# Patient Record
Sex: FEMALE | Race: BLACK OR AFRICAN AMERICAN | NOT HISPANIC OR LATINO | Employment: FULL TIME | RURAL
[De-identification: names, ages, dates, MRNs, and addresses within clinical notes are randomized per-mention and may not be internally consistent; named-entity substitution may affect disease eponyms.]

---

## 2018-11-14 ENCOUNTER — HISTORICAL (OUTPATIENT)
Dept: ADMINISTRATIVE | Facility: HOSPITAL | Age: 58
End: 2018-11-14

## 2018-11-15 LAB
LAB AP CLINICAL INFORMATION: NORMAL
LAB AP COMMENTS: NORMAL
LAB AP DIAGNOSIS - HISTORICAL: NORMAL
LAB AP GROSS PATHOLOGY - HISTORICAL: NORMAL
LAB AP SPECIMEN SUBMITTED - HISTORICAL: NORMAL

## 2018-11-28 ENCOUNTER — HISTORICAL (OUTPATIENT)
Dept: ADMINISTRATIVE | Facility: HOSPITAL | Age: 58
End: 2018-11-28

## 2018-11-30 LAB
LAB AP CAP CHECKLIST - HISTORICAL: NORMAL
LAB AP CLINICAL INFORMATION: NORMAL
LAB AP COMMENTS: NORMAL
LAB AP DIAGNOSIS - HISTORICAL: NORMAL
LAB AP GROSS PATHOLOGY - HISTORICAL: NORMAL
LAB AP SPECIMEN SUBMITTED - HISTORICAL: NORMAL

## 2019-10-09 ENCOUNTER — HISTORICAL (OUTPATIENT)
Dept: ADMINISTRATIVE | Facility: HOSPITAL | Age: 59
End: 2019-10-09

## 2019-10-10 LAB
LAB AP CLINICAL INFORMATION: NORMAL
LAB AP DIAGNOSIS - HISTORICAL: NORMAL
LAB AP GROSS PATHOLOGY - HISTORICAL: NORMAL
LAB AP SPECIMEN SUBMITTED - HISTORICAL: NORMAL

## 2020-01-29 ENCOUNTER — HISTORICAL (OUTPATIENT)
Dept: ADMINISTRATIVE | Facility: HOSPITAL | Age: 60
End: 2020-01-29

## 2020-10-13 ENCOUNTER — HISTORICAL (OUTPATIENT)
Dept: ADMINISTRATIVE | Facility: HOSPITAL | Age: 60
End: 2020-10-13

## 2020-10-13 LAB — SARS-COV+SARS-COV-2 AG RESP QL IA.RAPID: NEGATIVE

## 2020-12-01 ENCOUNTER — HISTORICAL (OUTPATIENT)
Dept: ADMINISTRATIVE | Facility: HOSPITAL | Age: 60
End: 2020-12-01

## 2021-03-03 ENCOUNTER — HISTORICAL (OUTPATIENT)
Dept: ADMINISTRATIVE | Facility: HOSPITAL | Age: 61
End: 2021-03-03

## 2021-03-31 ENCOUNTER — HOSPITAL ENCOUNTER (OUTPATIENT)
Dept: RADIOLOGY | Facility: HOSPITAL | Age: 61
Discharge: HOME | End: 2021-03-31
Attending: SURGERY
Payer: COMMERCIAL

## 2021-03-31 ENCOUNTER — OFFICE VISIT (OUTPATIENT)
Dept: SURGERY | Facility: CLINIC | Age: 61
End: 2021-03-31
Payer: COMMERCIAL

## 2021-03-31 ENCOUNTER — HISTORICAL (OUTPATIENT)
Dept: ADMINISTRATIVE | Facility: HOSPITAL | Age: 61
End: 2021-03-31

## 2021-03-31 DIAGNOSIS — E04.1 THYROID NODULE: ICD-10-CM

## 2021-03-31 DIAGNOSIS — E04.1 THYROID NODULE: Primary | ICD-10-CM

## 2021-03-31 PROCEDURE — 88173 PR  INTERPRETATION OF FNA SMEAR: ICD-10-PCS | Mod: 26,,, | Performed by: PATHOLOGY

## 2021-03-31 PROCEDURE — 10005 FNA BX W/US GDN 1ST LES: CPT | Mod: S$PBB,,, | Performed by: SURGERY

## 2021-03-31 PROCEDURE — 88173 CYTOPATH EVAL FNA REPORT: CPT | Mod: 26,,, | Performed by: PATHOLOGY

## 2021-03-31 PROCEDURE — 99999 PR PBB SHADOW E&M-EST. PATIENT-LVL III: CPT | Mod: PBBFAC,,, | Performed by: SURGERY

## 2021-03-31 PROCEDURE — 99204 OFFICE O/P NEW MOD 45 MIN: CPT | Mod: S$PBB,,, | Performed by: SURGERY

## 2021-03-31 PROCEDURE — 88305 TISSUE EXAM BY PATHOLOGIST: CPT | Mod: 26,,, | Performed by: PATHOLOGY

## 2021-03-31 PROCEDURE — 99204 PR OFFICE/OUTPT VISIT, NEW, LEVL IV, 45-59 MIN: ICD-10-PCS | Mod: S$PBB,,, | Performed by: SURGERY

## 2021-03-31 PROCEDURE — 10005 FNA BX W/US GDN 1ST LES: CPT | Mod: PBBFAC

## 2021-03-31 PROCEDURE — 88173 CYTOPATH EVAL FNA REPORT: CPT | Performed by: PATHOLOGY

## 2021-03-31 PROCEDURE — 88305 TISSUE EXAM BY PATHOLOGIST: ICD-10-PCS | Mod: 26,,, | Performed by: PATHOLOGY

## 2021-03-31 PROCEDURE — 99999 PR PBB SHADOW E&M-EST. PATIENT-LVL III: ICD-10-PCS | Mod: PBBFAC,,, | Performed by: SURGERY

## 2021-03-31 PROCEDURE — 10005 PR FINE NEEDLE ASP BIOPSY, W/US GUIDANCE, 1ST LESION: ICD-10-PCS | Mod: S$PBB,,, | Performed by: SURGERY

## 2021-03-31 PROCEDURE — 99213 OFFICE O/P EST LOW 20 MIN: CPT | Mod: PBBFAC | Performed by: SURGERY

## 2021-03-31 PROCEDURE — 88305 TISSUE EXAM BY PATHOLOGIST: CPT | Performed by: PATHOLOGY

## 2021-03-31 RX ORDER — FUROSEMIDE 20 MG/1
20 TABLET ORAL DAILY PRN
COMMUNITY
Start: 2021-02-14 | End: 2021-06-16 | Stop reason: SDUPTHER

## 2021-03-31 RX ORDER — INSULIN ASPART 100 [IU]/ML
INJECTION, SOLUTION INTRAVENOUS; SUBCUTANEOUS
COMMUNITY
Start: 2021-01-26 | End: 2021-06-16 | Stop reason: SDUPTHER

## 2021-03-31 RX ORDER — OLMESARTAN MEDOXOMIL AND HYDROCHLOROTHIAZIDE 40/25 40; 25 MG/1; MG/1
1 TABLET ORAL DAILY
COMMUNITY
Start: 2021-03-24 | End: 2021-06-16 | Stop reason: SDUPTHER

## 2021-03-31 RX ORDER — ROSUVASTATIN CALCIUM 40 MG/1
40 TABLET, COATED ORAL DAILY
COMMUNITY
Start: 2021-01-26 | End: 2021-06-16 | Stop reason: SDUPTHER

## 2021-03-31 RX ORDER — FERROUS SULFATE 324(65)MG
324 TABLET, DELAYED RELEASE (ENTERIC COATED) ORAL DAILY
COMMUNITY
End: 2021-06-16 | Stop reason: SDUPTHER

## 2021-03-31 RX ORDER — EMPAGLIFLOZIN 25 MG/1
25 TABLET, FILM COATED ORAL EVERY MORNING
Status: ON HOLD | COMMUNITY
Start: 2021-02-10 | End: 2021-05-15 | Stop reason: HOSPADM

## 2021-04-01 LAB
INSULIN SERPL-ACNC: NORMAL U[IU]/ML
LAB AP CLINICAL INFORMATION: NORMAL
LAB AP COMMENTS: NORMAL
LAB AP DIAGNOSIS - HISTORICAL: NORMAL
LAB AP MICROSCOPIC PATHOLOGY - HISTORICAL: NORMAL
LAB AP SPECIMEN SUBMITTED - HISTORICAL: NORMAL

## 2021-04-05 ENCOUNTER — TELEPHONE (OUTPATIENT)
Dept: SURGERY | Facility: CLINIC | Age: 61
End: 2021-04-05

## 2021-04-06 DIAGNOSIS — E04.1 THYROID NODULE GREATER THAN OR EQUAL TO 1 CM IN DIAMETER INCIDENTALLY NOTED ON IMAGING STUDY: Primary | ICD-10-CM

## 2021-05-13 ENCOUNTER — HOSPITAL ENCOUNTER (INPATIENT)
Facility: HOSPITAL | Age: 61
LOS: 2 days | Discharge: HOME | DRG: 247 | End: 2021-05-15
Attending: INTERNAL MEDICINE | Admitting: INTERNAL MEDICINE
Payer: COMMERCIAL

## 2021-05-13 DIAGNOSIS — R07.9 CHEST PAIN: ICD-10-CM

## 2021-05-13 DIAGNOSIS — I21.4 NSTEMI (NON-ST ELEVATED MYOCARDIAL INFARCTION): Primary | ICD-10-CM

## 2021-05-13 DIAGNOSIS — I48.91 ATRIAL FIBRILLATION WITH RAPID VENTRICULAR RESPONSE: ICD-10-CM

## 2021-05-13 DIAGNOSIS — I25.10 CORONARY ARTERY DISEASE: ICD-10-CM

## 2021-05-13 PROCEDURE — 11000001 HC ACUTE MED/SURG PRIVATE ROOM

## 2021-05-13 PROCEDURE — 25000003 PHARM REV CODE 250: Performed by: INTERNAL MEDICINE

## 2021-05-13 PROCEDURE — 36415 COLL VENOUS BLD VENIPUNCTURE: CPT | Performed by: INTERNAL MEDICINE

## 2021-05-13 PROCEDURE — 84443 ASSAY THYROID STIM HORMONE: CPT | Performed by: INTERNAL MEDICINE

## 2021-05-13 PROCEDURE — 84484 ASSAY OF TROPONIN QUANT: CPT | Performed by: INTERNAL MEDICINE

## 2021-05-13 RX ORDER — LOSARTAN POTASSIUM 100 MG/1
100 TABLET ORAL DAILY
Status: DISCONTINUED | OUTPATIENT
Start: 2021-05-14 | End: 2021-05-15 | Stop reason: HOSPADM

## 2021-05-13 RX ORDER — DILTIAZEM HYDROCHLORIDE 30 MG/1
30 TABLET, FILM COATED ORAL EVERY 6 HOURS
Status: DISCONTINUED | OUTPATIENT
Start: 2021-05-14 | End: 2021-05-14

## 2021-05-13 RX ORDER — GLUCAGON 1 MG
1 KIT INJECTION
Status: DISCONTINUED | OUTPATIENT
Start: 2021-05-14 | End: 2021-05-15 | Stop reason: HOSPADM

## 2021-05-13 RX ORDER — ACETAMINOPHEN 325 MG/1
650 TABLET ORAL EVERY 4 HOURS PRN
Status: DISCONTINUED | OUTPATIENT
Start: 2021-05-14 | End: 2021-05-14

## 2021-05-13 RX ORDER — ONDANSETRON 2 MG/ML
4 INJECTION INTRAMUSCULAR; INTRAVENOUS EVERY 8 HOURS PRN
Status: DISCONTINUED | OUTPATIENT
Start: 2021-05-14 | End: 2021-05-15 | Stop reason: HOSPADM

## 2021-05-13 RX ORDER — METOPROLOL TARTRATE 25 MG/1
25 TABLET, FILM COATED ORAL 2 TIMES DAILY
Status: DISCONTINUED | OUTPATIENT
Start: 2021-05-14 | End: 2021-05-14

## 2021-05-13 RX ORDER — ACETAMINOPHEN 325 MG/1
650 TABLET ORAL EVERY 8 HOURS PRN
Status: DISCONTINUED | OUTPATIENT
Start: 2021-05-14 | End: 2021-05-14

## 2021-05-13 RX ORDER — DILTIAZEM HYDROCHLORIDE 5 MG/ML
10 INJECTION INTRAVENOUS
Status: DISCONTINUED | OUTPATIENT
Start: 2021-05-14 | End: 2021-05-15 | Stop reason: HOSPADM

## 2021-05-13 RX ORDER — HYDROCODONE BITARTRATE AND ACETAMINOPHEN 5; 325 MG/1; MG/1
1 TABLET ORAL EVERY 6 HOURS PRN
Status: DISCONTINUED | OUTPATIENT
Start: 2021-05-14 | End: 2021-05-15 | Stop reason: HOSPADM

## 2021-05-13 RX ORDER — ATORVASTATIN CALCIUM 10 MG/1
10 TABLET, FILM COATED ORAL DAILY
Status: DISCONTINUED | OUTPATIENT
Start: 2021-05-14 | End: 2021-05-14

## 2021-05-13 RX ORDER — PROCHLORPERAZINE EDISYLATE 5 MG/ML
5 INJECTION INTRAMUSCULAR; INTRAVENOUS EVERY 6 HOURS PRN
Status: DISCONTINUED | OUTPATIENT
Start: 2021-05-14 | End: 2021-05-15 | Stop reason: HOSPADM

## 2021-05-13 RX ORDER — INSULIN ASPART 100 [IU]/ML
0-5 INJECTION, SOLUTION INTRAVENOUS; SUBCUTANEOUS
Status: DISCONTINUED | OUTPATIENT
Start: 2021-05-14 | End: 2021-05-15 | Stop reason: HOSPADM

## 2021-05-13 RX ORDER — FERROUS SULFATE 325(65) MG
325 TABLET ORAL DAILY
Status: DISCONTINUED | OUTPATIENT
Start: 2021-05-14 | End: 2021-05-15 | Stop reason: HOSPADM

## 2021-05-13 RX ORDER — IBUPROFEN 200 MG
24 TABLET ORAL
Status: DISCONTINUED | OUTPATIENT
Start: 2021-05-14 | End: 2021-05-13 | Stop reason: RX

## 2021-05-13 RX ORDER — SODIUM CHLORIDE 0.9 % (FLUSH) 0.9 %
10 SYRINGE (ML) INJECTION
Status: DISCONTINUED | OUTPATIENT
Start: 2021-05-14 | End: 2021-05-15 | Stop reason: HOSPADM

## 2021-05-13 RX ORDER — IBUPROFEN 200 MG
16 TABLET ORAL
Status: DISCONTINUED | OUTPATIENT
Start: 2021-05-14 | End: 2021-05-13 | Stop reason: RX

## 2021-05-13 RX ADMIN — METOPROLOL TARTRATE 25 MG: 25 TABLET, FILM COATED ORAL at 11:05

## 2021-05-13 RX ADMIN — HYDROCODONE BITARTRATE AND ACETAMINOPHEN 1 TABLET: 5; 325 TABLET ORAL at 11:05

## 2021-05-13 RX ADMIN — DILTIAZEM HYDROCHLORIDE 30 MG: 30 TABLET, FILM COATED ORAL at 11:05

## 2021-05-14 PROBLEM — I21.4 NSTEMI (NON-ST ELEVATED MYOCARDIAL INFARCTION): Status: ACTIVE | Noted: 2021-05-14

## 2021-05-14 PROBLEM — E11.9 DIABETES MELLITUS WITHOUT COMPLICATION: Status: ACTIVE | Noted: 2021-05-14

## 2021-05-14 PROBLEM — R07.9 CHEST PAIN: Status: ACTIVE | Noted: 2021-05-14

## 2021-05-14 PROBLEM — C18.9 COLON CANCER: Status: ACTIVE | Noted: 2021-05-14

## 2021-05-14 LAB
ALBUMIN SERPL BCP-MCNC: 3.2 G/DL (ref 3.5–5)
ALBUMIN/GLOB SERPL: 0.9 {RATIO}
ALP SERPL-CCNC: 90 U/L (ref 46–118)
ALT SERPL W P-5'-P-CCNC: 25 U/L (ref 13–56)
ANION GAP SERPL CALCULATED.3IONS-SCNC: 19 MMOL/L (ref 7–16)
AST SERPL W P-5'-P-CCNC: 23 U/L (ref 15–37)
AV INDEX (PROSTH): 0.73
AV VALVE AREA: 1.65 CM2
BASOPHILS # BLD AUTO: 0.05 K/UL (ref 0–0.2)
BASOPHILS NFR BLD AUTO: 0.6 % (ref 0–1)
BILIRUB SERPL-MCNC: 0.2 MG/DL (ref 0–1.2)
BSA FOR ECHO PROCEDURE: 2.02 M2
BUN SERPL-MCNC: 55 MG/DL (ref 7–18)
BUN/CREAT SERPL: 35 (ref 6–20)
CALCIUM SERPL-MCNC: 8.4 MG/DL (ref 8.5–10.1)
CHLORIDE SERPL-SCNC: 104 MMOL/L (ref 98–107)
CO2 SERPL-SCNC: 20 MMOL/L (ref 21–32)
CREAT SERPL-MCNC: 1.58 MG/DL (ref 0.55–1.02)
CV ECHO LV RWT: 0.72 CM
D DIMER PPP FEU-MCNC: 0.5 ΜG/ML (ref 0–0.47)
DIFFERENTIAL METHOD BLD: ABNORMAL
DOP CALC AO VTI: 36.19 CM
DOP CALC LVOT AREA: 2.3 CM2
DOP CALC LVOT DIAMETER: 1.7 CM
DOP CALC LVOT STROKE VOLUME: 59.85 CM3
DOP CALCLVOT PEAK VEL VTI: 26.38 CM
ECHO LV POSTERIOR WALL: 1.3 CM (ref 0.6–1.1)
EJECTION FRACTION: 65 %
EOSINOPHIL # BLD AUTO: 0.14 K/UL (ref 0–0.5)
EOSINOPHIL NFR BLD AUTO: 1.7 % (ref 1–4)
ERYTHROCYTE [DISTWIDTH] IN BLOOD BY AUTOMATED COUNT: 13.3 % (ref 11.5–14.5)
FRACTIONAL SHORTENING: 47 % (ref 28–44)
GLOBULIN SER-MCNC: 3.5 G/DL (ref 2–4)
GLUCOSE SERPL-MCNC: 175 MG/DL (ref 70–105)
GLUCOSE SERPL-MCNC: 198 MG/DL (ref 70–105)
GLUCOSE SERPL-MCNC: 253 MG/DL (ref 74–106)
GLUCOSE SERPL-MCNC: 270 MG/DL (ref 70–105)
HCT VFR BLD AUTO: 36.3 % (ref 38–47)
HGB BLD-MCNC: 10.9 G/DL (ref 12–16)
IMM GRANULOCYTES # BLD AUTO: 0.01 K/UL (ref 0–0.04)
IMM GRANULOCYTES NFR BLD: 0.1 % (ref 0–0.4)
INTERVENTRICULAR SEPTUM: 1.3 CM (ref 0.6–1.1)
LEFT ATRIUM SIZE: 3.9 CM
LEFT INTERNAL DIMENSION IN SYSTOLE: 1.9 CM (ref 2.1–4)
LEFT VENTRICLE MASS INDEX: 83 G/M2
LEFT VENTRICULAR INTERNAL DIMENSION IN DIASTOLE: 3.6 CM (ref 3.5–6)
LEFT VENTRICULAR MASS: 160.07 G
LYMPHOCYTES # BLD AUTO: 3 K/UL (ref 1–4.8)
LYMPHOCYTES NFR BLD AUTO: 36.8 % (ref 27–41)
MCH RBC QN AUTO: 26.7 PG (ref 27–31)
MCHC RBC AUTO-ENTMCNC: 30 G/DL (ref 32–36)
MCV RBC AUTO: 88.8 FL (ref 80–96)
MONOCYTES # BLD AUTO: 0.53 K/UL (ref 0–0.8)
MONOCYTES NFR BLD AUTO: 6.5 % (ref 2–6)
MPC BLD CALC-MCNC: 11.2 FL (ref 9.4–12.4)
NEUTROPHILS # BLD AUTO: 4.42 K/UL (ref 1.8–7.7)
NEUTROPHILS NFR BLD AUTO: 54.3 % (ref 53–65)
NRBC # BLD AUTO: 0 X10E3/UL
NRBC, AUTO (.00): 0 %
PLATELET # BLD AUTO: 373 K/UL (ref 150–400)
POTASSIUM SERPL-SCNC: 4.8 MMOL/L (ref 3.5–5.1)
PROT SERPL-MCNC: 6.7 G/DL (ref 6.4–8.2)
RBC # BLD AUTO: 4.09 M/UL (ref 4.2–5.4)
SODIUM SERPL-SCNC: 138 MMOL/L (ref 136–145)
TROPONIN I SERPL-MCNC: 0.16 NG/ML
TROPONIN I SERPL-MCNC: 0.17 NG/ML
TROPONIN I SERPL-MCNC: 0.19 NG/ML
TSH SERPL DL<=0.005 MIU/L-ACNC: 1.78 UIU/ML (ref 0.36–3.74)
WBC # BLD AUTO: 8.15 K/UL (ref 4.5–11)

## 2021-05-14 PROCEDURE — 82962 GLUCOSE BLOOD TEST: CPT

## 2021-05-14 PROCEDURE — 99223 1ST HOSP IP/OBS HIGH 75: CPT | Mod: 25,,, | Performed by: INTERNAL MEDICINE

## 2021-05-14 PROCEDURE — 93458 L HRT ARTERY/VENTRICLE ANGIO: CPT | Performed by: INTERNAL MEDICINE

## 2021-05-14 PROCEDURE — 99153 MOD SED SAME PHYS/QHP EA: CPT | Performed by: INTERNAL MEDICINE

## 2021-05-14 PROCEDURE — 27000221 HC OXYGEN, UP TO 24 HOURS

## 2021-05-14 PROCEDURE — 25000003 PHARM REV CODE 250: Performed by: INTERNAL MEDICINE

## 2021-05-14 PROCEDURE — 93458 PR CATH PLACE/CORON ANGIO, IMG SUPER/INTERP,W LEFT HEART VENTRICULOGRAPHY: ICD-10-PCS | Mod: 26,XU,, | Performed by: INTERNAL MEDICINE

## 2021-05-14 PROCEDURE — 27000080 OPTIME MED/SURG SUP & DEVICES GENERAL CLASSIFICATION: Performed by: INTERNAL MEDICINE

## 2021-05-14 PROCEDURE — 27100168 OPTIME MED/SURG SUP & DEVICES NON-STERILE SUPPLY: Performed by: INTERNAL MEDICINE

## 2021-05-14 PROCEDURE — 36252 INS CATH REN ART 1ST BILAT: CPT | Mod: ,,, | Performed by: INTERNAL MEDICINE

## 2021-05-14 PROCEDURE — 99152 MOD SED SAME PHYS/QHP 5/>YRS: CPT | Performed by: INTERNAL MEDICINE

## 2021-05-14 PROCEDURE — 27201423 OPTIME MED/SURG SUP & DEVICES STERILE SUPPLY: Performed by: INTERNAL MEDICINE

## 2021-05-14 PROCEDURE — 27800903 OPTIME MED/SURG SUP & DEVICES OTHER IMPLANTS: Performed by: INTERNAL MEDICINE

## 2021-05-14 PROCEDURE — 36252 PR INS CATH REN ART 1ST BILAT: ICD-10-PCS | Mod: ,,, | Performed by: INTERNAL MEDICINE

## 2021-05-14 PROCEDURE — 36415 COLL VENOUS BLD VENIPUNCTURE: CPT | Performed by: INTERNAL MEDICINE

## 2021-05-14 PROCEDURE — 36252 INS CATH REN ART 1ST BILAT: CPT | Performed by: INTERNAL MEDICINE

## 2021-05-14 PROCEDURE — 93458 L HRT ARTERY/VENTRICLE ANGIO: CPT | Mod: 26,XU,, | Performed by: INTERNAL MEDICINE

## 2021-05-14 PROCEDURE — C1887 CATHETER, GUIDING: HCPCS | Performed by: INTERNAL MEDICINE

## 2021-05-14 PROCEDURE — 25500020 PHARM REV CODE 255: Performed by: INTERNAL MEDICINE

## 2021-05-14 PROCEDURE — C9600 PERC DRUG-EL COR STENT SING: HCPCS | Mod: RC | Performed by: INTERNAL MEDICINE

## 2021-05-14 PROCEDURE — 92928 PR STENT: ICD-10-PCS | Mod: XS,RC,, | Performed by: INTERNAL MEDICINE

## 2021-05-14 PROCEDURE — 84484 ASSAY OF TROPONIN QUANT: CPT | Performed by: INTERNAL MEDICINE

## 2021-05-14 PROCEDURE — C1894 INTRO/SHEATH, NON-LASER: HCPCS | Performed by: INTERNAL MEDICINE

## 2021-05-14 PROCEDURE — 63600175 PHARM REV CODE 636 W HCPCS: Performed by: INTERNAL MEDICINE

## 2021-05-14 PROCEDURE — 94761 N-INVAS EAR/PLS OXIMETRY MLT: CPT

## 2021-05-14 PROCEDURE — 80053 COMPREHEN METABOLIC PANEL: CPT | Performed by: INTERNAL MEDICINE

## 2021-05-14 PROCEDURE — 85378 FIBRIN DEGRADE SEMIQUANT: CPT | Performed by: INTERNAL MEDICINE

## 2021-05-14 PROCEDURE — C1760 CLOSURE DEV, VASC: HCPCS | Performed by: INTERNAL MEDICINE

## 2021-05-14 PROCEDURE — 99223 1ST HOSP IP/OBS HIGH 75: CPT | Mod: ,,, | Performed by: INTERNAL MEDICINE

## 2021-05-14 PROCEDURE — 85025 COMPLETE CBC W/AUTO DIFF WBC: CPT | Performed by: INTERNAL MEDICINE

## 2021-05-14 PROCEDURE — 11000001 HC ACUTE MED/SURG PRIVATE ROOM

## 2021-05-14 PROCEDURE — 92928 PRQ TCAT PLMT NTRAC ST 1 LES: CPT | Mod: XS,RC,, | Performed by: INTERNAL MEDICINE

## 2021-05-14 PROCEDURE — 99223 PR INITIAL HOSPITAL CARE,LEVL III: ICD-10-PCS | Mod: 25,,, | Performed by: INTERNAL MEDICINE

## 2021-05-14 PROCEDURE — 25000003 PHARM REV CODE 250: Performed by: NURSE PRACTITIONER

## 2021-05-14 PROCEDURE — C1874 STENT, COATED/COV W/DEL SYS: HCPCS | Performed by: INTERNAL MEDICINE

## 2021-05-14 PROCEDURE — C1769 GUIDE WIRE: HCPCS | Performed by: INTERNAL MEDICINE

## 2021-05-14 PROCEDURE — 99223 PR INITIAL HOSPITAL CARE,LEVL III: ICD-10-PCS | Mod: ,,, | Performed by: INTERNAL MEDICINE

## 2021-05-14 DEVICE — XIENCE SIERRA™ EVEROLIMUS ELUTING CORONARY STENT SYSTEM 2.75 MM X 18 MM / RAPID-EXCHANGE
Type: IMPLANTABLE DEVICE | Site: CORONARY | Status: FUNCTIONAL
Brand: XIENCE SIERRA™

## 2021-05-14 DEVICE — XIENCE SIERRA™ EVEROLIMUS ELUTING CORONARY STENT SYSTEM 2.75 MM X 15 MM / RAPID-EXCHANGE
Type: IMPLANTABLE DEVICE | Site: CORONARY | Status: FUNCTIONAL
Brand: XIENCE SIERRA™

## 2021-05-14 RX ORDER — ASPIRIN 325 MG
TABLET, DELAYED RELEASE (ENTERIC COATED) ORAL
Status: DISCONTINUED | OUTPATIENT
Start: 2021-05-14 | End: 2021-05-14

## 2021-05-14 RX ORDER — MIDAZOLAM HYDROCHLORIDE 5 MG/ML
INJECTION INTRAMUSCULAR; INTRAVENOUS
Status: DISCONTINUED | OUTPATIENT
Start: 2021-05-14 | End: 2021-05-14 | Stop reason: HOSPADM

## 2021-05-14 RX ORDER — DIPHENHYDRAMINE HCL 25 MG
CAPSULE ORAL
Status: DISCONTINUED | OUTPATIENT
Start: 2021-05-14 | End: 2021-05-14 | Stop reason: HOSPADM

## 2021-05-14 RX ORDER — SPIRONOLACTONE 50 MG/1
50 TABLET, FILM COATED ORAL DAILY
Status: ON HOLD | COMMUNITY
End: 2021-05-28 | Stop reason: HOSPADM

## 2021-05-14 RX ORDER — SODIUM CHLORIDE 9 MG/ML
INJECTION, SOLUTION INTRAVENOUS
Status: DISCONTINUED | OUTPATIENT
Start: 2021-05-14 | End: 2021-05-15 | Stop reason: HOSPADM

## 2021-05-14 RX ORDER — SODIUM CHLORIDE 9 MG/ML
INJECTION, SOLUTION INTRAVENOUS CONTINUOUS
Status: DISCONTINUED | OUTPATIENT
Start: 2021-05-14 | End: 2021-05-15 | Stop reason: HOSPADM

## 2021-05-14 RX ORDER — ASPIRIN 81 MG/1
81 TABLET ORAL DAILY
Status: DISCONTINUED | OUTPATIENT
Start: 2021-05-14 | End: 2021-05-14

## 2021-05-14 RX ORDER — NITROGLYCERIN 5 MG/ML
INJECTION, SOLUTION INTRAVENOUS
Status: DISCONTINUED | OUTPATIENT
Start: 2021-05-14 | End: 2021-05-14 | Stop reason: HOSPADM

## 2021-05-14 RX ORDER — LIDOCAINE HYDROCHLORIDE 10 MG/ML
INJECTION INFILTRATION; PERINEURAL
Status: DISCONTINUED | OUTPATIENT
Start: 2021-05-14 | End: 2021-05-14 | Stop reason: HOSPADM

## 2021-05-14 RX ORDER — ASPIRIN 81 MG/1
81 TABLET ORAL DAILY
COMMUNITY
End: 2021-07-14 | Stop reason: ALTCHOICE

## 2021-05-14 RX ORDER — FENTANYL CITRATE 50 UG/ML
INJECTION, SOLUTION INTRAMUSCULAR; INTRAVENOUS
Status: DISCONTINUED | OUTPATIENT
Start: 2021-05-14 | End: 2021-05-14 | Stop reason: HOSPADM

## 2021-05-14 RX ORDER — DIAZEPAM 5 MG/1
TABLET ORAL
Status: DISCONTINUED | OUTPATIENT
Start: 2021-05-14 | End: 2021-05-14 | Stop reason: HOSPADM

## 2021-05-14 RX ORDER — ONDANSETRON 4 MG/1
8 TABLET, ORALLY DISINTEGRATING ORAL EVERY 8 HOURS PRN
Status: DISCONTINUED | OUTPATIENT
Start: 2021-05-14 | End: 2021-05-15 | Stop reason: HOSPADM

## 2021-05-14 RX ORDER — MELOXICAM 7.5 MG/1
7.5 TABLET ORAL DAILY
Status: ON HOLD | COMMUNITY
End: 2021-05-15 | Stop reason: HOSPADM

## 2021-05-14 RX ORDER — ASPIRIN 81 MG/1
81 TABLET ORAL DAILY
Status: DISCONTINUED | OUTPATIENT
Start: 2021-05-14 | End: 2021-05-15 | Stop reason: HOSPADM

## 2021-05-14 RX ORDER — ATORVASTATIN CALCIUM 80 MG/1
80 TABLET, FILM COATED ORAL DAILY
Status: DISCONTINUED | OUTPATIENT
Start: 2021-05-14 | End: 2021-05-15 | Stop reason: HOSPADM

## 2021-05-14 RX ORDER — ACETAMINOPHEN 325 MG/1
650 TABLET ORAL EVERY 4 HOURS PRN
Status: DISCONTINUED | OUTPATIENT
Start: 2021-05-14 | End: 2021-05-15 | Stop reason: HOSPADM

## 2021-05-14 RX ORDER — METOPROLOL TARTRATE 25 MG/1
25 TABLET, FILM COATED ORAL 4 TIMES DAILY
Status: DISCONTINUED | OUTPATIENT
Start: 2021-05-14 | End: 2021-05-15 | Stop reason: HOSPADM

## 2021-05-14 RX ORDER — SODIUM CHLORIDE 450 MG/100ML
INJECTION, SOLUTION INTRAVENOUS CONTINUOUS
Status: DISCONTINUED | OUTPATIENT
Start: 2021-05-14 | End: 2021-05-15 | Stop reason: HOSPADM

## 2021-05-14 RX ADMIN — SODIUM CHLORIDE: 4.5 INJECTION, SOLUTION INTRAVENOUS at 02:05

## 2021-05-14 RX ADMIN — ASPIRIN 81 MG: 81 TABLET, COATED ORAL at 02:05

## 2021-05-14 RX ADMIN — METOPROLOL TARTRATE 25 MG: 25 TABLET, FILM COATED ORAL at 09:05

## 2021-05-14 RX ADMIN — SODIUM CHLORIDE: 9 INJECTION, SOLUTION INTRAVENOUS at 03:05

## 2021-05-14 RX ADMIN — FERROUS SULFATE TAB 325 MG (65 MG ELEMENTAL FE) 325 MG: 325 (65 FE) TAB at 09:05

## 2021-05-14 RX ADMIN — ATORVASTATIN CALCIUM 80 MG: 80 TABLET, FILM COATED ORAL at 09:05

## 2021-05-14 RX ADMIN — TICAGRELOR 90 MG: 90 TABLET ORAL at 09:05

## 2021-05-14 RX ADMIN — ASPIRIN 81 MG: 81 TABLET, COATED ORAL at 09:05

## 2021-05-14 RX ADMIN — DILTIAZEM HYDROCHLORIDE 30 MG: 30 TABLET, FILM COATED ORAL at 05:05

## 2021-05-15 VITALS
HEIGHT: 62 IN | WEIGHT: 205 LBS | TEMPERATURE: 98 F | BODY MASS INDEX: 37.73 KG/M2 | RESPIRATION RATE: 16 BRPM | OXYGEN SATURATION: 99 % | SYSTOLIC BLOOD PRESSURE: 135 MMHG | HEART RATE: 72 BPM | DIASTOLIC BLOOD PRESSURE: 56 MMHG

## 2021-05-15 PROBLEM — N18.4 CKD (CHRONIC KIDNEY DISEASE), STAGE IV: Chronic | Status: ACTIVE | Noted: 2021-05-15

## 2021-05-15 LAB
ANION GAP SERPL CALCULATED.3IONS-SCNC: 16 MMOL/L (ref 7–16)
BASOPHILS # BLD AUTO: 0.04 K/UL (ref 0–0.2)
BASOPHILS NFR BLD AUTO: 0.5 % (ref 0–1)
BUN SERPL-MCNC: 46 MG/DL (ref 7–18)
BUN/CREAT SERPL: 30 (ref 6–20)
CALCIUM SERPL-MCNC: 8.5 MG/DL (ref 8.5–10.1)
CHLORIDE SERPL-SCNC: 106 MMOL/L (ref 98–107)
CHOLEST SERPL-MCNC: 141 MG/DL (ref 0–200)
CHOLEST/HDLC SERPL: 3.3 {RATIO}
CO2 SERPL-SCNC: 23 MMOL/L (ref 21–32)
CREAT SERPL-MCNC: 1.51 MG/DL (ref 0.55–1.02)
DIFFERENTIAL METHOD BLD: ABNORMAL
EOSINOPHIL # BLD AUTO: 0.17 K/UL (ref 0–0.5)
EOSINOPHIL NFR BLD AUTO: 2.3 % (ref 1–4)
ERYTHROCYTE [DISTWIDTH] IN BLOOD BY AUTOMATED COUNT: 13.6 % (ref 11.5–14.5)
GLUCOSE SERPL-MCNC: 216 MG/DL (ref 70–105)
GLUCOSE SERPL-MCNC: 256 MG/DL (ref 74–106)
HCT VFR BLD AUTO: 34.1 % (ref 38–47)
HCT VFR BLD AUTO: 34.2 % (ref 38–47)
HDLC SERPL-MCNC: 43 MG/DL (ref 40–60)
HGB BLD-MCNC: 10.5 G/DL (ref 12–16)
HGB BLD-MCNC: 10.5 G/DL (ref 12–16)
IMM GRANULOCYTES # BLD AUTO: 0.02 K/UL (ref 0–0.04)
IMM GRANULOCYTES NFR BLD: 0.3 % (ref 0–0.4)
LDLC SERPL CALC-MCNC: 62 MG/DL
LDLC/HDLC SERPL: 1.4 {RATIO}
LYMPHOCYTES # BLD AUTO: 2.75 K/UL (ref 1–4.8)
LYMPHOCYTES NFR BLD AUTO: 37.7 % (ref 27–41)
MCH RBC QN AUTO: 27.3 PG (ref 27–31)
MCHC RBC AUTO-ENTMCNC: 30.7 G/DL (ref 32–36)
MCV RBC AUTO: 89.1 FL (ref 80–96)
MONOCYTES # BLD AUTO: 0.48 K/UL (ref 0–0.8)
MONOCYTES NFR BLD AUTO: 6.6 % (ref 2–6)
MPC BLD CALC-MCNC: 11.3 FL (ref 9.4–12.4)
NEUTROPHILS # BLD AUTO: 3.83 K/UL (ref 1.8–7.7)
NEUTROPHILS NFR BLD AUTO: 52.6 % (ref 53–65)
NONHDLC SERPL-MCNC: 98 MG/DL
NRBC # BLD AUTO: 0 X10E3/UL
NRBC, AUTO (.00): 0 %
PLATELET # BLD AUTO: 342 K/UL (ref 150–400)
POTASSIUM SERPL-SCNC: 4.7 MMOL/L (ref 3.5–5.1)
RBC # BLD AUTO: 3.84 M/UL (ref 4.2–5.4)
SODIUM SERPL-SCNC: 140 MMOL/L (ref 136–145)
T4 SERPL-MCNC: 4.8 ΜG/DL (ref 4.8–13.9)
TRIGL SERPL-MCNC: 179 MG/DL (ref 35–150)
TSH SERPL DL<=0.005 MIU/L-ACNC: 2.07 UIU/ML (ref 0.36–3.74)
VLDLC SERPL-MCNC: 36 MG/DL
WBC # BLD AUTO: 7.29 K/UL (ref 4.5–11)

## 2021-05-15 PROCEDURE — 25000003 PHARM REV CODE 250: Performed by: INTERNAL MEDICINE

## 2021-05-15 PROCEDURE — 85014 HEMATOCRIT: CPT | Performed by: HOSPITALIST

## 2021-05-15 PROCEDURE — 99239 HOSP IP/OBS DSCHRG MGMT >30: CPT | Mod: ,,, | Performed by: INTERNAL MEDICINE

## 2021-05-15 PROCEDURE — 99239 PR HOSPITAL DISCHARGE DAY,>30 MIN: ICD-10-PCS | Mod: ,,, | Performed by: INTERNAL MEDICINE

## 2021-05-15 PROCEDURE — 36415 COLL VENOUS BLD VENIPUNCTURE: CPT | Performed by: INTERNAL MEDICINE

## 2021-05-15 PROCEDURE — 36415 COLL VENOUS BLD VENIPUNCTURE: CPT | Performed by: HOSPITALIST

## 2021-05-15 PROCEDURE — 80048 BASIC METABOLIC PNL TOTAL CA: CPT | Performed by: HOSPITALIST

## 2021-05-15 PROCEDURE — 82962 GLUCOSE BLOOD TEST: CPT

## 2021-05-15 PROCEDURE — 63600175 PHARM REV CODE 636 W HCPCS: Performed by: INTERNAL MEDICINE

## 2021-05-15 PROCEDURE — 84443 ASSAY THYROID STIM HORMONE: CPT | Performed by: HOSPITALIST

## 2021-05-15 PROCEDURE — 85018 HEMOGLOBIN: CPT | Performed by: HOSPITALIST

## 2021-05-15 PROCEDURE — 25000003 PHARM REV CODE 250: Performed by: NURSE PRACTITIONER

## 2021-05-15 PROCEDURE — 80061 LIPID PANEL: CPT | Performed by: HOSPITALIST

## 2021-05-15 PROCEDURE — 84436 ASSAY OF TOTAL THYROXINE: CPT | Performed by: HOSPITALIST

## 2021-05-15 PROCEDURE — 25000003 PHARM REV CODE 250: Performed by: HOSPITALIST

## 2021-05-15 PROCEDURE — 85025 COMPLETE CBC W/AUTO DIFF WBC: CPT | Performed by: INTERNAL MEDICINE

## 2021-05-15 PROCEDURE — 94761 N-INVAS EAR/PLS OXIMETRY MLT: CPT

## 2021-05-15 RX ORDER — PANTOPRAZOLE SODIUM 40 MG/1
40 TABLET, DELAYED RELEASE ORAL DAILY
Status: DISCONTINUED | OUTPATIENT
Start: 2021-05-15 | End: 2021-05-15 | Stop reason: HOSPADM

## 2021-05-15 RX ORDER — CLOPIDOGREL BISULFATE 75 MG/1
300 TABLET ORAL ONCE
Status: COMPLETED | OUTPATIENT
Start: 2021-05-15 | End: 2021-05-15

## 2021-05-15 RX ORDER — METOPROLOL TARTRATE 25 MG/1
25 TABLET, FILM COATED ORAL 2 TIMES DAILY
Qty: 30 TABLET | Refills: 2 | Status: SHIPPED | OUTPATIENT
Start: 2021-05-15 | End: 2021-06-01 | Stop reason: SDUPTHER

## 2021-05-15 RX ORDER — CLOPIDOGREL BISULFATE 75 MG/1
75 TABLET ORAL DAILY
Qty: 90 TABLET | Refills: 4 | Status: SHIPPED | OUTPATIENT
Start: 2021-05-15 | End: 2022-01-04 | Stop reason: SDUPTHER

## 2021-05-15 RX ORDER — PANTOPRAZOLE SODIUM 40 MG/1
40 TABLET, DELAYED RELEASE ORAL DAILY
Qty: 30 TABLET | Refills: 11 | Status: SHIPPED | OUTPATIENT
Start: 2021-05-16 | End: 2021-06-16 | Stop reason: SDUPTHER

## 2021-05-15 RX ADMIN — METOPROLOL TARTRATE 25 MG: 25 TABLET, FILM COATED ORAL at 09:05

## 2021-05-15 RX ADMIN — FERROUS SULFATE TAB 325 MG (65 MG ELEMENTAL FE) 325 MG: 325 (65 FE) TAB at 09:05

## 2021-05-15 RX ADMIN — LOSARTAN POTASSIUM 100 MG: 100 TABLET, FILM COATED ORAL at 09:05

## 2021-05-15 RX ADMIN — SODIUM CHLORIDE: 9 INJECTION, SOLUTION INTRAVENOUS at 03:05

## 2021-05-15 RX ADMIN — TICAGRELOR 90 MG: 90 TABLET ORAL at 09:05

## 2021-05-15 RX ADMIN — APIXABAN 5 MG: 5 TABLET, FILM COATED ORAL at 10:05

## 2021-05-15 RX ADMIN — CLOPIDOGREL 300 MG: 75 TABLET, FILM COATED ORAL at 10:05

## 2021-05-15 RX ADMIN — ASPIRIN 81 MG: 81 TABLET, COATED ORAL at 09:05

## 2021-05-15 RX ADMIN — PANTOPRAZOLE SODIUM 40 MG: 40 TABLET, DELAYED RELEASE ORAL at 09:05

## 2021-05-15 RX ADMIN — INSULIN ASPART 2 UNITS: 100 INJECTION, SOLUTION INTRAVENOUS; SUBCUTANEOUS at 05:05

## 2021-05-15 RX ADMIN — ATORVASTATIN CALCIUM 80 MG: 80 TABLET, FILM COATED ORAL at 09:05

## 2021-05-26 ENCOUNTER — OFFICE VISIT (OUTPATIENT)
Dept: CARDIOLOGY | Facility: CLINIC | Age: 61
End: 2021-05-26
Payer: COMMERCIAL

## 2021-05-26 ENCOUNTER — HOSPITAL ENCOUNTER (OUTPATIENT)
Facility: HOSPITAL | Age: 61
Discharge: HOME | End: 2021-05-28
Attending: FAMILY MEDICINE | Admitting: HOSPITALIST
Payer: COMMERCIAL

## 2021-05-26 VITALS
OXYGEN SATURATION: 96 % | WEIGHT: 186 LBS | HEART RATE: 99 BPM | HEIGHT: 62 IN | SYSTOLIC BLOOD PRESSURE: 126 MMHG | DIASTOLIC BLOOD PRESSURE: 70 MMHG | BODY MASS INDEX: 34.23 KG/M2

## 2021-05-26 DIAGNOSIS — Z53.20 PROCEDURE NOT CARRIED OUT BECAUSE OF PATIENT'S DECISION: ICD-10-CM

## 2021-05-26 DIAGNOSIS — R07.89 OTHER CHEST PAIN: ICD-10-CM

## 2021-05-26 DIAGNOSIS — I48.91 ATRIAL FIBRILLATION: Chronic | ICD-10-CM

## 2021-05-26 DIAGNOSIS — C18.9 MALIGNANT NEOPLASM OF COLON, UNSPECIFIED PART OF COLON: ICD-10-CM

## 2021-05-26 DIAGNOSIS — R73.9 HYPERGLYCEMIA: ICD-10-CM

## 2021-05-26 DIAGNOSIS — N18.4 CKD (CHRONIC KIDNEY DISEASE), STAGE IV: ICD-10-CM

## 2021-05-26 DIAGNOSIS — E04.1 THYROID NODULE: ICD-10-CM

## 2021-05-26 DIAGNOSIS — I25.119 CHEST PAIN DUE TO CAD: ICD-10-CM

## 2021-05-26 DIAGNOSIS — I10 HYPERTENSION, UNSPECIFIED TYPE: Primary | ICD-10-CM

## 2021-05-26 DIAGNOSIS — R07.9 CHEST PAIN: ICD-10-CM

## 2021-05-26 DIAGNOSIS — I48.11 LONGSTANDING PERSISTENT ATRIAL FIBRILLATION: ICD-10-CM

## 2021-05-26 DIAGNOSIS — E11.9 DIABETES MELLITUS WITHOUT COMPLICATION: ICD-10-CM

## 2021-05-26 DIAGNOSIS — E87.5 HYPERKALEMIA: ICD-10-CM

## 2021-05-26 DIAGNOSIS — R07.9 CHEST PAIN, UNSPECIFIED TYPE: Primary | ICD-10-CM

## 2021-05-26 DIAGNOSIS — C18.9 COLON CANCER: ICD-10-CM

## 2021-05-26 PROBLEM — I21.4 NSTEMI (NON-ST ELEVATED MYOCARDIAL INFARCTION): Status: RESOLVED | Noted: 2021-05-14 | Resolved: 2021-05-26

## 2021-05-26 LAB
ALBUMIN SERPL BCP-MCNC: 3.4 G/DL (ref 3.5–5)
ALBUMIN/GLOB SERPL: 0.9 {RATIO}
ALP SERPL-CCNC: 118 U/L (ref 46–118)
ALT SERPL W P-5'-P-CCNC: 38 U/L (ref 13–56)
ANION GAP SERPL CALCULATED.3IONS-SCNC: 10 MMOL/L (ref 7–16)
AST SERPL W P-5'-P-CCNC: 26 U/L (ref 15–37)
BASOPHILS # BLD AUTO: 0.05 K/UL (ref 0–0.2)
BASOPHILS NFR BLD AUTO: 0.6 % (ref 0–1)
BILIRUB SERPL-MCNC: 0.2 MG/DL (ref 0–1.2)
BUN SERPL-MCNC: 41 MG/DL (ref 7–18)
BUN/CREAT SERPL: 31 (ref 6–20)
CALCIUM SERPL-MCNC: 8.6 MG/DL (ref 8.5–10.1)
CHLORIDE SERPL-SCNC: 109 MMOL/L (ref 98–107)
CO2 SERPL-SCNC: 25 MMOL/L (ref 21–32)
CREAT SERPL-MCNC: 1.32 MG/DL (ref 0.55–1.02)
DIFFERENTIAL METHOD BLD: ABNORMAL
EOSINOPHIL # BLD AUTO: 0.17 K/UL (ref 0–0.5)
EOSINOPHIL NFR BLD AUTO: 2.2 % (ref 1–4)
ERYTHROCYTE [DISTWIDTH] IN BLOOD BY AUTOMATED COUNT: 13.2 % (ref 11.5–14.5)
EST. AVERAGE GLUCOSE BLD GHB EST-MCNC: 200 MG/DL
GLOBULIN SER-MCNC: 3.7 G/DL (ref 2–4)
GLUCOSE SERPL-MCNC: 111 MG/DL (ref 70–105)
GLUCOSE SERPL-MCNC: 158 MG/DL (ref 74–106)
GLUCOSE SERPL-MCNC: 218 MG/DL (ref 70–105)
HBA1C MFR BLD HPLC: 8.6 % (ref 4.5–6.6)
HCT VFR BLD AUTO: 31.7 % (ref 38–47)
HGB BLD-MCNC: 9.9 G/DL (ref 12–16)
IMM GRANULOCYTES # BLD AUTO: 0.01 K/UL (ref 0–0.04)
IMM GRANULOCYTES NFR BLD: 0.1 % (ref 0–0.4)
LYMPHOCYTES # BLD AUTO: 2.93 K/UL (ref 1–4.8)
LYMPHOCYTES NFR BLD AUTO: 37.2 % (ref 27–41)
MCH RBC QN AUTO: 27.4 PG (ref 27–31)
MCHC RBC AUTO-ENTMCNC: 31.2 G/DL (ref 32–36)
MCV RBC AUTO: 87.8 FL (ref 80–96)
MONOCYTES # BLD AUTO: 0.54 K/UL (ref 0–0.8)
MONOCYTES NFR BLD AUTO: 6.9 % (ref 2–6)
MPC BLD CALC-MCNC: 11.4 FL (ref 9.4–12.4)
NEUTROPHILS # BLD AUTO: 4.18 K/UL (ref 1.8–7.7)
NEUTROPHILS NFR BLD AUTO: 53 % (ref 53–65)
NRBC # BLD AUTO: 0 X10E3/UL
NRBC, AUTO (.00): 0 %
NT-PROBNP SERPL-MCNC: 287 PG/ML (ref 1–125)
PLATELET # BLD AUTO: 325 K/UL (ref 150–400)
POTASSIUM SERPL-SCNC: 5.8 MMOL/L (ref 3.5–5.1)
POTASSIUM SERPL-SCNC: 6.1 MMOL/L (ref 3.5–5.1)
PROT SERPL-MCNC: 7.1 G/DL (ref 6.4–8.2)
RBC # BLD AUTO: 3.61 M/UL (ref 4.2–5.4)
SODIUM SERPL-SCNC: 138 MMOL/L (ref 136–145)
TROPONIN I SERPL-MCNC: <0.017 NG/ML
WBC # BLD AUTO: 7.88 K/UL (ref 4.5–11)

## 2021-05-26 PROCEDURE — 99219 PR INITIAL OBSERVATION CARE,LEVL II: ICD-10-PCS | Mod: ,,, | Performed by: HOSPITALIST

## 2021-05-26 PROCEDURE — 25000003 PHARM REV CODE 250: Performed by: FAMILY MEDICINE

## 2021-05-26 PROCEDURE — 99214 OFFICE O/P EST MOD 30 MIN: CPT | Mod: PBBFAC,25 | Performed by: INTERNAL MEDICINE

## 2021-05-26 PROCEDURE — 36415 COLL VENOUS BLD VENIPUNCTURE: CPT | Performed by: FAMILY MEDICINE

## 2021-05-26 PROCEDURE — 84484 ASSAY OF TROPONIN QUANT: CPT | Mod: 91 | Performed by: FAMILY MEDICINE

## 2021-05-26 PROCEDURE — 84484 ASSAY OF TROPONIN QUANT: CPT | Mod: 91 | Performed by: NURSE PRACTITIONER

## 2021-05-26 PROCEDURE — 82962 GLUCOSE BLOOD TEST: CPT

## 2021-05-26 PROCEDURE — 96374 THER/PROPH/DIAG INJ IV PUSH: CPT

## 2021-05-26 PROCEDURE — 94760 N-INVAS EAR/PLS OXIMETRY 1: CPT

## 2021-05-26 PROCEDURE — 93010 EKG 12-LEAD: ICD-10-PCS | Mod: ,,, | Performed by: HOSPITALIST

## 2021-05-26 PROCEDURE — 93010 EKG 12-LEAD: ICD-10-PCS | Mod: S$PBB,,, | Performed by: INTERNAL MEDICINE

## 2021-05-26 PROCEDURE — 96375 TX/PRO/DX INJ NEW DRUG ADDON: CPT

## 2021-05-26 PROCEDURE — 93005 ELECTROCARDIOGRAM TRACING: CPT

## 2021-05-26 PROCEDURE — 25000003 PHARM REV CODE 250: Performed by: NURSE PRACTITIONER

## 2021-05-26 PROCEDURE — 93010 ELECTROCARDIOGRAM REPORT: CPT | Mod: ,,, | Performed by: HOSPITALIST

## 2021-05-26 PROCEDURE — 84484 ASSAY OF TROPONIN QUANT: CPT | Performed by: FAMILY MEDICINE

## 2021-05-26 PROCEDURE — 83880 ASSAY OF NATRIURETIC PEPTIDE: CPT | Performed by: FAMILY MEDICINE

## 2021-05-26 PROCEDURE — 99499 UNLISTED E&M SERVICE: CPT | Mod: S$PBB,,, | Performed by: INTERNAL MEDICINE

## 2021-05-26 PROCEDURE — G0378 HOSPITAL OBSERVATION PER HR: HCPCS

## 2021-05-26 PROCEDURE — 93005 ELECTROCARDIOGRAM TRACING: CPT | Mod: PBBFAC | Performed by: INTERNAL MEDICINE

## 2021-05-26 PROCEDURE — 25000003 PHARM REV CODE 250: Performed by: HOSPITALIST

## 2021-05-26 PROCEDURE — 93010 EKG 12-LEAD: ICD-10-PCS | Mod: 76,,, | Performed by: INTERNAL MEDICINE

## 2021-05-26 PROCEDURE — 3074F PR MOST RECENT SYSTOLIC BLOOD PRESSURE < 130 MM HG: ICD-10-PCS | Mod: ,,, | Performed by: INTERNAL MEDICINE

## 2021-05-26 PROCEDURE — 93010 ELECTROCARDIOGRAM REPORT: CPT | Mod: 76,,, | Performed by: INTERNAL MEDICINE

## 2021-05-26 PROCEDURE — 3078F PR MOST RECENT DIASTOLIC BLOOD PRESSURE < 80 MM HG: ICD-10-PCS | Mod: ,,, | Performed by: INTERNAL MEDICINE

## 2021-05-26 PROCEDURE — 99285 EMERGENCY DEPT VISIT HI MDM: CPT | Mod: 25

## 2021-05-26 PROCEDURE — 3074F SYST BP LT 130 MM HG: CPT | Mod: ,,, | Performed by: INTERNAL MEDICINE

## 2021-05-26 PROCEDURE — 99219 PR INITIAL OBSERVATION CARE,LEVL II: CPT | Mod: ,,, | Performed by: HOSPITALIST

## 2021-05-26 PROCEDURE — 63600175 PHARM REV CODE 636 W HCPCS: Performed by: FAMILY MEDICINE

## 2021-05-26 PROCEDURE — 3052F HG A1C>EQUAL 8.0%<EQUAL 9.0%: CPT | Mod: ,,, | Performed by: INTERNAL MEDICINE

## 2021-05-26 PROCEDURE — 80053 COMPREHEN METABOLIC PANEL: CPT | Performed by: FAMILY MEDICINE

## 2021-05-26 PROCEDURE — 3008F BODY MASS INDEX DOCD: CPT | Mod: ,,, | Performed by: INTERNAL MEDICINE

## 2021-05-26 PROCEDURE — 83036 HEMOGLOBIN GLYCOSYLATED A1C: CPT | Performed by: NURSE PRACTITIONER

## 2021-05-26 PROCEDURE — 93010 ELECTROCARDIOGRAM REPORT: CPT | Mod: S$PBB,,, | Performed by: INTERNAL MEDICINE

## 2021-05-26 PROCEDURE — 3052F PR MOST RECENT HEMOGLOBIN A1C LEVEL 8.0 - < 9.0%: ICD-10-PCS | Mod: ,,, | Performed by: INTERNAL MEDICINE

## 2021-05-26 PROCEDURE — 84132 ASSAY OF SERUM POTASSIUM: CPT | Mod: 59 | Performed by: FAMILY MEDICINE

## 2021-05-26 PROCEDURE — 99285 PR EMERGENCY DEPT VISIT,LEVEL V: ICD-10-PCS | Mod: ,,, | Performed by: FAMILY MEDICINE

## 2021-05-26 PROCEDURE — 99285 EMERGENCY DEPT VISIT HI MDM: CPT | Mod: ,,, | Performed by: FAMILY MEDICINE

## 2021-05-26 PROCEDURE — 99499 NO LOS: ICD-10-PCS | Mod: S$PBB,,, | Performed by: INTERNAL MEDICINE

## 2021-05-26 PROCEDURE — 85025 COMPLETE CBC W/AUTO DIFF WBC: CPT | Performed by: FAMILY MEDICINE

## 2021-05-26 PROCEDURE — 3078F DIAST BP <80 MM HG: CPT | Mod: ,,, | Performed by: INTERNAL MEDICINE

## 2021-05-26 PROCEDURE — 3008F PR BODY MASS INDEX (BMI) DOCUMENTED: ICD-10-PCS | Mod: ,,, | Performed by: INTERNAL MEDICINE

## 2021-05-26 RX ORDER — ASPIRIN 325 MG
325 TABLET ORAL
Status: COMPLETED | OUTPATIENT
Start: 2021-05-26 | End: 2021-05-26

## 2021-05-26 RX ORDER — LOSARTAN POTASSIUM 50 MG/1
50 TABLET ORAL DAILY
Status: DISCONTINUED | OUTPATIENT
Start: 2021-05-26 | End: 2021-05-28 | Stop reason: HOSPADM

## 2021-05-26 RX ORDER — METOPROLOL TARTRATE 25 MG/1
25 TABLET, FILM COATED ORAL 2 TIMES DAILY
Status: DISCONTINUED | OUTPATIENT
Start: 2021-05-26 | End: 2021-05-28 | Stop reason: HOSPADM

## 2021-05-26 RX ORDER — ATORVASTATIN CALCIUM 10 MG/1
10 TABLET, FILM COATED ORAL NIGHTLY
Status: DISCONTINUED | OUTPATIENT
Start: 2021-05-26 | End: 2021-05-28 | Stop reason: HOSPADM

## 2021-05-26 RX ORDER — GLUCAGON 1 MG
1 KIT INJECTION
Status: DISCONTINUED | OUTPATIENT
Start: 2021-05-26 | End: 2021-05-28 | Stop reason: HOSPADM

## 2021-05-26 RX ORDER — INSULIN ASPART 100 [IU]/ML
0-5 INJECTION, SOLUTION INTRAVENOUS; SUBCUTANEOUS
Status: DISCONTINUED | OUTPATIENT
Start: 2021-05-26 | End: 2021-05-28 | Stop reason: HOSPADM

## 2021-05-26 RX ORDER — IBUPROFEN 200 MG
24 TABLET ORAL
Status: DISCONTINUED | OUTPATIENT
Start: 2021-05-26 | End: 2021-05-28 | Stop reason: HOSPADM

## 2021-05-26 RX ORDER — BUTALBITAL, ACETAMINOPHEN AND CAFFEINE 50; 325; 40 MG/1; MG/1; MG/1
1 TABLET ORAL EVERY 4 HOURS PRN
Status: DISCONTINUED | OUTPATIENT
Start: 2021-05-26 | End: 2021-05-27

## 2021-05-26 RX ORDER — FERROUS SULFATE 325(65) MG
325 TABLET ORAL 2 TIMES DAILY
Status: DISCONTINUED | OUTPATIENT
Start: 2021-05-26 | End: 2021-05-28 | Stop reason: HOSPADM

## 2021-05-26 RX ORDER — ASPIRIN 81 MG/1
81 TABLET ORAL DAILY
Status: DISCONTINUED | OUTPATIENT
Start: 2021-05-27 | End: 2021-05-28 | Stop reason: HOSPADM

## 2021-05-26 RX ORDER — ONDANSETRON 4 MG/1
4 TABLET, ORALLY DISINTEGRATING ORAL
Status: COMPLETED | OUTPATIENT
Start: 2021-05-26 | End: 2021-05-26

## 2021-05-26 RX ORDER — PRAVASTATIN SODIUM 40 MG/1
40 TABLET ORAL NIGHTLY
Status: DISCONTINUED | OUTPATIENT
Start: 2021-05-26 | End: 2021-05-26 | Stop reason: CLARIF

## 2021-05-26 RX ORDER — PANTOPRAZOLE SODIUM 40 MG/1
40 TABLET, DELAYED RELEASE ORAL DAILY
Status: DISCONTINUED | OUTPATIENT
Start: 2021-05-26 | End: 2021-05-28 | Stop reason: HOSPADM

## 2021-05-26 RX ORDER — NITROGLYCERIN 0.4 MG/1
0.4 TABLET SUBLINGUAL EVERY 5 MIN PRN
Status: DISCONTINUED | OUTPATIENT
Start: 2021-05-26 | End: 2021-05-28 | Stop reason: HOSPADM

## 2021-05-26 RX ORDER — MORPHINE SULFATE 4 MG/ML
4 INJECTION, SOLUTION INTRAMUSCULAR; INTRAVENOUS
Status: COMPLETED | OUTPATIENT
Start: 2021-05-26 | End: 2021-05-26

## 2021-05-26 RX ORDER — IBUPROFEN 200 MG
16 TABLET ORAL
Status: DISCONTINUED | OUTPATIENT
Start: 2021-05-26 | End: 2021-05-28 | Stop reason: HOSPADM

## 2021-05-26 RX ORDER — CLOPIDOGREL BISULFATE 75 MG/1
75 TABLET ORAL DAILY
Status: DISCONTINUED | OUTPATIENT
Start: 2021-05-26 | End: 2021-05-28 | Stop reason: HOSPADM

## 2021-05-26 RX ADMIN — BUTALBITAL, ACETAMINOPHEN AND CAFFEINE 1 TABLET: 50; 325; 40 TABLET ORAL at 09:05

## 2021-05-26 RX ADMIN — METOPROLOL TARTRATE 25 MG: 25 TABLET, FILM COATED ORAL at 09:05

## 2021-05-26 RX ADMIN — MORPHINE SULFATE 4 MG: 4 INJECTION INTRAVENOUS at 04:05

## 2021-05-26 RX ADMIN — FERROUS SULFATE TAB 325 MG (65 MG ELEMENTAL FE) 325 MG: 325 (65 FE) TAB at 09:05

## 2021-05-26 RX ADMIN — APIXABAN 5 MG: 5 TABLET, FILM COATED ORAL at 09:05

## 2021-05-26 RX ADMIN — LOSARTAN POTASSIUM 50 MG: 50 TABLET, FILM COATED ORAL at 05:05

## 2021-05-26 RX ADMIN — ASPIRIN 325 MG ORAL TABLET 325 MG: 325 PILL ORAL at 11:05

## 2021-05-26 RX ADMIN — PANTOPRAZOLE SODIUM 40 MG: 40 TABLET, DELAYED RELEASE ORAL at 05:05

## 2021-05-26 RX ADMIN — ONDANSETRON 4 MG: 4 TABLET, ORALLY DISINTEGRATING ORAL at 04:05

## 2021-05-26 RX ADMIN — CLOPIDOGREL 75 MG: 75 TABLET, FILM COATED ORAL at 05:05

## 2021-05-26 RX ADMIN — ATORVASTATIN CALCIUM 10 MG: 10 TABLET, FILM COATED ORAL at 09:05

## 2021-05-27 LAB
ANION GAP SERPL CALCULATED.3IONS-SCNC: 6 MMOL/L (ref 7–16)
BASOPHILS # BLD AUTO: 0.03 K/UL (ref 0–0.2)
BASOPHILS NFR BLD AUTO: 0.4 % (ref 0–1)
BUN SERPL-MCNC: 37 MG/DL (ref 7–18)
BUN/CREAT SERPL: 30 (ref 6–20)
CALCIUM SERPL-MCNC: 8.2 MG/DL (ref 8.5–10.1)
CHLORIDE SERPL-SCNC: 107 MMOL/L (ref 98–107)
CO2 SERPL-SCNC: 26 MMOL/L (ref 21–32)
CREAT SERPL-MCNC: 1.23 MG/DL (ref 0.55–1.02)
DIFFERENTIAL METHOD BLD: ABNORMAL
EOSINOPHIL # BLD AUTO: 0.17 K/UL (ref 0–0.5)
EOSINOPHIL NFR BLD AUTO: 2.5 % (ref 1–4)
ERYTHROCYTE [DISTWIDTH] IN BLOOD BY AUTOMATED COUNT: 13.4 % (ref 11.5–14.5)
GLUCOSE SERPL-MCNC: 169 MG/DL (ref 74–106)
HCT VFR BLD AUTO: 30.2 % (ref 38–47)
HGB BLD-MCNC: 9.2 G/DL (ref 12–16)
IMM GRANULOCYTES # BLD AUTO: 0.01 K/UL (ref 0–0.04)
IMM GRANULOCYTES NFR BLD: 0.1 % (ref 0–0.4)
LYMPHOCYTES # BLD AUTO: 3.27 K/UL (ref 1–4.8)
LYMPHOCYTES NFR BLD AUTO: 47.5 % (ref 27–41)
MCH RBC QN AUTO: 27.2 PG (ref 27–31)
MCHC RBC AUTO-ENTMCNC: 30.5 G/DL (ref 32–36)
MCV RBC AUTO: 89.3 FL (ref 80–96)
MONOCYTES # BLD AUTO: 0.55 K/UL (ref 0–0.8)
MONOCYTES NFR BLD AUTO: 8 % (ref 2–6)
MPC BLD CALC-MCNC: 11.5 FL (ref 9.4–12.4)
NEUTROPHILS # BLD AUTO: 2.86 K/UL (ref 1.8–7.7)
NEUTROPHILS NFR BLD AUTO: 41.5 % (ref 53–65)
NRBC # BLD AUTO: 0 X10E3/UL
NRBC, AUTO (.00): 0 %
PLATELET # BLD AUTO: 316 K/UL (ref 150–400)
POTASSIUM SERPL-SCNC: 5.2 MMOL/L (ref 3.5–5.1)
RBC # BLD AUTO: 3.38 M/UL (ref 4.2–5.4)
SODIUM SERPL-SCNC: 134 MMOL/L (ref 136–145)
TROPONIN I SERPL-MCNC: <0.017 NG/ML
WBC # BLD AUTO: 6.89 K/UL (ref 4.5–11)

## 2021-05-27 PROCEDURE — 36415 COLL VENOUS BLD VENIPUNCTURE: CPT | Performed by: NURSE PRACTITIONER

## 2021-05-27 PROCEDURE — G0378 HOSPITAL OBSERVATION PER HR: HCPCS

## 2021-05-27 PROCEDURE — 25000003 PHARM REV CODE 250: Performed by: NURSE PRACTITIONER

## 2021-05-27 PROCEDURE — 36415 COLL VENOUS BLD VENIPUNCTURE: CPT | Performed by: HOSPITALIST

## 2021-05-27 PROCEDURE — 84484 ASSAY OF TROPONIN QUANT: CPT | Performed by: NURSE PRACTITIONER

## 2021-05-27 PROCEDURE — 27000221 HC OXYGEN, UP TO 24 HOURS

## 2021-05-27 PROCEDURE — 94761 N-INVAS EAR/PLS OXIMETRY MLT: CPT

## 2021-05-27 PROCEDURE — 63600175 PHARM REV CODE 636 W HCPCS: Performed by: HOSPITALIST

## 2021-05-27 PROCEDURE — 96376 TX/PRO/DX INJ SAME DRUG ADON: CPT | Mod: 59

## 2021-05-27 PROCEDURE — 85025 COMPLETE CBC W/AUTO DIFF WBC: CPT | Performed by: NURSE PRACTITIONER

## 2021-05-27 PROCEDURE — 25000003 PHARM REV CODE 250: Performed by: HOSPITALIST

## 2021-05-27 PROCEDURE — 99225 PR SUBSEQUENT OBSERVATION CARE,LEVEL II: CPT | Mod: ,,, | Performed by: HOSPITALIST

## 2021-05-27 PROCEDURE — 99225 PR SUBSEQUENT OBSERVATION CARE,LEVEL II: ICD-10-PCS | Mod: ,,, | Performed by: HOSPITALIST

## 2021-05-27 PROCEDURE — 80048 BASIC METABOLIC PNL TOTAL CA: CPT | Performed by: NURSE PRACTITIONER

## 2021-05-27 RX ORDER — MORPHINE SULFATE 2 MG/ML
2 INJECTION, SOLUTION INTRAMUSCULAR; INTRAVENOUS ONCE
Status: COMPLETED | OUTPATIENT
Start: 2021-05-27 | End: 2021-05-27

## 2021-05-27 RX ORDER — ACETAMINOPHEN 325 MG/1
650 TABLET ORAL EVERY 6 HOURS PRN
Status: DISCONTINUED | OUTPATIENT
Start: 2021-05-27 | End: 2021-05-28 | Stop reason: HOSPADM

## 2021-05-27 RX ADMIN — APIXABAN 5 MG: 5 TABLET, FILM COATED ORAL at 08:05

## 2021-05-27 RX ADMIN — ATORVASTATIN CALCIUM 10 MG: 10 TABLET, FILM COATED ORAL at 08:05

## 2021-05-27 RX ADMIN — METOPROLOL TARTRATE 25 MG: 25 TABLET, FILM COATED ORAL at 08:05

## 2021-05-27 RX ADMIN — ACETAMINOPHEN 650 MG: 325 TABLET ORAL at 09:05

## 2021-05-27 RX ADMIN — PANTOPRAZOLE SODIUM 40 MG: 40 TABLET, DELAYED RELEASE ORAL at 08:05

## 2021-05-27 RX ADMIN — FERROUS SULFATE TAB 325 MG (65 MG ELEMENTAL FE) 325 MG: 325 (65 FE) TAB at 08:05

## 2021-05-27 RX ADMIN — ACETAMINOPHEN 650 MG: 325 TABLET ORAL at 06:05

## 2021-05-27 RX ADMIN — MORPHINE SULFATE 2 MG: 2 INJECTION, SOLUTION INTRAMUSCULAR; INTRAVENOUS at 01:05

## 2021-05-27 RX ADMIN — LOSARTAN POTASSIUM 50 MG: 50 TABLET, FILM COATED ORAL at 08:05

## 2021-05-27 RX ADMIN — CLOPIDOGREL 75 MG: 75 TABLET, FILM COATED ORAL at 08:05

## 2021-05-27 RX ADMIN — ASPIRIN 81 MG: 81 TABLET, COATED ORAL at 08:05

## 2021-05-28 VITALS
RESPIRATION RATE: 20 BRPM | HEART RATE: 57 BPM | DIASTOLIC BLOOD PRESSURE: 46 MMHG | OXYGEN SATURATION: 95 % | HEIGHT: 62 IN | SYSTOLIC BLOOD PRESSURE: 120 MMHG | BODY MASS INDEX: 39.32 KG/M2 | TEMPERATURE: 98 F | WEIGHT: 213.69 LBS

## 2021-05-28 LAB
ANION GAP SERPL CALCULATED.3IONS-SCNC: 14 MMOL/L (ref 7–16)
AORTIC ROOT ANNULUS: 2.2 CM
AORTIC VALVE CUSP SEPERATION: 1.76 CM
BASOPHILS # BLD AUTO: 0.04 K/UL (ref 0–0.2)
BASOPHILS NFR BLD AUTO: 0.6 % (ref 0–1)
BSA FOR ECHO PROCEDURE: 2.06 M2
BUN SERPL-MCNC: 37 MG/DL (ref 7–18)
BUN/CREAT SERPL: 28 (ref 6–20)
CALCIUM SERPL-MCNC: 8.4 MG/DL (ref 8.5–10.1)
CHLORIDE SERPL-SCNC: 107 MMOL/L (ref 98–107)
CO2 SERPL-SCNC: 24 MMOL/L (ref 21–32)
CREAT SERPL-MCNC: 1.31 MG/DL (ref 0.55–1.02)
CV ECHO LV RWT: 0.64 CM
DIFFERENTIAL METHOD BLD: ABNORMAL
ECHO LV POSTERIOR WALL: 1.39 CM (ref 0.6–1.1)
EJECTION FRACTION: 55 %
EOSINOPHIL # BLD AUTO: 0.16 K/UL (ref 0–0.5)
EOSINOPHIL NFR BLD AUTO: 2.4 % (ref 1–4)
ERYTHROCYTE [DISTWIDTH] IN BLOOD BY AUTOMATED COUNT: 13.3 % (ref 11.5–14.5)
FRACTIONAL SHORTENING: 35 % (ref 28–44)
GLUCOSE SERPL-MCNC: 270 MG/DL (ref 74–106)
GLUCOSE SERPL-MCNC: 369 MG/DL (ref 70–105)
HCT VFR BLD AUTO: 31.5 % (ref 38–47)
HGB BLD-MCNC: 9.9 G/DL (ref 12–16)
IMM GRANULOCYTES # BLD AUTO: 0.01 K/UL (ref 0–0.04)
IMM GRANULOCYTES NFR BLD: 0.2 % (ref 0–0.4)
INTERVENTRICULAR SEPTUM: 1.19 CM (ref 0.6–1.1)
IVC OSTIUM: 1.2 CM
LEFT ATRIUM SIZE: 3.3 CM
LEFT INTERNAL DIMENSION IN SYSTOLE: 2.85 CM (ref 2.1–4)
LEFT VENTRICLE MASS INDEX: 106 G/M2
LEFT VENTRICULAR INTERNAL DIMENSION IN DIASTOLE: 4.36 CM (ref 3.5–6)
LEFT VENTRICULAR MASS: 209.74 G
LYMPHOCYTES # BLD AUTO: 2.27 K/UL (ref 1–4.8)
LYMPHOCYTES NFR BLD AUTO: 34.6 % (ref 27–41)
MCH RBC QN AUTO: 27.6 PG (ref 27–31)
MCHC RBC AUTO-ENTMCNC: 31.4 G/DL (ref 32–36)
MCV RBC AUTO: 87.7 FL (ref 80–96)
MONOCYTES # BLD AUTO: 0.43 K/UL (ref 0–0.8)
MONOCYTES NFR BLD AUTO: 6.6 % (ref 2–6)
MPC BLD CALC-MCNC: 11.2 FL (ref 9.4–12.4)
NEUTROPHILS # BLD AUTO: 3.65 K/UL (ref 1.8–7.7)
NEUTROPHILS NFR BLD AUTO: 55.6 % (ref 53–65)
NRBC # BLD AUTO: 0 X10E3/UL
NRBC, AUTO (.00): 0 %
PISA TR MAX VEL: 2.5 M/S
PLATELET # BLD AUTO: 311 K/UL (ref 150–400)
POTASSIUM SERPL-SCNC: 5.8 MMOL/L (ref 3.5–5.1)
RA MAJOR: 3.8 CM
RA PRESSURE: 3 MMHG
RBC # BLD AUTO: 3.59 M/UL (ref 4.2–5.4)
RIGHT VENTRICULAR END-DIASTOLIC DIMENSION: 3 CM
SODIUM SERPL-SCNC: 139 MMOL/L (ref 136–145)
TR MAX PG: 25 MMHG
TRICUSPID ANNULAR PLANE SYSTOLIC EXCURSION: 1.8 CM
TV REST PULMONARY ARTERY PRESSURE: 28 MMHG
WBC # BLD AUTO: 6.56 K/UL (ref 4.5–11)

## 2021-05-28 PROCEDURE — 25000003 PHARM REV CODE 250: Performed by: NURSE PRACTITIONER

## 2021-05-28 PROCEDURE — 82962 GLUCOSE BLOOD TEST: CPT

## 2021-05-28 PROCEDURE — 27000221 HC OXYGEN, UP TO 24 HOURS

## 2021-05-28 PROCEDURE — G0378 HOSPITAL OBSERVATION PER HR: HCPCS

## 2021-05-28 PROCEDURE — 99214 PR OFFICE/OUTPT VISIT, EST, LEVL IV, 30-39 MIN: ICD-10-PCS | Mod: ,,, | Performed by: INTERNAL MEDICINE

## 2021-05-28 PROCEDURE — 99214 OFFICE O/P EST MOD 30 MIN: CPT | Mod: ,,, | Performed by: INTERNAL MEDICINE

## 2021-05-28 PROCEDURE — 85025 COMPLETE CBC W/AUTO DIFF WBC: CPT | Performed by: HOSPITALIST

## 2021-05-28 PROCEDURE — 94761 N-INVAS EAR/PLS OXIMETRY MLT: CPT

## 2021-05-28 PROCEDURE — 36415 COLL VENOUS BLD VENIPUNCTURE: CPT | Performed by: HOSPITALIST

## 2021-05-28 PROCEDURE — 80048 BASIC METABOLIC PNL TOTAL CA: CPT | Performed by: HOSPITALIST

## 2021-05-28 RX ADMIN — PANTOPRAZOLE SODIUM 40 MG: 40 TABLET, DELAYED RELEASE ORAL at 08:05

## 2021-05-28 RX ADMIN — METOPROLOL TARTRATE 25 MG: 25 TABLET, FILM COATED ORAL at 08:05

## 2021-05-28 RX ADMIN — APIXABAN 5 MG: 5 TABLET, FILM COATED ORAL at 08:05

## 2021-05-28 RX ADMIN — LOSARTAN POTASSIUM 50 MG: 50 TABLET, FILM COATED ORAL at 08:05

## 2021-05-28 RX ADMIN — ASPIRIN 81 MG: 81 TABLET, COATED ORAL at 08:05

## 2021-05-28 RX ADMIN — FERROUS SULFATE TAB 325 MG (65 MG ELEMENTAL FE) 325 MG: 325 (65 FE) TAB at 09:05

## 2021-05-28 RX ADMIN — CLOPIDOGREL 75 MG: 75 TABLET, FILM COATED ORAL at 08:05

## 2021-06-01 DIAGNOSIS — I25.10 CORONARY ARTERIOSCLEROSIS: ICD-10-CM

## 2021-06-01 DIAGNOSIS — I48.91 ATRIAL FIBRILLATION, UNSPECIFIED TYPE: Primary | ICD-10-CM

## 2021-06-02 RX ORDER — METOPROLOL TARTRATE 25 MG/1
25 TABLET, FILM COATED ORAL 2 TIMES DAILY
Qty: 60 TABLET | Refills: 3 | Status: SHIPPED | OUTPATIENT
Start: 2021-06-02 | End: 2021-12-20 | Stop reason: SDUPTHER

## 2021-06-06 PROBLEM — I25.119 CHEST PAIN DUE TO CAD: Status: ACTIVE | Noted: 2021-05-26

## 2021-06-16 ENCOUNTER — OFFICE VISIT (OUTPATIENT)
Dept: FAMILY MEDICINE | Facility: CLINIC | Age: 61
End: 2021-06-16
Payer: COMMERCIAL

## 2021-06-16 VITALS
DIASTOLIC BLOOD PRESSURE: 72 MMHG | HEIGHT: 60 IN | BODY MASS INDEX: 42.01 KG/M2 | SYSTOLIC BLOOD PRESSURE: 139 MMHG | HEART RATE: 76 BPM | RESPIRATION RATE: 18 BRPM | WEIGHT: 214 LBS | TEMPERATURE: 98 F | OXYGEN SATURATION: 100 %

## 2021-06-16 DIAGNOSIS — I10 HYPERTENSION, UNSPECIFIED TYPE: ICD-10-CM

## 2021-06-16 DIAGNOSIS — Z00.01 ENCOUNTER FOR GENERAL ADULT MEDICAL EXAMINATION WITH ABNORMAL FINDINGS: Primary | ICD-10-CM

## 2021-06-16 DIAGNOSIS — Z13.220 SCREENING FOR LIPID DISORDERS: ICD-10-CM

## 2021-06-16 DIAGNOSIS — Z13.1 SCREENING FOR DIABETES MELLITUS (DM): ICD-10-CM

## 2021-06-16 DIAGNOSIS — Z79.4 TYPE 2 DIABETES MELLITUS WITH PROLIFERATIVE RETINOPATHY OF BOTH EYES, WITH LONG-TERM CURRENT USE OF INSULIN, MACULAR EDEMA PRESENCE UNSPECIFIED, UNSPECIFIED PROLIFERATIVE RETINOPATHY TYPE: ICD-10-CM

## 2021-06-16 DIAGNOSIS — E11.3593 TYPE 2 DIABETES MELLITUS WITH PROLIFERATIVE RETINOPATHY OF BOTH EYES, WITH LONG-TERM CURRENT USE OF INSULIN, MACULAR EDEMA PRESENCE UNSPECIFIED, UNSPECIFIED PROLIFERATIVE RETINOPATHY TYPE: ICD-10-CM

## 2021-06-16 DIAGNOSIS — Z12.4 SCREENING FOR MALIGNANT NEOPLASM OF CERVIX: ICD-10-CM

## 2021-06-16 PROBLEM — K20.90 GASTROESOPHAGITIS: Status: ACTIVE | Noted: 2021-06-16

## 2021-06-16 PROBLEM — D64.9 ANEMIA: Status: ACTIVE | Noted: 2021-06-16

## 2021-06-16 PROBLEM — E78.5 HYPERLIPIDEMIA: Status: ACTIVE | Noted: 2021-06-16

## 2021-06-16 PROBLEM — K29.70 GASTROESOPHAGITIS: Status: ACTIVE | Noted: 2021-06-16

## 2021-06-16 LAB
CHOLEST SERPL-MCNC: 143 MG/DL (ref 0–200)
CHOLEST/HDLC SERPL: 2.3 {RATIO}
GLUCOSE SERPL-MCNC: 130 MG/DL (ref 74–106)
HDLC SERPL-MCNC: 61 MG/DL (ref 40–60)
LDLC SERPL CALC-MCNC: 64 MG/DL
LDLC/HDLC SERPL: 1 {RATIO}
NONHDLC SERPL-MCNC: 82 MG/DL
TRIGL SERPL-MCNC: 90 MG/DL (ref 35–150)
VLDLC SERPL-MCNC: 18 MG/DL

## 2021-06-16 PROCEDURE — 3008F BODY MASS INDEX DOCD: CPT | Mod: ,,, | Performed by: FAMILY MEDICINE

## 2021-06-16 PROCEDURE — 82947 ASSAY GLUCOSE BLOOD QUANT: CPT | Mod: ,,, | Performed by: CLINICAL MEDICAL LABORATORY

## 2021-06-16 PROCEDURE — 80061 LIPID PANEL: CPT | Mod: ,,, | Performed by: CLINICAL MEDICAL LABORATORY

## 2021-06-16 PROCEDURE — 3052F PR MOST RECENT HEMOGLOBIN A1C LEVEL 8.0 - < 9.0%: ICD-10-PCS | Mod: ,,, | Performed by: FAMILY MEDICINE

## 2021-06-16 PROCEDURE — 80061 LIPID PANEL: ICD-10-PCS | Mod: ,,, | Performed by: CLINICAL MEDICAL LABORATORY

## 2021-06-16 PROCEDURE — 1126F AMNT PAIN NOTED NONE PRSNT: CPT | Mod: ,,, | Performed by: FAMILY MEDICINE

## 2021-06-16 PROCEDURE — 99396 PREV VISIT EST AGE 40-64: CPT | Mod: ,,, | Performed by: FAMILY MEDICINE

## 2021-06-16 PROCEDURE — 3008F PR BODY MASS INDEX (BMI) DOCUMENTED: ICD-10-PCS | Mod: ,,, | Performed by: FAMILY MEDICINE

## 2021-06-16 PROCEDURE — 3052F HG A1C>EQUAL 8.0%<EQUAL 9.0%: CPT | Mod: ,,, | Performed by: FAMILY MEDICINE

## 2021-06-16 PROCEDURE — 82947 GLUCOSE, FASTING: ICD-10-PCS | Mod: ,,, | Performed by: CLINICAL MEDICAL LABORATORY

## 2021-06-16 PROCEDURE — 99396 PR PREVENTIVE VISIT,EST,40-64: ICD-10-PCS | Mod: ,,, | Performed by: FAMILY MEDICINE

## 2021-06-16 PROCEDURE — 1126F PR PAIN SEVERITY QUANTIFIED, NO PAIN PRESENT: ICD-10-PCS | Mod: ,,, | Performed by: FAMILY MEDICINE

## 2021-06-16 RX ORDER — ROSUVASTATIN CALCIUM 40 MG/1
40 TABLET, COATED ORAL DAILY
Qty: 90 TABLET | Refills: 1 | Status: SHIPPED | OUTPATIENT
Start: 2021-06-16 | End: 2021-12-20 | Stop reason: SDUPTHER

## 2021-06-16 RX ORDER — CITALOPRAM 10 MG/1
10 TABLET ORAL DAILY
Qty: 30 TABLET | Refills: 5 | Status: SHIPPED | OUTPATIENT
Start: 2021-06-16 | End: 2021-10-24 | Stop reason: CLARIF

## 2021-06-16 RX ORDER — FERROUS SULFATE 324(65)MG
324 TABLET, DELAYED RELEASE (ENTERIC COATED) ORAL DAILY
Qty: 90 TABLET | Refills: 1 | Status: SHIPPED | OUTPATIENT
Start: 2021-06-16 | End: 2021-07-14 | Stop reason: SDUPTHER

## 2021-06-16 RX ORDER — OLMESARTAN MEDOXOMIL AND HYDROCHLOROTHIAZIDE 40/25 40; 25 MG/1; MG/1
1 TABLET ORAL DAILY
Qty: 90 TABLET | Refills: 1 | Status: SHIPPED | OUTPATIENT
Start: 2021-06-16 | End: 2021-12-20 | Stop reason: SDUPTHER

## 2021-06-16 RX ORDER — FUROSEMIDE 20 MG/1
20 TABLET ORAL DAILY PRN
Qty: 90 TABLET | Refills: 1 | Status: SHIPPED | OUTPATIENT
Start: 2021-06-16 | End: 2022-10-19 | Stop reason: SDUPTHER

## 2021-06-16 RX ORDER — PANTOPRAZOLE SODIUM 40 MG/1
40 TABLET, DELAYED RELEASE ORAL DAILY
Qty: 30 TABLET | Refills: 11 | Status: SHIPPED | OUTPATIENT
Start: 2021-06-16 | End: 2021-12-20 | Stop reason: SDUPTHER

## 2021-06-16 RX ORDER — INSULIN ASPART 100 [IU]/ML
20 INJECTION, SOLUTION INTRAVENOUS; SUBCUTANEOUS 2 TIMES DAILY
Qty: 12 SYRINGE | Refills: 1 | Status: SHIPPED | OUTPATIENT
Start: 2021-06-16 | End: 2021-12-20 | Stop reason: SDUPTHER

## 2021-06-17 DIAGNOSIS — R73.9 HYPERGLYCEMIA: Primary | ICD-10-CM

## 2021-06-17 PROBLEM — E11.39 TYPE 2 DIABETES MELLITUS WITH OPHTHALMIC COMPLICATION: Status: ACTIVE | Noted: 2021-05-14

## 2021-06-18 DIAGNOSIS — E11.9 TYPE 2 DIABETES MELLITUS WITHOUT COMPLICATION, WITH LONG-TERM CURRENT USE OF INSULIN: Primary | ICD-10-CM

## 2021-06-18 DIAGNOSIS — Z79.4 TYPE 2 DIABETES MELLITUS WITHOUT COMPLICATION, WITH LONG-TERM CURRENT USE OF INSULIN: Primary | ICD-10-CM

## 2021-06-18 DIAGNOSIS — R73.9 HYPERGLYCEMIA: Primary | ICD-10-CM

## 2021-06-30 ENCOUNTER — OFFICE VISIT (OUTPATIENT)
Dept: CARDIOLOGY | Facility: CLINIC | Age: 61
End: 2021-06-30
Payer: COMMERCIAL

## 2021-06-30 VITALS
OXYGEN SATURATION: 98 % | HEART RATE: 83 BPM | HEIGHT: 62 IN | WEIGHT: 213 LBS | BODY MASS INDEX: 39.2 KG/M2 | SYSTOLIC BLOOD PRESSURE: 122 MMHG | DIASTOLIC BLOOD PRESSURE: 66 MMHG

## 2021-06-30 DIAGNOSIS — I25.110 ATHEROSCLEROSIS OF NATIVE CORONARY ARTERY OF NATIVE HEART WITH UNSTABLE ANGINA PECTORIS: ICD-10-CM

## 2021-06-30 DIAGNOSIS — E78.2 MIXED HYPERLIPIDEMIA: ICD-10-CM

## 2021-06-30 DIAGNOSIS — I48.0 PAROXYSMAL ATRIAL FIBRILLATION: Primary | Chronic | ICD-10-CM

## 2021-06-30 DIAGNOSIS — I10 ESSENTIAL HYPERTENSION: ICD-10-CM

## 2021-06-30 DIAGNOSIS — E87.5 HYPERKALEMIA: Primary | ICD-10-CM

## 2021-06-30 PROCEDURE — 99214 PR OFFICE/OUTPT VISIT, EST, LEVL IV, 30-39 MIN: ICD-10-PCS | Mod: S$PBB,,, | Performed by: INTERNAL MEDICINE

## 2021-06-30 PROCEDURE — 99214 OFFICE O/P EST MOD 30 MIN: CPT | Mod: S$PBB,,, | Performed by: INTERNAL MEDICINE

## 2021-06-30 PROCEDURE — 3008F PR BODY MASS INDEX (BMI) DOCUMENTED: ICD-10-PCS | Mod: ,,, | Performed by: INTERNAL MEDICINE

## 2021-06-30 PROCEDURE — 3008F BODY MASS INDEX DOCD: CPT | Mod: ,,, | Performed by: INTERNAL MEDICINE

## 2021-06-30 PROCEDURE — 99214 OFFICE O/P EST MOD 30 MIN: CPT | Mod: PBBFAC | Performed by: INTERNAL MEDICINE

## 2021-06-30 RX ORDER — LANOLIN ALCOHOL/MO/W.PET/CERES
100 CREAM (GRAM) TOPICAL DAILY
COMMUNITY
End: 2023-11-16 | Stop reason: SDUPTHER

## 2021-06-30 RX ORDER — SPIRONOLACTONE 50 MG/1
50 TABLET, FILM COATED ORAL DAILY
COMMUNITY
End: 2021-10-25

## 2021-06-30 RX ORDER — ASCORBIC ACID 250 MG
250 TABLET,CHEWABLE ORAL DAILY
COMMUNITY

## 2021-06-30 RX ORDER — FERROUS SULFATE 325(65) MG
325 TABLET, DELAYED RELEASE (ENTERIC COATED) ORAL DAILY
COMMUNITY
End: 2022-01-04 | Stop reason: SDUPTHER

## 2021-07-14 ENCOUNTER — OFFICE VISIT (OUTPATIENT)
Dept: FAMILY MEDICINE | Facility: CLINIC | Age: 61
End: 2021-07-14
Payer: COMMERCIAL

## 2021-07-14 VITALS
OXYGEN SATURATION: 99 % | HEART RATE: 90 BPM | SYSTOLIC BLOOD PRESSURE: 138 MMHG | TEMPERATURE: 99 F | BODY MASS INDEX: 40.84 KG/M2 | WEIGHT: 208 LBS | DIASTOLIC BLOOD PRESSURE: 65 MMHG | HEIGHT: 60 IN | RESPIRATION RATE: 18 BRPM

## 2021-07-14 DIAGNOSIS — D64.9 ANEMIA, UNSPECIFIED TYPE: ICD-10-CM

## 2021-07-14 DIAGNOSIS — K92.2 GASTROINTESTINAL HEMORRHAGE, UNSPECIFIED GASTROINTESTINAL HEMORRHAGE TYPE: Primary | ICD-10-CM

## 2021-07-14 DIAGNOSIS — C18.9 MALIGNANT NEOPLASM OF COLON, UNSPECIFIED PART OF COLON: ICD-10-CM

## 2021-07-14 LAB
BASOPHILS # BLD AUTO: 0.04 K/UL (ref 0–0.2)
BASOPHILS NFR BLD AUTO: 0.4 % (ref 0–1)
DIFFERENTIAL METHOD BLD: ABNORMAL
EOSINOPHIL # BLD AUTO: 0.14 K/UL (ref 0–0.5)
EOSINOPHIL NFR BLD AUTO: 1.3 % (ref 1–4)
ERYTHROCYTE [DISTWIDTH] IN BLOOD BY AUTOMATED COUNT: 14.3 % (ref 11.5–14.5)
HCT VFR BLD AUTO: 34.5 % (ref 38–47)
HGB BLD-MCNC: 10.8 G/DL (ref 12–16)
IMM GRANULOCYTES # BLD AUTO: 0.02 K/UL (ref 0–0.04)
IMM GRANULOCYTES NFR BLD: 0.2 % (ref 0–0.4)
LYMPHOCYTES # BLD AUTO: 2.27 K/UL (ref 1–4.8)
LYMPHOCYTES NFR BLD AUTO: 21.7 % (ref 27–41)
MCH RBC QN AUTO: 27 PG (ref 27–31)
MCHC RBC AUTO-ENTMCNC: 31.3 G/DL (ref 32–36)
MCV RBC AUTO: 86.3 FL (ref 80–96)
MONOCYTES # BLD AUTO: 0.53 K/UL (ref 0–0.8)
MONOCYTES NFR BLD AUTO: 5.1 % (ref 2–6)
MPC BLD CALC-MCNC: 10.9 FL (ref 9.4–12.4)
NEUTROPHILS # BLD AUTO: 7.44 K/UL (ref 1.8–7.7)
NEUTROPHILS NFR BLD AUTO: 71.3 % (ref 53–65)
NRBC # BLD AUTO: 0 X10E3/UL
NRBC, AUTO (.00): 0 %
PLATELET # BLD AUTO: 436 K/UL (ref 150–400)
RBC # BLD AUTO: 4 M/UL (ref 4.2–5.4)
WBC # BLD AUTO: 10.44 K/UL (ref 4.5–11)

## 2021-07-14 PROCEDURE — 99495 TRANSJ CARE MGMT MOD F2F 14D: CPT | Mod: ,,, | Performed by: FAMILY MEDICINE

## 2021-07-14 PROCEDURE — 1111F PR DISCHARGE MEDS RECONCILED W/ CURRENT OUTPATIENT MED LIST: ICD-10-PCS | Mod: ,,, | Performed by: FAMILY MEDICINE

## 2021-07-14 PROCEDURE — 85025 COMPLETE CBC W/AUTO DIFF WBC: CPT | Mod: ,,, | Performed by: CLINICAL MEDICAL LABORATORY

## 2021-07-14 PROCEDURE — 99495 TCM SERVICES (MODERATE COMPLEXITY): ICD-10-PCS | Mod: ,,, | Performed by: FAMILY MEDICINE

## 2021-07-14 PROCEDURE — 3008F PR BODY MASS INDEX (BMI) DOCUMENTED: ICD-10-PCS | Mod: ,,, | Performed by: FAMILY MEDICINE

## 2021-07-14 PROCEDURE — 1125F AMNT PAIN NOTED PAIN PRSNT: CPT | Mod: ,,, | Performed by: FAMILY MEDICINE

## 2021-07-14 PROCEDURE — 85025 CBC WITH DIFFERENTIAL: ICD-10-PCS | Mod: ,,, | Performed by: CLINICAL MEDICAL LABORATORY

## 2021-07-14 PROCEDURE — 1125F PR PAIN SEVERITY QUANTIFIED, PAIN PRESENT: ICD-10-PCS | Mod: ,,, | Performed by: FAMILY MEDICINE

## 2021-07-14 PROCEDURE — 1111F DSCHRG MED/CURRENT MED MERGE: CPT | Mod: ,,, | Performed by: FAMILY MEDICINE

## 2021-07-14 PROCEDURE — 3008F BODY MASS INDEX DOCD: CPT | Mod: ,,, | Performed by: FAMILY MEDICINE

## 2021-07-14 RX ORDER — FLASH GLUCOSE SENSOR
KIT MISCELLANEOUS
COMMUNITY
Start: 2021-06-06

## 2021-07-14 RX ORDER — PREGABALIN 75 MG/1
75 CAPSULE ORAL NIGHTLY
COMMUNITY
Start: 2021-05-13 | End: 2021-10-24 | Stop reason: CLARIF

## 2021-07-14 RX ORDER — MELOXICAM 7.5 MG/1
2 TABLET ORAL DAILY
COMMUNITY
Start: 2021-06-15 | End: 2021-07-14 | Stop reason: ALTCHOICE

## 2021-07-14 RX ORDER — SEMAGLUTIDE 1.34 MG/ML
1 INJECTION, SOLUTION SUBCUTANEOUS WEEKLY
COMMUNITY
Start: 2020-11-12 | End: 2021-12-20 | Stop reason: SDUPTHER

## 2021-07-28 ENCOUNTER — OFFICE VISIT (OUTPATIENT)
Dept: CARDIOLOGY | Facility: CLINIC | Age: 61
End: 2021-07-28
Payer: COMMERCIAL

## 2021-07-28 VITALS
HEART RATE: 78 BPM | DIASTOLIC BLOOD PRESSURE: 60 MMHG | SYSTOLIC BLOOD PRESSURE: 114 MMHG | WEIGHT: 210 LBS | BODY MASS INDEX: 38.64 KG/M2 | OXYGEN SATURATION: 99 % | HEIGHT: 62 IN

## 2021-07-28 DIAGNOSIS — I25.110 ATHEROSCLEROSIS OF NATIVE CORONARY ARTERY OF NATIVE HEART WITH UNSTABLE ANGINA PECTORIS: Primary | ICD-10-CM

## 2021-07-28 DIAGNOSIS — I48.0 PAROXYSMAL ATRIAL FIBRILLATION: Chronic | ICD-10-CM

## 2021-07-28 PROCEDURE — 1160F RVW MEDS BY RX/DR IN RCRD: CPT | Mod: ,,, | Performed by: INTERNAL MEDICINE

## 2021-07-28 PROCEDURE — 1159F PR MEDICATION LIST DOCUMENTED IN MEDICAL RECORD: ICD-10-PCS | Mod: ,,, | Performed by: INTERNAL MEDICINE

## 2021-07-28 PROCEDURE — 3008F PR BODY MASS INDEX (BMI) DOCUMENTED: ICD-10-PCS | Mod: ,,, | Performed by: INTERNAL MEDICINE

## 2021-07-28 PROCEDURE — 1159F MED LIST DOCD IN RCRD: CPT | Mod: ,,, | Performed by: INTERNAL MEDICINE

## 2021-07-28 PROCEDURE — 3052F PR MOST RECENT HEMOGLOBIN A1C LEVEL 8.0 - < 9.0%: ICD-10-PCS | Mod: ,,, | Performed by: INTERNAL MEDICINE

## 2021-07-28 PROCEDURE — 3008F BODY MASS INDEX DOCD: CPT | Mod: ,,, | Performed by: INTERNAL MEDICINE

## 2021-07-28 PROCEDURE — 3074F SYST BP LT 130 MM HG: CPT | Mod: ,,, | Performed by: INTERNAL MEDICINE

## 2021-07-28 PROCEDURE — 99214 OFFICE O/P EST MOD 30 MIN: CPT | Mod: S$PBB,,, | Performed by: INTERNAL MEDICINE

## 2021-07-28 PROCEDURE — 99214 PR OFFICE/OUTPT VISIT, EST, LEVL IV, 30-39 MIN: ICD-10-PCS | Mod: S$PBB,,, | Performed by: INTERNAL MEDICINE

## 2021-07-28 PROCEDURE — 3074F PR MOST RECENT SYSTOLIC BLOOD PRESSURE < 130 MM HG: ICD-10-PCS | Mod: ,,, | Performed by: INTERNAL MEDICINE

## 2021-07-28 PROCEDURE — 1160F PR REVIEW ALL MEDS BY PRESCRIBER/CLIN PHARMACIST DOCUMENTED: ICD-10-PCS | Mod: ,,, | Performed by: INTERNAL MEDICINE

## 2021-07-28 PROCEDURE — 99214 OFFICE O/P EST MOD 30 MIN: CPT | Mod: PBBFAC | Performed by: INTERNAL MEDICINE

## 2021-07-28 PROCEDURE — 3078F DIAST BP <80 MM HG: CPT | Mod: ,,, | Performed by: INTERNAL MEDICINE

## 2021-07-28 PROCEDURE — 3078F PR MOST RECENT DIASTOLIC BLOOD PRESSURE < 80 MM HG: ICD-10-PCS | Mod: ,,, | Performed by: INTERNAL MEDICINE

## 2021-07-28 PROCEDURE — 3052F HG A1C>EQUAL 8.0%<EQUAL 9.0%: CPT | Mod: ,,, | Performed by: INTERNAL MEDICINE

## 2021-08-09 ENCOUNTER — OFFICE VISIT (OUTPATIENT)
Dept: FAMILY MEDICINE | Facility: CLINIC | Age: 61
End: 2021-08-09
Payer: COMMERCIAL

## 2021-08-09 VITALS
SYSTOLIC BLOOD PRESSURE: 124 MMHG | WEIGHT: 214 LBS | TEMPERATURE: 98 F | HEIGHT: 62 IN | DIASTOLIC BLOOD PRESSURE: 58 MMHG | OXYGEN SATURATION: 98 % | BODY MASS INDEX: 39.38 KG/M2 | RESPIRATION RATE: 18 BRPM | HEART RATE: 86 BPM

## 2021-08-09 DIAGNOSIS — U07.1 COVID-19: Primary | ICD-10-CM

## 2021-08-09 DIAGNOSIS — Z20.822 CONTACT WITH AND (SUSPECTED) EXPOSURE TO COVID-19: ICD-10-CM

## 2021-08-09 LAB
CTP QC/QA: YES
FLUAV AG NPH QL: NEGATIVE
FLUBV AG NPH QL: NEGATIVE
SARS-COV-2 AG RESP QL IA.RAPID: POSITIVE

## 2021-08-09 PROCEDURE — 3008F BODY MASS INDEX DOCD: CPT | Mod: ,,, | Performed by: FAMILY MEDICINE

## 2021-08-09 PROCEDURE — 99213 OFFICE O/P EST LOW 20 MIN: CPT | Mod: ,,, | Performed by: FAMILY MEDICINE

## 2021-08-09 PROCEDURE — 3074F SYST BP LT 130 MM HG: CPT | Mod: ,,, | Performed by: FAMILY MEDICINE

## 2021-08-09 PROCEDURE — 87428 SARSCOV & INF VIR A&B AG IA: CPT | Mod: QW,,, | Performed by: FAMILY MEDICINE

## 2021-08-09 PROCEDURE — 87428 POCT SARS-COV2 (COVID) WITH FLU ANTIGEN: ICD-10-PCS | Mod: QW,,, | Performed by: FAMILY MEDICINE

## 2021-08-09 PROCEDURE — 3052F HG A1C>EQUAL 8.0%<EQUAL 9.0%: CPT | Mod: ,,, | Performed by: FAMILY MEDICINE

## 2021-08-09 PROCEDURE — 3008F PR BODY MASS INDEX (BMI) DOCUMENTED: ICD-10-PCS | Mod: ,,, | Performed by: FAMILY MEDICINE

## 2021-08-09 PROCEDURE — 3078F PR MOST RECENT DIASTOLIC BLOOD PRESSURE < 80 MM HG: ICD-10-PCS | Mod: ,,, | Performed by: FAMILY MEDICINE

## 2021-08-09 PROCEDURE — 3052F PR MOST RECENT HEMOGLOBIN A1C LEVEL 8.0 - < 9.0%: ICD-10-PCS | Mod: ,,, | Performed by: FAMILY MEDICINE

## 2021-08-09 PROCEDURE — 3074F PR MOST RECENT SYSTOLIC BLOOD PRESSURE < 130 MM HG: ICD-10-PCS | Mod: ,,, | Performed by: FAMILY MEDICINE

## 2021-08-09 PROCEDURE — 3078F DIAST BP <80 MM HG: CPT | Mod: ,,, | Performed by: FAMILY MEDICINE

## 2021-08-09 PROCEDURE — 99213 PR OFFICE/OUTPT VISIT, EST, LEVL III, 20-29 MIN: ICD-10-PCS | Mod: ,,, | Performed by: FAMILY MEDICINE

## 2021-08-10 ENCOUNTER — INFUSION (OUTPATIENT)
Dept: INFECTIOUS DISEASES | Facility: HOSPITAL | Age: 61
End: 2021-08-10
Attending: FAMILY MEDICINE
Payer: COMMERCIAL

## 2021-08-10 ENCOUNTER — TELEPHONE (OUTPATIENT)
Dept: FAMILY MEDICINE | Facility: CLINIC | Age: 61
End: 2021-08-10

## 2021-08-10 VITALS
OXYGEN SATURATION: 98 % | RESPIRATION RATE: 15 BRPM | HEIGHT: 62 IN | WEIGHT: 214 LBS | DIASTOLIC BLOOD PRESSURE: 64 MMHG | TEMPERATURE: 100 F | HEART RATE: 76 BPM | SYSTOLIC BLOOD PRESSURE: 110 MMHG | BODY MASS INDEX: 39.38 KG/M2

## 2021-08-10 DIAGNOSIS — U07.1 COVID-19: Primary | ICD-10-CM

## 2021-08-10 PROCEDURE — 63600175 PHARM REV CODE 636 W HCPCS: Performed by: FAMILY MEDICINE

## 2021-08-10 PROCEDURE — 96365 THER/PROPH/DIAG IV INF INIT: CPT

## 2021-08-10 PROCEDURE — M0243 CASIRIVI AND IMDEVI INFUSION: HCPCS | Performed by: FAMILY MEDICINE

## 2021-08-10 PROCEDURE — 25000003 PHARM REV CODE 250: Performed by: FAMILY MEDICINE

## 2021-08-10 RX ORDER — ALBUTEROL SULFATE 90 UG/1
2 AEROSOL, METERED RESPIRATORY (INHALATION)
Status: DISCONTINUED | OUTPATIENT
Start: 2021-08-10 | End: 2022-06-13

## 2021-08-10 RX ORDER — ACETAMINOPHEN 325 MG/1
650 TABLET ORAL ONCE AS NEEDED
Status: DISCONTINUED | OUTPATIENT
Start: 2021-08-10 | End: 2022-06-13

## 2021-08-10 RX ORDER — EPINEPHRINE 0.3 MG/.3ML
0.3 INJECTION SUBCUTANEOUS
Status: DISCONTINUED | OUTPATIENT
Start: 2021-08-10 | End: 2022-06-13

## 2021-08-10 RX ORDER — DIPHENHYDRAMINE HYDROCHLORIDE 50 MG/ML
25 INJECTION INTRAMUSCULAR; INTRAVENOUS ONCE AS NEEDED
Status: DISCONTINUED | OUTPATIENT
Start: 2021-08-10 | End: 2022-06-13

## 2021-08-10 RX ORDER — ONDANSETRON 4 MG/1
4 TABLET, ORALLY DISINTEGRATING ORAL ONCE AS NEEDED
Status: DISCONTINUED | OUTPATIENT
Start: 2021-08-10 | End: 2022-06-13

## 2021-08-10 RX ORDER — SODIUM CHLORIDE 0.9 % (FLUSH) 0.9 %
10 SYRINGE (ML) INJECTION
Status: DISCONTINUED | OUTPATIENT
Start: 2021-08-10 | End: 2022-06-22 | Stop reason: HOSPADM

## 2021-08-10 RX ADMIN — SODIUM CHLORIDE 600 MG: 9 INJECTION, SOLUTION INTRAVENOUS at 12:08

## 2021-08-23 ENCOUNTER — HOSPITAL ENCOUNTER (OUTPATIENT)
Dept: RADIOLOGY | Facility: HOSPITAL | Age: 61
Discharge: HOME | End: 2021-08-23
Attending: FAMILY MEDICINE
Payer: COMMERCIAL

## 2021-08-23 ENCOUNTER — OFFICE VISIT (OUTPATIENT)
Dept: FAMILY MEDICINE | Facility: CLINIC | Age: 61
End: 2021-08-23
Payer: COMMERCIAL

## 2021-08-23 VITALS
OXYGEN SATURATION: 98 % | HEART RATE: 90 BPM | DIASTOLIC BLOOD PRESSURE: 63 MMHG | TEMPERATURE: 98 F | SYSTOLIC BLOOD PRESSURE: 125 MMHG | HEIGHT: 62 IN | WEIGHT: 211.63 LBS | BODY MASS INDEX: 38.94 KG/M2 | RESPIRATION RATE: 18 BRPM

## 2021-08-23 DIAGNOSIS — M54.9 DORSALGIA, UNSPECIFIED: ICD-10-CM

## 2021-08-23 DIAGNOSIS — Z79.4 TYPE 2 DIABETES MELLITUS WITH DIABETIC POLYNEUROPATHY, WITH LONG-TERM CURRENT USE OF INSULIN: ICD-10-CM

## 2021-08-23 DIAGNOSIS — E11.42 TYPE 2 DIABETES MELLITUS WITH DIABETIC POLYNEUROPATHY, WITH LONG-TERM CURRENT USE OF INSULIN: ICD-10-CM

## 2021-08-23 DIAGNOSIS — M25.50 MULTIPLE JOINT PAIN: Primary | ICD-10-CM

## 2021-08-23 LAB
CRP SERPL-MCNC: 0.49 MG/DL (ref 0–0.8)
ERYTHROCYTE [SEDIMENTATION RATE] IN BLOOD BY WESTERGREN METHOD: 22 MM/HR (ref 0–30)
RHEUMATOID FACT SER NEPH-ACNC: NEGATIVE [IU]/ML
URATE SERPL-MCNC: 9.3 MG/DL (ref 2.6–6)

## 2021-08-23 PROCEDURE — 3078F DIAST BP <80 MM HG: CPT | Mod: ,,, | Performed by: FAMILY MEDICINE

## 2021-08-23 PROCEDURE — 86140 C-REACTIVE PROTEIN: CPT | Mod: ,,, | Performed by: CLINICAL MEDICAL LABORATORY

## 2021-08-23 PROCEDURE — 84550 ASSAY OF BLOOD/URIC ACID: CPT | Mod: ,,, | Performed by: CLINICAL MEDICAL LABORATORY

## 2021-08-23 PROCEDURE — 72110 X-RAY EXAM L-2 SPINE 4/>VWS: CPT | Mod: TC

## 2021-08-23 PROCEDURE — 86430 RHEUMATOID FACTOR SCREEN: ICD-10-PCS | Mod: ,,, | Performed by: CLINICAL MEDICAL LABORATORY

## 2021-08-23 PROCEDURE — 86038 ANA EIA W/REFLEX DSDNA/ENA: ICD-10-PCS | Mod: ,,, | Performed by: CLINICAL MEDICAL LABORATORY

## 2021-08-23 PROCEDURE — 86430 RHEUMATOID FACTOR TEST QUAL: CPT | Mod: ,,, | Performed by: CLINICAL MEDICAL LABORATORY

## 2021-08-23 PROCEDURE — 86038 ANTINUCLEAR ANTIBODIES: CPT | Mod: ,,, | Performed by: CLINICAL MEDICAL LABORATORY

## 2021-08-23 PROCEDURE — 99214 OFFICE O/P EST MOD 30 MIN: CPT | Mod: ,,, | Performed by: FAMILY MEDICINE

## 2021-08-23 PROCEDURE — 3052F HG A1C>EQUAL 8.0%<EQUAL 9.0%: CPT | Mod: ,,, | Performed by: FAMILY MEDICINE

## 2021-08-23 PROCEDURE — 3008F BODY MASS INDEX DOCD: CPT | Mod: ,,, | Performed by: FAMILY MEDICINE

## 2021-08-23 PROCEDURE — 85651 SEDIMENTATION RATE, AUTOMATED: ICD-10-PCS | Mod: ,,, | Performed by: CLINICAL MEDICAL LABORATORY

## 2021-08-23 PROCEDURE — 99214 PR OFFICE/OUTPT VISIT, EST, LEVL IV, 30-39 MIN: ICD-10-PCS | Mod: ,,, | Performed by: FAMILY MEDICINE

## 2021-08-23 PROCEDURE — 86140 C-REACTIVE PROTEIN: ICD-10-PCS | Mod: ,,, | Performed by: CLINICAL MEDICAL LABORATORY

## 2021-08-23 PROCEDURE — 3008F PR BODY MASS INDEX (BMI) DOCUMENTED: ICD-10-PCS | Mod: ,,, | Performed by: FAMILY MEDICINE

## 2021-08-23 PROCEDURE — 3078F PR MOST RECENT DIASTOLIC BLOOD PRESSURE < 80 MM HG: ICD-10-PCS | Mod: ,,, | Performed by: FAMILY MEDICINE

## 2021-08-23 PROCEDURE — 84550 URIC ACID: ICD-10-PCS | Mod: ,,, | Performed by: CLINICAL MEDICAL LABORATORY

## 2021-08-23 PROCEDURE — 3074F PR MOST RECENT SYSTOLIC BLOOD PRESSURE < 130 MM HG: ICD-10-PCS | Mod: ,,, | Performed by: FAMILY MEDICINE

## 2021-08-23 PROCEDURE — 3074F SYST BP LT 130 MM HG: CPT | Mod: ,,, | Performed by: FAMILY MEDICINE

## 2021-08-23 PROCEDURE — 85651 RBC SED RATE NONAUTOMATED: CPT | Mod: ,,, | Performed by: CLINICAL MEDICAL LABORATORY

## 2021-08-23 PROCEDURE — 3052F PR MOST RECENT HEMOGLOBIN A1C LEVEL 8.0 - < 9.0%: ICD-10-PCS | Mod: ,,, | Performed by: FAMILY MEDICINE

## 2021-08-23 RX ORDER — TRAMADOL HYDROCHLORIDE 50 MG/1
50 TABLET ORAL EVERY 6 HOURS
Qty: 30 TABLET | Refills: 0 | Status: SHIPPED | OUTPATIENT
Start: 2021-08-23 | End: 2021-10-24 | Stop reason: CLARIF

## 2021-08-24 LAB — ANA SER QL: NEGATIVE

## 2021-08-30 PROBLEM — E11.42 TYPE 2 DIABETES MELLITUS WITH DIABETIC POLYNEUROPATHY, WITH LONG-TERM CURRENT USE OF INSULIN: Status: ACTIVE | Noted: 2021-08-30

## 2021-08-30 PROBLEM — Z79.4 TYPE 2 DIABETES MELLITUS WITH DIABETIC POLYNEUROPATHY, WITH LONG-TERM CURRENT USE OF INSULIN: Status: ACTIVE | Noted: 2021-08-30

## 2021-09-24 ENCOUNTER — OFFICE VISIT (OUTPATIENT)
Dept: FAMILY MEDICINE | Facility: CLINIC | Age: 61
End: 2021-09-24
Payer: COMMERCIAL

## 2021-09-24 VITALS
TEMPERATURE: 98 F | OXYGEN SATURATION: 97 % | DIASTOLIC BLOOD PRESSURE: 66 MMHG | SYSTOLIC BLOOD PRESSURE: 130 MMHG | HEART RATE: 73 BPM | BODY MASS INDEX: 39.2 KG/M2 | HEIGHT: 62 IN | RESPIRATION RATE: 20 BRPM | WEIGHT: 213 LBS

## 2021-09-24 DIAGNOSIS — M10.9 ACUTE GOUT OF LEFT KNEE, UNSPECIFIED CAUSE: Primary | ICD-10-CM

## 2021-09-24 PROCEDURE — 3078F PR MOST RECENT DIASTOLIC BLOOD PRESSURE < 80 MM HG: ICD-10-PCS | Mod: ,,, | Performed by: NURSE PRACTITIONER

## 2021-09-24 PROCEDURE — 3008F PR BODY MASS INDEX (BMI) DOCUMENTED: ICD-10-PCS | Mod: ,,, | Performed by: NURSE PRACTITIONER

## 2021-09-24 PROCEDURE — 99213 OFFICE O/P EST LOW 20 MIN: CPT | Mod: 25,,, | Performed by: NURSE PRACTITIONER

## 2021-09-24 PROCEDURE — 3052F PR MOST RECENT HEMOGLOBIN A1C LEVEL 8.0 - < 9.0%: ICD-10-PCS | Mod: ,,, | Performed by: NURSE PRACTITIONER

## 2021-09-24 PROCEDURE — 3075F PR MOST RECENT SYSTOLIC BLOOD PRESS GE 130-139MM HG: ICD-10-PCS | Mod: ,,, | Performed by: NURSE PRACTITIONER

## 2021-09-24 PROCEDURE — 3052F HG A1C>EQUAL 8.0%<EQUAL 9.0%: CPT | Mod: ,,, | Performed by: NURSE PRACTITIONER

## 2021-09-24 PROCEDURE — 1159F PR MEDICATION LIST DOCUMENTED IN MEDICAL RECORD: ICD-10-PCS | Mod: ,,, | Performed by: NURSE PRACTITIONER

## 2021-09-24 PROCEDURE — 3075F SYST BP GE 130 - 139MM HG: CPT | Mod: ,,, | Performed by: NURSE PRACTITIONER

## 2021-09-24 PROCEDURE — 96372 THER/PROPH/DIAG INJ SC/IM: CPT | Mod: ,,, | Performed by: NURSE PRACTITIONER

## 2021-09-24 PROCEDURE — 3078F DIAST BP <80 MM HG: CPT | Mod: ,,, | Performed by: NURSE PRACTITIONER

## 2021-09-24 PROCEDURE — 96372 PR INJECTION,THERAP/PROPH/DIAG2ST, IM OR SUBCUT: ICD-10-PCS | Mod: ,,, | Performed by: NURSE PRACTITIONER

## 2021-09-24 PROCEDURE — 3008F BODY MASS INDEX DOCD: CPT | Mod: ,,, | Performed by: NURSE PRACTITIONER

## 2021-09-24 PROCEDURE — 99213 PR OFFICE/OUTPT VISIT, EST, LEVL III, 20-29 MIN: ICD-10-PCS | Mod: 25,,, | Performed by: NURSE PRACTITIONER

## 2021-09-24 PROCEDURE — 1159F MED LIST DOCD IN RCRD: CPT | Mod: ,,, | Performed by: NURSE PRACTITIONER

## 2021-09-24 RX ORDER — KETOROLAC TROMETHAMINE 30 MG/ML
60 INJECTION, SOLUTION INTRAMUSCULAR; INTRAVENOUS
Status: COMPLETED | OUTPATIENT
Start: 2021-09-24 | End: 2021-09-24

## 2021-09-24 RX ORDER — ALLOPURINOL 100 MG/1
100 TABLET ORAL DAILY
Qty: 90 TABLET | Refills: 1 | Status: SHIPPED | OUTPATIENT
Start: 2021-09-24 | End: 2021-10-24 | Stop reason: CLARIF

## 2021-09-24 RX ADMIN — KETOROLAC TROMETHAMINE 60 MG: 30 INJECTION, SOLUTION INTRAMUSCULAR; INTRAVENOUS at 05:09

## 2021-09-30 ENCOUNTER — TELEPHONE (OUTPATIENT)
Dept: FAMILY MEDICINE | Facility: CLINIC | Age: 61
End: 2021-09-30

## 2021-10-04 ENCOUNTER — HOSPITAL ENCOUNTER (OUTPATIENT)
Dept: RADIOLOGY | Facility: HOSPITAL | Age: 61
Discharge: HOME | End: 2021-10-04
Attending: SURGERY
Payer: COMMERCIAL

## 2021-10-04 ENCOUNTER — OFFICE VISIT (OUTPATIENT)
Dept: SURGERY | Facility: CLINIC | Age: 61
End: 2021-10-04
Payer: COMMERCIAL

## 2021-10-04 DIAGNOSIS — E04.1 THYROID NODULE: ICD-10-CM

## 2021-10-04 DIAGNOSIS — Z01.818 ENCOUNTER FOR OTHER PREPROCEDURAL EXAMINATION: Primary | ICD-10-CM

## 2021-10-04 DIAGNOSIS — E04.1 THYROID NODULE GREATER THAN OR EQUAL TO 1 CM IN DIAMETER INCIDENTALLY NOTED ON IMAGING STUDY: ICD-10-CM

## 2021-10-04 DIAGNOSIS — Z78.9 CENTRAL VENOUS CATHETER IN PLACE: ICD-10-CM

## 2021-10-04 PROCEDURE — 3052F HG A1C>EQUAL 8.0%<EQUAL 9.0%: CPT | Mod: ,,, | Performed by: SURGERY

## 2021-10-04 PROCEDURE — 76536 US EXAM OF HEAD AND NECK: CPT | Mod: TC

## 2021-10-04 PROCEDURE — 76536 US THYROID: ICD-10-PCS | Mod: 26,,, | Performed by: RADIOLOGY

## 2021-10-04 PROCEDURE — 99214 PR OFFICE/OUTPT VISIT, EST, LEVL IV, 30-39 MIN: ICD-10-PCS | Mod: S$PBB,,, | Performed by: SURGERY

## 2021-10-04 PROCEDURE — 76536 US EXAM OF HEAD AND NECK: CPT | Mod: 26,,, | Performed by: RADIOLOGY

## 2021-10-04 PROCEDURE — 1159F MED LIST DOCD IN RCRD: CPT | Mod: ,,, | Performed by: SURGERY

## 2021-10-04 PROCEDURE — 99214 OFFICE O/P EST MOD 30 MIN: CPT | Mod: S$PBB,,, | Performed by: SURGERY

## 2021-10-04 PROCEDURE — 1159F PR MEDICATION LIST DOCUMENTED IN MEDICAL RECORD: ICD-10-PCS | Mod: ,,, | Performed by: SURGERY

## 2021-10-04 PROCEDURE — 3052F PR MOST RECENT HEMOGLOBIN A1C LEVEL 8.0 - < 9.0%: ICD-10-PCS | Mod: ,,, | Performed by: SURGERY

## 2021-10-04 PROCEDURE — 1160F RVW MEDS BY RX/DR IN RCRD: CPT | Mod: ,,, | Performed by: SURGERY

## 2021-10-04 PROCEDURE — 1160F PR REVIEW ALL MEDS BY PRESCRIBER/CLIN PHARMACIST DOCUMENTED: ICD-10-PCS | Mod: ,,, | Performed by: SURGERY

## 2021-10-04 PROCEDURE — 99213 OFFICE O/P EST LOW 20 MIN: CPT | Mod: PBBFAC | Performed by: SURGERY

## 2021-10-14 ENCOUNTER — OUTSIDE PLACE OF SERVICE (OUTPATIENT)
Dept: SURGERY | Facility: CLINIC | Age: 61
End: 2021-10-14
Payer: COMMERCIAL

## 2021-10-14 PROCEDURE — 36590 REMOVAL TUNNELED CV CATH: CPT | Mod: ,,, | Performed by: SURGERY

## 2021-10-14 PROCEDURE — 36590 PR REMOVAL TUNNELED CV CATH W SUBQ PORT OR PUMP: ICD-10-PCS | Mod: ,,, | Performed by: SURGERY

## 2021-10-19 ENCOUNTER — OFFICE VISIT (OUTPATIENT)
Dept: OTOLARYNGOLOGY | Facility: CLINIC | Age: 61
End: 2021-10-19
Payer: COMMERCIAL

## 2021-10-19 VITALS — HEIGHT: 62 IN | WEIGHT: 209 LBS | BODY MASS INDEX: 38.46 KG/M2

## 2021-10-19 DIAGNOSIS — J32.9 CHRONIC SINUSITIS, UNSPECIFIED LOCATION: ICD-10-CM

## 2021-10-19 DIAGNOSIS — R43.0 ANOSMIA: Primary | ICD-10-CM

## 2021-10-19 PROCEDURE — 3008F PR BODY MASS INDEX (BMI) DOCUMENTED: ICD-10-PCS | Mod: ,,, | Performed by: OTOLARYNGOLOGY

## 2021-10-19 PROCEDURE — 99204 PR OFFICE/OUTPT VISIT, NEW, LEVL IV, 45-59 MIN: ICD-10-PCS | Mod: S$PBB,,, | Performed by: OTOLARYNGOLOGY

## 2021-10-19 PROCEDURE — 3052F HG A1C>EQUAL 8.0%<EQUAL 9.0%: CPT | Mod: ,,, | Performed by: OTOLARYNGOLOGY

## 2021-10-19 PROCEDURE — 99215 OFFICE O/P EST HI 40 MIN: CPT | Mod: PBBFAC | Performed by: OTOLARYNGOLOGY

## 2021-10-19 PROCEDURE — 3052F PR MOST RECENT HEMOGLOBIN A1C LEVEL 8.0 - < 9.0%: ICD-10-PCS | Mod: ,,, | Performed by: OTOLARYNGOLOGY

## 2021-10-19 PROCEDURE — 99204 OFFICE O/P NEW MOD 45 MIN: CPT | Mod: S$PBB,,, | Performed by: OTOLARYNGOLOGY

## 2021-10-19 PROCEDURE — 3008F BODY MASS INDEX DOCD: CPT | Mod: ,,, | Performed by: OTOLARYNGOLOGY

## 2021-10-19 PROCEDURE — 1159F MED LIST DOCD IN RCRD: CPT | Mod: ,,, | Performed by: OTOLARYNGOLOGY

## 2021-10-19 PROCEDURE — 1159F PR MEDICATION LIST DOCUMENTED IN MEDICAL RECORD: ICD-10-PCS | Mod: ,,, | Performed by: OTOLARYNGOLOGY

## 2021-10-19 PROCEDURE — 1160F RVW MEDS BY RX/DR IN RCRD: CPT | Mod: ,,, | Performed by: OTOLARYNGOLOGY

## 2021-10-19 PROCEDURE — 1160F PR REVIEW ALL MEDS BY PRESCRIBER/CLIN PHARMACIST DOCUMENTED: ICD-10-PCS | Mod: ,,, | Performed by: OTOLARYNGOLOGY

## 2021-10-22 ENCOUNTER — OFFICE VISIT (OUTPATIENT)
Dept: SURGERY | Facility: CLINIC | Age: 61
End: 2021-10-22
Attending: SURGERY
Payer: COMMERCIAL

## 2021-10-22 DIAGNOSIS — Z09 FOLLOW-UP EXAMINATION, FOLLOWING OTHER SURGERY: Primary | ICD-10-CM

## 2021-10-22 PROCEDURE — 99024 PR POST-OP FOLLOW-UP VISIT: ICD-10-PCS | Mod: ,,, | Performed by: SURGERY

## 2021-10-22 PROCEDURE — 3052F HG A1C>EQUAL 8.0%<EQUAL 9.0%: CPT | Mod: ,,, | Performed by: SURGERY

## 2021-10-22 PROCEDURE — 99024 POSTOP FOLLOW-UP VISIT: CPT | Mod: ,,, | Performed by: SURGERY

## 2021-10-22 PROCEDURE — 3052F PR MOST RECENT HEMOGLOBIN A1C LEVEL 8.0 - < 9.0%: ICD-10-PCS | Mod: ,,, | Performed by: SURGERY

## 2021-10-22 PROCEDURE — 99214 OFFICE O/P EST MOD 30 MIN: CPT | Mod: PBBFAC | Performed by: SURGERY

## 2021-10-24 ENCOUNTER — HOSPITAL ENCOUNTER (EMERGENCY)
Facility: HOSPITAL | Age: 61
Discharge: HOME | End: 2021-10-25
Attending: EMERGENCY MEDICINE
Payer: COMMERCIAL

## 2021-10-24 DIAGNOSIS — R73.9 HYPERGLYCEMIA: ICD-10-CM

## 2021-10-24 DIAGNOSIS — Z79.4 TYPE 2 DIABETES MELLITUS WITH DIABETIC POLYNEUROPATHY, WITH LONG-TERM CURRENT USE OF INSULIN: ICD-10-CM

## 2021-10-24 DIAGNOSIS — C18.9 COLON CANCER: ICD-10-CM

## 2021-10-24 DIAGNOSIS — E11.42 TYPE 2 DIABETES MELLITUS WITH DIABETIC POLYNEUROPATHY, WITH LONG-TERM CURRENT USE OF INSULIN: ICD-10-CM

## 2021-10-24 DIAGNOSIS — I10 HYPERTENSION: ICD-10-CM

## 2021-10-24 DIAGNOSIS — E11.39: ICD-10-CM

## 2021-10-24 DIAGNOSIS — K29.70 GASTROESOPHAGITIS: ICD-10-CM

## 2021-10-24 DIAGNOSIS — I48.91 ATRIAL FIBRILLATION: Chronic | ICD-10-CM

## 2021-10-24 DIAGNOSIS — E78.5 HYPERLIPIDEMIA: ICD-10-CM

## 2021-10-24 DIAGNOSIS — I25.110 ATHEROSCLEROSIS OF NATIVE CORONARY ARTERY OF NATIVE HEART WITH UNSTABLE ANGINA PECTORIS: ICD-10-CM

## 2021-10-24 DIAGNOSIS — E04.1 THYROID NODULE: ICD-10-CM

## 2021-10-24 DIAGNOSIS — E87.5 HYPERKALEMIA: ICD-10-CM

## 2021-10-24 DIAGNOSIS — D64.9 ANEMIA: ICD-10-CM

## 2021-10-24 DIAGNOSIS — I25.119 CHEST PAIN DUE TO CAD: ICD-10-CM

## 2021-10-24 DIAGNOSIS — N17.9 ACUTE KIDNEY INJURY: ICD-10-CM

## 2021-10-24 DIAGNOSIS — N18.4 CKD (CHRONIC KIDNEY DISEASE), STAGE IV: Chronic | ICD-10-CM

## 2021-10-24 DIAGNOSIS — K20.90 GASTROESOPHAGITIS: ICD-10-CM

## 2021-10-24 DIAGNOSIS — R07.9 CHEST PAIN: Primary | ICD-10-CM

## 2021-10-24 LAB
ALBUMIN SERPL BCP-MCNC: 4 G/DL (ref 3.5–5)
ALBUMIN/GLOB SERPL: 0.9 {RATIO}
ALP SERPL-CCNC: 113 U/L (ref 46–118)
ALT SERPL W P-5'-P-CCNC: 18 U/L (ref 13–56)
ANION GAP SERPL CALCULATED.3IONS-SCNC: 13 MMOL/L (ref 7–16)
AST SERPL W P-5'-P-CCNC: 12 U/L (ref 15–37)
BASOPHILS # BLD AUTO: 0.03 K/UL (ref 0–0.2)
BASOPHILS NFR BLD AUTO: 0.4 % (ref 0–1)
BILIRUB SERPL-MCNC: 0.2 MG/DL (ref 0–1.2)
BUN SERPL-MCNC: 55 MG/DL (ref 7–18)
BUN/CREAT SERPL: 25 (ref 6–20)
CALCIUM SERPL-MCNC: 9.2 MG/DL (ref 8.5–10.1)
CHLORIDE SERPL-SCNC: 104 MMOL/L (ref 98–107)
CO2 SERPL-SCNC: 23 MMOL/L (ref 21–32)
CREAT SERPL-MCNC: 2.19 MG/DL (ref 0.55–1.02)
DIFFERENTIAL METHOD BLD: ABNORMAL
EOSINOPHIL # BLD AUTO: 0.11 K/UL (ref 0–0.5)
EOSINOPHIL NFR BLD AUTO: 1.5 % (ref 1–4)
ERYTHROCYTE [DISTWIDTH] IN BLOOD BY AUTOMATED COUNT: 14.4 % (ref 11.5–14.5)
FLUAV AG UPPER RESP QL IA.RAPID: NEGATIVE
FLUBV AG UPPER RESP QL IA.RAPID: NEGATIVE
GLOBULIN SER-MCNC: 4.4 G/DL (ref 2–4)
GLUCOSE SERPL-MCNC: 148 MG/DL (ref 74–106)
HCT VFR BLD AUTO: 28.8 % (ref 38–47)
HGB BLD-MCNC: 9.2 G/DL (ref 12–16)
IMM GRANULOCYTES # BLD AUTO: 0.01 K/UL (ref 0–0.04)
IMM GRANULOCYTES NFR BLD: 0.1 % (ref 0–0.4)
LYMPHOCYTES # BLD AUTO: 3.11 K/UL (ref 1–4.8)
LYMPHOCYTES NFR BLD AUTO: 43.3 % (ref 27–41)
MAGNESIUM SERPL-MCNC: 3.8 MG/DL (ref 1.7–2.3)
MCH RBC QN AUTO: 26.4 PG (ref 27–31)
MCHC RBC AUTO-ENTMCNC: 31.9 G/DL (ref 32–36)
MCV RBC AUTO: 82.5 FL (ref 80–96)
MONOCYTES # BLD AUTO: 0.6 K/UL (ref 0–0.8)
MONOCYTES NFR BLD AUTO: 8.3 % (ref 2–6)
MPC BLD CALC-MCNC: 10.6 FL (ref 9.4–12.4)
NEUTROPHILS # BLD AUTO: 3.33 K/UL (ref 1.8–7.7)
NEUTROPHILS NFR BLD AUTO: 46.4 % (ref 53–65)
NRBC # BLD AUTO: 0 X10E3/UL
NRBC, AUTO (.00): 0 %
NT-PROBNP SERPL-MCNC: 31 PG/ML (ref 1–125)
PLATELET # BLD AUTO: 446 K/UL (ref 150–400)
POTASSIUM SERPL-SCNC: 5.2 MMOL/L (ref 3.5–5.1)
PROT SERPL-MCNC: 8.4 G/DL (ref 6.4–8.2)
RBC # BLD AUTO: 3.49 M/UL (ref 4.2–5.4)
SARS-COV+SARS-COV-2 AG RESP QL IA.RAPID: NEGATIVE
SODIUM SERPL-SCNC: 135 MMOL/L (ref 136–145)
TROPONIN I SERPL-MCNC: <0.017 NG/ML
WBC # BLD AUTO: 7.19 K/UL (ref 4.5–11)

## 2021-10-24 PROCEDURE — 93010 ELECTROCARDIOGRAM REPORT: CPT | Mod: ,,, | Performed by: INTERNAL MEDICINE

## 2021-10-24 PROCEDURE — 80053 COMPREHEN METABOLIC PANEL: CPT | Performed by: EMERGENCY MEDICINE

## 2021-10-24 PROCEDURE — 93005 ELECTROCARDIOGRAM TRACING: CPT

## 2021-10-24 PROCEDURE — 99283 EMERGENCY DEPT VISIT LOW MDM: CPT | Mod: ,,, | Performed by: EMERGENCY MEDICINE

## 2021-10-24 PROCEDURE — 83880 ASSAY OF NATRIURETIC PEPTIDE: CPT | Performed by: EMERGENCY MEDICINE

## 2021-10-24 PROCEDURE — 93010 EKG 12-LEAD: ICD-10-PCS | Mod: ,,, | Performed by: INTERNAL MEDICINE

## 2021-10-24 PROCEDURE — 36415 COLL VENOUS BLD VENIPUNCTURE: CPT | Performed by: EMERGENCY MEDICINE

## 2021-10-24 PROCEDURE — 99285 EMERGENCY DEPT VISIT HI MDM: CPT | Mod: 25

## 2021-10-24 PROCEDURE — 84484 ASSAY OF TROPONIN QUANT: CPT | Performed by: EMERGENCY MEDICINE

## 2021-10-24 PROCEDURE — 87428 SARSCOV & INF VIR A&B AG IA: CPT | Performed by: EMERGENCY MEDICINE

## 2021-10-24 PROCEDURE — 83735 ASSAY OF MAGNESIUM: CPT | Performed by: EMERGENCY MEDICINE

## 2021-10-24 PROCEDURE — 84075 ASSAY ALKALINE PHOSPHATASE: CPT | Performed by: EMERGENCY MEDICINE

## 2021-10-24 PROCEDURE — 99283 PR EMERGENCY DEPT VISIT,LEVEL III: ICD-10-PCS | Mod: ,,, | Performed by: EMERGENCY MEDICINE

## 2021-10-24 PROCEDURE — 85025 COMPLETE CBC W/AUTO DIFF WBC: CPT | Performed by: EMERGENCY MEDICINE

## 2021-10-24 PROCEDURE — 25000003 PHARM REV CODE 250: Performed by: EMERGENCY MEDICINE

## 2021-10-24 PROCEDURE — 96360 HYDRATION IV INFUSION INIT: CPT

## 2021-10-24 RX ORDER — SODIUM CHLORIDE 9 MG/ML
INJECTION, SOLUTION INTRAVENOUS
Status: COMPLETED | OUTPATIENT
Start: 2021-10-24 | End: 2021-10-24

## 2021-10-24 RX ORDER — NAPROXEN SODIUM 220 MG/1
81 TABLET, FILM COATED ORAL DAILY
COMMUNITY
End: 2023-11-02

## 2021-10-24 RX ADMIN — SODIUM CHLORIDE: 9 INJECTION, SOLUTION INTRAVENOUS at 09:10

## 2021-10-25 VITALS
SYSTOLIC BLOOD PRESSURE: 138 MMHG | RESPIRATION RATE: 18 BRPM | OXYGEN SATURATION: 100 % | WEIGHT: 209 LBS | BODY MASS INDEX: 38.46 KG/M2 | HEIGHT: 62 IN | HEART RATE: 75 BPM | DIASTOLIC BLOOD PRESSURE: 56 MMHG | TEMPERATURE: 98 F

## 2021-10-25 LAB — TROPONIN I SERPL-MCNC: <0.017 NG/ML

## 2021-10-25 PROCEDURE — 36415 COLL VENOUS BLD VENIPUNCTURE: CPT | Performed by: EMERGENCY MEDICINE

## 2021-10-25 PROCEDURE — 84484 ASSAY OF TROPONIN QUANT: CPT | Performed by: EMERGENCY MEDICINE

## 2021-10-27 ENCOUNTER — HOSPITAL ENCOUNTER (OUTPATIENT)
Dept: RADIOLOGY | Facility: HOSPITAL | Age: 61
Discharge: HOME | End: 2021-10-27
Attending: OTOLARYNGOLOGY
Payer: COMMERCIAL

## 2021-10-27 ENCOUNTER — OFFICE VISIT (OUTPATIENT)
Dept: OTOLARYNGOLOGY | Facility: CLINIC | Age: 61
End: 2021-10-27
Payer: COMMERCIAL

## 2021-10-27 VITALS — BODY MASS INDEX: 38.46 KG/M2 | WEIGHT: 209 LBS | HEIGHT: 62 IN

## 2021-10-27 DIAGNOSIS — R43.0 ANOSMIA: Primary | ICD-10-CM

## 2021-10-27 DIAGNOSIS — J32.9 CHRONIC SINUSITIS, UNSPECIFIED LOCATION: ICD-10-CM

## 2021-10-27 PROCEDURE — 3008F PR BODY MASS INDEX (BMI) DOCUMENTED: ICD-10-PCS | Mod: ,,, | Performed by: OTOLARYNGOLOGY

## 2021-10-27 PROCEDURE — 3052F PR MOST RECENT HEMOGLOBIN A1C LEVEL 8.0 - < 9.0%: ICD-10-PCS | Mod: ,,, | Performed by: OTOLARYNGOLOGY

## 2021-10-27 PROCEDURE — 1159F MED LIST DOCD IN RCRD: CPT | Mod: ,,, | Performed by: OTOLARYNGOLOGY

## 2021-10-27 PROCEDURE — 70486 CT SINUSES WITHOUT CONTRAST: ICD-10-PCS | Mod: 26,,, | Performed by: RADIOLOGY

## 2021-10-27 PROCEDURE — 1159F PR MEDICATION LIST DOCUMENTED IN MEDICAL RECORD: ICD-10-PCS | Mod: ,,, | Performed by: OTOLARYNGOLOGY

## 2021-10-27 PROCEDURE — 99214 OFFICE O/P EST MOD 30 MIN: CPT | Mod: PBBFAC,25 | Performed by: OTOLARYNGOLOGY

## 2021-10-27 PROCEDURE — 70486 CT MAXILLOFACIAL W/O DYE: CPT | Mod: TC

## 2021-10-27 PROCEDURE — 99214 PR OFFICE/OUTPT VISIT, EST, LEVL IV, 30-39 MIN: ICD-10-PCS | Mod: S$PBB,,, | Performed by: OTOLARYNGOLOGY

## 2021-10-27 PROCEDURE — 99214 OFFICE O/P EST MOD 30 MIN: CPT | Mod: S$PBB,,, | Performed by: OTOLARYNGOLOGY

## 2021-10-27 PROCEDURE — 1160F PR REVIEW ALL MEDS BY PRESCRIBER/CLIN PHARMACIST DOCUMENTED: ICD-10-PCS | Mod: ,,, | Performed by: OTOLARYNGOLOGY

## 2021-10-27 PROCEDURE — 1160F RVW MEDS BY RX/DR IN RCRD: CPT | Mod: ,,, | Performed by: OTOLARYNGOLOGY

## 2021-10-27 PROCEDURE — 3052F HG A1C>EQUAL 8.0%<EQUAL 9.0%: CPT | Mod: ,,, | Performed by: OTOLARYNGOLOGY

## 2021-10-27 PROCEDURE — 3008F BODY MASS INDEX DOCD: CPT | Mod: ,,, | Performed by: OTOLARYNGOLOGY

## 2021-10-27 PROCEDURE — 70486 CT MAXILLOFACIAL W/O DYE: CPT | Mod: 26,,, | Performed by: RADIOLOGY

## 2021-10-27 RX ORDER — TRIAMCINOLONE ACETONIDE 55 UG/1
2 SPRAY, METERED NASAL DAILY
Qty: 16.9 ML | Refills: 0 | Status: SHIPPED | OUTPATIENT
Start: 2021-10-27 | End: 2022-06-13

## 2021-11-10 ENCOUNTER — OFFICE VISIT (OUTPATIENT)
Dept: PAIN MEDICINE | Facility: CLINIC | Age: 61
End: 2021-11-10
Payer: COMMERCIAL

## 2021-11-10 VITALS
SYSTOLIC BLOOD PRESSURE: 140 MMHG | HEART RATE: 82 BPM | BODY MASS INDEX: 38.46 KG/M2 | HEIGHT: 62 IN | WEIGHT: 209 LBS | DIASTOLIC BLOOD PRESSURE: 51 MMHG

## 2021-11-10 DIAGNOSIS — M54.17 LUMBOSACRAL RADICULOPATHY: Chronic | ICD-10-CM

## 2021-11-10 DIAGNOSIS — Z79.899 ENCOUNTER FOR LONG-TERM (CURRENT) USE OF OTHER MEDICATIONS: Primary | ICD-10-CM

## 2021-11-10 DIAGNOSIS — M54.9 DORSALGIA, UNSPECIFIED: Chronic | ICD-10-CM

## 2021-11-10 LAB

## 2021-11-10 PROCEDURE — 3077F PR MOST RECENT SYSTOLIC BLOOD PRESSURE >= 140 MM HG: ICD-10-PCS | Mod: ,,, | Performed by: PAIN MEDICINE

## 2021-11-10 PROCEDURE — 3078F DIAST BP <80 MM HG: CPT | Mod: ,,, | Performed by: PAIN MEDICINE

## 2021-11-10 PROCEDURE — 1159F PR MEDICATION LIST DOCUMENTED IN MEDICAL RECORD: ICD-10-PCS | Mod: ,,, | Performed by: PAIN MEDICINE

## 2021-11-10 PROCEDURE — G0481 PR DRUG TEST DEF 8-14 CLASSES: ICD-10-PCS | Mod: ,,, | Performed by: CLINICAL MEDICAL LABORATORY

## 2021-11-10 PROCEDURE — 80305 DRUG TEST PRSMV DIR OPT OBS: CPT | Mod: PBBFAC | Performed by: PAIN MEDICINE

## 2021-11-10 PROCEDURE — 3052F PR MOST RECENT HEMOGLOBIN A1C LEVEL 8.0 - < 9.0%: ICD-10-PCS | Mod: ,,, | Performed by: PAIN MEDICINE

## 2021-11-10 PROCEDURE — 3008F PR BODY MASS INDEX (BMI) DOCUMENTED: ICD-10-PCS | Mod: ,,, | Performed by: PAIN MEDICINE

## 2021-11-10 PROCEDURE — 1159F MED LIST DOCD IN RCRD: CPT | Mod: ,,, | Performed by: PAIN MEDICINE

## 2021-11-10 PROCEDURE — 99204 PR OFFICE/OUTPT VISIT, NEW, LEVL IV, 45-59 MIN: ICD-10-PCS | Mod: S$PBB,,, | Performed by: PAIN MEDICINE

## 2021-11-10 PROCEDURE — 99215 OFFICE O/P EST HI 40 MIN: CPT | Mod: PBBFAC | Performed by: PAIN MEDICINE

## 2021-11-10 PROCEDURE — 3008F BODY MASS INDEX DOCD: CPT | Mod: ,,, | Performed by: PAIN MEDICINE

## 2021-11-10 PROCEDURE — 3077F SYST BP >= 140 MM HG: CPT | Mod: ,,, | Performed by: PAIN MEDICINE

## 2021-11-10 PROCEDURE — 3078F PR MOST RECENT DIASTOLIC BLOOD PRESSURE < 80 MM HG: ICD-10-PCS | Mod: ,,, | Performed by: PAIN MEDICINE

## 2021-11-10 PROCEDURE — 99204 OFFICE O/P NEW MOD 45 MIN: CPT | Mod: S$PBB,,, | Performed by: PAIN MEDICINE

## 2021-11-10 PROCEDURE — 3052F HG A1C>EQUAL 8.0%<EQUAL 9.0%: CPT | Mod: ,,, | Performed by: PAIN MEDICINE

## 2021-11-10 PROCEDURE — G0481 DRUG TEST DEF 8-14 CLASSES: HCPCS | Mod: ,,, | Performed by: CLINICAL MEDICAL LABORATORY

## 2021-11-10 RX ORDER — TRAMADOL HYDROCHLORIDE 50 MG/1
50 TABLET ORAL EVERY 12 HOURS PRN
Qty: 30 TABLET | Refills: 0 | Status: SHIPPED | OUTPATIENT
Start: 2021-11-10 | End: 2021-11-30 | Stop reason: SDUPTHER

## 2021-11-12 LAB
6-ACETYLMORPHINE, URINE (RUSH): NEGATIVE 10 NG/ML
7-AMINOCLONAZEPAM, URINE (RUSH): NEGATIVE 25 NG/ML
A-HYDROXYALPRAZOLAM, URINE (RUSH): NEGATIVE 25 NG/ML
ACETYL FENTANYL, URINE (RUSH): NEGATIVE 2.5 NG/ML
ACETYL NORFENTANYL OXALATE, URINE (RUSH): NEGATIVE 5 NG/ML
AMPHET UR QL SCN: NEGATIVE 100 NG/ML
BENZOYLECGONINE, URINE (RUSH): NEGATIVE 100 NG/ML
BUPRENORPHINE UR QL SCN: NEGATIVE 25 NG/ML
CODEINE, URINE (RUSH): NEGATIVE 25 NG/ML
CREAT UR-MCNC: 36 MG/DL (ref 28–219)
EDDP, URINE (RUSH): NEGATIVE 25 NG/ML
FENTANYL, URINE (RUSH): NEGATIVE 2.5 NG/ML
HYDROCODONE, URINE (RUSH): NEGATIVE 25 NG/ML
HYDROMORPHONE, URINE (RUSH): NEGATIVE 25 NG/ML
LORAZEPAM, URINE (RUSH): NEGATIVE 25 NG/ML
METHADONE UR QL SCN: NEGATIVE 25 NG/ML
METHAMPHET UR QL SCN: NEGATIVE 100 NG/ML
MORPHINE, URINE (RUSH): NEGATIVE 25 NG/ML
NORBUPRENORPHINE, URINE (RUSH): NEGATIVE 25 NG/ML
NORDIAZEPAM, URINE (RUSH): NEGATIVE 25 NG/ML
NORFENTANYL OXALATE, URINE (RUSH): NEGATIVE 5 NG/ML
NORHYDROCODONE, URINE (RUSH): NEGATIVE 50 NG/ML
NOROXYCODONE HCL, URINE (RUSH): NEGATIVE 50 NG/ML
OXAZEPAM, URINE (RUSH): NEGATIVE 25 NG/ML
OXYCODONE UR QL SCN: NEGATIVE 25 NG/ML
OXYMORPHONE, URINE (RUSH): NEGATIVE 25 NG/ML
PH UR STRIP: 6 PH UNITS
SP GR UR STRIP: 1.01
TAPENTADOL, URINE (RUSH): NEGATIVE 25 NG/ML
TEMAZEPAM, URINE (RUSH): NEGATIVE 25 NG/ML
THC-COOH, URINE (RUSH): NEGATIVE 25 NG/ML
TRAMADOL, URINE (RUSH): 180.4 100 NG/ML

## 2021-11-16 ENCOUNTER — OFFICE VISIT (OUTPATIENT)
Dept: FAMILY MEDICINE | Facility: CLINIC | Age: 61
End: 2021-11-16
Payer: COMMERCIAL

## 2021-11-16 VITALS
WEIGHT: 212.63 LBS | BODY MASS INDEX: 40.15 KG/M2 | HEIGHT: 61 IN | SYSTOLIC BLOOD PRESSURE: 141 MMHG | TEMPERATURE: 97 F | HEART RATE: 74 BPM | RESPIRATION RATE: 17 BRPM | OXYGEN SATURATION: 98 % | DIASTOLIC BLOOD PRESSURE: 63 MMHG

## 2021-11-16 DIAGNOSIS — Z79.4 TYPE 2 DIABETES MELLITUS WITH DIABETIC POLYNEUROPATHY, WITH LONG-TERM CURRENT USE OF INSULIN: ICD-10-CM

## 2021-11-16 DIAGNOSIS — L97.519 DIABETIC ULCER OF TOE OF RIGHT FOOT ASSOCIATED WITH TYPE 2 DIABETES MELLITUS, UNSPECIFIED ULCER STAGE: Primary | ICD-10-CM

## 2021-11-16 DIAGNOSIS — E11.621 DIABETIC ULCER OF TOE OF RIGHT FOOT ASSOCIATED WITH TYPE 2 DIABETES MELLITUS, UNSPECIFIED ULCER STAGE: Primary | ICD-10-CM

## 2021-11-16 DIAGNOSIS — E11.42 TYPE 2 DIABETES MELLITUS WITH DIABETIC POLYNEUROPATHY, WITH LONG-TERM CURRENT USE OF INSULIN: ICD-10-CM

## 2021-11-16 PROCEDURE — 99214 OFFICE O/P EST MOD 30 MIN: CPT | Mod: ,,, | Performed by: NURSE PRACTITIONER

## 2021-11-16 PROCEDURE — 99214 PR OFFICE/OUTPT VISIT, EST, LEVL IV, 30-39 MIN: ICD-10-PCS | Mod: ,,, | Performed by: NURSE PRACTITIONER

## 2021-11-16 RX ORDER — INSULIN DEGLUDEC 100 U/ML
INJECTION, SOLUTION SUBCUTANEOUS
COMMUNITY
Start: 2021-07-27 | End: 2021-12-20 | Stop reason: SDUPTHER

## 2021-11-16 RX ORDER — FERROUS SULFATE TAB 325 MG (65 MG ELEMENTAL FE) 325 (65 FE) MG
TAB ORAL
COMMUNITY
Start: 2021-10-24 | End: 2022-01-04

## 2021-11-16 RX ORDER — SEMAGLUTIDE 1.34 MG/ML
INJECTION, SOLUTION SUBCUTANEOUS
COMMUNITY
Start: 2021-07-27 | End: 2022-06-13

## 2021-11-16 RX ORDER — EMPAGLIFLOZIN 25 MG/1
TABLET, FILM COATED ORAL
COMMUNITY
Start: 2021-10-10 | End: 2021-12-20 | Stop reason: SDUPTHER

## 2021-11-16 RX ORDER — HYDROCODONE BITARTRATE AND ACETAMINOPHEN 7.5; 325 MG/1; MG/1
TABLET ORAL
COMMUNITY
Start: 2021-10-14 | End: 2022-06-13

## 2021-11-30 PROBLEM — M89.49 OSTEOARTHROSIS MULTIPLE SITES, NOT SPECIFIED AS GENERALIZED: Chronic | Status: ACTIVE | Noted: 2021-11-30

## 2021-11-30 PROBLEM — M54.17 LUMBOSACRAL RADICULOPATHY: Status: ACTIVE | Noted: 2021-11-30

## 2021-11-30 PROBLEM — M54.17 LUMBOSACRAL RADICULOPATHY: Chronic | Status: ACTIVE | Noted: 2021-11-30

## 2021-12-01 ENCOUNTER — OFFICE VISIT (OUTPATIENT)
Dept: PAIN MEDICINE | Facility: CLINIC | Age: 61
End: 2021-12-01
Payer: COMMERCIAL

## 2021-12-01 ENCOUNTER — HOSPITAL ENCOUNTER (OUTPATIENT)
Dept: RADIOLOGY | Facility: HOSPITAL | Age: 61
Discharge: HOME | End: 2021-12-01
Attending: PAIN MEDICINE
Payer: COMMERCIAL

## 2021-12-01 VITALS
HEIGHT: 62 IN | HEART RATE: 79 BPM | SYSTOLIC BLOOD PRESSURE: 132 MMHG | RESPIRATION RATE: 18 BRPM | DIASTOLIC BLOOD PRESSURE: 45 MMHG | WEIGHT: 210 LBS | BODY MASS INDEX: 38.64 KG/M2

## 2021-12-01 DIAGNOSIS — M89.49 OSTEOARTHROSIS MULTIPLE SITES, NOT SPECIFIED AS GENERALIZED: Chronic | ICD-10-CM

## 2021-12-01 DIAGNOSIS — M54.17 LUMBOSACRAL RADICULOPATHY: Primary | Chronic | ICD-10-CM

## 2021-12-01 DIAGNOSIS — M54.9 DORSALGIA, UNSPECIFIED: ICD-10-CM

## 2021-12-01 PROCEDURE — 72148 MRI LUMBAR SPINE WITHOUT CONTRAST: ICD-10-PCS | Mod: 26,,, | Performed by: RADIOLOGY

## 2021-12-01 PROCEDURE — 99214 PR OFFICE/OUTPT VISIT, EST, LEVL IV, 30-39 MIN: ICD-10-PCS | Mod: S$PBB,,, | Performed by: PHYSICIAN ASSISTANT

## 2021-12-01 PROCEDURE — 72148 MRI LUMBAR SPINE W/O DYE: CPT | Mod: TC

## 2021-12-01 PROCEDURE — 99215 OFFICE O/P EST HI 40 MIN: CPT | Mod: PBBFAC,25 | Performed by: PHYSICIAN ASSISTANT

## 2021-12-01 PROCEDURE — 99214 OFFICE O/P EST MOD 30 MIN: CPT | Mod: S$PBB,,, | Performed by: PHYSICIAN ASSISTANT

## 2021-12-01 PROCEDURE — 72148 MRI LUMBAR SPINE W/O DYE: CPT | Mod: 26,,, | Performed by: RADIOLOGY

## 2021-12-01 RX ORDER — TRAMADOL HYDROCHLORIDE 50 MG/1
50 TABLET ORAL EVERY 12 HOURS PRN
Qty: 30 TABLET | Refills: 1 | Status: SHIPPED | OUTPATIENT
Start: 2021-12-10 | End: 2022-01-09

## 2021-12-07 ENCOUNTER — HOSPITAL ENCOUNTER (OUTPATIENT)
Dept: RADIOLOGY | Facility: HOSPITAL | Age: 61
Discharge: HOME | End: 2021-12-07
Attending: RADIOLOGY
Payer: COMMERCIAL

## 2021-12-07 DIAGNOSIS — Z12.31 SCREENING MAMMOGRAM, ENCOUNTER FOR: ICD-10-CM

## 2021-12-07 PROCEDURE — 77067 SCR MAMMO BI INCL CAD: CPT | Mod: TC

## 2021-12-20 ENCOUNTER — OFFICE VISIT (OUTPATIENT)
Dept: FAMILY MEDICINE | Facility: CLINIC | Age: 61
End: 2021-12-20
Payer: COMMERCIAL

## 2021-12-20 VITALS
DIASTOLIC BLOOD PRESSURE: 57 MMHG | TEMPERATURE: 98 F | RESPIRATION RATE: 18 BRPM | BODY MASS INDEX: 39.84 KG/M2 | WEIGHT: 211 LBS | OXYGEN SATURATION: 100 % | HEART RATE: 78 BPM | HEIGHT: 61 IN | SYSTOLIC BLOOD PRESSURE: 134 MMHG

## 2021-12-20 DIAGNOSIS — Z79.4 TYPE 2 DIABETES MELLITUS WITH PROLIFERATIVE RETINOPATHY OF BOTH EYES, WITH LONG-TERM CURRENT USE OF INSULIN, MACULAR EDEMA PRESENCE UNSPECIFIED, UNSPECIFIED PROLIFERATIVE RETINOPATHY TYPE: ICD-10-CM

## 2021-12-20 DIAGNOSIS — I10 HYPERTENSION, UNSPECIFIED TYPE: ICD-10-CM

## 2021-12-20 DIAGNOSIS — Z79.4 TYPE 2 DIABETES MELLITUS WITH DIABETIC POLYNEUROPATHY, WITH LONG-TERM CURRENT USE OF INSULIN: ICD-10-CM

## 2021-12-20 DIAGNOSIS — I10 PRIMARY HYPERTENSION: Primary | ICD-10-CM

## 2021-12-20 DIAGNOSIS — I48.91 ATRIAL FIBRILLATION, UNSPECIFIED TYPE: ICD-10-CM

## 2021-12-20 DIAGNOSIS — E11.42 TYPE 2 DIABETES MELLITUS WITH DIABETIC POLYNEUROPATHY, WITH LONG-TERM CURRENT USE OF INSULIN: ICD-10-CM

## 2021-12-20 DIAGNOSIS — E11.3593 TYPE 2 DIABETES MELLITUS WITH PROLIFERATIVE RETINOPATHY OF BOTH EYES, WITH LONG-TERM CURRENT USE OF INSULIN, MACULAR EDEMA PRESENCE UNSPECIFIED, UNSPECIFIED PROLIFERATIVE RETINOPATHY TYPE: ICD-10-CM

## 2021-12-20 LAB
ALBUMIN SERPL BCP-MCNC: 3.3 G/DL (ref 3.5–5)
ALBUMIN/GLOB SERPL: 0.8 {RATIO}
ALP SERPL-CCNC: 121 U/L (ref 46–118)
ALT SERPL W P-5'-P-CCNC: 28 U/L (ref 13–56)
ANION GAP SERPL CALCULATED.3IONS-SCNC: 11 MMOL/L (ref 7–16)
AST SERPL W P-5'-P-CCNC: 20 U/L (ref 15–37)
BILIRUB SERPL-MCNC: 0.1 MG/DL (ref 0–1.2)
BUN SERPL-MCNC: 29 MG/DL (ref 7–18)
BUN/CREAT SERPL: 24 (ref 6–20)
CALCIUM SERPL-MCNC: 8.7 MG/DL (ref 8.5–10.1)
CHLORIDE SERPL-SCNC: 107 MMOL/L (ref 98–107)
CO2 SERPL-SCNC: 26 MMOL/L (ref 21–32)
CREAT SERPL-MCNC: 1.21 MG/DL (ref 0.55–1.02)
CREAT UR-MCNC: 28 MG/DL (ref 28–219)
EST. AVERAGE GLUCOSE BLD GHB EST-MCNC: 170 MG/DL
GLOBULIN SER-MCNC: 4.3 G/DL (ref 2–4)
GLUCOSE SERPL-MCNC: 192 MG/DL (ref 74–106)
HBA1C MFR BLD HPLC: 7.7 % (ref 4.5–6.6)
MICROALBUMIN UR-MCNC: 14.4 MG/DL (ref 0–2.8)
MICROALBUMIN/CREAT RATIO PNL UR: 514.3 MG/G (ref 0–30)
POTASSIUM SERPL-SCNC: 3.9 MMOL/L (ref 3.5–5.1)
PROT SERPL-MCNC: 7.6 G/DL (ref 6.4–8.2)
SODIUM SERPL-SCNC: 140 MMOL/L (ref 136–145)

## 2021-12-20 PROCEDURE — 83036 HEMOGLOBIN A1C: ICD-10-PCS | Mod: ,,, | Performed by: CLINICAL MEDICAL LABORATORY

## 2021-12-20 PROCEDURE — 82570 ASSAY OF URINE CREATININE: CPT | Mod: ,,, | Performed by: CLINICAL MEDICAL LABORATORY

## 2021-12-20 PROCEDURE — 99214 OFFICE O/P EST MOD 30 MIN: CPT | Mod: ,,, | Performed by: FAMILY MEDICINE

## 2021-12-20 PROCEDURE — 80053 COMPREHEN METABOLIC PANEL: CPT | Mod: ,,, | Performed by: CLINICAL MEDICAL LABORATORY

## 2021-12-20 PROCEDURE — 82570 MICROALBUMIN / CREATININE RATIO URINE: ICD-10-PCS | Mod: ,,, | Performed by: CLINICAL MEDICAL LABORATORY

## 2021-12-20 PROCEDURE — 99214 PR OFFICE/OUTPT VISIT, EST, LEVL IV, 30-39 MIN: ICD-10-PCS | Mod: ,,, | Performed by: FAMILY MEDICINE

## 2021-12-20 PROCEDURE — 82043 UR ALBUMIN QUANTITATIVE: CPT | Mod: ,,, | Performed by: CLINICAL MEDICAL LABORATORY

## 2021-12-20 PROCEDURE — 80053 COMPREHENSIVE METABOLIC PANEL: ICD-10-PCS | Mod: ,,, | Performed by: CLINICAL MEDICAL LABORATORY

## 2021-12-20 PROCEDURE — 83036 HEMOGLOBIN GLYCOSYLATED A1C: CPT | Mod: ,,, | Performed by: CLINICAL MEDICAL LABORATORY

## 2021-12-20 PROCEDURE — 82043 MICROALBUMIN / CREATININE RATIO URINE: ICD-10-PCS | Mod: ,,, | Performed by: CLINICAL MEDICAL LABORATORY

## 2021-12-20 RX ORDER — PANTOPRAZOLE SODIUM 40 MG/1
40 TABLET, DELAYED RELEASE ORAL DAILY
Qty: 90 TABLET | Refills: 1 | Status: SHIPPED | OUTPATIENT
Start: 2021-12-20 | End: 2022-06-13

## 2021-12-20 RX ORDER — CLINDAMYCIN HYDROCHLORIDE 300 MG/1
CAPSULE ORAL
COMMUNITY
Start: 2021-12-01 | End: 2022-06-13

## 2021-12-20 RX ORDER — EMPAGLIFLOZIN 25 MG/1
25 TABLET, FILM COATED ORAL DAILY
Qty: 30 TABLET | Refills: 5 | Status: SHIPPED | OUTPATIENT
Start: 2021-12-20

## 2021-12-20 RX ORDER — INSULIN ASPART 100 [IU]/ML
20 INJECTION, SOLUTION INTRAVENOUS; SUBCUTANEOUS 2 TIMES DAILY
Qty: 12 EACH | Refills: 1 | Status: SHIPPED | OUTPATIENT
Start: 2021-12-20

## 2021-12-20 RX ORDER — OLMESARTAN MEDOXOMIL AND HYDROCHLOROTHIAZIDE 40/25 40; 25 MG/1; MG/1
1 TABLET ORAL DAILY
Qty: 90 TABLET | Refills: 1 | Status: ON HOLD | OUTPATIENT
Start: 2021-12-20 | End: 2022-06-22 | Stop reason: SDUPTHER

## 2021-12-20 RX ORDER — METOPROLOL TARTRATE 25 MG/1
25 TABLET, FILM COATED ORAL 2 TIMES DAILY
Qty: 180 TABLET | Refills: 1 | Status: SHIPPED | OUTPATIENT
Start: 2021-12-20 | End: 2022-01-04 | Stop reason: SDUPTHER

## 2021-12-20 RX ORDER — ROSUVASTATIN CALCIUM 40 MG/1
40 TABLET, COATED ORAL DAILY
Qty: 90 TABLET | Refills: 1 | Status: SHIPPED | OUTPATIENT
Start: 2021-12-20 | End: 2022-01-04 | Stop reason: SDUPTHER

## 2021-12-20 RX ORDER — SEMAGLUTIDE 1.34 MG/ML
1 INJECTION, SOLUTION SUBCUTANEOUS WEEKLY
Qty: 4 PEN | Refills: 5 | Status: SHIPPED | OUTPATIENT
Start: 2021-12-20 | End: 2022-06-13

## 2021-12-20 RX ORDER — INSULIN DEGLUDEC 100 U/ML
20 INJECTION, SOLUTION SUBCUTANEOUS NIGHTLY
Qty: 3 PEN | Refills: 5 | Status: SHIPPED | OUTPATIENT
Start: 2021-12-20 | End: 2022-06-13

## 2022-01-03 NOTE — PROGRESS NOTES
PCP: Primary Doctor No    Referring Provider:     Subjective:   Dilma Cr is a 61 y.o. female, nonsmoker, with hx of diabetes, Afib not on AC due to occult GI bleeding, HTN, HLD, CKD stage III, CAD  status post PCI of LAD and RCA 5/14/21,who presents for 6 mo follow up.       Patient denies recent chest pain.  Patient states she is out of iron right now. She states she has noticed no blood.  On plavix and aspirin.    She reports occasional shortness of breath and lower extremity edema.     EKG NSR without ischemic changes  ECHO  5/27/21:  The left ventricle is normal in size with mild concentric hypertrophy and normal systolic function.  EF 55%.                               Normal left ventricular diastolic function.                               Atrial fibrillation not observed.  C 5/14/21:  PCI Mid LAD Xience Maria Del Carmen 2.75 mm x 15 mm.     PCI distal RCA  Xience Maria Del Carmen  2.75 mm x 18 mm        Past Surgical History:   Procedure Laterality Date    BREAST SURGERY      C5-C6 RADHA  8-, 7-, 6-1-2016    Dr Fowler    CERVICAL SPINE SURGERY      COLON SURGERY      HYSTERECTOMY      LEFT HEART CATHETERIZATION Left 5/14/2021    Procedure: Left heart cath;  Surgeon: Mateus Guan MD;  Location: [facility] CATH LAB;  Service: Cardiology;  Laterality: Left;    MEDIPORT REMOVAL  10/14/2021    Dr Kraft    right rotator cuff repair      in [place]    TOTAL REDUCTION MAMMOPLASTY        Family History   Problem Relation Age of Onset    Hypertension Mother     Diabetes Mother     Hypertension Father     Diabetes Father     Hypertension Sister     Breast cancer Sister     Hypertension Brother       Social History     Socioeconomic History    Marital status:      Spouse name: Jorge Alberto    Number of children: 2   Occupational History    Occupation:  at [employer] for past 27 years   Tobacco Use    Smoking status: Never Smoker    Smokeless tobacco: Never Used   Substance and Sexual  Activity    Alcohol use: Never    Drug use: Never    Sexual activity: Yes          Lab Results   Component Value Date     12/20/2021    K 3.9 12/20/2021     12/20/2021    CO2 26 12/20/2021    BUN 29 (H) 12/20/2021    CREATININE 1.21 (H) 12/20/2021    CALCIUM 8.7 12/20/2021    ANIONGAP 11 12/20/2021    ESTGFRAFRICA 59 (L) 12/20/2021       Lab Results   Component Value Date    CHOL 143 06/16/2021    CHOL 141 05/15/2021     Lab Results   Component Value Date    HDL 61 (H) 06/16/2021    HDL 43 05/15/2021     Lab Results   Component Value Date    LDLCALC 64 06/16/2021    LDLCALC 62 05/15/2021     Lab Results   Component Value Date    TRIG 90 06/16/2021    TRIG 179 (H) 05/15/2021     Lab Results   Component Value Date    CHOLHDL 2.3 06/16/2021    CHOLHDL 3.3 05/15/2021       Lab Results   Component Value Date    WBC 7.19 10/24/2021    HGB 9.2 (L) 10/24/2021    HCT 28.8 (L) 10/24/2021    MCV 82.5 10/24/2021     (H) 10/24/2021           Current Outpatient Medications:     ascorbic acid, vitamin C, 250 mg Chew, Take 250 mg by mouth once daily., Disp: , Rfl:     aspirin 81 MG Chew, Take 81 mg by mouth once daily., Disp: , Rfl:     clindamycin (CLEOCIN) 300 MG capsule, , Disp: , Rfl:     clopidogreL (PLAVIX) 75 mg tablet, Take 1 tablet (75 mg total) by mouth once daily., Disp: 90 tablet, Rfl: 4    cyanocobalamin (VITAMIN B-12) 1000 MCG tablet, Take 100 mcg by mouth once daily., Disp: , Rfl:     FEROSUL 325 mg (65 mg iron) Tab tablet, , Disp: , Rfl:     ferrous sulfate 325 (65 FE) MG EC tablet, Take 325 mg by mouth once daily., Disp: , Rfl:     FREESTYLE BECCA 14 DAY SENSOR Kit, USE TO CHECK GLUCOSE 4 TIMES DAILY, Disp: , Rfl:     furosemide (LASIX) 20 MG tablet, Take 1 tablet (20 mg total) by mouth daily as needed (fluid)., Disp: 90 tablet, Rfl: 1    HYDROcodone-acetaminophen (NORCO) 7.5-325 mg per tablet, , Disp: , Rfl:     JARDIANCE 25 mg tablet, Take 1 tablet (25 mg total) by mouth once  daily., Disp: 30 tablet, Rfl: 5    metoprolol tartrate (LOPRESSOR) 25 MG tablet, Take 1 tablet (25 mg total) by mouth 2 (two) times daily., Disp: 180 tablet, Rfl: 1    NOVOLOG FLEXPEN U-100 INSULIN 100 unit/mL (3 mL) InPn pen, Inject 20 Units into the skin 2 (two) times a day., Disp: 12 each, Rfl: 1    olmesartan-hydrochlorothiazide (BENICAR HCT) 40-25 mg per tablet, Take 1 tablet by mouth once daily., Disp: 90 tablet, Rfl: 1    OZEMPIC 1 mg/dose (4 mg/3 mL), , Disp: , Rfl:     pantoprazole (PROTONIX) 40 MG tablet, Take 1 tablet (40 mg total) by mouth once daily., Disp: 90 tablet, Rfl: 1    rosuvastatin (CRESTOR) 40 MG Tab, Take 1 tablet (40 mg total) by mouth once daily., Disp: 90 tablet, Rfl: 1    semaglutide (OZEMPIC) 1 mg/dose (2 mg/1.5 mL) PnIj, Inject 1 mg into the skin once a week., Disp: 4 pen, Rfl: 5    traMADoL (ULTRAM) 50 mg tablet, Take 1 tablet (50 mg total) by mouth every 12 (twelve) hours as needed for Pain., Disp: 30 tablet, Rfl: 1    TRESIBA FLEXTOUCH U-100 100 unit/mL (3 mL) insulin pen, Inject 20 Units into the skin every evening., Disp: 3 pen, Rfl: 5    triamcinolone (NASACORT) 55 mcg nasal inhaler, 2 sprays by Nasal route once daily., Disp: 16.9 mL, Rfl: 0    Current Facility-Administered Medications:     acetaminophen tablet 650 mg, 650 mg, Oral, Once PRN, Olga Potts MD    albuterol inhaler 2 puff, 2 puff, Inhalation, Q20 Min PRN, Olga Potts MD    diphenhydrAMINE injection 25 mg, 25 mg, Intravenous, Once PRN, Olga Potts MD    EPINEPHrine (EPIPEN) 0.3 mg/0.3 mL pen injection 0.3 mg, 0.3 mg, Intramuscular, PRN, Olga Potts MD    methylPREDNISolone sodium succinate injection 40 mg, 40 mg, Intravenous, Once PRN, Olga Potts MD    ondansetron disintegrating tablet 4 mg, 4 mg, Oral, Once PRN, Olga Potts MD    sodium chloride 0.9% 500 mL flush bag, , Intravenous, PRN, Olga Potts MD    sodium chloride 0.9% flush 10 mL, 10 mL,  "Intravenous, PRN, Olga Potts MD       Review of Systems   Constitutional: Positive for malaise/fatigue.   Respiratory: Positive for shortness of breath. Negative for cough.    Cardiovascular: Positive for chest pain and leg swelling. Negative for palpitations, orthopnea, claudication and PND.   Neurological:        Numbness         Objective:   /62 (BP Location: Left arm, Patient Position: Sitting)   Pulse 83   Ht 5' 2" (1.575 m)   Wt 94.3 kg (208 lb)   LMP  (LMP Unknown)   SpO2 99%   BMI 38.04 kg/m²     Physical Exam  Constitutional:       General: She is not in acute distress.  Eyes:      Extraocular Movements: Extraocular movements intact.   Cardiovascular:      Pulses: Normal pulses.      Heart sounds: Normal heart sounds. No murmur heard.      Pulmonary:      Effort: Pulmonary effort is normal.      Breath sounds: Normal breath sounds.   Abdominal:      Palpations: Abdomen is soft.   Musculoskeletal:         General: No swelling.      Cervical back: Neck supple.   Skin:     Findings: No rash.   Neurological:      Mental Status: She is alert and oriented to person, place, and time.           Assessment:     1. Atherosclerosis of native coronary artery of native heart with unstable angina pectoris     2. Paroxysmal atrial fibrillation     3. Mixed hyperlipidemia           Plan:     Problem List Items Addressed This Visit        Cardiac/Vascular    Atrial fibrillation (Chronic)    Hyperlipidemia    Atherosclerosis of native coronary artery of native heart with unstable angina pectoris - Primary         #CAD  NSTEMI, status post PCI of LAD and RCA 5/14/21  PCI to mid to distal LAD by Dr. Quick  PCI to distal RCA, however stent at had diffuse disease with small PDA which is diffusely disease.  She has occasional chest pains.  These may be attributed to her anemia as well as her small vessel disease in the PDA.    On aspirin and Plavix given recent PCI in May of 2021. Will continue dual " anti-platelet therapy till May of 2022.   Will recommend continuing her iron replacement therapy.  If she continues to be symptomatic may consider PCI to the PDA which is severely stenosed however small in caliber.    #Hypertension  Well controlled      Please follow-up in May 22     # paroxysmal atrial fibrillation  Currently appears to be in sinus rhythm on exam.  She is off of Eliquis given significant symptomatic anemia, according the patient GI evaluation was unremarkable.  No other source of bleeding identified.    Refill iron and Plavix ths.

## 2022-01-04 ENCOUNTER — OFFICE VISIT (OUTPATIENT)
Dept: CARDIOLOGY | Facility: CLINIC | Age: 62
End: 2022-01-04
Payer: COMMERCIAL

## 2022-01-04 VITALS
BODY MASS INDEX: 38.28 KG/M2 | HEART RATE: 83 BPM | WEIGHT: 208 LBS | SYSTOLIC BLOOD PRESSURE: 134 MMHG | HEIGHT: 62 IN | OXYGEN SATURATION: 99 % | DIASTOLIC BLOOD PRESSURE: 62 MMHG

## 2022-01-04 DIAGNOSIS — I25.110 ATHEROSCLEROSIS OF NATIVE CORONARY ARTERY OF NATIVE HEART WITH UNSTABLE ANGINA PECTORIS: Primary | ICD-10-CM

## 2022-01-04 DIAGNOSIS — I48.0 PAROXYSMAL ATRIAL FIBRILLATION: Chronic | ICD-10-CM

## 2022-01-04 DIAGNOSIS — I48.91 ATRIAL FIBRILLATION, UNSPECIFIED TYPE: ICD-10-CM

## 2022-01-04 DIAGNOSIS — E78.2 MIXED HYPERLIPIDEMIA: ICD-10-CM

## 2022-01-04 PROCEDURE — 99215 OFFICE O/P EST HI 40 MIN: CPT | Mod: PBBFAC | Performed by: INTERNAL MEDICINE

## 2022-01-04 PROCEDURE — 3075F PR MOST RECENT SYSTOLIC BLOOD PRESS GE 130-139MM HG: ICD-10-PCS | Mod: ,,, | Performed by: INTERNAL MEDICINE

## 2022-01-04 PROCEDURE — 3008F BODY MASS INDEX DOCD: CPT | Mod: ,,, | Performed by: INTERNAL MEDICINE

## 2022-01-04 PROCEDURE — 99214 PR OFFICE/OUTPT VISIT, EST, LEVL IV, 30-39 MIN: ICD-10-PCS | Mod: S$PBB,,, | Performed by: INTERNAL MEDICINE

## 2022-01-04 PROCEDURE — 3075F SYST BP GE 130 - 139MM HG: CPT | Mod: ,,, | Performed by: INTERNAL MEDICINE

## 2022-01-04 PROCEDURE — 1159F MED LIST DOCD IN RCRD: CPT | Mod: ,,, | Performed by: INTERNAL MEDICINE

## 2022-01-04 PROCEDURE — 3078F PR MOST RECENT DIASTOLIC BLOOD PRESSURE < 80 MM HG: ICD-10-PCS | Mod: ,,, | Performed by: INTERNAL MEDICINE

## 2022-01-04 PROCEDURE — 1159F PR MEDICATION LIST DOCUMENTED IN MEDICAL RECORD: ICD-10-PCS | Mod: ,,, | Performed by: INTERNAL MEDICINE

## 2022-01-04 PROCEDURE — 3008F PR BODY MASS INDEX (BMI) DOCUMENTED: ICD-10-PCS | Mod: ,,, | Performed by: INTERNAL MEDICINE

## 2022-01-04 PROCEDURE — 99214 OFFICE O/P EST MOD 30 MIN: CPT | Mod: S$PBB,,, | Performed by: INTERNAL MEDICINE

## 2022-01-04 PROCEDURE — 3078F DIAST BP <80 MM HG: CPT | Mod: ,,, | Performed by: INTERNAL MEDICINE

## 2022-01-04 RX ORDER — FERROUS SULFATE 325(65) MG
325 TABLET, DELAYED RELEASE (ENTERIC COATED) ORAL DAILY
Qty: 60 TABLET | Refills: 5 | Status: SHIPPED | OUTPATIENT
Start: 2022-01-04 | End: 2022-05-10 | Stop reason: SDUPTHER

## 2022-01-04 RX ORDER — METOPROLOL TARTRATE 25 MG/1
25 TABLET, FILM COATED ORAL 2 TIMES DAILY
Qty: 180 TABLET | Refills: 1 | Status: SHIPPED | OUTPATIENT
Start: 2022-01-04 | End: 2022-06-13

## 2022-01-04 RX ORDER — ROSUVASTATIN CALCIUM 40 MG/1
40 TABLET, COATED ORAL DAILY
Qty: 90 TABLET | Refills: 1 | Status: SHIPPED | OUTPATIENT
Start: 2022-01-04 | End: 2022-06-12

## 2022-01-04 RX ORDER — CLOPIDOGREL BISULFATE 75 MG/1
75 TABLET ORAL DAILY
Qty: 90 TABLET | Refills: 4 | Status: SHIPPED | OUTPATIENT
Start: 2022-01-04 | End: 2022-05-10

## 2022-02-01 ENCOUNTER — OFFICE VISIT (OUTPATIENT)
Dept: FAMILY MEDICINE | Facility: CLINIC | Age: 62
End: 2022-02-01
Payer: COMMERCIAL

## 2022-02-01 VITALS
SYSTOLIC BLOOD PRESSURE: 140 MMHG | DIASTOLIC BLOOD PRESSURE: 57 MMHG | WEIGHT: 208 LBS | OXYGEN SATURATION: 97 % | RESPIRATION RATE: 20 BRPM | HEIGHT: 62 IN | HEART RATE: 80 BPM | TEMPERATURE: 98 F | BODY MASS INDEX: 38.28 KG/M2

## 2022-02-01 DIAGNOSIS — R05.9 COUGH: ICD-10-CM

## 2022-02-01 DIAGNOSIS — J30.9 ALLERGIC RHINITIS, UNSPECIFIED SEASONALITY, UNSPECIFIED TRIGGER: Primary | ICD-10-CM

## 2022-02-01 DIAGNOSIS — R09.81 NASAL CONGESTION: ICD-10-CM

## 2022-02-01 LAB
CTP QC/QA: YES
FLUAV AG NPH QL: NEGATIVE
FLUBV AG NPH QL: NEGATIVE
SARS-COV-2 AG RESP QL IA.RAPID: NEGATIVE

## 2022-02-01 PROCEDURE — 87428 SARSCOV & INF VIR A&B AG IA: CPT | Mod: QW,,, | Performed by: NURSE PRACTITIONER

## 2022-02-01 PROCEDURE — 3008F BODY MASS INDEX DOCD: CPT | Mod: ,,, | Performed by: NURSE PRACTITIONER

## 2022-02-01 PROCEDURE — 1159F MED LIST DOCD IN RCRD: CPT | Mod: ,,, | Performed by: NURSE PRACTITIONER

## 2022-02-01 PROCEDURE — 99213 PR OFFICE/OUTPT VISIT, EST, LEVL III, 20-29 MIN: ICD-10-PCS | Mod: ,,, | Performed by: NURSE PRACTITIONER

## 2022-02-01 PROCEDURE — 1159F PR MEDICATION LIST DOCUMENTED IN MEDICAL RECORD: ICD-10-PCS | Mod: ,,, | Performed by: NURSE PRACTITIONER

## 2022-02-01 PROCEDURE — 1160F PR REVIEW ALL MEDS BY PRESCRIBER/CLIN PHARMACIST DOCUMENTED: ICD-10-PCS | Mod: ,,, | Performed by: NURSE PRACTITIONER

## 2022-02-01 PROCEDURE — 87428 POCT SARS-COV2 (COVID) WITH FLU ANTIGEN: ICD-10-PCS | Mod: QW,,, | Performed by: NURSE PRACTITIONER

## 2022-02-01 PROCEDURE — 3077F SYST BP >= 140 MM HG: CPT | Mod: ,,, | Performed by: NURSE PRACTITIONER

## 2022-02-01 PROCEDURE — 3078F DIAST BP <80 MM HG: CPT | Mod: ,,, | Performed by: NURSE PRACTITIONER

## 2022-02-01 PROCEDURE — 99213 OFFICE O/P EST LOW 20 MIN: CPT | Mod: ,,, | Performed by: NURSE PRACTITIONER

## 2022-02-01 PROCEDURE — 3008F PR BODY MASS INDEX (BMI) DOCUMENTED: ICD-10-PCS | Mod: ,,, | Performed by: NURSE PRACTITIONER

## 2022-02-01 PROCEDURE — 3078F PR MOST RECENT DIASTOLIC BLOOD PRESSURE < 80 MM HG: ICD-10-PCS | Mod: ,,, | Performed by: NURSE PRACTITIONER

## 2022-02-01 PROCEDURE — 3077F PR MOST RECENT SYSTOLIC BLOOD PRESSURE >= 140 MM HG: ICD-10-PCS | Mod: ,,, | Performed by: NURSE PRACTITIONER

## 2022-02-01 PROCEDURE — 1160F RVW MEDS BY RX/DR IN RCRD: CPT | Mod: ,,, | Performed by: NURSE PRACTITIONER

## 2022-02-01 RX ORDER — FLUTICASONE PROPIONATE 50 MCG
2 SPRAY, SUSPENSION (ML) NASAL DAILY
Qty: 16 G | Refills: 0 | Status: SHIPPED | OUTPATIENT
Start: 2022-02-01 | End: 2022-06-13

## 2022-02-01 NOTE — PROGRESS NOTES
Kathryn Crocker DNP, [facility]    [address] [place] [address]     PATIENT NAME: Dilma Cr  : 1960  DATE: 22  MRN: [id number]      Billing Provider: Kathryn Crocker DNP, FNP  Level of Service:   Patient PCP Information     Provider PCP Type    Primary Doctor No General          Reason for Visit / Chief Complaint: Cough, Nasal Congestion, and Sore Throat (Symptoms started a couple days ago. Complain of non productive cough, nasal congestion, sneezing, and sore throat. Patient took tylenol this morning at 8:00 am. Patient is up to date on covid vaccine, and flu vaccine. )       Update PCP  Update Chief Complaint         History of Present Illness / Problem Focused Workflow     Dilma Cr presents to the clinic with Cough, Nasal Congestion, and Sore Throat (Symptoms started a couple days ago. Complain of non productive cough, nasal congestion, sneezing, and sore throat. Patient took tylenol this morning at 8:00 am. Patient is up to date on covid vaccine, and flu vaccine. )     Pt c/o nasal congestion and cough x 2 days. Denies any shortness of breath or fever.       Review of Systems     Review of Systems   Constitutional: Negative for appetite change, fatigue, fever and unexpected weight change.   HENT: Positive for nasal congestion, postnasal drip and rhinorrhea. Negative for hearing loss.    Eyes: Negative for visual disturbance.   Respiratory: Negative for shortness of breath.    Cardiovascular: Negative for chest pain.   Gastrointestinal: Negative for abdominal pain, constipation, diarrhea, nausea and vomiting.   Genitourinary: Negative for dysuria.   Musculoskeletal: Negative for back pain.   Psychiatric/Behavioral: Negative for sleep disturbance.        Medical / Social / Family History     Past Medical History:   Diagnosis Date    Arthritis     Cancer     colon cancer    Colon cancer     Coronary artery disease     Depression     Diabetes  mellitus, type 2     GERD (gastroesophageal reflux disease)     Hyperlipidemia     Myocardial infarction     2 stents in 05/14/2021 Dr Lora    Thyroid disease        Past Surgical History:   Procedure Laterality Date    BREAST SURGERY      C5-C6 RADHA  8-, 7-, 6-1-2016    Dr Fowler    CERVICAL SPINE SURGERY      COLON SURGERY      HYSTERECTOMY      LEFT HEART CATHETERIZATION Left 5/14/2021    Procedure: Left heart cath;  Surgeon: Mateus Guan MD;  Location: [facility] CATH LAB;  Service: Cardiology;  Laterality: Left;    MEDIPORT REMOVAL  10/14/2021    Dr Kraft    right rotator cuff repair      in [place]    TOTAL REDUCTION MAMMOPLASTY         Social History  Ms.  reports that she has never smoked. She has never used smokeless tobacco. She reports that she does not drink alcohol and does not use drugs.    Family History  Ms.'s family history includes Breast cancer in her sister; Diabetes in her father and mother; Hypertension in her brother, father, mother, and sister.    Medications and Allergies     Medications  Outpatient Medications Marked as Taking for the 2/1/22 encounter (Office Visit) with Kathryn Crocker, BARB, FNP   Medication Sig Dispense Refill    ascorbic acid, vitamin C, 250 mg Chew Take 250 mg by mouth once daily.      aspirin 81 MG Chew Take 81 mg by mouth once daily.      clopidogreL (PLAVIX) 75 mg tablet Take 1 tablet (75 mg total) by mouth once daily. 90 tablet 4    cyanocobalamin (VITAMIN B-12) 1000 MCG tablet Take 100 mcg by mouth once daily.      ferrous sulfate 325 (65 FE) MG EC tablet Take 1 tablet (325 mg total) by mouth once daily. 60 tablet 5    furosemide (LASIX) 20 MG tablet Take 1 tablet (20 mg total) by mouth daily as needed (fluid). 90 tablet 1    HYDROcodone-acetaminophen (NORCO) 7.5-325 mg per tablet       JARDIANCE 25 mg tablet Take 1 tablet (25 mg total) by mouth once daily. 30 tablet 5    metoprolol tartrate (LOPRESSOR) 25 MG tablet Take 1  tablet (25 mg total) by mouth 2 (two) times daily. 180 tablet 1    NOVOLOG FLEXPEN U-100 INSULIN 100 unit/mL (3 mL) InPn pen Inject 20 Units into the skin 2 (two) times a day. 12 each 1    olmesartan-hydrochlorothiazide (BENICAR HCT) 40-25 mg per tablet Take 1 tablet by mouth once daily. 90 tablet 1    rosuvastatin (CRESTOR) 40 MG Tab Take 1 tablet (40 mg total) by mouth once daily. 90 tablet 1     Current Facility-Administered Medications for the 2/1/22 encounter (Office Visit) with Kathryn Crocker, DNP, FNP   Medication Dose Route Frequency Provider Last Rate Last Admin    acetaminophen tablet 650 mg  650 mg Oral Once PRN Olga Potts MD        albuterol inhaler 2 puff  2 puff Inhalation Q20 Min PRN Olga Potts MD        diphenhydrAMINE injection 25 mg  25 mg Intravenous Once PRN Olga Potts MD        EPINEPHrine (EPIPEN) 0.3 mg/0.3 mL pen injection 0.3 mg  0.3 mg Intramuscular PRN Olga Potts MD        methylPREDNISolone sodium succinate injection 40 mg  40 mg Intravenous Once PRN Olga Potts MD        ondansetron disintegrating tablet 4 mg  4 mg Oral Once PRN Olga Potts MD        sodium chloride 0.9% 500 mL flush bag   Intravenous PRN Olga Potts MD        sodium chloride 0.9% flush 10 mL  10 mL Intravenous PRN Olga Potts MD           Allergies  Review of patient's allergies indicates:  No Known Allergies    Physical Examination     Vitals:    02/01/22 1551   BP: (!) 140/57   Pulse: 80   Resp: 20   Temp: 98.2 °F (36.8 °C)     Physical Exam  Vitals and nursing note reviewed.   Constitutional:       General: She is not in acute distress.  HENT:      Nose: Congestion and rhinorrhea present.      Mouth/Throat:      Mouth: Mucous membranes are moist.   Eyes:      Pupils: Pupils are equal, round, and reactive to light.   Cardiovascular:      Rate and Rhythm: Normal rate and regular rhythm.      Pulses: Normal pulses.      Heart sounds: Normal heart  sounds. No murmur heard.      Pulmonary:      Effort: Pulmonary effort is normal. No respiratory distress.      Breath sounds: Normal breath sounds. No wheezing, rhonchi or rales.   Chest:      Chest wall: No tenderness.   Abdominal:      General: Bowel sounds are normal.      Palpations: Abdomen is soft.   Musculoskeletal:         General: Normal range of motion.      Cervical back: Normal range of motion and neck supple.      Right lower leg: No edema.      Left lower leg: No edema.   Skin:     General: Skin is warm and dry.   Neurological:      General: No focal deficit present.      Mental Status: She is alert and oriented to person, place, and time.          Assessment and Plan (including Health Maintenance)      Problem List  Smart Sets  Document Outside HM   :    Plan:         Health Maintenance Due   Topic Date Due    Hepatitis C Screening  Never done    Eye Exam  Never done    COVID-19 Vaccine (3 - Booster for Pfizer series) 09/18/2021       Problem List Items Addressed This Visit    None     Visit Diagnoses     Allergic rhinitis, unspecified seasonality, unspecified trigger    -  Primary    Relevant Medications    pyrilamine-chlophedianoL 12.5-12.5 mg/5 mL Liqd    fluticasone propionate (FLONASE) 50 mcg/actuation nasal spray    Nasal congestion        Relevant Orders    POCT SARS-COV2 (COVID) with Flu Antigen (Completed)    Cough        Relevant Orders    POCT SARS-COV2 (COVID) with Flu Antigen (Completed)        Allergic rhinitis, unspecified seasonality, unspecified trigger  -     pyrilamine-chlophedianoL 12.5-12.5 mg/5 mL Liqd; Take 10 mLs by mouth every 8 (eight) hours as needed (cough/sneezing/drainage).  Dispense: 120 mL; Refill: 1  -     fluticasone propionate (FLONASE) 50 mcg/actuation nasal spray; 2 sprays (100 mcg total) by Each Nostril route once daily.  Dispense: 16 g; Refill: 0    Nasal congestion  -     POCT SARS-COV2 (COVID) with Flu Antigen    Cough  -     POCT SARS-COV2 (COVID) with Flu  Antigen       Health Maintenance Topics with due status: Not Due       Topic Last Completion Date    Colorectal Cancer Screening 12/02/2019    Lipid Panel 06/16/2021    Foot Exam 08/23/2021    Mammogram 12/07/2021    Diabetes Urine Screening 12/20/2021    Hemoglobin A1c 12/20/2021    High Dose Statin 02/01/2022         Future Appointments   Date Time Provider Department Center   2/2/2022 10:15 AM Gela Fowler MD [facility] PNTRE [facility]   5/10/2022  9:45 AM Stefany Lora MD [facility] CARD [facility]   6/17/2022  8:15 AM Olga Potts MD [facility] FAMMED [place] [facility]   10/3/2022  1:00 PM [facility] VASC US1 [facility] VASCUS [facility]   12/13/2022  9:00 AM [facility] MAMMO1 [facility] MMIC [facility] [place]        Follow up if symptoms worsen or fail to improve.     Signature:  Kathryn Crocker DNP, FNP  [address] [place] [address]  Phone #: [phone]  Fax #: [phone]    Date of encounter: 2/1/22    Patient Instructions   Increase fluid intake. Take meds as prescribed. Follow up if no improvement in 48 hours.

## 2022-02-01 NOTE — PROGRESS NOTES
She Disclaimer: This note has been generated using voice-recognition software. There may be typographical errors that have been missed during proof-reading        Patient ID: Dilma Cr is a 61 y.o. female.      Chief Complaint: Back Pain      61-year-old female returns for re-evaluation of chronic lower back and bilateral radicular pain.  MRI of the lumbar spine results were reviewed and discussed with patient.  The lower back pain has progressively worsened and is poorly responding to tramadol.  She was re-evaluate by her oncologist for colon cancer and there  was no recurrence.  Paresthesia of the lower extremities a and weakness have worsened.  She has  chronic renal insufficiency and is not a candidate for NSAIDs.  Lyrica has provided some relief.  She remains on Plavix per Dr. Lora and has a re-evaluation in several months.  Her pain remains poorly controlled and she returns today to discuss treatment options besides medication management.              Pain Assessment  Pain Score:   7  Pain Location: Back  Pain Orientation: Right,Left  Pain Radiating Towards: lower back pain radiates to bilateral ankles  Pain Descriptors: Burning,Constant,Sharp  Pain Frequency: Continuous  Pain Onset: Awakened from sleep  Clinical Progression: Gradually worsening  Aggravating Factors: Bending,Standing,Walking  Pain Intervention(s): Medication (See eMAR),Rest      A's of Opioid Risk Assessment  Activity:Patient can not perform ADL.   Analgesia:Patients pain is not controlled by current medication.   Adverse Effects: Patient denies constipation or sedation.  Aberrant Behavior:  reviewed with no aberrant drug seeking/taking behavior.      Patient denies any suicidal or homicidal ideations    Physical Therapy/Home Exercise: yes          Review of Systems   Constitutional: Negative.    HENT: Negative.    Eyes: Negative.    Respiratory: Negative.    Cardiovascular: Negative.    Gastrointestinal: Negative.    Endocrine:  Negative.    Genitourinary: Negative.    Musculoskeletal: Positive for arthralgias, back pain, leg pain and myalgias.   Integumentary:  Negative.   Neurological: Negative.    Hematological: Negative.    Psychiatric/Behavioral: Negative.              Past Medical History:   Diagnosis Date    Arthritis     Cancer     colon cancer    Colon cancer     Coronary artery disease     Depression     Diabetes mellitus, type 2     GERD (gastroesophageal reflux disease)     Hyperlipidemia     Myocardial infarction     2 stents in 05/14/2021 Dr Lora    Thyroid disease      Past Surgical History:   Procedure Laterality Date    BREAST SURGERY      C5-C6 RADHA  8-, 7-, 6-1-2016    Dr Fowler    CERVICAL SPINE SURGERY      COLON SURGERY      HYSTERECTOMY      LEFT HEART CATHETERIZATION Left 5/14/2021    Procedure: Left heart cath;  Surgeon: Mateus Guan MD;  [facility] CATH LAB;  Service: Cardiology;  Laterality: Left;    MEDIPORT REMOVAL  10/14/2021    Dr Kraft    right rotator cuff repair      in [place]    TOTAL REDUCTION MAMMOPLASTY       Social History     Socioeconomic History    Marital status:      Spouse name: Jorge Alberto    Number of children: 2   Occupational History    Occupation:  at [employer] for past 27 years   Tobacco Use    Smoking status: Never Smoker    Smokeless tobacco: Never Used   Substance and Sexual Activity    Alcohol use: Never    Drug use: Never    Sexual activity: Yes     Family History   Problem Relation Age of Onset    Hypertension Mother     Diabetes Mother     Hypertension Father     Diabetes Father     Hypertension Sister     Breast cancer Sister     Hypertension Brother      Review of patient's allergies indicates:  No Known Allergies  has a current medication list which includes the following prescription(s): ascorbic acid (vitamin c), aspirin, clopidogrel, cyanocobalamin, ferrous sulfate, fluticasone propionate, freestyle  padma 14 day sensor, furosemide, jardiance, metoprolol tartrate, novolog flexpen u-100 insulin, olmesartan-hydrochlorothiazide, ozempic, pyrilamine-chlophedianol, rosuvastatin, clindamycin, hydrocodone-acetaminophen, pantoprazole, ozempic, tramadol, tresiba flextouch u-100, and triamcinolone, and the following Facility-Administered Medications: acetaminophen, albuterol, diphenhydramine, epinephrine, methylprednisolone sodium succinate, ondansetron, sodium chloride 0.9% 500 mL flush bag, and sodium chloride 0.9%.      Objective:  Vitals:    02/02/22 1029   BP: (!) 146/68   Pulse: 80        Physical Exam  Vitals and nursing note reviewed.   Constitutional:       General: She is not in acute distress.     Appearance: Normal appearance. She is not ill-appearing, toxic-appearing or diaphoretic.   HENT:      Head: Normocephalic and atraumatic.      Nose: Nose normal.      Mouth/Throat:      Mouth: Mucous membranes are moist.   Eyes:      Extraocular Movements: Extraocular movements intact.      Pupils: Pupils are equal, round, and reactive to light.   Cardiovascular:      Rate and Rhythm: Normal rate and regular rhythm.      Heart sounds: Normal heart sounds.   Pulmonary:      Effort: Pulmonary effort is normal. No respiratory distress.      Breath sounds: Normal breath sounds. No stridor. No wheezing or rhonchi.   Abdominal:      General: Bowel sounds are normal.      Palpations: Abdomen is soft.   Musculoskeletal:         General: No swelling or deformity.      Cervical back: Normal and normal range of motion. No spasms or tenderness. No pain with movement. Normal range of motion.      Thoracic back: Normal.      Lumbar back: Tenderness and bony tenderness present. No signs of trauma or spasms. Negative right straight leg raise test and negative left straight leg raise test. No scoliosis.      Right lower leg: No edema.      Left lower leg: No edema.   Skin:     General: Skin is warm.   Neurological:      General: No  focal deficit present.      Mental Status: She is alert and oriented to person, place, and time. Mental status is at baseline.      Cranial Nerves: Cranial nerves are intact. No cranial nerve deficit.      Sensory: Sensation is intact. No sensory deficit.      Motor: No weakness.      Coordination: Coordination normal.      Gait: Gait normal.      Deep Tendon Reflexes: Reflexes are normal and symmetric.   Psychiatric:         Mood and Affect: Mood normal.         Behavior: Behavior normal.           Assessment:      1. Osteoarthrosis multiple sites, not specified as generalized    2. Dorsalgia, unspecified    3. Lumbosacral radiculopathy    4. Encounter for long-term (current) use of other medications    5. Pre-op testing    6. Screening for viral disease          Plan:  1. reviewed  2.Addiction, Dependency, Tolerance, Opioid abuse-misuse, Death, Diversion Discussed. Overdose reversal drug Naloxone discussed.  3.Refill/Continue medications for pain control and function       Requested Prescriptions     Signed Prescriptions Disp Refills    traMADoL (ULTRAM) 50 mg tablet 30 tablet 1     Sig: Take 1 tablet (50 mg total) by mouth every 24 hours as needed for Pain.     4.Urine drug screen point of care obtained and consistent with prescribed medications and medication refill date  5. Schedule L4-5 epidural steroid injection for lumbar radiculopathy    Orders Placed This Encounter   Procedures    COVID-19 Routine Screening     Standing Status:   Future     Standing Expiration Date:   4/3/2023     Order Specific Question:   Is the patient symptomatic?     Answer:   No     Order Specific Question:   Is this needed for pre-procedure or pre-op testing?     Answer:   Yes     Order Specific Question:   Diagnosis:     Answer:   Pre-op testing [[id number]]    POCT Urine Drug Screen Presump     Interpretive Information:     Negative:  No drug detected at the cut off level.   Positive:  This result represents presumptive positive  for the   tested drug, other substances may yield a positive response other   than the analyte of interest. This result should be utilized for   diagnostic purpose only. Confirmation testing will be performed upon physician request only.       Case Request Operating Room: L4-5 RADHA     Order Specific Question:   Medical Necessity:     Answer:   Medically Non-Urgent [100]     Order Specific Question:   CPT Code:     Answer:   [place] INJ LUMBAR/SACRAL, W/IMAGING GUIDANCE [82139]     Order Specific Question:   Positioning:     Answer:   Prone [1003]     Order Specific Question:   Post-Procedure Disposition:     Answer:   PACU [1]     Order Specific Question:   Estimated Length of Stay:     Answer:   0 midnight     Order Specific Question:   Implant Required:     Answer:   No [1001]     Order Specific Question:   Is an on-site pathologist required for this procedure?     Answer:   N/A      6. Indications for this procedure for this specific patient include the following     - Pt has been in pain for at least 6 weeks and has failed conservative care (e.g. Exercise, physical methods, NSAID/ and or muscle relaxants)  - Pt has no major risk factors for spinal cancer or contraindicated condition   - Radicular pain is interfering with functional activity  - Pain is associated with symptoms of nerve root irritation   - Any numbness documented is accompanied with paresthesia   - No evidence of systemic or local infection, bleeding tendency or unstable medical condition   - Epidural provided as part of a comprehensive pain management program  - All repeat injections have at least 80% pain relief and increase functional gain and physical activity, and reduction in reliance on the use of medication and or physical therapy  - Pt has significant functional limitation resulting in diminished quality of life and impaired age appropriate ADL's.   - Diagnostic evaluation has ruled out other causes of pain  - Pt participating in an active  rehabilitation program or home exercise program which has been discussed with the patient including heat ice and rest  - No more than 3 epidurals will be done in a 6 month period at the same level with at least 7 days between injections  - MAC is only offered in cases of needle phobia   - Injection done at L4-5 level  which is consistent with patient's dermatomal pain complaint    7. Monitored Anesthesia Care medical necessity authorization request:    Monitor anesthesia request is medically indicated for the scheduled nerve block procedure due to:  - needle phobia and anxiety, placing  the patient at risk during the provided service.  -patient has an ASA class greater than 3 and requires constant presence of an anesthesiologist during the procedure:  -patient has severe problems with muscles and muscle spasticity that makes it hard to lie still  -patient suffers from chronic pain and is unable to function due to  diminished ADLs,     report:  Reviewed and consistent with medication use as prescribed.      The total time spent for evaluation and management on 02/03/2022 including reviewing separately obtained history, performing a medically appropriate exam and evaluation, documenting clinical information in the health record, independently interpreting results and communicating them to the patient/family/caregiver, and ordering medications/tests/procedures was between 15-29 minutes.    The above plan and management options were discussed at length with patient. Patient is in agreement with the above and verbalized understanding. It will be communicated with the referring physician via electronic record, fax, or mail.

## 2022-02-02 ENCOUNTER — OFFICE VISIT (OUTPATIENT)
Dept: PAIN MEDICINE | Facility: CLINIC | Age: 62
End: 2022-02-02
Payer: COMMERCIAL

## 2022-02-02 VITALS
HEART RATE: 80 BPM | WEIGHT: 208 LBS | HEIGHT: 62 IN | BODY MASS INDEX: 38.28 KG/M2 | DIASTOLIC BLOOD PRESSURE: 68 MMHG | SYSTOLIC BLOOD PRESSURE: 146 MMHG

## 2022-02-02 DIAGNOSIS — M54.17 LUMBOSACRAL RADICULOPATHY: Chronic | ICD-10-CM

## 2022-02-02 DIAGNOSIS — M54.9 DORSALGIA, UNSPECIFIED: Chronic | ICD-10-CM

## 2022-02-02 DIAGNOSIS — Z11.59 SCREENING FOR VIRAL DISEASE: ICD-10-CM

## 2022-02-02 DIAGNOSIS — M89.49 OSTEOARTHROSIS MULTIPLE SITES, NOT SPECIFIED AS GENERALIZED: Primary | Chronic | ICD-10-CM

## 2022-02-02 DIAGNOSIS — Z79.899 ENCOUNTER FOR LONG-TERM (CURRENT) USE OF OTHER MEDICATIONS: ICD-10-CM

## 2022-02-02 DIAGNOSIS — Z01.818 PRE-OP TESTING: ICD-10-CM

## 2022-02-02 LAB

## 2022-02-02 PROCEDURE — 99214 OFFICE O/P EST MOD 30 MIN: CPT | Mod: PBBFAC | Performed by: PAIN MEDICINE

## 2022-02-02 PROCEDURE — 99214 PR OFFICE/OUTPT VISIT, EST, LEVL IV, 30-39 MIN: ICD-10-PCS | Mod: S$PBB,,, | Performed by: PAIN MEDICINE

## 2022-02-02 PROCEDURE — 3077F PR MOST RECENT SYSTOLIC BLOOD PRESSURE >= 140 MM HG: ICD-10-PCS | Mod: ,,, | Performed by: PAIN MEDICINE

## 2022-02-02 PROCEDURE — 99214 OFFICE O/P EST MOD 30 MIN: CPT | Mod: S$PBB,,, | Performed by: PAIN MEDICINE

## 2022-02-02 PROCEDURE — 80305 DRUG TEST PRSMV DIR OPT OBS: CPT | Mod: PBBFAC | Performed by: PAIN MEDICINE

## 2022-02-02 PROCEDURE — 3008F BODY MASS INDEX DOCD: CPT | Mod: ,,, | Performed by: PAIN MEDICINE

## 2022-02-02 PROCEDURE — 3078F DIAST BP <80 MM HG: CPT | Mod: ,,, | Performed by: PAIN MEDICINE

## 2022-02-02 PROCEDURE — 3008F PR BODY MASS INDEX (BMI) DOCUMENTED: ICD-10-PCS | Mod: ,,, | Performed by: PAIN MEDICINE

## 2022-02-02 PROCEDURE — 3078F PR MOST RECENT DIASTOLIC BLOOD PRESSURE < 80 MM HG: ICD-10-PCS | Mod: ,,, | Performed by: PAIN MEDICINE

## 2022-02-02 PROCEDURE — 1159F PR MEDICATION LIST DOCUMENTED IN MEDICAL RECORD: ICD-10-PCS | Mod: ,,, | Performed by: PAIN MEDICINE

## 2022-02-02 PROCEDURE — 3077F SYST BP >= 140 MM HG: CPT | Mod: ,,, | Performed by: PAIN MEDICINE

## 2022-02-02 PROCEDURE — 1159F MED LIST DOCD IN RCRD: CPT | Mod: ,,, | Performed by: PAIN MEDICINE

## 2022-02-02 RX ORDER — TRAMADOL HYDROCHLORIDE 50 MG/1
50 TABLET ORAL
Qty: 30 TABLET | Refills: 1 | Status: SHIPPED | OUTPATIENT
Start: 2022-02-02 | End: 2022-04-03

## 2022-02-02 NOTE — PATIENT INSTRUCTIONS
Pt is currently taking Plavix. We will send over a fax to Dr Lora for  permission to hold blood thinner for 10 days for L4-5 RADHA. Pt  aware we will call her as soon as we receive confirmation for it to be  held. Pt will continue to take her Plavix until she receives a call from our  office. Verbalized good understanding.    ALL PATIENTS TO HAVE COVID SCREENING DONE 48-72 HOURS PRIOR TO PROCEDURE INCLUDING PATIENTS THAT WHOM HAVE HAD COVID VACCINE!!!   IF YOUR  PROCEDURE IS ON A Tuesday, GET COVID TESTING ON THE  Friday BEFORE PROCEDURE.  IF YOUR PROCEDURE IS ON Thursday, HAVE COVID TESTING ON THE Monday OR Tuesday PRIOR TO PROCEDURE    IF YOUR PRIMARY  CARE DOCTOR ORDERS YOUR COVID TESTING YOU MUST BRING YOUR RESULTS WITH YOU TO YOUR PROCEDURE.    Procedure Instructions:    Nothing to eat or drink for 8 hours or after midnight including gum, candy, mints, or tobacco products.  If you are scheduled for 1:30 or later nothing to eat or drink after 5 a.m. the morning of the procedure, including gum, candy, mints, or tobacco products.  Must have a  at least 18 yrs of age to stay present at all times  No Diabetic medications the morning of procedure, check blood sugar the morning of procedure, if it is greater than 200 call the office at [phone]  If you are started on antibiotics or have been prescribed antibiotics, have a fever, or have any other type of infection call to reschedule procedure.  If you take blood pressure medications you can take it at your regular scheduled time with a small sip of WATER!    If you are having ANY TYPE OF EPIDURAL  RADHA/TFESI: hold aspirin and aspirin products, BC POWDER ETC. for 7 days and NSAIDS( ibuprofen, mobic, advil, diclofenac, aleve etc....)  for 3 days  prior to procedure.    IF YOU ARE HAVING ANY TYPE OF NECK/CERVICAL PROCEDURE  ASPIRIN AND ASPIRIN PRODUCTS, BC POWDER ETC. TO BE HELD FOR 7 DAYS AS WELL AS NSAIDS( IBUPROFEN, MOBIC, ADVIL, ALEVE, DICLOFENAC ETC...) FOR  3 DAYS PRIOR TO PROCEDURE.

## 2022-02-17 ENCOUNTER — DOCUMENTATION ONLY (OUTPATIENT)
Dept: PAIN MEDICINE | Facility: CLINIC | Age: 62
End: 2022-02-17
Payer: COMMERCIAL

## 2022-02-17 ENCOUNTER — TELEPHONE (OUTPATIENT)
Dept: PAIN MEDICINE | Facility: CLINIC | Age: 62
End: 2022-02-17
Payer: COMMERCIAL

## 2022-02-17 NOTE — TELEPHONE ENCOUNTER
2/17/2022 at 0909    Attempted to call pt and , no answer, voicemail not set up. Received permission for Plavix to be held 7 days instead of 10 days on 2/15/2022 from Dr Lora's office prior to L4-5 RADHA, ok Per Dr Fowler to hold for 7 days.    2/23/22 at 1329    Spoke to pt. Pt states she does not want to have procedure scheduled at this time b/c she has some doctor appointments coming up and they are all on procedure days. Pt aware she will need an OV within 30 days before we can schedule her procedure. Pt scheduled to see Dr Fowler on March 9th at 9:30. Pt verbalized good understanding.

## 2022-02-17 NOTE — PROGRESS NOTES
Received permission for Plavix to be held 7 days instead of 10 days on 2/15/2022 from Dr Lora's office prior to L4-5 RADHA. Ok for Plavix to be held for 7 days per Dr Fowler.

## 2022-03-01 ENCOUNTER — OFFICE VISIT (OUTPATIENT)
Dept: FAMILY MEDICINE | Facility: CLINIC | Age: 62
End: 2022-03-01
Payer: COMMERCIAL

## 2022-03-01 VITALS
HEART RATE: 71 BPM | BODY MASS INDEX: 38.58 KG/M2 | DIASTOLIC BLOOD PRESSURE: 61 MMHG | WEIGHT: 209.63 LBS | OXYGEN SATURATION: 99 % | HEIGHT: 62 IN | RESPIRATION RATE: 20 BRPM | SYSTOLIC BLOOD PRESSURE: 135 MMHG

## 2022-03-01 DIAGNOSIS — M50.90 CERVICAL DISC DISEASE: ICD-10-CM

## 2022-03-01 DIAGNOSIS — M54.12 CERVICAL RADICULOPATHY: Primary | ICD-10-CM

## 2022-03-01 PROCEDURE — 96372 PR INJECTION,THERAP/PROPH/DIAG2ST, IM OR SUBCUT: ICD-10-PCS | Mod: ,,, | Performed by: NURSE PRACTITIONER

## 2022-03-01 PROCEDURE — 1159F MED LIST DOCD IN RCRD: CPT | Mod: ,,, | Performed by: NURSE PRACTITIONER

## 2022-03-01 PROCEDURE — 3075F PR MOST RECENT SYSTOLIC BLOOD PRESS GE 130-139MM HG: ICD-10-PCS | Mod: ,,, | Performed by: NURSE PRACTITIONER

## 2022-03-01 PROCEDURE — 1159F PR MEDICATION LIST DOCUMENTED IN MEDICAL RECORD: ICD-10-PCS | Mod: ,,, | Performed by: NURSE PRACTITIONER

## 2022-03-01 PROCEDURE — 3008F BODY MASS INDEX DOCD: CPT | Mod: ,,, | Performed by: NURSE PRACTITIONER

## 2022-03-01 PROCEDURE — 99214 PR OFFICE/OUTPT VISIT, EST, LEVL IV, 30-39 MIN: ICD-10-PCS | Mod: 25,,, | Performed by: NURSE PRACTITIONER

## 2022-03-01 PROCEDURE — 3008F PR BODY MASS INDEX (BMI) DOCUMENTED: ICD-10-PCS | Mod: ,,, | Performed by: NURSE PRACTITIONER

## 2022-03-01 PROCEDURE — 3078F PR MOST RECENT DIASTOLIC BLOOD PRESSURE < 80 MM HG: ICD-10-PCS | Mod: ,,, | Performed by: NURSE PRACTITIONER

## 2022-03-01 PROCEDURE — 3078F DIAST BP <80 MM HG: CPT | Mod: ,,, | Performed by: NURSE PRACTITIONER

## 2022-03-01 PROCEDURE — 3075F SYST BP GE 130 - 139MM HG: CPT | Mod: ,,, | Performed by: NURSE PRACTITIONER

## 2022-03-01 PROCEDURE — 99214 OFFICE O/P EST MOD 30 MIN: CPT | Mod: 25,,, | Performed by: NURSE PRACTITIONER

## 2022-03-01 PROCEDURE — 1160F PR REVIEW ALL MEDS BY PRESCRIBER/CLIN PHARMACIST DOCUMENTED: ICD-10-PCS | Mod: ,,, | Performed by: NURSE PRACTITIONER

## 2022-03-01 PROCEDURE — 1160F RVW MEDS BY RX/DR IN RCRD: CPT | Mod: ,,, | Performed by: NURSE PRACTITIONER

## 2022-03-01 PROCEDURE — 96372 THER/PROPH/DIAG INJ SC/IM: CPT | Mod: ,,, | Performed by: NURSE PRACTITIONER

## 2022-03-01 RX ORDER — KETOROLAC TROMETHAMINE 30 MG/ML
30 INJECTION, SOLUTION INTRAMUSCULAR; INTRAVENOUS
Status: COMPLETED | OUTPATIENT
Start: 2022-03-01 | End: 2022-03-01

## 2022-03-01 RX ORDER — PREGABALIN 50 MG/1
50 CAPSULE ORAL 3 TIMES DAILY
Qty: 90 CAPSULE | Refills: 1 | Status: SHIPPED | OUTPATIENT
Start: 2022-03-01 | End: 2022-07-11

## 2022-03-01 RX ADMIN — KETOROLAC TROMETHAMINE 30 MG: 30 INJECTION, SOLUTION INTRAMUSCULAR; INTRAVENOUS at 11:03

## 2022-03-01 NOTE — PROGRESS NOTES
Kathryn Crocker DNP, EDMOND    [address] [place] [address]     PATIENT NAME: Dilma Cr  : 1960  DATE: 3/1/22  MRN: [id number]      Billing Provider: Kathryn Crocker DNP, EDMOND  Level of Service:   Patient PCP Information     Provider PCP Type    Primary Doctor No General          Reason for Visit / Chief Complaint: Neck Pain (Pt complains of neck pain that goes down shoulders and both arms. Started Torey morning when she woke up, did not get better with Tylenol, so she went to ER Torey night. )       Update PCP  Update Chief Complaint         History of Present Illness / Problem Focused Workflow     Dilma Cr presents to the clinic with Neck Pain (Pt complains of neck pain that goes down shoulders and both arms. Started Torey morning when she woke up, did not get better with Tylenol, so she went to ER Torey night. )     Pt c/o right upper extremity pain from neck. Hx cervical disc disease that required surgery. Pt states this pain started suddenly approx 3-4 days ago. Pt went to ED. Pt has not been taking anything for the pain.       Review of Systems     Review of Systems   Constitutional: Negative for appetite change, fatigue, fever and unexpected weight change.   HENT: Negative for hearing loss.    Eyes: Negative for visual disturbance.   Respiratory: Negative for shortness of breath.    Cardiovascular: Negative for chest pain.   Gastrointestinal: Negative for abdominal pain, constipation, diarrhea, nausea and vomiting.   Genitourinary: Negative for dysuria.   Musculoskeletal: Positive for arthralgias, myalgias and neck pain. Negative for back pain and neck stiffness.   Psychiatric/Behavioral: Negative for sleep disturbance.        Medical / Social / Family History     Past Medical History:   Diagnosis Date    Arthritis     Cancer     colon cancer    Colon cancer     Coronary artery disease     Depression     Diabetes mellitus, type 2      GERD (gastroesophageal reflux disease)     Hyperlipidemia     Myocardial infarction     2 stents in 05/14/2021 Dr Lora    Thyroid disease        Past Surgical History:   Procedure Laterality Date    BREAST SURGERY      C5-C6 RADHA  8-, 7-, 6-1-2016    Dr Fowler    CERVICAL SPINE SURGERY      COLON SURGERY      HYSTERECTOMY      LEFT HEART CATHETERIZATION Left 5/14/2021    Procedure: Left heart cath;  Surgeon: Mateus Guan MD;  Location: [facility] CATH LAB;  Service: Cardiology;  Laterality: Left;    MEDIPORT REMOVAL  10/14/2021    Dr Kraft    right rotator cuff repair      in [place]    TOTAL REDUCTION MAMMOPLASTY         Social History  Ms. Dilma Cr  reports that she has never smoked. She has never used smokeless tobacco. She reports that she does not drink alcohol and does not use drugs.    Family History  Ms. Dilma Cr's family history includes Breast cancer in her sister; Diabetes in her father and mother; Hypertension in her brother, father, mother, and sister.    Medications and Allergies     Medications  Outpatient Medications Marked as Taking for the 3/1/22 encounter (Office Visit) with Kathryn Crocker DNP, FNP   Medication Sig Dispense Refill    ascorbic acid, vitamin C, 250 mg Chew Take 250 mg by mouth once daily.      aspirin 81 MG Chew Take 81 mg by mouth once daily.      clopidogreL (PLAVIX) 75 mg tablet Take 1 tablet (75 mg total) by mouth once daily. 90 tablet 4    cyanocobalamin (VITAMIN B-12) 1000 MCG tablet Take 100 mcg by mouth once daily.      ferrous sulfate 325 (65 FE) MG EC tablet Take 1 tablet (325 mg total) by mouth once daily. 60 tablet 5    furosemide (LASIX) 20 MG tablet Take 1 tablet (20 mg total) by mouth daily as needed (fluid). 90 tablet 1    HYDROcodone-acetaminophen (NORCO) 7.5-325 mg per tablet       JARDIANCE 25 mg tablet Take 1 tablet (25 mg total) by mouth once daily. 30 tablet 5    metoprolol tartrate (LOPRESSOR) 25 MG  tablet Take 1 tablet (25 mg total) by mouth 2 (two) times daily. 180 tablet 1    NOVOLOG FLEXPEN U-100 INSULIN 100 unit/mL (3 mL) InPn pen Inject 20 Units into the skin 2 (two) times a day. 12 each 1    olmesartan-hydrochlorothiazide (BENICAR HCT) 40-25 mg per tablet Take 1 tablet by mouth once daily. 90 tablet 1    pyrilamine-chlophedianoL 12.5-12.5 mg/5 mL Liqd Take 10 mLs by mouth every 8 (eight) hours as needed (cough/sneezing/drainage). 120 mL 1    rosuvastatin (CRESTOR) 40 MG Tab Take 1 tablet (40 mg total) by mouth once daily. 90 tablet 1    traMADoL (ULTRAM) 50 mg tablet Take 1 tablet (50 mg total) by mouth every 24 hours as needed for Pain. 30 tablet 1    TRESIBA FLEXTOUCH U-100 100 unit/mL (3 mL) insulin pen Inject 20 Units into the skin every evening. 3 pen 5     Current Facility-Administered Medications for the 3/1/22 encounter (Office Visit) with Kathryn Crocker, BARB, FNP   Medication Dose Route Frequency Provider Last Rate Last Admin    acetaminophen tablet 650 mg  650 mg Oral Once PRN Olga Potts MD        albuterol inhaler 2 puff  2 puff Inhalation Q20 Min PRN Olga Potts MD        diphenhydrAMINE injection 25 mg  25 mg Intravenous Once PRN Olga Potts MD        EPINEPHrine (EPIPEN) 0.3 mg/0.3 mL pen injection 0.3 mg  0.3 mg Intramuscular PRN Olga Potts MD        methylPREDNISolone sodium succinate injection 40 mg  40 mg Intravenous Once PRN Olga Potts MD        ondansetron disintegrating tablet 4 mg  4 mg Oral Once PRN Olga Potts MD        sodium chloride 0.9% 500 mL flush bag   Intravenous PRN Olga Potts MD        sodium chloride 0.9% flush 10 mL  10 mL Intravenous PRN Olga Potts MD           Allergies  Review of patient's allergies indicates:  No Known Allergies    Physical Examination     Vitals:    03/01/22 1010   BP: 135/61   Pulse: 71   Resp: 20     Physical Exam  Vitals and nursing note reviewed.   Constitutional:        General: She is not in acute distress.  HENT:      Nose: Nose normal.      Mouth/Throat:      Mouth: Mucous membranes are moist.   Eyes:      Pupils: Pupils are equal, round, and reactive to light.   Neck:      Comments: + shoulder adduction that relieves symptoms.  Cardiovascular:      Rate and Rhythm: Normal rate and regular rhythm.      Pulses: Normal pulses.      Heart sounds: Normal heart sounds. No murmur heard.  Pulmonary:      Effort: Pulmonary effort is normal. No respiratory distress.      Breath sounds: Normal breath sounds. No wheezing, rhonchi or rales.   Chest:      Chest wall: No tenderness.   Abdominal:      General: Bowel sounds are normal.      Palpations: Abdomen is soft.   Musculoskeletal:         General: Normal range of motion.      Cervical back: Normal range of motion and neck supple. Spinous process tenderness and muscular tenderness present.      Right lower leg: No edema.      Left lower leg: No edema.   Skin:     General: Skin is warm and dry.   Neurological:      General: No focal deficit present.      Mental Status: She is alert and oriented to person, place, and time.          Assessment and Plan (including Health Maintenance)      Problem List  [employer]  Document Outside HM   :    Plan:         Health Maintenance Due   Topic Date Due    Hepatitis C Screening  Never done    Eye Exam  Never done    COVID-19 Vaccine (3 - Booster for Pfizer series) 08/18/2021       Problem List Items Addressed This Visit    None     Visit Diagnoses     Cervical radiculopathy    -  Primary    Relevant Medications    pregabalin (LYRICA) 50 MG capsule    ketorolac injection 30 mg (Completed)    Cervical disc disease            Cervical radiculopathy  -     pregabalin (LYRICA) 50 MG capsule; Take 1 capsule (50 mg total) by mouth 3 (three) times daily.  Dispense: 90 capsule; Refill: 1  -     ketorolac injection 30 mg    Cervical disc disease       Health Maintenance Topics with due status: Not Due        Topic Last Completion Date    Colorectal Cancer Screening 12/02/2019    Lipid Panel 06/16/2021    Foot Exam 08/23/2021    Mammogram 12/07/2021    Diabetes Urine Screening 12/20/2021    Hemoglobin A1c 12/20/2021    High Dose Statin 02/02/2022       Procedures     Future Appointments   Date Time Provider Department Center   3/9/2022  9:30 AM Gela Fowler MD [facility] P[facility]   3/24/2022  9:15 AM [facility]   5/10/2022  9:45 AM Stefany Lora MD [facility] CARD [facility]   6/17/2022  8:15 AM Olga Potts MD [facility]   10/3/2022  1:00 PM [facility]   12/13/2022  9:00 AM [facility] MAMMO[facility]        Follow up in about 4 weeks (around 3/29/2022), or if symptoms worsen or fail to improve.     Signature:  Kathryn Crocker DNP, SELINP  [address] [place] [address]  Phone #: [phone]  Fax #: [phone]    Date of encounter: 3/1/22    Patient Instructions   Recommend PT and NSAIDs + Lyrica for cervical radiculopathy. Follow up in 3-4 weeks.

## 2022-03-02 ENCOUNTER — DOCUMENTATION ONLY (OUTPATIENT)
Dept: CARDIOLOGY | Facility: CLINIC | Age: 62
End: 2022-03-02

## 2022-03-08 NOTE — H&P (VIEW-ONLY)
She Disclaimer: This note has been generated using voice-recognition software. There may be typographical errors that have been missed during proof-reading        Patient ID: Dilma Cr is a 61 y.o. female.      Chief Complaint: Low-back Pain and Knee Pain (bilateral)      61-year-old female returns today for re-evaluation of lower back and bilateral lumbar radicular symptoms.  She notes associated paresthesia and weakness of the lower extremities.  She  failed to benefit from physical therapy and Ultram has provided minimal relief.  She is unable to tolerate NSAIDs due to chronic renal insufficiency and anticoagulants.  We received a hold on Plavix for 7 days per Dr. Lora for an epidural steroid injection. She denies any changes since the last office visit.              Pain Assessment  Pain Assessment: 0-10  Pain Score:   8  Pain Location: Back  Pain Orientation: Lower  Pain Radiating Towards: LBp and bilateral knee pain  Pain Descriptors: Sharp, Dull, Burning, Aching, Stabbing, Constant  Pain Frequency: Continuous  Pain Onset: Awakened from sleep  Clinical Progression: Not changed  Aggravating Factors: Walking  Pain Intervention(s): Medication (See eMAR)      A's of Opioid Risk Assessment  Activity:Patient can partially perform ADL.   Analgesia:Patients pain is not controlled by current medication.   Adverse Effects: Patient denies constipation or sedation.  Aberrant Behavior:  reviewed with no aberrant drug seeking/taking behavior.      Patient denies any suicidal or homicidal ideations    Physical Therapy/Home Exercise: yes, no relief          Review of Systems   Constitutional: Negative.    HENT: Negative.    Eyes: Negative.    Respiratory: Negative.    Cardiovascular: Negative.    Gastrointestinal: Negative.    Endocrine: Negative.    Genitourinary: Negative.    Musculoskeletal: Positive for arthralgias and leg pain.   Integumentary:  Negative.   Neurological: Positive for weakness (BLE) and  numbness (BLE).   Hematological: Negative.    Psychiatric/Behavioral: Negative.              Past Medical History:   Diagnosis Date    Arthritis     Cancer     colon cancer    Colon cancer     Coronary artery disease     Depression     Diabetes mellitus, type 2     GERD (gastroesophageal reflux disease)     Hyperlipidemia     Myocardial infarction     2 stents in 05/14/2021 Dr Lora    Thyroid disease      Past Surgical History:   Procedure Laterality Date    BREAST SURGERY      C5-C6 RADHA  8-, 7-, 6-1-2016    Dr Fowler    CERVICAL SPINE SURGERY      COLON SURGERY      HYSTERECTOMY      LEFT HEART CATHETERIZATION Left 5/14/2021    Procedure: Left heart cath;  Surgeon: Mateus Guan MD;  Location: [facility] CATH LAB;  Service: Cardiology;  Laterality: Left;    MEDIPORT REMOVAL  10/14/2021    Dr Kraft    [place] cuff [place]    TOTAL REDUCTION MAMMOPLASTY       Social History     Socioeconomic History    Marital status:      Spouse name: Jorge Alberto    Number of children: 2   Occupational History    Occupation:  at [employer] for past 27 years   Tobacco Use    Smoking status: Never Smoker    Smokeless tobacco: Never Used   Substance and Sexual Activity    Alcohol use: Never    Drug use: Never    Sexual activity: Yes     Family History   Problem Relation Age of Onset    Hypertension Mother     Diabetes Mother     Hypertension Father     Diabetes Father     Hypertension Sister     Breast cancer Sister     Hypertension Brother      Review of patient's allergies indicates:  No Known Allergies  has a current medication list which includes the following prescription(s): ascorbic acid (vitamin c), aspirin, clindamycin, clopidogrel, cyanocobalamin, ferrous sulfate, fluticasone propionate, freestyle padma 14 day sensor, furosemide, hydrocodone-acetaminophen, jardiance, metoprolol tartrate, novolog flexpen u-100 insulin,  olmesartan-hydrochlorothiazide, ozempic, pantoprazole, pregabalin, pyrilamine-chlophedianol, rosuvastatin, ozempic, tramadol, tresiba flextouch u-100, triamcinolone, and tramadol, and the following Facility-Administered Medications: acetaminophen, albuterol, diphenhydramine, epinephrine, methylprednisolone sodium succinate, ondansetron, sodium chloride 0.9% 500 mL flush bag, and sodium chloride 0.9%.      Objective:  Vitals:    03/09/22 1045   BP: (!) 159/69   Pulse: 73        Physical Exam  Vitals and nursing note reviewed.   Constitutional:       General: She is not in acute distress.     Appearance: Normal appearance. She is not ill-appearing, toxic-appearing or diaphoretic.   HENT:      Head: Normocephalic and atraumatic.      Nose: Nose normal.      Mouth/Throat:      Mouth: Mucous membranes are moist.   Eyes:      Extraocular Movements: Extraocular movements intact.      Pupils: Pupils are equal, round, and reactive to light.   Cardiovascular:      Rate and Rhythm: Normal rate and regular rhythm.      Heart sounds: Normal heart sounds.   Pulmonary:      Effort: Pulmonary effort is normal. No respiratory distress.      Breath sounds: Normal breath sounds. No stridor. No wheezing or rhonchi.   Abdominal:      General: Bowel sounds are normal.      Palpations: Abdomen is soft.   Musculoskeletal:         General: No swelling or deformity.      Cervical back: Normal and normal range of motion. No spasms or tenderness. No pain with movement. Normal range of motion.      Thoracic back: Normal.      Lumbar back: Tenderness and bony tenderness present. No spasms. Decreased range of motion. Negative right straight leg raise test and negative left straight leg raise test. No scoliosis.      Right lower leg: No edema.      Left lower leg: No edema.      Comments: Lumbar pain with flexion, extension and lateral rotation   Skin:     General: Skin is warm.   Neurological:      General: No focal deficit present.      Mental  Status: She is alert and oriented to person, place, and time. Mental status is at baseline.      Cranial Nerves: Cranial nerves are intact. No cranial nerve deficit.      Sensory: Sensation is intact. No sensory deficit.      Motor: No weakness.      Coordination: Coordination normal.      Gait: Gait normal.      Deep Tendon Reflexes: Reflexes are normal and symmetric.   Psychiatric:         Mood and Affect: Mood normal.         Behavior: Behavior normal.           Assessment:      1. Lumbosacral radiculopathy    2. Osteoarthrosis multiple sites, not specified as generalized    3. Chronic bilateral low back pain with bilateral sciatica          Plan:  1. reviewed  2.Addiction, Dependency, Tolerance, Opioid abuse-misuse, Death, Diversion Discussed. Overdose reversal drug Naloxone discussed.  3.Refill/Continue medications for pain control and function       Requested Prescriptions     Signed Prescriptions Disp Refills    traMADoL (ULTRAM) 50 mg tablet 30 tablet 0     Sig: Take 1 tablet (50 mg total) by mouth every 6 (six) hours.     4. Schedule L4-5 epidural steroid injection for lumbar radiculopathy  Orders Placed This Encounter   Procedures    Case Request Operating Room: L4-5 RADHA           Order Specific Question:   Medical Necessity:     Answer:   Medically Non-Urgent [100]     Order Specific Question:   CPT Code:     Answer:   [place] INJ LUMBAR/SACRAL, W/IMAGING GUIDANCE [32471]     Order Specific Question:   Positioning:     Answer:   Prone [1003]     Order Specific Question:   Post-Procedure Disposition:     Answer:   PACU [1]     Order Specific Question:   Estimated Length of Stay:     Answer:   0 midnight     Order Specific Question:   Implant Required:     Answer:   No [1001]     Order Specific Question:   Is an on-site pathologist required for this procedure?     Answer:   N/A      5. Indications for this procedure for this specific patient include the following     - Pt has been in pain for at least 6  weeks and has failed conservative care (e.g. Exercise, physical methods, NSAID/ and or muscle relaxants)  - Pt has no major risk factors for spinal cancer or contraindicated condition   - Radicular pain is interfering with functional activity  - Pain is associated with symptoms of nerve root irritation   - Any numbness documented is accompanied with paresthesia   - No evidence of systemic or local infection, bleeding tendency or unstable medical condition   - Epidural provided as part of a comprehensive pain management program  - All repeat injections have at least 80% pain relief and increase functional gain and physical activity, and reduction in reliance on the use of medication and or physical therapy  - Pt has significant functional limitation resulting in diminished quality of life and impaired age appropriate ADL's.   - Diagnostic evaluation has ruled out other causes of pain  - Pt participating in an active rehabilitation program or home exercise program which has been discussed with the patient including heat ice and rest  - No more than 3 epidurals will be done in a 6 month period at the same level with at least 7 days between injections  - MAC is only offered in cases of needle phobia   - Injection done at L4-5  level which is consistent with patient's dermatomal pain complaint    6.Monitored Anesthesia Care medical necessity authorization request:    Monitor anesthesia request is medically indicated for the scheduled nerve block procedure due to:  - needle phobia and anxiety, placing  the patient at risk during the provided service.  -patient has severe problems with muscles and muscle spasticity that makes it hard to lie still-patient suffers from chronic pain and is unable to function due to  diminished ADLs        Hold Plavix and aspirin x7 days.     report:  Reviewed and consistent with medication use as prescribed.      The total time spent for evaluation and management on 03/09/2022 including  reviewing separately obtained history, performing a medically appropriate exam and evaluation, documenting clinical information in the health record, independently interpreting results and communicating them to the patient/family/caregiver, and ordering medications/tests/procedures was between 15-29 minutes.    The above plan and management options were discussed at length with patient. Patient is in agreement with the above and verbalized understanding. It will be communicated with the referring physician via electronic record, fax, or mail.

## 2022-03-08 NOTE — PROGRESS NOTES
She Disclaimer: This note has been generated using voice-recognition software. There may be typographical errors that have been missed during proof-reading        Patient ID: Dilma Cr is a 61 y.o. female.      Chief Complaint: Low-back Pain and Knee Pain (bilateral)      61-year-old female returns today for re-evaluation of lower back and bilateral lumbar radicular symptoms.  She notes associated paresthesia and weakness of the lower extremities.  She  failed to benefit from physical therapy and Ultram has provided minimal relief.  She is unable to tolerate NSAIDs due to chronic renal insufficiency and anticoagulants.  We received a hold on Plavix for 7 days per Dr. Lora for an epidural steroid injection. She denies any changes since the last office visit.              Pain Assessment  Pain Assessment: 0-10  Pain Score:   8  Pain Location: Back  Pain Orientation: Lower  Pain Radiating Towards: LBp and bilateral knee pain  Pain Descriptors: Sharp, Dull, Burning, Aching, Stabbing, Constant  Pain Frequency: Continuous  Pain Onset: Awakened from sleep  Clinical Progression: Not changed  Aggravating Factors: Walking  Pain Intervention(s): Medication (See eMAR)      A's of Opioid Risk Assessment  Activity:Patient can partially perform ADL.   Analgesia:Patients pain is not controlled by current medication.   Adverse Effects: Patient denies constipation or sedation.  Aberrant Behavior:  reviewed with no aberrant drug seeking/taking behavior.      Patient denies any suicidal or homicidal ideations    Physical Therapy/Home Exercise: yes, no relief          Review of Systems   Constitutional: Negative.    HENT: Negative.    Eyes: Negative.    Respiratory: Negative.    Cardiovascular: Negative.    Gastrointestinal: Negative.    Endocrine: Negative.    Genitourinary: Negative.    Musculoskeletal: Positive for arthralgias and leg pain.   Integumentary:  Negative.   Neurological: Positive for weakness (BLE) and  numbness (BLE).   Hematological: Negative.    Psychiatric/Behavioral: Negative.              Past Medical History:   Diagnosis Date    Arthritis     Cancer     colon cancer    Colon cancer     Coronary artery disease     Depression     Diabetes mellitus, type 2     GERD (gastroesophageal reflux disease)     Hyperlipidemia     Myocardial infarction     2 stents in 05/14/2021 Dr Lora    Thyroid disease      Past Surgical History:   Procedure Laterality Date    BREAST SURGERY      C5-C6 RADHA  8-, 7-, 6-1-2016    Dr Fowler    CERVICAL SPINE SURGERY      COLON SURGERY      HYSTERECTOMY      LEFT HEART CATHETERIZATION Left 5/14/2021    Procedure: Left heart cath;  Surgeon: Mateus Guan MD;  Location: [facility] CATH LAB;  Service: Cardiology;  Laterality: Left;    MEDIPORT REMOVAL  10/14/2021    Dr Kraft    right rotator cuff repair      in [place]    TOTAL REDUCTION MAMMOPLASTY       Social History     Socioeconomic History    Marital status:      Spouse name: Jorge Alberto    Number of children: 2   Occupational History    Occupation:  at [employer] for past 27 years   Tobacco Use    Smoking status: Never Smoker    Smokeless tobacco: Never Used   Substance and Sexual Activity    Alcohol use: Never    Drug use: Never    Sexual activity: Yes     Family History   Problem Relation Age of Onset    Hypertension Mother     Diabetes Mother     Hypertension Father     Diabetes Father     Hypertension Sister     Breast cancer Sister     Hypertension Brother      Review of patient's allergies indicates:  No Known Allergies  has a current medication list which includes the following prescription(s): ascorbic acid (vitamin c), aspirin, clindamycin, clopidogrel, cyanocobalamin, ferrous sulfate, fluticasone propionate, freestyle padma 14 day sensor, furosemide, hydrocodone-acetaminophen, jardiance, metoprolol tartrate, novolog flexpen u-100 insulin,  olmesartan-hydrochlorothiazide, ozempic, pantoprazole, pregabalin, pyrilamine-chlophedianol, rosuvastatin, ozempic, tramadol, tresiba flextouch u-100, triamcinolone, and tramadol, and the following Facility-Administered Medications: acetaminophen, albuterol, diphenhydramine, epinephrine, methylprednisolone sodium succinate, ondansetron, sodium chloride 0.9% 500 mL flush bag, and sodium chloride 0.9%.      Objective:  Vitals:    03/09/22 1045   BP: (!) 159/69   Pulse: 73        Physical Exam  Vitals and nursing note reviewed.   Constitutional:       General: She is not in acute distress.     Appearance: Normal appearance. She is not ill-appearing, toxic-appearing or diaphoretic.   HENT:      Head: Normocephalic and atraumatic.      Nose: Nose normal.      Mouth/Throat:      Mouth: Mucous membranes are moist.   Eyes:      Extraocular Movements: Extraocular movements intact.      Pupils: Pupils are equal, round, and reactive to light.   Cardiovascular:      Rate and Rhythm: Normal rate and regular rhythm.      Heart sounds: Normal heart sounds.   Pulmonary:      Effort: Pulmonary effort is normal. No respiratory distress.      Breath sounds: Normal breath sounds. No stridor. No wheezing or rhonchi.   Abdominal:      General: Bowel sounds are normal.      Palpations: Abdomen is soft.   Musculoskeletal:         General: No swelling or deformity.      Cervical back: Normal and normal range of motion. No spasms or tenderness. No pain with movement. Normal range of motion.      Thoracic back: Normal.      Lumbar back: Tenderness and bony tenderness present. No spasms. Decreased range of motion. Negative right straight leg raise test and negative left straight leg raise test. No scoliosis.      Right lower leg: No edema.      Left lower leg: No edema.      Comments: Lumbar pain with flexion, extension and lateral rotation   Skin:     General: Skin is warm.   Neurological:      General: No focal deficit present.      Mental  Status: She is alert and oriented to person, place, and time. Mental status is at baseline.      Cranial Nerves: Cranial nerves are intact. No cranial nerve deficit.      Sensory: Sensation is intact. No sensory deficit.      Motor: No weakness.      Coordination: Coordination normal.      Gait: Gait normal.      Deep Tendon Reflexes: Reflexes are normal and symmetric.   Psychiatric:         Mood and Affect: Mood normal.         Behavior: Behavior normal.           Assessment:      1. Lumbosacral radiculopathy    2. Osteoarthrosis multiple sites, not specified as generalized    3. Chronic bilateral low back pain with bilateral sciatica          Plan:  1. reviewed  2.Addiction, Dependency, Tolerance, Opioid abuse-misuse, Death, Diversion Discussed. Overdose reversal drug Naloxone discussed.  3.Refill/Continue medications for pain control and function       Requested Prescriptions     Signed Prescriptions Disp Refills    traMADoL (ULTRAM) 50 mg tablet 30 tablet 0     Sig: Take 1 tablet (50 mg total) by mouth every 6 (six) hours.     4. Schedule L4-5 epidural steroid injection for lumbar radiculopathy  Orders Placed This Encounter   Procedures    Case Request Operating Room: L4-5 RADHA           Order Specific Question:   Medical Necessity:     Answer:   Medically Non-Urgent [100]     Order Specific Question:   CPT Code:     Answer:   [place] INJ LUMBAR/SACRAL, W/IMAGING GUIDANCE [60323]     Order Specific Question:   Positioning:     Answer:   Prone [1003]     Order Specific Question:   Post-Procedure Disposition:     Answer:   PACU [1]     Order Specific Question:   Estimated Length of Stay:     Answer:   0 midnight     Order Specific Question:   Implant Required:     Answer:   No [1001]     Order Specific Question:   Is an on-site pathologist required for this procedure?     Answer:   N/A      5. Indications for this procedure for this specific patient include the following     - Pt has been in pain for at least 6  weeks and has failed conservative care (e.g. Exercise, physical methods, NSAID/ and or muscle relaxants)  - Pt has no major risk factors for spinal cancer or contraindicated condition   - Radicular pain is interfering with functional activity  - Pain is associated with symptoms of nerve root irritation   - Any numbness documented is accompanied with paresthesia   - No evidence of systemic or local infection, bleeding tendency or unstable medical condition   - Epidural provided as part of a comprehensive pain management program  - All repeat injections have at least 80% pain relief and increase functional gain and physical activity, and reduction in reliance on the use of medication and or physical therapy  - Pt has significant functional limitation resulting in diminished quality of life and impaired age appropriate ADL's.   - Diagnostic evaluation has ruled out other causes of pain  - Pt participating in an active rehabilitation program or home exercise program which has been discussed with the patient including heat ice and rest  - No more than 3 epidurals will be done in a 6 month period at the same level with at least 7 days between injections  - MAC is only offered in cases of needle phobia   - Injection done at L4-5  level which is consistent with patient's dermatomal pain complaint    6.Monitored Anesthesia Care medical necessity authorization request:    Monitor anesthesia request is medically indicated for the scheduled nerve block procedure due to:  - needle phobia and anxiety, placing  the patient at risk during the provided service.  -patient has severe problems with muscles and muscle spasticity that makes it hard to lie still-patient suffers from chronic pain and is unable to function due to  diminished ADLs        Hold Plavix and aspirin x7 days.     report:  Reviewed and consistent with medication use as prescribed.      The total time spent for evaluation and management on 03/09/2022 including  reviewing separately obtained history, performing a medically appropriate exam and evaluation, documenting clinical information in the health record, independently interpreting results and communicating them to the patient/family/caregiver, and ordering medications/tests/procedures was between 15-29 minutes.    The above plan and management options were discussed at length with patient. Patient is in agreement with the above and verbalized understanding. It will be communicated with the referring physician via electronic record, fax, or mail.

## 2022-03-09 ENCOUNTER — OFFICE VISIT (OUTPATIENT)
Dept: PAIN MEDICINE | Facility: CLINIC | Age: 62
End: 2022-03-09
Payer: COMMERCIAL

## 2022-03-09 VITALS
HEIGHT: 62 IN | SYSTOLIC BLOOD PRESSURE: 159 MMHG | BODY MASS INDEX: 39.01 KG/M2 | WEIGHT: 212 LBS | HEART RATE: 73 BPM | DIASTOLIC BLOOD PRESSURE: 69 MMHG

## 2022-03-09 DIAGNOSIS — M54.42 CHRONIC BILATERAL LOW BACK PAIN WITH BILATERAL SCIATICA: Chronic | ICD-10-CM

## 2022-03-09 DIAGNOSIS — M89.49 OSTEOARTHROSIS MULTIPLE SITES, NOT SPECIFIED AS GENERALIZED: Chronic | ICD-10-CM

## 2022-03-09 DIAGNOSIS — M54.17 LUMBOSACRAL RADICULOPATHY: Primary | Chronic | ICD-10-CM

## 2022-03-09 DIAGNOSIS — M54.41 CHRONIC BILATERAL LOW BACK PAIN WITH BILATERAL SCIATICA: Chronic | ICD-10-CM

## 2022-03-09 DIAGNOSIS — G89.29 CHRONIC BILATERAL LOW BACK PAIN WITH BILATERAL SCIATICA: Chronic | ICD-10-CM

## 2022-03-09 DIAGNOSIS — Z11.59 SCREENING FOR VIRAL DISEASE: ICD-10-CM

## 2022-03-09 PROCEDURE — 3077F SYST BP >= 140 MM HG: CPT | Mod: ,,, | Performed by: PAIN MEDICINE

## 2022-03-09 PROCEDURE — 3078F PR MOST RECENT DIASTOLIC BLOOD PRESSURE < 80 MM HG: ICD-10-PCS | Mod: ,,, | Performed by: PAIN MEDICINE

## 2022-03-09 PROCEDURE — 3078F DIAST BP <80 MM HG: CPT | Mod: ,,, | Performed by: PAIN MEDICINE

## 2022-03-09 PROCEDURE — 1159F MED LIST DOCD IN RCRD: CPT | Mod: ,,, | Performed by: PAIN MEDICINE

## 2022-03-09 PROCEDURE — 1159F PR MEDICATION LIST DOCUMENTED IN MEDICAL RECORD: ICD-10-PCS | Mod: ,,, | Performed by: PAIN MEDICINE

## 2022-03-09 PROCEDURE — 3008F PR BODY MASS INDEX (BMI) DOCUMENTED: ICD-10-PCS | Mod: ,,, | Performed by: PAIN MEDICINE

## 2022-03-09 PROCEDURE — 99215 OFFICE O/P EST HI 40 MIN: CPT | Mod: PBBFAC | Performed by: PAIN MEDICINE

## 2022-03-09 PROCEDURE — 99214 PR OFFICE/OUTPT VISIT, EST, LEVL IV, 30-39 MIN: ICD-10-PCS | Mod: S$PBB,,, | Performed by: PAIN MEDICINE

## 2022-03-09 PROCEDURE — 3077F PR MOST RECENT SYSTOLIC BLOOD PRESSURE >= 140 MM HG: ICD-10-PCS | Mod: ,,, | Performed by: PAIN MEDICINE

## 2022-03-09 PROCEDURE — 99214 OFFICE O/P EST MOD 30 MIN: CPT | Mod: S$PBB,,, | Performed by: PAIN MEDICINE

## 2022-03-09 PROCEDURE — 3008F BODY MASS INDEX DOCD: CPT | Mod: ,,, | Performed by: PAIN MEDICINE

## 2022-03-09 RX ORDER — TRAMADOL HYDROCHLORIDE 50 MG/1
50 TABLET ORAL EVERY 6 HOURS
Qty: 30 TABLET | Refills: 0 | Status: SHIPPED | OUTPATIENT
Start: 2022-03-09 | End: 2022-04-12 | Stop reason: SDUPTHER

## 2022-03-09 NOTE — PATIENT INSTRUCTIONS
L4-L5 RADHA   3- AT 0840    PLAVIX AND ASPIRIN  TO BE HELD FOR 7 DAYS INSTEAD OF 10 PER DR PORTER AND DR BERMUDEZ PRIOR TO PROCEDURE      ALL PATIENTS TO HAVE COVID SCREENING DONE 48-72 HOURS PRIOR TO PROCEDURE INCLUDING PATIENTS THAT WHOM HAVE HAD COVID VACCINE!!!     IF YOUR  PROCEDURE IS ON A Tuesday, GET COVID TESTING ON THE  Friday BEFORE PROCEDURE.    IF YOUR PROCEDURE IS ON Thursday, HAVE COVID TESTING ON THE Monday OR Tuesday PRIOR TO PROCEDURE    COVID TESTING TO BE DONE AT [facility] IN THE LAB DEPARTMENT OR YOUR PRIMARY CARE DOCTOR MAY ORDER IT FOR YOU.    IF YOUR PRIMARY  CARE DOCTOR ORDERS YOUR COVID TESTING YOU MUST BRING YOUR RESULTS WITH YOU TO YOUR PROCEDURE.    Procedure Instructions:    Nothing to eat or drink for 8 hours or after midnight including gum, candy, mints, or tobacco products.  If you are scheduled for 1:30 or later nothing to eat or drink after 5 a.m. the morning of the procedure, including gum, candy, mints, or tobacco products.  Must have a  at least 18 yrs of age to stay present at all times  No Diabetic medications the morning of procedure, check blood sugar the morning of procedure, if it is greater than 200 call the office at [phone]  If you are started on antibiotics or have been prescribed antibiotics, have a fever, or have any other type of infection call to reschedule procedure.  If you take blood pressure medications you can take it at your regular scheduled time with a small sip of WATER!    HOLD ASPIRIN AND ASPIRIN PRODUCTS  (ASPIRIN, BC POWDER ETC. ) FOR 7 DAYS  PRIOR TO PROCEDURE  HOLD NSAIDS( ibuprofen, mobic, meloxicam, advil, diclofenac, methotrexate, aleve etc....)  FOR 3 DAYS   PRIOR TO PROCEDURE

## 2022-03-23 ENCOUNTER — HOSPITAL ENCOUNTER (OUTPATIENT)
Dept: RADIOLOGY | Facility: HOSPITAL | Age: 62
Discharge: HOME | End: 2022-03-23
Attending: FAMILY MEDICINE
Payer: COMMERCIAL

## 2022-03-23 ENCOUNTER — OFFICE VISIT (OUTPATIENT)
Dept: FAMILY MEDICINE | Facility: CLINIC | Age: 62
End: 2022-03-23
Payer: COMMERCIAL

## 2022-03-23 VITALS
OXYGEN SATURATION: 99 % | TEMPERATURE: 98 F | BODY MASS INDEX: 40.64 KG/M2 | RESPIRATION RATE: 18 BRPM | WEIGHT: 207 LBS | DIASTOLIC BLOOD PRESSURE: 70 MMHG | SYSTOLIC BLOOD PRESSURE: 132 MMHG | HEIGHT: 60 IN | HEART RATE: 75 BPM

## 2022-03-23 DIAGNOSIS — M54.12 CERVICAL RADICULOPATHY: Primary | ICD-10-CM

## 2022-03-23 DIAGNOSIS — M79.601 RIGHT ARM PAIN: ICD-10-CM

## 2022-03-23 DIAGNOSIS — M79.641 RIGHT HAND PAIN: ICD-10-CM

## 2022-03-23 DIAGNOSIS — M54.2 NECK PAIN: ICD-10-CM

## 2022-03-23 PROCEDURE — 1160F RVW MEDS BY RX/DR IN RCRD: CPT | Mod: ,,, | Performed by: FAMILY MEDICINE

## 2022-03-23 PROCEDURE — 3078F PR MOST RECENT DIASTOLIC BLOOD PRESSURE < 80 MM HG: ICD-10-PCS | Mod: ,,, | Performed by: FAMILY MEDICINE

## 2022-03-23 PROCEDURE — 1159F PR MEDICATION LIST DOCUMENTED IN MEDICAL RECORD: ICD-10-PCS | Mod: ,,, | Performed by: FAMILY MEDICINE

## 2022-03-23 PROCEDURE — 3075F PR MOST RECENT SYSTOLIC BLOOD PRESS GE 130-139MM HG: ICD-10-PCS | Mod: ,,, | Performed by: FAMILY MEDICINE

## 2022-03-23 PROCEDURE — 72050 X-RAY EXAM NECK SPINE 4/5VWS: CPT | Mod: TC

## 2022-03-23 PROCEDURE — 96372 PR INJECTION,THERAP/PROPH/DIAG2ST, IM OR SUBCUT: ICD-10-PCS | Mod: ,,, | Performed by: FAMILY MEDICINE

## 2022-03-23 PROCEDURE — 3078F DIAST BP <80 MM HG: CPT | Mod: ,,, | Performed by: FAMILY MEDICINE

## 2022-03-23 PROCEDURE — 96372 THER/PROPH/DIAG INJ SC/IM: CPT | Mod: ,,, | Performed by: FAMILY MEDICINE

## 2022-03-23 PROCEDURE — 3008F BODY MASS INDEX DOCD: CPT | Mod: ,,, | Performed by: FAMILY MEDICINE

## 2022-03-23 PROCEDURE — 1160F PR REVIEW ALL MEDS BY PRESCRIBER/CLIN PHARMACIST DOCUMENTED: ICD-10-PCS | Mod: ,,, | Performed by: FAMILY MEDICINE

## 2022-03-23 PROCEDURE — 1159F MED LIST DOCD IN RCRD: CPT | Mod: ,,, | Performed by: FAMILY MEDICINE

## 2022-03-23 PROCEDURE — 3075F SYST BP GE 130 - 139MM HG: CPT | Mod: ,,, | Performed by: FAMILY MEDICINE

## 2022-03-23 PROCEDURE — 99213 PR OFFICE/OUTPT VISIT, EST, LEVL III, 20-29 MIN: ICD-10-PCS | Mod: 25,,, | Performed by: FAMILY MEDICINE

## 2022-03-23 PROCEDURE — 3008F PR BODY MASS INDEX (BMI) DOCUMENTED: ICD-10-PCS | Mod: ,,, | Performed by: FAMILY MEDICINE

## 2022-03-23 PROCEDURE — 99213 OFFICE O/P EST LOW 20 MIN: CPT | Mod: 25,,, | Performed by: FAMILY MEDICINE

## 2022-03-23 RX ORDER — KETOROLAC TROMETHAMINE 30 MG/ML
15 INJECTION, SOLUTION INTRAMUSCULAR; INTRAVENOUS
Status: COMPLETED | OUTPATIENT
Start: 2022-03-23 | End: 2022-03-23

## 2022-03-23 RX ADMIN — KETOROLAC TROMETHAMINE 15 MG: 30 INJECTION, SOLUTION INTRAMUSCULAR; INTRAVENOUS at 05:03

## 2022-03-23 NOTE — PROGRESS NOTES
New Clinic Note    Dilma Cr is a 61 y.o. female     CC:   Chief Complaint   Patient presents with    Neck Pain     H/O colon cancer in 2018. H/O neck surgery 7 years ago. Worsening arm/hand/shoulder and neck pain over last one month.     Hand Pain     Shoulder pain that radiates all the way from her right neck down to her hand for past one month. 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 Has history of previous cervical neck surgery 7 years ago.  Rates her pain today as 8/10 on pain scale. Never had Hepatitis C Screen.         Subjective    History of Present Illness HPI   Patient complains of neck pain that radiates into her right arm and hand. She takes Tramadol from Dr. Fowler with some relief. She is scheduled for a procedure on her low back on 3/29/22. She denies an injury.     Presents to clinic for follow up on chronic health problems    Hypertension:    Takes medications as directed: yes  Checks blood pressure outside of clinic: yes  On a statin: yes  Cardiovascular exercise: no  Heart healthy diet: no    Diabetes, type 2:    Checks glucose outside of clinic:yes  Keeps log of glucoses: no  Eats a diabetic diet: no  Regular cardiovascular exercise: no  Monitors feet: yes  Most recent HbA1c values:   Hemoglobin A1C   Date Value Ref Range Status   12/20/2021 7.7 (H) 4.5 - 6.6 % Final     Comment:       Normal:               <5.7%  Pre-Diabetic:       5.7% to 6.4%  Diabetic:             >6.4%  Diabetic Goal:     <7%   06/25/2021 8.4 (H) 4.5 - 6.6 % Final     Comment:       Normal:               <5.7%  Pre-Diabetic:       5.7% to 6.4%  Diabetic:             >6.4%  Diabetic Goal:     <7%   05/26/2021 8.6 (H) 4.5 - 6.6 % Final     Comment:       Normal:               <5.7%  Pre-Diabetic:       5.7% to 6.4%  Diabetic:             >6.4%  Diabetic Goal:     <7%      Takes an aspirin: yes  On a statin: yes    Current  Outpatient Medications:     ascorbic acid, vitamin C, 250 mg Chew, Take 250 mg by mouth once daily., Disp: , Rfl:     aspirin 81 MG Chew, Take 81 mg by mouth once daily., Disp: , Rfl:     cyanocobalamin (VITAMIN B-12) 1000 MCG tablet, Take 100 mcg by mouth once daily., Disp: , Rfl:     ferrous sulfate 325 (65 FE) MG EC tablet, Take 1 tablet (325 mg total) by mouth once daily., Disp: 60 tablet, Rfl: 5    fluticasone propionate (FLONASE) 50 mcg/actuation nasal spray, 2 sprays (100 mcg total) by Each Nostril route once daily., Disp: 16 g, Rfl: 0    FREESTYLE BECCA 14 DAY SENSOR Kit, USE TO CHECK GLUCOSE 4 TIMES DAILY, Disp: , Rfl:     furosemide (LASIX) 20 MG tablet, Take 1 tablet (20 mg total) by mouth daily as needed (fluid)., Disp: 90 tablet, Rfl: 1    JARDIANCE 25 mg tablet, Take 1 tablet (25 mg total) by mouth once daily., Disp: 30 tablet, Rfl: 5    metoprolol tartrate (LOPRESSOR) 25 MG tablet, Take 1 tablet (25 mg total) by mouth 2 (two) times daily., Disp: 180 tablet, Rfl: 1    NOVOLOG FLEXPEN U-100 INSULIN 100 unit/mL (3 mL) InPn pen, Inject 20 Units into the skin 2 (two) times a day., Disp: 12 each, Rfl: 1    olmesartan-hydrochlorothiazide (BENICAR HCT) 40-25 mg per tablet, Take 1 tablet by mouth once daily., Disp: 90 tablet, Rfl: 1    OZEMPIC 1 mg/dose (4 mg/3 mL), , Disp: , Rfl:     pantoprazole (PROTONIX) 40 MG tablet, Take 1 tablet (40 mg total) by mouth once daily., Disp: 90 tablet, Rfl: 1    pregabalin (LYRICA) 50 MG capsule, Take 1 capsule (50 mg total) by mouth 3 (three) times daily., Disp: 90 capsule, Rfl: 1    pyrilamine-chlophedianoL 12.5-12.5 mg/5 mL Liqd, Take 10 mLs by mouth every 8 (eight) hours as needed (cough/sneezing/drainage)., Disp: 120 mL, Rfl: 1    rosuvastatin (CRESTOR) 40 MG Tab, Take 1 tablet (40 mg total) by mouth once daily., Disp: 90 tablet, Rfl: 1    semaglutide (OZEMPIC) 1 mg/dose (2 mg/1.5 mL) PnIj, Inject 1 mg into the skin once a week., Disp: 4 pen, Rfl: 5     traMADoL (ULTRAM) 50 mg tablet, Take 1 tablet (50 mg total) by mouth every 24 hours as needed for Pain., Disp: 30 tablet, Rfl: 1    traMADoL (ULTRAM) 50 mg tablet, Take 1 tablet (50 mg total) by mouth every 6 (six) hours., Disp: 30 tablet, Rfl: 0    TRESIBA FLEXTOUCH U-100 100 unit/mL (3 mL) insulin pen, Inject 20 Units into the skin every evening., Disp: 3 pen, Rfl: 5    triamcinolone (NASACORT) 55 mcg nasal inhaler, 2 sprays by Nasal route once daily., Disp: 16.9 mL, Rfl: 0    clindamycin (CLEOCIN) 300 MG capsule, , Disp: , Rfl:     clopidogreL (PLAVIX) 75 mg tablet, Take 1 tablet (75 mg total) by mouth once daily. (Patient not taking: Reported on 3/23/2022), Disp: 90 tablet, Rfl: 4    HYDROcodone-acetaminophen (NORCO) 7.5-325 mg per tablet, , Disp: , Rfl:     Current Facility-Administered Medications:     acetaminophen tablet 650 mg, 650 mg, Oral, Once PRN, Olga Potts MD    albuterol inhaler 2 puff, 2 puff, Inhalation, Q20 Min PRN, Olga Potts MD    diphenhydrAMINE injection 25 mg, 25 mg, Intravenous, Once PRN, Olga Potts MD    EPINEPHrine (EPIPEN) 0.3 mg/0.3 mL pen injection 0.3 mg, 0.3 mg, Intramuscular, PRN, Olga Potts MD    methylPREDNISolone sodium succinate injection 40 mg, 40 mg, Intravenous, Once PRN, Olga Potts MD    ondansetron disintegrating tablet 4 mg, 4 mg, Oral, Once PRN, Olga Potts MD    sodium chloride 0.9% 500 mL flush bag, , Intravenous, PRN, Olga Potts MD    sodium chloride 0.9% flush 10 mL, 10 mL, Intravenous, PRN, Olga Potts MD     Past Medical History:   Diagnosis Date    Arthritis     Cancer     colon cancer    Colon cancer     Coronary artery disease     Depression     Diabetes mellitus, type 2     GERD (gastroesophageal reflux disease)     Hyperlipidemia     Myocardial infarction     2 stents in 05/14/2021 Dr Lora    Thyroid disease         Family History   Problem Relation Age of Onset     Hypertension Mother     Diabetes Mother     Hypertension Father     Diabetes Father     Hypertension Sister     Breast cancer Sister     Hypertension Brother         Past Surgical History:   Procedure Laterality Date    BREAST SURGERY      C5-C6 RADHA  8-, 7-, 6-1-2016    Dr Fowler    CERVICAL SPINE SURGERY      COLON SURGERY      HYSTERECTOMY      LEFT HEART CATHETERIZATION Left 5/14/2021    Procedure: Left heart cath;  Surgeon: Mateus Guan MD;  Location: [facility] CATH LAB;  Service: Cardiology;  Laterality: Left;    MEDIPORT REMOVAL  10/14/2021    Dr Kraft    right rotator cuff repair      in [place]    TOTAL REDUCTION MAMMOPLASTY          Review of Systems   Constitutional: Negative for fatigue and fever.   HENT: Negative for ear pain, postnasal drip, rhinorrhea and sinus pressure/congestion.    Respiratory: Negative for cough and shortness of breath.    Cardiovascular: Negative for chest pain.   Gastrointestinal: Negative for abdominal pain, diarrhea, nausea and vomiting.   Genitourinary: Negative for dysuria.   Musculoskeletal: Positive for neck pain.   Neurological: Negative for headaches.        /70 (BP Location: Right arm, Patient Position: Sitting, BP Method: Large (Automatic))   Pulse 75   Temp 97.7 °F (36.5 °C) (Oral)   Resp 18   Ht 5' (1.524 m)   Wt 93.9 kg (207 lb)   LMP  (LMP Unknown)   SpO2 99%   BMI 40.43 kg/m²      Physical Exam  HENT:      Head: Normocephalic and atraumatic.   Cardiovascular:      Rate and Rhythm: Normal rate and regular rhythm.   Pulmonary:      Effort: Pulmonary effort is normal.      Breath sounds: Normal breath sounds.   Musculoskeletal:      Cervical back: Tenderness present. Decreased range of motion.   Neurological:      Mental Status: She is alert and oriented to person, place, and time.   Psychiatric:         Mood and Affect: Mood normal.         Behavior: Behavior normal.          Assessment and Plan      ICD-10-CM ICD-9-CM    1. Cervical radiculopathy  M54.12 723.4   2. Neck pain  M54.2 723.1        Problem List Items Addressed This Visit    None     Visit Diagnoses     Cervical radiculopathy    -  Primary    Neck pain        Relevant Medications    ketorolac injection 15 mg (Completed)    Other Relevant Orders    X-Ray Cervical Spine Complete 5 view (Completed)         Patient will follow up with pain treatment for neck pain.     Follow up if symptoms worsen or fail to improve.

## 2022-03-29 ENCOUNTER — HOSPITAL ENCOUNTER (OUTPATIENT)
Facility: HOSPITAL | Age: 62
Discharge: HOME | End: 2022-03-29
Attending: PAIN MEDICINE | Admitting: PAIN MEDICINE
Payer: COMMERCIAL

## 2022-03-29 ENCOUNTER — ANESTHESIA (OUTPATIENT)
Dept: PAIN MEDICINE | Facility: HOSPITAL | Age: 62
End: 2022-03-29
Payer: COMMERCIAL

## 2022-03-29 ENCOUNTER — ANESTHESIA EVENT (OUTPATIENT)
Dept: PAIN MEDICINE | Facility: HOSPITAL | Age: 62
End: 2022-03-29
Payer: COMMERCIAL

## 2022-03-29 VITALS
RESPIRATION RATE: 7 BRPM | TEMPERATURE: 98 F | BODY MASS INDEX: 38.64 KG/M2 | WEIGHT: 210 LBS | DIASTOLIC BLOOD PRESSURE: 70 MMHG | OXYGEN SATURATION: 100 % | HEIGHT: 62 IN | SYSTOLIC BLOOD PRESSURE: 158 MMHG | HEART RATE: 76 BPM

## 2022-03-29 DIAGNOSIS — M54.17 LUMBOSACRAL RADICULOPATHY: ICD-10-CM

## 2022-03-29 LAB
GLUCOSE SERPL-MCNC: 187 MG/DL (ref 70–105)
GLUCOSE SERPL-MCNC: 187 MG/DL (ref 70–110)

## 2022-03-29 PROCEDURE — D9220A PRA ANESTHESIA: ICD-10-PCS | Mod: ,,, | Performed by: NURSE ANESTHETIST, CERTIFIED REGISTERED

## 2022-03-29 PROCEDURE — D9220A PRA ANESTHESIA: Mod: ,,, | Performed by: NURSE ANESTHETIST, CERTIFIED REGISTERED

## 2022-03-29 PROCEDURE — 82962 GLUCOSE BLOOD TEST: CPT

## 2022-03-29 PROCEDURE — 63600175 PHARM REV CODE 636 W HCPCS: Performed by: PAIN MEDICINE

## 2022-03-29 PROCEDURE — 62323 NJX INTERLAMINAR LMBR/SAC: CPT | Performed by: PAIN MEDICINE

## 2022-03-29 PROCEDURE — 25500020 PHARM REV CODE 255: Performed by: PAIN MEDICINE

## 2022-03-29 PROCEDURE — 25000003 PHARM REV CODE 250: Performed by: PAIN MEDICINE

## 2022-03-29 PROCEDURE — 37000009 HC ANESTHESIA EA ADD 15 MINS: Performed by: PAIN MEDICINE

## 2022-03-29 PROCEDURE — 62323 NJX INTERLAMINAR LMBR/SAC: CPT | Mod: ,,, | Performed by: PAIN MEDICINE

## 2022-03-29 PROCEDURE — 37000008 HC ANESTHESIA 1ST 15 MINUTES: Performed by: PAIN MEDICINE

## 2022-03-29 PROCEDURE — 27201423 OPTIME MED/SURG SUP & DEVICES STERILE SUPPLY: Performed by: PAIN MEDICINE

## 2022-03-29 PROCEDURE — 25000003 PHARM REV CODE 250: Performed by: NURSE ANESTHETIST, CERTIFIED REGISTERED

## 2022-03-29 PROCEDURE — 62323 PR INJ LUMBAR/SACRAL, W/IMAGING GUIDANCE: ICD-10-PCS | Mod: ,,, | Performed by: PAIN MEDICINE

## 2022-03-29 PROCEDURE — 63600175 PHARM REV CODE 636 W HCPCS: Performed by: NURSE ANESTHETIST, CERTIFIED REGISTERED

## 2022-03-29 PROCEDURE — 27000284 HC CANNULA NASAL: Performed by: NURSE ANESTHETIST, CERTIFIED REGISTERED

## 2022-03-29 RX ORDER — TRIAMCINOLONE ACETONIDE 40 MG/ML
INJECTION, SUSPENSION INTRA-ARTICULAR; INTRAMUSCULAR
Status: DISCONTINUED | OUTPATIENT
Start: 2022-03-29 | End: 2022-03-29 | Stop reason: HOSPADM

## 2022-03-29 RX ORDER — BUPIVACAINE HYDROCHLORIDE 2.5 MG/ML
INJECTION, SOLUTION EPIDURAL; INFILTRATION; INTRACAUDAL
Status: DISCONTINUED | OUTPATIENT
Start: 2022-03-29 | End: 2022-03-29 | Stop reason: HOSPADM

## 2022-03-29 RX ORDER — IOPAMIDOL 612 MG/ML
INJECTION, SOLUTION INTRATHECAL
Status: DISCONTINUED | OUTPATIENT
Start: 2022-03-29 | End: 2022-03-29 | Stop reason: HOSPADM

## 2022-03-29 RX ORDER — PROPOFOL 10 MG/ML
VIAL (ML) INTRAVENOUS
Status: DISCONTINUED | OUTPATIENT
Start: 2022-03-29 | End: 2022-03-29

## 2022-03-29 RX ORDER — SODIUM CHLORIDE 9 MG/ML
INJECTION, SOLUTION INTRAVENOUS CONTINUOUS
Status: DISCONTINUED | OUTPATIENT
Start: 2022-03-29 | End: 2022-03-29 | Stop reason: HOSPADM

## 2022-03-29 RX ORDER — MORPHINE SULFATE 10 MG/ML
5 INJECTION INTRAMUSCULAR; INTRAVENOUS; SUBCUTANEOUS ONCE
Status: COMPLETED | OUTPATIENT
Start: 2022-03-29 | End: 2022-03-29

## 2022-03-29 RX ORDER — LIDOCAINE HYDROCHLORIDE 20 MG/ML
INJECTION, SOLUTION EPIDURAL; INFILTRATION; INTRACAUDAL; PERINEURAL
Status: DISCONTINUED | OUTPATIENT
Start: 2022-03-29 | End: 2022-03-29

## 2022-03-29 RX ORDER — ORPHENADRINE CITRATE 30 MG/ML
60 INJECTION INTRAMUSCULAR; INTRAVENOUS ONCE
Status: COMPLETED | OUTPATIENT
Start: 2022-03-29 | End: 2022-03-29

## 2022-03-29 RX ORDER — MORPHINE SULFATE 10 MG/ML
5 INJECTION INTRAMUSCULAR; INTRAVENOUS; SUBCUTANEOUS ONCE
Status: DISCONTINUED | OUTPATIENT
Start: 2022-03-29 | End: 2022-03-29

## 2022-03-29 RX ADMIN — PROPOFOL 50 MG: 10 INJECTION, EMULSION INTRAVENOUS at 09:03

## 2022-03-29 RX ADMIN — LIDOCAINE HYDROCHLORIDE 50 MG: 20 INJECTION, SOLUTION EPIDURAL; INFILTRATION; INTRACAUDAL; PERINEURAL at 09:03

## 2022-03-29 RX ADMIN — MORPHINE SULFATE 5 MG: 10 INJECTION INTRAVENOUS at 10:03

## 2022-03-29 RX ADMIN — SODIUM CHLORIDE: 9 INJECTION, SOLUTION INTRAVENOUS at 09:03

## 2022-03-29 RX ADMIN — ORPHENADRINE CITRATE 60 MG: 30 INJECTION INTRAMUSCULAR; INTRAVENOUS at 10:03

## 2022-03-29 NOTE — BRIEF OP NOTE
Discharge Note  Short Stay    Admit Date: 3/29/2022    Discharge Date: 3/29/2022    Attending Physician: Gela Fowler     Discharge Provider: Gela Fowler    Diagnoses:  Lumbar radiculopathy    Discharged Condition: Good    Final Diagnoses: Lumbosacral radiculopathy [M54.17]    Disposition: Home or Self Care    Hospital Course: No complications, uneventful    Outcome of Hospitalization, Treatment, Procedure, or Surgery:  Patient was admitted for outpatient interventional pain management procedure. The patient tolerated the procedure well with no complications.    Follow up/Patient Instructions:  Follow up as scheduled in Pain Management office in 3-4 weeks.  Patient has received instructions and follow up date and time.    Medications:  Continue previous medications

## 2022-03-29 NOTE — DISCHARGE SUMMARY
[facility] - Pain Management  Discharge Note  Short Stay    Procedure(s) (LRB):  L4-5 RADHA (N/A)    OUTCOME: Patient tolerated treatment/procedure well without complication and is now ready for discharge.    DISPOSITION: Home or Self Care    FINAL DIAGNOSIS:  Lumbar radiculopathy    FOLLOWUP: In clinic    DISCHARGE INSTRUCTIONS:  See nurse's notes      Clinical Reference Documents Added to Patient Instructions       Document    TRAMADOL, ADULT (ENGLISH)          TIME SPENT ON DISCHARGE: 5 minutes

## 2022-03-29 NOTE — ANESTHESIA PREPROCEDURE EVALUATION
03/29/2022  Dilma Cr is a 61 y.o., female.      Pre-op Assessment    I have reviewed the Patient Summary Reports.     I have reviewed the Nursing Notes. I have reviewed the NPO Status.   I have reviewed the Medications.     Review of Systems  Anesthesia Hx:  No problems with previous Anesthesia  Family Hx of Anesthesia complications:  Personal Hx of Anesthesia complications   Social:  Non-Smoker    Hematology/Oncology:  Hematology Normal   Oncology Normal     EENT/Dental:EENT/Dental Normal   Cardiovascular:   Hypertension Past MI CAD  Dysrhythmias atrial fibrillation Angina    Pulmonary:  Pulmonary Normal    Renal/:   Chronic Renal Disease    Hepatic/GI:   GERD    Musculoskeletal:   Arthritis     Neurological:   Neuromuscular Disease,    Endocrine:   Diabetes  Morbid Obesity / BMI > 40  Dermatological:  Skin Normal    Psych:   Psychiatric History depression          Physical Exam  General: Well nourished, Cooperative, Alert and Oriented    Airway:  Mallampati: II   Mouth Opening: Normal  TM Distance: Normal  Tongue: Normal  Neck ROM: Normal ROM    Dental:  Intact    Chest/Lungs:  Clear to auscultation, Normal Respiratory Rate    Heart:  Rate: Normal  Rhythm: Regular Rhythm    Abdomen:  Normal, Soft        Anesthesia Plan  Type of Anesthesia, risks & benefits discussed:    Anesthesia Type: Gen Natural Airway  Intra-op Monitoring Plan: Standard ASA Monitors  Post Op Pain Control Plan: multimodal analgesia  Induction:  IV  Informed Consent: Informed consent signed with the Patient and all parties understand the risks and agree with anesthesia plan.  All questions answered. Patient consented to blood products? Yes  ASA Score: 3    Ready For Surgery From Anesthesia Perspective.     .

## 2022-03-29 NOTE — ANESTHESIA POSTPROCEDURE EVALUATION
Anesthesia Post Evaluation    Patient: Dilma Cr    Procedure(s) Performed: Procedure(s) (LRB):  L4-5 RADHA (N/A)    Final Anesthesia Type: general      Patient location during evaluation: PACU  Patient participation: Yes- Able to Participate  Level of consciousness: awake and alert  Post-procedure vital signs: reviewed and stable  Pain management: adequate  Airway patency: patent  OLIVERIO mitigation strategies: Multimodal analgesia  PONV status at discharge: No PONV  Anesthetic complications: no      Cardiovascular status: blood pressure returned to baseline  Respiratory status: unassisted  Hydration status: euvolemic  Follow-up not needed.          Vitals Value Taken Time   /70 03/29/22 1053   Temp 98f 03/29/22 1120   Pulse 71 03/29/22 1054   Resp 9 03/29/22 1054   SpO2 100 % 03/29/22 1054   Vitals shown include unvalidated device data.      Event Time   Out of Recovery 10:53:00         Pain/Alonzo Score: Pain Rating Prior to Med Admin: 10 (3/29/2022 10:38 AM)  Alonzo Score: 10 (3/29/2022 10:53 AM)

## 2022-03-29 NOTE — OP NOTE
"Procedure Note    Procedure Date: 3/29/2022    Procedure Performed:  Lumbar interlaminar epidural steroid injection under fluoroscopy at L4-5    Indications: Patient failed conservative therapy.      Pre-op diagnosis: Lumbar Radiculopathy    Post-op diagnosis: same    Physician: Gela Fowler MD    Anesthesia: MAC    Medications injected: Kenalog 40mg,  2 mL sterile preservative-free normal saline.    Local anesthetic used: 1% Lidocaine, 5 ml    Estimated Blood Loss:  Less than 5 cc    Complications:  None    Technique:  The patient was interviewed in the holding area and Risks/Benefits were discussed, including, but not limited to, the possibility of new or different pain, bleeding or infection.   All questions were answered.  The patient understood and accepted risks.  Consent was verified and signed.   A time-out was taken to identify patient and procedure prior to starting the procedure. The patient was placed in the prone position on the fluoroscopy table. The area of the lumbar spine was prepped with Chloraprep and draped in a sterile manner. The L4-5  interspace was identified and marked under AP fluoroscopy. The skin and subcutaneous tissues overlying the targeted interspace were anesthetized with 3-5 mL of 1% lidocaine using a 25G 1.5" needle.  A 20G  3.5" Tuohy epidural needle was directed toward the interspace under fluoroscopic guidance until the ligamentum flavum was engaged. From this point, a loss of resistance technique with a pulsator syringe a was used to identify entrance of the needle into the epidural space.  There was some difficulty with needle placement and after 3 attempts,  the interlaminar approach was abandoned and a caudal approach was performed without difficulty.  A 20 gauge needle was inserted into the sacral hiatus.  Spinal catheter was placed through the needle and advanced to the L4-L5 interlaminar space without difficulty.  3mL of Isovue contrast solution was injected. An " appropriate epidurogram was noted.  A 3mL mixture consisting of saline and 40 mg of kenalog was injected slowly and without resistance.  The needle was  removed and a sterile Band-Aid dressing was applied to the puncture site.  2 cc of 0.25% Marcaine was injected at the sacral hiatus for patient comfort postprocedure.  The patient tolerated the procedure well [facility]  The patient was monitored after the procedure and was given post-procedure and discharge instructions to follow at home. The patient was discharged in a stable condition and accompanied by an adult .    Epidurogram:5 mL allotment of Isovue M 300 contrast revealed excellent delineation from L3-5. There were  filling defects and obstruction to dye flow noted at L4-5.  There was no  intravascular or intrathecal spread noted with dye flow.

## 2022-03-29 NOTE — TRANSFER OF CARE
"Anesthesia Transfer of Care Note    Patient: Dilma Cr    Procedure(s) Performed: Procedure(s) (LRB):  L4-5 RADHA (N/A)    Patient location: PACU    Anesthesia Type: general    Transport from OR: Transported from OR on room air with adequate spontaneous ventilation    Post pain: adequate analgesia    Post assessment: no apparent anesthetic complications    Post vital signs: stable    Level of consciousness: responds to stimulation    Nausea/Vomiting: no nausea/vomiting    Complications: none    Transfer of care protocol was followed      Last vitals:   Visit Vitals  BP (!) 149/61   Pulse 77   Temp 36.7 °C (98 °F)   Resp 10   Ht 5' 2" (1.575 m)   Wt 95.3 kg (210 lb)   LMP  (LMP Unknown)   SpO2 98%   Breastfeeding No   BMI 38.41 kg/m²     "

## 2022-04-12 ENCOUNTER — OFFICE VISIT (OUTPATIENT)
Dept: PAIN MEDICINE | Facility: CLINIC | Age: 62
End: 2022-04-12
Payer: COMMERCIAL

## 2022-04-12 VITALS
HEART RATE: 61 BPM | SYSTOLIC BLOOD PRESSURE: 132 MMHG | RESPIRATION RATE: 19 BRPM | HEIGHT: 62 IN | DIASTOLIC BLOOD PRESSURE: 54 MMHG | WEIGHT: 210 LBS | BODY MASS INDEX: 38.64 KG/M2

## 2022-04-12 DIAGNOSIS — M89.49 OSTEOARTHROSIS MULTIPLE SITES, NOT SPECIFIED AS GENERALIZED: Chronic | ICD-10-CM

## 2022-04-12 DIAGNOSIS — M54.17 LUMBOSACRAL RADICULOPATHY: Primary | Chronic | ICD-10-CM

## 2022-04-12 PROCEDURE — 3078F PR MOST RECENT DIASTOLIC BLOOD PRESSURE < 80 MM HG: ICD-10-PCS | Mod: ,,, | Performed by: PHYSICIAN ASSISTANT

## 2022-04-12 PROCEDURE — 99214 OFFICE O/P EST MOD 30 MIN: CPT | Mod: S$PBB,,, | Performed by: PHYSICIAN ASSISTANT

## 2022-04-12 PROCEDURE — 99214 PR OFFICE/OUTPT VISIT, EST, LEVL IV, 30-39 MIN: ICD-10-PCS | Mod: S$PBB,,, | Performed by: PHYSICIAN ASSISTANT

## 2022-04-12 PROCEDURE — 3078F DIAST BP <80 MM HG: CPT | Mod: ,,, | Performed by: PHYSICIAN ASSISTANT

## 2022-04-12 PROCEDURE — 3075F SYST BP GE 130 - 139MM HG: CPT | Mod: ,,, | Performed by: PHYSICIAN ASSISTANT

## 2022-04-12 PROCEDURE — 3008F PR BODY MASS INDEX (BMI) DOCUMENTED: ICD-10-PCS | Mod: ,,, | Performed by: PHYSICIAN ASSISTANT

## 2022-04-12 PROCEDURE — 3008F BODY MASS INDEX DOCD: CPT | Mod: ,,, | Performed by: PHYSICIAN ASSISTANT

## 2022-04-12 PROCEDURE — 99213 OFFICE O/P EST LOW 20 MIN: CPT | Mod: PBBFAC | Performed by: PHYSICIAN ASSISTANT

## 2022-04-12 PROCEDURE — 3075F PR MOST RECENT SYSTOLIC BLOOD PRESS GE 130-139MM HG: ICD-10-PCS | Mod: ,,, | Performed by: PHYSICIAN ASSISTANT

## 2022-04-12 RX ORDER — TRAMADOL HYDROCHLORIDE 50 MG/1
50 TABLET ORAL EVERY 6 HOURS
Qty: 30 TABLET | Refills: 1 | Status: SHIPPED | OUTPATIENT
Start: 2022-05-01 | End: 2022-06-23 | Stop reason: SDUPTHER

## 2022-04-12 NOTE — PROGRESS NOTES
Disclaimer:  This note has been generated using voice recognition software.  There may be type of graft focal areas that have been missed during a proof reading      Subjective:      Patient ID: Dilma Cr is a 61 y.o. female.    Chief Complaint: Low-back Pain      Pain  This is a chronic problem. The current episode started more than 1 year ago. The problem occurs daily. The problem has been unchanged. Associated symptoms include arthralgias. Pertinent negatives include no change in bowel habit, chest pain, chills, coughing, diaphoresis, fever, neck pain, rash, sore throat, vertigo or vomiting.     Review of Systems   Constitutional: Negative for activity change, appetite change, chills, diaphoresis, fever and unexpected weight change.   HENT: Negative for drooling, ear pain, facial swelling, nosebleeds, sore throat, trouble swallowing, voice change and goiter.    Eyes: Negative for photophobia, pain, discharge, redness and visual disturbance.   Respiratory: Negative for apnea, cough, choking, chest tightness, shortness of breath, wheezing and stridor.    Cardiovascular: Negative for chest pain, palpitations and leg swelling.   Gastrointestinal: Negative for abdominal distention, change in bowel habit, diarrhea, rectal pain, vomiting, fecal incontinence and change in bowel habit.   Endocrine: Negative for cold intolerance, heat intolerance, polydipsia, polyphagia and polyuria.   Genitourinary: Negative for bladder incontinence, dysuria, flank pain, frequency and hot flashes.   Musculoskeletal: Positive for arthralgias, back pain and leg pain. Negative for neck pain and neck stiffness.   Integumentary:  Negative for color change, pallor and rash.   Allergic/Immunologic: Negative for immunocompromised state.   Neurological: Negative for dizziness, vertigo, seizures, syncope, facial asymmetry, speech difficulty, light-headedness, disturbances in coordination, memory loss and coordination difficulties.  "  Hematological: Negative for adenopathy. Does not bruise/bleed easily.   Psychiatric/Behavioral: Negative for agitation, behavioral problems, confusion, decreased concentration, dysphoric mood, hallucinations, self-injury and suicidal ideas. The patient is not nervous/anxious and is not hyperactive.             Objective:  Vitals:    04/12/22 1042 04/12/22 1043   BP: (!) 132/54    Pulse: 61    Resp: 19    Weight: 95.3 kg (210 lb)    Height: 5' 2" (1.575 m)    PainSc:   7   7         Physical Exam  Vitals and nursing note reviewed. Exam conducted with a chaperone present.   Constitutional:       General: She is awake. She is not in acute distress.     Appearance: Normal appearance. She is not ill-appearing or diaphoretic.   HENT:      Head: Normocephalic and atraumatic.      Nose: Nose normal.      Mouth/Throat:      Mouth: Mucous membranes are moist.      Pharynx: Oropharynx is clear.   Eyes:      Conjunctiva/sclera: Conjunctivae normal.      Pupils: Pupils are equal, round, and reactive to light.   Cardiovascular:      Rate and Rhythm: Normal rate.   Pulmonary:      Effort: Pulmonary effort is normal. No respiratory distress.   Abdominal:      Palpations: Abdomen is soft.      Tenderness: There is no guarding.   Musculoskeletal:         General: Normal range of motion.      Cervical back: Normal range of motion and neck supple. No rigidity.      Thoracic back: Tenderness present.      Lumbar back: Tenderness present.   Skin:     General: Skin is warm and dry.      Coloration: Skin is not jaundiced or pale.   Neurological:      General: No focal deficit present.      Mental Status: She is alert and oriented to person, place, and time. Mental status is at baseline.      Cranial Nerves: Cranial nerves are intact. No cranial nerve deficit (II-XII).   Psychiatric:         Mood and Affect: Mood normal.         Behavior: Behavior normal. Behavior is cooperative.         Thought Content: Thought content normal.       "     [facility]  See OP Notes for results.     IMPRESSION: See OP Notes for results.     This procedure was auto-finalized by: Virtual Radiologist       Admission on 03/29/2022, Discharged on 03/29/2022   Component Date Value Ref Range Status    POC Glucose 03/29/2022 187 (A) 70 - 110 MG/DL Final    POC Glucose 03/29/2022 187 (A) 70 - 105 mg/dL Final   Appointment on 03/28/2022   Component Date Value Ref Range Status    SARS Coronavirus 2 Antigen 03/28/2022 Negative  Negative Final     Acceptable 03/28/2022 Yes   Final   Office Visit on 02/02/2022   Component Date Value Ref Range Status    POC Amphetamines 02/02/2022 Negative  Negative, Inconclusive Final    POC Barbiturates 02/02/2022 Negative  Negative, Inconclusive Final    POC Benzodiazepines 02/02/2022 Negative  Negative, Inconclusive Final    POC Cocaine 02/02/2022 Negative  Negative, Inconclusive Final    POC THC 02/02/2022 Negative  Negative, Inconclusive Final    POC Methadone 02/02/2022 Negative  Negative, Inconclusive Final    POC Methamphetamine 02/02/2022 Negative  Negative, Inconclusive Final    POC Opiates 02/02/2022 Negative  Negative, Inconclusive Final    POC Oxycodone 02/02/2022 Negative  Negative, Inconclusive Final    POC Phencyclidine 02/02/2022 Negative  Negative, Inconclusive Final    POC Methylenedioxymethamphetamine * 02/02/2022 Negative  Negative, Inconclusive Final    POC Tricyclic Antidepressants 02/02/2022 Negative  Negative, Inconclusive Final    POC Buprenorphine 02/02/2022 Negative   Final     Acceptable 02/02/2022 Yes   Final    POC Temperature (Urine) 02/02/2022 94   Final   Office Visit on 02/01/2022   Component Date Value Ref Range Status    SARS Coronavirus 2 Antigen 02/01/2022 Negative  Negative Final    Rapid Influenza A Ag 02/01/2022 Negative  Negative Final    Rapid Influenza B Ag 02/01/2022 Negative  Negative Final     Acceptable  02/01/2022 Yes   Final   Office Visit on 12/20/2021   Component Date Value Ref Range Status    Creatinine, Urine 12/20/2021 28  28 - 219 mg/dL Final    Microalbumin 12/20/2021 14.4 (A) 0.0 - 2.8 mg/dL Final    Microalbumin/Creatinine Ratio 12/20/2021 514.3 (A) 0.0 - 30.0 mg/g Final    Hemoglobin A1C 12/20/2021 7.7 (A) 4.5 - 6.6 % Final    Estimated Average Glucose 12/20/2021 170  mg/dL Final    Sodium 12/20/2021 140  136 - 145 mmol/L Final    Potassium 12/20/2021 3.9  3.5 - 5.1 mmol/L Final    Chloride 12/20/2021 107  98 - 107 mmol/L Final    CO2 12/20/2021 26  21 - 32 mmol/L Final    Anion Gap 12/20/2021 11  7 - 16 mmol/L Final    Glucose 12/20/2021 192 (A) 74 - 106 mg/dL Final    BUN 12/20/2021 29 (A) 7 - 18 mg/dL Final    Creatinine 12/20/2021 1.21 (A) 0.55 - 1.02 mg/dL Final    BUN/Creatinine Ratio 12/20/2021 24 (A) 6 - 20 Final    Calcium 12/20/2021 8.7  8.5 - 10.1 mg/dL Final    Total Protein 12/20/2021 7.6  6.4 - 8.2 g/dL Final    Albumin 12/20/2021 3.3 (A) 3.5 - 5.0 g/dL Final    Globulin 12/20/2021 4.3 (A) 2.0 - 4.0 g/dL Final    A/G Ratio 12/20/2021 0.8   Final    Bilirubin, Total 12/20/2021 0.1  0.0 - 1.2 mg/dL Final    Alk Phos 12/20/2021 121 (A) 46 - 118 U/L Final    ALT 12/20/2021 28  13 - 56 U/L Final    AST 12/20/2021 20  15 - 37 U/L Final    eGFR  12/20/2021 59 (A) >=60 mL/min/1.73m² Final   Office Visit on 11/10/2021   Component Date Value Ref Range Status    POC Amphetamines 11/10/2021 Negative  Negative, Inconclusive Final    POC Barbiturates 11/10/2021 Negative  Negative, Inconclusive Final    POC Benzodiazepines 11/10/2021 Negative  Negative, Inconclusive Final    POC Cocaine 11/10/2021 Negative  Negative, Inconclusive Final    POC THC 11/10/2021 Negative  Negative, Inconclusive Final    POC Methadone 11/10/2021 Negative  Negative, Inconclusive Final    POC Methamphetamine 11/10/2021 Negative  Negative, Inconclusive Final    POC Opiates 11/10/2021  Negative  Negative, Inconclusive Final    POC Oxycodone 11/10/2021 Negative  Negative, Inconclusive Final    POC Phencyclidine 11/10/2021 Negative  Negative, Inconclusive Final    POC Methylenedioxymethamphetamine * 11/10/2021 Negative  Negative, Inconclusive Final    POC Tricyclic Antidepressants 11/10/2021 Negative  Negative, Inconclusive Final    POC Buprenorphine 11/10/2021 Negative   Final     Acceptable 11/10/2021 Yes   Final    POC Temperature (Urine) 11/10/2021 94   Final    pH, UA 11/10/2021 6.0  5.0, 5.5, 6.0, 6.5, 7.0, 7.5, 8.0 pH Units Final    Specific [place], UA 11/10/2021 1.015  <=1.005, 1.010, 1.015, 1.020, 1.025, 1.030 Final    Creatinine, Urine 11/10/2021 36  28 - 219 mg/dL Final    6-Acetylmorphine 11/10/2021 Negative  10 ng/mL Final    7-Aminoclonazepam 11/10/2021 Negative  Negative 25 ng/mL Final    a-Hydroxyalprazolam 11/10/2021 Negative  25 ng/mL Final    Acetyl Fentanyl 11/10/2021 Negative  2.5 ng/mL Final    Acetyl Norfentanyl Oxalate 11/10/2021 Negative  5 ng/mL Final    Benzoylecgonine 11/10/2021 Negative  100 ng/mL Final    Buprenorphine 11/10/2021 Negative  25 ng/mL Final    Codeine 11/10/2021 Negative  25 ng/mL Final    EDDP 11/10/2021 Negative  25 ng/mL Final    Fentanyl 11/10/2021 Negative  2.5 ng/mL Final    Hydrocodone 11/10/2021 Negative  25 ng/mL Final    Hydromorphone 11/10/2021 Negative  25 ng/mL Final    Lorazepam 11/10/2021 Negative  25 ng/mL Final    Morphine 11/10/2021 Negative  25 ng/mL Final    Norbuprenorphine 11/10/2021 Negative  25 ng/mL Final    Nordiazepam 11/10/2021 Negative  25 ng/mL Final    Norfentanyl Oxalate 11/10/2021 Negative  5 ng/mL Final    Norhydrocodone 11/10/2021 Negative  50 ng/mL Final    Noroxycodone HCL 11/10/2021 Negative  50 ng/mL Final    Oxazepam 11/10/2021 Negative  25 ng/mL Final    Oxymorphone 11/10/2021 Negative  25 ng/mL Final    Tapentadol 11/10/2021 Negative  25 ng/mL Final    Temazepam  11/10/2021 Negative  25 ng/mL Final    THC-COOH 11/10/2021 Negative  25 ng/mL Final    Tramadol 11/10/2021 180.4 (A) <100.0 100 ng/mL Final    Amphetamine, Urine 11/10/2021 Negative  Negative 100 ng/mL Final    Methamphetamines, Urine 11/10/2021 Negative  Negative 100 ng/mL Final    Methadone, Urine 11/10/2021 Negative  Negative 25 ng/mL Final    Oxycodone, Urine 11/10/2021 Negative  Negative 25 ng/mL Final   Admission on 10/24/2021, Discharged on 10/25/2021   Component Date Value Ref Range Status    Sodium 10/24/2021 135 (A) 136 - 145 mmol/L Final    Potassium 10/24/2021 5.2 (A) 3.5 - 5.1 mmol/L Final    Chloride 10/24/2021 104  98 - 107 mmol/L Final    CO2 10/24/2021 23  21 - 32 mmol/L Final    Anion Gap 10/24/2021 13  7 - 16 mmol/L Final    Glucose 10/24/2021 148 (A) 74 - 106 mg/dL Final    BUN 10/24/2021 55 (A) 7 - 18 mg/dL Final    Creatinine 10/24/2021 2.19 (A) 0.55 - 1.02 mg/dL Final    BUN/Creatinine Ratio 10/24/2021 25 (A) 6 - 20 Final    Calcium 10/24/2021 9.2  8.5 - 10.1 mg/dL Final    Total Protein 10/24/2021 8.4 (A) 6.4 - 8.2 g/dL Final    Albumin 10/24/2021 4.0  3.5 - 5.0 g/dL Final    Globulin 10/24/2021 4.4 (A) 2.0 - 4.0 g/dL Final    A/G Ratio 10/24/2021 0.9   Final    Bilirubin, Total 10/24/2021 0.2  0.0 - 1.2 mg/dL Final    Alk Phos 10/24/2021 113  46 - 118 U/L Final    ALT 10/24/2021 18  13 - 56 U/L Final    AST 10/24/2021 12 (A) 15 - 37 U/L Final    eGFR  10/24/2021 30 (A) >=60 mL/min/1.73m² Final    Troponin-I 10/24/2021 <0.017  <=0.056 ng/mL Final    ProBNP 10/24/2021 31  1 - 125 pg/mL Final    Magnesium 10/24/2021 3.8 (A) 1.7 - 2.3 mg/dL Final    Influenza A 10/24/2021 Negative  Negative, Invalid Final    Influenza B 10/24/2021 Negative  Negative, Invalid Final    COVID-19 Ag 10/24/2021 Negative  Negative, Invalid Final    WBC 10/24/2021 7.19  4.50 - 11.00 K/uL Final    RBC 10/24/2021 3.49 (A) 4.20 - 5.40 M/uL Final    Hemoglobin 10/24/2021  9.2 (A) 12.0 - 16.0 g/dL Final    Hematocrit 10/24/2021 28.8 (A) 38.0 - 47.0 % Final    MCV 10/24/2021 82.5  80.0 - 96.0 fL Final    MCH 10/24/2021 26.4 (A) 27.0 - 31.0 pg Final    MCHC 10/24/2021 31.9 (A) 32.0 - 36.0 g/dL Final    RDW 10/24/2021 14.4  11.5 - 14.5 % Final    Platelet Count 10/24/2021 446 (A) 150 - 400 K/uL Final    MPV 10/24/2021 10.6  9.4 - 12.4 fL Final    Neutrophils % 10/24/2021 46.4 (A) 53.0 - 65.0 % Final    Lymphocytes % 10/24/2021 43.3 (A) 27.0 - 41.0 % Final    Monocytes % 10/24/2021 8.3 (A) 2.0 - 6.0 % Final    Eosinophils % 10/24/2021 1.5  1.0 - 4.0 % Final    Basophils % 10/24/2021 0.4  0.0 - 1.0 % Final    Immature Granulocytes % 10/24/2021 0.1  0.0 - 0.4 % Final    nRBC, Auto 10/24/2021 0.0  <=0.0 % Final    Neutrophils, Abs 10/24/2021 3.33  1.80 - 7.70 K/uL Final    Lymphocytes, Absolute 10/24/2021 3.11  1.00 - 4.80 K/uL Final    Monocytes, Absolute 10/24/2021 0.60  0.00 - 0.80 K/uL Final    Eosinophils, Absolute 10/24/2021 0.11  0.00 - 0.50 K/uL Final    Basophils, Absolute 10/24/2021 0.03  0.00 - 0.20 K/uL Final    Immature Granulocytes, Absolute 10/24/2021 0.01  0.00 - 0.04 K/uL Final    nRBC, Absolute 10/24/2021 0.00  <=0.00 x10e3/uL Final    Diff Type 10/24/2021 Auto   Final    Troponin-I 10/25/2021 <0.017  <=0.056 ng/mL Final           Assessment:      1. Lumbosacral radiculopathy    2. Osteoarthrosis multiple sites, not specified as generalized          No orders of the defined types were placed in this encounter.        A's of Opioid Risk Assessment  Activity:Patient can perform ADL.   Analgesia:Patients pain is partially controlled by current medication. Patient has tried OTC medications such as Tylenol and Ibuprofen with out relief.   Adverse Effects: Patient denies constipation or sedation.  Aberrant Behavior:  reviewed with no aberrant drug seeking/taking behavior.  Overdose reversal drug naloxone discussed    Drug screen reviewed      Requested  Prescriptions     Signed Prescriptions Disp Refills    traMADoL (ULTRAM) 50 mg tablet 30 tablet 1     Sig: Take 1 tablet (50 mg total) by mouth every 6 (six) hours.         Plan:    Anticoagulated Plavix    History Colon  Cancer    Follow-up after lumbar L4/5 RADHA # 1, March 29, 2022  She states she had 80% relief after procedure maintain 70% relief with time, she states procedure did help increase her level function    Procedure notes/fluoro images    X-ray lumbar spine [facility] August 2021  Grade 1 spondylolisthesis L3/4 mild scoliosis, multiple level degenerative changes please see images/report    MRI lumbar spine reviewed L2-3 mild edema area noted marrow, L4-5 5 S1 neuroforaminal stenosis    At this point she would like to continue conservative management    Continue home exercise program as directed    Continue current medication    Follow-up 2 month     Dr. Fowler, March 2023    Bring original prescription medication bottles/container/box with labels to each visit

## 2022-05-09 NOTE — PROGRESS NOTES
PCP: Primary Doctor No    Referring Provider:     Subjective:   Dilma Cr is a 61 y.o. female, nonsmoker, with hx of diabetes, Afib not on AC due to occult GI bleeding, HTN, HLD, CKD stage III, CAD  status post PCI of LAD and RCA 5/14/21,who presents for follow up.          Patient has atypical sharp chest pains that last a few seconds.  Denies any significant dyspnea, palpitations, syncope.  She has bilateral arm numbness and tingling with a history of cervical spine surgery.      She notes right neck pain radiating down arm. Previous surgery 7 years ago.           EKG NSR without ischemic changes  ECHO  5/27/21:  The left ventricle is normal in size with mild concentric hypertrophy and normal systolic function.  EF 55%.                               Normal left ventricular diastolic function.                               Atrial fibrillation not observed.  [facility] 5/14/21:  PCI Mid LAD Xience Maria Del Carmen 2.75 mm x 15 mm.     PCI distal RCA  Xience Maria Del Carmen  2.75 mm x 18 mm        Past Surgical History:   Procedure Laterality Date    BREAST SURGERY      C5-C6 RADHA  8-, 7-, 6-1-2016    Dr Fowler    CERVICAL SPINE SURGERY      COLON SURGERY      EPIDURAL STEROID INJECTION INTO LUMBAR SPINE N/A 3/29/2022    Procedure: L4-5 RADHA;  Surgeon: Gela Fowler MD;  Location: [facility];  Service: Pain Management;  Laterality: N/A;  PT AWARE ON OV TO BE TESTED\  PLAVIX TO BE HELD FOR 7 DAYS PRIOR TO PROCEDURE PER DR FOWLER AND DR PORTER  3-28  message left on vm re: covid    HYSTERECTOMY      LEFT HEART CATHETERIZATION Left 5/14/2021    Procedure: Left heart cath;  Surgeon: Mateus Guan MD;  Location: [facility] CATH LAB;  Service: Cardiology;  Laterality: Left;    MEDIPORT REMOVAL  10/14/2021    Dr Kraft    [place] cuff [place]      in [place]    TOTAL REDUCTION MAMMOPLASTY        Family History   Problem Relation Age of Onset    Hypertension Mother     Diabetes Mother     Hypertension Father      Diabetes Father     Hypertension Sister     Breast cancer Sister     Hypertension Brother       Social History     Socioeconomic History    Marital status:      Spouse name: Jorge Alberto    Number of children: 2   Occupational History    Occupation:  at [employer] for past 27 years   Tobacco Use    Smoking status: Never Smoker    Smokeless tobacco: Never Used   Substance and Sexual Activity    Alcohol use: Never    Drug use: Never    Sexual activity: Yes          Lab Results   Component Value Date     12/20/2021    K 3.9 12/20/2021     12/20/2021    CO2 26 12/20/2021    BUN 29 (H) 12/20/2021    CREATININE 1.21 (H) 12/20/2021    CALCIUM 8.7 12/20/2021    ANIONGAP 11 12/20/2021    ESTGFRAFRICA 59 (L) 12/20/2021       Lab Results   Component Value Date    CHOL 176 05/10/2022    CHOL 143 06/16/2021    CHOL 141 05/15/2021     Lab Results   Component Value Date    HDL 75 (H) 05/10/2022    HDL 61 (H) 06/16/2021    HDL 43 05/15/2021     Lab Results   Component Value Date    LDLCALC 85 05/10/2022    LDLCALC 64 06/16/2021    LDLCALC 62 05/15/2021     Lab Results   Component Value Date    TRIG 81 05/10/2022    TRIG 90 06/16/2021    TRIG 179 (H) 05/15/2021     Lab Results   Component Value Date    CHOLHDL 2.3 05/10/2022    CHOLHDL 2.3 06/16/2021    CHOLHDL 3.3 05/15/2021       Lab Results   Component Value Date    WBC 7.45 05/10/2022    HGB 9.9 (L) 05/10/2022    HCT 32.8 (L) 05/10/2022    MCV 83.7 05/10/2022     (H) 05/10/2022           Current Outpatient Medications:     ascorbic acid, vitamin C, 250 mg Chew, Take 250 mg by mouth once daily., Disp: , Rfl:     aspirin 81 MG Chew, Take 81 mg by mouth once daily., Disp: , Rfl:     clindamycin (CLEOCIN) 300 MG capsule, , Disp: , Rfl:     clopidogreL (PLAVIX) 75 mg tablet, Take 1 tablet (75 mg total) by mouth once daily. (Patient not taking: No sig reported), Disp: 90 tablet, Rfl: 4    cyanocobalamin (VITAMIN B-12) 1000 MCG  tablet, Take 100 mcg by mouth once daily., Disp: , Rfl:     ferrous sulfate 325 (65 FE) MG EC tablet, Take 1 tablet (325 mg total) by mouth once daily., Disp: 60 tablet, Rfl: 5    fluticasone propionate (FLONASE) 50 mcg/actuation nasal spray, 2 sprays (100 mcg total) by Each Nostril route once daily., Disp: 16 g, Rfl: 0    FREESTYLE BECCA 14 DAY SENSOR Kit, USE TO CHECK GLUCOSE 4 TIMES DAILY, Disp: , Rfl:     furosemide (LASIX) 20 MG tablet, Take 1 tablet (20 mg total) by mouth daily as needed (fluid)., Disp: 90 tablet, Rfl: 1    HYDROcodone-acetaminophen (NORCO) 7.5-325 mg per tablet, , Disp: , Rfl:     JARDIANCE 25 mg tablet, Take 1 tablet (25 mg total) by mouth once daily., Disp: 30 tablet, Rfl: 5    metoprolol tartrate (LOPRESSOR) 25 MG tablet, Take 1 tablet (25 mg total) by mouth 2 (two) times daily., Disp: 180 tablet, Rfl: 1    NOVOLOG FLEXPEN U-100 INSULIN 100 unit/mL (3 mL) InPn pen, Inject 20 Units into the skin 2 (two) times a day., Disp: 12 each, Rfl: 1    olmesartan-hydrochlorothiazide (BENICAR HCT) 40-25 mg per tablet, Take 1 tablet by mouth once daily., Disp: 90 tablet, Rfl: 1    OZEMPIC 1 mg/dose (4 mg/3 mL), , Disp: , Rfl:     pantoprazole (PROTONIX) 40 MG tablet, Take 1 tablet (40 mg total) by mouth once daily., Disp: 90 tablet, Rfl: 1    pregabalin (LYRICA) 50 MG capsule, Take 1 capsule (50 mg total) by mouth 3 (three) times daily., Disp: 90 capsule, Rfl: 1    pyrilamine-chlophedianoL 12.5-12.5 mg/5 mL Liqd, Take 10 mLs by mouth every 8 (eight) hours as needed (cough/sneezing/drainage)., Disp: 120 mL, Rfl: 1    rosuvastatin (CRESTOR) 40 MG Tab, Take 1 tablet (40 mg total) by mouth once daily., Disp: 90 tablet, Rfl: 1    semaglutide (OZEMPIC) 1 mg/dose (2 mg/1.5 mL) PnIj, Inject 1 mg into the skin once a week., Disp: 4 pen, Rfl: 5    traMADoL (ULTRAM) 50 mg tablet, Take 1 tablet (50 mg total) by mouth every 6 (six) hours., Disp: 30 tablet, Rfl: 1    TRESIBA FLEXTOUCH U-100 100  "unit/mL (3 mL) insulin pen, Inject 20 Units into the skin every evening., Disp: 3 pen, Rfl: 5    triamcinolone (NASACORT) 55 mcg nasal inhaler, 2 sprays by Nasal route once daily., Disp: 16.9 mL, Rfl: 0    Current Facility-Administered Medications:     acetaminophen tablet 650 mg, 650 mg, Oral, Once PRN, Olga Potts MD    albuterol inhaler 2 puff, 2 puff, Inhalation, Q20 Min PRN, Olga Potts MD    diphenhydrAMINE injection 25 mg, 25 mg, Intravenous, Once PRN, Olga Potts MD    EPINEPHrine (EPIPEN) 0.3 mg/0.3 mL pen injection 0.3 mg, 0.3 mg, Intramuscular, PRN, Olga Potts MD    methylPREDNISolone sodium succinate injection 40 mg, 40 mg, Intravenous, Once PRN, Olga Potts MD    ondansetron disintegrating tablet 4 mg, 4 mg, Oral, Once PRN, Olga Potts MD    sodium chloride 0.9% 500 mL flush bag, , Intravenous, PRN, Olga Potts MD    sodium chloride 0.9% flush 10 mL, 10 mL, Intravenous, PRN, Olga Potts MD       Review of Systems   Constitutional: Positive for malaise/fatigue.   Respiratory: Negative for cough and shortness of breath.    Cardiovascular: Positive for chest pain and leg swelling. Negative for palpitations, orthopnea, claudication and PND.   Neurological:        Numbness         Objective:   /68 (BP Location: Left arm, Patient Position: Sitting)   Pulse 77   Ht 5' 2" (1.575 m)   Wt 95.7 kg (211 lb)   LMP  (LMP Unknown)   SpO2 98%   BMI 38.59 kg/m²     Physical Exam  Constitutional:       General: She is not in acute distress.  Eyes:      Extraocular Movements: Extraocular movements intact.   Cardiovascular:      Pulses: Normal pulses.      Heart sounds: Normal heart sounds. No murmur heard.  Pulmonary:      Effort: Pulmonary effort is normal.      Breath sounds: Normal breath sounds.   Abdominal:      Palpations: Abdomen is soft.   Musculoskeletal:         General: No swelling.      Cervical back: Neck supple.   Skin:     " Findings: No rash.   Neurological:      Mental Status: She is alert and oriented to person, place, and time.           Assessment:     1. Atrial fibrillation, unspecified type  EKG 12-lead    CBC Auto Differential    EKG 12-lead   2. Hyperlipidemia, unspecified hyperlipidemia type  Lipid Panel   3. Hyperkalemia     4. Primary hypertension     5. Atherosclerosis of native coronary artery of native heart with unstable angina pectoris           Plan:     Problem List Items Addressed This Visit        Cardiac/Vascular    Atrial fibrillation - Primary (Chronic)    Relevant Orders    EKG 12-lead    CBC Auto Differential (Completed)    Hypertension    Hyperlipidemia    Relevant Orders    Lipid Panel (Completed)    Atherosclerosis of native coronary artery of native heart with unstable angina pectoris       Renal/    Hyperkalemia         #CAD  NSTEMI, status post PCI of LAD and RCA 5/14/21  PCI to mid to distal LAD by Dr. Quick  PCI to distal RCA, however stent at had diffuse disease with small PDA which is diffusely disease.  She has occasional chest pains.  Most likely from her small vessel CAD.  Recommend maximizing medical regimen.    Prescribe Imdur 30 mg daily  Will check her CBC today, she has completed a year of dual anti-platelet therapy, given her history of AFib would likely switch her to Eliquis 5 mg twice a day with aspirin.  Discontinue Plavix, prescribe Eliquis. Continue aspirin   Monitor H&H, will recheck her hemoglobin in 10 days on Eliquis.    #Hypertension  Well controlled       # paroxysmal atrial fibrillation  Currently appears to be in sinus rhythm on exam.       #Numbness of hands   Refer to Dr. Tellez    CBC, lipids profile.  Refill meds, and iron and protonix

## 2022-05-10 ENCOUNTER — OFFICE VISIT (OUTPATIENT)
Dept: CARDIOLOGY | Facility: CLINIC | Age: 62
End: 2022-05-10
Payer: COMMERCIAL

## 2022-05-10 VITALS
HEART RATE: 77 BPM | OXYGEN SATURATION: 98 % | SYSTOLIC BLOOD PRESSURE: 128 MMHG | HEIGHT: 62 IN | BODY MASS INDEX: 38.83 KG/M2 | DIASTOLIC BLOOD PRESSURE: 68 MMHG | WEIGHT: 211 LBS

## 2022-05-10 DIAGNOSIS — E78.5 HYPERLIPIDEMIA, UNSPECIFIED HYPERLIPIDEMIA TYPE: ICD-10-CM

## 2022-05-10 DIAGNOSIS — I10 PRIMARY HYPERTENSION: ICD-10-CM

## 2022-05-10 DIAGNOSIS — I25.110 ATHEROSCLEROSIS OF NATIVE CORONARY ARTERY OF NATIVE HEART WITH UNSTABLE ANGINA PECTORIS: ICD-10-CM

## 2022-05-10 DIAGNOSIS — I48.91 ATRIAL FIBRILLATION, UNSPECIFIED TYPE: Primary | ICD-10-CM

## 2022-05-10 DIAGNOSIS — E87.5 HYPERKALEMIA: ICD-10-CM

## 2022-05-10 PROCEDURE — 93010 EKG 12-LEAD: ICD-10-PCS | Mod: S$PBB,,, | Performed by: INTERNAL MEDICINE

## 2022-05-10 PROCEDURE — 99214 PR OFFICE/OUTPT VISIT, EST, LEVL IV, 30-39 MIN: ICD-10-PCS | Mod: S$PBB,,, | Performed by: INTERNAL MEDICINE

## 2022-05-10 PROCEDURE — 3008F BODY MASS INDEX DOCD: CPT | Mod: ,,, | Performed by: INTERNAL MEDICINE

## 2022-05-10 PROCEDURE — 99213 OFFICE O/P EST LOW 20 MIN: CPT | Mod: PBBFAC | Performed by: INTERNAL MEDICINE

## 2022-05-10 PROCEDURE — 93010 ELECTROCARDIOGRAM REPORT: CPT | Mod: S$PBB,,, | Performed by: INTERNAL MEDICINE

## 2022-05-10 PROCEDURE — 3078F DIAST BP <80 MM HG: CPT | Mod: ,,, | Performed by: INTERNAL MEDICINE

## 2022-05-10 PROCEDURE — 1159F PR MEDICATION LIST DOCUMENTED IN MEDICAL RECORD: ICD-10-PCS | Mod: ,,, | Performed by: INTERNAL MEDICINE

## 2022-05-10 PROCEDURE — 1159F MED LIST DOCD IN RCRD: CPT | Mod: ,,, | Performed by: INTERNAL MEDICINE

## 2022-05-10 PROCEDURE — 99214 OFFICE O/P EST MOD 30 MIN: CPT | Mod: S$PBB,,, | Performed by: INTERNAL MEDICINE

## 2022-05-10 PROCEDURE — 3074F PR MOST RECENT SYSTOLIC BLOOD PRESSURE < 130 MM HG: ICD-10-PCS | Mod: ,,, | Performed by: INTERNAL MEDICINE

## 2022-05-10 PROCEDURE — 3074F SYST BP LT 130 MM HG: CPT | Mod: ,,, | Performed by: INTERNAL MEDICINE

## 2022-05-10 PROCEDURE — 93005 ELECTROCARDIOGRAM TRACING: CPT | Mod: PBBFAC | Performed by: INTERNAL MEDICINE

## 2022-05-10 PROCEDURE — 3008F PR BODY MASS INDEX (BMI) DOCUMENTED: ICD-10-PCS | Mod: ,,, | Performed by: INTERNAL MEDICINE

## 2022-05-10 PROCEDURE — 3078F PR MOST RECENT DIASTOLIC BLOOD PRESSURE < 80 MM HG: ICD-10-PCS | Mod: ,,, | Performed by: INTERNAL MEDICINE

## 2022-05-10 RX ORDER — FERROUS SULFATE 325(65) MG
325 TABLET, DELAYED RELEASE (ENTERIC COATED) ORAL DAILY
Qty: 60 TABLET | Refills: 5 | Status: SHIPPED | OUTPATIENT
Start: 2022-05-10 | End: 2022-09-01

## 2022-05-10 RX ORDER — ISOSORBIDE MONONITRATE 30 MG/1
30 TABLET, EXTENDED RELEASE ORAL DAILY
Qty: 30 TABLET | Refills: 11 | Status: SHIPPED | OUTPATIENT
Start: 2022-05-10 | End: 2022-06-22

## 2022-05-18 ENCOUNTER — DOCUMENTATION ONLY (OUTPATIENT)
Dept: CARDIOLOGY | Facility: CLINIC | Age: 62
End: 2022-05-18

## 2022-05-24 ENCOUNTER — OFFICE VISIT (OUTPATIENT)
Dept: FAMILY MEDICINE | Facility: CLINIC | Age: 62
End: 2022-05-24
Payer: COMMERCIAL

## 2022-05-24 VITALS
DIASTOLIC BLOOD PRESSURE: 67 MMHG | SYSTOLIC BLOOD PRESSURE: 125 MMHG | HEIGHT: 62 IN | RESPIRATION RATE: 16 BRPM | OXYGEN SATURATION: 97 % | WEIGHT: 210 LBS | HEART RATE: 75 BPM | TEMPERATURE: 99 F | BODY MASS INDEX: 38.64 KG/M2

## 2022-05-24 DIAGNOSIS — R05.8 COUGH WITH EXPOSURE TO COVID-19 VIRUS: Primary | ICD-10-CM

## 2022-05-24 DIAGNOSIS — Z20.822 COUGH WITH EXPOSURE TO COVID-19 VIRUS: Primary | ICD-10-CM

## 2022-05-24 DIAGNOSIS — J02.9 SORE THROAT: ICD-10-CM

## 2022-05-24 DIAGNOSIS — J34.89 POSTERIOR RHINORRHEA: ICD-10-CM

## 2022-05-24 PROBLEM — C18.9 COLON CANCER: Chronic | Status: ACTIVE | Noted: 2021-05-14

## 2022-05-24 PROBLEM — I10 HYPERTENSION: Chronic | Status: ACTIVE | Noted: 2021-06-16

## 2022-05-24 PROBLEM — E11.39 TYPE 2 DIABETES MELLITUS WITH OPHTHALMIC COMPLICATION: Chronic | Status: ACTIVE | Noted: 2021-05-14

## 2022-05-24 PROBLEM — K20.90 GASTROESOPHAGITIS: Chronic | Status: ACTIVE | Noted: 2021-06-16

## 2022-05-24 PROBLEM — E11.42 TYPE 2 DIABETES MELLITUS WITH DIABETIC POLYNEUROPATHY, WITH LONG-TERM CURRENT USE OF INSULIN: Chronic | Status: ACTIVE | Noted: 2021-08-30

## 2022-05-24 PROBLEM — K29.70 GASTROESOPHAGITIS: Chronic | Status: ACTIVE | Noted: 2021-06-16

## 2022-05-24 PROBLEM — Z79.4 TYPE 2 DIABETES MELLITUS WITH DIABETIC POLYNEUROPATHY, WITH LONG-TERM CURRENT USE OF INSULIN: Chronic | Status: ACTIVE | Noted: 2021-08-30

## 2022-05-24 PROBLEM — I25.110 ATHEROSCLEROSIS OF NATIVE CORONARY ARTERY OF NATIVE HEART WITH UNSTABLE ANGINA PECTORIS: Chronic | Status: ACTIVE | Noted: 2021-06-30

## 2022-05-24 PROBLEM — E78.5 HYPERLIPIDEMIA: Chronic | Status: ACTIVE | Noted: 2021-06-16

## 2022-05-24 LAB
CTP QC/QA: YES
CTP QC/QA: YES
FLUAV AG NPH QL: NEGATIVE
FLUBV AG NPH QL: NEGATIVE
S PYO RRNA THROAT QL PROBE: NEGATIVE
SARS-COV-2 AG RESP QL IA.RAPID: NEGATIVE

## 2022-05-24 PROCEDURE — 3078F PR MOST RECENT DIASTOLIC BLOOD PRESSURE < 80 MM HG: ICD-10-PCS | Mod: ,,, | Performed by: NURSE PRACTITIONER

## 2022-05-24 PROCEDURE — 87880 POCT RAPID STREP A: ICD-10-PCS | Mod: QW,,, | Performed by: NURSE PRACTITIONER

## 2022-05-24 PROCEDURE — 3008F PR BODY MASS INDEX (BMI) DOCUMENTED: ICD-10-PCS | Mod: ,,, | Performed by: NURSE PRACTITIONER

## 2022-05-24 PROCEDURE — 1159F MED LIST DOCD IN RCRD: CPT | Mod: ,,, | Performed by: NURSE PRACTITIONER

## 2022-05-24 PROCEDURE — 3074F SYST BP LT 130 MM HG: CPT | Mod: ,,, | Performed by: NURSE PRACTITIONER

## 2022-05-24 PROCEDURE — 1160F PR REVIEW ALL MEDS BY PRESCRIBER/CLIN PHARMACIST DOCUMENTED: ICD-10-PCS | Mod: ,,, | Performed by: NURSE PRACTITIONER

## 2022-05-24 PROCEDURE — 87428 POCT SARS-COV2 (COVID) WITH FLU ANTIGEN: ICD-10-PCS | Mod: QW,,, | Performed by: NURSE PRACTITIONER

## 2022-05-24 PROCEDURE — 87880 STREP A ASSAY W/OPTIC: CPT | Mod: QW,,, | Performed by: NURSE PRACTITIONER

## 2022-05-24 PROCEDURE — 3008F BODY MASS INDEX DOCD: CPT | Mod: ,,, | Performed by: NURSE PRACTITIONER

## 2022-05-24 PROCEDURE — 1160F RVW MEDS BY RX/DR IN RCRD: CPT | Mod: ,,, | Performed by: NURSE PRACTITIONER

## 2022-05-24 PROCEDURE — 3078F DIAST BP <80 MM HG: CPT | Mod: ,,, | Performed by: NURSE PRACTITIONER

## 2022-05-24 PROCEDURE — 1159F PR MEDICATION LIST DOCUMENTED IN MEDICAL RECORD: ICD-10-PCS | Mod: ,,, | Performed by: NURSE PRACTITIONER

## 2022-05-24 PROCEDURE — 99214 OFFICE O/P EST MOD 30 MIN: CPT | Mod: ,,, | Performed by: NURSE PRACTITIONER

## 2022-05-24 PROCEDURE — 99214 PR OFFICE/OUTPT VISIT, EST, LEVL IV, 30-39 MIN: ICD-10-PCS | Mod: ,,, | Performed by: NURSE PRACTITIONER

## 2022-05-24 PROCEDURE — 3074F PR MOST RECENT SYSTOLIC BLOOD PRESSURE < 130 MM HG: ICD-10-PCS | Mod: ,,, | Performed by: NURSE PRACTITIONER

## 2022-05-24 PROCEDURE — 87428 SARSCOV & INF VIR A&B AG IA: CPT | Mod: QW,,, | Performed by: NURSE PRACTITIONER

## 2022-05-24 RX ORDER — FLUTICASONE PROPIONATE 50 MCG
1 SPRAY, SUSPENSION (ML) NASAL DAILY
Qty: 16 G | Refills: 0 | Status: SHIPPED | OUTPATIENT
Start: 2022-05-24 | End: 2022-07-11

## 2022-05-24 RX ORDER — CLOPIDOGREL BISULFATE 75 MG/1
75 TABLET ORAL DAILY
COMMUNITY
Start: 2022-05-15 | End: 2022-07-01 | Stop reason: SDUPTHER

## 2022-05-24 NOTE — PROGRESS NOTES
Kathryn Crocker DNP, EDMOND    [address] [place] [address]     PATIENT NAME: Dilma Cr  : 1960  DATE: 22  MRN: [id number]      Billing Provider: Kathryn Crocker DNP, FNP  Level of Service:   Patient PCP Information     Provider PCP Type    Primary Doctor No General          Reason for Visit / Chief Complaint: Sinus Problem and Sore Throat (Patient is here to for a sore throat and sinus problems. Patient stated that she been having symptoms for a few days, patient stated that she is having the most trouble with her sore throat she stated that she can not swallow. )       Update PCP  Update Chief Complaint         History of Present Illness / Problem Focused Workflow     Dilma Cr presents to the clinic with Sinus Problem and Sore Throat (Patient is here to for a sore throat and sinus problems. Patient stated that she been having symptoms for a few days, patient stated that she is having the most trouble with her sore throat she stated that she can not swallow. )     Pt c/o sore throat x 2-3 days. Pt has tried otc sinus meds x 1 dose.     Sinus Problem  Associated symptoms include congestion, coughing and a sore throat. Pertinent negatives include no shortness of breath.   Sore Throat   Associated symptoms include congestion and coughing. Pertinent negatives include no abdominal pain, diarrhea, shortness of breath or vomiting.       Review of Systems     Review of Systems   Constitutional: Negative for appetite change, fatigue, fever and unexpected weight change.   HENT: Positive for nasal congestion, postnasal drip and sore throat. Negative for hearing loss.    Eyes: Negative for visual disturbance.   Respiratory: Positive for cough. Negative for shortness of breath.    Cardiovascular: Negative for chest pain.   Gastrointestinal: Negative for abdominal pain, constipation, diarrhea, nausea and vomiting.   Genitourinary: Negative for dysuria.    Musculoskeletal: Negative for back pain.   Psychiatric/Behavioral: Negative for sleep disturbance.        Medical / Social / Family History     Past Medical History:   Diagnosis Date    Arthritis     Cancer     colon cancer    Colon cancer     Coronary artery disease     Depression     Diabetes mellitus, type 2     GERD (gastroesophageal reflux disease)     Hyperlipidemia     Myocardial infarction     2 stents in 05/14/2021 Dr Porter    Thyroid disease        Past Surgical History:   Procedure Laterality Date    BREAST SURGERY      C5-C6 RADHA  8-, 7-, 6-1-2016    Dr Fowler    CERVICAL SPINE SURGERY      COLON SURGERY      EPIDURAL STEROID INJECTION INTO LUMBAR SPINE N/A 3/29/2022    Procedure: L4-5 RADHA;  Surgeon: Gela Fowler MD;  Location: [facility] PAIN [facility];  Service: Pain Management;  Laterality: N/A;  PT AWARE ON OV TO BE TESTED\  PLAVIX TO BE HELD FOR 7 DAYS PRIOR TO PROCEDURE PER DR FOWLER AND DR PORTER  3-28  message left on vm re: covid    HYSTERECTOMY      LEFT HEART CATHETERIZATION Left 5/14/2021    Procedure: Left heart cath;  Surgeon: Mateus Guan MD;  Location: [facility] CATH LAB;  Service: Cardiology;  Laterality: Left;    MEDIPORT REMOVAL  10/14/2021    Dr Kraft    right rotator cuff repair      in [place]    TOTAL REDUCTION MAMMOPLASTY         Social History  Ms. Dilma Cr  reports that she has never smoked. She has never used smokeless tobacco. She reports that she does not drink alcohol and does not use drugs.    Family History  Ms. Dilma Cr's family history includes Breast cancer in her sister; Diabetes in her father and mother; Hypertension in her brother, father, mother, and sister.    Medications and Allergies     Medications  Outpatient Medications Marked as Taking for the 5/24/22 encounter (Office Visit) with Kathryn Crocker DNP, FNP   Medication Sig Dispense Refill    apixaban (ELIQUIS) 5 mg Tab Take 1 tablet (5 mg total) by mouth 2  (two) times daily. 60 tablet 5    ascorbic acid, vitamin C, 250 mg Chew Take 250 mg by mouth once daily.      aspirin 81 MG Chew Take 81 mg by mouth once daily.      clopidogreL (PLAVIX) 75 mg tablet Take 75 mg by mouth once daily.      cyanocobalamin (VITAMIN B-12) 1000 MCG tablet Take 100 mcg by mouth once daily.      ferrous sulfate 325 (65 FE) MG EC tablet Take 1 tablet (325 mg total) by mouth once daily. 60 tablet 5    FREESTYLE BECCA 14 DAY SENSOR Kit USE TO CHECK GLUCOSE 4 TIMES DAILY      furosemide (LASIX) 20 MG tablet Take 1 tablet (20 mg total) by mouth daily as needed (fluid). 90 tablet 1    isosorbide mononitrate (IMDUR) 30 MG 24 hr tablet Take 1 tablet (30 mg total) by mouth once daily. 30 tablet 11    JARDIANCE 25 mg tablet Take 1 tablet (25 mg total) by mouth once daily. 30 tablet 5    NOVOLOG FLEXPEN U-100 INSULIN 100 unit/mL (3 mL) InPn pen Inject 20 Units into the skin 2 (two) times a day. 12 each 1    olmesartan-hydrochlorothiazide (BENICAR HCT) 40-25 mg per tablet Take 1 tablet by mouth once daily. 90 tablet 1    pregabalin (LYRICA) 50 MG capsule Take 1 capsule (50 mg total) by mouth 3 (three) times daily. 90 capsule 1    rosuvastatin (CRESTOR) 40 MG Tab Take 1 tablet (40 mg total) by mouth once daily. 90 tablet 1    traMADoL (ULTRAM) 50 mg tablet Take 1 tablet (50 mg total) by mouth every 6 (six) hours. 30 tablet 1    TRESIBA FLEXTOUCH U-100 100 unit/mL (3 mL) insulin pen Inject 20 Units into the skin every evening. 3 pen 5     Current Facility-Administered Medications for the 5/24/22 encounter (Office Visit) with Kathryn Crocker, BARB, FNP   Medication Dose Route Frequency Provider Last Rate Last Admin    acetaminophen tablet 650 mg  650 mg Oral Once PRN Olga Potts MD        albuterol inhaler 2 puff  2 puff Inhalation Q20 Min PRN Olga Potts MD        diphenhydrAMINE injection 25 mg  25 mg Intravenous Once PRN Olga Potts MD        EPINEPHrine (EPIPEN) 0.3  mg/0.3 mL pen injection 0.3 mg  0.3 mg Intramuscular PRN Olga Potts MD        methylPREDNISolone sodium succinate injection 40 mg  40 mg Intravenous Once PRN Olga Potts MD        ondansetron disintegrating tablet 4 mg  4 mg Oral Once PRN Olga Potts MD        sodium chloride 0.9% 500 mL flush bag   Intravenous PRN Olga Potts MD        sodium chloride 0.9% flush 10 mL  10 mL Intravenous PRN Olga Potts MD           Allergies  Review of patient's allergies indicates:  No Known Allergies    Physical Examination     Vitals:    05/24/22 1058   BP: 125/67   Pulse: 75   Resp: 16   Temp: 99.1 °F (37.3 °C)     Physical Exam  Vitals and nursing note reviewed.   Constitutional:       General: She is not in acute distress.  HENT:      Nose: Congestion and rhinorrhea present.      Mouth/Throat:      Mouth: Mucous membranes are moist.      Pharynx: No posterior oropharyngeal erythema.   Eyes:      Pupils: Pupils are equal, round, and reactive to light.   Cardiovascular:      Rate and Rhythm: Normal rate and regular rhythm.      Pulses: Normal pulses.      Heart sounds: Normal heart sounds. No murmur heard.  Pulmonary:      Effort: Pulmonary effort is normal. No respiratory distress.      Breath sounds: Normal breath sounds. No wheezing, rhonchi or rales.   Chest:      Chest wall: No tenderness.   Abdominal:      General: Bowel sounds are normal.      Palpations: Abdomen is soft.   Musculoskeletal:         General: Normal range of motion.      Cervical back: Normal range of motion and neck supple.      Right lower leg: No edema.      Left lower leg: No edema.   Skin:     General: Skin is warm and dry.   Neurological:      General: No focal deficit present.      Mental Status: She is alert and oriented to person, place, and time.          Assessment and Plan (including Health Maintenance)      Problem List  Smart Sets  Document Outside HM   :    Plan:         Health Maintenance Due   Topic  Date Due    Hepatitis C Screening  Never done    Eye Exam  Never done    COVID-19 Vaccine (3 - Booster for Pfizer series) 08/18/2021       Problem List Items Addressed This Visit    None     Visit Diagnoses     Cough with exposure to COVID-19 virus    -  Primary    Relevant Orders    POCT SARS-COV2 (COVID) with Flu Antigen (Completed)    Sore throat        Relevant Orders    POCT rapid strep A (Completed)    Posterior rhinorrhea        Relevant Medications    chlorpheniramine-phenylephrine 4-10 mg per tablet    fluticasone propionate (FLONASE) 50 mcg/actuation nasal spray        Cough with exposure to COVID-19 virus  -     POCT SARS-COV2 (COVID) with Flu Antigen    Sore throat  -     POCT rapid strep A    Posterior rhinorrhea  -     chlorpheniramine-phenylephrine 4-10 mg per tablet; Take 1 tablet by mouth 3 (three) times daily as needed for Congestion.  Dispense: 30 tablet; Refill: 0  -     fluticasone propionate (FLONASE) 50 mcg/actuation nasal spray; 1 spray (50 mcg total) by Each Nostril route once daily.  Dispense: 16 g; Refill: 0       Health Maintenance Topics with due status: Not Due       Topic Last Completion Date    Colorectal Cancer Screening 12/02/2019    Foot Exam 08/23/2021    Mammogram 12/07/2021    Diabetes Urine Screening 12/20/2021    Hemoglobin A1c 12/20/2021    Lipid Panel 05/10/2022    High Dose Statin 05/24/2022           Future Appointments   Date Time Provider Department Center   6/22/2022  9:30 AM Kathryn Crocker DNP, EDMOND [facility] TAMIKO [place]   6/28/2022  9:00 AM CLIFTON Mattson [facility] P[facility]   10/3/2022  1:00 PM [facility] VASC US1 [facility] VASC[facility]   11/8/2022  9:45 AM Stefany Lora MD [facility] CARD [facility]   12/13/2022  9:00 AM [facility] MAMMO1 R[facility]B [facility] [place] [facility] [place]        Follow up if symptoms worsen or fail to improve.     Signature:  Kathryn Crocker DNP, EDMOND  [address] [place] [address]  Phone #:  [phone]  Fax #: [phone]    Date of encounter: 5/24/22    Patient Instructions   Take meds as prescribed. Increase fluid intake. Follow up if symptoms persist or worsen. Ibuprofen as needed for pain/inflammation.

## 2022-05-24 NOTE — PATIENT INSTRUCTIONS
Take meds as prescribed. Increase fluid intake. Follow up if symptoms persist or worsen. Ibuprofen as needed for pain/inflammation.

## 2022-06-11 ENCOUNTER — OUTSIDE PLACE OF SERVICE (OUTPATIENT)
Dept: CARDIOLOGY | Facility: HOSPITAL | Age: 62
End: 2022-06-11
Payer: COMMERCIAL

## 2022-06-11 PROCEDURE — 93010 ELECTROCARDIOGRAM REPORT: CPT | Mod: ,,, | Performed by: INTERNAL MEDICINE

## 2022-06-11 PROCEDURE — 93010 PR ELECTROCARDIOGRAM REPORT: ICD-10-PCS | Mod: ,,, | Performed by: INTERNAL MEDICINE

## 2022-06-13 ENCOUNTER — HOSPITAL ENCOUNTER (OUTPATIENT)
Dept: RADIOLOGY | Facility: HOSPITAL | Age: 62
Discharge: HOME | End: 2022-06-13
Attending: FAMILY MEDICINE
Payer: COMMERCIAL

## 2022-06-13 ENCOUNTER — OFFICE VISIT (OUTPATIENT)
Dept: FAMILY MEDICINE | Facility: CLINIC | Age: 62
End: 2022-06-13
Payer: COMMERCIAL

## 2022-06-13 VITALS
RESPIRATION RATE: 18 BRPM | TEMPERATURE: 99 F | WEIGHT: 206 LBS | HEIGHT: 62 IN | BODY MASS INDEX: 37.91 KG/M2 | HEART RATE: 80 BPM | OXYGEN SATURATION: 100 % | DIASTOLIC BLOOD PRESSURE: 64 MMHG | SYSTOLIC BLOOD PRESSURE: 134 MMHG

## 2022-06-13 DIAGNOSIS — D64.9 ANEMIA, UNSPECIFIED TYPE: ICD-10-CM

## 2022-06-13 DIAGNOSIS — R10.9 ABDOMINAL PAIN, UNSPECIFIED ABDOMINAL LOCATION: Primary | ICD-10-CM

## 2022-06-13 DIAGNOSIS — Z20.822 PERSON UNDER INVESTIGATION FOR COVID-19: ICD-10-CM

## 2022-06-13 DIAGNOSIS — Z79.4 TYPE 2 DIABETES MELLITUS WITH DIABETIC POLYNEUROPATHY, WITH LONG-TERM CURRENT USE OF INSULIN: Primary | ICD-10-CM

## 2022-06-13 DIAGNOSIS — R10.9 ABDOMINAL PAIN, UNSPECIFIED ABDOMINAL LOCATION: ICD-10-CM

## 2022-06-13 DIAGNOSIS — E11.42 TYPE 2 DIABETES MELLITUS WITH DIABETIC POLYNEUROPATHY, WITH LONG-TERM CURRENT USE OF INSULIN: Primary | ICD-10-CM

## 2022-06-13 DIAGNOSIS — C18.9 MALIGNANT NEOPLASM OF COLON, UNSPECIFIED PART OF COLON: ICD-10-CM

## 2022-06-13 PROBLEM — E04.1 THYROID NODULE: Chronic | Status: ACTIVE | Noted: 2021-03-31

## 2022-06-13 PROCEDURE — 99213 OFFICE O/P EST LOW 20 MIN: CPT | Mod: ,,, | Performed by: FAMILY MEDICINE

## 2022-06-13 PROCEDURE — 3078F PR MOST RECENT DIASTOLIC BLOOD PRESSURE < 80 MM HG: ICD-10-PCS | Mod: ,,, | Performed by: FAMILY MEDICINE

## 2022-06-13 PROCEDURE — 1160F RVW MEDS BY RX/DR IN RCRD: CPT | Mod: ,,, | Performed by: FAMILY MEDICINE

## 2022-06-13 PROCEDURE — 1159F MED LIST DOCD IN RCRD: CPT | Mod: ,,, | Performed by: FAMILY MEDICINE

## 2022-06-13 PROCEDURE — 99213 PR OFFICE/OUTPT VISIT, EST, LEVL III, 20-29 MIN: ICD-10-PCS | Mod: ,,, | Performed by: FAMILY MEDICINE

## 2022-06-13 PROCEDURE — 1159F PR MEDICATION LIST DOCUMENTED IN MEDICAL RECORD: ICD-10-PCS | Mod: ,,, | Performed by: FAMILY MEDICINE

## 2022-06-13 PROCEDURE — 3075F PR MOST RECENT SYSTOLIC BLOOD PRESS GE 130-139MM HG: ICD-10-PCS | Mod: ,,, | Performed by: FAMILY MEDICINE

## 2022-06-13 PROCEDURE — 74176 CT ABD & PELVIS W/O CONTRAST: CPT | Mod: TC

## 2022-06-13 PROCEDURE — 87428 SARSCOV & INF VIR A&B AG IA: CPT | Mod: QW,,, | Performed by: FAMILY MEDICINE

## 2022-06-13 PROCEDURE — 3008F PR BODY MASS INDEX (BMI) DOCUMENTED: ICD-10-PCS | Mod: ,,, | Performed by: FAMILY MEDICINE

## 2022-06-13 PROCEDURE — 3075F SYST BP GE 130 - 139MM HG: CPT | Mod: ,,, | Performed by: FAMILY MEDICINE

## 2022-06-13 PROCEDURE — 87428 POCT SARS-COV2 (COVID) WITH FLU ANTIGEN: ICD-10-PCS | Mod: QW,,, | Performed by: FAMILY MEDICINE

## 2022-06-13 PROCEDURE — 3078F DIAST BP <80 MM HG: CPT | Mod: ,,, | Performed by: FAMILY MEDICINE

## 2022-06-13 PROCEDURE — 3008F BODY MASS INDEX DOCD: CPT | Mod: ,,, | Performed by: FAMILY MEDICINE

## 2022-06-13 PROCEDURE — 1160F PR REVIEW ALL MEDS BY PRESCRIBER/CLIN PHARMACIST DOCUMENTED: ICD-10-PCS | Mod: ,,, | Performed by: FAMILY MEDICINE

## 2022-06-13 RX ORDER — ONDANSETRON 8 MG/1
8 TABLET, ORALLY DISINTEGRATING ORAL EVERY 8 HOURS PRN
Qty: 15 TABLET | Refills: 1 | Status: SHIPPED | OUTPATIENT
Start: 2022-06-13 | End: 2022-07-11

## 2022-06-13 RX ORDER — OMEPRAZOLE 20 MG/1
20 CAPSULE, DELAYED RELEASE ORAL DAILY
COMMUNITY
Start: 2022-06-12 | End: 2022-09-07

## 2022-06-13 NOTE — PROGRESS NOTES
Normal. No signs of blockage. Could see better with IV but could not do due to kidneys. Can send in medication for nausea. Need to see when her next visit to the oncologist is.

## 2022-06-13 NOTE — PROGRESS NOTES
"New Clinic Note    Dilma Cr is a 61 y.o. female     CC:   Chief Complaint   Patient presents with    Abdominal Pain     Seen in the ER Saturday night at [facility] with "jitter" stomach and SOB. Stated she had EKG, Xrays but not sure what results were . Records requests. Given Omeprazole 20 mg.    Fatigue     Sees Dr Stover for colon cancer and has anemia and stents in her heart. Stated she feels so weak.     Headache    Shortness of Breath     Shortness of breath when she eats and chest pain. Never been told she has a hiatal hernia.Had EGD in the past.         Subjective  Patient complains of generalized abdominal pain for 2 days. She was seen at the ER and had a normal chest X-ray and EKG. She denies nausea, vomiting, diarrhea, or fever. She complains of constipation. But she had 2 bowel movements yesterday. She has a history ofcolon cancer. She was seen by her oncologist 5 days ago. He informed her she was anemic and plans to do further studies.     Presents to clinic for follow up on chronic health problems    Hypertension:    Takes medications as directed: yes  Checks blood pressure outside of clinic: yes  On a statin: yes  Cardiovascular exercise: no  Heart healthy diet: no    Diabetes, type 2:    Checks glucose outside of clinic:yes  Keeps log of glucoses: no  Eats a diabetic diet: no  Regular cardiovascular exercise: no  Monitors feet: yes  Most recent HbA1c values:   Hemoglobin A1C   Date Value Ref Range Status   12/20/2021 7.7 (H) 4.5 - 6.6 % Final     Comment:       Normal:               <5.7%  Pre-Diabetic:       5.7% to 6.4%  Diabetic:             >6.4%  Diabetic Goal:     <7%   06/25/2021 8.4 (H) 4.5 - 6.6 % Final     Comment:       Normal:               <5.7%  Pre-Diabetic:       5.7% to 6.4%  Diabetic:             >6.4%  Diabetic Goal:     <7%   05/26/2021 8.6 (H) 4.5 - 6.6 % Final     Comment:       Normal:               <5.7%  Pre-Diabetic:       5.7% to 6.4%  Diabetic:             " >6.4%  Diabetic Goal:     <7%      Takes an aspirin: yes  On a statin: yes        Current Outpatient Medications:     apixaban (ELIQUIS) 5 mg Tab, Take 1 tablet (5 mg total) by mouth 2 (two) times daily., Disp: 60 tablet, Rfl: 5    ascorbic acid, vitamin C, 250 mg Chew, Take 250 mg by mouth once daily., Disp: , Rfl:     aspirin 81 MG Chew, Take 81 mg by mouth once daily., Disp: , Rfl:     clopidogreL (PLAVIX) 75 mg tablet, Take 75 mg by mouth once daily., Disp: , Rfl:     cyanocobalamin (VITAMIN B-12) 1000 MCG tablet, Take 100 mcg by mouth once daily., Disp: , Rfl:     ferrous sulfate 325 (65 FE) MG EC tablet, Take 1 tablet (325 mg total) by mouth once daily., Disp: 60 tablet, Rfl: 5    fluticasone propionate (FLONASE) 50 mcg/actuation nasal spray, 1 spray (50 mcg total) by Each Nostril route once daily., Disp: 16 g, Rfl: 0    furosemide (LASIX) 20 MG tablet, Take 1 tablet (20 mg total) by mouth daily as needed (fluid)., Disp: 90 tablet, Rfl: 1    JARDIANCE 25 mg tablet, Take 1 tablet (25 mg total) by mouth once daily., Disp: 30 tablet, Rfl: 5    NOVOLOG FLEXPEN U-100 INSULIN 100 unit/mL (3 mL) InPn pen, Inject 20 Units into the skin 2 (two) times a day., Disp: 12 each, Rfl: 1    olmesartan-hydrochlorothiazide (BENICAR HCT) 40-25 mg per tablet, Take 1 tablet by mouth once daily., Disp: 90 tablet, Rfl: 1    pregabalin (LYRICA) 50 MG capsule, Take 1 capsule (50 mg total) by mouth 3 (three) times daily., Disp: 90 capsule, Rfl: 1    rosuvastatin (CRESTOR) 40 MG Tab, Take 1 tablet by mouth once daily, Disp: 90 tablet, Rfl: 0    traMADoL (ULTRAM) 50 mg tablet, Take 1 tablet (50 mg total) by mouth every 6 (six) hours., Disp: 30 tablet, Rfl: 1    FREESTYLE BECCA 14 DAY SENSOR Kit, USE TO CHECK GLUCOSE 4 TIMES DAILY, Disp: , Rfl:     isosorbide mononitrate (IMDUR) 30 MG 24 hr tablet, Take 1 tablet (30 mg total) by mouth once daily., Disp: 30 tablet, Rfl: 11    omeprazole (PRILOSEC) 20 MG capsule, Take 20 mg  by mouth once daily., Disp: , Rfl:     ondansetron (ZOFRAN-ODT) 8 MG TbDL, Take 1 tablet (8 mg total) by mouth every 8 (eight) hours as needed (nausea)., Disp: 15 tablet, Rfl: 1    Current Facility-Administered Medications:     methylPREDNISolone sodium succinate injection 40 mg, 40 mg, Intravenous, Once PRN, Olga Potts MD    sodium chloride 0.9% 500 mL flush bag, , Intravenous, PRN, Olga Potts MD    sodium chloride 0.9% flush 10 mL, 10 mL, Intravenous, PRN, Olga Potts MD     Past Medical History:   Diagnosis Date    Arthritis     Cancer     colon cancer    Colon cancer     Coronary artery disease     Depression     Diabetes mellitus, type 2     GERD (gastroesophageal reflux disease)     Hyperlipidemia     Myocardial infarction     2 stents in 05/14/2021 Dr Porter    Thyroid disease         Family History   Problem Relation Age of Onset    Hypertension Mother     Diabetes Mother     Hypertension Father     Diabetes Father     Hypertension Sister     Breast cancer Sister     Hypertension Brother         Past Surgical History:   Procedure Laterality Date    BREAST SURGERY      C5-C6 RADHA  8-, 7-, 6-1-2016    Dr Fowler    CERVICAL SPINE SURGERY      COLON SURGERY      EPIDURAL STEROID INJECTION INTO LUMBAR SPINE N/A 3/29/2022    Procedure: L4-5 RADHA;  Surgeon: Gela Fowler MD;  Location: [facility];  Service: Pain Management;  Laterality: N/A;  PT AWARE ON OV TO BE TESTED\  PLAVIX TO BE HELD FOR 7 DAYS PRIOR TO PROCEDURE PER DR FOWLER AND DR PORTER  3-28  message left on vm re: covid    HYSTERECTOMY      LEFT HEART CATHETERIZATION Left 5/14/2021    Procedure: Left heart cath;  Surgeon: Mateus Guan MD;  Location: [facility] C[facility];  Service: Cardiology;  Laterality: Left;    MEDIPORT REMOVAL  10/14/2021    Dr Kraft    right rotator cuff repair      in [place]    TOTAL REDUCTION MAMMOPLASTY          Review of Systems   Constitutional:  "Negative for fatigue and fever.   HENT: Negative for ear pain, postnasal drip, rhinorrhea and sinus pressure/congestion.    Respiratory: Negative for cough and shortness of breath.    Cardiovascular: Negative for chest pain.   Gastrointestinal: Positive for abdominal pain and nausea. Negative for diarrhea and vomiting.   Genitourinary: Negative for dysuria.   Neurological: Negative for headaches.        /64 (BP Location: Left arm, Patient Position: Sitting, BP Method: Large (Automatic))   Pulse 80   Temp 98.9 °F (37.2 °C) (Oral)   Resp 18   Ht 5' 2" (1.575 m)   Wt 93.4 kg (206 lb)   LMP  (LMP Unknown)   SpO2 100%   BMI 37.68 kg/m²      Physical Exam  HENT:      Head: Normocephalic and atraumatic.   Cardiovascular:      Rate and Rhythm: Normal rate and regular rhythm.   Pulmonary:      Effort: Pulmonary effort is normal.      Breath sounds: Normal breath sounds.   Abdominal:      Palpations: Abdomen is soft.      Tenderness: There is generalized abdominal tenderness.   Neurological:      Mental Status: She is alert and oriented to person, place, and time.   Psychiatric:         Mood and Affect: Mood normal.         Behavior: Behavior normal.          Assessment and Plan      ICD-10-CM ICD-9-CM   1. Abdominal pain, unspecified abdominal location  R10.9 789.00   2. Person under investigation for COVID-19  Z20.822 V01.79   3. Malignant neoplasm of colon, unspecified part of colon  C18.9 153.9        Problem List Items Addressed This Visit        Oncology    Colon cancer (Chronic)    Relevant Orders    CT Abdomen Pelvis  Without Contrast (Completed)      Other Visit Diagnoses     Abdominal pain, unspecified abdominal location    -  Primary    Relevant Orders    CT Abdomen Pelvis  Without Contrast (Completed)    Person under investigation for COVID-19        Relevant Orders    POCT SARS-COV2 (COVID) with Flu Antigen (Completed)         With patient's history will order a CT abdomen and pelvis.  Order it " without contrast due to patient's kidney function.  Will order labs as needed.  Depending on results may refer back to Oncology.    Follow up if symptoms worsen or fail to improve.

## 2022-06-17 ENCOUNTER — TELEPHONE (OUTPATIENT)
Dept: FAMILY MEDICINE | Facility: CLINIC | Age: 62
End: 2022-06-17
Payer: COMMERCIAL

## 2022-06-17 DIAGNOSIS — C18.9 MALIGNANT NEOPLASM OF COLON, UNSPECIFIED PART OF COLON: Primary | ICD-10-CM

## 2022-06-17 DIAGNOSIS — D64.9 LOW HEMATOCRIT: ICD-10-CM

## 2022-06-17 DIAGNOSIS — D64.9 LOW HEMOGLOBIN AND LOW HEMATOCRIT: ICD-10-CM

## 2022-06-17 NOTE — TELEPHONE ENCOUNTER
----- Message from Olga Potts MD sent at 6/16/2022 10:26 AM CDT -----  Patient's hematocrit is dropping. Need to send labs to oncologist. If she has a GI doctor will need to set up a scope.

## 2022-06-18 ENCOUNTER — HOSPITAL ENCOUNTER (INPATIENT)
Facility: HOSPITAL | Age: 62
LOS: 4 days | Discharge: HOME | DRG: 250 | End: 2022-06-22
Attending: EMERGENCY MEDICINE | Admitting: FAMILY MEDICINE
Payer: COMMERCIAL

## 2022-06-18 DIAGNOSIS — I10 PRIMARY HYPERTENSION: Chronic | ICD-10-CM

## 2022-06-18 DIAGNOSIS — C18.9 MALIGNANT NEOPLASM OF COLON, UNSPECIFIED PART OF COLON: Chronic | ICD-10-CM

## 2022-06-18 DIAGNOSIS — I48.91 ATRIAL FIBRILLATION WITH RVR: Primary | ICD-10-CM

## 2022-06-18 DIAGNOSIS — R07.9 CHEST PAIN: ICD-10-CM

## 2022-06-18 DIAGNOSIS — I25.110 ATHEROSCLEROSIS OF NATIVE CORONARY ARTERY OF NATIVE HEART WITH UNSTABLE ANGINA PECTORIS: Chronic | ICD-10-CM

## 2022-06-18 DIAGNOSIS — Z79.4 TYPE 2 DIABETES MELLITUS WITH DIABETIC POLYNEUROPATHY, WITH LONG-TERM CURRENT USE OF INSULIN: Chronic | ICD-10-CM

## 2022-06-18 DIAGNOSIS — M89.49 OSTEOARTHROSIS MULTIPLE SITES, NOT SPECIFIED AS GENERALIZED: Chronic | ICD-10-CM

## 2022-06-18 DIAGNOSIS — N17.9 AKI (ACUTE KIDNEY INJURY): ICD-10-CM

## 2022-06-18 DIAGNOSIS — E66.01 SEVERE OBESITY (BMI >= 40): Chronic | ICD-10-CM

## 2022-06-18 DIAGNOSIS — Z90.49 H/O RIGHT HEMICOLECTOMY: ICD-10-CM

## 2022-06-18 DIAGNOSIS — K44.9 HH (HIATUS HERNIA): ICD-10-CM

## 2022-06-18 DIAGNOSIS — K29.00 ACUTE SUPERFICIAL GASTRITIS WITHOUT HEMORRHAGE: ICD-10-CM

## 2022-06-18 DIAGNOSIS — E78.2 MIXED HYPERLIPIDEMIA: Chronic | ICD-10-CM

## 2022-06-18 DIAGNOSIS — I10 HYPERTENSION, UNSPECIFIED TYPE: ICD-10-CM

## 2022-06-18 DIAGNOSIS — Z85.038 HISTORY OF COLON CANCER: ICD-10-CM

## 2022-06-18 DIAGNOSIS — I48.91 ATRIAL FIBRILLATION WITH RAPID VENTRICULAR RESPONSE: ICD-10-CM

## 2022-06-18 DIAGNOSIS — N18.32 CHRONIC KIDNEY DISEASE (CKD) STAGE G3B/A3, MODERATELY DECREASED GLOMERULAR FILTRATION RATE (GFR) BETWEEN 30-44 ML/MIN/1.73 SQUARE METER AND ALBUMINURIA CREATININE RATIO GREATER THAN 300 MG/G: ICD-10-CM

## 2022-06-18 DIAGNOSIS — I21.4 NSTEMI (NON-ST ELEVATED MYOCARDIAL INFARCTION): ICD-10-CM

## 2022-06-18 DIAGNOSIS — D50.0 IRON DEFICIENCY ANEMIA DUE TO CHRONIC BLOOD LOSS: ICD-10-CM

## 2022-06-18 DIAGNOSIS — D64.9 ANEMIA, UNSPECIFIED TYPE: ICD-10-CM

## 2022-06-18 DIAGNOSIS — E11.9 TYPE 2 DIABETES MELLITUS WITHOUT COMPLICATION, WITHOUT LONG-TERM CURRENT USE OF INSULIN: ICD-10-CM

## 2022-06-18 DIAGNOSIS — M54.17 LUMBOSACRAL RADICULOPATHY: Chronic | ICD-10-CM

## 2022-06-18 DIAGNOSIS — E04.1 THYROID NODULE: Chronic | ICD-10-CM

## 2022-06-18 DIAGNOSIS — Z79.4 TYPE 2 DIABETES MELLITUS WITH OTHER OPHTHALMIC COMPLICATION, WITH LONG-TERM CURRENT USE OF INSULIN: Chronic | ICD-10-CM

## 2022-06-18 DIAGNOSIS — E11.42 TYPE 2 DIABETES MELLITUS WITH DIABETIC POLYNEUROPATHY, WITH LONG-TERM CURRENT USE OF INSULIN: Chronic | ICD-10-CM

## 2022-06-18 DIAGNOSIS — K57.30 DIVERTICULA, COLON: ICD-10-CM

## 2022-06-18 DIAGNOSIS — E11.39 TYPE 2 DIABETES MELLITUS WITH OTHER OPHTHALMIC COMPLICATION, WITH LONG-TERM CURRENT USE OF INSULIN: Chronic | ICD-10-CM

## 2022-06-18 LAB
ALBUMIN SERPL BCP-MCNC: 3.2 G/DL (ref 3.5–5)
ALBUMIN/GLOB SERPL: 1 {RATIO}
ALP SERPL-CCNC: 96 U/L (ref 46–118)
ALT SERPL W P-5'-P-CCNC: 32 U/L (ref 13–56)
AMPHET UR QL SCN: NEGATIVE
ANION GAP SERPL CALCULATED.3IONS-SCNC: 17 MMOL/L (ref 7–16)
ANISOCYTOSIS BLD QL SMEAR: ABNORMAL
APTT PPP: 26.1 SECONDS (ref 25.2–37.3)
AST SERPL W P-5'-P-CCNC: 22 U/L (ref 15–37)
BARBITURATES UR QL SCN: NEGATIVE
BASOPHILS # BLD AUTO: 0.03 K/UL (ref 0–0.2)
BASOPHILS NFR BLD AUTO: 0.4 % (ref 0–1)
BASOPHILS NFR BLD MANUAL: 1 % (ref 0–1)
BENZODIAZ METAB UR QL SCN: NEGATIVE
BILIRUB SERPL-MCNC: 0.2 MG/DL (ref 0–1.2)
BILIRUB UR QL STRIP: NEGATIVE
BUN SERPL-MCNC: 29 MG/DL (ref 7–18)
BUN/CREAT SERPL: 22 (ref 6–20)
CALCIUM SERPL-MCNC: 8.7 MG/DL (ref 8.5–10.1)
CANNABINOIDS UR QL SCN: NEGATIVE
CHLORIDE SERPL-SCNC: 107 MMOL/L (ref 98–107)
CHOLEST SERPL-MCNC: 122 MG/DL (ref 0–200)
CHOLEST/HDLC SERPL: 2.3 {RATIO}
CLARITY UR: CLEAR
CO2 SERPL-SCNC: 24 MMOL/L (ref 21–32)
COCAINE UR QL SCN: NEGATIVE
COLOR UR: COLORLESS
CREAT SERPL-MCNC: 1.34 MG/DL (ref 0.55–1.02)
DIFFERENTIAL METHOD BLD: ABNORMAL
EOSINOPHIL # BLD AUTO: 0.11 K/UL (ref 0–0.5)
EOSINOPHIL NFR BLD AUTO: 1.4 % (ref 1–4)
ERYTHROCYTE [DISTWIDTH] IN BLOOD BY AUTOMATED COUNT: 15.4 % (ref 11.5–14.5)
FERRITIN SERPL-MCNC: 9 NG/ML (ref 8–252)
FOLATE SERPL-MCNC: 11.5 NG/ML (ref 3.1–17.5)
GLOBULIN SER-MCNC: 3.3 G/DL (ref 2–4)
GLUCOSE SERPL-MCNC: 118 MG/DL (ref 70–105)
GLUCOSE SERPL-MCNC: 120 MG/DL (ref 74–106)
GLUCOSE SERPL-MCNC: 126 MG/DL (ref 70–105)
GLUCOSE SERPL-MCNC: 139 MG/DL (ref 70–105)
GLUCOSE UR STRIP-MCNC: 1000 MG/DL
HCT VFR BLD AUTO: 26.1 % (ref 38–47)
HDLC SERPL-MCNC: 52 MG/DL (ref 40–60)
HGB BLD-MCNC: 8 G/DL (ref 12–16)
HYPOCHROMIA BLD QL SMEAR: ABNORMAL
IMM GRANULOCYTES # BLD AUTO: 0.03 K/UL (ref 0–0.04)
IMM GRANULOCYTES NFR BLD: 0.4 % (ref 0–0.4)
INR BLD: 1 (ref 0.9–1.1)
IRON SATN MFR SERPL: 6 % (ref 14–50)
IRON SERPL-MCNC: 20 ΜG/DL (ref 50–170)
KETONES UR STRIP-SCNC: NEGATIVE MG/DL
LDLC SERPL CALC-MCNC: 41 MG/DL
LDLC/HDLC SERPL: 0.8 {RATIO}
LEUKOCYTE ESTERASE UR QL STRIP: NEGATIVE
LYMPHOCYTES # BLD AUTO: 2.62 K/UL (ref 1–4.8)
LYMPHOCYTES NFR BLD AUTO: 32.5 % (ref 27–41)
LYMPHOCYTES NFR BLD MANUAL: 38 % (ref 27–41)
MAGNESIUM SERPL-MCNC: 2.5 MG/DL (ref 1.7–2.3)
MCH RBC QN AUTO: 25 PG (ref 27–31)
MCHC RBC AUTO-ENTMCNC: 30.7 G/DL (ref 32–36)
MCV RBC AUTO: 81.6 FL (ref 80–96)
MONOCYTES # BLD AUTO: 0.47 K/UL (ref 0–0.8)
MONOCYTES NFR BLD AUTO: 5.8 % (ref 2–6)
MONOCYTES NFR BLD MANUAL: 2 % (ref 2–6)
MPC BLD CALC-MCNC: 10.7 FL (ref 9.4–12.4)
NEUTROPHILS # BLD AUTO: 4.8 K/UL (ref 1.8–7.7)
NEUTROPHILS NFR BLD AUTO: 59.5 % (ref 53–65)
NEUTS SEG NFR BLD MANUAL: 59 % (ref 50–62)
NITRITE UR QL STRIP: NEGATIVE
NONHDLC SERPL-MCNC: 70 MG/DL
NRBC # BLD AUTO: 0 X10E3/UL
NRBC, AUTO (.00): 0 %
NT-PROBNP SERPL-MCNC: 352 PG/ML (ref 1–125)
OPIATES UR QL SCN: NEGATIVE
OVALOCYTES BLD QL SMEAR: ABNORMAL
PCP UR QL SCN: NEGATIVE
PH UR STRIP: 7.5 PH UNITS
PLATELET # BLD AUTO: 434 K/UL (ref 150–400)
PLATELET MORPHOLOGY: ABNORMAL
POC OCCULT BLOOD STOOL: POSITIVE
POLYCHROMASIA BLD QL SMEAR: ABNORMAL
POTASSIUM SERPL-SCNC: 3.8 MMOL/L (ref 3.5–5.1)
PROT SERPL-MCNC: 6.5 G/DL (ref 6.4–8.2)
PROT UR QL STRIP: NEGATIVE
PROTHROMBIN TIME: 12.8 SECONDS (ref 11.7–14.7)
RBC # BLD AUTO: 3.2 M/UL (ref 4.2–5.4)
RBC # UR STRIP: NEGATIVE /UL
SODIUM SERPL-SCNC: 144 MMOL/L (ref 136–145)
SP GR UR STRIP: <=1.005
T4 SERPL-MCNC: 6.7 ΜG/DL (ref 4.8–13.9)
TIBC SERPL-MCNC: 359 ΜG/DL (ref 250–450)
TRIGL SERPL-MCNC: 143 MG/DL (ref 35–150)
TROPONIN I SERPL HS-MCNC: 110.5 PG/ML
TROPONIN I SERPL HS-MCNC: 48.8 PG/ML
TSH SERPL DL<=0.005 MIU/L-ACNC: 1.64 UIU/ML (ref 0.36–3.74)
UROBILINOGEN UR STRIP-ACNC: 0.2 MG/DL
VIT B12 SERPL-MCNC: 1559 PG/ML (ref 193–986)
VLDLC SERPL-MCNC: 29 MG/DL
WBC # BLD AUTO: 8.06 K/UL (ref 4.5–11)

## 2022-06-18 PROCEDURE — 85730 THROMBOPLASTIN TIME PARTIAL: CPT | Performed by: NURSE PRACTITIONER

## 2022-06-18 PROCEDURE — 85610 PROTHROMBIN TIME: CPT | Performed by: NURSE PRACTITIONER

## 2022-06-18 PROCEDURE — 81003 URINALYSIS AUTO W/O SCOPE: CPT | Mod: 59 | Performed by: NURSE PRACTITIONER

## 2022-06-18 PROCEDURE — 80053 COMPREHEN METABOLIC PANEL: CPT | Performed by: NURSE PRACTITIONER

## 2022-06-18 PROCEDURE — 25000003 PHARM REV CODE 250: Performed by: HOSPITALIST

## 2022-06-18 PROCEDURE — 93010 ELECTROCARDIOGRAM REPORT: CPT | Mod: 76,,, | Performed by: INTERNAL MEDICINE

## 2022-06-18 PROCEDURE — 83540 ASSAY OF IRON: CPT | Performed by: HOSPITALIST

## 2022-06-18 PROCEDURE — 80307 DRUG TEST PRSMV CHEM ANLYZR: CPT | Performed by: NURSE PRACTITIONER

## 2022-06-18 PROCEDURE — 82746 ASSAY OF FOLIC ACID SERUM: CPT | Performed by: HOSPITALIST

## 2022-06-18 PROCEDURE — 82040 ASSAY OF SERUM ALBUMIN: CPT | Performed by: NURSE PRACTITIONER

## 2022-06-18 PROCEDURE — 96376 TX/PRO/DX INJ SAME DRUG ADON: CPT

## 2022-06-18 PROCEDURE — 80061 LIPID PANEL: CPT | Performed by: HOSPITALIST

## 2022-06-18 PROCEDURE — 83735 ASSAY OF MAGNESIUM: CPT | Performed by: NURSE PRACTITIONER

## 2022-06-18 PROCEDURE — 99223 1ST HOSP IP/OBS HIGH 75: CPT | Mod: ,,, | Performed by: HOSPITALIST

## 2022-06-18 PROCEDURE — 25000003 PHARM REV CODE 250: Performed by: NURSE PRACTITIONER

## 2022-06-18 PROCEDURE — 85025 COMPLETE CBC W/AUTO DIFF WBC: CPT | Performed by: NURSE PRACTITIONER

## 2022-06-18 PROCEDURE — 82607 VITAMIN B-12: CPT | Performed by: HOSPITALIST

## 2022-06-18 PROCEDURE — 36415 COLL VENOUS BLD VENIPUNCTURE: CPT | Performed by: NURSE PRACTITIONER

## 2022-06-18 PROCEDURE — 11000001 HC ACUTE MED/SURG PRIVATE ROOM

## 2022-06-18 PROCEDURE — 84436 ASSAY OF TOTAL THYROXINE: CPT | Performed by: HOSPITALIST

## 2022-06-18 PROCEDURE — 82962 GLUCOSE BLOOD TEST: CPT

## 2022-06-18 PROCEDURE — 82272 OCCULT BLD FECES 1-3 TESTS: CPT

## 2022-06-18 PROCEDURE — 99285 PR EMERGENCY DEPT VISIT,LEVEL V: ICD-10-PCS | Mod: ,,, | Performed by: NURSE PRACTITIONER

## 2022-06-18 PROCEDURE — 63600175 PHARM REV CODE 636 W HCPCS: Performed by: HOSPITALIST

## 2022-06-18 PROCEDURE — 63600175 PHARM REV CODE 636 W HCPCS: Performed by: NURSE PRACTITIONER

## 2022-06-18 PROCEDURE — 99285 EMERGENCY DEPT VISIT HI MDM: CPT | Mod: 25

## 2022-06-18 PROCEDURE — 93010 EKG 12-LEAD: ICD-10-PCS | Mod: ,,, | Performed by: INTERNAL MEDICINE

## 2022-06-18 PROCEDURE — 83880 ASSAY OF NATRIURETIC PEPTIDE: CPT | Performed by: NURSE PRACTITIONER

## 2022-06-18 PROCEDURE — 84443 ASSAY THYROID STIM HORMONE: CPT | Performed by: HOSPITALIST

## 2022-06-18 PROCEDURE — 93005 ELECTROCARDIOGRAM TRACING: CPT

## 2022-06-18 PROCEDURE — 99285 EMERGENCY DEPT VISIT HI MDM: CPT | Mod: ,,, | Performed by: NURSE PRACTITIONER

## 2022-06-18 PROCEDURE — 84484 ASSAY OF TROPONIN QUANT: CPT | Performed by: NURSE PRACTITIONER

## 2022-06-18 PROCEDURE — 99223 PR INITIAL HOSPITAL CARE,LEVL III: ICD-10-PCS | Mod: ,,, | Performed by: HOSPITALIST

## 2022-06-18 PROCEDURE — 82728 ASSAY OF FERRITIN: CPT | Performed by: HOSPITALIST

## 2022-06-18 PROCEDURE — 96375 TX/PRO/DX INJ NEW DRUG ADDON: CPT

## 2022-06-18 PROCEDURE — 96374 THER/PROPH/DIAG INJ IV PUSH: CPT

## 2022-06-18 RX ORDER — DILTIAZEM HYDROCHLORIDE 5 MG/ML
20 INJECTION INTRAVENOUS
Status: COMPLETED | OUTPATIENT
Start: 2022-06-18 | End: 2022-06-18

## 2022-06-18 RX ORDER — FUROSEMIDE 10 MG/ML
40 INJECTION INTRAMUSCULAR; INTRAVENOUS
Status: COMPLETED | OUTPATIENT
Start: 2022-06-18 | End: 2022-06-18

## 2022-06-18 RX ORDER — NAPROXEN SODIUM 220 MG/1
81 TABLET, FILM COATED ORAL DAILY
Status: DISCONTINUED | OUTPATIENT
Start: 2022-06-19 | End: 2022-06-22 | Stop reason: HOSPADM

## 2022-06-18 RX ORDER — LANOLIN ALCOHOL/MO/W.PET/CERES
1 CREAM (GRAM) TOPICAL 2 TIMES DAILY
Refills: 5 | Status: DISCONTINUED | OUTPATIENT
Start: 2022-06-18 | End: 2022-06-19

## 2022-06-18 RX ORDER — PANTOPRAZOLE SODIUM 40 MG/1
40 TABLET, DELAYED RELEASE ORAL DAILY
COMMUNITY
End: 2022-07-11 | Stop reason: SDUPTHER

## 2022-06-18 RX ORDER — IBUPROFEN 200 MG
16 TABLET ORAL
Status: DISCONTINUED | OUTPATIENT
Start: 2022-06-18 | End: 2022-06-22 | Stop reason: HOSPADM

## 2022-06-18 RX ORDER — NITROGLYCERIN 0.4 MG/1
0.4 TABLET SUBLINGUAL EVERY 5 MIN PRN
Status: DISCONTINUED | OUTPATIENT
Start: 2022-06-18 | End: 2022-06-22 | Stop reason: HOSPADM

## 2022-06-18 RX ORDER — INSULIN ASPART 100 [IU]/ML
1-10 INJECTION, SOLUTION INTRAVENOUS; SUBCUTANEOUS
Status: DISCONTINUED | OUTPATIENT
Start: 2022-06-18 | End: 2022-06-19

## 2022-06-18 RX ORDER — METOPROLOL TARTRATE 1 MG/ML
5 INJECTION, SOLUTION INTRAVENOUS EVERY 5 MIN PRN
Status: DISCONTINUED | OUTPATIENT
Start: 2022-06-18 | End: 2022-06-22 | Stop reason: HOSPADM

## 2022-06-18 RX ORDER — PREGABALIN 50 MG/1
50 CAPSULE ORAL 3 TIMES DAILY
Status: DISCONTINUED | OUTPATIENT
Start: 2022-06-18 | End: 2022-06-22 | Stop reason: HOSPADM

## 2022-06-18 RX ORDER — GLUCAGON 1 MG
1 KIT INJECTION
Status: DISCONTINUED | OUTPATIENT
Start: 2022-06-18 | End: 2022-06-22 | Stop reason: HOSPADM

## 2022-06-18 RX ORDER — TRAMADOL HYDROCHLORIDE 50 MG/1
50 TABLET ORAL EVERY 4 HOURS PRN
Status: DISCONTINUED | OUTPATIENT
Start: 2022-06-18 | End: 2022-06-22 | Stop reason: HOSPADM

## 2022-06-18 RX ORDER — PNV NO.95/FERROUS FUM/FOLIC AC 28MG-0.8MG
100 TABLET ORAL DAILY
Status: DISCONTINUED | OUTPATIENT
Start: 2022-06-19 | End: 2022-06-22 | Stop reason: HOSPADM

## 2022-06-18 RX ORDER — POTASSIUM CHLORIDE 20 MEQ/1
40 TABLET, EXTENDED RELEASE ORAL ONCE
Status: COMPLETED | OUTPATIENT
Start: 2022-06-18 | End: 2022-06-18

## 2022-06-18 RX ORDER — ATORVASTATIN CALCIUM 80 MG/1
80 TABLET, FILM COATED ORAL NIGHTLY
Refills: 0 | Status: DISCONTINUED | OUTPATIENT
Start: 2022-06-18 | End: 2022-06-22 | Stop reason: HOSPADM

## 2022-06-18 RX ORDER — PANTOPRAZOLE SODIUM 40 MG/1
40 TABLET, DELAYED RELEASE ORAL DAILY
Status: DISCONTINUED | OUTPATIENT
Start: 2022-06-19 | End: 2022-06-19

## 2022-06-18 RX ORDER — IBUPROFEN 200 MG
24 TABLET ORAL
Status: DISCONTINUED | OUTPATIENT
Start: 2022-06-18 | End: 2022-06-22 | Stop reason: HOSPADM

## 2022-06-18 RX ORDER — CLOPIDOGREL BISULFATE 75 MG/1
75 TABLET ORAL DAILY
Status: DISCONTINUED | OUTPATIENT
Start: 2022-06-19 | End: 2022-06-22 | Stop reason: HOSPADM

## 2022-06-18 RX ORDER — DIGOXIN 0.25 MG/ML
500 INJECTION INTRAMUSCULAR; INTRAVENOUS
Status: COMPLETED | OUTPATIENT
Start: 2022-06-18 | End: 2022-06-18

## 2022-06-18 RX ORDER — ONDANSETRON 2 MG/ML
4 INJECTION INTRAMUSCULAR; INTRAVENOUS
Status: COMPLETED | OUTPATIENT
Start: 2022-06-18 | End: 2022-06-18

## 2022-06-18 RX ORDER — ISOSORBIDE MONONITRATE 30 MG/1
30 TABLET, EXTENDED RELEASE ORAL DAILY
Status: DISCONTINUED | OUTPATIENT
Start: 2022-06-19 | End: 2022-06-18

## 2022-06-18 RX ORDER — MORPHINE SULFATE 4 MG/ML
4 INJECTION, SOLUTION INTRAMUSCULAR; INTRAVENOUS
Status: COMPLETED | OUTPATIENT
Start: 2022-06-18 | End: 2022-06-18

## 2022-06-18 RX ORDER — METOPROLOL TARTRATE 25 MG/1
25 TABLET, FILM COATED ORAL 2 TIMES DAILY
Status: DISCONTINUED | OUTPATIENT
Start: 2022-06-19 | End: 2022-06-22 | Stop reason: HOSPADM

## 2022-06-18 RX ADMIN — DIGOXIN 500 MCG: 0.25 INJECTION INTRAMUSCULAR; INTRAVENOUS at 06:06

## 2022-06-18 RX ADMIN — METOPROLOL TARTRATE 5 MG: 5 INJECTION, SOLUTION INTRAVENOUS at 05:06

## 2022-06-18 RX ADMIN — AMIODARONE HYDROCHLORIDE 1 MG/MIN: 50 INJECTION, SOLUTION INTRAVENOUS at 09:06

## 2022-06-18 RX ADMIN — MORPHINE SULFATE 4 MG: 4 INJECTION INTRAVENOUS at 04:06

## 2022-06-18 RX ADMIN — ONDANSETRON 4 MG: 2 INJECTION INTRAMUSCULAR; INTRAVENOUS at 04:06

## 2022-06-18 RX ADMIN — METOPROLOL TARTRATE 5 MG: 5 INJECTION, SOLUTION INTRAVENOUS at 02:06

## 2022-06-18 RX ADMIN — FUROSEMIDE 40 MG: 10 INJECTION INTRAMUSCULAR; INTRAVENOUS at 02:06

## 2022-06-18 RX ADMIN — ATORVASTATIN CALCIUM 80 MG: 80 TABLET, FILM COATED ORAL at 08:06

## 2022-06-18 RX ADMIN — AMIODARONE HYDROCHLORIDE 150 MG: 50 INJECTION, SOLUTION INTRAVENOUS at 08:06

## 2022-06-18 RX ADMIN — APIXABAN 5 MG: 5 TABLET, FILM COATED ORAL at 08:06

## 2022-06-18 RX ADMIN — FERROUS SULFATE TAB 325 MG (65 MG ELEMENTAL FE) 1 EACH: 325 (65 FE) TAB at 08:06

## 2022-06-18 RX ADMIN — DILTIAZEM HYDROCHLORIDE 20 MG: 5 INJECTION INTRAVENOUS at 03:06

## 2022-06-18 RX ADMIN — PREGABALIN 50 MG: 50 CAPSULE ORAL at 08:06

## 2022-06-18 RX ADMIN — POTASSIUM CHLORIDE 40 MEQ: 20 TABLET, EXTENDED RELEASE ORAL at 08:06

## 2022-06-18 NOTE — Clinical Note
The catheter was repositioned into the aorta ostium   left main. An angiography was performed of the left coronary arteries. Multiple views were taken.

## 2022-06-18 NOTE — Clinical Note
The radial band was applied to the right radial artery. 20 cc's of air were inserted into the closure device.

## 2022-06-18 NOTE — Clinical Note
The catheter was inserted into the and was inserted over the wire into the aorta. Hemodynamics were performed.

## 2022-06-18 NOTE — Clinical Note
ID band present and verified. Family is not present. Contact # : [phone]. Jorge Alberto nicholas

## 2022-06-18 NOTE — Clinical Note
The catheter was repositioned into the aorta ostium   right coronary artery. An angiography was performed of the right coronary arteries. Multiple views were taken.

## 2022-06-18 NOTE — Clinical Note
50 ml of contrast were injected throughout the case. 0 mL of contrast was the total wasted during the case. 50 mL was the total amount used during the case.

## 2022-06-18 NOTE — ED TRIAGE NOTES
Pt presents to ed with c/o cp. Pt hs hx of colon cancer and afib. Pt had stents placed by myriam last year.

## 2022-06-18 NOTE — Clinical Note
Patient transported to [facility] via stretcher. Patient tolerated well. Bedside report and assessment with HELDER Segura.  Puncture site stable.  No signs of bleeding or hematoma.  TR band in place.  VS stable.  Pulse palpable.  Patient education complete.

## 2022-06-18 NOTE — DISCHARGE INSTRUCTIONS
Procedure Date  6/21/22     Impression  Overall Impression: Mucosa of the esophagus was normal without varices or bleeding. The stomach had gastritis and a 4cm hiatus hernia. No biopsies were obtained due to plavix therapy. The pyloric channel was patent. Mucosa of the duodenum was normal.     Recommendation  Follow up with PCP; prep for colonoscopy tomorrow.    Procedure Date  6/22/22     Impression  Overall Impression: No bleeding was seen. No polyps were seen. Diverticula were present in the sigmoid and descending colon. Right garrick-colectomy changes are present.     Recommendation  Repeat colonoscopy in 3 years; resume diet; po iron; watch for Gi bleeding. Pt will need intermittent HgB checks after discharge.    NO DRIVING, OPERATING EQUIPMENT, OR SIGNING LEGAL DOCUMENTS FOR 24 HOURS.

## 2022-06-18 NOTE — ED PROVIDER NOTES
Encounter Date: 6/18/2022       History     Chief Complaint   Patient presents with    Chest Pain     Patient presents to ER with complaint of chest pain and altered mental status.  Patient was brought to ER from patient's place of employment.  She states she begin to have chest pain.  She took break and sit down.  Pain did not go away so ambulance was called. Patient was working when pain started.  The pain started approximately 45 minutes PTA.  Pain is described as right sided pain that is pressure type pain and is sometime sharp pain.   Ems states patient was awake and alert upon their arrival.  Patient appears weak and is mumbling upon arrival to ER.  She is not weak but is having difficulty answering questions and following simple commands.  No weakness noted in upper or lower extremities.     The history is provided by the patient. No  was used.     Review of patient's allergies indicates:  No Known Allergies  Past Medical History:   Diagnosis Date    Arthritis     Cancer     colon cancer    Colon cancer     Coronary artery disease     Depression     Diabetes mellitus, type 2     GERD (gastroesophageal reflux disease)     Hyperlipidemia     Myocardial infarction     2 stents in 05/14/2021 Dr Porter    Thyroid disease      Past Surgical History:   Procedure Laterality Date    BREAST SURGERY      C5-C6 RADHA  8-, 7-, 6-1-2016    Dr Fowler    CERVICAL SPINE SURGERY      COLON SURGERY      EPIDURAL STEROID INJECTION INTO LUMBAR SPINE N/A 3/29/2022    Procedure: L4-5 RADHA;  Surgeon: Gela Fowler MD;  [facility];  Service: Pain Management;  Laterality: N/A;  PT AWARE ON OV TO BE TESTED\  PLAVIX TO BE HELD FOR 7 DAYS PRIOR TO PROCEDURE PER DR FOWLER AND DR PORTER  3-28  message left on  re: covid    HYSTERECTOMY      LEFT HEART CATHETERIZATION Left 5/14/2021    Procedure: Left heart cath;  Surgeon: Mateus Guan MD;  Location: [facility] CATH  LAB;  Service: Cardiology;  Laterality: Left;    MEDIPORT REMOVAL  10/14/2021    Dr Kraft    right rotator cuff repair      in [place]    TOTAL REDUCTION MAMMOPLASTY       Family History   Problem Relation Age of Onset    Hypertension Mother     Diabetes Mother     Hypertension Father     Diabetes Father     Hypertension Sister     Breast cancer Sister     Hypertension Brother      Social History     Tobacco Use    Smoking status: Never Smoker    Smokeless tobacco: Never Used   Substance Use Topics    Alcohol use: Never    Drug use: Never     Review of Systems   Constitutional: Positive for activity change and fatigue.   Respiratory: Positive for chest tightness.    Cardiovascular: Positive for chest pain.   Neurological: Positive for dizziness, speech difficulty and weakness.   Psychiatric/Behavioral: Positive for confusion.   All other systems reviewed and are negative.      Physical Exam     Initial Vitals   BP Pulse Resp Temp SpO2   06/18/22 1321 06/18/22 1317 06/18/22 1315 06/18/22 1315 06/18/22 1315   133/62 (!) 140 (!) 22 98.6 °F (37 °C) 97 %      MAP       --                Physical Exam    Nursing note and vitals reviewed.  Constitutional: She appears well-developed and well-nourished.   HENT:   Head: Normocephalic.   Right Ear: External ear normal.   Left Ear: External ear normal.   Nose: Nose normal.   Mouth/Throat: Oropharynx is clear and moist.   Eyes: Conjunctivae and EOM are normal. Pupils are equal, round, and reactive to light.   Neck: Neck supple.   Normal range of motion.  Cardiovascular: Normal rate, regular rhythm, normal heart sounds and intact distal pulses.   Pulmonary/Chest: Breath sounds normal.   Abdominal: Abdomen is soft. Bowel sounds are normal.   Musculoskeletal:         General: Normal range of motion.      Cervical back: Normal range of motion and neck supple.     Neurological: She is alert. She has normal strength. GCS score is 15. GCS eye subscore is 4. GCS verbal  subscore is 5. GCS motor subscore is 6.   Skin: Skin is warm and dry. Capillary refill takes less than 2 seconds.   Psychiatric: She has a normal mood and affect. Her behavior is normal. Judgment and thought content normal.         Medical Screening Exam   See Full Note    ED Course   Procedures  Labs Reviewed   COMPREHENSIVE METABOLIC PANEL - Abnormal; Notable for the following components:       Result Value    Anion Gap 17 (*)     Glucose 120 (*)     BUN 29 (*)     Creatinine 1.34 (*)     BUN/Creatinine Ratio 22 (*)     Albumin 3.2 (*)     eGFR 43 (*)     All other components within normal limits   URINALYSIS, REFLEX TO URINE CULTURE - Abnormal; Notable for the following components:    Glucose, UA 1000  (*)     All other components within normal limits   MAGNESIUM - Abnormal; Notable for the following components:    Magnesium 2.5 (*)     All other components within normal limits   NT-PRO NATRIURETIC PEPTIDE - Abnormal; Notable for the following components:    ProBNP 352 (*)     All other components within normal limits   CBC WITH DIFFERENTIAL - Abnormal; Notable for the following components:    RBC 3.20 (*)     Hemoglobin 8.0 (*)     Hematocrit 26.1 (*)     MCH 25.0 (*)     MCHC 30.7 (*)     RDW 15.4 (*)     Platelet Count 434 (*)     All other components within normal limits   MANUAL DIFFERENTIAL - Abnormal; Notable for the following components:    Platelet Morphology Increased (*)     All other components within normal limits   TROPONIN I - Abnormal; Notable for the following components:    Troponin I High Sensitivity 110.5 (*)     All other components within normal limits   POCT GLUCOSE MONITORING CONTINUOUS - Abnormal; Notable for the following components:    POC Glucose 139 (*)     All other components within normal limits   TROPONIN I - Normal   PROTIME-INR - Normal   APTT - Normal   DRUG SCREEN, URINE (BEAKER) - Normal    Narrative:     The results of screening tests should be considered presumptive.  Confirmatory testing is available upon request.    Cutoff Points:  PCP:         25ng/mL  AMPH:        500ng/mL  ALEXIS:        200ng/mL  NERIS:        200ng/mL  THC:         50ng/mL  VON:         300ng/mL  OPI:         2000ng/mL   CBC W/ AUTO DIFFERENTIAL    Narrative:     The following orders were created for panel order CBC auto differential.  Procedure                               Abnormality         Status                     ---------                               -----------         ------                     CBC with Differential[[id number]]        Abnormal            Final result               Manual Differential[[id number]]          Abnormal            Final result                 Please view results for these tests on the individual orders.   POCT OCCULT BLOOD (STOOL)   POCT GLUCOSE MONITORING CONTINUOUS          Imaging Results          X-Ray Chest AP Portable (Final result)  Result time 06/18/22 13:41:45    Final result by Yajaira Guzmán MD (06/18/22 13:41:45)                 Impression:      Mildly increasing diffuse infiltrates versus pulmonary edema      Electronically signed by: Yajaira Guzmán  Date:    06/18/2022  Time:    13:41             Narrative:    EXAMINATION:  XR CHEST AP PORTABLE    CLINICAL HISTORY:  al;tered mental status;    FINDINGS:  Comparison 10/24/2021    Heart and vessels are enlarged with mildly increasing reticular and hazy bilateral pulmonary opacities without consolidation                               CT Head Without Contrast (Final result)  Result time 06/18/22 13:38:17    Final result by Marybeth Guzmán MD (06/18/22 13:38:17)                 Impression:      No acute intracranial process is seen.      Electronically signed by: Marybeth Guzmán  Date:    06/18/2022  Time:    13:38             Narrative:    EXAMINATION:  CT HEAD WITHOUT CONTRAST    CLINICAL HISTORY:  Mental status change, unknown cause;    TECHNIQUE:  Low dose axial images were obtained through the head.  Sagittal and  coronal 2D reconstructions were performed.  Contrast was not administered.    COMPARISON:  None.    FINDINGS:  There is a partial empty sella.  The ventricles are normal in size and configuration.  There is no mass effect, midline shift, or area of acute hemorrhage.  There is bilateral basal ganglial calcification.  No cortical infarcts are seen at this time.    The calvarium is intact.  The included paranasal sinuses and mastoid air cells are clear.                                 Medications   metoprolol injection 5 mg (5 mg Intravenous Given 6/18/22 1723)   furosemide injection 40 mg (40 mg Intravenous Given 6/18/22 1433)   diltiaZEM injection 20 mg (20 mg Intravenous Given 6/18/22 1516)   morphine injection 4 mg (4 mg Intravenous Given 6/18/22 1624)   ondansetron injection 4 mg (4 mg Intravenous Given 6/18/22 1622)     Medical Decision Making:   ED Management:  Discussed history and results with Dr Cespedes.  Recommendation for afib rvr treatment- lopressor 5mg x 5 min x 3 doses.   Blood pressure wound not tolerate lopressor.  Finished liter of fluids started by EMS.  Will give cardizem bolus x 1 dose, and treat pain with Morphine.   1715 Pain has improved and Blood pressure has also  improved- second troponin elevated.  Dr Cespedes recommends admission.   1720 Dr Cornelius called in consult.  Will admit to hospitalist service of Dr Haji.               ED Course as of 06/18/22 1741   Sat Jun 18, 2022   1410 EKG 12-lead [CQ]      ED Course User Index  [CQ] EDMOND Hill          Clinical Impression:   Final diagnoses:  [R07.9] Chest pain  [I48.91] Atrial fibrillation with RVR (Primary)  [D64.9] Anemia, unspecified type  [Z85.038] History of colon cancer  [E11.9] Type 2 diabetes mellitus without complication, without long-term current use of insulin  [I48.91] Atrial fibrillation with rapid ventricular response          ED Disposition Condition    Admit               EDMOND Hill  06/18/22 1741

## 2022-06-18 NOTE — Clinical Note
The right radial was prepped. The site was prepped with ChloraPrep. The patient was draped. The patient was positioned supine.

## 2022-06-18 NOTE — Clinical Note
The catheter was inserted into the and was inserted over the wire into the left ventricle. Pullback was recorded.

## 2022-06-19 PROBLEM — D50.0 IRON DEFICIENCY ANEMIA DUE TO CHRONIC BLOOD LOSS: Status: ACTIVE | Noted: 2022-06-19

## 2022-06-19 LAB
ABORH RETYPE: NORMAL
AORTIC VALVE CUSP SEPERATION: 17.3 CM
BSA FOR ECHO PROCEDURE: 2.04 M2
CV ECHO LV RWT: 0.34 CM
E WAVE DECELERATION TIME: 258 MSEC
ECHO LV POSTERIOR WALL: 0.84 CM (ref 0.6–1.1)
EJECTION FRACTION: 60 %
FRACTIONAL SHORTENING: 34 % (ref 28–44)
GLUCOSE SERPL-MCNC: 203 MG/DL (ref 70–105)
GLUCOSE SERPL-MCNC: 224 MG/DL (ref 70–105)
GLUCOSE SERPL-MCNC: 227 MG/DL (ref 70–105)
HCT VFR BLD AUTO: 25.7 % (ref 38–47)
HGB BLD-MCNC: 7.6 G/DL (ref 12–16)
INDIRECT COOMBS: NORMAL
INTERVENTRICULAR SEPTUM: 1.09 CM (ref 0.6–1.1)
LEFT ATRIUM SIZE: 3.78 CM
LEFT INTERNAL DIMENSION IN SYSTOLE: 3.25 CM (ref 2.1–4)
LEFT VENTRICLE DIASTOLIC VOLUME INDEX: 58.14 ML/M2
LEFT VENTRICLE DIASTOLIC VOLUME: 112.8 ML
LEFT VENTRICLE MASS INDEX: 86 G/M2
LEFT VENTRICLE SYSTOLIC VOLUME INDEX: 21.9 ML/M2
LEFT VENTRICLE SYSTOLIC VOLUME: 42.5 ML
LEFT VENTRICULAR INTERNAL DIMENSION IN DIASTOLE: 4.9 CM (ref 3.5–6)
LEFT VENTRICULAR MASS: 167.8 G
MV PEAK E VEL: 1.28 M/S
PISA TR MAX VEL: 2.47 M/S
RH BLD: NORMAL
RIGHT ATRIAL AREA: 11.6 CM2
RIGHT VENTRICULAR END-DIASTOLIC DIMENSION: 3.07 CM
TR MAX PG: 24 MMHG
TRICUSPID ANNULAR PLANE SYSTOLIC EXCURSION: 2.5 CM
TROPONIN I SERPL HS-MCNC: 156.3 PG/ML

## 2022-06-19 PROCEDURE — 25000003 PHARM REV CODE 250: Performed by: INTERNAL MEDICINE

## 2022-06-19 PROCEDURE — 99222 1ST HOSP IP/OBS MODERATE 55: CPT | Mod: ,,, | Performed by: INTERNAL MEDICINE

## 2022-06-19 PROCEDURE — 63600175 PHARM REV CODE 636 W HCPCS: Performed by: HOSPITALIST

## 2022-06-19 PROCEDURE — 86850 RBC ANTIBODY SCREEN: CPT | Performed by: NURSE PRACTITIONER

## 2022-06-19 PROCEDURE — 86850 RBC ANTIBODY SCREEN: CPT | Mod: 91 | Performed by: NURSE PRACTITIONER

## 2022-06-19 PROCEDURE — 86923 COMPATIBILITY TEST ELECTRIC: CPT | Performed by: NURSE PRACTITIONER

## 2022-06-19 PROCEDURE — 99222 PR INITIAL HOSPITAL CARE,LEVL II: ICD-10-PCS | Mod: ,,, | Performed by: STUDENT IN AN ORGANIZED HEALTH CARE EDUCATION/TRAINING PROGRAM

## 2022-06-19 PROCEDURE — 99232 SBSQ HOSP IP/OBS MODERATE 35: CPT | Mod: ,,, | Performed by: INTERNAL MEDICINE

## 2022-06-19 PROCEDURE — 99222 PR INITIAL HOSPITAL CARE,LEVL II: ICD-10-PCS | Mod: ,,, | Performed by: INTERNAL MEDICINE

## 2022-06-19 PROCEDURE — 11000001 HC ACUTE MED/SURG PRIVATE ROOM

## 2022-06-19 PROCEDURE — 99232 PR SUBSEQUENT HOSPITAL CARE,LEVL II: ICD-10-PCS | Mod: ,,, | Performed by: INTERNAL MEDICINE

## 2022-06-19 PROCEDURE — 82962 GLUCOSE BLOOD TEST: CPT

## 2022-06-19 PROCEDURE — 63600175 PHARM REV CODE 636 W HCPCS: Performed by: NURSE PRACTITIONER

## 2022-06-19 PROCEDURE — 36430 TRANSFUSION BLD/BLD COMPNT: CPT

## 2022-06-19 PROCEDURE — C9399 UNCLASSIFIED DRUGS OR BIOLOG: HCPCS | Performed by: HOSPITALIST

## 2022-06-19 PROCEDURE — 25000003 PHARM REV CODE 250: Performed by: NURSE PRACTITIONER

## 2022-06-19 PROCEDURE — 86901 BLOOD TYPING SEROLOGIC RH(D): CPT | Mod: 91 | Performed by: NURSE PRACTITIONER

## 2022-06-19 PROCEDURE — 99222 1ST HOSP IP/OBS MODERATE 55: CPT | Mod: ,,, | Performed by: STUDENT IN AN ORGANIZED HEALTH CARE EDUCATION/TRAINING PROGRAM

## 2022-06-19 PROCEDURE — 36415 COLL VENOUS BLD VENIPUNCTURE: CPT | Performed by: NURSE PRACTITIONER

## 2022-06-19 PROCEDURE — 85014 HEMATOCRIT: CPT | Performed by: NURSE PRACTITIONER

## 2022-06-19 PROCEDURE — C9113 INJ PANTOPRAZOLE SODIUM, VIA: HCPCS | Performed by: NURSE PRACTITIONER

## 2022-06-19 PROCEDURE — 84484 ASSAY OF TROPONIN QUANT: CPT | Performed by: HOSPITALIST

## 2022-06-19 PROCEDURE — P9016 RBC LEUKOCYTES REDUCED: HCPCS | Performed by: NURSE PRACTITIONER

## 2022-06-19 PROCEDURE — 63600175 PHARM REV CODE 636 W HCPCS: Performed by: INTERNAL MEDICINE

## 2022-06-19 PROCEDURE — 25000003 PHARM REV CODE 250: Performed by: HOSPITALIST

## 2022-06-19 PROCEDURE — 86900 BLOOD TYPING SEROLOGIC ABO: CPT | Mod: 91 | Performed by: NURSE PRACTITIONER

## 2022-06-19 PROCEDURE — C9113 INJ PANTOPRAZOLE SODIUM, VIA: HCPCS | Performed by: INTERNAL MEDICINE

## 2022-06-19 PROCEDURE — 36415 COLL VENOUS BLD VENIPUNCTURE: CPT | Performed by: HOSPITALIST

## 2022-06-19 RX ORDER — HYDROCODONE BITARTRATE AND ACETAMINOPHEN 500; 5 MG/1; MG/1
TABLET ORAL
Status: DISCONTINUED | OUTPATIENT
Start: 2022-06-19 | End: 2022-06-22 | Stop reason: HOSPADM

## 2022-06-19 RX ORDER — PANTOPRAZOLE SODIUM 40 MG/10ML
40 INJECTION, POWDER, LYOPHILIZED, FOR SOLUTION INTRAVENOUS ONCE
Status: COMPLETED | OUTPATIENT
Start: 2022-06-19 | End: 2022-06-19

## 2022-06-19 RX ORDER — PANTOPRAZOLE SODIUM 40 MG/10ML
40 INJECTION, POWDER, LYOPHILIZED, FOR SOLUTION INTRAVENOUS DAILY
Status: DISCONTINUED | OUTPATIENT
Start: 2022-06-19 | End: 2022-06-22 | Stop reason: HOSPADM

## 2022-06-19 RX ORDER — INSULIN ASPART 100 [IU]/ML
1-10 INJECTION, SOLUTION INTRAVENOUS; SUBCUTANEOUS
Status: DISCONTINUED | OUTPATIENT
Start: 2022-06-19 | End: 2022-06-22 | Stop reason: HOSPADM

## 2022-06-19 RX ADMIN — METOPROLOL TARTRATE 25 MG: 25 TABLET, FILM COATED ORAL at 01:06

## 2022-06-19 RX ADMIN — AMIODARONE HYDROCHLORIDE 0.5 MG/MIN: 50 INJECTION, SOLUTION INTRAVENOUS at 02:06

## 2022-06-19 RX ADMIN — INSULIN ASPART 4 UNITS: 100 INJECTION, SOLUTION INTRAVENOUS; SUBCUTANEOUS at 05:06

## 2022-06-19 RX ADMIN — PREGABALIN 50 MG: 50 CAPSULE ORAL at 10:06

## 2022-06-19 RX ADMIN — ASPIRIN 81 MG CHEWABLE TABLET 81 MG: 81 TABLET CHEWABLE at 09:06

## 2022-06-19 RX ADMIN — INSULIN DETEMIR 12 UNITS: 100 INJECTION, SOLUTION SUBCUTANEOUS at 10:06

## 2022-06-19 RX ADMIN — PANTOPRAZOLE SODIUM 8 MG/HR: 40 INJECTION, POWDER, FOR SOLUTION INTRAVENOUS at 09:06

## 2022-06-19 RX ADMIN — PANTOPRAZOLE SODIUM 40 MG: 40 INJECTION, POWDER, FOR SOLUTION INTRAVENOUS at 02:06

## 2022-06-19 RX ADMIN — ATORVASTATIN CALCIUM 80 MG: 80 TABLET, FILM COATED ORAL at 10:06

## 2022-06-19 RX ADMIN — Medication 100 MCG: at 09:06

## 2022-06-19 RX ADMIN — TRAMADOL HYDROCHLORIDE 50 MG: 50 TABLET, COATED ORAL at 09:06

## 2022-06-19 RX ADMIN — INSULIN ASPART 4 UNITS: 100 INJECTION, SOLUTION INTRAVENOUS; SUBCUTANEOUS at 06:06

## 2022-06-19 RX ADMIN — TRAMADOL HYDROCHLORIDE 50 MG: 50 TABLET, COATED ORAL at 10:06

## 2022-06-19 RX ADMIN — SODIUM CHLORIDE: 9 INJECTION, SOLUTION INTRAVENOUS at 06:06

## 2022-06-19 RX ADMIN — CLOPIDOGREL 75 MG: 75 TABLET, FILM COATED ORAL at 09:06

## 2022-06-19 RX ADMIN — METOPROLOL TARTRATE 25 MG: 25 TABLET, FILM COATED ORAL at 09:06

## 2022-06-19 RX ADMIN — PANTOPRAZOLE SODIUM 40 MG: 40 INJECTION, POWDER, FOR SOLUTION INTRAVENOUS at 09:06

## 2022-06-19 RX ADMIN — PREGABALIN 50 MG: 50 CAPSULE ORAL at 02:06

## 2022-06-19 RX ADMIN — PREGABALIN 50 MG: 50 CAPSULE ORAL at 09:06

## 2022-06-19 RX ADMIN — FERROUS SULFATE TAB 325 MG (65 MG ELEMENTAL FE) 1 EACH: 325 (65 FE) TAB at 09:06

## 2022-06-19 NOTE — H&P
[address] Medicine  History & Physical    Patient Name: Dilma Cr  MRN: [id number]  Patient Class: IP- Inpatient  Admission Date: 6/18/2022  Attending Physician: Nancy Caballero MD   Primary Care Provider: Dr. Olga Potts         Patient information was obtained from patient, past medical records and ER records.     Subjective:     Principal Problem:Atrial fibrillation with RVR    Chief Complaint:   Chief Complaint   Patient presents with    Chest Pain        HPI: 59 yo F presents to the ED with a week of intermittent non radiating chest pressure with some SOB and palpitations.  She does not name any aggravating or alleviating factors.  Her EKG shows atrial fibrillation with RVR that has not responded to dig, cardizem or BB IV.  Her trop has increased from 48 to 110.  Her cxr shows some mild bilateral pulmonary edema.  She is on low dose of lasix as well as HCT as OP.  Her K is 3.3 with baseline GFR 45.  Her magnesium is normal      She has a history of CAD with stent  to LAD and RCA on 4/14/21 by Dr. Lora and is on DAPT.  She also has a history of CAF and was on eliquis but was taken off recently due anemia of chronic blood loss.  Her PCP Dr. Olga Potts was making arrangement for Dr. Moustapha Kraft to [facility] for colonoscopy.  Patient has h/o colon cancer and has had a partial colectomy and follows with oncologist Dr. Guillermo Stover.  Her anemia panel at this time appears to be TANYA due to blood loss and she is heme positive.      She takes long acting NTG as OP and is also on benicar/HCT.  She is not on a rate controlling agent but I see on Dr. Lora's last note she was on metoprolol but also her med rec has her still on eliquis and also on a steriod that she had not taken in a very long time.  Her med recon sheet will need to be updated.  She is on Jardance and iron supplementation at present.      ROS as below.  She states she has never been diagnosed with OLIVERIO  and is unaware of snoring or apnea.  Would benefit from OP sleep study as she is morbidly obese.        Past Medical History:   Diagnosis Date    Arthritis     Cancer     colon cancer    Chronic kidney disease, stage 3b     Colon cancer     Coronary artery disease     Depression     Diabetes mellitus, type 2     GERD (gastroesophageal reflux disease)     Hyperlipidemia     Myocardial infarction     2 stents in 05/14/2021 Dr Porter    Renal disorder     Thyroid disease        Past Surgical History:   Procedure Laterality Date    BREAST SURGERY      C5-C6 RADHA  8-, 7-, 6-1-2016    Dr Fowler    CERVICAL SPINE SURGERY      COLON SURGERY      EPIDURAL STEROID INJECTION INTO LUMBAR SPINE N/A 3/29/2022    Procedure: L4-5 RADHA;  Surgeon: Gela Fowler MD;  Location: [facility] PAIN MGMT;  Service: Pain Management;  Laterality: N/A;  PT AWARE ON OV TO BE TESTED\  PLAVIX TO BE HELD FOR 7 DAYS PRIOR TO PROCEDURE PER DR FOWLER AND DR PORTER  3-28  message left on  re: covid    HYSTERECTOMY      LEFT HEART CATHETERIZATION Left 5/14/2021    Procedure: Left heart cath;  Surgeon: Mateus Guan MD;  Location: [facility] CATH LAB;  Service: Cardiology;  Laterality: Left;    MEDIPORT REMOVAL  10/14/2021    Dr Kraft    right rotator cuff repair      in [place]    TOTAL REDUCTION MAMMOPLASTY         Review of patient's allergies indicates:  No Known Allergies    No current facility-administered medications on file prior to encounter.     Current Outpatient Medications on File Prior to Encounter   Medication Sig    ascorbic acid, vitamin C, 250 mg Chew Take 250 mg by mouth once daily.    aspirin 81 MG Chew Take 81 mg by mouth once daily.    clopidogreL (PLAVIX) 75 mg tablet Take 75 mg by mouth once daily.    cyanocobalamin (VITAMIN B-12) 1000 MCG tablet Take 100 mcg by mouth once daily.    ferrous sulfate 325 (65 FE) MG EC tablet Take 1 tablet (325 mg total) by mouth once daily.    FREESTYLE  BECCA 14 DAY SENSOR Kit USE TO CHECK GLUCOSE 4 TIMES DAILY    furosemide (LASIX) 20 MG tablet Take 1 tablet (20 mg total) by mouth daily as needed (fluid).    JARDIANCE 25 mg tablet Take 1 tablet (25 mg total) by mouth once daily.    olmesartan-hydrochlorothiazide (BENICAR HCT) 40-25 mg per tablet Take 1 tablet by mouth once daily.    pantoprazole (PROTONIX) 40 MG tablet Take 40 mg by mouth once daily.    rosuvastatin (CRESTOR) 40 MG Tab Take 1 tablet by mouth once daily    apixaban (ELIQUIS) 5 mg Tab Take 1 tablet (5 mg total) by mouth 2 (two) times daily.    fluticasone propionate (FLONASE) 50 mcg/actuation nasal spray 1 spray (50 mcg total) by Each Nostril route once daily.    isosorbide mononitrate (IMDUR) 30 MG 24 hr tablet Take 1 tablet (30 mg total) by mouth once daily.    NOVOLOG FLEXPEN U-100 INSULIN 100 unit/mL (3 mL) InPn pen Inject 20 Units into the skin 2 (two) times a day.    omeprazole (PRILOSEC) 20 MG capsule Take 20 mg by mouth once daily.    ondansetron (ZOFRAN-ODT) 8 MG TbDL Take 1 tablet (8 mg total) by mouth every 8 (eight) hours as needed (nausea).    pregabalin (LYRICA) 50 MG capsule Take 1 capsule (50 mg total) by mouth 3 (three) times daily.    traMADoL (ULTRAM) 50 mg tablet Take 1 tablet (50 mg total) by mouth every 6 (six) hours.     Family History       Problem Relation (Age of Onset)    Breast cancer Sister    Diabetes Mother, Father    Hypertension Mother, Father, Sister, Brother          Tobacco Use    Smoking status: Never Smoker    Smokeless tobacco: Never Used   Substance and Sexual Activity    Alcohol use: Never    Drug use: Never    Sexual activity: Yes     Review of Systems   Constitutional:  Negative for appetite change, chills, fatigue, fever and unexpected weight change.   HENT:  Negative for congestion, mouth sores, nosebleeds, sinus pain, sore throat and trouble swallowing.    Eyes:  Negative for visual disturbance.   Respiratory:  Positive for chest  tightness and shortness of breath. Negative for apnea and cough.    Cardiovascular:  Positive for chest pain and palpitations. Negative for leg swelling.   Gastrointestinal:  Negative for abdominal pain, blood in stool, constipation, diarrhea, nausea and vomiting.   Endocrine: Negative for polyphagia and polyuria.   Genitourinary:  Negative for decreased urine volume, difficulty urinating, dysuria and frequency.   Musculoskeletal:  Negative for arthralgias, back pain and neck pain.   Skin:  Negative for rash.   Neurological:  Negative for syncope, light-headedness and headaches.   Hematological:  Does not bruise/bleed easily.   Psychiatric/Behavioral:  Negative for confusion and suicidal ideas.    Objective:     Vital Signs (Most Recent):  Temp: 98.3 °F (36.8 °C) (06/18/22 1934)  Pulse: (!) 118 (06/18/22 2105)  Resp: 18 (06/18/22 2105)  BP: (!) 112/59 (06/18/22 2105)  SpO2: 98 % (06/18/22 2105)   Vital Signs (24h Range):  Temp:  [98.3 °F (36.8 °C)-98.6 °F (37 °C)] 98.3 °F (36.8 °C)  Pulse:  [110-150] 118  Resp:  [10-23] 18  SpO2:  [94 %-100 %] 98 %  BP: ()/(43-74) 112/59     Weight: 97.3 kg (214 lb 8 oz)  Body mass index is 40.53 kg/m².    Physical Exam  Vitals and nursing note reviewed. Exam conducted with a chaperone present.   Constitutional:       General: She is not in acute distress.     Appearance: Normal appearance. She is obese. She is not ill-appearing.   HENT:      Head: Atraumatic.      Mouth/Throat:      Mouth: Mucous membranes are moist.      Pharynx: Oropharynx is clear.   Eyes:      Conjunctiva/sclera: Conjunctivae normal.      Pupils: Pupils are equal, round, and reactive to light.   Cardiovascular:      Rate and Rhythm: Tachycardia present. Rhythm irregular.      Pulses: Normal pulses.      Heart sounds: Normal heart sounds.   Pulmonary:      Effort: Pulmonary effort is normal.      Breath sounds: Normal breath sounds.   Abdominal:      General: Abdomen is flat. Bowel sounds are normal.       Palpations: Abdomen is soft.   Musculoskeletal:         General: Normal range of motion.   Skin:     General: Skin is warm and dry.      Capillary Refill: Capillary refill takes less than 2 seconds.      Coloration: Skin is not jaundiced or pale.      Findings: No bruising, lesion or rash.   Neurological:      General: No focal deficit present.      Mental Status: She is alert and oriented to person, place, and time.   Psychiatric:         Mood and Affect: Mood normal.         CRANIAL NERVES     CN III, IV, VI   Pupils are equal, round, and reactive to light.     Significant Labs: All pertinent labs within the past 24 hours have been reviewed.    Significant Imaging: I have reviewed all pertinent imaging results/findings within the past 24 hours.    Assessment/Plan:     * Atrial fibrillation with RVR  Maximized BB.  Will not start diltiazem until echo returns.    Starting amiodarone infusion.    Patient would benefit from OP sleep study.    Trending troponins and monitoring on telemetry  TSH pending.      TANYA from chronic blood loss with h/o colon cancer  Consulted GI to eval for endoscopy when medically stable.          Type 2 diabetes mellitus with diabetic polyneuropathy, with long-term current use of insulin  Hold jardance while NPO  Basal bolus insulin while in hospital.  Continue lyrica      Atherosclerosis of native coronary artery of native heart with unstable angina pectoris  Continue DAPT and statin.  Last cardiac stents were 5/21.      Hypertension  Holding ARB/HCT to maximize BB.    Restart ARB if tolerated.    Holding long acting NTG at present due to low normal BP.  Will need to continue lasix at increased dose due to pulmonary edema at SOB (proBNP is 352 and was 31).        Type 2 diabetes mellitus with ophthalmic complication            VTE Risk Mitigation (From admission, onward)    None             Dorys Haji MD  Department of Hospital Medicine   [address]  Telemetry

## 2022-06-19 NOTE — SUBJECTIVE & OBJECTIVE
Past Medical History:   Diagnosis Date    Arthritis     Cancer     colon cancer    Chronic kidney disease, stage 3b     Colon cancer     Coronary artery disease     Depression     Diabetes mellitus, type 2     GERD (gastroesophageal reflux disease)     Hyperlipidemia     Myocardial infarction     2 stents in 05/14/2021 Dr Porter    Renal disorder     Thyroid disease        Past Surgical History:   Procedure Laterality Date    BREAST SURGERY      C5-C6 RADHA  8-, 7-, 6-1-2016    Dr Fowler    CERVICAL SPINE SURGERY      COLON SURGERY      EPIDURAL STEROID INJECTION INTO LUMBAR SPINE N/A 3/29/2022    Procedure: L4-5 RADHA;  Surgeon: Gela Fowler MD;  Location: [facility] PAIN MGMT;  Service: Pain Management;  Laterality: N/A;  PT AWARE ON OV TO BE TESTED\  PLAVIX TO BE HELD FOR 7 DAYS PRIOR TO PROCEDURE PER DR FOWLER AND DR PORTER  3-28  message left on vm re: covid    HYSTERECTOMY      LEFT HEART CATHETERIZATION Left 5/14/2021    Procedure: Left heart cath;  Surgeon: Mateus Guan MD;  Location: [facility] CATH LAB;  Service: Cardiology;  Laterality: Left;    MEDIPORT REMOVAL  10/14/2021    Dr Kraft    right rotator cuff repair      in [place]    TOTAL REDUCTION MAMMOPLASTY         Review of patient's allergies indicates:  No Known Allergies    No current facility-administered medications on file prior to encounter.     Current Outpatient Medications on File Prior to Encounter   Medication Sig    ascorbic acid, vitamin C, 250 mg Chew Take 250 mg by mouth once daily.    aspirin 81 MG Chew Take 81 mg by mouth once daily.    clopidogreL (PLAVIX) 75 mg tablet Take 75 mg by mouth once daily.    cyanocobalamin (VITAMIN B-12) 1000 MCG tablet Take 100 mcg by mouth once daily.    ferrous sulfate 325 (65 FE) MG EC tablet Take 1 tablet (325 mg total) by mouth once daily.    FREESTYLE BECAC 14 DAY SENSOR Kit USE TO CHECK GLUCOSE 4 TIMES DAILY    furosemide (LASIX) 20 MG tablet Take 1 tablet (20 mg total) by mouth  daily as needed (fluid).    JARDIANCE 25 mg tablet Take 1 tablet (25 mg total) by mouth once daily.    olmesartan-hydrochlorothiazide (BENICAR HCT) 40-25 mg per tablet Take 1 tablet by mouth once daily.    pantoprazole (PROTONIX) 40 MG tablet Take 40 mg by mouth once daily.    rosuvastatin (CRESTOR) 40 MG Tab Take 1 tablet by mouth once daily    apixaban (ELIQUIS) 5 mg Tab Take 1 tablet (5 mg total) by mouth 2 (two) times daily.    fluticasone propionate (FLONASE) 50 mcg/actuation nasal spray 1 spray (50 mcg total) by Each Nostril route once daily.    isosorbide mononitrate (IMDUR) 30 MG 24 hr tablet Take 1 tablet (30 mg total) by mouth once daily.    NOVOLOG FLEXPEN U-100 INSULIN 100 unit/mL (3 mL) InPn pen Inject 20 Units into the skin 2 (two) times a day.    omeprazole (PRILOSEC) 20 MG capsule Take 20 mg by mouth once daily.    ondansetron (ZOFRAN-ODT) 8 MG TbDL Take 1 tablet (8 mg total) by mouth every 8 (eight) hours as needed (nausea).    pregabalin (LYRICA) 50 MG capsule Take 1 capsule (50 mg total) by mouth 3 (three) times daily.    traMADoL (ULTRAM) 50 mg tablet Take 1 tablet (50 mg total) by mouth every 6 (six) hours.     Family History       Problem Relation (Age of Onset)    Breast cancer Sister    Diabetes Mother, Father    Hypertension Mother, Father, Sister, Brother          Tobacco Use    Smoking status: Never Smoker    Smokeless tobacco: Never Used   Substance and Sexual Activity    Alcohol use: Never    Drug use: Never    Sexual activity: Yes     Review of Systems   Constitutional:  Negative for appetite change, chills, fatigue, fever and unexpected weight change.   HENT:  Negative for congestion, mouth sores, nosebleeds, sinus pain, sore throat and trouble swallowing.    Eyes:  Negative for visual disturbance.   Respiratory:  Positive for chest tightness and shortness of breath. Negative for apnea and cough.    Cardiovascular:  Positive for chest pain and palpitations. Negative for leg swelling.    Gastrointestinal:  Negative for abdominal pain, blood in stool, constipation, diarrhea, nausea and vomiting.   Endocrine: Negative for polyphagia and polyuria.   Genitourinary:  Negative for decreased urine volume, difficulty urinating, dysuria and frequency.   Musculoskeletal:  Negative for arthralgias, back pain and neck pain.   Skin:  Negative for rash.   Neurological:  Negative for syncope, light-headedness and headaches.   Hematological:  Does not bruise/bleed easily.   Psychiatric/Behavioral:  Negative for confusion and suicidal ideas.    Objective:     Vital Signs (Most Recent):  Temp: 98.3 °F (36.8 °C) (06/18/22 1934)  Pulse: (!) 118 (06/18/22 2105)  Resp: 18 (06/18/22 2105)  BP: (!) 112/59 (06/18/22 2105)  SpO2: 98 % (06/18/22 2105)   Vital Signs (24h Range):  Temp:  [98.3 °F (36.8 °C)-98.6 °F (37 °C)] 98.3 °F (36.8 °C)  Pulse:  [110-150] 118  Resp:  [10-23] 18  SpO2:  [94 %-100 %] 98 %  BP: ()/(43-74) 112/59     Weight: 97.3 kg (214 lb 8 oz)  Body mass index is 40.53 kg/m².    Physical Exam  Vitals and nursing note reviewed. Exam conducted with a chaperone present.   Constitutional:       General: She is not in acute distress.     Appearance: Normal appearance. She is obese. She is not ill-appearing.   HENT:      Head: Atraumatic.      Mouth/Throat:      Mouth: Mucous membranes are moist.      Pharynx: Oropharynx is clear.   Eyes:      Conjunctiva/sclera: Conjunctivae normal.      Pupils: Pupils are equal, round, and reactive to light.   Cardiovascular:      Rate and Rhythm: Tachycardia present. Rhythm irregular.      Pulses: Normal pulses.      Heart sounds: Normal heart sounds.   Pulmonary:      Effort: Pulmonary effort is normal.      Breath sounds: Normal breath sounds.   Abdominal:      General: Abdomen is flat. Bowel sounds are normal.      Palpations: Abdomen is soft.   Musculoskeletal:         General: Normal range of motion.   Skin:     General: Skin is warm and dry.      Capillary Refill:  Capillary refill takes less than 2 seconds.      Coloration: Skin is not jaundiced or pale.      Findings: No bruising, lesion or rash.   Neurological:      General: No focal deficit present.      Mental Status: She is alert and oriented to person, place, and time.   Psychiatric:         Mood and Affect: Mood normal.         CRANIAL NERVES     CN III, IV, VI   Pupils are equal, round, and reactive to light.     Significant Labs: All pertinent labs within the past 24 hours have been reviewed.    Significant Imaging: I have reviewed all pertinent imaging results/findings within the past 24 hours.

## 2022-06-19 NOTE — ASSESSMENT & PLAN NOTE
Holding ARB/HCT to maximize BB.    Restart ARB if tolerated.    Holding long acting NTG at present due to low normal BP.  Will need to continue lasix at increased dose due to pulmonary edema at SOB (proBNP is 352 and was 31).

## 2022-06-19 NOTE — CONSULTS
Digestive Disease Consult Note    Chief Complaint   Patient presents with    Chest Pain       History of Present Illness:  Dilma Cr is a 61 y.o. female that  has a past medical history of Arthritis, Cancer, Chronic kidney disease, stage 3b, Colon cancer, Coronary artery disease, Depression, Diabetes mellitus, type 2, GERD (gastroesophageal reflux disease), Hyperlipidemia, Myocardial infarction, and Renal disorder. .    Patient presenting with intermittent chest pressure and palpitations. On presentation noted to have HR up to 150. Labs also revealed elevated troponins. Iron studies showing TANYA, with normal folate and B12 levels. Normal INR.     She has a history of colon cancer s/p resection. She states her last colonoscopy was with Dr. Martini about 2 years ago. She states in July 2021 she also had an EGD done in [facility].    She is on iron and has black stools, however they are solid and hard. She denies any hematochezia.   Patient has been set up for colonoscopy with Dr. Kraft.    Of note, she did have a coronary stent placed 4/2021 and is on ASA and Plavix. Previously was on Eliquis though this was being held.    Review of Systems:  12 point review of systems otherwise negative except as stated in HPI.    Objective:  Vitals:    06/19/22 0719   BP: (!) 111/50   Pulse: 63   Resp: 20   Temp: 98.2 °F (36.8 °C)     Physical Exam  Constitutional:       Appearance: Normal appearance.   HENT:      Head: Normocephalic and atraumatic.      Nose: No congestion or rhinorrhea.   Eyes:      General: No scleral icterus.  Cardiovascular:      Rate and Rhythm: Normal rate.   Pulmonary:      Effort: Pulmonary effort is normal.   Abdominal:      General: Abdomen is flat.      Palpations: Abdomen is soft.      Comments: Midline abdominal scar   Musculoskeletal:         General: No swelling or tenderness.      Cervical back: Neck supple. No rigidity.   Skin:     General: Skin is warm and dry.   Neurological:       Mental Status: She is alert.         Assessment and Plan:  Severe Iron Deficiency Anemia  History of CRC  - will hold off on endoscopy at this time, as patient is not having any overt bleeding  - cardiology evaluation pending  -  Agree with holding iron so as not to obscure clinical picture    Please call if patient develops overt GI bleeding.    Thank you for including us in the care of this patient. Please call with any questions.     Bernardo Schwartz MD  Gastroenterology

## 2022-06-19 NOTE — ASSESSMENT & PLAN NOTE
Holding ARB/HCT to maximize BB.    Restart ARB if tolerated.    Holding long acting NTG at present due to low normal BP.  Will need to continue lasix at increased dose due to pulmonary edema at SOB (proBNP is 352 and was 31).    6/19  - BP normotensive on current meds   - continue to monitor trend  - adjust meds as needed

## 2022-06-19 NOTE — ASSESSMENT & PLAN NOTE
- iron and iron saturation low   - is on iron supplementation  - holding iron in light of possible GI procedure

## 2022-06-19 NOTE — SUBJECTIVE & OBJECTIVE
Interval History: Pt resting in bed. States she has some episodes of chest tightness since admit. HR is better controlled at present. GI and cardiology to see pt today.     Review of Systems   Respiratory:  Positive for chest tightness and shortness of breath.    Cardiovascular:  Positive for chest pain and palpitations.   Gastrointestinal:  Negative for abdominal pain, diarrhea, nausea and vomiting.   All other systems reviewed and are negative.  Objective:     Vital Signs (Most Recent):  Temp: 98.5 °F (36.9 °C) (06/19/22 1155)  Pulse: 62 (06/19/22 1155)  Resp: 20 (06/19/22 1155)  BP: (!) 111/50 (06/19/22 0719)  SpO2: 97 % (06/19/22 1155)   Vital Signs (24h Range):  Temp:  [98.2 °F (36.8 °C)-98.6 °F (37 °C)] 98.5 °F (36.9 °C)  Pulse:  [] 62  Resp:  [10-23] 20  SpO2:  [94 %-100 %] 97 %  BP: ()/(41-74) 111/50     Weight: 97.1 kg (214 lb)  Body mass index is 40.43 kg/m².  No intake or output data in the 24 hours ending 06/19/22 1224   Physical Exam  Vitals and nursing note reviewed. Exam conducted with a chaperone present.   Constitutional:       General: She is not in acute distress.     Appearance: Normal appearance. She is obese. She is not ill-appearing.   HENT:      Head: Atraumatic.      Mouth/Throat:      Mouth: Mucous membranes are moist.      Pharynx: Oropharynx is clear.   Eyes:      Conjunctiva/sclera: Conjunctivae normal.      Pupils: Pupils are equal, round, and reactive to light.   Cardiovascular:      Rate and Rhythm: Normal rate. Rhythm irregular.      Pulses: Normal pulses.      Heart sounds: Normal heart sounds.   Pulmonary:      Effort: Pulmonary effort is normal.      Breath sounds: Normal breath sounds.   Abdominal:      General: Abdomen is flat. Bowel sounds are normal.      Palpations: Abdomen is soft.   Musculoskeletal:         General: Normal range of motion.   Skin:     General: Skin is warm and dry.      Capillary Refill: Capillary refill takes less than 2 seconds.    Neurological:      General: No focal deficit present.      Mental Status: She is alert and oriented to person, place, and time.   Psychiatric:         Mood and Affect: Mood normal.       Significant Labs: All pertinent labs within the past 24 hours have been reviewed.  Recent Lab Results  (Last 5 results in the past 24 hours)        06/19/22  0935   06/19/22  0413   06/19/22  0408   06/18/22 2009 06/18/22  1925        AORTIC VALVE CUSP SEPERATION [id number]               BSA 2.04               Left Ventricle Relative Wall Thickness 0.34               EF 60               E wave deceleration time 258               Ferritin               Folate               FS 34               Iron               Iron Saturation               IVSd 1.09               LA size 3.78               LV EDV .80               LV Diastolic Volume Index 58.14               LVIDd 4.90               LVIDs 3.25               LV mass 167.80               LV Mass Index 86               LV ESV BP 42.50               LV Systolic Volume Index 21.9               MV Peak E Fran 1.28               POC Glucose     227   118   126       Posterior Wall 0.84               RA area 11.6               RVDD 3.07               TAPSE 2.50               TIBC               Triscuspid Valve Regurgitation Peak Gradient 24               TR Max Fran 2.47               Troponin I High Sensitivity   156.3             Vitamin B-12                                      Significant Imaging: I have reviewed all pertinent imaging results/findings within the past 24 hours.

## 2022-06-19 NOTE — ASSESSMENT & PLAN NOTE
Maximized BB.  Will not start diltiazem until echo returns.    Starting amiodarone infusion.    Patient would benefit from OP sleep study.    Trending troponins and monitoring on telemetry  TSH pending.    6/19  - transitioned off amiodarone infusion per nursing  - continue current meds  - TSH 1.640

## 2022-06-19 NOTE — PROGRESS NOTES
[facility] - [address] [place] [address] Medicine  Progress Note    Patient Name: Dilma Cr  MRN: [id number]  Patient Class: IP- Inpatient   Admission Date: 6/18/2022  Length of Stay: 1 days  Attending Physician: Nancy Caballero MD  Primary Care Provider: Primary Doctor No        Subjective:     Principal Problem:Atrial fibrillation with rapid ventricular response        HPI:  61 yo F presents to the ED with a week of intermittent non radiating chest pressure with some SOB and palpitations.  She does not name any aggravating or alleviating factors.  Her EKG shows atrial fibrillation with RVR that has not responded to dig, cardizem or BB IV.  Her trop has increased from 48 to 110.  Her cxr shows some mild bilateral pulmonary edema.  She is on low dose of lasix as well as HCT as OP.  Her K is 3.3 with baseline GFR 45.  Her magnesium is normal      She has a history of CAD with stent  to LAD and RCA on 4/14/21 by Dr. Lora and is on DAPT.  She also has a history of CAF and was on eliquis but was taken off recently due anemia of chronic blood loss.  Her PCP Dr. Olga Potts was making arrangement for Dr. Moustapha Kraft to [facility] for colonoscopy.  Patient has h/o colon cancer and has had a partial colectomy and follows with oncologist Dr. Guillermo Stover.  Her anemia panel at this time appears to be TANYA due to blood loss and she is heme positive.      She takes long acting NTG as OP and is also on benicar/HCT.  She is not on a rate controlling agent but I see on Dr. Lora's last note she was on metoprolol but also her med rec has her still on eliquis and also on a steriod that she had not taken in a very long time.  Her med recon sheet will need to be updated.  She is on Jardance and iron supplementation at present.      ROS as below.  She states she has never been diagnosed with OLIVERIO and is unaware of snoring or apnea.  Would benefit from OP sleep study as she is morbidly obese.        Overview/Hospital  Course:  6/19: cardiology and GI following; GI deferring any potential procedures until cardiology has finished their workup; plans for [facility] tomorrow; HR better controlled at present- monitor trend; continue current meds- has been transitioned off amiodarone       Interval History: Pt resting in bed. States she has some episodes of chest tightness since admit. HR is better controlled at present. GI and cardiology to see pt today.     Review of Systems   Respiratory:  Positive for chest tightness and shortness of breath.    Cardiovascular:  Positive for chest pain and palpitations.   Gastrointestinal:  Negative for abdominal pain, diarrhea, nausea and vomiting.   All other systems reviewed and are negative.  Objective:     Vital Signs (Most Recent):  Temp: 98.5 °F (36.9 °C) (06/19/22 1155)  Pulse: 62 (06/19/22 1155)  Resp: 20 (06/19/22 1155)  BP: (!) 111/50 (06/19/22 0719)  SpO2: 97 % (06/19/22 1155)   Vital Signs (24h Range):  Temp:  [98.2 °F (36.8 °C)-98.6 °F (37 °C)] 98.5 °F (36.9 °C)  Pulse:  [] 62  Resp:  [10-23] 20  SpO2:  [94 %-100 %] 97 %  BP: ()/(41-74) 111/50     Weight: 97.1 kg (214 lb)  Body mass index is 40.43 kg/m².  No intake or output data in the 24 hours ending 06/19/22 1224   Physical Exam  Vitals and nursing note reviewed. Exam conducted with a chaperone present.   Constitutional:       General: She is not in acute distress.     Appearance: Normal appearance. She is obese. She is not ill-appearing.   HENT:      Head: Atraumatic.      Mouth/Throat:      Mouth: Mucous membranes are moist.      Pharynx: Oropharynx is clear.   Eyes:      Conjunctiva/sclera: Conjunctivae normal.      Pupils: Pupils are equal, round, and reactive to light.   Cardiovascular:      Rate and Rhythm: Normal rate. Rhythm irregular.      Pulses: Normal pulses.      Heart sounds: Normal heart sounds.   Pulmonary:      Effort: Pulmonary effort is normal.      Breath sounds: Normal breath sounds.   Abdominal:       General: Abdomen is flat. Bowel sounds are normal.      Palpations: Abdomen is soft.   Musculoskeletal:         General: Normal range of motion.   Skin:     General: Skin is warm and dry.      Capillary Refill: Capillary refill takes less than 2 seconds.   Neurological:      General: No focal deficit present.      Mental Status: She is alert and oriented to person, place, and time.   Psychiatric:         Mood and Affect: Mood normal.       Significant Labs: All pertinent labs within the past 24 hours have been reviewed.  Recent Lab Results  (Last 5 results in the past 24 hours)        06/19/22  0935   06/19/22  0413   06/19/22  0408   06/18/22 2009 06/18/22  1925        AORTIC VALVE CUSP SEPERATION [id number]               BSA 2.04               Left Ventricle Relative Wall Thickness 0.34               EF 60               E wave deceleration time 258               Ferritin               Folate               FS 34               Iron               Iron Saturation               IVSd 1.09               LA size 3.78               LV EDV .80               LV Diastolic Volume Index 58.14               LVIDd 4.90               LVIDs 3.25               LV mass 167.80               LV Mass Index 86               LV ESV BP 42.50               LV Systolic Volume Index 21.9               MV Peak E Fran 1.28               POC Glucose     227   118   126       Posterior Wall 0.84               RA area 11.6               RVDD 3.07               TAPSE 2.50               TIBC               Triscuspid Valve Regurgitation Peak Gradient 24               TR Max Fran 2.47               Troponin I High Sensitivity   156.3             Vitamin B-12                                      Significant Imaging: I have reviewed all pertinent imaging results/findings within the past 24 hours.      Assessment/Plan:      * Atrial fibrillation with rapid ventricular response  Maximized BB.  Will not start diltiazem until echo returns.     Starting amiodarone infusion.    Patient would benefit from OP sleep study.    Trending troponins and monitoring on telemetry  TSH pending.    6/19  - transitioned off amiodarone infusion per nursing  - continue current meds  - TSH 1.640       NSTEMI (non-ST elevated myocardial infarction)  - trop 110 -> 156  - ECHO  The left ventricle is normal in size with normal systolic function.  The estimated ejection fraction is 60-65%.  Normal left ventricular diastolic function.  Normal right ventricular size with normal right ventricular systolic function.  Mild tricuspid regurgitation.    - CXR Mildly increasing diffuse infiltrates versus pulmonary edema  - cardiac monitoring  - ASA, statin, BB,   - AC held in light of possible GIB and anemia  - holding ACE/ARB for renal dysfunction   - lipid panel pending  - A1C 8.6 on 6/16     Anemia  -H/H 8/26  - iron 20 with iron saturation 6  - has been on iron supplements-- holding in light of possible GI procedure       Iron deficiency anemia due to chronic blood loss  - iron and iron saturation low   - is on iron supplementation  - holding iron in light of possible GI procedure       Chronic kidney disease (CKD) stage G3b/A3, moderately decreased glomerular filtration rate (GFR) between 30-44 mL/min/1.73 square meter and albuminuria creatinine ratio greater than 300 mg/g  - Cre 1.34   - per previous labs- stable   - monitor labs   - avoid nephrotoxic meds       Type 2 diabetes mellitus with diabetic polyneuropathy, with long-term current use of insulin  Hold jardance while NPO  Basal bolus insulin while in hospital.  Continue lyrica  6/19  - continue long acting and SSI  - monitor BG trend and adjust as needed   - A1C 8.6 on 6/16       Atherosclerosis of native coronary artery of native heart with unstable angina pectoris  Continue DAPT and statin.  Last cardiac stents were 5/21.        Hypertension  Holding ARB/HCT to maximize BB.    Restart ARB if tolerated.    Holding long  acting NTG at present due to low normal BP.  Will need to continue lasix at increased dose due to pulmonary edema at SOB (proBNP is 352 and was 31).    6/19  - BP normotensive on current meds   - continue to monitor trend  - adjust meds as needed     Type 2 diabetes mellitus with ophthalmic complication            VTE Risk Mitigation (From admission, onward)    None          Discharge Planning   DEVAN:      Code Status: Full Code   Is the patient medically ready for discharge?:     Reason for patient still in hospital (select all that apply): Treatment             EDMOND Hodges  Department of Hospital Medicine   [address]

## 2022-06-19 NOTE — ASSESSMENT & PLAN NOTE
Maximized BB.  Will not start diltiazem until echo returns.    Starting amiodarone infusion.    Patient would benefit from OP sleep study.    Trending troponins and monitoring on telemetry  TSH pending.

## 2022-06-19 NOTE — PLAN OF CARE
Problem: Adult Inpatient Plan of Care  Goal: Plan of Care Review  Outcome: Ongoing, Progressing  Flowsheets (Taken 6/19/2022 1741)  Plan of Care Reviewed With: patient  Goal: Patient-Specific Goal (Individualized)  Outcome: Ongoing, Progressing  Goal: Absence of Hospital-Acquired Illness or Injury  Outcome: Ongoing, Progressing  Intervention: Identify and Manage Fall Risk  Flowsheets (Taken 6/19/2022 1741)  Safety Promotion/Fall Prevention:   assistive device/personal item within reach   nonskid shoes/socks when out of bed   side rails raised x 2  Intervention: Prevent Skin Injury  Flowsheets (Taken 6/19/2022 1741)  Body Position: position changed independently  Skin Protection:   adhesive use limited   transparent dressing maintained   tubing/devices free from skin contact  Intervention: Prevent and Manage VTE (Venous Thromboembolism) Risk  Flowsheets (Taken 6/19/2022 1741)  Activity Management: Ambulated -L4  VTE Prevention/Management: ambulation promoted  Range of Motion: active ROM (range of motion) encouraged  Intervention: Prevent Infection  Flowsheets (Taken 6/19/2022 1741)  Infection Prevention:   hand hygiene promoted   equipment surfaces disinfected  Goal: Optimal Comfort and Wellbeing  Outcome: Ongoing, Progressing  Intervention: Monitor Pain and Promote Comfort  Flowsheets (Taken 6/19/2022 1741)  Pain Management Interventions:   care clustered   pain management plan reviewed with patient/caregiver  Intervention: Provide Person-Centered Care  Flowsheets (Taken 6/19/2022 1741)  Trust Relationship/Rapport:   care explained   choices provided   emotional support provided   empathic listening provided   questions answered   questions encouraged   reassurance provided   thoughts/feelings acknowledged  Goal: Readiness for Transition of Care  Outcome: Ongoing, Progressing     Problem: Diabetes Comorbidity  Goal: Blood Glucose Level Within Targeted Range  Outcome: Ongoing, Progressing  Intervention: Monitor and  Manage Glycemia  Flowsheets (Taken 6/19/2022 1741)  Glycemic Management: blood glucose monitored     Problem: Impaired Wound Healing  Goal: Optimal Wound Healing  Outcome: Ongoing, Progressing  Intervention: Promote Wound Healing  Flowsheets (Taken 6/19/2022 1741)  Activity Management: Ambulated -L4  Pain Management Interventions:   care clustered   pain management plan reviewed with patient/caregiver     Problem: Bariatric Environmental Safety  Goal: Safety Maintained with Care  Outcome: Ongoing, Progressing    POC reviewed and continues

## 2022-06-19 NOTE — ASSESSMENT & PLAN NOTE
- trop 110 -> 156  - ECHO  · The left ventricle is normal in size with normal systolic function.  · The estimated ejection fraction is 60-65%.  · Normal left ventricular diastolic function.  · Normal right ventricular size with normal right ventricular systolic function.  · Mild tricuspid regurgitation.    - CXR Mildly increasing diffuse infiltrates versus pulmonary edema  - cardiac monitoring  - ASA, statin, BB,   - AC held in light of possible GIB and anemia  - holding ACE/ARB for renal dysfunction   - lipid panel pending  - A1C 8.6 on 6/16

## 2022-06-19 NOTE — ASSESSMENT & PLAN NOTE
Hold jardance while NPO  Basal bolus insulin while in hospital.  Continue lyrica  6/19  - continue long acting and SSI  - monitor BG trend and adjust as needed   - A1C 8.6 on 6/16

## 2022-06-19 NOTE — CONSULTS
[address] Telemetry  Cardiology  Consult Note    Patient Name: Dilma Cr  MRN: [id number]  Admission Date: 6/18/2022  Hospital Length of Stay: 1 days  Code Status: Full Code   Attending Provider: Nancy Caballero MD   Consulting Provider: Stefany Lora MD  Primary Care Physician: Primary Doctor No  Principal Problem:Atrial fibrillation with rapid ventricular response    Patient information was obtained from patient and ER records.     Consults  Subjective:     Chief Complaint:  NSTEMI     HPI:   61-year-old female with history of colon cancer, please status post PCI to LAD and RCA, diabetes, hyperlipidemia, atrial fibrillation not on anticoagulation due to GI bleeding admitted with intermittent chest pains with rising troponin.  Echocardiogram shows normal overall LV function.  Patient was also noted to be in AFib with RVR since then has converted back to normal sinus rhythm.  She is also anemic with concern for GI bleeding, hemoglobin has dropped to 8 from 10. She was previously on aspirin Plavix which were stopped after having completed the year from her PCI and then was switched to Eliquis however complained of GI upset from that and has not been taking Eliquis.      Denies any chest pain or pressure at this time.    Past Medical History:   Diagnosis Date    Arthritis     Cancer     colon cancer    Chronic kidney disease, stage 3b     Colon cancer     Coronary artery disease     Depression     Diabetes mellitus, type 2     GERD (gastroesophageal reflux disease)     Hyperlipidemia     Myocardial infarction     2 stents in 05/14/2021 Dr Lora    Renal disorder        Past Surgical History:   Procedure Laterality Date    BREAST SURGERY      C5-C6 RADHA  8-, 7-, 6-1-2016    Dr Fowler    CERVICAL SPINE SURGERY      COLON SURGERY      EPIDURAL STEROID INJECTION INTO LUMBAR SPINE N/A 3/29/2022    Procedure: L4-5 RADHA;  Surgeon: Gela Fowler MD;  Location:  [facility] PAIN MGMT;  Service: Pain Management;  Laterality: N/A;  PT AWARE ON OV TO BE TESTED\  PLAVIX TO BE HELD FOR 7 DAYS PRIOR TO PROCEDURE PER DR BERMUDEZ AND DR PORTER  3-28  message left on  re: covid    HYSTERECTOMY      LEFT HEART CATHETERIZATION Left 5/14/2021    Procedure: Left heart cath;  Surgeon: Mateus Guan MD;  Location: [facility] CATH LAB;  Service: Cardiology;  Laterality: Left;    MEDIPORT REMOVAL  10/14/2021    Dr Kraft    right rotator cuff repair      in [place]    TOTAL REDUCTION MAMMOPLASTY         Review of patient's allergies indicates:  No Known Allergies    No current facility-administered medications on file prior to encounter.     Current Outpatient Medications on File Prior to Encounter   Medication Sig    ascorbic acid, vitamin C, 250 mg Chew Take 250 mg by mouth once daily.    aspirin 81 MG Chew Take 81 mg by mouth once daily.    clopidogreL (PLAVIX) 75 mg tablet Take 75 mg by mouth once daily.    cyanocobalamin (VITAMIN B-12) 1000 MCG tablet Take 100 mcg by mouth once daily.    ferrous sulfate 325 (65 FE) MG EC tablet Take 1 tablet (325 mg total) by mouth once daily.    FREESTYLE BECCA 14 DAY SENSOR Kit USE TO CHECK GLUCOSE 4 TIMES DAILY    furosemide (LASIX) 20 MG tablet Take 1 tablet (20 mg total) by mouth daily as needed (fluid).    JARDIANCE 25 mg tablet Take 1 tablet (25 mg total) by mouth once daily.    olmesartan-hydrochlorothiazide (BENICAR HCT) 40-25 mg per tablet Take 1 tablet by mouth once daily.    pantoprazole (PROTONIX) 40 MG tablet Take 40 mg by mouth once daily.    rosuvastatin (CRESTOR) 40 MG Tab Take 1 tablet by mouth once daily    apixaban (ELIQUIS) 5 mg Tab Take 1 tablet (5 mg total) by mouth 2 (two) times daily.    fluticasone propionate (FLONASE) 50 mcg/actuation nasal spray 1 spray (50 mcg total) by Each Nostril route once daily.    isosorbide mononitrate (IMDUR) 30 MG 24 hr tablet Take 1 tablet (30 mg total) by mouth once daily.     NOVOLOG FLEXPEN U-100 INSULIN 100 unit/mL (3 mL) InPn pen Inject 20 Units into the skin 2 (two) times a day.    omeprazole (PRILOSEC) 20 MG capsule Take 20 mg by mouth once daily.    ondansetron (ZOFRAN-ODT) 8 MG TbDL Take 1 tablet (8 mg total) by mouth every 8 (eight) hours as needed (nausea).    pregabalin (LYRICA) 50 MG capsule Take 1 capsule (50 mg total) by mouth 3 (three) times daily.    traMADoL (ULTRAM) 50 mg tablet Take 1 tablet (50 mg total) by mouth every 6 (six) hours.     Family History     Problem Relation (Age of Onset)    Breast cancer Sister    Diabetes Mother, Father    Hypertension Mother, Father, Sister, Brother        Tobacco Use    Smoking status: Never Smoker    Smokeless tobacco: Never Used   Substance and Sexual Activity    Alcohol use: Never    Drug use: Never    Sexual activity: Yes     ROS  Objective:     Vital Signs (Most Recent):  Temp: 98.5 °F (36.9 °C) (06/19/22 1155)  Pulse: 62 (06/19/22 1155)  Resp: 20 (06/19/22 1155)  BP: (!) 111/50 (06/19/22 0719)  SpO2: 97 % (06/19/22 1155) Vital Signs (24h Range):  Temp:  [98.2 °F (36.8 °C)-98.6 °F (37 °C)] 98.5 °F (36.9 °C)  Pulse:  [] 62  Resp:  [10-23] 20  SpO2:  [94 %-100 %] 97 %  BP: ()/(41-74) 111/50     Weight: 97.1 kg (214 lb)  Body mass index is 40.43 kg/m².    SpO2: 97 %  O2 Device (Oxygen Therapy): room air    No intake or output data in the 24 hours ending 06/19/22 1223    Lines/Drains/Airways     Peripheral Intravenous Line  Duration                Peripheral IV - Single Lumen 06/18/22 20 G Anterior;Distal;Left Forearm 1 day                Physical Exam   No murmur,  No edema,  Lungs are clear to auscultation    Significant Labs:   CMP   Recent Labs   Lab 06/18/22  1422      K 3.8      CO2 24   *   BUN 29*   CREATININE 1.34*   CALCIUM 8.7   PROT 6.5   ALBUMIN 3.2*   BILITOT 0.2   ALKPHOS 96   AST 22   ALT 32   ANIONGAP 17*   EGFRNONAA 43*    and CBC   Recent Labs   Lab 06/18/22  1422   WBC  8.06   HGB 8.0*   HCT 26.1*   *       Significant Imaging: Echocardiogram:   Transthoracic echo (TTE) complete (Cupid Only):   Results for orders placed or performed during the hospital encounter of 06/18/22   Echo Saline Bubble? No   Result Value Ref Range    BSA 2.04 m2    EF 60 %    AORTIC VALVE CUSP SEPERATION [id number] cm    LVIDd 4.90 3.5 - 6.0 cm    IVS 1.09 0.6 - 1.1 cm    Posterior Wall 0.84 0.6 - 1.1 cm    LVIDs 3.25 2.1 - 4.0 cm    FS 34 28 - 44 %    LV mass 167.80 g    LA size 3.78 cm    RVDD 3.07 cm    TAPSE 2.50 cm    RA area 11.6 cm2    Left Ventricle Relative Wall Thickness 0.34 cm    E wave deceleration time 258 msec    MV Peak E Fran 1.28 m/s    TR Max Fran 2.47 m/s    LV Systolic Volume 42.50 mL    LV Systolic Volume Index 21.9 mL/m2    LV Diastolic Volume 112.80 mL    LV Diastolic Volume Index 58.14 mL/m2    LV Mass Index 86 g/m2    Triscuspid Valve Regurgitation Peak Gradient 24 mmHg    Narrative    · The left ventricle is normal in size with normal systolic function.  · The estimated ejection fraction is 60-65%.  · Normal left ventricular diastolic function.  · Normal right ventricular size with normal right ventricular systolic   function.  · Mild tricuspid regurgitation.        Assessment and Plan:     Active Diagnoses:    Diagnosis Date Noted POA    PRINCIPAL PROBLEM:  Atrial fibrillation with rapid ventricular response [I48.91] 05/14/2021 Yes    Iron deficiency anemia due to chronic blood loss [D50.0] 06/19/2022 Yes    Anemia [D64.9]  Yes    History of colon cancer [Z85.038]  Yes    Chronic kidney disease (CKD) stage G3b/A3, moderately decreased glomerular filtration rate (GFR) between 30-44 mL/min/1.73 square meter and albuminuria creatinine ratio greater than 300 mg/g [N18.32] 06/18/2022 Yes    Type 2 diabetes mellitus with diabetic polyneuropathy, with long-term current use of insulin [E11.42, Z79.4] 08/30/2021 Not Applicable     Chronic    Atherosclerosis of native  coronary artery of native heart with unstable angina pectoris [I25.110] 06/30/2021 Yes     Chronic    Hypertension [I10] 06/16/2021 Yes     Chronic    Type 2 diabetes mellitus with ophthalmic complication [E11.39] 05/14/2021 Yes     Chronic      Problems Resolved During this Admission:       VTE Risk Mitigation (From admission, onward)    None          # NSTEMI  Status post PCI to LAD and RCA with Dr. Quick in April 2021  She is not complaining of chest pressure and dyspnea with minimal exertion.  She had some residual disease in a diffusely diseased PDA.  Will recommend left heart catheterization tomorrow.      Thank you for your consult.    Stefany Lora MD  Cardiology   [facility] - [address] [place] [address]

## 2022-06-19 NOTE — HOSPITAL COURSE
"6/19: cardiology and GI following; GI deferring any potential procedures until cardiology has finished their workup; plans for LHC tomorrow; HR better controlled at present- monitor trend; continue current meds- has been transitioned off amiodarone   6/20: HR remains stable with current medications; plans for LHC today; no signs of bleeding; GI following   6/21: LHC as follows   1. Single vessel CAD (90% mid-distal LAD in-stent restenosis)  2. Normal left sided filling pressure (LVEDP - 10mmHg)  3. S/p successful angioplasty with scoring balloon (angiosculpt) of mid-distal LAD in-stent restenosis  EGD Mucosa of the esophagus was normal without varices or bleeding. The stomach had gastritis and a 4cm hiatus hernia. No biopsies were obtained due to plavix therapy. The pyloric channel was patent. Mucosa of the duodenum was normal. prep for colonoscopy tomorrow.  06/22:  Pt with no new issues or concers, states feels well enough to be discharged today and is s/p colonoscopy today, "Overall Impression: No bleeding was seen. No polyps were seen. Diverticula were present in the sigmoid and descending colon. Right garrick-colectomy changes are present"; Recommendations to repeat Colonoscopy in 3 years, resume diet, po iron, monitor for bleeding, intermittent H&H after discharge. Follow up with pcp in one week with repeat CBC, follow up with cards as directed and GI in 4 weeks; follow cards recs at discharge.  Resume home meds, hold arb pending hospital follow up. Will not need Aldosterone antagonist as pt's EF is 60-65%. Has met maximum benefit from this hospitalization and is stable for discharge, will have cardiac rehab.         "

## 2022-06-19 NOTE — ASSESSMENT & PLAN NOTE
-H/H 8/26  - iron 20 with iron saturation 6  - has been on iron supplements-- holding in light of possible GI procedure

## 2022-06-19 NOTE — HPI
59 yo F presents to the ED with a week of intermittent non radiating chest pressure with some SOB and palpitations.  She does not name any aggravating or alleviating factors.  Her EKG shows atrial fibrillation with RVR that has not responded to dig, cardizem or BB IV.  Her trop has increased from 48 to 110.  Her cxr shows some mild bilateral pulmonary edema.  She is on low dose of lasix as well as HCT as OP.  Her K is 3.3 with baseline GFR 45.  Her magnesium is normal      She has a history of CAD with stent  to LAD and RCA on 4/14/21 by Dr. Lora and is on DAPT.  She also has a history of CAF and was on eliquis but was taken off recently due anemia of chronic blood loss.  Her PCP Dr. Olga Potts was making arrangement for Dr. Moustapha Kraft to [facility] for colonoscopy.  Patient has h/o colon cancer and has had a partial colectomy and follows with oncologist Dr. Guillermo Stover.  Her anemia panel at this time appears to be TANYA due to blood loss and she is heme positive.      She takes long acting NTG as OP and is also on benicar/HCT.  She is not on a rate controlling agent but I see on Dr. Lora's last note she was on metoprolol but also her med rec has her still on eliquis and also on a steriod that she had not taken in a very long time.  Her med recon sheet will need to be updated.  She is on Jardance and iron supplementation at present.      ROS as below.  She states she has never been diagnosed with OLIVERIO and is unaware of snoring or apnea.  Would benefit from OP sleep study as she is morbidly obese.

## 2022-06-20 LAB
ANION GAP SERPL CALCULATED.3IONS-SCNC: 16 MMOL/L (ref 7–16)
BASOPHILS # BLD AUTO: 0.04 K/UL (ref 0–0.2)
BASOPHILS NFR BLD AUTO: 0.4 % (ref 0–1)
BUN SERPL-MCNC: 24 MG/DL (ref 7–18)
BUN/CREAT SERPL: 20 (ref 6–20)
CALCIUM SERPL-MCNC: 8.7 MG/DL (ref 8.5–10.1)
CHLORIDE SERPL-SCNC: 106 MMOL/L (ref 98–107)
CO2 SERPL-SCNC: 25 MMOL/L (ref 21–32)
CREAT SERPL-MCNC: 1.21 MG/DL (ref 0.55–1.02)
DIFFERENTIAL METHOD BLD: ABNORMAL
EOSINOPHIL # BLD AUTO: 0.24 K/UL (ref 0–0.5)
EOSINOPHIL NFR BLD AUTO: 2.5 % (ref 1–4)
ERYTHROCYTE [DISTWIDTH] IN BLOOD BY AUTOMATED COUNT: 15.5 % (ref 11.5–14.5)
GLUCOSE SERPL-MCNC: 144 MG/DL (ref 74–106)
GLUCOSE SERPL-MCNC: 160 MG/DL (ref 70–105)
GLUCOSE SERPL-MCNC: 168 MG/DL (ref 70–105)
GLUCOSE SERPL-MCNC: 304 MG/DL (ref 70–105)
HCT VFR BLD AUTO: 29.7 % (ref 38–47)
HGB BLD-MCNC: 9.1 G/DL (ref 12–16)
IMM GRANULOCYTES # BLD AUTO: 0.05 K/UL (ref 0–0.04)
IMM GRANULOCYTES NFR BLD: 0.5 % (ref 0–0.4)
LYMPHOCYTES # BLD AUTO: 2.61 K/UL (ref 1–4.8)
LYMPHOCYTES NFR BLD AUTO: 27.4 % (ref 27–41)
MCH RBC QN AUTO: 25.4 PG (ref 27–31)
MCHC RBC AUTO-ENTMCNC: 30.6 G/DL (ref 32–36)
MCV RBC AUTO: 83 FL (ref 80–96)
MONOCYTES # BLD AUTO: 0.66 K/UL (ref 0–0.8)
MONOCYTES NFR BLD AUTO: 6.9 % (ref 2–6)
MPC BLD CALC-MCNC: 10.7 FL (ref 9.4–12.4)
NEUTROPHILS # BLD AUTO: 5.92 K/UL (ref 1.8–7.7)
NEUTROPHILS NFR BLD AUTO: 62.3 % (ref 53–65)
NRBC # BLD AUTO: 0 X10E3/UL
NRBC, AUTO (.00): 0 %
PLATELET # BLD AUTO: 433 K/UL (ref 150–400)
POTASSIUM SERPL-SCNC: 4.9 MMOL/L (ref 3.5–5.1)
RBC # BLD AUTO: 3.58 M/UL (ref 4.2–5.4)
SODIUM SERPL-SCNC: 142 MMOL/L (ref 136–145)
WBC # BLD AUTO: 9.52 K/UL (ref 4.5–11)

## 2022-06-20 PROCEDURE — 27201423 OPTIME MED/SURG SUP & DEVICES STERILE SUPPLY: Performed by: STUDENT IN AN ORGANIZED HEALTH CARE EDUCATION/TRAINING PROGRAM

## 2022-06-20 PROCEDURE — 25000003 PHARM REV CODE 250: Performed by: STUDENT IN AN ORGANIZED HEALTH CARE EDUCATION/TRAINING PROGRAM

## 2022-06-20 PROCEDURE — 25000003 PHARM REV CODE 250: Performed by: HOSPITALIST

## 2022-06-20 PROCEDURE — 93458 L HRT ARTERY/VENTRICLE ANGIO: CPT | Mod: 26,XU,, | Performed by: STUDENT IN AN ORGANIZED HEALTH CARE EDUCATION/TRAINING PROGRAM

## 2022-06-20 PROCEDURE — 93458 L HRT ARTERY/VENTRICLE ANGIO: CPT | Performed by: STUDENT IN AN ORGANIZED HEALTH CARE EDUCATION/TRAINING PROGRAM

## 2022-06-20 PROCEDURE — C1894 INTRO/SHEATH, NON-LASER: HCPCS | Performed by: STUDENT IN AN ORGANIZED HEALTH CARE EDUCATION/TRAINING PROGRAM

## 2022-06-20 PROCEDURE — 92920 PR PTCA: ICD-10-PCS | Mod: LD,,, | Performed by: STUDENT IN AN ORGANIZED HEALTH CARE EDUCATION/TRAINING PROGRAM

## 2022-06-20 PROCEDURE — C1725 CATH, TRANSLUMIN NON-LASER: HCPCS | Performed by: STUDENT IN AN ORGANIZED HEALTH CARE EDUCATION/TRAINING PROGRAM

## 2022-06-20 PROCEDURE — C9113 INJ PANTOPRAZOLE SODIUM, VIA: HCPCS | Performed by: NURSE PRACTITIONER

## 2022-06-20 PROCEDURE — C1713 ANCHOR/SCREW BN/BN,TIS/BN: HCPCS | Performed by: STUDENT IN AN ORGANIZED HEALTH CARE EDUCATION/TRAINING PROGRAM

## 2022-06-20 PROCEDURE — 82962 GLUCOSE BLOOD TEST: CPT

## 2022-06-20 PROCEDURE — 63600175 PHARM REV CODE 636 W HCPCS: Performed by: HOSPITALIST

## 2022-06-20 PROCEDURE — C1887 CATHETER, GUIDING: HCPCS | Performed by: STUDENT IN AN ORGANIZED HEALTH CARE EDUCATION/TRAINING PROGRAM

## 2022-06-20 PROCEDURE — 63600175 PHARM REV CODE 636 W HCPCS: Performed by: STUDENT IN AN ORGANIZED HEALTH CARE EDUCATION/TRAINING PROGRAM

## 2022-06-20 PROCEDURE — C1769 GUIDE WIRE: HCPCS | Performed by: STUDENT IN AN ORGANIZED HEALTH CARE EDUCATION/TRAINING PROGRAM

## 2022-06-20 PROCEDURE — 63600175 PHARM REV CODE 636 W HCPCS: Performed by: NURSE PRACTITIONER

## 2022-06-20 PROCEDURE — 36415 COLL VENOUS BLD VENIPUNCTURE: CPT | Performed by: NURSE PRACTITIONER

## 2022-06-20 PROCEDURE — 80048 BASIC METABOLIC PNL TOTAL CA: CPT | Performed by: NURSE PRACTITIONER

## 2022-06-20 PROCEDURE — 27100168 OPTIME MED/SURG SUP & DEVICES NON-STERILE SUPPLY: Performed by: STUDENT IN AN ORGANIZED HEALTH CARE EDUCATION/TRAINING PROGRAM

## 2022-06-20 PROCEDURE — 99233 PR SUBSEQUENT HOSPITAL CARE,LEVL III: ICD-10-PCS | Mod: ,,, | Performed by: HOSPITALIST

## 2022-06-20 PROCEDURE — 63600175 PHARM REV CODE 636 W HCPCS

## 2022-06-20 PROCEDURE — 99153 MOD SED SAME PHYS/QHP EA: CPT | Performed by: STUDENT IN AN ORGANIZED HEALTH CARE EDUCATION/TRAINING PROGRAM

## 2022-06-20 PROCEDURE — 92920 PRQ TRLUML C ANGIOP 1ART&/BR: CPT | Mod: LD,,, | Performed by: STUDENT IN AN ORGANIZED HEALTH CARE EDUCATION/TRAINING PROGRAM

## 2022-06-20 PROCEDURE — 11000001 HC ACUTE MED/SURG PRIVATE ROOM

## 2022-06-20 PROCEDURE — C9399 UNCLASSIFIED DRUGS OR BIOLOG: HCPCS | Performed by: HOSPITALIST

## 2022-06-20 PROCEDURE — 27000080 OPTIME MED/SURG SUP & DEVICES GENERAL CLASSIFICATION: Performed by: STUDENT IN AN ORGANIZED HEALTH CARE EDUCATION/TRAINING PROGRAM

## 2022-06-20 PROCEDURE — 99233 SBSQ HOSP IP/OBS HIGH 50: CPT | Mod: ,,, | Performed by: HOSPITALIST

## 2022-06-20 PROCEDURE — 92920 PRQ TRLUML C ANGIOP 1ART&/BR: CPT | Mod: LD | Performed by: STUDENT IN AN ORGANIZED HEALTH CARE EDUCATION/TRAINING PROGRAM

## 2022-06-20 PROCEDURE — 99152 MOD SED SAME PHYS/QHP 5/>YRS: CPT | Performed by: STUDENT IN AN ORGANIZED HEALTH CARE EDUCATION/TRAINING PROGRAM

## 2022-06-20 PROCEDURE — 85025 COMPLETE CBC W/AUTO DIFF WBC: CPT | Performed by: NURSE PRACTITIONER

## 2022-06-20 PROCEDURE — 93458 PR CATH PLACE/CORON ANGIO, IMG SUPER/INTERP,W LEFT HEART VENTRICULOGRAPHY: ICD-10-PCS | Mod: 26,XU,, | Performed by: STUDENT IN AN ORGANIZED HEALTH CARE EDUCATION/TRAINING PROGRAM

## 2022-06-20 RX ORDER — HEPARIN SODIUM 5000 [USP'U]/ML
INJECTION, SOLUTION INTRAVENOUS; SUBCUTANEOUS
Status: DISCONTINUED | OUTPATIENT
Start: 2022-06-20 | End: 2022-06-20 | Stop reason: HOSPADM

## 2022-06-20 RX ORDER — SODIUM CHLORIDE 9 MG/ML
INJECTION, SOLUTION INTRAVENOUS CONTINUOUS
Status: DISCONTINUED | OUTPATIENT
Start: 2022-06-20 | End: 2022-06-22 | Stop reason: HOSPADM

## 2022-06-20 RX ORDER — LIDOCAINE HYDROCHLORIDE 10 MG/ML
INJECTION INFILTRATION; PERINEURAL
Status: DISCONTINUED | OUTPATIENT
Start: 2022-06-20 | End: 2022-06-20 | Stop reason: HOSPADM

## 2022-06-20 RX ORDER — ACETAMINOPHEN 325 MG/1
650 TABLET ORAL EVERY 4 HOURS PRN
Status: DISCONTINUED | OUTPATIENT
Start: 2022-06-20 | End: 2022-06-22 | Stop reason: HOSPADM

## 2022-06-20 RX ORDER — SODIUM CHLORIDE 9 MG/ML
INJECTION, SOLUTION INTRAVENOUS
Status: COMPLETED
Start: 2022-06-20 | End: 2022-06-21

## 2022-06-20 RX ORDER — HEPARIN SOD,PORCINE/0.9 % NACL 1000/500ML
INTRAVENOUS SOLUTION INTRAVENOUS
Status: DISCONTINUED | OUTPATIENT
Start: 2022-06-20 | End: 2022-06-20 | Stop reason: HOSPADM

## 2022-06-20 RX ORDER — MIDAZOLAM HYDROCHLORIDE 1 MG/ML
INJECTION, SOLUTION INTRAMUSCULAR; INTRAVENOUS
Status: DISCONTINUED | OUTPATIENT
Start: 2022-06-20 | End: 2022-06-20 | Stop reason: HOSPADM

## 2022-06-20 RX ORDER — ONDANSETRON 4 MG/1
8 TABLET, ORALLY DISINTEGRATING ORAL EVERY 8 HOURS PRN
Status: DISCONTINUED | OUTPATIENT
Start: 2022-06-20 | End: 2022-06-22 | Stop reason: HOSPADM

## 2022-06-20 RX ORDER — FENTANYL CITRATE 50 UG/ML
INJECTION, SOLUTION INTRAMUSCULAR; INTRAVENOUS
Status: DISCONTINUED | OUTPATIENT
Start: 2022-06-20 | End: 2022-06-20 | Stop reason: HOSPADM

## 2022-06-20 RX ADMIN — METOPROLOL TARTRATE 25 MG: 25 TABLET, FILM COATED ORAL at 08:06

## 2022-06-20 RX ADMIN — ASPIRIN 81 MG CHEWABLE TABLET 81 MG: 81 TABLET CHEWABLE at 08:06

## 2022-06-20 RX ADMIN — INSULIN ASPART 6 UNITS: 100 INJECTION, SOLUTION INTRAVENOUS; SUBCUTANEOUS at 05:06

## 2022-06-20 RX ADMIN — ATORVASTATIN CALCIUM 80 MG: 80 TABLET, FILM COATED ORAL at 08:06

## 2022-06-20 RX ADMIN — TRAMADOL HYDROCHLORIDE 50 MG: 50 TABLET, COATED ORAL at 05:06

## 2022-06-20 RX ADMIN — CLOPIDOGREL 75 MG: 75 TABLET, FILM COATED ORAL at 08:06

## 2022-06-20 RX ADMIN — Medication 100 MCG: at 08:06

## 2022-06-20 RX ADMIN — PREGABALIN 50 MG: 50 CAPSULE ORAL at 05:06

## 2022-06-20 RX ADMIN — PREGABALIN 50 MG: 50 CAPSULE ORAL at 08:06

## 2022-06-20 RX ADMIN — INSULIN DETEMIR 12 UNITS: 100 INJECTION, SOLUTION SUBCUTANEOUS at 08:06

## 2022-06-20 RX ADMIN — PANTOPRAZOLE SODIUM 40 MG: 40 INJECTION, POWDER, FOR SOLUTION INTRAVENOUS at 08:06

## 2022-06-20 NOTE — ASSESSMENT & PLAN NOTE
Holding ARB/HCT to maximize BB.    Restart ARB if tolerated.    Holding long acting NTG at present due to low normal BP.  Will need to continue lasix at increased dose due to pulmonary edema at SOB (proBNP is 352 and was 31).    6/19  - BP normotensive on current meds   - continue to monitor trend  - adjust meds as needed   6/20  - stable

## 2022-06-20 NOTE — NURSING
Pt returned from Cath lab with R radial access , TR Band in place no s/s of complication. Pt stable, awake,alert will continue to monitor.

## 2022-06-20 NOTE — PLAN OF CARE
[facility] - Cath Lab  Initial Discharge Assessment       Primary Care Provider: Primary Doctor No    Admission Diagnosis: History of colon cancer [Z85.038]  Atrial fibrillation with rapid ventricular response [I48.91]  Chest pain [R07.9]  Atrial fibrillation with RVR [I48.91]  Anemia, unspecified type [D64.9]  Type 2 diabetes mellitus without complication, without long-term current use of insulin [E11.9]    Admission Date: 6/18/2022  Expected Discharge Date:     Discharge Barriers Identified: None    Payor: BLUE [employer]UE [employer] / Plan: [facility] [employer] / Product Type: PPO /     Extended Emergency Contact Information  Primary Emergency Contact: GABBY BALES  Mobile Phone: [phone]  Relation: Spouse  Preferred language: English   needed? No    Discharge Plan A: Home with family  Discharge Plan B: Home with family      [address] [address]  Phone: [phone] Fax: [phone]      Initial Assessment (most recent)     Adult Discharge Assessment - 06/20/22 1104        Discharge Assessment    Assessment Type Discharge Planning Assessment     Source of Information family     Lives With spouse     Do you expect to return to your current living situation? Yes     Do you have help at home or someone to help you manage your care at home? Yes     Who are your caregiver(s) and their phone number(s)? gabby bales spouse [phone]     Equipment Currently Used at Home none     Do you currently have service(s) that help you manage your care at home? No     Discharge Plan A Home with family     Discharge Plan B Home with family     DME Needed Upon Discharge  none     Discharge Plan discussed with: Spouse/sig other     Discharge Barriers Identified None               Pt gone to cath lab, spoke with spouse gabby, pt lives home with spouse, no hh or dme pta, dc plan home, plan entered into [employer]

## 2022-06-20 NOTE — ASSESSMENT & PLAN NOTE
- trop 110 -> 156  - ECHO  · The left ventricle is normal in size with normal systolic function.  · The estimated ejection fraction is 60-65%.  · Normal left ventricular diastolic function.  · Normal right ventricular size with normal right ventricular systolic function.  · Mild tricuspid regurgitation.    - CXR Mildly increasing diffuse infiltrates versus pulmonary edema  - cardiac monitoring  - ASA, statin, BB,   - AC held in light of possible GIB and anemia  - holding ACE/ARB for renal dysfunction   - lipid panel pending  - A1C 8.6 on 6/16 6/20  - plans for [facility] today

## 2022-06-20 NOTE — ASSESSMENT & PLAN NOTE
Continue DAPT and statin.  Last cardiac stents were 5/21.    6/20  - plans for [facility] today   - eliquis stopped in light of procedure today   - continue ASA

## 2022-06-20 NOTE — PROGRESS NOTES
[facility] - [address] [place] [address] Medicine  Progress Note    Patient Name: Dilma Cr  MRN: [id number]  Patient Class: IP- Inpatient   Admission Date: 6/18/2022  Length of Stay: 2 days  Attending Physician: Ranjan Linder MD  Primary Care Provider: Primary Doctor No        Subjective:     Principal Problem:Atrial fibrillation with rapid ventricular response        HPI:  59 yo F presents to the ED with a week of intermittent non radiating chest pressure with some SOB and palpitations.  She does not name any aggravating or alleviating factors.  Her EKG shows atrial fibrillation with RVR that has not responded to dig, cardizem or BB IV.  Her trop has increased from 48 to 110.  Her cxr shows some mild bilateral pulmonary edema.  She is on low dose of lasix as well as HCT as OP.  Her K is 3.3 with baseline GFR 45.  Her magnesium is normal      She has a history of CAD with stent  to LAD and RCA on 4/14/21 by Dr. Lora and is on DAPT.  She also has a history of CAF and was on eliquis but was taken off recently due anemia of chronic blood loss.  Her PCP Dr. Olga Potts was making arrangement for Dr. Moustapha Kraft to [facility] for colonoscopy.  Patient has h/o colon cancer and has had a partial colectomy and follows with oncologist Dr. Guillermo Stover.  Her anemia panel at this time appears to be TANYA due to blood loss and she is heme positive.      She takes long acting NTG as OP and is also on benicar/HCT.  She is not on a rate controlling agent but I see on Dr. Lora's last note she was on metoprolol but also her med rec has her still on eliquis and also on a steriod that she had not taken in a very long time.  Her med recon sheet will need to be updated.  She is on Jardance and iron supplementation at present.      ROS as below.  She states she has never been diagnosed with OLIVERIO and is unaware of snoring or apnea.  Would benefit from OP sleep study as she is morbidly obese.        Overview/Hospital  Course:  6/19: cardiology and GI following; GI deferring any potential procedures until cardiology has finished their workup; plans for [facility] tomorrow; HR better controlled at present- monitor trend; continue current meds- has been transitioned off amiodarone   6/20: HR remains stable with current medications; plans for [facility] today; no signs of bleeding; GI following       Interval History: Pt resting in bed. Denies any SOB or abd pain. Does have some chest tightness intermittently. Plans for [facility] today- verbalizes understanding and denies any questions or concerns at present.     Review of Systems   Constitutional:  Negative for activity change and chills.   HENT:  Negative for sinus pressure and sinus pain.    Respiratory:  Positive for chest tightness. Negative for cough.    Gastrointestinal:  Negative for abdominal pain, diarrhea, nausea and vomiting.   All other systems reviewed and are negative.  Objective:     Vital Signs (Most Recent):  Temp: 97.9 °F (36.6 °C) (06/20/22 0900)  Pulse: 61 (06/20/22 0900)  Resp: 16 (06/20/22 0900)  BP: 121/77 (06/20/22 0900)  SpO2: 100 % (06/20/22 0900) Vital Signs (24h Range):  Temp:  [97.9 °F (36.6 °C)-99.1 °F (37.3 °C)] 97.9 °F (36.6 °C)  Pulse:  [61-73] 61  Resp:  [16-20] 16  SpO2:  [97 %-100 %] 100 %  BP: (105-153)/(43-77) 121/77     Weight: 97.1 kg (214 lb)  Body mass index is 40.43 kg/m².  No intake or output data in the 24 hours ending 06/20/22 0935   Physical Exam  Vitals and nursing note reviewed.   Constitutional:       General: She is not in acute distress.     Appearance: Normal appearance. She is obese. She is not ill-appearing.   HENT:      Head: Atraumatic.      Mouth/Throat:      Mouth: Mucous membranes are moist.      Pharynx: Oropharynx is clear.   Eyes:      Conjunctiva/sclera: Conjunctivae normal.      Pupils: Pupils are equal, round, and reactive to light.   Cardiovascular:      Rate and Rhythm: Normal rate. Rhythm irregular.      Pulses: Normal pulses.       Heart sounds: Normal heart sounds.   Pulmonary:      Effort: Pulmonary effort is normal.      Breath sounds: Normal breath sounds.   Abdominal:      General: Abdomen is flat. Bowel sounds are normal.      Palpations: Abdomen is soft.   Musculoskeletal:         General: Normal range of motion.   Skin:     General: Skin is warm and dry.      Capillary Refill: Capillary refill takes less than 2 seconds.   Neurological:      General: No focal deficit present.      Mental Status: She is alert and oriented to person, place, and time.   Psychiatric:         Mood and Affect: Mood normal.       Significant Labs: All pertinent labs within the past 24 hours have been reviewed.  Recent Lab Results  (Last 5 results in the past 24 hours)        06/20/22  0643   06/20/22  0427   06/19/22  1951   06/19/22  1726   06/19/22  1533        Anion Gap 16               Baso # 0.04               Basophil % 0.4               BUN 24               BUN/CREAT RATIO 20               Calcium 8.7               Chloride 106               CO2 25               Creatinine 1.21               Differential Type Auto               eGFR if non  48               Eos # 0.24               Eosinophil % 2.5               Glucose 144               Group & Rh         O POS       Hematocrit 29.7               Hemoglobin 9.1               Immature Grans (Abs) 0.05               Immature Granulocytes 0.5               INDIRECT JUSTICE         NEG       Lymph # 2.61               Lymph % 27.4               MCH 25.4               MCHC 30.6               MCV 83.0               Mono # 0.66               Mono % 6.9               MPV 10.7               Neutrophils, Abs 5.92               Neutrophils Relative 62.3               nRBC 0.0               NUCLEATED RBC ABSOLUTE 0.00               Platelets 433               POC Glucose   160   203   224         Potassium 4.9               RBC 3.58               RDW 15.5               Sodium 142               WBC  9.52                                      Significant Imaging: I have reviewed all pertinent imaging results/findings within the past 24 hours.      Assessment/Plan:      * Atrial fibrillation with rapid ventricular response  Maximized BB.  Will not start diltiazem until echo returns.    Starting amiodarone infusion.    Patient would benefit from OP sleep study.    Trending troponins and monitoring on telemetry  TSH pending.    6/19  - transitioned off amiodarone infusion per nursing  - continue current meds  - TSH 1.640   6/20  - HR controlled at present with current meds   - HR NSR with rate 60s       NSTEMI (non-ST elevated myocardial infarction)  - trop 110 -> 156  - ECHO  · The left ventricle is normal in size with normal systolic function.  · The estimated ejection fraction is 60-65%.  · Normal left ventricular diastolic function.  · Normal right ventricular size with normal right ventricular systolic function.  · Mild tricuspid regurgitation.    - CXR Mildly increasing diffuse infiltrates versus pulmonary edema  - cardiac monitoring  - ASA, statin, BB,   - AC held in light of possible GIB and anemia  - holding ACE/ARB for renal dysfunction   - lipid panel pending  - A1C 8.6 on 6/16 6/20  - plans for [facility] today     History of colon cancer  - hx of colon cancer   - was getting set up with an OP cscope         Anemia  -H/H 8/26  - iron 20 with iron saturation 6  - has been on iron supplements-- holding in light of possible GI procedure   6/20  - rec'd 1u PRBCs yesterday for hgb less than 7  - H/H 9/29 this AM  - no overt signs of bleeding     Iron deficiency anemia due to chronic blood loss  - iron and iron saturation low   - is on iron supplementation  - holding iron in light of possible GI procedure       Chronic kidney disease (CKD) stage G3b/A3, moderately decreased glomerular filtration rate (GFR) between 30-44 mL/min/1.73 square meter and albuminuria creatinine ratio greater than 300 mg/g  - Cre 1.34   -  per previous labs- stable   - monitor labs   - avoid nephrotoxic meds       Type 2 diabetes mellitus with diabetic polyneuropathy, with long-term current use of insulin  Hold jardance while NPO  Basal bolus insulin while in hospital.  Continue lyrica  6/19  - continue long acting and SSI  - monitor BG trend and adjust as needed   - A1C 8.6 on 6/16 6/20  - BG trend 144-224       Atherosclerosis of native coronary artery of native heart with unstable angina pectoris  Continue DAPT and statin.  Last cardiac stents were 5/21.    6/20  - plans for [facility] today   - eliquis stopped in light of procedure today   - continue ASA     Hypertension  Holding ARB/HCT to maximize BB.    Restart ARB if tolerated.    Holding long acting NTG at present due to low normal BP.  Will need to continue lasix at increased dose due to pulmonary edema at SOB (proBNP is 352 and was 31).    6/19  - BP normotensive on current meds   - continue to monitor trend  - adjust meds as needed   6/20  - stable     Type 2 diabetes mellitus with ophthalmic complication            VTE Risk Mitigation (From admission, onward)    None          Discharge Planning   DEVAN:      Code Status: Full Code   Is the patient medically ready for discharge?:     Reason for patient still in hospital (select all that apply): Treatment             EDMOND Hodges  Department of Hospital Medicine   [address]

## 2022-06-20 NOTE — SUBJECTIVE & OBJECTIVE
Interval History: Pt resting in bed. Denies any SOB or abd pain. Does have some chest tightness intermittently. Plans for [facility] today- verbalizes understanding and denies any questions or concerns at present.     Review of Systems   Constitutional:  Negative for activity change and chills.   HENT:  Negative for sinus pressure and sinus pain.    Respiratory:  Positive for chest tightness. Negative for cough.    Gastrointestinal:  Negative for abdominal pain, diarrhea, nausea and vomiting.   All other systems reviewed and are negative.  Objective:     Vital Signs (Most Recent):  Temp: 97.9 °F (36.6 °C) (06/20/22 0900)  Pulse: 61 (06/20/22 0900)  Resp: 16 (06/20/22 0900)  BP: 121/77 (06/20/22 0900)  SpO2: 100 % (06/20/22 0900) Vital Signs (24h Range):  Temp:  [97.9 °F (36.6 °C)-99.1 °F (37.3 °C)] 97.9 °F (36.6 °C)  Pulse:  [61-73] 61  Resp:  [16-20] 16  SpO2:  [97 %-100 %] 100 %  BP: (105-153)/(43-77) 121/77     Weight: 97.1 kg (214 lb)  Body mass index is 40.43 kg/m².  No intake or output data in the 24 hours ending 06/20/22 0935   Physical Exam  Vitals and nursing note reviewed.   Constitutional:       General: She is not in acute distress.     Appearance: Normal appearance. She is obese. She is not ill-appearing.   HENT:      Head: Atraumatic.      Mouth/Throat:      Mouth: Mucous membranes are moist.      Pharynx: Oropharynx is clear.   Eyes:      Conjunctiva/sclera: Conjunctivae normal.      Pupils: Pupils are equal, round, and reactive to light.   Cardiovascular:      Rate and Rhythm: Normal rate. Rhythm irregular.      Pulses: Normal pulses.      Heart sounds: Normal heart sounds.   Pulmonary:      Effort: Pulmonary effort is normal.      Breath sounds: Normal breath sounds.   Abdominal:      General: Abdomen is flat. Bowel sounds are normal.      Palpations: Abdomen is soft.   Musculoskeletal:         General: Normal range of motion.   Skin:     General: Skin is warm and dry.      Capillary Refill: Capillary  refill takes less than 2 seconds.   Neurological:      General: No focal deficit present.      Mental Status: She is alert and oriented to person, place, and time.   Psychiatric:         Mood and Affect: Mood normal.       Significant Labs: All pertinent labs within the past 24 hours have been reviewed.  Recent Lab Results  (Last 5 results in the past 24 hours)        06/20/22  0643   06/20/22  0427   06/19/22  1951   06/19/22  1726   06/19/22  1533        Anion Gap 16               Baso # 0.04               Basophil % 0.4               BUN 24               BUN/CREAT RATIO 20               Calcium 8.7               Chloride 106               CO2 25               Creatinine 1.21               Differential Type Auto               eGFR if non  48               Eos # 0.24               Eosinophil % 2.5               Glucose 144               Group & Rh         O POS       Hematocrit 29.7               Hemoglobin 9.1               Immature Grans (Abs) 0.05               Immature Granulocytes 0.5               INDIRECT JUSTICE         NEG       Lymph # 2.61               Lymph % 27.4               MCH 25.4               MCHC 30.6               MCV 83.0               Mono # 0.66               Mono % 6.9               MPV 10.7               Neutrophils, Abs 5.92               Neutrophils Relative 62.3               nRBC 0.0               NUCLEATED RBC ABSOLUTE 0.00               Platelets 433               POC Glucose   160   203   224         Potassium 4.9               RBC 3.58               RDW 15.5               Sodium 142               WBC 9.52                                      Significant Imaging: I have reviewed all pertinent imaging results/findings within the past 24 hours.

## 2022-06-20 NOTE — ASSESSMENT & PLAN NOTE
Maximized BB.  Will not start diltiazem until echo returns.    Starting amiodarone infusion.    Patient would benefit from OP sleep study.    Trending troponins and monitoring on telemetry  TSH pending.    6/19  - transitioned off amiodarone infusion per nursing  - continue current meds  - TSH 1.640   6/20  - HR controlled at present with current meds   - HR NSR with rate 60s

## 2022-06-20 NOTE — ASSESSMENT & PLAN NOTE
-H/H 8/26  - iron 20 with iron saturation 6  - has been on iron supplements-- holding in light of possible GI procedure   6/20  - rec'd 1u PRBCs yesterday for hgb less than 7  - H/H 9/29 this AM  - no overt signs of bleeding

## 2022-06-20 NOTE — ASSESSMENT & PLAN NOTE
Adult Nutrition  Assessment    Patient Name:  Mohan Villatoro  YOB: 1960  MRN: [id number]  Admit Date:  11/7/2020    Assessment Date:  11/11/2020    Comments:  Pt currently eating well although he reports a decreased appetite.  He also notes that his appetite was down pta but he has not lost any wt.  Cardene drip continues for HTN Crisis.  Wound care continues for open wounds to the right foot G Toe; 3rd toe of the R foot and the 2nd toe of the R. Foot.  Poor arteria flow noted--awaiting angiogram.  Pt is edentulous but he denies the need for diet modifications.  Pt does not think he has received supplements?? Staff claims that there was none at breakfast.  Called the Office to ensure pt receives them.  Will monitor.      Reason for Assessment     Row Name 11/11/20 1436          Reason for Assessment    Reason For Assessment  follow-up protocol         Nutrition/Diet History     Row Name 11/11/20 1436          Nutrition/Diet History    Typical Food/Fluid Intake  Pt sitting up in chair.  He reports that pta his appetite was depressed.  Still not very good.  He has not dentures but declines to have diet modified.  Has not recieved the Supplements.  Per staff--pt is eating very well.  Everything this am.  He is having no noted eating difficulties.           Labs/Tests/Procedures/Meds     Row Name 11/11/20 1437          Labs/Procedures/Meds    Lab Results Reviewed  reviewed, pertinent     Lab Results Comments  Na 126; Bun 43; Creat 2.36; Alb 3.10        Diagnostic Tests/Procedures    Diagnostic Test/Procedure Reviewed  reviewed, pertinent     Diagnostic Test/Procedures Comments  wound care; Cardene drip        Medications    Pertinent Medications Reviewed  reviewed, pertinent     Pertinent Medications Comments  Coreg; VitD; SSI; REglan; Cardene drip         Physical Findings     Row Name 11/11/20 1438          Physical Findings    Overall Physical Appearance  on oxygen therapy     Skin  pressure  Hold jardance while NPO  Basal bolus insulin while in hospital.  Continue lyrica  6/19  - continue long acting and SSI  - monitor BG trend and adjust as needed   - A1C 8.6 on 6/16 6/20  - BG trend 144-224      injury;poor skin integrity/turgor           Nutrition Prescription Ordered     Row Name 11/11/20 1439          Nutrition Prescription PO    Current PO Diet  Regular     Supplement  Novasource Renal (Nepro)     Supplement Frequency  Other (comment);3 times a day 4 ounces     Common Modifiers  Cardiac;Consistent Carbohydrate;Renal         Evaluation of Received Nutrient/Fluid Intake     Row Name 11/11/20 1439          PO Evaluation    Number of Meals  2     % PO Intake  100% per staff               Electronically signed by:  Judie Angulo RD  11/11/20 14:43 CST

## 2022-06-21 ENCOUNTER — ANESTHESIA (OUTPATIENT)
Dept: GASTROENTEROLOGY | Facility: HOSPITAL | Age: 62
DRG: 250 | End: 2022-06-21
Payer: COMMERCIAL

## 2022-06-21 ENCOUNTER — ANESTHESIA EVENT (OUTPATIENT)
Dept: GASTROENTEROLOGY | Facility: HOSPITAL | Age: 62
DRG: 250 | End: 2022-06-21
Payer: COMMERCIAL

## 2022-06-21 PROBLEM — K44.9 HH (HIATUS HERNIA): Status: ACTIVE | Noted: 2022-06-21

## 2022-06-21 PROBLEM — K29.00 ACUTE SUPERFICIAL GASTRITIS WITHOUT HEMORRHAGE: Status: ACTIVE | Noted: 2022-06-21

## 2022-06-21 LAB
ANION GAP SERPL CALCULATED.3IONS-SCNC: 16 MMOL/L (ref 7–16)
BASOPHILS # BLD AUTO: 0.03 K/UL (ref 0–0.2)
BASOPHILS NFR BLD AUTO: 0.3 % (ref 0–1)
BUN SERPL-MCNC: 23 MG/DL (ref 7–18)
BUN/CREAT SERPL: 21 (ref 6–20)
CALCIUM SERPL-MCNC: 8.5 MG/DL (ref 8.5–10.1)
CHLORIDE SERPL-SCNC: 106 MMOL/L (ref 98–107)
CO2 SERPL-SCNC: 24 MMOL/L (ref 21–32)
CREAT SERPL-MCNC: 1.08 MG/DL (ref 0.55–1.02)
DIFFERENTIAL METHOD BLD: ABNORMAL
EOSINOPHIL # BLD AUTO: 0.26 K/UL (ref 0–0.5)
EOSINOPHIL NFR BLD AUTO: 2.7 % (ref 1–4)
ERYTHROCYTE [DISTWIDTH] IN BLOOD BY AUTOMATED COUNT: 16 % (ref 11.5–14.5)
GLUCOSE SERPL-MCNC: 111 MG/DL (ref 74–106)
GLUCOSE SERPL-MCNC: 121 MG/DL (ref 70–105)
GLUCOSE SERPL-MCNC: 128 MG/DL (ref 70–105)
GLUCOSE SERPL-MCNC: 229 MG/DL (ref 70–105)
GLUCOSE SERPL-MCNC: 74 MG/DL (ref 70–105)
HCT VFR BLD AUTO: 31 % (ref 38–47)
HGB BLD-MCNC: 9.2 G/DL (ref 12–16)
IMM GRANULOCYTES # BLD AUTO: 0.03 K/UL (ref 0–0.04)
IMM GRANULOCYTES NFR BLD: 0.3 % (ref 0–0.4)
LYMPHOCYTES # BLD AUTO: 2.81 K/UL (ref 1–4.8)
LYMPHOCYTES NFR BLD AUTO: 29 % (ref 27–41)
MCH RBC QN AUTO: 25.1 PG (ref 27–31)
MCHC RBC AUTO-ENTMCNC: 29.7 G/DL (ref 32–36)
MCV RBC AUTO: 84.5 FL (ref 80–96)
MONOCYTES # BLD AUTO: 0.74 K/UL (ref 0–0.8)
MONOCYTES NFR BLD AUTO: 7.6 % (ref 2–6)
MPC BLD CALC-MCNC: 10.7 FL (ref 9.4–12.4)
NEUTROPHILS # BLD AUTO: 5.82 K/UL (ref 1.8–7.7)
NEUTROPHILS NFR BLD AUTO: 60.1 % (ref 53–65)
NRBC # BLD AUTO: 0 X10E3/UL
NRBC, AUTO (.00): 0 %
PLATELET # BLD AUTO: 450 K/UL (ref 150–400)
POTASSIUM SERPL-SCNC: 5 MMOL/L (ref 3.5–5.1)
RBC # BLD AUTO: 3.67 M/UL (ref 4.2–5.4)
SODIUM SERPL-SCNC: 141 MMOL/L (ref 136–145)
WBC # BLD AUTO: 9.69 K/UL (ref 4.5–11)

## 2022-06-21 PROCEDURE — D9220A PRA ANESTHESIA: ICD-10-PCS | Mod: CRNA,,, | Performed by: NURSE ANESTHETIST, CERTIFIED REGISTERED

## 2022-06-21 PROCEDURE — 63600175 PHARM REV CODE 636 W HCPCS: Performed by: NURSE ANESTHETIST, CERTIFIED REGISTERED

## 2022-06-21 PROCEDURE — 36415 COLL VENOUS BLD VENIPUNCTURE: CPT | Performed by: NURSE PRACTITIONER

## 2022-06-21 PROCEDURE — D9220A PRA ANESTHESIA: Mod: ANES,,, | Performed by: ANESTHESIOLOGY

## 2022-06-21 PROCEDURE — 25000003 PHARM REV CODE 250: Performed by: INTERNAL MEDICINE

## 2022-06-21 PROCEDURE — 85025 COMPLETE CBC W/AUTO DIFF WBC: CPT | Performed by: NURSE PRACTITIONER

## 2022-06-21 PROCEDURE — 99232 PR SUBSEQUENT HOSPITAL CARE,LEVL II: ICD-10-PCS | Mod: ,,, | Performed by: HOSPITALIST

## 2022-06-21 PROCEDURE — 11000001 HC ACUTE MED/SURG PRIVATE ROOM

## 2022-06-21 PROCEDURE — 99233 SBSQ HOSP IP/OBS HIGH 50: CPT | Mod: ,,, | Performed by: INTERNAL MEDICINE

## 2022-06-21 PROCEDURE — 99232 SBSQ HOSP IP/OBS MODERATE 35: CPT | Mod: ,,, | Performed by: HOSPITALIST

## 2022-06-21 PROCEDURE — 25000003 PHARM REV CODE 250: Performed by: HOSPITALIST

## 2022-06-21 PROCEDURE — C9399 UNCLASSIFIED DRUGS OR BIOLOG: HCPCS | Performed by: HOSPITALIST

## 2022-06-21 PROCEDURE — 63600175 PHARM REV CODE 636 W HCPCS: Performed by: NURSE PRACTITIONER

## 2022-06-21 PROCEDURE — 27000284 HC CANNULA NASAL: Performed by: ANESTHESIOLOGY

## 2022-06-21 PROCEDURE — 80048 BASIC METABOLIC PNL TOTAL CA: CPT | Performed by: NURSE PRACTITIONER

## 2022-06-21 PROCEDURE — 25000003 PHARM REV CODE 250: Performed by: NURSE ANESTHETIST, CERTIFIED REGISTERED

## 2022-06-21 PROCEDURE — 63600175 PHARM REV CODE 636 W HCPCS: Performed by: HOSPITALIST

## 2022-06-21 PROCEDURE — 25000003 PHARM REV CODE 250

## 2022-06-21 PROCEDURE — 43235 EGD DIAGNOSTIC BRUSH WASH: CPT

## 2022-06-21 PROCEDURE — D9220A PRA ANESTHESIA: ICD-10-PCS | Mod: ANES,,, | Performed by: ANESTHESIOLOGY

## 2022-06-21 PROCEDURE — 82962 GLUCOSE BLOOD TEST: CPT

## 2022-06-21 PROCEDURE — 99233 PR SUBSEQUENT HOSPITAL CARE,LEVL III: ICD-10-PCS | Mod: ,,, | Performed by: INTERNAL MEDICINE

## 2022-06-21 PROCEDURE — D9220A PRA ANESTHESIA: Mod: CRNA,,, | Performed by: NURSE ANESTHETIST, CERTIFIED REGISTERED

## 2022-06-21 PROCEDURE — C9113 INJ PANTOPRAZOLE SODIUM, VIA: HCPCS | Performed by: NURSE PRACTITIONER

## 2022-06-21 RX ORDER — POLYETHYLENE GLYCOL 3350, SODIUM SULFATE ANHYDROUS, SODIUM BICARBONATE, SODIUM CHLORIDE, POTASSIUM CHLORIDE 236; 22.74; 6.74; 5.86; 2.97 G/4L; G/4L; G/4L; G/4L; G/4L
4000 POWDER, FOR SOLUTION ORAL ONCE
Status: COMPLETED | OUTPATIENT
Start: 2022-06-21 | End: 2022-06-21

## 2022-06-21 RX ORDER — LIDOCAINE HYDROCHLORIDE 20 MG/ML
INJECTION INTRAVENOUS
Status: DISCONTINUED | OUTPATIENT
Start: 2022-06-21 | End: 2022-06-21

## 2022-06-21 RX ORDER — PROPOFOL 10 MG/ML
VIAL (ML) INTRAVENOUS
Status: DISCONTINUED | OUTPATIENT
Start: 2022-06-21 | End: 2022-06-21

## 2022-06-21 RX ORDER — FENTANYL CITRATE 50 UG/ML
INJECTION, SOLUTION INTRAMUSCULAR; INTRAVENOUS
Status: DISCONTINUED | OUTPATIENT
Start: 2022-06-21 | End: 2022-06-21

## 2022-06-21 RX ORDER — SODIUM CHLORIDE 0.9 % (FLUSH) 0.9 %
10 SYRINGE (ML) INJECTION
Status: DISCONTINUED | OUTPATIENT
Start: 2022-06-21 | End: 2022-06-22 | Stop reason: HOSPADM

## 2022-06-21 RX ADMIN — SODIUM CHLORIDE: 9 INJECTION, SOLUTION INTRAVENOUS at 12:06

## 2022-06-21 RX ADMIN — POLYETHYLENE GLYCOL 3350, SODIUM SULFATE ANHYDROUS, SODIUM BICARBONATE, SODIUM CHLORIDE, POTASSIUM CHLORIDE 4000 ML: 236; 22.74; 6.74; 5.86; 2.97 POWDER, FOR SOLUTION ORAL at 02:06

## 2022-06-21 RX ADMIN — INSULIN DETEMIR 12 UNITS: 100 INJECTION, SOLUTION SUBCUTANEOUS at 08:06

## 2022-06-21 RX ADMIN — PANTOPRAZOLE SODIUM 40 MG: 40 INJECTION, POWDER, FOR SOLUTION INTRAVENOUS at 08:06

## 2022-06-21 RX ADMIN — LIDOCAINE HYDROCHLORIDE 50 MG: 20 INJECTION, SOLUTION INTRAVENOUS at 01:06

## 2022-06-21 RX ADMIN — PROPOFOL 100 MG: 10 INJECTION, EMULSION INTRAVENOUS at 01:06

## 2022-06-21 RX ADMIN — PREGABALIN 50 MG: 50 CAPSULE ORAL at 02:06

## 2022-06-21 RX ADMIN — ASPIRIN 81 MG CHEWABLE TABLET 81 MG: 81 TABLET CHEWABLE at 09:06

## 2022-06-21 RX ADMIN — PREGABALIN 50 MG: 50 CAPSULE ORAL at 08:06

## 2022-06-21 RX ADMIN — ATORVASTATIN CALCIUM 80 MG: 80 TABLET, FILM COATED ORAL at 08:06

## 2022-06-21 RX ADMIN — FENTANYL CITRATE 50 MCG: 50 INJECTION INTRAMUSCULAR; INTRAVENOUS at 01:06

## 2022-06-21 RX ADMIN — METOPROLOL TARTRATE 25 MG: 25 TABLET, FILM COATED ORAL at 08:06

## 2022-06-21 RX ADMIN — CLOPIDOGREL 75 MG: 75 TABLET, FILM COATED ORAL at 09:06

## 2022-06-21 RX ADMIN — METOPROLOL TARTRATE 25 MG: 25 TABLET, FILM COATED ORAL at 09:06

## 2022-06-21 RX ADMIN — SODIUM CHLORIDE: 9 INJECTION, SOLUTION INTRAVENOUS at 02:06

## 2022-06-21 RX ADMIN — Medication 100 MCG: at 09:06

## 2022-06-21 RX ADMIN — INSULIN ASPART 4 UNITS: 100 INJECTION, SOLUTION INTRAVENOUS; SUBCUTANEOUS at 04:06

## 2022-06-21 RX ADMIN — PREGABALIN 50 MG: 50 CAPSULE ORAL at 09:06

## 2022-06-21 NOTE — PROGRESS NOTES
[facility] - [address] [place] [address] Medicine  Progress Note    Patient Name: Dilma Cr  MRN: [id number]  Patient Class: IP- Inpatient   Admission Date: 6/18/2022  Length of Stay: 3 days  Attending Physician: Ranjan Linder MD  Primary Care Provider: Primary Doctor No        Subjective:     Principal Problem:Atrial fibrillation with rapid ventricular response        HPI:  59 yo F presents to the ED with a week of intermittent non radiating chest pressure with some SOB and palpitations.  She does not name any aggravating or alleviating factors.  Her EKG shows atrial fibrillation with RVR that has not responded to dig, cardizem or BB IV.  Her trop has increased from 48 to 110.  Her cxr shows some mild bilateral pulmonary edema.  She is on low dose of lasix as well as HCT as OP.  Her K is 3.3 with baseline GFR 45.  Her magnesium is normal      She has a history of CAD with stent  to LAD and RCA on 4/14/21 by Dr. Lora and is on DAPT.  She also has a history of CAF and was on eliquis but was taken off recently due anemia of chronic blood loss.  Her PCP Dr. Olga Potts was making arrangement for Dr. Moustapha Krfat to [facility] for colonoscopy.  Patient has h/o colon cancer and has had a partial colectomy and follows with oncologist Dr. Guillermo Stover.  Her anemia panel at this time appears to be TANYA due to blood loss and she is heme positive.      She takes long acting NTG as OP and is also on benicar/HCT.  She is not on a rate controlling agent but I see on Dr. Lora's last note she was on metoprolol but also her med rec has her still on eliquis and also on a steriod that she had not taken in a very long time.  Her med recon sheet will need to be updated.  She is on Jardance and iron supplementation at present.      ROS as below.  She states she has never been diagnosed with OLIVERIO and is unaware of snoring or apnea.  Would benefit from OP sleep study as she is morbidly obese.        Overview/Hospital  Course:  6/19: cardiology and GI following; GI deferring any potential procedures until cardiology has finished their workup; plans for LHC tomorrow; HR better controlled at present- monitor trend; continue current meds- has been transitioned off amiodarone   6/20: HR remains stable with current medications; plans for LHC today; no signs of bleeding; GI following   6/21: LHC as follows   1. Single vessel CAD (90% mid-distal LAD in-stent restenosis)  2. Normal left sided filling pressure (LVEDP - 10mmHg)  3. S/p successful angioplasty with scoring balloon (angiosculpt) of mid-distal LAD in-stent restenosis  EGD Mucosa of the esophagus was normal without varices or bleeding. The stomach had gastritis and a 4cm hiatus hernia. No biopsies were obtained due to plavix therapy. The pyloric channel was patent. Mucosa of the duodenum was normal. prep for colonoscopy tomorrow.      Interval History: Pt resting in bed. GI reconsulted for possible scopes. Denies any CP or SOB. Still has some abd pain and distention. Discussed POC.      Review of Systems   Constitutional:  Negative for activity change and chills.   HENT:  Negative for sinus pressure and sinus pain.    Respiratory:  Negative for cough.    Gastrointestinal:  Positive for abdominal pain. Negative for diarrhea, nausea and vomiting.   All other systems reviewed and are negative.  Objective:     Vital Signs (Most Recent):  Temp: 98 °F (36.7 °C) (06/21/22 1320)  Pulse: (!) 57 (06/21/22 1340)  Resp: 14 (06/21/22 1340)  BP: (!) 120/46 (06/21/22 1340)  SpO2: 100 % (06/21/22 1340)   Vital Signs (24h Range):  Temp:  [98 °F (36.7 °C)-99.3 °F (37.4 °C)] 98 °F (36.7 °C)  Pulse:  [56-86] 57  Resp:  [12-22] 14  SpO2:  [94 %-100 %] 100 %  BP: (111-139)/(40-85) 120/46     Weight: 97.1 kg (214 lb)  Body mass index is 40.43 kg/m².    Intake/Output Summary (Last 24 hours) at 6/21/2022 6224  Last data filed at 6/21/2022 1340  Gross per 24 hour   Intake 350 ml   Output --   Net 350 ml       Physical Exam  Vitals and nursing note reviewed.   Constitutional:       General: She is not in acute distress.     Appearance: Normal appearance. She is obese. She is not ill-appearing.   HENT:      Head: Atraumatic.      Mouth/Throat:      Mouth: Mucous membranes are moist.      Pharynx: Oropharynx is clear.   Eyes:      Conjunctiva/sclera: Conjunctivae normal.      Pupils: Pupils are equal, round, and reactive to light.   Cardiovascular:      Rate and Rhythm: Normal rate. Rhythm irregular.      Pulses: Normal pulses.      Heart sounds: Normal heart sounds.   Pulmonary:      Effort: Pulmonary effort is normal.      Breath sounds: Normal breath sounds.   Abdominal:      General: Abdomen is flat. Bowel sounds are normal.      Palpations: Abdomen is soft.   Musculoskeletal:         General: Normal range of motion.   Skin:     General: Skin is warm and dry.      Capillary Refill: Capillary refill takes less than 2 seconds.   Neurological:      General: No focal deficit present.      Mental Status: She is alert and oriented to person, place, and time.   Psychiatric:         Mood and Affect: Mood normal.       Significant Labs: All pertinent labs within the past 24 hours have been reviewed.  Recent Lab Results  (Last 5 results in the past 24 hours)        06/21/22  1129   06/21/22  0434   06/21/22  0426   06/20/22  2032   06/20/22  1740        Anion Gap     16           Baso #     0.03           Basophil %     0.3           BUN     23           BUN/CREAT RATIO     21           Calcium     8.5           Chloride     106           CO2     24           Creatinine     1.08           Differential Type     Auto           eGFR if non      55           Eos #     0.26           Eosinophil %     2.7           Glucose     111           Hematocrit     31.0           Hemoglobin     9.2           Immature Grans (Abs)     0.03           Immature Granulocytes     0.3           Lymph #     2.81           Lymph %      29.0           MCH     25.1           MCHC     29.7           MCV     84.5           Mono #     0.74           Mono %     7.6           MPV     10.7           Neutrophils, Abs     5.82           Neutrophils Relative     60.1           nRBC     0.0           NUCLEATED RBC ABSOLUTE     0.00           Platelets     450           POC Glucose 128   121     168   304       Potassium     5.0           RBC     3.67           RDW     16.0           Sodium     141           WBC     9.69                                  Significant Imaging: I have reviewed all pertinent imaging results/findings within the past 24 hours.      Assessment/Plan:      * Atrial fibrillation with rapid ventricular response  Maximized BB.  Will not start diltiazem until echo returns.    Starting amiodarone infusion.    Patient would benefit from OP sleep study.    Trending troponins and monitoring on telemetry  TSH pending.    6/19  - transitioned off amiodarone infusion per nursing  - continue current meds  - TSH 1.640   6/20  - HR controlled at present with current meds   - HR NSR with rate 60s   6/21  - controlled     HH (hiatus hernia)        NSTEMI (non-ST elevated myocardial infarction)  - trop 110 -> 156  - ECHO  · The left ventricle is normal in size with normal systolic function.  · The estimated ejection fraction is 60-65%.  · Normal left ventricular diastolic function.  · Normal right ventricular size with normal right ventricular systolic function.  · Mild tricuspid regurgitation.    - CXR Mildly increasing diffuse infiltrates versus pulmonary edema  - cardiac monitoring  - ASA, statin, BB,   - AC held in light of possible GIB and anemia  - holding ACE/ARB for renal dysfunction   - lipid panel pending  - A1C 8.6 on 6/16 6/20  - plans for [facility] today   6/21  - [facility] with results as follow   1. Single vessel CAD (90% mid-distal LAD in-stent restenosis)  2. Normal left sided filling pressure (LVEDP - 10mmHg)  3. S/p successful angioplasty with  scoring balloon (angiosculpt) of mid-distal LAD in-stent restenosis      History of colon cancer  - hx of colon cancer   - was getting set up with an OP cscope         Anemia  -H/H 8/26  - iron 20 with iron saturation 6  - has been on iron supplements-- holding in light of possible GI procedure   6/20  - rec'd 1u PRBCs yesterday for hgb less than 7  - H/H 9/29 this AM  - no overt signs of bleeding   6/21  - 9/31  - stable at present     Iron deficiency anemia due to chronic blood loss  - iron and iron saturation low   - is on iron supplementation  - holding iron in light of possible GI procedure       Chronic kidney disease (CKD) stage G3b/A3, moderately decreased glomerular filtration rate (GFR) between 30-44 mL/min/1.73 square meter and albuminuria creatinine ratio greater than 300 mg/g  - Cre 1.34   - per previous labs- stable   - monitor labs   - avoid nephrotoxic meds       Type 2 diabetes mellitus with diabetic polyneuropathy, with long-term current use of insulin  Hold jardance while NPO  Basal bolus insulin while in hospital.  Continue lyrica  6/19  - continue long acting and SSI  - monitor BG trend and adjust as needed   - A1C 8.6 on 6/16 6/20  - BG trend 144-224   6/21  - BG trend        Atherosclerosis of native coronary artery of native heart with unstable angina pectoris  Continue DAPT and statin.  Last cardiac stents were 5/21.    6/20  - plans for [facility] today   - eliquis stopped in light of procedure today   - continue ASA   6/21  - continue current tx plan     Hypertension  Holding ARB/HCT to maximize BB.    Restart ARB if tolerated.    Holding long acting NTG at present due to low normal BP.  Will need to continue lasix at increased dose due to pulmonary edema at SOB (proBNP is 352 and was 31).    6/19  - BP normotensive on current meds   - continue to monitor trend  - adjust meds as needed   6/20  - stable     Type 2 diabetes mellitus with ophthalmic complication            VTE Risk  Mitigation (From admission, onward)    None          Discharge Planning   DEVAN:      Code Status: Full Code   Is the patient medically ready for discharge?:     Reason for patient still in hospital (select all that apply): Treatment  Discharge Plan A: Home with family          EDMOND Hodges  Department of Hospital Medicine   [address]

## 2022-06-21 NOTE — PLAN OF CARE
Problem: Adult Inpatient Plan of Care  Goal: Plan of Care Review  Outcome: Ongoing, Progressing  Goal: Patient-Specific Goal (Individualized)  Outcome: Ongoing, Progressing  Goal: Absence of Hospital-Acquired Illness or Injury  Outcome: Ongoing, Progressing  Goal: Optimal Comfort and Wellbeing  Outcome: Ongoing, Progressing  Goal: Readiness for Transition of Care  Outcome: Ongoing, Progressing     Problem: Diabetes Comorbidity  Goal: Blood Glucose Level Within Targeted Range  Outcome: Ongoing, Progressing     Problem: Impaired Wound Healing  Goal: Optimal Wound Healing  Outcome: Ongoing, Progressing     Problem: Bariatric Environmental Safety  Goal: Safety Maintained with Care  Outcome: Ongoing, Progressing

## 2022-06-21 NOTE — ANESTHESIA PREPROCEDURE EVALUATION
06/21/2022  Dilma Cr is a 61 y.o., female.      Pre-op Assessment    I have reviewed the Patient Summary Reports.     I have reviewed the Nursing Notes. I have reviewed the NPO Status.   I have reviewed the Medications.     Review of Systems  Anesthesia Hx:  No problems with previous Anesthesia    Social:  Non-Smoker, No Alcohol Use    Hematology/Oncology:     Oncology Normal    -- Anemia:   EENT/Dental:EENT/Dental Normal   Cardiovascular:   Hypertension Past MI CAD   Angina ASCVD, heard cath / angioplasty yesterday. No stent/ anticoagulation co GI workup could be done for anemia   Pulmonary:  Pulmonary Normal    Renal/:   Chronic Renal Disease, CRI    Hepatic/GI:   GERD    Musculoskeletal:   Arthritis     Neurological:  Neurology Normal    Endocrine:   Diabetes, poorly controlled, type 2  Obesity / BMI > 30  Dermatological:  Skin Normal    Psych:  Psychiatric Normal           Physical Exam  General: Well nourished    Airway:  Mallampati: III / III  Mouth Opening: Normal  TM Distance: > 6 cm  Tongue: Normal  Neck ROM: Normal ROM    Chest/Lungs:  Clear to auscultation, Normal Respiratory Rate    Heart:  Rate: Normal  Rhythm: Regular Rhythm        Anesthesia Plan  Type of Anesthesia, risks & benefits discussed:    Anesthesia Type: MAC  Intra-op Monitoring Plan: Standard ASA Monitors  Post Op Pain Control Plan: multimodal analgesia  Induction:  IV  Informed Consent: Informed consent signed with the Patient and all parties understand the risks and agree with anesthesia plan.  All questions answered.   ASA Score: 4  Day of Surgery Review of History & Physical: H&P Update referred to the surgeon/provider.I have interviewed and examined the patient. I have reviewed the patient's H&P dated: There are no significant changes. H&P completed by Anesthesiologist.    Ready For Surgery From Anesthesia  Perspective.     .

## 2022-06-21 NOTE — ASSESSMENT & PLAN NOTE
- [facility] yesterday s/p successful angioplasty with scoring balloon (angiosculpt) of mid-distal LAD in-stent restenosis  - Deferred stent placement in setting of GI bleed s/p blood transfusion, Pt is scheduled for EGD today  - continue ASA indefinitely  - continue plavix for one month  - continue statin and metoprolol  - refer for cardiac rehab  - f/u with GAGE Hickey in one week, then f/u with Dr. Bautista in 4 weeks  - pt can be discharged from cardiology standpoint, cardiology will sign off, please call if needed

## 2022-06-21 NOTE — ASSESSMENT & PLAN NOTE
Continue DAPT and statin.  Last cardiac stents were 5/21.    6/20  - plans for LHC today   - eliquis stopped in light of procedure today   - continue ASA   6/21  - continue current tx plan

## 2022-06-21 NOTE — SUBJECTIVE & OBJECTIVE
Interval History: [facility] yesterday s/p successful angioplasty with scoring balloon (angiosculpt) of mid-distal LAD in-stent restenosis. Deferred stent placement in setting of GI bleed s/p blood transfusion. Pt is scheduled for EGD today.    Review of Systems   Constitutional: Negative for diaphoresis.   Cardiovascular:  Negative for chest pain, dyspnea on exertion, leg swelling, near-syncope, orthopnea and palpitations.   Respiratory:  Negative for shortness of breath.    Gastrointestinal:  Positive for abdominal pain.   Objective:     Vital Signs (Most Recent):  Temp: 98.1 °F (36.7 °C) (06/21/22 1121)  Pulse: (!) 58 (06/21/22 1241)  Resp: 20 (06/21/22 1241)  BP: (!) 132/51 (06/21/22 1241)  SpO2: 99 % (06/21/22 1241)   Vital Signs (24h Range):  Temp:  [98 °F (36.7 °C)-99.3 °F (37.4 °C)] 98.1 °F (36.7 °C)  Pulse:  [57-86] 58  Resp:  [18-22] 20  SpO2:  [94 %-100 %] 99 %  BP: (121-139)/(40-85) 132/51     Weight: 97.1 kg (214 lb)  Body mass index is 40.43 kg/m².     SpO2: 99 %  O2 Device (Oxygen Therapy): room air    No intake or output data in the 24 hours ending 06/21/22 1254    Lines/Drains/Airways       Peripheral Intravenous Line  Duration                  Peripheral IV - Single Lumen 06/18/22 20 G Anterior;Distal;Left Forearm 3 days                    Physical Exam  Vitals reviewed.   Constitutional:       General: She is not in acute distress.     Appearance: Normal appearance. She is obese.   Cardiovascular:      Rate and Rhythm: Normal rate and regular rhythm.      Pulses: Normal pulses.      Heart sounds: Normal heart sounds.   Pulmonary:      Effort: Pulmonary effort is normal.      Breath sounds: Normal breath sounds.   Skin:     General: Skin is warm and dry.   Neurological:      Mental Status: She is alert and oriented to person, place, and time.   Psychiatric:         Mood and Affect: Mood normal.         Behavior: Behavior normal.       Significant Labs: All pertinent lab results from the last 24 hours have  been reviewed. and   Recent Lab Results  (Last 5 results in the past 24 hours)        06/21/22  1129   06/21/22  0434   06/21/22  0426   06/20/22  2032   06/20/22  1740        Anion Gap     16           Baso #     0.03           Basophil %     0.3           BUN     23           BUN/CREAT RATIO     21           Calcium     8.5           Chloride     106           CO2     24           Creatinine     1.08           Differential Type     Auto           eGFR if non      55           Eos #     0.26           Eosinophil %     2.7           Glucose     111           Hematocrit     31.0           Hemoglobin     9.2           Immature Grans (Abs)     0.03           Immature Granulocytes     0.3           Lymph #     2.81           Lymph %     29.0           MCH     25.1           MCHC     29.7           MCV     84.5           Mono #     0.74           Mono %     7.6           MPV     10.7           Neutrophils, Abs     5.82           Neutrophils Relative     60.1           nRBC     0.0           NUCLEATED RBC ABSOLUTE     0.00           Platelets     450           POC Glucose 128   121     168   304       Potassium     5.0           RBC     3.67           RDW     16.0           Sodium     141           WBC     9.69                                  Significant Imaging: Echocardiogram: Transthoracic echo (TTE) complete (Cupid Only):   Results for orders placed or performed during the hospital encounter of 06/18/22   Echo Saline Bubble? No   Result Value Ref Range    BSA 2.04 m2    EF 60 %    AORTIC VALVE CUSP SEPERATION [id number] cm    LVIDd 4.90 3.5 - 6.0 cm    IVS 1.09 0.6 - 1.1 cm    Posterior Wall 0.84 0.6 - 1.1 cm    LVIDs 3.25 2.1 - 4.0 cm    FS 34 28 - 44 %    LV mass 167.80 g    LA size 3.78 cm    RVDD 3.07 cm    TAPSE 2.50 cm    RA area 11.6 cm2    Left Ventricle Relative Wall Thickness 0.34 cm    E wave deceleration time 258 msec    MV Peak E Fran 1.28 m/s    TR Max Fran 2.47 m/s    LV Systolic  Volume 42.50 mL    LV Systolic Volume Index 21.9 mL/m2    LV Diastolic Volume 112.80 mL    LV Diastolic Volume Index 58.14 mL/m2    LV Mass Index 86 g/m2    Triscuspid Valve Regurgitation Peak Gradient 24 mmHg    Narrative    · The left ventricle is normal in size with normal systolic function.  · The estimated ejection fraction is 60-65%.  · Normal left ventricular diastolic function.  · Normal right ventricular size with normal right ventricular systolic   function.  · Mild tricuspid regurgitation.

## 2022-06-21 NOTE — H&P
[address]  Gastroenterology  H&P    Patient Name: Dilma Cr  MRN: [id number]  Admission Date: 6/18/2022  Code Status: Full Code    Attending Provider: Adam Martini MD  Primary Care Physician: Primary Doctor No  Principal Problem:Atrial fibrillation with rapid ventricular response    Subjective:     History of Present Illness: Pt has iron def anemia and epigastric pain; for egd.    Past Medical History:   Diagnosis Date    Arthritis     Cancer     colon cancer    Chronic kidney disease, stage 3b     Colon cancer     Coronary artery disease     Depression     Diabetes mellitus, type 2     GERD (gastroesophageal reflux disease)     Hyperlipidemia     Myocardial infarction     2 stents in 05/14/2021 Dr Porter    Renal disorder        Past Surgical History:   Procedure Laterality Date    BREAST SURGERY      C5-C6 RADHA  8-, 7-, 6-1-2016    Dr Fowler    CERVICAL SPINE SURGERY      COLON SURGERY      EPIDURAL STEROID INJECTION INTO LUMBAR SPINE N/A 3/29/2022    Procedure: L4-5 RADHA;  Surgeon: Gela Fowler MD;  Location: [facility];  Service: Pain Management;  Laterality: N/A;  PT AWARE ON OV TO BE TESTED\  PLAVIX TO BE HELD FOR 7 DAYS PRIOR TO PROCEDURE PER DR FOWLER AND DR PORTER  3-28  message left on vm re: covid    HYSTERECTOMY      LEFT HEART CATHETERIZATION Left 5/14/2021    Procedure: Left heart cath;  Surgeon: Mateus Guan MD;  Location: [facility] C[facility];  Service: Cardiology;  Laterality: Left;    MEDIPORT REMOVAL  10/14/2021    Dr Kraft    right [place]ator cuff [place]      in [place]    TOTAL REDUCTION MAMMOPLASTY         Review of patient's allergies indicates:  No Known Allergies  Family History     Problem Relation (Age of Onset)    Breast cancer Sister    Diabetes Mother, Father    Hypertension Mother, Father, Sister, Brother        Tobacco Use    Smoking status: Never Smoker    Smokeless tobacco: Never Used    Substance and Sexual Activity    Alcohol use: Never    Drug use: Never    Sexual activity: Yes     Review of Systems   Respiratory: Negative.    Cardiovascular: Negative.    Gastrointestinal: Positive for abdominal pain.     Objective:     Vital Signs (Most Recent):  Temp: 98.1 °F (36.7 °C) (06/21/22 1121)  Pulse: (!) 58 (06/21/22 1241)  Resp: 20 (06/21/22 1241)  BP: (!) 132/51 (06/21/22 1241)  SpO2: 99 % (06/21/22 1241) Vital Signs (24h Range):  Temp:  [98 °F (36.7 °C)-99.3 °F (37.4 °C)] 98.1 °F (36.7 °C)  Pulse:  [57-86] 58  Resp:  [18-22] 20  SpO2:  [94 %-100 %] 99 %  BP: (121-139)/(40-85) 132/51     Weight: 97.1 kg (214 lb) (06/19/22 0719)  Body mass index is 40.43 kg/m².    No intake or output data in the 24 hours ending 06/21/22 1306    Lines/Drains/Airways     Peripheral Intravenous Line  Duration                Peripheral IV - Single Lumen 06/18/22 20 G Anterior;Distal;Left Forearm 3 days                Physical Exam  Vitals reviewed.   Constitutional:       General: She is not in acute distress.     Appearance: Normal appearance. She is well-developed. She is not ill-appearing.   HENT:      Head: Normocephalic and atraumatic.      Nose: Nose normal.   Eyes:      Pupils: Pupils are equal, round, and reactive to light.   Cardiovascular:      Rate and Rhythm: Normal rate and regular rhythm.   Pulmonary:      Effort: Pulmonary effort is normal.      Breath sounds: Normal breath sounds. No wheezing.   Abdominal:      General: Abdomen is flat. Bowel sounds are normal. There is no distension.      Palpations: Abdomen is soft.      Tenderness: There is no abdominal tenderness. There is no guarding.   Skin:     General: Skin is warm and dry.      Coloration: Skin is not jaundiced.   Neurological:      Mental Status: She is alert.   Psychiatric:         Attention and Perception: Attention normal.         Mood and Affect: Affect normal.         Speech: Speech normal.         Behavior: Behavior is cooperative.       Comments: Pt was calm while speaking.         Significant Labs:  CBC:   Recent Labs   Lab 06/19/22  1322 06/20/22  0643 06/21/22  0426   WBC  --  9.52 9.69   HGB 7.6* 9.1* 9.2*   HCT 25.7* 29.7* 31.0*   PLT  --  433* 450*     CMP:   Recent Labs   Lab 06/21/22  0426   *   CALCIUM 8.5      K 5.0   CO2 24      BUN 23*   CREATININE 1.08*       Significant Imaging:  Imaging results within the past 24 hours have been reviewed.    Assessment/Plan:     Active Diagnoses:    Diagnosis Date Noted POA    PRINCIPAL PROBLEM:  Atrial fibrillation with rapid ventricular response [I48.91] 05/14/2021 Yes    Iron deficiency anemia due to chronic blood loss [D50.0] 06/19/2022 Yes    Anemia [D64.9]  Yes    History of colon cancer [Z85.038]  Yes    NSTEMI (non-ST elevated myocardial infarction) [I21.4]  Unknown    Chronic kidney disease (CKD) stage G3b/A3, moderately decreased glomerular filtration rate (GFR) between 30-44 mL/min/1.73 square meter and albuminuria creatinine ratio greater than 300 mg/g [N18.32] 06/18/2022 Yes    Type 2 diabetes mellitus with diabetic polyneuropathy, with long-term current use of insulin [E11.42, Z79.4] 08/30/2021 Not Applicable     Chronic    Atherosclerosis of native coronary artery of native heart with unstable angina pectoris [I25.110] 06/30/2021 Yes     Chronic    Hypertension [I10] 06/16/2021 Yes     Chronic      Problems Resolved During this Admission:       Imp: iron deficiency anemia  Plan: egd    Adam Martini MD  Gastroenterology  [address]

## 2022-06-21 NOTE — ASSESSMENT & PLAN NOTE
-H/H 8/26  - iron 20 with iron saturation 6  - has been on iron supplements-- holding in light of possible GI procedure   6/20  - rec'd 1u PRBCs yesterday for hgb less than 7  - H/H 9/29 this AM  - no overt signs of bleeding   6/21  - 9/31  - stable at present

## 2022-06-21 NOTE — ASSESSMENT & PLAN NOTE
- trop 110 -> 156  - ECHO  · The left ventricle is normal in size with normal systolic function.  · The estimated ejection fraction is 60-65%.  · Normal left ventricular diastolic function.  · Normal right ventricular size with normal right ventricular systolic function.  · Mild tricuspid regurgitation.    - CXR Mildly increasing diffuse infiltrates versus pulmonary edema  - cardiac monitoring  - ASA, statin, BB,   - AC held in light of possible GIB and anemia  - holding ACE/ARB for renal dysfunction   - lipid panel pending  - A1C 8.6 on 6/16 6/20  - plans for [facility] today   6/21  - [facility] with results as follow   1. Single vessel CAD (90% mid-distal LAD in-stent restenosis)  2. Normal left sided filling pressure (LVEDP - 10mmHg)  3. S/p successful angioplasty with scoring balloon (angiosculpt) of mid-distal LAD in-stent restenosis

## 2022-06-21 NOTE — TRANSFER OF CARE
"Anesthesia Transfer of Care Note    Patient: Dilma Cr    Procedure(s) Performed: * No procedures listed *    Patient location: GI    Anesthesia Type: general    Transport from OR: Transported from OR on room air with adequate spontaneous ventilation. Continuous ECG monitoring in transport. Continuous SpO2 monitoring in transport. Continuos invasive BP monitoring in transport    Post pain: adequate analgesia    Post assessment: no apparent anesthetic complications and tolerated procedure well    Post vital signs: stable    Level of consciousness: responds to stimulation and awake    Nausea/Vomiting: no nausea/vomiting    Complications: none    Transfer of care protocol was followed      Last vitals:   Visit Vitals  BP (!) 115/43 (BP Location: Right arm, Patient Position: Lying)   Pulse (!) 58   Temp 36.7 °C (98 °F)   Resp 12   Ht 5' 1" (1.549 m)   Wt 97.1 kg (214 lb)   LMP  (LMP Unknown)   SpO2 100%   Breastfeeding No   BMI 40.43 kg/m²     "

## 2022-06-21 NOTE — ANESTHESIA POSTPROCEDURE EVALUATION
Anesthesia Post Evaluation    Patient: Dilma Cr    Procedure(s) Performed: * No procedures listed *    Final Anesthesia Type: general      Patient location during evaluation: PACU  Patient participation: Yes- Able to Participate  Level of consciousness: awake and sedated  Post-procedure vital signs: reviewed and stable  Pain management: adequate  Airway patency: patent    PONV status at discharge: No PONV  Anesthetic complications: no      Cardiovascular status: blood pressure returned to baseline  Respiratory status: unassisted  Hydration status: euvolemic  Follow-up not needed.          Vitals Value Taken Time   BP 99/52 06/21/22 1341   Temp 36.7 °C (98 °F) 06/21/22 1320   Pulse 57 06/21/22 1341   Resp 17 06/21/22 1341   SpO2 100 % 06/21/22 1341   Vitals shown include unvalidated device data.      Event Time   Out of Recovery 06/21/2022 13:59:43         Pain/Alonzo Score: Pain Rating Prior to Med Admin: 9 (6/20/2022  5:44 PM)  Alonzo Score: 8 (6/21/2022  1:23 PM)

## 2022-06-21 NOTE — CONSULTS
[address]  Gastroenterology  Consult Note    Patient Name: Dilma Cr  MRN: [id number]  Admission Date: 6/18/2022  Hospital Length of Stay: 3 days  Code Status: Full Code   Attending Provider: Adam Martini MD  Consulting Provider: Adam Martini MD  Primary Care Physician: Primary Doctor No  Principal Problem:Atrial fibrillation with rapid ventricular response    Inpatient consult to Gastroenterology  Consult performed by: Adam Martini MD  Consult ordered by: Ranjan Linder MD        Subjective:     HPI: GI consult was repeated for iron deficiency anemia. EGD shows no bleeding, but gastritis and a hiatus hernia are present.     Past Medical History:   Diagnosis Date    Acute superficial gastritis without hemorrhage 6/21/2022    Arthritis     Cancer     colon cancer    Chronic kidney disease, stage 3b     Colon cancer     Coronary artery disease     Depression     Diabetes mellitus, type 2     GERD (gastroesophageal reflux disease)     HH (hiatus hernia) 6/21/2022    Hyperlipidemia     Myocardial infarction     2 stents in 05/14/2021 Dr Porter    Renal disorder        Past Surgical History:   Procedure Laterality Date    BREAST SURGERY      C5-C6 RADHA  8-, 7-, 6-1-2016    Dr Fowler    CERVICAL SPINE SURGERY      COLON SURGERY      EPIDURAL STEROID INJECTION INTO LUMBAR SPINE N/A 3/29/2022    Procedure: L4-5 RADHA;  Surgeon: Gela Fowler MD;  Location: [facility];  Service: Pain Management;  Laterality: N/A;  PT AWARE ON OV TO BE TESTED\  PLAVIX TO BE HELD FOR 7 DAYS PRIOR TO PROCEDURE PER DR FOWLER AND DR PORTER  3-28  message left on vm re: covid    HYSTERECTOMY      LEFT HEART CATHETERIZATION Left 5/14/2021    Procedure: Left heart cath;  Surgeon: Mateus Guan MD;  Location: [facility] CATH LAB;  Service: Cardiology;  Laterality: Left;    MEDIPORT REMOVAL  10/14/2021    Dr Kraft    right rotator cuff repair      in  [place]    TOTAL REDUCTION MAMMOPLASTY         Review of patient's allergies indicates:  No Known Allergies  Family History     Problem Relation (Age of Onset)    Breast cancer Sister    Diabetes Mother, Father    Hypertension Mother, Father, Sister, Brother        Tobacco Use    Smoking status: Never Smoker    Smokeless tobacco: Never Used   Substance and Sexual Activity    Alcohol use: Never    Drug use: Never    Sexual activity: Yes     Review of Systems  Objective:     Vital Signs (Most Recent):  Temp: 98 °F (36.7 °C) (06/21/22 1320)  Pulse: (!) 58 (06/21/22 1323)  Resp: 16 (06/21/22 1323)  BP: (!) 113/44 (06/21/22 1323)  SpO2: 98 % (06/21/22 1323) Vital Signs (24h Range):  Temp:  [98 °F (36.7 °C)-99.3 °F (37.4 °C)] 98 °F (36.7 °C)  Pulse:  [57-86] 58  Resp:  [12-22] 16  SpO2:  [94 %-100 %] 98 %  BP: (113-139)/(40-85) 113/44     Weight: 97.1 kg (214 lb) (06/19/22 0719)  Body mass index is 40.43 kg/m².    No intake or output data in the 24 hours ending 06/21/22 1329    Lines/Drains/Airways     Peripheral Intravenous Line  Duration                Peripheral IV - Single Lumen 06/18/22 20 G Anterior;Distal;Left Forearm 3 days                Physical Exam    Significant Labs:  CBC:   Recent Labs   Lab 06/20/22  0643 06/21/22  0426   WBC 9.52 9.69   HGB 9.1* 9.2*   HCT 29.7* 31.0*   * 450*       Significant Imaging:  Imaging results within the past 24 hours have been reviewed.    Assessment/Plan:     Active Diagnoses:    Diagnosis Date Noted POA    PRINCIPAL PROBLEM:  Atrial fibrillation with rapid ventricular response [I48.91] 05/14/2021 Yes    HH (hiatus hernia) [K44.9] 06/21/2022 Yes    Acute superficial gastritis without hemorrhage [K29.00] 06/21/2022 Unknown    Iron deficiency anemia due to chronic blood loss [D50.0] 06/19/2022 Yes    Anemia [D64.9]  Yes    History of colon cancer [Z85.038]  Yes    NSTEMI (non-ST elevated myocardial infarction) [I21.4]  Unknown    Chronic kidney disease (CKD)  stage G3b/A3, moderately decreased glomerular filtration rate (GFR) between 30-44 mL/min/1.73 square meter and albuminuria creatinine ratio greater than 300 mg/g [N18.32] 06/18/2022 Yes    Type 2 diabetes mellitus with diabetic polyneuropathy, with long-term current use of insulin [E11.42, Z79.4] 08/30/2021 Not Applicable     Chronic    Atherosclerosis of native coronary artery of native heart with unstable angina pectoris [I25.110] 06/30/2021 Yes     Chronic    Hypertension [I10] 06/16/2021 Yes     Chronic      Problems Resolved During this Admission:       Imp: iron def anemia; hx of colon cancer  Plan: colonoscopy tomorrow, after bowel prep.    Thank you for your consult. I will follow-up with patient. Please contact us if you have any additional questions.    Adam Martini MD  Gastroenterology  [facility] - [address] [place] [address]

## 2022-06-21 NOTE — NURSING
IV in right hand infiltrated. extremity  Is cool, and puffy non tender. Applied warm compress and discontinued IV. Will replace in a new location.pt tolerated well no complaints no distress noted.

## 2022-06-21 NOTE — ASSESSMENT & PLAN NOTE
Hold jardance while NPO  Basal bolus insulin while in hospital.  Continue lyrica  6/19  - continue long acting and SSI  - monitor BG trend and adjust as needed   - A1C 8.6 on 6/16 6/20  - BG trend 144-224   6/21  - BG trend

## 2022-06-21 NOTE — PROGRESS NOTES
[address]  Cardiology  Progress Note    Patient Name: Dilma Cr  MRN: [id number]  Admission Date: 6/18/2022  Hospital Length of Stay: 3 days  Code Status: Full Code   Attending Physician: Ranjan Linder MD   Primary Care Physician: Primary Doctor No  Expected Discharge Date:   Principal Problem:Atrial fibrillation with rapid ventricular response    Subjective:     Interval History: [facility] yesterday s/p successful angioplasty with scoring balloon (angiosculpt) of mid-distal LAD in-stent restenosis. Deferred stent placement in setting of GI bleed s/p blood transfusion. Pt is scheduled for EGD today.    Review of Systems   Constitutional: Negative for diaphoresis.   Cardiovascular:  Negative for chest pain, dyspnea on exertion, leg swelling, near-syncope, orthopnea and palpitations.   Respiratory:  Negative for shortness of breath.    Gastrointestinal:  Positive for abdominal pain.   Objective:     Vital Signs (Most Recent):  Temp: 98.1 °F (36.7 °C) (06/21/22 1121)  Pulse: (!) 58 (06/21/22 1241)  Resp: 20 (06/21/22 1241)  BP: (!) 132/51 (06/21/22 1241)  SpO2: 99 % (06/21/22 1241)   Vital Signs (24h Range):  Temp:  [98 °F (36.7 °C)-99.3 °F (37.4 °C)] 98.1 °F (36.7 °C)  Pulse:  [57-86] 58  Resp:  [18-22] 20  SpO2:  [94 %-100 %] 99 %  BP: (121-139)/(40-85) 132/51     Weight: 97.1 kg (214 lb)  Body mass index is 40.43 kg/m².     SpO2: 99 %  O2 Device (Oxygen Therapy): room air    No intake or output data in the 24 hours ending 06/21/22 1254    Lines/Drains/Airways       Peripheral Intravenous Line  Duration                  Peripheral IV - Single Lumen 06/18/22 20 G Anterior;Distal;Left Forearm 3 days                    Physical Exam  Vitals reviewed.   Constitutional:       General: She is not in acute distress.     Appearance: Normal appearance. She is obese.   Cardiovascular:      Rate and Rhythm: Normal rate and regular rhythm.      Pulses: Normal pulses.      Heart sounds: Normal  heart sounds.   Pulmonary:      Effort: Pulmonary effort is normal.      Breath sounds: Normal breath sounds.   Skin:     General: Skin is warm and dry.   Neurological:      Mental Status: She is alert and oriented to person, place, and time.   Psychiatric:         Mood and Affect: Mood normal.         Behavior: Behavior normal.       Significant Labs: All pertinent lab results from the last 24 hours have been reviewed. and   Recent Lab Results  (Last 5 results in the past 24 hours)        06/21/22  1129   06/21/22  0434   06/21/22  0426   06/20/22  2032   06/20/22  1740        Anion Gap     16           Baso #     0.03           Basophil %     0.3           BUN     23           BUN/CREAT RATIO     21           Calcium     8.5           Chloride     106           CO2     24           Creatinine     1.08           Differential Type     Auto           eGFR if non      55           Eos #     0.26           Eosinophil %     2.7           Glucose     111           Hematocrit     31.0           Hemoglobin     9.2           Immature Grans (Abs)     0.03           Immature Granulocytes     0.3           Lymph #     2.81           Lymph %     29.0           MCH     25.1           MCHC     29.7           MCV     84.5           Mono #     0.74           Mono %     7.6           MPV     10.7           Neutrophils, Abs     5.82           Neutrophils Relative     60.1           nRBC     0.0           NUCLEATED RBC ABSOLUTE     0.00           Platelets     450           POC Glucose 128   121     168   304       Potassium     5.0           RBC     3.67           RDW     16.0           Sodium     141           WBC     9.69                                  Significant Imaging: Echocardiogram: Transthoracic echo (TTE) complete (Cupid Only):   Results for orders placed or performed during the hospital encounter of 06/18/22   Echo Saline Bubble? No   Result Value Ref Range    BSA 2.04 m2    EF 60 %    AORTIC VALVE CUSP  SEPERATION [id number] cm    LVIDd 4.90 3.5 - 6.0 cm    IVS 1.09 0.6 - 1.1 cm    Posterior Wall 0.84 0.6 - 1.1 cm    LVIDs 3.25 2.1 - 4.0 cm    FS 34 28 - 44 %    LV mass 167.80 g    LA size 3.78 cm    RVDD 3.07 cm    TAPSE 2.50 cm    RA area 11.6 cm2    Left Ventricle Relative Wall Thickness 0.34 cm    E wave deceleration time 258 msec    MV Peak E Fran 1.28 m/s    TR Max Fran 2.47 m/s    LV Systolic Volume 42.50 mL    LV Systolic Volume Index 21.9 mL/m2    LV Diastolic Volume 112.80 mL    LV Diastolic Volume Index 58.14 mL/m2    LV Mass Index 86 g/m2    Triscuspid Valve Regurgitation Peak Gradient 24 mmHg    Narrative    · The left ventricle is normal in size with normal systolic function.  · The estimated ejection fraction is 60-65%.  · Normal left ventricular diastolic function.  · Normal right ventricular size with normal right ventricular systolic   function.  · Mild tricuspid regurgitation.        Assessment and Plan:     Brief HPI: 61-year-old female with history of colon cancer, please status post PCI to LAD and RCA, diabetes, hyperlipidemia, atrial fibrillation not on anticoagulation due to GI bleeding admitted with intermittent chest pains with rising troponin.  Echocardiogram shows normal overall LV function.  Patient was also noted to be in AFib with RVR since then has converted back to normal sinus rhythm.  She is also anemic with concern for GI bleeding, hemoglobin has dropped to 8 from 10. She was previously on aspirin Plavix which were stopped after having completed the year from her PCI and then was switched to Eliquis however complained of GI upset from that and has not been taking Eliquis.    * Atrial fibrillation with rapid ventricular response  - rate is now controlled  - continue metoprolol  - no AC due to GI bleed    NSTEMI (non-ST elevated myocardial infarction)  - [facility] yesterday s/p successful angioplasty with scoring balloon (angiosculpt) of mid-distal LAD in-stent restenosis  -  Deferred stent placement in setting of GI bleed s/p blood transfusion, Pt is scheduled for EGD today  - continue ASA indefinitely  - continue plavix for one month  - continue statin and metoprolol  - refer for cardiac rehab  - f/u with GAGE Hickey in one week, then f/u with Dr. Bautista in 4 weeks  - cardiology will sign off, please call if needed      Atherosclerosis of native coronary artery of native heart with unstable angina pectoris  - continue ASA, statin, metoprolol  - continue plavix for one month          VTE Risk Mitigation (From admission, onward)    None          Shara Forrest, SELINP  Cardiology  [facility] - [address] [place] [address]

## 2022-06-21 NOTE — ASSESSMENT & PLAN NOTE
Maximized BB.  Will not start diltiazem until echo returns.    Starting amiodarone infusion.    Patient would benefit from OP sleep study.    Trending troponins and monitoring on telemetry  TSH pending.    6/19  - transitioned off amiodarone infusion per nursing  - continue current meds  - TSH 1.640   6/20  - HR controlled at present with current meds   - HR NSR with rate 60s   6/21  - controlled

## 2022-06-21 NOTE — NURSING
Received report from HELDER Marquez from GI lab pt had upper scope with no biopsy pt will be prepped for Colonoscopy tomorrow. Will continue to monitor

## 2022-06-21 NOTE — SUBJECTIVE & OBJECTIVE
Interval History: Pt resting in bed. GI reconsulted for possible scopes. Denies any CP or SOB. Still has some abd pain and distention. Discussed POC.      Review of Systems   Constitutional:  Negative for activity change and chills.   HENT:  Negative for sinus pressure and sinus pain.    Respiratory:  Negative for cough.    Gastrointestinal:  Positive for abdominal pain. Negative for diarrhea, nausea and vomiting.   All other systems reviewed and are negative.  Objective:     Vital Signs (Most Recent):  Temp: 98 °F (36.7 °C) (06/21/22 1320)  Pulse: (!) 57 (06/21/22 1340)  Resp: 14 (06/21/22 1340)  BP: (!) 120/46 (06/21/22 1340)  SpO2: 100 % (06/21/22 1340)   Vital Signs (24h Range):  Temp:  [98 °F (36.7 °C)-99.3 °F (37.4 °C)] 98 °F (36.7 °C)  Pulse:  [56-86] 57  Resp:  [12-22] 14  SpO2:  [94 %-100 %] 100 %  BP: (111-139)/(40-85) 120/46     Weight: 97.1 kg (214 lb)  Body mass index is 40.43 kg/m².    Intake/Output Summary (Last 24 hours) at 6/21/2022 1558  Last data filed at 6/21/2022 1340  Gross per 24 hour   Intake 350 ml   Output --   Net 350 ml      Physical Exam  Vitals and nursing note reviewed.   Constitutional:       General: She is not in acute distress.     Appearance: Normal appearance. She is obese. She is not ill-appearing.   HENT:      Head: Atraumatic.      Mouth/Throat:      Mouth: Mucous membranes are moist.      Pharynx: Oropharynx is clear.   Eyes:      Conjunctiva/sclera: Conjunctivae normal.      Pupils: Pupils are equal, round, and reactive to light.   Cardiovascular:      Rate and Rhythm: Normal rate. Rhythm irregular.      Pulses: Normal pulses.      Heart sounds: Normal heart sounds.   Pulmonary:      Effort: Pulmonary effort is normal.      Breath sounds: Normal breath sounds.   Abdominal:      General: Abdomen is flat. Bowel sounds are normal.      Palpations: Abdomen is soft.   Musculoskeletal:         General: Normal range of motion.   Skin:     General: Skin is warm and dry.       Capillary Refill: Capillary refill takes less than 2 seconds.   Neurological:      General: No focal deficit present.      Mental Status: She is alert and oriented to person, place, and time.   Psychiatric:         Mood and Affect: Mood normal.       Significant Labs: All pertinent labs within the past 24 hours have been reviewed.  Recent Lab Results  (Last 5 results in the past 24 hours)        06/21/22  1129   06/21/22  0434   06/21/22  0426   06/20/22  2032   06/20/22  1740        Anion Gap     16           Baso #     0.03           Basophil %     0.3           BUN     23           BUN/CREAT RATIO     21           Calcium     8.5           Chloride     106           CO2     24           Creatinine     1.08           Differential Type     Auto           eGFR if non      55           Eos #     0.26           Eosinophil %     2.7           Glucose     111           Hematocrit     31.0           Hemoglobin     9.2           Immature Grans (Abs)     0.03           Immature Granulocytes     0.3           Lymph #     2.81           Lymph %     29.0           MCH     25.1           MCHC     29.7           MCV     84.5           Mono #     0.74           Mono %     7.6           MPV     10.7           Neutrophils, Abs     5.82           Neutrophils Relative     60.1           nRBC     0.0           NUCLEATED RBC ABSOLUTE     0.00           Platelets     450           POC Glucose 128   121     168   304       Potassium     5.0           RBC     3.67           RDW     16.0           Sodium     141           WBC     9.69                                  Significant Imaging: I have reviewed all pertinent imaging results/findings within the past 24 hours.

## 2022-06-22 ENCOUNTER — ANESTHESIA EVENT (OUTPATIENT)
Dept: GASTROENTEROLOGY | Facility: HOSPITAL | Age: 62
DRG: 250 | End: 2022-06-22
Payer: COMMERCIAL

## 2022-06-22 ENCOUNTER — ANESTHESIA (OUTPATIENT)
Dept: GASTROENTEROLOGY | Facility: HOSPITAL | Age: 62
DRG: 250 | End: 2022-06-22
Payer: COMMERCIAL

## 2022-06-22 VITALS — SYSTOLIC BLOOD PRESSURE: 137 MMHG | OXYGEN SATURATION: 100 % | DIASTOLIC BLOOD PRESSURE: 58 MMHG | HEART RATE: 58 BPM

## 2022-06-22 VITALS
HEART RATE: 62 BPM | BODY MASS INDEX: 40.4 KG/M2 | TEMPERATURE: 98 F | HEIGHT: 61 IN | OXYGEN SATURATION: 100 % | RESPIRATION RATE: 12 BRPM | SYSTOLIC BLOOD PRESSURE: 138 MMHG | DIASTOLIC BLOOD PRESSURE: 64 MMHG | WEIGHT: 214 LBS

## 2022-06-22 PROBLEM — K57.30 DIVERTICULA, COLON: Status: ACTIVE | Noted: 2022-06-22

## 2022-06-22 PROBLEM — N17.9 AKI (ACUTE KIDNEY INJURY): Status: ACTIVE | Noted: 2022-06-22

## 2022-06-22 PROBLEM — Z90.49 H/O RIGHT HEMICOLECTOMY: Status: ACTIVE | Noted: 2022-06-22

## 2022-06-22 PROBLEM — E66.01 SEVERE OBESITY (BMI >= 40): Chronic | Status: ACTIVE | Noted: 2022-06-22

## 2022-06-22 LAB
ABO + RH BLD: NORMAL
ABO + RH BLD: NORMAL
ANION GAP SERPL CALCULATED.3IONS-SCNC: 12 MMOL/L (ref 7–16)
BASOPHILS # BLD AUTO: 0.03 K/UL (ref 0–0.2)
BASOPHILS NFR BLD AUTO: 0.4 % (ref 0–1)
BLD PROD TYP BPU: NORMAL
BLD PROD TYP BPU: NORMAL
BLOOD UNIT EXPIRATION DATE: NORMAL
BLOOD UNIT EXPIRATION DATE: NORMAL
BLOOD UNIT TYPE CODE: 5100
BLOOD UNIT TYPE CODE: 5100
BUN SERPL-MCNC: 13 MG/DL (ref 7–18)
BUN/CREAT SERPL: 14 (ref 6–20)
CALCIUM SERPL-MCNC: 8.4 MG/DL (ref 8.5–10.1)
CHLORIDE SERPL-SCNC: 107 MMOL/L (ref 98–107)
CO2 SERPL-SCNC: 25 MMOL/L (ref 21–32)
CREAT SERPL-MCNC: 0.9 MG/DL (ref 0.55–1.02)
CROSSMATCH INTERPRETATION: NORMAL
CROSSMATCH INTERPRETATION: NORMAL
DIFFERENTIAL METHOD BLD: ABNORMAL
DISPENSE STATUS: NORMAL
DISPENSE STATUS: NORMAL
EOSINOPHIL # BLD AUTO: 0.2 K/UL (ref 0–0.5)
EOSINOPHIL NFR BLD AUTO: 2.6 % (ref 1–4)
ERYTHROCYTE [DISTWIDTH] IN BLOOD BY AUTOMATED COUNT: 16.1 % (ref 11.5–14.5)
GLUCOSE SERPL-MCNC: 113 MG/DL (ref 74–106)
GLUCOSE SERPL-MCNC: 119 MG/DL (ref 70–105)
GLUCOSE SERPL-MCNC: 131 MG/DL (ref 70–105)
GLUCOSE SERPL-MCNC: 173 MG/DL (ref 70–105)
HCT VFR BLD AUTO: 29.1 % (ref 38–47)
HGB BLD-MCNC: 8.8 G/DL (ref 12–16)
IMM GRANULOCYTES # BLD AUTO: 0.03 K/UL (ref 0–0.04)
IMM GRANULOCYTES NFR BLD: 0.4 % (ref 0–0.4)
LYMPHOCYTES # BLD AUTO: 2.41 K/UL (ref 1–4.8)
LYMPHOCYTES NFR BLD AUTO: 31.8 % (ref 27–41)
MCH RBC QN AUTO: 25.6 PG (ref 27–31)
MCHC RBC AUTO-ENTMCNC: 30.2 G/DL (ref 32–36)
MCV RBC AUTO: 84.6 FL (ref 80–96)
MONOCYTES # BLD AUTO: 0.65 K/UL (ref 0–0.8)
MONOCYTES NFR BLD AUTO: 8.6 % (ref 2–6)
MPC BLD CALC-MCNC: 10.4 FL (ref 9.4–12.4)
NEUTROPHILS # BLD AUTO: 4.25 K/UL (ref 1.8–7.7)
NEUTROPHILS NFR BLD AUTO: 56.2 % (ref 53–65)
NRBC # BLD AUTO: 0 X10E3/UL
NRBC, AUTO (.00): 0 %
PLATELET # BLD AUTO: 429 K/UL (ref 150–400)
POTASSIUM SERPL-SCNC: 4.5 MMOL/L (ref 3.5–5.1)
RBC # BLD AUTO: 3.44 M/UL (ref 4.2–5.4)
SODIUM SERPL-SCNC: 139 MMOL/L (ref 136–145)
UNIT NUMBER: NORMAL
UNIT NUMBER: NORMAL
WBC # BLD AUTO: 7.57 K/UL (ref 4.5–11)

## 2022-06-22 PROCEDURE — 25000003 PHARM REV CODE 250: Performed by: HOSPITALIST

## 2022-06-22 PROCEDURE — 27000284 HC CANNULA NASAL: Performed by: NURSE ANESTHETIST, CERTIFIED REGISTERED

## 2022-06-22 PROCEDURE — 99239 PR HOSPITAL DISCHARGE DAY,>30 MIN: ICD-10-PCS | Mod: ,,, | Performed by: HOSPITALIST

## 2022-06-22 PROCEDURE — 45378 DIAGNOSTIC COLONOSCOPY: CPT

## 2022-06-22 PROCEDURE — 63600175 PHARM REV CODE 636 W HCPCS: Performed by: NURSE ANESTHETIST, CERTIFIED REGISTERED

## 2022-06-22 PROCEDURE — 25000003 PHARM REV CODE 250: Performed by: NURSE ANESTHETIST, CERTIFIED REGISTERED

## 2022-06-22 PROCEDURE — D9220A PRA ANESTHESIA: ICD-10-PCS | Mod: ,,, | Performed by: NURSE ANESTHETIST, CERTIFIED REGISTERED

## 2022-06-22 PROCEDURE — 63600175 PHARM REV CODE 636 W HCPCS: Performed by: NURSE PRACTITIONER

## 2022-06-22 PROCEDURE — 85025 COMPLETE CBC W/AUTO DIFF WBC: CPT | Performed by: NURSE PRACTITIONER

## 2022-06-22 PROCEDURE — 36415 COLL VENOUS BLD VENIPUNCTURE: CPT | Performed by: NURSE PRACTITIONER

## 2022-06-22 PROCEDURE — C9113 INJ PANTOPRAZOLE SODIUM, VIA: HCPCS | Performed by: NURSE PRACTITIONER

## 2022-06-22 PROCEDURE — 80048 BASIC METABOLIC PNL TOTAL CA: CPT | Performed by: NURSE PRACTITIONER

## 2022-06-22 PROCEDURE — 82962 GLUCOSE BLOOD TEST: CPT

## 2022-06-22 PROCEDURE — 27000716 HC OXISENSOR PROBE, ANY SIZE: Performed by: NURSE ANESTHETIST, CERTIFIED REGISTERED

## 2022-06-22 PROCEDURE — D9220A PRA ANESTHESIA: Mod: ,,, | Performed by: NURSE ANESTHETIST, CERTIFIED REGISTERED

## 2022-06-22 PROCEDURE — 99239 HOSP IP/OBS DSCHRG MGMT >30: CPT | Mod: ,,, | Performed by: HOSPITALIST

## 2022-06-22 RX ORDER — METOPROLOL TARTRATE 25 MG/1
25 TABLET, FILM COATED ORAL 2 TIMES DAILY
Qty: 60 TABLET | Refills: 0 | Status: SHIPPED | OUTPATIENT
Start: 2022-06-22 | End: 2022-08-30

## 2022-06-22 RX ORDER — OLMESARTAN MEDOXOMIL AND HYDROCHLOROTHIAZIDE 40/25 40; 25 MG/1; MG/1
1 TABLET ORAL DAILY
Qty: 90 TABLET | Refills: 1
Start: 2022-06-22 | End: 2022-06-30 | Stop reason: DRUGHIGH

## 2022-06-22 RX ORDER — PROPOFOL 10 MG/ML
VIAL (ML) INTRAVENOUS
Status: DISCONTINUED | OUTPATIENT
Start: 2022-06-22 | End: 2022-06-22

## 2022-06-22 RX ORDER — SODIUM CHLORIDE 0.9 % (FLUSH) 0.9 %
10 SYRINGE (ML) INJECTION
Status: DISCONTINUED | OUTPATIENT
Start: 2022-06-22 | End: 2022-06-22 | Stop reason: HOSPADM

## 2022-06-22 RX ORDER — LIDOCAINE HYDROCHLORIDE 20 MG/ML
INJECTION, SOLUTION EPIDURAL; INFILTRATION; INTRACAUDAL; PERINEURAL
Status: DISCONTINUED | OUTPATIENT
Start: 2022-06-22 | End: 2022-06-22

## 2022-06-22 RX ORDER — NITROGLYCERIN 0.4 MG/1
0.4 TABLET SUBLINGUAL EVERY 5 MIN PRN
Qty: 30 TABLET | Refills: 0 | Status: SHIPPED | OUTPATIENT
Start: 2022-06-22 | End: 2023-11-15

## 2022-06-22 RX ORDER — LANOLIN ALCOHOL/MO/W.PET/CERES
1 CREAM (GRAM) TOPICAL 2 TIMES DAILY
Status: DISCONTINUED | OUTPATIENT
Start: 2022-06-22 | End: 2022-06-22 | Stop reason: HOSPADM

## 2022-06-22 RX ADMIN — PREGABALIN 50 MG: 50 CAPSULE ORAL at 09:06

## 2022-06-22 RX ADMIN — PROPOFOL 60 MG: 10 INJECTION, EMULSION INTRAVENOUS at 03:06

## 2022-06-22 RX ADMIN — SODIUM CHLORIDE: 9 INJECTION, SOLUTION INTRAVENOUS at 03:06

## 2022-06-22 RX ADMIN — PROPOFOL 70 MG: 10 INJECTION, EMULSION INTRAVENOUS at 03:06

## 2022-06-22 RX ADMIN — ASPIRIN 81 MG CHEWABLE TABLET 81 MG: 81 TABLET CHEWABLE at 09:06

## 2022-06-22 RX ADMIN — SODIUM CHLORIDE: 9 INJECTION, SOLUTION INTRAVENOUS at 09:06

## 2022-06-22 RX ADMIN — CLOPIDOGREL 75 MG: 75 TABLET, FILM COATED ORAL at 09:06

## 2022-06-22 RX ADMIN — Medication 100 MCG: at 09:06

## 2022-06-22 RX ADMIN — LIDOCAINE HYDROCHLORIDE 60 MG: 20 INJECTION, SOLUTION INTRAVENOUS at 03:06

## 2022-06-22 RX ADMIN — PANTOPRAZOLE SODIUM 40 MG: 40 INJECTION, POWDER, FOR SOLUTION INTRAVENOUS at 09:06

## 2022-06-22 NOTE — ASSESSMENT & PLAN NOTE
Body mass index is 40.43 kg/m². Morbid obesity complicates all aspects of disease management from diagnostic modalities to treatment. Weight loss encouraged and health benefits explained to patient.

## 2022-06-22 NOTE — H&P
[address]  Gastroenterology  H&P    Patient Name: Dilma Cr  MRN: [id number]  Admission Date: 6/18/2022  Code Status: Full Code    Attending Provider: Adam Martini MD  Primary Care Physician: Primary Doctor No  Principal Problem:Atrial fibrillation with rapid ventricular response    Subjective:     History of Present Illness: Pt has iron deficiency anemia, heme + stool and hx of colon cancer. Her last colonoscopy was 2020.    Past Medical History:   Diagnosis Date    Acute superficial gastritis without hemorrhage 6/21/2022    Arthritis     Cancer     colon cancer    Chronic kidney disease, stage 3b     Colon cancer     Coronary artery disease     Depression     Diabetes mellitus, type 2     GERD (gastroesophageal reflux disease)     HH (hiatus hernia) 6/21/2022    Hyperlipidemia     Myocardial infarction     2 stents in 05/14/2021 Dr Porter    Renal disorder        Past Surgical History:   Procedure Laterality Date    BREAST SURGERY      C5-C6 RADHA  8-, 7-, 6-1-2016    Dr Fowler    CERVICAL SPINE SURGERY      COLON SURGERY      EPIDURAL STEROID INJECTION INTO LUMBAR SPINE N/A 3/29/2022    Procedure: L4-5 RADHA;  Surgeon: Gela Fowler MD;  Location: [facility];  Service: Pain Management;  Laterality: N/A;  PT AWARE ON OV TO BE TESTED\  PLAVIX TO BE HELD FOR 7 DAYS PRIOR TO PROCEDURE PER DR FOWLER AND DR PORTER  3-28  message left on vm re: covid    HYSTERECTOMY      LEFT HEART CATHETERIZATION Left 5/14/2021    Procedure: Left heart cath;  Surgeon: Mateus Guan MD;  Location: [facility] C[facility];  Service: Cardiology;  Laterality: Left;    MEDIPORT REMOVAL  10/14/2021    Dr Kraft    right rotator cuff repair      in [place]    TOTAL REDUCTION MAMMOPLASTY         Review of patient's allergies indicates:  No Known Allergies  Family History     Problem Relation (Age of Onset)    Breast cancer Sister    Diabetes Mother, Father     Hypertension Mother, Father, Sister, Brother        Tobacco Use    Smoking status: Never Smoker    Smokeless tobacco: Never Used   Substance and Sexual Activity    Alcohol use: Never    Drug use: Never    Sexual activity: Yes     Review of Systems   Respiratory: Negative.    Cardiovascular: Negative.    Gastrointestinal: Negative.      Objective:     Vital Signs (Most Recent):  Temp: 98 °F (36.7 °C) (06/22/22 1446)  Pulse: 62 (06/22/22 1446)  Resp: 19 (06/22/22 1446)  BP: (!) 142/45 (06/22/22 1446)  SpO2: 100 % (06/22/22 1446) Vital Signs (24h Range):  Temp:  [97.8 °F (36.6 °C)-98.4 °F (36.9 °C)] 98 °F (36.7 °C)  Pulse:  [56-63] 62  Resp:  [18-19] 19  SpO2:  [98 %-100 %] 100 %  BP: (120-142)/(40-80) 142/45     Weight: 97.1 kg (214 lb) (06/19/22 0719)  Body mass index is 40.43 kg/m².    No intake or output data in the 24 hours ending 06/22/22 1519    Lines/Drains/Airways     Peripheral Intravenous Line  Duration                Peripheral IV - Single Lumen 06/21/22 1745 22 G Posterior;Right Hand <1 day                Physical Exam  Vitals reviewed.   Constitutional:       General: She is not in acute distress.     Appearance: Normal appearance. She is well-developed. She is not ill-appearing.   HENT:      Head: Normocephalic and atraumatic.      Nose: Nose normal.   Eyes:      Pupils: Pupils are equal, round, and reactive to light.   Cardiovascular:      Rate and Rhythm: Normal rate and regular rhythm.   Pulmonary:      Effort: Pulmonary effort is normal.      Breath sounds: Normal breath sounds. No wheezing.   Abdominal:      General: Abdomen is flat. Bowel sounds are normal. There is no distension.      Palpations: Abdomen is soft.      Tenderness: There is no abdominal tenderness. There is no guarding.   Skin:     General: Skin is warm and dry.      Coloration: Skin is not jaundiced.   Neurological:      Mental Status: She is alert.   Psychiatric:         Attention and Perception: Attention normal.          Mood and Affect: Affect normal.         Speech: Speech normal.         Behavior: Behavior is cooperative.      Comments: Pt was calm while speaking.         Significant Labs:  CBC:   Recent Labs   Lab 06/21/22  0426 06/22/22  0349   WBC 9.69 7.57   HGB 9.2* 8.8*   HCT 31.0* 29.1*   * 429*     CMP:   Recent Labs   Lab 06/22/22  0349   *   CALCIUM 8.4*      K 4.5   CO2 25      BUN 13   CREATININE 0.90       Significant Imaging:  Imaging results within the past 24 hours have been reviewed.    Assessment/Plan:     Active Diagnoses:    Diagnosis Date Noted POA    PRINCIPAL PROBLEM:  Atrial fibrillation with rapid ventricular response [I48.91] 05/14/2021 Yes    Severe obesity (BMI >= 40) [E66.01] 06/22/2022 Yes     Chronic    EVELIO (acute kidney injury) [N17.9] 06/22/2022 Yes    HH (hiatus hernia) [K44.9] 06/21/2022 Yes    Acute superficial gastritis without hemorrhage [K29.00] 06/21/2022 Yes    Iron deficiency anemia due to chronic blood loss [D50.0] 06/19/2022 Yes    Anemia [D64.9]  Yes    History of colon cancer [Z85.038]  Yes    NSTEMI (non-ST elevated myocardial infarction) [I21.4]  Unknown    Chronic kidney disease (CKD) stage G3b/A3, moderately decreased glomerular filtration rate (GFR) between 30-44 mL/min/1.73 square meter and albuminuria creatinine ratio greater than 300 mg/g [N18.32] 06/18/2022 Yes    Type 2 diabetes mellitus with diabetic polyneuropathy, with long-term current use of insulin [E11.42, Z79.4] 08/30/2021 Not Applicable     Chronic    Atherosclerosis of native coronary artery of native heart with unstable angina pectoris [I25.110] 06/30/2021 Yes     Chronic    Hypertension [I10] 06/16/2021 Yes     Chronic      Problems Resolved During this Admission:       Imp: iron deficiency anemia, heme + stool, hx of colon cancer  Plan: colonoscopy    Adam Martini MD  Gastroenterology  [address]

## 2022-06-22 NOTE — PLAN OF CARE
Problem: Adult Inpatient Plan of Care  Goal: Plan of Care Review  Outcome: Met  Flowsheets (Taken 6/22/2022 1750)  Plan of Care Reviewed With: patient  Goal: Patient-Specific Goal (Individualized)  Outcome: Met  Flowsheets (Taken 6/22/2022 1750)  Anxieties, Fears or Concerns: none  Individualized Care Needs: none  Patient-Specific Goals (Include Timeframe): none  Goal: Absence of Hospital-Acquired Illness or Injury  Outcome: Met  Intervention: Identify and Manage Fall Risk  Flowsheets (Taken 6/22/2022 1750)  Safety Promotion/Fall Prevention:   nonskid shoes/socks when out of bed   family to remain at bedside  Intervention: Prevent and Manage VTE (Venous Thromboembolism) Risk  Flowsheets (Taken 6/22/2022 1750)  Activity Management: Ambulated -L4  VTE Prevention/Management: remove, assess skin, and reapply sequential compression device  Goal: Optimal Comfort and Wellbeing  Outcome: Met  Intervention: Provide Person-Centered Care  Flowsheets (Taken 6/22/2022 1750)  Trust Relationship/Rapport: care explained  Goal: Readiness for Transition of Care  Outcome: Met  Intervention: Mutually Develop Transition Plan  Flowsheets (Taken 6/22/2022 1750)  Equipment Currently Used at Home: none  Do you expect to return to your current living situation?: Yes  Do you have help at home or someone to help you manage your care at home?: Yes  Readmission within 30 days?: No  Do you currently have service(s) that help you manage your care at home?: No  Is the pt/caregiver preference to resume services with current agency: No     Problem: Diabetes Comorbidity  Goal: Blood Glucose Level Within Targeted Range  Outcome: Met     Problem: Impaired Wound Healing  Goal: Optimal Wound Healing  Outcome: Met     Problem: Bariatric Environmental Safety  Goal: Safety Maintained with Care  Outcome: Met     Problem: Fluid and Electrolyte Imbalance (Acute Kidney Injury/Impairment)  Goal: Fluid and Electrolyte Balance  Outcome: Met     Problem: Oral  Intake Inadequate (Acute Kidney Injury/Impairment)  Goal: Optimal Nutrition Intake  Outcome: Met     Problem: Renal Function Impairment (Acute Kidney Injury/Impairment)  Goal: Effective Renal Function  Outcome: Met

## 2022-06-22 NOTE — TRANSFER OF CARE
"Anesthesia Transfer of Care Note    Patient: Dilma Cr    Procedure(s) Performed: * No procedures listed *    Patient location: GI    Anesthesia Type: general    Transport from OR: Transported from OR on room air with adequate spontaneous ventilation. Continuous ECG monitoring in transport. Continuous SpO2 monitoring in transport    Post pain: adequate analgesia    Post assessment: no apparent anesthetic complications    Post vital signs: stable    Level of consciousness: sedated and responds to stimulation    Nausea/Vomiting: no nausea/vomiting    Complications: none    Transfer of care protocol was followedComments: Good SV continue, NAD, VSS, RTRN      Last vitals:   Visit Vitals  BP (!) 142/45   Pulse 62   Temp 36.7 °C (98 °F)   Resp 19   Ht 5' 1" (1.549 m)   Wt 97.1 kg (214 lb)   LMP  (LMP Unknown)   SpO2 100%   Breastfeeding No   BMI 40.43 kg/m²     "

## 2022-06-22 NOTE — ANESTHESIA PREPROCEDURE EVALUATION
06/22/2022  Dilma Cr is a 61 y.o., female.  Past Medical History:   Diagnosis Date    Acute superficial gastritis without hemorrhage 6/21/2022    Arthritis     Cancer     colon cancer    Chronic kidney disease, stage 3b     Colon cancer     Coronary artery disease     Depression     Diabetes mellitus, type 2     GERD (gastroesophageal reflux disease)     HH (hiatus hernia) 6/21/2022    Hyperlipidemia     Myocardial infarction     2 stents in 05/14/2021 Dr Porter    Renal disorder        Past Surgical History:   Procedure Laterality Date    BREAST SURGERY      C5-C6 RADHA  8-, 7-, 6-1-2016    Dr Fowler    CERVICAL SPINE SURGERY      COLON SURGERY      EPIDURAL STEROID INJECTION INTO LUMBAR SPINE N/A 3/29/2022    Procedure: L4-5 RADHA;  Surgeon: Gela Fowler MD;  Location: [facility];  Service: Pain Management;  Laterality: N/A;  PT AWARE ON OV TO BE TESTED\  PLAVIX TO BE HELD FOR 7 DAYS PRIOR TO PROCEDURE PER DR FOWLER AND DR PORTER  3-28  message left on vm re: covid    HYSTERECTOMY      LEFT HEART CATHETERIZATION Left 5/14/2021    Procedure: Left heart cath;  Surgeon: Mateus Guan MD;  Location: [facility] CATH LAB;  Service: Cardiology;  Laterality: Left;    MEDIPORT REMOVAL  10/14/2021    Dr Kraft    right rotator cuff repair      in [place]    TOTAL REDUCTION MAMMOPLASTY         Family History   Problem Relation Age of Onset    Hypertension Mother     Diabetes Mother     Hypertension Father     Diabetes Father     Hypertension Sister     Breast cancer Sister     Hypertension Brother        Social History     Socioeconomic History    Marital status:      Spouse name: Jorge Alberto    Number of children: 2   Occupational History    Occupation:  at [employer] for past 27 years   Tobacco Use    Smoking status: Never Smoker     Smokeless tobacco: Never Used   Substance and Sexual Activity    Alcohol use: Never    Drug use: Never    Sexual activity: Yes       Current Facility-Administered Medications   Medication Dose Route Frequency Provider Last Rate Last Admin    0.9%  NaCl infusion (for blood administration)   Intravenous Q24H PRN EDMOND Hodges   New Bag at 06/19/22 1836    0.9%  NaCl infusion   Intravenous Continuous Ranjan Linder MD 75 mL/hr at 06/22/22 0905 New Bag at 06/22/22 0905    acetaminophen tablet 650 mg  650 mg Oral Q4H PRN Oliverio Bautista MD        aspirin chewable tablet 81 mg  81 mg Oral Daily Dorys Haji MD   81 mg at 06/22/22 0906    atorvastatin tablet 80 mg  80 mg Oral QHS Dorys Haji MD   80 mg at 06/21/22 2018    clopidogreL tablet 75 mg  75 mg Oral Daily Dorys Haji MD   75 mg at 06/22/22 0906    cyanocobalamin tablet 100 mcg  100 mcg Oral Daily Dorys Haji MD   100 mcg at 06/22/22 0906    dextrose 50% injection 12.5 g  12.5 g Intravenous PRN Dorys Haji MD        dextrose 50% injection 25 g  25 g Intravenous PRN Dorys Haji MD        glucagon (human recombinant) injection 1 mg  1 mg Intramuscular PRN Dorys Haji MD        glucose chewable tablet 16 g  16 g Oral PRN Dorys Haji MD        glucose chewable tablet 24 g  24 g Oral PRN Dorys Haji MD        insulin aspart U-100 injection 1-10 Units  1-10 Units Subcutaneous QID (AC & HS) EDMOND Hodges   4 Units at 06/21/22 1656    insulin detemir U-100 injection 12 Units  12 Units Subcutaneous QHS Dorys Haji MD   12 Units at 06/21/22 2018    metoprolol injection 5 mg  5 mg Intravenous Q5 Min PRN EDMOND Hill   5 mg at 06/18/22 1723    metoprolol tartrate (LOPRESSOR) tablet 25 mg  25 mg Oral BID Dorys Haji MD   25 mg at 06/21/22 2018    nitroGLYCERIN SL tablet 0.4 mg  0.4 mg Sublingual Q5 Min PRN Dorys Haji MD        ondansetron  disintegrating tablet 8 mg  8 mg Oral Q8H PRN Oliverio Bautista MD        pantoprazole injection 40 mg  40 mg Intravenous Daily Taylor Majano, SELINP   40 mg at 06/22/22 0927    pregabalin capsule 50 mg  50 mg Oral TID Dorys Haji MD   50 mg at 06/22/22 0906    sodium chloride 0.9% flush 10 mL  10 mL Intravenous PRN Adam Martini MD        traMADoL tablet 50 mg  50 mg Oral Q4H PRN Dorys Haji MD   50 mg at 06/20/22 1744       Review of patient's allergies indicates:  No Known Allergies      Pre-op Assessment    I have reviewed the Patient Summary Reports.     I have reviewed the Nursing Notes. I have reviewed the NPO Status.   I have reviewed the Medications.     Review of Systems  Anesthesia Hx:  No problems with previous Anesthesia  Denies Family Hx of Anesthesia complications.   Denies Personal Hx of Anesthesia complications.   Hematology/Oncology:     Oncology Normal     EENT/Dental:EENT/Dental Normal   Cardiovascular:   Hypertension Past MI CAD   Angina hyperlipidemia    Renal/:   Chronic Renal Disease    Hepatic/GI:   Bowel Prep. Hiatal Hernia, GERD    Musculoskeletal:   Arthritis     Neurological:   Neuromuscular Disease,    Endocrine:   Diabetes, type 2  Morbid Obesity / BMI > 40  Dermatological:  Skin Normal    Psych:   Psychiatric History          Physical Exam  General: Well nourished, Alert, Oriented and Cooperative    Airway:  Mouth Opening: Normal  Neck ROM: Normal ROM    Chest/Lungs:  Normal Respiratory Rate    Heart:  Rate: Normal        Anesthesia Plan  Type of Anesthesia, risks & benefits discussed:    Anesthesia Type: Gen Natural Airway, MAC  Intra-op Monitoring Plan: Standard ASA Monitors  Post Op Pain Control Plan: multimodal analgesia and IV/PO Opioids PRN  Induction:  IV  Informed Consent: Informed consent signed with the Patient and all parties understand the risks and agree with anesthesia plan.  All questions answered. Patient consented to blood products? Yes  ASA  Score: 3  Day of Surgery Review of History & Physical: I have interviewed and examined the patient. I have reviewed the patient's H&P dated: There are no significant changes.     Ready For Surgery From Anesthesia Perspective.     .

## 2022-06-22 NOTE — ASSESSMENT & PLAN NOTE
Hold jardance while NPO  Basal bolus insulin while in hospital.  Continue lyrica  6/19  - continue long acting and SSI  - monitor BG trend and adjust as needed   - A1C 8.6 on 6/16 6/20  - BG trend 144-224   6/21  - BG trend      06/22--resume home meds

## 2022-06-22 NOTE — ASSESSMENT & PLAN NOTE
Maximized BB.  Will not start diltiazem until echo returns.    Starting amiodarone infusion.    Patient would benefit from OP sleep study.    Trending troponins and monitoring on telemetry  TSH pending.    6/19  - transitioned off amiodarone infusion per nursing  - continue current meds  - TSH 1.640   6/20  - HR controlled at present with current meds   - HR NSR with rate 60s   6/21  - controlled   06/22--controlled on present regimen, continue at discharge, follow up with cards outpatient

## 2022-06-22 NOTE — PROGRESS NOTES
[facility] - [address] [place] [address] Medicine  Progress Note    Patient Name: Dilma Cr  MRN: [id number]  Patient Class: IP- Inpatient   Admission Date: 6/18/2022  Length of Stay: 4 days  Attending Physician: Ranjan Linder MD  Primary Care Provider: Primary Doctor No        Subjective:     Principal Problem:Atrial fibrillation with rapid ventricular response        HPI:  61 yo F presents to the ED with a week of intermittent non radiating chest pressure with some SOB and palpitations.  She does not name any aggravating or alleviating factors.  Her EKG shows atrial fibrillation with RVR that has not responded to dig, cardizem or BB IV.  Her trop has increased from 48 to 110.  Her cxr shows some mild bilateral pulmonary edema.  She is on low dose of lasix as well as HCT as OP.  Her K is 3.3 with baseline GFR 45.  Her magnesium is normal      She has a history of CAD with stent  to LAD and RCA on 4/14/21 by Dr. Lora and is on DAPT.  She also has a history of CAF and was on eliquis but was taken off recently due anemia of chronic blood loss.  Her PCP Dr. Olga Potts was making arrangement for Dr. Moustapha Kraft to [facility] for colonoscopy.  Patient has h/o colon cancer and has had a partial colectomy and follows with oncologist Dr. Guillermo Stover.  Her anemia panel at this time appears to be TANYA due to blood loss and she is heme positive.      She takes long acting NTG as OP and is also on benicar/HCT.  She is not on a rate controlling agent but I see on Dr. Lora's last note she was on metoprolol but also her med rec has her still on eliquis and also on a steriod that she had not taken in a very long time.  Her med recon sheet will need to be updated.  She is on Jardance and iron supplementation at present.      ROS as below.  She states she has never been diagnosed with OLIVERIO and is unaware of snoring or apnea.  Would benefit from OP sleep study as she is morbidly obese.        Overview/Hospital  Course:  6/19: cardiology and GI following; GI deferring any potential procedures until cardiology has finished their workup; plans for LHC tomorrow; HR better controlled at present- monitor trend; continue current meds- has been transitioned off amiodarone   6/20: HR remains stable with current medications; plans for LHC today; no signs of bleeding; GI following   6/21: LHC as follows   1. Single vessel CAD (90% mid-distal LAD in-stent restenosis)  2. Normal left sided filling pressure (LVEDP - 10mmHg)  3. S/p successful angioplasty with scoring balloon (angiosculpt) of mid-distal LAD in-stent restenosis  EGD Mucosa of the esophagus was normal without varices or bleeding. The stomach had gastritis and a 4cm hiatus hernia. No biopsies were obtained due to plavix therapy. The pyloric channel was patent. Mucosa of the duodenum was normal. prep for colonoscopy tomorrow.      Interval History: No acute events overnight. Plan for lower scope today.    Review of Systems   Constitutional:  Negative for activity change and chills.   HENT:  Negative for sinus pressure and sinus pain.    Respiratory:  Negative for cough.    Gastrointestinal:  Positive for abdominal pain. Negative for diarrhea, nausea and vomiting.   Neurological:  Negative for dizziness and headaches.   All other systems reviewed and are negative.  Objective:     Vital Signs (Most Recent):  Temp: 98.2 °F (36.8 °C) (06/22/22 0400)  Pulse: (!) 57 (06/22/22 0400)  Resp: 18 (06/22/22 0400)  BP: (!) 129/54 (06/22/22 0400)  SpO2: 99 % (06/22/22 0400)   Vital Signs (24h Range):  Temp:  [97.8 °F (36.6 °C)-98.4 °F (36.9 °C)] 98.2 °F (36.8 °C)  Pulse:  [56-86] 57  Resp:  [12-20] 18  SpO2:  [94 %-100 %] 99 %  BP: (111-132)/(40-85) 129/54     Weight: 97.1 kg (214 lb)  Body mass index is 40.43 kg/m².    Intake/Output Summary (Last 24 hours) at 6/22/2022 0712  Last data filed at 6/21/2022 1340  Gross per 24 hour   Intake 350 ml   Output --   Net 350 ml      Physical  Exam  Vitals and nursing note reviewed.   Constitutional:       General: She is not in acute distress.     Appearance: Normal appearance. She is obese. She is not ill-appearing.   HENT:      Head: Atraumatic.      Mouth/Throat:      Mouth: Mucous membranes are moist.      Pharynx: Oropharynx is clear.   Eyes:      Conjunctiva/sclera: Conjunctivae normal.      Pupils: Pupils are equal, round, and reactive to light.   Cardiovascular:      Rate and Rhythm: Normal rate. Rhythm irregular.      Pulses: Normal pulses.      Heart sounds: Normal heart sounds.   Pulmonary:      Effort: Pulmonary effort is normal.      Breath sounds: Normal breath sounds.   Abdominal:      General: Abdomen is flat. Bowel sounds are normal.      Palpations: Abdomen is soft.   Musculoskeletal:         General: Normal range of motion.   Skin:     General: Skin is warm and dry.      Capillary Refill: Capillary refill takes less than 2 seconds.   Neurological:      General: No focal deficit present.      Mental Status: She is alert and oriented to person, place, and time.   Psychiatric:         Mood and Affect: Mood normal.       Significant Labs: All pertinent labs within the past 24 hours have been reviewed.    Significant Imaging: I have reviewed all pertinent imaging results/findings within the past 24 hours.      Assessment/Plan:      * Atrial fibrillation with rapid ventricular response  Maximized BB.  Will not start diltiazem until echo returns.    Starting amiodarone infusion.    Patient would benefit from OP sleep study.    Trending troponins and monitoring on telemetry  TSH pending.    6/19  - transitioned off amiodarone infusion per nursing  - continue current meds  - TSH 1.640   6/20  - HR controlled at present with current meds   - HR NSR with rate 60s   6/21  - controlled     EVELIO (acute kidney injury)  Patient with acute kidney injury likely d/t IVVD/Dehydration Which is currently improving. Labs reviewed- Renal function/electrolytes  with Estimated Creatinine Clearance: 71.7 mL/min (based on SCr of 0.9 mg/dL). according to latest data. Monitor urine output and serial BMP and adjust therapy as needed. Avoid nephrotoxins and renally dose meds for GFR listed above.       Severe obesity (BMI >= 40)  Body mass index is 40.43 kg/m². Morbid obesity complicates all aspects of disease management from diagnostic modalities to treatment. Weight loss encouraged and health benefits explained to patient.         HH (hiatus hernia)        NSTEMI (non-ST elevated myocardial infarction)  - trop 110 -> 156  - ECHO  · The left ventricle is normal in size with normal systolic function.  · The estimated ejection fraction is 60-65%.  · Normal left ventricular diastolic function.  · Normal right ventricular size with normal right ventricular systolic function.  · Mild tricuspid regurgitation.    - CXR Mildly increasing diffuse infiltrates versus pulmonary edema  - cardiac monitoring  - ASA, statin, BB,   - AC held in light of possible GIB and anemia  - holding ACE/ARB for renal dysfunction   - lipid panel pending  - A1C 8.6 on 6/16 6/20  - plans for [facility] today   6/21  - [facility] with results as follow   1. Single vessel CAD (90% mid-distal LAD in-stent restenosis)  2. Normal left sided filling pressure (LVEDP - 10mmHg)  3. S/p successful angioplasty with scoring balloon (angiosculpt) of mid-distal LAD in-stent restenosis      History of colon cancer  - hx of colon cancer   - was getting set up with an OP cscope   6/22  - Plan for lower scope today. Home tomorrow if stable.        Anemia  -H/H 8/26  - iron 20 with iron saturation 6  - has been on iron supplements-- holding in light of possible GI procedure   6/20  - rec'd 1u PRBCs yesterday for hgb less than 7  - H/H 9/29 this AM  - no overt signs of bleeding   6/21  - 9/31  - stable at present     Iron deficiency anemia due to chronic blood loss  - iron and iron saturation low   - is on iron supplementation  - holding  iron in light of possible GI procedure       Chronic kidney disease (CKD) stage G3b/A3, moderately decreased glomerular filtration rate (GFR) between 30-44 mL/min/1.73 square meter and albuminuria creatinine ratio greater than 300 mg/g  - Cre 1.34   - per previous labs- stable   - monitor labs   - avoid nephrotoxic meds       Type 2 diabetes mellitus with diabetic polyneuropathy, with long-term current use of insulin  Hold jardance while NPO  Basal bolus insulin while in hospital.  Continue lyrica  6/19  - continue long acting and SSI  - monitor BG trend and adjust as needed   - A1C 8.6 on 6/16 6/20  - BG trend 144-224   6/21  - BG trend        Atherosclerosis of native coronary artery of native heart with unstable angina pectoris  Continue DAPT and statin.  Last cardiac stents were 5/21.    6/20  - plans for [facility] today   - eliquis stopped in light of procedure today   - continue ASA   6/21  - continue current tx plan     Hypertension  Holding ARB/HCT to maximize BB.    Restart ARB if tolerated.    Holding long acting NTG at present due to low normal BP.  Will need to continue lasix at increased dose due to pulmonary edema at SOB (proBNP is 352 and was 31).    6/19  - BP normotensive on current meds   - continue to monitor trend  - adjust meds as needed   6/20  - stable     Type 2 diabetes mellitus with ophthalmic complication            VTE Risk Mitigation (From admission, onward)    None          Discharge Planning   DEVAN:      Code Status: Full Code   Is the patient medically ready for discharge?:     Reason for patient still in hospital (select all that apply): Treatment  Discharge Plan A: Home with family                  GAGE Goodrich  Department of Hospital Medicine   [address]

## 2022-06-22 NOTE — ASSESSMENT & PLAN NOTE
- hx of colon cancer   - was getting set up with an OP cscope   6/22  - Plan for lower scope today. Home tomorrow if stable.

## 2022-06-22 NOTE — ASSESSMENT & PLAN NOTE
Patient with acute kidney injury likely d/t IVVD/Dehydration Which is currently improving. Labs reviewed- Renal function/electrolytes with Estimated Creatinine Clearance: 71.7 mL/min (based on SCr of 0.9 mg/dL). according to latest data. Monitor urine output and serial BMP and adjust therapy as needed. Avoid nephrotoxins and renally dose meds for GFR listed above.       06/22--resolved

## 2022-06-22 NOTE — ASSESSMENT & PLAN NOTE
-H/H 8/26  - iron 20 with iron saturation 6  - has been on iron supplements-- holding in light of possible GI procedure   6/20  - rec'd 1u PRBCs yesterday for hgb less than 7  - H/H 9/29 this AM  - no overt signs of bleeding   6/21  - 9/31  - stable at present     06/22--H & H 8.8/29.1, colonoscopy today with no bleeding noted, Oral Iron resumed per GI, monitor closely outpatient

## 2022-06-22 NOTE — ASSESSMENT & PLAN NOTE
- trop 110 -> 156  - ECHO  · The left ventricle is normal in size with normal systolic function.  · The estimated ejection fraction is 60-65%.  · Normal left ventricular diastolic function.  · Normal right ventricular size with normal right ventricular systolic function.  · Mild tricuspid regurgitation.    - CXR Mildly increasing diffuse infiltrates versus pulmonary edema  - cardiac monitoring  - ASA, statin, BB,   - AC held in light of possible GIB and anemia  - holding ACE/ARB for renal dysfunction   - lipid panel pending  - A1C 8.6 on 6/16 6/20  - plans for [facility] today   6/21  - [facility] with results as follow   1. Single vessel CAD (90% mid-distal LAD in-stent restenosis)  2. Normal left sided filling pressure (LVEDP - 10mmHg)  3. S/p successful angioplasty with scoring balloon (angiosculpt) of mid-distal LAD in-stent restenosis    06/22--Follow cards recs at discharge, cardiac rehab, follow up outpatient corky Sifuentes in one week

## 2022-06-22 NOTE — ASSESSMENT & PLAN NOTE
- Cre 1.34   - per previous labs- stable   - monitor labs   - avoid nephrotoxic meds     06/22--sCr 0.90

## 2022-06-22 NOTE — ASSESSMENT & PLAN NOTE
- hx of colon cancer   - was getting set up with an OP cscope   6/22  - Plan for lower scope today. Home tomorrow if stable.  Follow up with Dr. Matthews as need/previously scheduled

## 2022-06-22 NOTE — ASSESSMENT & PLAN NOTE
- iron and iron saturation low   - is on iron supplementation  - holding iron in light of possible GI procedure     06/22--resume iron per GI recommendations

## 2022-06-22 NOTE — ASSESSMENT & PLAN NOTE
Patient with acute kidney injury likely d/t IVVD/Dehydration Which is currently improving. Labs reviewed- Renal function/electrolytes with Estimated Creatinine Clearance: 71.7 mL/min (based on SCr of 0.9 mg/dL). according to latest data. Monitor urine output and serial BMP and adjust therapy as needed. Avoid nephrotoxins and renally dose meds for GFR listed above.

## 2022-06-22 NOTE — ASSESSMENT & PLAN NOTE
Intermittent H & H monitoring outpatient, repeat Colonoscopy in 3 years, follow up with GI in 4 weeks

## 2022-06-22 NOTE — ANESTHESIA POSTPROCEDURE EVALUATION
Anesthesia Post Evaluation    Patient: Dilma Cr    Procedure(s) Performed: * No procedures listed *    Final Anesthesia Type: general      Patient location during evaluation: GI PACU  Patient participation: Yes- Able to Participate  Level of consciousness: awake and alert  Post-procedure vital signs: reviewed and stable  Pain management: adequate  Airway patency: patent    PONV status at discharge: No PONV  Anesthetic complications: no      Cardiovascular status: blood pressure returned to baseline and hemodynamically stable  Respiratory status: spontaneous ventilation  Hydration status: euvolemic  Follow-up not needed.  Comments: Pt voices appreciation for care          Vitals Value Taken Time   /61 06/22/22 1557   Temp 36.7 °C (98 °F) 06/22/22 1600   Pulse 62 06/22/22 1600   Resp 10 06/22/22 1600   SpO2 100 % 06/22/22 1559   Vitals shown include unvalidated device data.      Event Time   Out of Recovery 06/22/2022 16:05:28         Pain/Alonzo Score: Pain Rating Prior to Med Admin: 0 (6/21/2022  7:35 AM)  Pain Rating Post Med Admin: 0 (6/21/2022  7:35 AM)  Alonzo Score: 9 (6/22/2022  3:38 PM)

## 2022-06-22 NOTE — SUBJECTIVE & OBJECTIVE
Interval History: No acute events overnight. Plan for lower scope today.    Review of Systems   Constitutional:  Negative for activity change and chills.   HENT:  Negative for sinus pressure and sinus pain.    Respiratory:  Negative for cough.    Gastrointestinal:  Positive for abdominal pain. Negative for diarrhea, nausea and vomiting.   Neurological:  Negative for dizziness and headaches.   All other systems reviewed and are negative.  Objective:     Vital Signs (Most Recent):  Temp: 98.2 °F (36.8 °C) (06/22/22 0400)  Pulse: (!) 57 (06/22/22 0400)  Resp: 18 (06/22/22 0400)  BP: (!) 129/54 (06/22/22 0400)  SpO2: 99 % (06/22/22 0400)   Vital Signs (24h Range):  Temp:  [97.8 °F (36.6 °C)-98.4 °F (36.9 °C)] 98.2 °F (36.8 °C)  Pulse:  [56-86] 57  Resp:  [12-20] 18  SpO2:  [94 %-100 %] 99 %  BP: (111-132)/(40-85) 129/54     Weight: 97.1 kg (214 lb)  Body mass index is 40.43 kg/m².    Intake/Output Summary (Last 24 hours) at 6/22/2022 0712  Last data filed at 6/21/2022 1340  Gross per 24 hour   Intake 350 ml   Output --   Net 350 ml      Physical Exam  Vitals and nursing note reviewed.   Constitutional:       General: She is not in acute distress.     Appearance: Normal appearance. She is obese. She is not ill-appearing.   HENT:      Head: Atraumatic.      Mouth/Throat:      Mouth: Mucous membranes are moist.      Pharynx: Oropharynx is clear.   Eyes:      Conjunctiva/sclera: Conjunctivae normal.      Pupils: Pupils are equal, round, and reactive to light.   Cardiovascular:      Rate and Rhythm: Normal rate. Rhythm irregular.      Pulses: Normal pulses.      Heart sounds: Normal heart sounds.   Pulmonary:      Effort: Pulmonary effort is normal.      Breath sounds: Normal breath sounds.   Abdominal:      General: Abdomen is flat. Bowel sounds are normal.      Palpations: Abdomen is soft.   Musculoskeletal:         General: Normal range of motion.   Skin:     General: Skin is warm and dry.      Capillary Refill: Capillary  refill takes less than 2 seconds.   Neurological:      General: No focal deficit present.      Mental Status: She is alert and oriented to person, place, and time.   Psychiatric:         Mood and Affect: Mood normal.       Significant Labs: All pertinent labs within the past 24 hours have been reviewed.    Significant Imaging: I have reviewed all pertinent imaging results/findings within the past 24 hours.

## 2022-06-22 NOTE — DISCHARGE SUMMARY
[facility] - [address] [place] [address] Medicine  Discharge Summary      Patient Name: Dilma Cr  MRN: [id number]  Patient Class: IP- Inpatient  Admission Date: 6/18/2022  Hospital Length of Stay: 4 days  Discharge Date and Time:  06/22/2022 5:36 PM  Attending Physician: Ranjan Linder MD   Discharging Provider: EDMOND Goodrich  Primary Care Provider: Primary Doctor Diana      HPI:   61 yo F presents to the ED with a week of intermittent non radiating chest pressure with some SOB and palpitations.  She does not name any aggravating or alleviating factors.  Her EKG shows atrial fibrillation with RVR that has not responded to dig, cardizem or BB IV.  Her trop has increased from 48 to 110.  Her cxr shows some mild bilateral pulmonary edema.  She is on low dose of lasix as well as HCT as OP.  Her K is 3.3 with baseline GFR 45.  Her magnesium is normal      She has a history of CAD with stent  to LAD and RCA on 4/14/21 by Dr. Lora and is on DAPT.  She also has a history of CAF and was on eliquis but was taken off recently due anemia of chronic blood loss.  Her PCP Dr. Olga Potts was making arrangement for Dr. Moustapha Kraft to [facility] for colonoscopy.  Patient has h/o colon cancer and has had a partial colectomy and follows with oncologist Dr. Guillermo Stover.  Her anemia panel at this time appears to be TANYA due to blood loss and she is heme positive.      She takes long acting NTG as OP and is also on benicar/HCT.  She is not on a rate controlling agent but I see on Dr. Lora's last note she was on metoprolol but also her med rec has her still on eliquis and also on a steriod that she had not taken in a very long time.  Her med recon sheet will need to be updated.  She is on Jardance and iron supplementation at present.      ROS as below.  She states she has never been diagnosed with OLIVERIO and is unaware of snoring or apnea.  Would benefit from OP sleep study as she is morbidly obese.   "      Procedure(s) (LRB):  Left heart cath (Left)  Percutaneous coronary intervention (N/A)      Hospital Course:   6/19: cardiology and GI following; GI deferring any potential procedures until cardiology has finished their workup; plans for LHC tomorrow; HR better controlled at present- monitor trend; continue current meds- has been transitioned off amiodarone   6/20: HR remains stable with current medications; plans for LHC today; no signs of bleeding; GI following   6/21: LHC as follows   1. Single vessel CAD (90% mid-distal LAD in-stent restenosis)  2. Normal left sided filling pressure (LVEDP - 10mmHg)  3. S/p successful angioplasty with scoring balloon (angiosculpt) of mid-distal LAD in-stent restenosis  EGD Mucosa of the esophagus was normal without varices or bleeding. The stomach had gastritis and a 4cm hiatus hernia. No biopsies were obtained due to plavix therapy. The pyloric channel was patent. Mucosa of the duodenum was normal. prep for colonoscopy tomorrow.  06/22:  Pt with no new issues or concers, states feels well enough to be discharged today and is s/p colonoscopy today, "Overall Impression: No bleeding was seen. No polyps were seen. Diverticula were present in the sigmoid and descending colon. Right garrick-colectomy changes are present"; Recommendations to repeat Colonoscopy in 3 years, resume diet, po iron, monitor for bleeding, intermittent H&H after discharge. Follow up with pcp in one week with repeat CBC, follow up with cards as directed and GI in 4 weeks; follow cards recs at discharge.  Resume home meds, hold arb pending hospital follow up. Will not need Aldosterone antagonist as pt's EF is 60-65%. Has met maximum benefit from this hospitalization and is stable for discharge, will have cardiac rehab.              Goals of Care Treatment Preferences:  Code Status: Full Code      Consults:   Consults (From admission, onward)        Status Ordering Provider     Inpatient consult to Social Work "  Once        Provider:  (Not yet assigned)    Completed TANNA GARCIA     Inpatient consult to Social Work  Once        Provider:  (Not yet assigned)    Completed SOLO SARAVIA     Inpatient consult to Gastroenterology  Once        Provider:  Sagar Schwartz MD    Completed ALISHA MIRANDA     Inpatient consult to Cardiology  Once        Provider:  tSefany Lora MD    Completed BEVERLY, REHMAT U     Inpatient consult to Gastroenterology  Once        Provider:  Sagar Schwartz MD    Completed BEVERLY, REHMAT U          * Atrial fibrillation with rapid ventricular response  Maximized BB.  Will not start diltiazem until echo returns.    Starting amiodarone infusion.    Patient would benefit from OP sleep study.    Trending troponins and monitoring on telemetry  TSH pending.    6/19  - transitioned off amiodarone infusion per nursing  - continue current meds  - TSH 1.640   6/20  - HR controlled at present with current meds   - HR NSR with rate 60s   6/21  - controlled   06/22--controlled on present regimen, continue at discharge, follow up with cards outpatient    H/O right hemicolectomy        Diverticula, colon  Intermittent H & H monitoring outpatient, repeat Colonoscopy in 3 years, follow up with GI in 4 weeks      EVELIO (acute kidney injury)  Patient with acute kidney injury likely d/t IVVD/Dehydration Which is currently improving. Labs reviewed- Renal function/electrolytes with Estimated Creatinine Clearance: 71.7 mL/min (based on SCr of 0.9 mg/dL). according to latest data. Monitor urine output and serial BMP and adjust therapy as needed. Avoid nephrotoxins and renally dose meds for GFR listed above.       06/22--resolved    Severe obesity (BMI >= 40)  Body mass index is 40.43 kg/m². Morbid obesity complicates all aspects of disease management from diagnostic modalities to treatment. Weight loss encouraged and health benefits explained to patient.         Acute superficial gastritis without hemorrhage  -continue  PPI, avoid NSAIDS      HH (hiatus hernia)        NSTEMI (non-ST elevated myocardial infarction)  - trop 110 -> 156  - ECHO  · The left ventricle is normal in size with normal systolic function.  · The estimated ejection fraction is 60-65%.  · Normal left ventricular diastolic function.  · Normal right ventricular size with normal right ventricular systolic function.  · Mild tricuspid regurgitation.    - CXR Mildly increasing diffuse infiltrates versus pulmonary edema  - cardiac monitoring  - ASA, statin, BB,   - AC held in light of possible GIB and anemia  - holding ACE/ARB for renal dysfunction   - lipid panel pending  - A1C 8.6 on 6/16 6/20  - plans for C today   6/21  - [facility] with results as follow   1. Single vessel CAD (90% mid-distal LAD in-stent restenosis)  2. Normal left sided filling pressure (LVEDP - 10mmHg)  3. S/p successful angioplasty with scoring balloon (angiosculpt) of mid-distal LAD in-stent restenosis    06/22--Follow cards recs at discharge, cardiac rehab, follow up outpatient corky Sifuentes in one week      History of colon cancer  - hx of colon cancer   - was getting set up with an OP cscope   6/22  - Plan for lower scope today. Home tomorrow if stable.  Follow up with Dr. Matthews as need/previously scheduled        Anemia  -H/H 8/26  - iron 20 with iron saturation 6  - has been on iron supplements-- holding in light of possible GI procedure   6/20  - rec'd 1u PRBCs yesterday for hgb less than 7  - H/H 9/29 this AM  - no overt signs of bleeding   6/21  - 9/31  - stable at present     06/22--H & H 8.8/29.1, colonoscopy today with no bleeding noted, Oral Iron resumed per GI, monitor closely outpatient    Iron deficiency anemia due to chronic blood loss  - iron and iron saturation low   - is on iron supplementation  - holding iron in light of possible GI procedure     06/22--resume iron per GI recommendations      Chronic kidney disease (CKD) stage G3b/A3, moderately decreased glomerular  filtration rate (GFR) between 30-44 mL/min/1.73 square meter and albuminuria creatinine ratio greater than 300 mg/g  - Cre 1.34   - per previous labs- stable   - monitor labs   - avoid nephrotoxic meds     06/22--sCr 0.90    Type 2 diabetes mellitus with diabetic polyneuropathy, with long-term current use of insulin  Hold jardance while NPO  Basal bolus insulin while in hospital.  Continue lyrica  6/19  - continue long acting and SSI  - monitor BG trend and adjust as needed   - A1C 8.6 on 6/16 6/20  - BG trend 144-224   6/21  - BG trend      06/22--resume home meds      Atherosclerosis of native coronary artery of native heart with unstable angina pectoris  Continue DAPT and statin.  Last cardiac stents were 5/21.    6/20  - plans for [facility] today   - eliquis stopped in light of procedure today   - continue ASA   6/21  - continue current tx plan     Hypertension  Holding ARB/HCT to maximize BB.    Restart ARB if tolerated.    Holding long acting NTG at present due to low normal BP.  Will need to continue lasix at increased dose due to pulmonary edema at SOB (proBNP is 352 and was 31).    6/19  - BP normotensive on current meds   - continue to monitor trend  - adjust meds as needed   6/20  - stable   06/22--stable on present regimen, will continue at discharge      Final Active Diagnoses:    Diagnosis Date Noted POA    PRINCIPAL PROBLEM:  Atrial fibrillation with rapid ventricular response [I48.91] 05/14/2021 Yes    Severe obesity (BMI >= 40) [E66.01] 06/22/2022 Yes     Chronic    EVELIO (acute kidney injury) [N17.9] 06/22/2022 Yes    Diverticula, colon [K57.30] 06/22/2022 Yes    H/O right hemicolectomy [Z90.49] 06/22/2022 Not Applicable    HH (hiatus hernia) [K44.9] 06/21/2022 Yes    Acute superficial gastritis without hemorrhage [K29.00] 06/21/2022 Yes    Iron deficiency anemia due to chronic blood loss [D50.0] 06/19/2022 Yes    Anemia [D64.9]  Yes    History of colon cancer [Z85.038]  Yes    NSTEMI  (non-ST elevated myocardial infarction) [I21.4]  Yes    Chronic kidney disease (CKD) stage G3b/A3, moderately decreased glomerular filtration rate (GFR) between 30-44 mL/min/1.73 square meter and albuminuria creatinine ratio greater than 300 mg/g [N18.32] 06/18/2022 Yes    Type 2 diabetes mellitus with diabetic polyneuropathy, with long-term current use of insulin [E11.42, Z79.4] 08/30/2021 Not Applicable     Chronic    Atherosclerosis of native coronary artery of native heart with unstable angina pectoris [I25.110] 06/30/2021 Yes     Chronic    Hypertension [I10] 06/16/2021 Yes     Chronic      Problems Resolved During this Admission:       Discharged Condition: stable    Disposition: Home or Self Care    Follow Up:   Follow-up Information     Oliverio Bautista MD Follow up on 7/25/2022.    Specialties: Interventional Cardiology, Cardiology  Why: Hospital discharge; NSTEMI s/p balloon angioplasty; 8:45 am  Contact information:  [address] [facility] [address]  [phone]             GAGE Pimentel Follow up in 1 week(s).    Specialty: Cardiology  Why: Appointment scheduled for June 30, 2022 at 1:15 with BRISA Sifuentes NP. Hospital discharge; NSTEMI s/p balloon angioplasty.  Contact information:  [address] [facility] [address]  [phone]             Olga Potts MD Follow up in 1 week(s).    Specialty: Family Medicine  Why: Hospital follow up, CBC  Contact information:  [address] [facility] [address]  [phone]             Adam Martini MD Follow up in 4 week(s).    Specialty: Gastroenterology  Why: hospital follow up  Contact information:  [address]  [facility] [address]  [phone]                       Patient Instructions:      Basic metabolic panel   Standing Status: Future Standing Exp. Date: 08/18/23     CBC auto differential   Standing Status: Future  Standing Exp. Date: 08/18/23     Diet diabetic     Notify your health care provider if you experience any of the following:  temperature >100.4     Notify your health care provider if you experience any of the following:  persistent nausea and vomiting or diarrhea     Notify your health care provider if you experience any of the following:  severe uncontrolled pain     Notify your health care provider if you experience any of the following:  redness, tenderness, or signs of infection (pain, swelling, redness, odor or green/yellow discharge around incision site)     Notify your health care provider if you experience any of the following:  difficulty breathing or increased cough     Notify your health care provider if you experience any of the following:  severe persistent headache     Notify your health care provider if you experience any of the following:  worsening rash     Notify your health care provider if you experience any of the following:  persistent dizziness, light-headedness, or visual disturbances     Notify your health care provider if you experience any of the following:  increased confusion or weakness     Activity as tolerated       Significant Diagnostic Studies: Labs:   BMP:   Recent Labs   Lab 06/21/22  0426 06/22/22  0349   * 113*    139   K 5.0 4.5    107   CO2 24 25   BUN 23* 13   CREATININE 1.08* 0.90   CALCIUM 8.5 8.4*    and CBC   Recent Labs   Lab 06/21/22  0426 06/22/22  0349   WBC 9.69 7.57   HGB 9.2* 8.8*   HCT 31.0* 29.1*   * 429*       Pending Diagnostic Studies:     None         Medications:  Reconciled Home Medications:      Medication List      START taking these medications    metoprolol tartrate 25 MG tablet  Commonly known as: LOPRESSOR  Take 1 tablet (25 mg total) by mouth 2 (two) times daily.     nitroGLYCERIN 0.4 MG SL tablet  Commonly known as: NITROSTAT  Place 1 tablet (0.4 mg total) under the tongue every 5 (five) minutes as needed for Chest  pain.        CHANGE how you take these medications    olmesartan-hydrochlorothiazide 40-25 mg per tablet  Commonly known as: BENICAR HCT  Take 1 tablet by mouth once daily. HOLD PENDING HOSPITAL FOLLOW UP  What changed: additional instructions        CONTINUE taking these medications    ascorbic acid (vitamin C) 250 mg Chew  Take 250 mg by mouth once daily.     aspirin 81 MG Chew  Take 81 mg by mouth once daily.     clopidogreL 75 mg tablet  Commonly known as: PLAVIX  Take 75 mg by mouth once daily.     cyanocobalamin 1000 MCG tablet  Commonly known as: VITAMIN B-12  Take 100 mcg by mouth once daily.     ferrous sulfate 325 (65 FE) MG EC tablet  Take 1 tablet (325 mg total) by mouth once daily.     fluticasone propionate 50 mcg/actuation nasal spray  Commonly known as: FLONASE  1 spray (50 mcg total) by Each Nostril route once daily.     FREESTYLE BECCA 14 DAY SENSOR Kit  Generic drug: flash glucose sensor  USE TO CHECK GLUCOSE 4 TIMES DAILY     furosemide 20 MG tablet  Commonly known as: LASIX  Take 1 tablet (20 mg total) by mouth daily as needed (fluid).     JARDIANCE 25 mg tablet  Generic drug: empagliflozin  Take 1 tablet (25 mg total) by mouth once daily.     NovoLOG Flexpen U-100 Insulin 100 unit/mL (3 mL) Inpn pen  Generic drug: insulin aspart U-100  Inject 20 Units into the skin 2 (two) times a day.     ondansetron 8 MG Tbdl  Commonly known as: ZOFRAN-ODT  Take 1 tablet (8 mg total) by mouth every 8 (eight) hours as needed (nausea).     pantoprazole 40 MG tablet  Commonly known as: PROTONIX  Take 40 mg by mouth once daily.     pregabalin 50 MG capsule  Commonly known as: LYRICA  Take 1 capsule (50 mg total) by mouth 3 (three) times daily.     rosuvastatin 40 MG Tab  Commonly known as: CRESTOR  Take 1 tablet by mouth once daily     traMADoL 50 mg tablet  Commonly known as: ULTRAM  Take 1 tablet (50 mg total) by mouth every 6 (six) hours.        STOP taking these medications    apixaban 5 mg Tab  Commonly  known as: ELIQUIS     isosorbide mononitrate 30 MG 24 hr tablet  Commonly known as: IMDUR     omeprazole 20 MG capsule  Commonly known as: PRILOSEC            Indwelling Lines/Drains at time of discharge:   Lines/Drains/Airways     None                 Time spent on the discharge of patient: >30 minutes         EDMOND Goodrich  Department of Hospital Medicine  [address]

## 2022-06-22 NOTE — NURSING
Received report from [facility] lab no bleeding no polyps repeat colonoscopy in three years iron p.o. was ordered

## 2022-06-22 NOTE — ASSESSMENT & PLAN NOTE
Holding ARB/HCT to maximize BB.    Restart ARB if tolerated.    Holding long acting NTG at present due to low normal BP.  Will need to continue lasix at increased dose due to pulmonary edema at SOB (proBNP is 352 and was 31).    6/19  - BP normotensive on current meds   - continue to monitor trend  - adjust meds as needed   6/20  - stable   06/22--stable on present regimen, will continue at discharge

## 2022-06-23 NOTE — PLAN OF CARE
[facility] - [address] [facility] Telemetry  Discharge Final Note    Primary Care Provider: Primary Doctor No    Expected Discharge Date: 6/22/2022    Final Discharge Note (most recent)     Final Note - 06/23/22 0921        Final Note    Assessment Type Final Discharge Note     Anticipated Discharge Disposition Home or Self Care               D/c info faxed to [facility] rehab for cardiac rehab. Info faxed to [facility]  Important Message from Medicare             Contact Info     Oliverio Bautista MD   Specialty: Interventional Cardiology, Cardiology    [address] [place] [address]   Phone: [phone]       Next Steps: Follow up on 7/25/2022    Instructions: Hospital discharge; NSTEMI s/p balloon angioplasty; 8:45 am    GAGE Pimentel   Specialty: Cardiology    [address] [place] [address] [facility] [place] [address]   Phone: [phone]       Next Steps: Follow up in 1 week(s)    Instructions: Appointment scheduled for June 30, 2022 at 1:15 with BRISA Sifuentes NP. Hospital discharge; NSTEMI s/p balloon angioplasty.    Olga Potts MD   Specialty: Family Medicine    [address] [facility] [place] [address]   Phone: [phone]       Next Steps: Follow up in 1 week(s)    Instructions: Hospital follow up, CBC    Adam Martini MD   Specialty: Gastroenterology    [address] [place] [address]  [facility] [address]   Phone: [phone]       Next Steps: Follow up in 4 week(s)    Instructions: hospital follow up

## 2022-06-23 NOTE — PROGRESS NOTES
Subjective:      Patient ID: Dilma Cr is a 61 y.o. female.    Chief Complaint: Low-back Pain      Pain  This is a chronic problem. The current episode started more than 1 year ago. The problem occurs daily. The problem has been waxing and waning. Associated symptoms include arthralgias. Pertinent negatives include no change in bowel habit, chest pain, chills, coughing, diaphoresis, fever, neck pain, rash, sore throat, vertigo or vomiting.     Review of Systems   Constitutional: Negative for activity change, appetite change, chills, diaphoresis, fever and unexpected weight change.   HENT: Negative for drooling, ear pain, facial swelling, nosebleeds, sore throat, trouble swallowing, voice change and goiter.    Eyes: Negative for photophobia, pain, discharge, redness and visual disturbance.   Respiratory: Negative for apnea, cough, choking, chest tightness, shortness of breath, wheezing and stridor.    Cardiovascular: Negative for chest pain, palpitations and leg swelling.   Gastrointestinal: Negative for abdominal distention, change in bowel habit, diarrhea, rectal pain, vomiting, fecal incontinence and change in bowel habit.   Endocrine: Negative for cold intolerance, heat intolerance, polydipsia, polyphagia and polyuria.   Genitourinary: Negative for bladder incontinence, dysuria, flank pain, frequency and hot flashes.   Musculoskeletal: Positive for arthralgias, back pain and leg pain. Negative for neck pain and neck stiffness.   Integumentary:  Negative for color change, pallor and rash.   Allergic/Immunologic: Negative for immunocompromised state.   Neurological: Negative for dizziness, vertigo, seizures, syncope, facial asymmetry, speech difficulty, light-headedness, disturbances in coordination, memory loss and coordination difficulties.   Hematological: Negative for adenopathy. Does not bruise/bleed easily.   Psychiatric/Behavioral: Negative for agitation, behavioral problems, confusion, decreased  "concentration, dysphoric mood, hallucinations, self-injury and suicidal ideas. The patient is not nervous/anxious and is not hyperactive.             Objective:  Vitals:    06/28/22 0924   BP: (!) 164/62   Pulse: (!) 57   Weight: 95.3 kg (210 lb 3.2 oz)   Height: 5' 2.4" (1.585 m)   PainSc:   8         Physical Exam  Vitals and nursing note reviewed. Exam conducted with a chaperone present.   Constitutional:       General: She is awake. She is not in acute distress.     Appearance: Normal appearance. She is not ill-appearing or diaphoretic.   HENT:      Head: Normocephalic and atraumatic.      Nose: Nose normal.      Mouth/Throat:      Mouth: Mucous membranes are moist.      Pharynx: Oropharynx is clear.   Eyes:      Conjunctiva/sclera: Conjunctivae normal.      Pupils: Pupils are equal, round, and reactive to light.   Cardiovascular:      Rate and Rhythm: Normal rate.   Pulmonary:      Effort: Pulmonary effort is normal. No respiratory distress.   Abdominal:      Palpations: Abdomen is soft.      Tenderness: There is no guarding.   Musculoskeletal:         General: Normal range of motion.      Cervical back: Normal range of motion and neck supple. No rigidity.      Thoracic back: Tenderness present.      Lumbar back: Tenderness present.   Skin:     General: Skin is warm and dry.      Coloration: Skin is not jaundiced or pale.   Neurological:      General: No focal deficit present.      Mental Status: She is alert and oriented to person, place, and time. Mental status is at baseline.      Cranial Nerves: No cranial nerve deficit (II-XII).   Psychiatric:         Mood and Affect: Mood normal.         Behavior: Behavior normal. Behavior is cooperative.         Thought Content: Thought content normal.           Cardiac catheterization  · The Mid LAD to Dist LAD lesion was 90% stenosed with 0% stenosis   post-intervention.  · The left ventricular end diastolic pressure was normal.  · The pre-procedure left ventricular " end diastolic pressure was 10.  · The RPDA lesion was 80% stenosed.  · The estimated blood loss was none.  · There was single vessel coronary artery disease.     1. Single vessel CAD (90% mid-distal LAD in-stent restenosis)  2. Normal left sided filling pressure (LVEDP - 10mmHg)  3. S/p successful angioplasty with scoring balloon (angiosculpt) of   mid-distal LAD in-stent restenosis    Plan:  1. Consider complete GI workup for GI bleed  2. Deferred stent placement in the setting of GI bleed s/p blood   transfusion.     The procedure log was documented by No documenter listed and verified by   Oliverio Bautista MD.    Date: 6/20/2022  Time: 12:33 PM       No results displayed because visit has over 200 results.      Lab Visit on 06/15/2022   Component Date Value Ref Range Status    Sodium 06/15/2022 139  136 - 145 mmol/L Final    Potassium 06/15/2022 4.5  3.5 - 5.1 mmol/L Final    Chloride 06/15/2022 105  98 - 107 mmol/L Final    CO2 06/15/2022 26  21 - 32 mmol/L Final    Anion Gap 06/15/2022 13  7 - 16 mmol/L Final    Glucose 06/15/2022 119 (A) 74 - 106 mg/dL Final    BUN 06/15/2022 26 (A) 7 - 18 mg/dL Final    Creatinine 06/15/2022 1.41 (A) 0.55 - 1.02 mg/dL Final    BUN/Creatinine Ratio 06/15/2022 18  6 - 20 Final    Calcium 06/15/2022 8.2 (A) 8.5 - 10.1 mg/dL Final    Total Protein 06/15/2022 6.4  6.4 - 8.2 g/dL Final    Albumin 06/15/2022 3.3 (A) 3.5 - 5.0 g/dL Final    Globulin 06/15/2022 3.1  2.0 - 4.0 g/dL Final    A/G Ratio 06/15/2022 1.1   Final    Bilirubin, Total 06/15/2022 0.1  0.0 - 1.2 mg/dL Final    Alk Phos 06/15/2022 119 (A) 46 - 118 U/L Final    ALT 06/15/2022 32  13 - 56 U/L Final    AST 06/15/2022 27  15 - 37 U/L Final    eGFR 06/15/2022 41 (A) >=60 mL/min/1.73m² Final    Hemoglobin A1C 06/15/2022 8.6 (A) 4.5 - 6.6 % Final    Estimated Average Glucose 06/15/2022 200  mg/dL Final    WBC 06/15/2022 7.68  4.50 - 11.00 K/uL Final    RBC 06/15/2022 3.00 (A) 4.20 - 5.40 M/uL  Final    Hemoglobin 06/15/2022 7.6 (A) 12.0 - 16.0 g/dL Final    Hematocrit 06/15/2022 25.4 (A) 38.0 - 47.0 % Final    MCV 06/15/2022 84.7  80.0 - 96.0 fL Final    MCH 06/15/2022 25.3 (A) 27.0 - 31.0 pg Final    MCHC 06/15/2022 29.9 (A) 32.0 - 36.0 g/dL Final    RDW 06/15/2022 16.2 (A) 11.5 - 14.5 % Final    Platelet Count 06/15/2022 381  150 - 400 K/uL Final    MPV 06/15/2022 12.0  9.4 - 12.4 fL Final    Neutrophils % 06/15/2022 58.2  53.0 - 65.0 % Final    Lymphocytes % 06/15/2022 31.6  27.0 - 41.0 % Final    Monocytes % 06/15/2022 7.8 (A) 2.0 - 6.0 % Final    Eosinophils % 06/15/2022 1.7  1.0 - 4.0 % Final    Basophils % 06/15/2022 0.4  0.0 - 1.0 % Final    Immature Granulocytes % 06/15/2022 0.3  0.0 - 0.4 % Final    nRBC, Auto 06/15/2022 0.0  <=0.0 % Final    Neutrophils, Abs 06/15/2022 4.47  1.80 - 7.70 K/uL Final    Lymphocytes, Absolute 06/15/2022 2.43  1.00 - 4.80 K/uL Final    Monocytes, Absolute 06/15/2022 0.60  0.00 - 0.80 K/uL Final    Eosinophils, Absolute 06/15/2022 0.13  0.00 - 0.50 K/uL Final    Basophils, Absolute 06/15/2022 0.03  0.00 - 0.20 K/uL Final    Immature Granulocytes, Absolute 06/15/2022 0.02  0.00 - 0.04 K/uL Final    nRBC, Absolute 06/15/2022 0.00  <=0.00 x10e3/uL Final    Diff Type 06/15/2022 Auto   Final    Creatinine, Urine 06/15/2022 77  28 - 219 mg/dL Final    Microalbumin 06/15/2022 10.4 (A) 0.0 - 2.8 mg/dL Final    Microalbumin/Creatinine Ratio 06/15/2022 135.1 (A) 0.0 - 30.0 mg/g Final   Office Visit on 06/13/2022   Component Date Value Ref Range Status    SARS Coronavirus 2 Antigen 06/13/2022 Negative  Negative Final    Rapid Influenza A Ag 06/13/2022 Negative  Negative Final    Rapid Influenza B Ag 06/13/2022 Negative  Negative Final     Acceptable 06/13/2022 Yes   Final   Office Visit on 05/24/2022   Component Date Value Ref Range Status    SARS Coronavirus 2 Antigen 05/24/2022 Negative  Negative Final    Rapid Influenza A Ag  05/24/2022 Negative  Negative Final    Rapid Influenza B Ag 05/24/2022 Negative  Negative Final     Acceptable 05/24/2022 Yes   Final    Rapid Strep A Screen 05/24/2022 Negative  Negative Final     Acceptable 05/24/2022 Yes   Final   Lab Visit on 05/10/2022   Component Date Value Ref Range Status    Triglycerides 05/10/2022 81  35 - 150 mg/dL Final    Cholesterol 05/10/2022 176  0 - 200 mg/dL Final    HDL Cholesterol 05/10/2022 75 (A) 40 - 60 mg/dL Final    Cholesterol/HDL Ratio (Risk Factor) 05/10/2022 2.3   Final    Non-HDL 05/10/2022 101  mg/dL Final    LDL Calculated 05/10/2022 85  mg/dL Final    LDL/HDL 05/10/2022 1.1   Final    VLDL 05/10/2022 16  mg/dL Final    WBC 05/10/2022 7.45  4.50 - 11.00 K/uL Final    RBC 05/10/2022 3.92 (A) 4.20 - 5.40 M/uL Final    Hemoglobin 05/10/2022 9.9 (A) 12.0 - 16.0 g/dL Final    Hematocrit 05/10/2022 32.8 (A) 38.0 - 47.0 % Final    MCV 05/10/2022 83.7  80.0 - 96.0 fL Final    MCH 05/10/2022 25.3 (A) 27.0 - 31.0 pg Final    MCHC 05/10/2022 30.2 (A) 32.0 - 36.0 g/dL Final    RDW 05/10/2022 15.5 (A) 11.5 - 14.5 % Final    Platelet Count 05/10/2022 448 (A) 150 - 400 K/uL Final    MPV 05/10/2022 11.9  9.4 - 12.4 fL Final    Neutrophils % 05/10/2022 61.5  53.0 - 65.0 % Final    Lymphocytes % 05/10/2022 30.7  27.0 - 41.0 % Final    Monocytes % 05/10/2022 5.8  2.0 - 6.0 % Final    Eosinophils % 05/10/2022 1.2  1.0 - 4.0 % Final    Basophils % 05/10/2022 0.5  0.0 - 1.0 % Final    Immature Granulocytes % 05/10/2022 0.3  0.0 - 0.4 % Final    nRBC, Auto 05/10/2022 0.0  <=0.0 % Final    Neutrophils, Abs 05/10/2022 4.58  1.80 - 7.70 K/uL Final    Lymphocytes, Absolute 05/10/2022 2.29  1.00 - 4.80 K/uL Final    Monocytes, Absolute 05/10/2022 0.43  0.00 - 0.80 K/uL Final    Eosinophils, Absolute 05/10/2022 0.09  0.00 - 0.50 K/uL Final    Basophils, Absolute 05/10/2022 0.04  0.00 - 0.20 K/uL Final    Immature Granulocytes,  Absolute 05/10/2022 0.02  0.00 - 0.04 K/uL Final    nRBC, Absolute 05/10/2022 0.00  <=0.00 x10e3/uL Final    Diff Type 05/10/2022 Auto   Final   Admission on 03/29/2022, Discharged on 03/29/2022   Component Date Value Ref Range Status    POC Glucose 03/29/2022 187 (A) 70 - 110 MG/DL Final    POC Glucose 03/29/2022 187 (A) 70 - 105 mg/dL Final   Appointment on 03/28/2022   Component Date Value Ref Range Status    SARS Coronavirus 2 Antigen 03/28/2022 Negative  Negative Final     Acceptable 03/28/2022 Yes   Final   Office Visit on 02/02/2022   Component Date Value Ref Range Status    POC Amphetamines 02/02/2022 Negative  Negative, Inconclusive Final    POC Barbiturates 02/02/2022 Negative  Negative, Inconclusive Final    POC Benzodiazepines 02/02/2022 Negative  Negative, Inconclusive Final    POC Cocaine 02/02/2022 Negative  Negative, Inconclusive Final    POC THC 02/02/2022 Negative  Negative, Inconclusive Final    POC Methadone 02/02/2022 Negative  Negative, Inconclusive Final    POC Methamphetamine 02/02/2022 Negative  Negative, Inconclusive Final    POC Opiates 02/02/2022 Negative  Negative, Inconclusive Final    POC Oxycodone 02/02/2022 Negative  Negative, Inconclusive Final    POC Phencyclidine 02/02/2022 Negative  Negative, Inconclusive Final    POC Methylenedioxymethamphetamine * 02/02/2022 Negative  Negative, Inconclusive Final    POC Tricyclic Antidepressants 02/02/2022 Negative  Negative, Inconclusive Final    POC Buprenorphine 02/02/2022 Negative   Final     Acceptable 02/02/2022 Yes   Final    POC Temperature (Urine) 02/02/2022 94   Final   Office Visit on 02/01/2022   Component Date Value Ref Range Status    SARS Coronavirus 2 Antigen 02/01/2022 Negative  Negative Final    Rapid Influenza A Ag 02/01/2022 Negative  Negative Final    Rapid Influenza B Ag 02/01/2022 Negative  Negative Final     Acceptable 02/01/2022 Yes   Final            Assessment:      1. Lumbosacral radiculopathy    2. Osteoarthrosis multiple sites, not specified as generalized          No orders of the defined types were placed in this encounter.        A's of Opioid Risk Assessment  Activity:Patient can perform ADL.   Analgesia:Patients pain is partially controlled by current medication. Patient has tried OTC medications such as Tylenol and Ibuprofen with out relief.   Adverse Effects: Patient denies constipation or sedation.  Aberrant Behavior:  reviewed with no aberrant drug seeking/taking behavior.  Overdose reversal drug naloxone discussed    Drug screen reviewed      Requested Prescriptions     Signed Prescriptions Disp Refills    traMADoL (ULTRAM) 50 mg tablet 60 tablet 3     Sig: Take 1 tablet (50 mg total) by mouth every 12 (twelve) hours as needed for Pain.         Plan:    Anticoagulated Plavix    History Colon  Cancer    Presumptive drug screen positive for opiates today will order definitive for clarification patient takes tramadol    Discharge from the hospital congestive heart failure coronary artery disease    Complaint increasing back and joint pain requesting adjustment pain medication    Increase tramadol 50 mg 1 p.o. q.12 hours number 60 tablets    Continue home exercise program as directed    Continue current medication    Follow-up 3 months     Dr. Fowler, March 2023    Bring original prescription medication bottles/container/box with labels to each visit

## 2022-06-24 LAB
POC ACTIVATED CLOTTING TIME K: 188 SEC
POC ACTIVATED CLOTTING TIME K: 251 SEC

## 2022-06-28 ENCOUNTER — OFFICE VISIT (OUTPATIENT)
Dept: PAIN MEDICINE | Facility: CLINIC | Age: 62
End: 2022-06-28
Payer: COMMERCIAL

## 2022-06-28 VITALS
BODY MASS INDEX: 38.68 KG/M2 | WEIGHT: 210.19 LBS | HEIGHT: 62 IN | HEART RATE: 57 BPM | SYSTOLIC BLOOD PRESSURE: 164 MMHG | DIASTOLIC BLOOD PRESSURE: 62 MMHG

## 2022-06-28 DIAGNOSIS — M54.17 LUMBOSACRAL RADICULOPATHY: Primary | Chronic | ICD-10-CM

## 2022-06-28 DIAGNOSIS — Z79.899 ENCOUNTER FOR LONG-TERM (CURRENT) USE OF OTHER MEDICATIONS: ICD-10-CM

## 2022-06-28 DIAGNOSIS — M89.49 OSTEOARTHROSIS MULTIPLE SITES, NOT SPECIFIED AS GENERALIZED: Chronic | ICD-10-CM

## 2022-06-28 LAB
CTP QC/QA: YES
POC (AMP) AMPHETAMINE: NEGATIVE
POC (BAR) BARBITURATES: NEGATIVE
POC (BUP) BUPRENORPHINE: NEGATIVE
POC (BZO) BENZODIAZEPINES: NEGATIVE
POC (COC) COCAINE: NEGATIVE
POC (MDMA) METHYLENEDIOXYMETHAMPHETAMINE 3,4: NEGATIVE
POC (MET) METHAMPHETAMINE: NEGATIVE
POC (MOP) OPIATES: ABNORMAL
POC (MTD) METHADONE: NEGATIVE
POC (OXY) OXYCODONE: NEGATIVE
POC (PCP) PHENCYCLIDINE: NEGATIVE
POC (TCA) TRICYCLIC ANTIDEPRESSANTS: NEGATIVE
POC TEMPERATURE (URINE): 92
POC THC: NEGATIVE

## 2022-06-28 PROCEDURE — 3066F NEPHROPATHY DOC TX: CPT | Mod: ,,, | Performed by: PHYSICIAN ASSISTANT

## 2022-06-28 PROCEDURE — 3052F HG A1C>EQUAL 8.0%<EQUAL 9.0%: CPT | Mod: ,,, | Performed by: PHYSICIAN ASSISTANT

## 2022-06-28 PROCEDURE — 3008F BODY MASS INDEX DOCD: CPT | Mod: ,,, | Performed by: PHYSICIAN ASSISTANT

## 2022-06-28 PROCEDURE — 1111F DSCHRG MED/CURRENT MED MERGE: CPT | Mod: ,,, | Performed by: PHYSICIAN ASSISTANT

## 2022-06-28 PROCEDURE — 3077F PR MOST RECENT SYSTOLIC BLOOD PRESSURE >= 140 MM HG: ICD-10-PCS | Mod: ,,, | Performed by: PHYSICIAN ASSISTANT

## 2022-06-28 PROCEDURE — 80305 DRUG TEST PRSMV DIR OPT OBS: CPT | Mod: PBBFAC | Performed by: PHYSICIAN ASSISTANT

## 2022-06-28 PROCEDURE — 3060F POS MICROALBUMINURIA REV: CPT | Mod: ,,, | Performed by: PHYSICIAN ASSISTANT

## 2022-06-28 PROCEDURE — 99214 PR OFFICE/OUTPT VISIT, EST, LEVL IV, 30-39 MIN: ICD-10-PCS | Mod: S$PBB,,, | Performed by: PHYSICIAN ASSISTANT

## 2022-06-28 PROCEDURE — 1159F PR MEDICATION LIST DOCUMENTED IN MEDICAL RECORD: ICD-10-PCS | Mod: ,,, | Performed by: PHYSICIAN ASSISTANT

## 2022-06-28 PROCEDURE — 3077F SYST BP >= 140 MM HG: CPT | Mod: ,,, | Performed by: PHYSICIAN ASSISTANT

## 2022-06-28 PROCEDURE — 3066F PR DOCUMENTATION OF TREATMENT FOR NEPHROPATHY: ICD-10-PCS | Mod: ,,, | Performed by: PHYSICIAN ASSISTANT

## 2022-06-28 PROCEDURE — 3052F PR MOST RECENT HEMOGLOBIN A1C LEVEL 8.0 - < 9.0%: ICD-10-PCS | Mod: ,,, | Performed by: PHYSICIAN ASSISTANT

## 2022-06-28 PROCEDURE — 3008F PR BODY MASS INDEX (BMI) DOCUMENTED: ICD-10-PCS | Mod: ,,, | Performed by: PHYSICIAN ASSISTANT

## 2022-06-28 PROCEDURE — 99215 OFFICE O/P EST HI 40 MIN: CPT | Mod: PBBFAC | Performed by: PHYSICIAN ASSISTANT

## 2022-06-28 PROCEDURE — 99214 OFFICE O/P EST MOD 30 MIN: CPT | Mod: S$PBB,,, | Performed by: PHYSICIAN ASSISTANT

## 2022-06-28 PROCEDURE — G0481 PR DRUG TEST DEF 8-14 CLASSES: ICD-10-PCS | Mod: ,,, | Performed by: CLINICAL MEDICAL LABORATORY

## 2022-06-28 PROCEDURE — 1159F MED LIST DOCD IN RCRD: CPT | Mod: ,,, | Performed by: PHYSICIAN ASSISTANT

## 2022-06-28 PROCEDURE — G0481 DRUG TEST DEF 8-14 CLASSES: HCPCS | Mod: ,,, | Performed by: CLINICAL MEDICAL LABORATORY

## 2022-06-28 PROCEDURE — 1111F PR DISCHARGE MEDS RECONCILED W/ CURRENT OUTPATIENT MED LIST: ICD-10-PCS | Mod: ,,, | Performed by: PHYSICIAN ASSISTANT

## 2022-06-28 PROCEDURE — 3078F PR MOST RECENT DIASTOLIC BLOOD PRESSURE < 80 MM HG: ICD-10-PCS | Mod: ,,, | Performed by: PHYSICIAN ASSISTANT

## 2022-06-28 PROCEDURE — 3060F PR POS MICROALBUMINURIA RESULT DOCUMENTED/REVIEW: ICD-10-PCS | Mod: ,,, | Performed by: PHYSICIAN ASSISTANT

## 2022-06-28 PROCEDURE — 3078F DIAST BP <80 MM HG: CPT | Mod: ,,, | Performed by: PHYSICIAN ASSISTANT

## 2022-06-28 RX ORDER — TRAMADOL HYDROCHLORIDE 50 MG/1
50 TABLET ORAL EVERY 12 HOURS PRN
Qty: 60 TABLET | Refills: 3 | Status: SHIPPED | OUTPATIENT
Start: 2022-06-28 | End: 2022-09-28 | Stop reason: SDUPTHER

## 2022-06-30 ENCOUNTER — OFFICE VISIT (OUTPATIENT)
Dept: FAMILY MEDICINE | Facility: CLINIC | Age: 62
End: 2022-06-30
Payer: COMMERCIAL

## 2022-06-30 ENCOUNTER — HOSPITAL ENCOUNTER (OUTPATIENT)
Dept: RADIOLOGY | Facility: HOSPITAL | Age: 62
Discharge: HOME | End: 2022-06-30
Attending: NURSE PRACTITIONER
Payer: COMMERCIAL

## 2022-06-30 ENCOUNTER — OFFICE VISIT (OUTPATIENT)
Dept: CARDIOLOGY | Facility: CLINIC | Age: 62
End: 2022-06-30
Payer: COMMERCIAL

## 2022-06-30 VITALS
BODY MASS INDEX: 39.65 KG/M2 | HEART RATE: 59 BPM | DIASTOLIC BLOOD PRESSURE: 58 MMHG | HEIGHT: 61 IN | WEIGHT: 210 LBS | OXYGEN SATURATION: 99 % | SYSTOLIC BLOOD PRESSURE: 150 MMHG

## 2022-06-30 VITALS
WEIGHT: 208.63 LBS | HEIGHT: 62 IN | TEMPERATURE: 99 F | RESPIRATION RATE: 20 BRPM | HEART RATE: 63 BPM | SYSTOLIC BLOOD PRESSURE: 174 MMHG | OXYGEN SATURATION: 100 % | DIASTOLIC BLOOD PRESSURE: 72 MMHG | BODY MASS INDEX: 38.39 KG/M2

## 2022-06-30 DIAGNOSIS — I48.91 ATRIAL FIBRILLATION WITH RAPID VENTRICULAR RESPONSE: Primary | Chronic | ICD-10-CM

## 2022-06-30 DIAGNOSIS — I10 ESSENTIAL HYPERTENSION: ICD-10-CM

## 2022-06-30 DIAGNOSIS — R06.01 ORTHOPNEA: ICD-10-CM

## 2022-06-30 DIAGNOSIS — I25.110 ATHEROSCLEROSIS OF NATIVE CORONARY ARTERY OF NATIVE HEART WITH UNSTABLE ANGINA PECTORIS: Primary | ICD-10-CM

## 2022-06-30 DIAGNOSIS — R06.02 SOB (SHORTNESS OF BREATH): ICD-10-CM

## 2022-06-30 DIAGNOSIS — D50.0 IRON DEFICIENCY ANEMIA DUE TO CHRONIC BLOOD LOSS: Chronic | ICD-10-CM

## 2022-06-30 DIAGNOSIS — I21.4 NSTEMI (NON-ST ELEVATED MYOCARDIAL INFARCTION): Chronic | ICD-10-CM

## 2022-06-30 DIAGNOSIS — N18.32 CHRONIC KIDNEY DISEASE (CKD) STAGE G3B/A3, MODERATELY DECREASED GLOMERULAR FILTRATION RATE (GFR) BETWEEN 30-44 ML/MIN/1.73 SQUARE METER AND ALBUMINURIA CREATININE RATIO GREATER THAN 300 MG/G: Chronic | ICD-10-CM

## 2022-06-30 DIAGNOSIS — Z79.899 HIGH RISK MEDICATION USE: ICD-10-CM

## 2022-06-30 PROBLEM — Z90.49 H/O RIGHT HEMICOLECTOMY: Chronic | Status: ACTIVE | Noted: 2022-06-22

## 2022-06-30 PROBLEM — K57.30 DIVERTICULA, COLON: Chronic | Status: ACTIVE | Noted: 2022-06-22

## 2022-06-30 PROBLEM — K44.9 HH (HIATUS HERNIA): Chronic | Status: ACTIVE | Noted: 2022-06-21

## 2022-06-30 PROBLEM — N17.9 AKI (ACUTE KIDNEY INJURY): Chronic | Status: ACTIVE | Noted: 2022-06-22

## 2022-06-30 LAB
6-ACETYLMORPHINE, URINE (RUSH): NEGATIVE 10 NG/ML
7-AMINOCLONAZEPAM, URINE (RUSH): NEGATIVE 25 NG/ML
A-HYDROXYALPRAZOLAM, URINE (RUSH): NEGATIVE 25 NG/ML
ACETYL FENTANYL, URINE (RUSH): NEGATIVE 2.5 NG/ML
ACETYL NORFENTANYL OXALATE, URINE (RUSH): NEGATIVE 5 NG/ML
AMPHET UR QL SCN: NEGATIVE
ANION GAP SERPL CALCULATED.3IONS-SCNC: 13 MMOL/L (ref 7–16)
BASOPHILS # BLD AUTO: 0.04 K/UL (ref 0–0.2)
BASOPHILS NFR BLD AUTO: 0.5 % (ref 0–1)
BENZOYLECGONINE, URINE (RUSH): NEGATIVE 100 NG/ML
BUN SERPL-MCNC: 21 MG/DL (ref 7–18)
BUN/CREAT SERPL: 21 (ref 6–20)
BUPRENORPHINE UR QL SCN: NEGATIVE 25 NG/ML
CALCIUM SERPL-MCNC: 9.3 MG/DL (ref 8.5–10.1)
CHLORIDE SERPL-SCNC: 108 MMOL/L (ref 98–107)
CO2 SERPL-SCNC: 24 MMOL/L (ref 21–32)
CODEINE, URINE (RUSH): NEGATIVE 25 NG/ML
CREAT SERPL-MCNC: 0.98 MG/DL (ref 0.55–1.02)
CREAT UR-MCNC: <13 MG/DL (ref 28–219)
DIFFERENTIAL METHOD BLD: ABNORMAL
EDDP, URINE (RUSH): NEGATIVE 25 NG/ML
EOSINOPHIL # BLD AUTO: 0.12 K/UL (ref 0–0.5)
EOSINOPHIL NFR BLD AUTO: 1.5 % (ref 1–4)
ERYTHROCYTE [DISTWIDTH] IN BLOOD BY AUTOMATED COUNT: 16.1 % (ref 11.5–14.5)
FENTANYL, URINE (RUSH): NEGATIVE 2.5 NG/ML
GLUCOSE SERPL-MCNC: 209 MG/DL (ref 74–106)
HCT VFR BLD AUTO: 31.8 % (ref 38–47)
HGB BLD-MCNC: 9.8 G/DL (ref 12–16)
HYDROCODONE, URINE (RUSH): NEGATIVE 25 NG/ML
HYDROMORPHONE, URINE (RUSH): NEGATIVE 25 NG/ML
IMM GRANULOCYTES # BLD AUTO: 0.02 K/UL (ref 0–0.04)
IMM GRANULOCYTES NFR BLD: 0.3 % (ref 0–0.4)
LORAZEPAM, URINE (RUSH): NEGATIVE 25 NG/ML
LYMPHOCYTES # BLD AUTO: 1.8 K/UL (ref 1–4.8)
LYMPHOCYTES NFR BLD AUTO: 22.9 % (ref 27–41)
MCH RBC QN AUTO: 25.4 PG (ref 27–31)
MCHC RBC AUTO-ENTMCNC: 30.8 G/DL (ref 32–36)
MCV RBC AUTO: 82.4 FL (ref 80–96)
METHADONE UR QL SCN: NEGATIVE 25 NG/ML
METHAMPHET UR QL SCN: NEGATIVE
MONOCYTES # BLD AUTO: 0.45 K/UL (ref 0–0.8)
MONOCYTES NFR BLD AUTO: 5.7 % (ref 2–6)
MORPHINE, URINE (RUSH): NEGATIVE 25 NG/ML
MPC BLD CALC-MCNC: 11.7 FL (ref 9.4–12.4)
NEUTROPHILS # BLD AUTO: 5.42 K/UL (ref 1.8–7.7)
NEUTROPHILS NFR BLD AUTO: 69.1 % (ref 53–65)
NORBUPRENORPHINE, URINE (RUSH): NEGATIVE 25 NG/ML
NORDIAZEPAM, URINE (RUSH): NEGATIVE 25 NG/ML
NORFENTANYL OXALATE, URINE (RUSH): NEGATIVE 5 NG/ML
NORHYDROCODONE, URINE (RUSH): NEGATIVE 50 NG/ML
NOROXYCODONE HCL, URINE (RUSH): NEGATIVE 50 NG/ML
NRBC # BLD AUTO: 0 X10E3/UL
NRBC, AUTO (.00): 0 %
OXAZEPAM, URINE (RUSH): NEGATIVE 25 NG/ML
OXYCODONE UR QL SCN: NEGATIVE 25 NG/ML
OXYMORPHONE, URINE (RUSH): NEGATIVE 25 NG/ML
PH UR STRIP: 6.5 PH UNITS
PLATELET # BLD AUTO: 421 K/UL (ref 150–400)
POTASSIUM SERPL-SCNC: 4 MMOL/L (ref 3.5–5.1)
RBC # BLD AUTO: 3.86 M/UL (ref 4.2–5.4)
REFRACTOMETER, URINE TOX: 1.01
SODIUM SERPL-SCNC: 141 MMOL/L (ref 136–145)
SP GR UR STRIP: <=1.005
TAPENTADOL, URINE (RUSH): NEGATIVE 25 NG/ML
TEMAZEPAM, URINE (RUSH): NEGATIVE 25 NG/ML
THC-COOH, URINE (RUSH): NEGATIVE 25 NG/ML
TRAMADOL, URINE (RUSH): NEGATIVE 100 NG/ML
WBC # BLD AUTO: 7.85 K/UL (ref 4.5–11)

## 2022-06-30 PROCEDURE — 3077F PR MOST RECENT SYSTOLIC BLOOD PRESSURE >= 140 MM HG: ICD-10-PCS | Mod: ,,, | Performed by: FAMILY MEDICINE

## 2022-06-30 PROCEDURE — 3066F NEPHROPATHY DOC TX: CPT | Mod: ,,, | Performed by: NURSE PRACTITIONER

## 2022-06-30 PROCEDURE — 3066F NEPHROPATHY DOC TX: CPT | Mod: ,,, | Performed by: FAMILY MEDICINE

## 2022-06-30 PROCEDURE — 1160F PR REVIEW ALL MEDS BY PRESCRIBER/CLIN PHARMACIST DOCUMENTED: ICD-10-PCS | Mod: ,,, | Performed by: NURSE PRACTITIONER

## 2022-06-30 PROCEDURE — 4010F ACE/ARB THERAPY RXD/TAKEN: CPT | Mod: ,,, | Performed by: FAMILY MEDICINE

## 2022-06-30 PROCEDURE — 3066F PR DOCUMENTATION OF TREATMENT FOR NEPHROPATHY: ICD-10-PCS | Mod: ,,, | Performed by: NURSE PRACTITIONER

## 2022-06-30 PROCEDURE — 3008F PR BODY MASS INDEX (BMI) DOCUMENTED: ICD-10-PCS | Mod: ,,, | Performed by: NURSE PRACTITIONER

## 2022-06-30 PROCEDURE — 3052F HG A1C>EQUAL 8.0%<EQUAL 9.0%: CPT | Mod: ,,, | Performed by: FAMILY MEDICINE

## 2022-06-30 PROCEDURE — 1159F PR MEDICATION LIST DOCUMENTED IN MEDICAL RECORD: ICD-10-PCS | Mod: ,,, | Performed by: NURSE PRACTITIONER

## 2022-06-30 PROCEDURE — 3052F HG A1C>EQUAL 8.0%<EQUAL 9.0%: CPT | Mod: ,,, | Performed by: NURSE PRACTITIONER

## 2022-06-30 PROCEDURE — 3060F PR POS MICROALBUMINURIA RESULT DOCUMENTED/REVIEW: ICD-10-PCS | Mod: ,,, | Performed by: FAMILY MEDICINE

## 2022-06-30 PROCEDURE — 4010F ACE/ARB THERAPY RXD/TAKEN: CPT | Mod: ,,, | Performed by: NURSE PRACTITIONER

## 2022-06-30 PROCEDURE — 1159F MED LIST DOCD IN RCRD: CPT | Mod: ,,, | Performed by: FAMILY MEDICINE

## 2022-06-30 PROCEDURE — 4010F PR ACE/ARB THEARPY RXD/TAKEN: ICD-10-PCS | Mod: ,,, | Performed by: NURSE PRACTITIONER

## 2022-06-30 PROCEDURE — 99204 OFFICE O/P NEW MOD 45 MIN: CPT | Mod: S$PBB,,, | Performed by: NURSE PRACTITIONER

## 2022-06-30 PROCEDURE — 3078F PR MOST RECENT DIASTOLIC BLOOD PRESSURE < 80 MM HG: ICD-10-PCS | Mod: ,,, | Performed by: NURSE PRACTITIONER

## 2022-06-30 PROCEDURE — 99495 TCM SERVICES (MODERATE COMPLEXITY): ICD-10-PCS | Mod: ,,, | Performed by: FAMILY MEDICINE

## 2022-06-30 PROCEDURE — 3077F SYST BP >= 140 MM HG: CPT | Mod: ,,, | Performed by: NURSE PRACTITIONER

## 2022-06-30 PROCEDURE — 99495 TRANSJ CARE MGMT MOD F2F 14D: CPT | Mod: ,,, | Performed by: FAMILY MEDICINE

## 2022-06-30 PROCEDURE — 1160F RVW MEDS BY RX/DR IN RCRD: CPT | Mod: ,,, | Performed by: FAMILY MEDICINE

## 2022-06-30 PROCEDURE — 99204 PR OFFICE/OUTPT VISIT, NEW, LEVL IV, 45-59 MIN: ICD-10-PCS | Mod: S$PBB,,, | Performed by: NURSE PRACTITIONER

## 2022-06-30 PROCEDURE — 3052F PR MOST RECENT HEMOGLOBIN A1C LEVEL 8.0 - < 9.0%: ICD-10-PCS | Mod: ,,, | Performed by: NURSE PRACTITIONER

## 2022-06-30 PROCEDURE — 71046 X-RAY EXAM CHEST 2 VIEWS: CPT | Mod: 26,,, | Performed by: STUDENT IN AN ORGANIZED HEALTH CARE EDUCATION/TRAINING PROGRAM

## 2022-06-30 PROCEDURE — 1159F MED LIST DOCD IN RCRD: CPT | Mod: ,,, | Performed by: NURSE PRACTITIONER

## 2022-06-30 PROCEDURE — 80048 BASIC METABOLIC PNL TOTAL CA: CPT | Mod: ,,, | Performed by: CLINICAL MEDICAL LABORATORY

## 2022-06-30 PROCEDURE — 80048 BASIC METABOLIC PANEL: ICD-10-PCS | Mod: ,,, | Performed by: CLINICAL MEDICAL LABORATORY

## 2022-06-30 PROCEDURE — 3078F DIAST BP <80 MM HG: CPT | Mod: ,,, | Performed by: FAMILY MEDICINE

## 2022-06-30 PROCEDURE — 3008F BODY MASS INDEX DOCD: CPT | Mod: ,,, | Performed by: FAMILY MEDICINE

## 2022-06-30 PROCEDURE — 3060F POS MICROALBUMINURIA REV: CPT | Mod: ,,, | Performed by: FAMILY MEDICINE

## 2022-06-30 PROCEDURE — 1160F PR REVIEW ALL MEDS BY PRESCRIBER/CLIN PHARMACIST DOCUMENTED: ICD-10-PCS | Mod: ,,, | Performed by: FAMILY MEDICINE

## 2022-06-30 PROCEDURE — 71046 XR CHEST PA AND LATERAL: ICD-10-PCS | Mod: 26,,, | Performed by: STUDENT IN AN ORGANIZED HEALTH CARE EDUCATION/TRAINING PROGRAM

## 2022-06-30 PROCEDURE — 1160F RVW MEDS BY RX/DR IN RCRD: CPT | Mod: ,,, | Performed by: NURSE PRACTITIONER

## 2022-06-30 PROCEDURE — 1159F PR MEDICATION LIST DOCUMENTED IN MEDICAL RECORD: ICD-10-PCS | Mod: ,,, | Performed by: FAMILY MEDICINE

## 2022-06-30 PROCEDURE — 1111F DSCHRG MED/CURRENT MED MERGE: CPT | Mod: ,,, | Performed by: NURSE PRACTITIONER

## 2022-06-30 PROCEDURE — 3077F PR MOST RECENT SYSTOLIC BLOOD PRESSURE >= 140 MM HG: ICD-10-PCS | Mod: ,,, | Performed by: NURSE PRACTITIONER

## 2022-06-30 PROCEDURE — 3066F PR DOCUMENTATION OF TREATMENT FOR NEPHROPATHY: ICD-10-PCS | Mod: ,,, | Performed by: FAMILY MEDICINE

## 2022-06-30 PROCEDURE — 85025 CBC WITH DIFFERENTIAL: ICD-10-PCS | Mod: ,,, | Performed by: CLINICAL MEDICAL LABORATORY

## 2022-06-30 PROCEDURE — 99215 OFFICE O/P EST HI 40 MIN: CPT | Mod: PBBFAC,25 | Performed by: NURSE PRACTITIONER

## 2022-06-30 PROCEDURE — 3008F PR BODY MASS INDEX (BMI) DOCUMENTED: ICD-10-PCS | Mod: ,,, | Performed by: FAMILY MEDICINE

## 2022-06-30 PROCEDURE — 4010F PR ACE/ARB THEARPY RXD/TAKEN: ICD-10-PCS | Mod: ,,, | Performed by: FAMILY MEDICINE

## 2022-06-30 PROCEDURE — 85025 COMPLETE CBC W/AUTO DIFF WBC: CPT | Mod: ,,, | Performed by: CLINICAL MEDICAL LABORATORY

## 2022-06-30 PROCEDURE — 3060F PR POS MICROALBUMINURIA RESULT DOCUMENTED/REVIEW: ICD-10-PCS | Mod: ,,, | Performed by: NURSE PRACTITIONER

## 2022-06-30 PROCEDURE — 71046 X-RAY EXAM CHEST 2 VIEWS: CPT | Mod: TC

## 2022-06-30 PROCEDURE — 3077F SYST BP >= 140 MM HG: CPT | Mod: ,,, | Performed by: FAMILY MEDICINE

## 2022-06-30 PROCEDURE — 3078F DIAST BP <80 MM HG: CPT | Mod: ,,, | Performed by: NURSE PRACTITIONER

## 2022-06-30 PROCEDURE — 3078F PR MOST RECENT DIASTOLIC BLOOD PRESSURE < 80 MM HG: ICD-10-PCS | Mod: ,,, | Performed by: FAMILY MEDICINE

## 2022-06-30 PROCEDURE — 3060F POS MICROALBUMINURIA REV: CPT | Mod: ,,, | Performed by: NURSE PRACTITIONER

## 2022-06-30 PROCEDURE — 3052F PR MOST RECENT HEMOGLOBIN A1C LEVEL 8.0 - < 9.0%: ICD-10-PCS | Mod: ,,, | Performed by: FAMILY MEDICINE

## 2022-06-30 PROCEDURE — 1111F PR DISCHARGE MEDS RECONCILED W/ CURRENT OUTPATIENT MED LIST: ICD-10-PCS | Mod: ,,, | Performed by: NURSE PRACTITIONER

## 2022-06-30 PROCEDURE — 3008F BODY MASS INDEX DOCD: CPT | Mod: ,,, | Performed by: NURSE PRACTITIONER

## 2022-06-30 RX ORDER — OLMESARTAN MEDOXOMIL 20 MG/1
20 TABLET ORAL DAILY
Qty: 90 TABLET | Refills: 1 | Status: SHIPPED | OUTPATIENT
Start: 2022-06-30 | End: 2023-01-18 | Stop reason: DRUGHIGH

## 2022-06-30 RX ORDER — CYCLOBENZAPRINE HCL 5 MG
5 TABLET ORAL 3 TIMES DAILY PRN
COMMUNITY
Start: 2022-06-14

## 2022-06-30 RX ORDER — NAPROXEN SODIUM 550 MG/1
550 TABLET ORAL 2 TIMES DAILY
COMMUNITY
Start: 2022-06-14 | End: 2022-07-11 | Stop reason: SINTOL

## 2022-06-30 RX ORDER — INSULIN DEGLUDEC 100 U/ML
INJECTION, SOLUTION SUBCUTANEOUS
COMMUNITY
Start: 2022-06-15 | End: 2022-09-01 | Stop reason: SDUPTHER

## 2022-06-30 NOTE — PROGRESS NOTES
New Clinic Note    Dilma Cr is a 61 y.o. female     CC:   Chief Complaint   Patient presents with    Follow-up     Patient is here for a follow up after being discharged. She was discharged last Wednesday night. She started physical therapy on Monday. She has not been taking Olmesartan HCTZ since she got out of the hospital. She has an appointment with Dr. Nataly Sifuentes today at 1:15.    Fatigue    Shortness of Breath     Patient complains of it being hard to breath when she is laying down. She has been having to sleep sitting up. She gets short of breath with any activity as well.         Subjective    History of Present Illness HPI   Patient is here for a TCM. She was admitted to [facility] on 6/18/22 for atrial fib with RVR and a NSTEMI. Cardiology did a cath with angioplasty. Rate was controlled. She was anemic and required one unit of pRBC. GI found no signs of active bleeding. She was restarted on iron.     Current Outpatient Medications:     ascorbic acid, vitamin C, 250 mg Chew, Take 250 mg by mouth once daily., Disp: , Rfl:     aspirin 81 MG Chew, Take 81 mg by mouth once daily., Disp: , Rfl:     cyanocobalamin (VITAMIN B-12) 1000 MCG tablet, Take 100 mcg by mouth once daily., Disp: , Rfl:     cyclobenzaprine (FLEXERIL) 5 MG tablet, Take 5 mg by mouth 3 (three) times daily as needed., Disp: , Rfl:     ferrous sulfate 325 (65 FE) MG EC tablet, Take 1 tablet (325 mg total) by mouth once daily., Disp: 60 tablet, Rfl: 5    FREESTYLE BECCA 14 DAY SENSOR Kit, USE TO CHECK GLUCOSE 4 TIMES DAILY, Disp: , Rfl:     furosemide (LASIX) 20 MG tablet, Take 1 tablet (20 mg total) by mouth daily as needed (fluid)., Disp: 90 tablet, Rfl: 1    JARDIANCE 25 mg tablet, Take 1 tablet (25 mg total) by mouth once daily., Disp: 30 tablet, Rfl: 5    metoprolol tartrate (LOPRESSOR) 25 MG tablet, Take 1 tablet (25 mg total) by mouth 2 (two) times daily., Disp: 60 tablet, Rfl: 0    nitroGLYCERIN (NITROSTAT) 0.4  MG SL tablet, Place 1 tablet (0.4 mg total) under the tongue every 5 (five) minutes as needed for Chest pain., Disp: 30 tablet, Rfl: 0    NOVOLOG FLEXPEN U-100 INSULIN 100 unit/mL (3 mL) InPn pen, Inject 20 Units into the skin 2 (two) times a day., Disp: 12 each, Rfl: 1    rosuvastatin (CRESTOR) 40 MG Tab, Take 1 tablet by mouth once daily, Disp: 90 tablet, Rfl: 0    traMADoL (ULTRAM) 50 mg tablet, Take 1 tablet (50 mg total) by mouth every 12 (twelve) hours as needed for Pain., Disp: 60 tablet, Rfl: 3    TRESIBA FLEXTOUCH U-100 100 unit/mL (3 mL) insulin pen, Inject into the skin., Disp: , Rfl:     benzonatate (TESSALON) 100 MG capsule, Take 1 capsule (100 mg total) by mouth 3 (three) times daily as needed for Cough., Disp: 30 capsule, Rfl: 0    clopidogreL (PLAVIX) 75 mg tablet, Take 75 mg by mouth once daily., Disp: , Rfl:     molnupiravir 200 mg capsule (EUA), Take 4 capsules (800 mg total) by mouth every 12 (twelve) hours. for 5 days, Disp: 40 capsule, Rfl: 0    olmesartan (BENICAR) 20 MG tablet, Take 1 tablet (20 mg total) by mouth once daily., Disp: 90 tablet, Rfl: 1    pantoprazole (PROTONIX) 40 MG tablet, Take 1 tablet (40 mg total) by mouth once daily., Disp: 90 tablet, Rfl: 1     Past Medical History:   Diagnosis Date    Acute superficial gastritis without hemorrhage 6/21/2022    Arthritis     Cancer     colon cancer    Chronic kidney disease, stage 3b     Colon cancer     Coronary artery disease     Depression     Diabetes mellitus, type 2     Diverticula, colon 6/22/2022    GERD (gastroesophageal reflux disease)     H/O right hemicolectomy 6/22/2022    HH (hiatus hernia) 6/21/2022    Hyperlipidemia     Myocardial infarction     2 stents in 05/14/2021 Dr Lora    Renal disorder         Family History   Problem Relation Age of Onset    Hypertension Mother     Diabetes Mother     Hypertension Father     Diabetes Father     Hypertension Sister     Breast cancer Sister      Hypertension Brother         Past Surgical History:   Procedure Laterality Date    BREAST SURGERY      C5-C6 RADHA  8-, 7-, 6-1-2016    Dr Fowler    CERVICAL SPINE SURGERY      COLON SURGERY      EPIDURAL STEROID INJECTION INTO LUMBAR SPINE N/A 3/29/2022    Procedure: L4-5 RADHA;  Surgeon: Gela Fowler MD;  Location: [facility] PAIN [facility];  Service: Pain Management;  Laterality: N/A;  PT AWARE ON OV TO BE TESTED\  PLAVIX TO BE HELD FOR 7 DAYS PRIOR TO PROCEDURE PER DR FOWLER AND DR PORTER  3-28  message left on vm re: covid    HYSTERECTOMY      LEFT HEART CATHETERIZATION Left 5/14/2021    Procedure: Left heart cath;  Surgeon: Mateus Guan MD;  [email or web address] C[facility];  Service: Cardiology;  Laterality: Left;    LEFT HEART CATHETERIZATION Left 6/20/2022    Procedure: Left heart cath;  Surgeon: Oliverio Bautista MD;  Location: [email or web address] CATH LAB;  Service: Cardiology;  Laterality: Left;    MEDIPORT REMOVAL  10/14/2021    Dr Kraft    right rotator cuff repair      in [place]    TOTAL REDUCTION MAMMOPLASTY          Social History     Socioeconomic History    Marital status:      Spouse name: Jorge Alberto    Number of children: 2   Occupational History    Occupation:  at [employer] for past 27 years   Tobacco Use    Smoking status: Never Smoker    Smokeless tobacco: Never Used   Substance and Sexual Activity    Alcohol use: Never    Drug use: Never    Sexual activity: Yes        Review of Systems   Constitutional: Positive for fatigue. Negative for fever.   HENT: Negative for ear pain, postnasal drip, rhinorrhea and sinus pressure/congestion.    Respiratory: Positive for shortness of breath. Negative for cough.    Cardiovascular: Negative for chest pain.   Gastrointestinal: Negative for abdominal pain, diarrhea, nausea and vomiting.   Genitourinary: Negative for dysuria.   Neurological: Negative for headaches.        BP (!) 174/72 (BP Location: Left arm, Patient  "Position: Sitting, BP Method: Large (Automatic))   Pulse 63   Temp 98.6 °F (37 °C) (Oral)   Resp 20   Ht 5' 2" (1.575 m)   Wt 94.6 kg (208 lb 9.6 oz)   LMP  (LMP Unknown)   SpO2 100%   BMI 38.15 kg/m²      Physical Exam  HENT:      Head: Normocephalic and atraumatic.   Cardiovascular:      Rate and Rhythm: Normal rate and regular rhythm.   Pulmonary:      Effort: Pulmonary effort is normal.      Breath sounds: Normal breath sounds.   Neurological:      Mental Status: She is alert and oriented to person, place, and time.   Psychiatric:         Mood and Affect: Mood normal.         Behavior: Behavior normal.          Assessment and Plan      ICD-10-CM ICD-9-CM   1. Atrial fibrillation with rapid ventricular response  I48.91 427.31   2. Essential hypertension  I10 401.9   3. Iron deficiency anemia due to chronic blood loss  D50.0 280.0   4. Chronic kidney disease (CKD) stage G3b/A3, moderately decreased glomerular filtration rate (GFR) between 30-44 mL/min/1.73 square meter and albuminuria creatinine ratio greater than 300 mg/g  N18.32 585.3   5. NSTEMI (non-ST elevated myocardial infarction)  I21.4 410.70        Problem List Items Addressed This Visit        Cardiac/Vascular    Atrial fibrillation with rapid ventricular response - Primary (Chronic)    NSTEMI (non-ST elevated myocardial infarction) (Chronic)       Renal/    Chronic kidney disease (CKD) stage G3b/A3, moderately decreased glomerular filtration rate (GFR) between 30-44 mL/min/1.73 square meter and albuminuria creatinine ratio greater than 300 mg/g (Chronic)    Relevant Orders    Basic Metabolic Panel (Completed)       Oncology    Iron deficiency anemia due to chronic blood loss (Chronic)    Relevant Orders    CBC Auto Differential (Completed)      Other Visit Diagnoses     Essential hypertension        Relevant Medications    olmesartan (BENICAR) 20 MG tablet        I have reviewed hospital records, discharge summary, medications, labs and " images.   I did not discuss her care with any family or other providers.    Medications are reconciled with hospital discharge medications and are believed to be accurate.  She is to follow up with Nataly Sifuentes today. She is to see Dr. Bautista on 7/25/22. She is to see Dr. Martini on 7/20/22.  Labs today for follow up. Restarted olmesartan as blood pressure is elevated. Will monitor kidney function.     Patient Instructions   1. Take Medications as directed  2. Monitor blood pressure outside of clinic  3. Eat a heart healthy diet and get plenty of cardiovascular exercise.         Follow up in about 1 week (around 7/7/2022), or if symptoms worsen or fail to improve.

## 2022-07-01 ENCOUNTER — TELEPHONE (OUTPATIENT)
Dept: CARDIOLOGY | Facility: CLINIC | Age: 62
End: 2022-07-01
Payer: COMMERCIAL

## 2022-07-01 RX ORDER — CLOPIDOGREL BISULFATE 75 MG/1
75 TABLET ORAL DAILY
Qty: 90 TABLET | Refills: 3 | Status: SHIPPED | OUTPATIENT
Start: 2022-07-01 | End: 2022-07-11 | Stop reason: SINTOL

## 2022-07-01 NOTE — PROGRESS NOTES
[facility] note        DATE OF SERVICE: 07/01/2022       PCP: Primary Doctor No      CHIEF COMPLAINT:   Chief Complaint   Patient presents with    Palpitations     At different time.    Chest Pain     Chest tightness that comes and goes.    Shortness of Breath     With exertion.    Dizziness     One time the other day when she got up and turned around and found herself on the floor, she can't recall if she actually passed out or not.    Fatigue     All the time.        HISTORY OF PRESENT ILLNESS:  Dilma Cr is a 61 y.o. female with a PMH of   Past Medical History:   Diagnosis Date    Acute superficial gastritis without hemorrhage 6/21/2022    Arthritis     Cancer     colon cancer    Chronic kidney disease, stage 3b     Colon cancer     Coronary artery disease     Depression     Diabetes mellitus, type 2     Diverticula, colon 6/22/2022    GERD (gastroesophageal reflux disease)     H/O right hemicolectomy 6/22/2022    HH (hiatus hernia) 6/21/2022    Hyperlipidemia     Myocardial infarction     2 stents in 05/14/2021 Dr Lora    Renal disorder      who presents for HD follow up for Dr. Bautista from admission 6/18/2022-6/22/2022 presenting with a fib with RVR. She has h/o chronic a fib and was on Eliquis but this as stopped due to anemia of chronic blood loss. She was started on warfarin and her INR is monitored by her PCP, UTLIO PRUITT. Her anemia panel on admission demonstrated iron deficiency anemia and her stool was heme positive. Her troponins were elevated c/w NSTEMI.  LHC demonstrated 2-vessel CAD. She had TERE to the mid LAD and distal RCA. Her LVSF was normal and there were no WMA.  Her LVEDP was also normal.   The patient states today that she has had a constant epigastric/lower sternal chest tightness since presenting to the hospital. She also has had HERNANDEZ and orthopnea; dizziness with standing occasionally; occasional palpitations; and ongoing issues with  constipation. She states her last BM was on Monday.  Chief Complaint   Patient presents with    Palpitations     At different time.    Chest Pain     Chest tightness that comes and goes.    Shortness of Breath     With exertion.    Dizziness     One time the other day when she got up and turned around and found herself on the floor, she can't recall if she actually passed out or not.    Fatigue     All the time.            PAST MEDICAL HISTORY:  Past Medical History:   Diagnosis Date    Acute superficial gastritis without hemorrhage 6/21/2022    Arthritis     Cancer     colon cancer    Chronic kidney disease, stage 3b     Colon cancer     Coronary artery disease     Depression     Diabetes mellitus, type 2     Diverticula, colon 6/22/2022    GERD (gastroesophageal reflux disease)     H/O right hemicolectomy 6/22/2022    HH (hiatus hernia) 6/21/2022    Hyperlipidemia     Myocardial infarction     2 stents in 05/14/2021 Dr Porter    Renal disorder        PAST SURGICAL HISTORY:  Past Surgical History:   Procedure Laterality Date    BREAST SURGERY      C5-C6 RADHA  8-, 7-, 6-1-2016    Dr Fowler    CERVICAL SPINE SURGERY      COLON SURGERY      EPIDURAL STEROID INJECTION INTO LUMBAR SPINE N/A 3/29/2022    Procedure: L4-5 RADHA;  Surgeon: Gela Fowler MD;  Location: [facility];  Service: Pain Management;  Laterality: N/A;  PT AWARE ON OV TO BE TESTED\  PLAVIX TO BE HELD FOR 7 DAYS PRIOR TO PROCEDURE PER DR FOWLER AND DR PORTER  3-28  message left on  re: covid    HYSTERECTOMY      LEFT HEART CATHETERIZATION Left 5/14/2021    Procedure: Left heart cath;  Surgeon: Mateus Guan MD;  Location: [facility] C[facility] LAB;  Service: Cardiology;  Laterality: Left;    LEFT HEART CATHETERIZATION Left 6/20/2022    Procedure: Left heart cath;  Surgeon: Oliverio Bautista MD;  Location: [facility] CATH LAB;  Service: Cardiology;  Laterality: Left;    MEDIPORT REMOVAL  10/14/2021      Swapnil    right rotator cuff repair      in [place]    TOTAL REDUCTION MAMMOPLASTY         SOCIAL HISTORY:  Social History     Socioeconomic History    Marital status:      Spouse name: Jorge Alberto    Number of children: 2   Occupational History    Occupation:  at [employer] for past 27 years   Tobacco Use    Smoking status: Never Smoker    Smokeless tobacco: Never Used   Substance and Sexual Activity    Alcohol use: Never    Drug use: Never    Sexual activity: Yes       FAMILY HISTORY:  Family History   Problem Relation Age of Onset    Hypertension Mother     Diabetes Mother     Hypertension Father     Diabetes Father     Hypertension Sister     Breast cancer Sister     Hypertension Brother          ALLERGIES:  Review of patient's allergies indicates:  No Known Allergies     MEDICATIONS:    Current Outpatient Medications:     ascorbic acid, vitamin C, 250 mg Chew, Take 250 mg by mouth once daily., Disp: , Rfl:     aspirin 81 MG Chew, Take 81 mg by mouth once daily., Disp: , Rfl:     clopidogreL (PLAVIX) 75 mg tablet, Take 75 mg by mouth once daily., Disp: , Rfl:     cyanocobalamin (VITAMIN B-12) 1000 MCG tablet, Take 100 mcg by mouth once daily., Disp: , Rfl:     ferrous sulfate 325 (65 FE) MG EC tablet, Take 1 tablet (325 mg total) by mouth once daily., Disp: 60 tablet, Rfl: 5    FREESTYLE BECCA 14 DAY SENSOR Kit, USE TO CHECK GLUCOSE 4 TIMES DAILY, Disp: , Rfl:     furosemide (LASIX) 20 MG tablet, Take 1 tablet (20 mg total) by mouth daily as needed (fluid)., Disp: 90 tablet, Rfl: 1    JARDIANCE 25 mg tablet, Take 1 tablet (25 mg total) by mouth once daily., Disp: 30 tablet, Rfl: 5    metoprolol tartrate (LOPRESSOR) 25 MG tablet, Take 1 tablet (25 mg total) by mouth 2 (two) times daily., Disp: 60 tablet, Rfl: 0    nitroGLYCERIN (NITROSTAT) 0.4 MG SL tablet, Place 1 tablet (0.4 mg total) under the tongue every 5 (five) minutes as needed for Chest pain., Disp: 30 tablet,  "Rfl: 0    NOVOLOG FLEXPEN U-100 INSULIN 100 unit/mL (3 mL) InPn pen, Inject 20 Units into the skin 2 (two) times a day., Disp: 12 each, Rfl: 1    olmesartan (BENICAR) 20 MG tablet, Take 1 tablet (20 mg total) by mouth once daily., Disp: 90 tablet, Rfl: 1    rosuvastatin (CRESTOR) 40 MG Tab, Take 1 tablet by mouth once daily, Disp: 90 tablet, Rfl: 0    traMADoL (ULTRAM) 50 mg tablet, Take 1 tablet (50 mg total) by mouth every 12 (twelve) hours as needed for Pain., Disp: 60 tablet, Rfl: 3    cyclobenzaprine (FLEXERIL) 5 MG tablet, Take 5 mg by mouth 3 (three) times daily as needed., Disp: , Rfl:     fluticasone propionate (FLONASE) 50 mcg/actuation nasal spray, 1 spray (50 mcg total) by Each Nostril route once daily. (Patient not taking: Reported on 6/30/2022), Disp: 16 g, Rfl: 0    naproxen sodium (ANAPROX) 550 MG tablet, Take 550 mg by mouth 2 (two) times daily., Disp: , Rfl:     ondansetron (ZOFRAN-ODT) 8 MG TbDL, Take 1 tablet (8 mg total) by mouth every 8 (eight) hours as needed (nausea). (Patient not taking: Reported on 6/30/2022), Disp: 15 tablet, Rfl: 1    pantoprazole (PROTONIX) 40 MG tablet, Take 40 mg by mouth once daily., Disp: , Rfl:     pregabalin (LYRICA) 50 MG capsule, Take 1 capsule (50 mg total) by mouth 3 (three) times daily. (Patient not taking: Reported on 6/30/2022), Disp: 90 capsule, Rfl: 1    TRESIBA FLEXTOUCH U-100 100 unit/mL (3 mL) insulin pen, Inject into the skin., Disp: , Rfl:   Medications have been reviewed and reconciled using the list provided.     PHYSICAL EXAM:  BP (!) 150/58 (BP Location: Left arm, Patient Position: Sitting)   Pulse (!) 59   Ht 5' 1" (1.549 m)   Wt 95.3 kg (210 lb)   LMP  (LMP Unknown)   SpO2 99%   BMI 39.68 kg/m²   Wt Readings from Last 3 Encounters:   06/30/22 95.3 kg (210 lb)   06/30/22 94.6 kg (208 lb 9.6 oz)   06/28/22 95.3 kg (210 lb 3.2 oz)      Body mass index is 39.68 kg/m².    Physical Exam  Vitals and nursing note reviewed. "   Constitutional:       Appearance: Normal appearance. She is obese.   HENT:      Head: Normocephalic and atraumatic.   Eyes:      Pupils: Pupils are equal, round, and reactive to light.   Cardiovascular:      Rate and Rhythm: Normal rate and regular rhythm.      Pulses: Normal pulses.      Heart sounds: Normal heart sounds.   Pulmonary:      Effort: Pulmonary effort is normal.      Breath sounds: Normal breath sounds.   Abdominal:      General: Bowel sounds are normal.      Palpations: Abdomen is soft.   Musculoskeletal:      Cervical back: Neck supple.      Right lower leg: No edema.      Left lower leg: No edema.   Skin:     General: Skin is warm and dry.      Capillary Refill: Capillary refill takes less than 2 seconds.   Neurological:      General: No focal deficit present.      Mental Status: She is alert and oriented to person, place, and time.   Psychiatric:         Mood and Affect: Mood normal.         Behavior: Behavior normal.         LABS REVIEWED:  Lab Results   Component Value Date    WBC 7.85 06/30/2022    RBC 3.86 (L) 06/30/2022    HGB 9.8 (L) 06/30/2022    HCT 31.8 (L) 06/30/2022    MCV 82.4 06/30/2022    MCH 25.4 (L) 06/30/2022    MCHC 30.8 (L) 06/30/2022    RDW 16.1 (H) 06/30/2022     (H) 06/30/2022    MPV 11.7 06/30/2022    NRBC 0.0 06/30/2022    INR 1.00 06/18/2022     Lab Results   Component Value Date     06/30/2022    K 4.0 06/30/2022     06/30/2022    CO2 25 06/30/2022    BUN 20 (H) 06/30/2022    MG 2.5 (H) 06/18/2022     Lab Results   Component Value Date    AST 21 06/30/2022    ALT 18 06/30/2022     Lab Results   Component Value Date     (H) 06/30/2022    HGBA1C 8.6 (H) 06/15/2022     Lab Results   Component Value Date    CHOL 122 06/18/2022    HDL 52 06/18/2022    TRIG 143 06/18/2022    CHOLHDL 2.3 06/18/2022       CARDIAC STUDIES REVIEWED   echocardiogram  Results for orders placed during the hospital encounter of 06/18/22    Echo Saline Bubble?  No    Interpretation Summary  · The left ventricle is normal in size with normal systolic function.  · The estimated ejection fraction is 60-65%.  · Normal left ventricular diastolic function.  · Normal right ventricular size with normal right ventricular systolic function.  · Mild tricuspid regurgitation.     Heart cath  Results for orders placed during the hospital encounter of 06/18/22    Cardiac catheterization    Conclusion  · The Mid LAD to Dist LAD lesion was 90% stenosed with 0% stenosis post-intervention.  · The left ventricular end diastolic pressure was normal.  · The pre-procedure left ventricular end diastolic pressure was 10.  · The RPDA lesion was 80% stenosed.  · The estimated blood loss was none.  · There was single vessel coronary artery disease.    1. Single vessel CAD (90% mid-distal LAD in-stent restenosis)  2. Normal left sided filling pressure (LVEDP - 10mmHg)  3. S/p successful angioplasty with scoring balloon (angiosculpt) of mid-distal LAD in-stent restenosis    Plan:  1. Consider complete GI workup for GI bleed  2. Deferred stent placement in the setting of GI bleed s/p blood transfusion.    The procedure log was documented by No documenter listed and verified by Oliverio Bautista MD.    Date: 6/20/2022  Time: 12:33 PM           ASSESSMENT:   Patient Active Problem List   Diagnosis    Thyroid nodule    Type 2 diabetes mellitus with ophthalmic complication    Colon cancer    Atrial fibrillation with rapid ventricular response    Hypertension    Hyperlipidemia    Atherosclerosis of native coronary artery of native heart with unstable angina pectoris    Type 2 diabetes mellitus with diabetic polyneuropathy, with long-term current use of insulin    Lumbosacral radiculopathy    Osteoarthrosis multiple sites, not specified as generalized    Chronic kidney disease (CKD) stage G3b/A3, moderately decreased glomerular filtration rate (GFR) between 30-44 mL/min/1.73 square meter and  albuminuria creatinine ratio greater than 300 mg/g    Iron deficiency anemia due to chronic blood loss    Anemia    History of colon cancer    NSTEMI (non-ST elevated myocardial infarction)    HH (hiatus hernia)    Acute superficial gastritis without hemorrhage    Severe obesity (BMI >= 40)    EVELIO (acute kidney injury)    Diverticula, colon    H/O right hemicolectomy            Problem List Items Addressed This Visit        Cardiac/Vascular    Atherosclerosis of native coronary artery of native heart with unstable angina pectoris - Primary (Chronic)    Relevant Orders    EKG 12-lead      Other Visit Diagnoses     Orthopnea        Relevant Orders    X-Ray Chest PA And Lateral (Completed)    NT-Pro Natriuretic Peptide (Completed)    SOB (shortness of breath)        Relevant Orders    X-Ray Chest PA And Lateral (Completed)    CBC Auto Differential    High risk medication use        Relevant Orders    CBC Auto Differential    Comprehensive Metabolic Panel (Completed)           PLAN: I reviewed labs/CXR and the case with Dr. Bautista. We will continue medical management at this time. She will get OTC Miralax to treat her constipation issues.   Orders Placed This Encounter   Procedures    X-Ray Chest PA And Lateral     Standing Status:   Future     Number of Occurrences:   1     Standing Expiration Date:   6/30/2023     Order Specific Question:   May the Radiologist modify the order per protocol to meet the clinical needs of the patient?     Answer:   Yes     Order Specific Question:   Release to patient     Answer:   Immediate    NT-Pro Natriuretic Peptide     Standing Status:   Future     Number of Occurrences:   1     Standing Expiration Date:   8/29/2023    CBC Auto Differential     Standing Status:   Future     Number of Occurrences:   1     Standing Expiration Date:   8/29/2023    Comprehensive Metabolic Panel     Standing Status:   Future     Number of Occurrences:   1     Standing Expiration Date:    8/29/2023    EKG 12-lead     Standing Status:   Future     Standing Expiration Date:   6/30/2023      RTC: 2-4 weeks with Dr. Bautista

## 2022-07-01 NOTE — TELEPHONE ENCOUNTER
Pt states she is not taking coumadin. Spoke with Padmini at TULIO Potts's office and she states they did not put her on it and were not following INRs. Pt was told to continue ASA/Plavix per GAGE Hendricks. Pt verbalized understanding.

## 2022-07-05 DIAGNOSIS — R06.02 SHORTNESS OF BREATH: Primary | ICD-10-CM

## 2022-07-11 ENCOUNTER — OFFICE VISIT (OUTPATIENT)
Dept: FAMILY MEDICINE | Facility: CLINIC | Age: 62
End: 2022-07-11
Payer: COMMERCIAL

## 2022-07-11 VITALS
RESPIRATION RATE: 18 BRPM | WEIGHT: 208 LBS | SYSTOLIC BLOOD PRESSURE: 156 MMHG | TEMPERATURE: 98 F | BODY MASS INDEX: 39.27 KG/M2 | DIASTOLIC BLOOD PRESSURE: 64 MMHG | HEIGHT: 61 IN | OXYGEN SATURATION: 100 % | HEART RATE: 67 BPM

## 2022-07-11 DIAGNOSIS — I10 PRIMARY HYPERTENSION: Primary | Chronic | ICD-10-CM

## 2022-07-11 DIAGNOSIS — D50.0 IRON DEFICIENCY ANEMIA DUE TO CHRONIC BLOOD LOSS: ICD-10-CM

## 2022-07-11 DIAGNOSIS — K21.9 GASTROESOPHAGEAL REFLUX DISEASE, UNSPECIFIED WHETHER ESOPHAGITIS PRESENT: ICD-10-CM

## 2022-07-11 DIAGNOSIS — R51.9 NONINTRACTABLE HEADACHE, UNSPECIFIED CHRONICITY PATTERN, UNSPECIFIED HEADACHE TYPE: ICD-10-CM

## 2022-07-11 LAB
BASOPHILS # BLD AUTO: 0.05 K/UL (ref 0–0.2)
BASOPHILS NFR BLD AUTO: 0.6 % (ref 0–1)
DIFFERENTIAL METHOD BLD: ABNORMAL
EOSINOPHIL # BLD AUTO: 0.14 K/UL (ref 0–0.5)
EOSINOPHIL NFR BLD AUTO: 1.7 % (ref 1–4)
ERYTHROCYTE [DISTWIDTH] IN BLOOD BY AUTOMATED COUNT: 16.2 % (ref 11.5–14.5)
HCT VFR BLD AUTO: 34.7 % (ref 38–47)
HGB BLD-MCNC: 10.6 G/DL (ref 12–16)
IMM GRANULOCYTES # BLD AUTO: 0.02 K/UL (ref 0–0.04)
IMM GRANULOCYTES NFR BLD: 0.2 % (ref 0–0.4)
LYMPHOCYTES # BLD AUTO: 2.51 K/UL (ref 1–4.8)
LYMPHOCYTES NFR BLD AUTO: 30.4 % (ref 27–41)
MCH RBC QN AUTO: 25.7 PG (ref 27–31)
MCHC RBC AUTO-ENTMCNC: 30.5 G/DL (ref 32–36)
MCV RBC AUTO: 84 FL (ref 80–96)
MONOCYTES # BLD AUTO: 0.62 K/UL (ref 0–0.8)
MONOCYTES NFR BLD AUTO: 7.5 % (ref 2–6)
MPC BLD CALC-MCNC: 11.8 FL (ref 9.4–12.4)
NEUTROPHILS # BLD AUTO: 4.93 K/UL (ref 1.8–7.7)
NEUTROPHILS NFR BLD AUTO: 59.6 % (ref 53–65)
NRBC # BLD AUTO: 0 X10E3/UL
NRBC, AUTO (.00): 0 %
PLATELET # BLD AUTO: 469 K/UL (ref 150–400)
RBC # BLD AUTO: 4.13 M/UL (ref 4.2–5.4)
WBC # BLD AUTO: 8.27 K/UL (ref 4.5–11)

## 2022-07-11 PROCEDURE — 1160F RVW MEDS BY RX/DR IN RCRD: CPT | Mod: ,,, | Performed by: FAMILY MEDICINE

## 2022-07-11 PROCEDURE — 3052F PR MOST RECENT HEMOGLOBIN A1C LEVEL 8.0 - < 9.0%: ICD-10-PCS | Mod: ,,, | Performed by: FAMILY MEDICINE

## 2022-07-11 PROCEDURE — 4010F PR ACE/ARB THEARPY RXD/TAKEN: ICD-10-PCS | Mod: ,,, | Performed by: FAMILY MEDICINE

## 2022-07-11 PROCEDURE — 1111F DSCHRG MED/CURRENT MED MERGE: CPT | Mod: ,,, | Performed by: FAMILY MEDICINE

## 2022-07-11 PROCEDURE — 3060F POS MICROALBUMINURIA REV: CPT | Mod: ,,, | Performed by: FAMILY MEDICINE

## 2022-07-11 PROCEDURE — 3077F SYST BP >= 140 MM HG: CPT | Mod: ,,, | Performed by: FAMILY MEDICINE

## 2022-07-11 PROCEDURE — 99214 OFFICE O/P EST MOD 30 MIN: CPT | Mod: ,,, | Performed by: FAMILY MEDICINE

## 2022-07-11 PROCEDURE — 3052F HG A1C>EQUAL 8.0%<EQUAL 9.0%: CPT | Mod: ,,, | Performed by: FAMILY MEDICINE

## 2022-07-11 PROCEDURE — 3060F PR POS MICROALBUMINURIA RESULT DOCUMENTED/REVIEW: ICD-10-PCS | Mod: ,,, | Performed by: FAMILY MEDICINE

## 2022-07-11 PROCEDURE — 1159F PR MEDICATION LIST DOCUMENTED IN MEDICAL RECORD: ICD-10-PCS | Mod: ,,, | Performed by: FAMILY MEDICINE

## 2022-07-11 PROCEDURE — 3077F PR MOST RECENT SYSTOLIC BLOOD PRESSURE >= 140 MM HG: ICD-10-PCS | Mod: ,,, | Performed by: FAMILY MEDICINE

## 2022-07-11 PROCEDURE — 99214 PR OFFICE/OUTPT VISIT, EST, LEVL IV, 30-39 MIN: ICD-10-PCS | Mod: ,,, | Performed by: FAMILY MEDICINE

## 2022-07-11 PROCEDURE — 3008F PR BODY MASS INDEX (BMI) DOCUMENTED: ICD-10-PCS | Mod: ,,, | Performed by: FAMILY MEDICINE

## 2022-07-11 PROCEDURE — 3008F BODY MASS INDEX DOCD: CPT | Mod: ,,, | Performed by: FAMILY MEDICINE

## 2022-07-11 PROCEDURE — 3078F PR MOST RECENT DIASTOLIC BLOOD PRESSURE < 80 MM HG: ICD-10-PCS | Mod: ,,, | Performed by: FAMILY MEDICINE

## 2022-07-11 PROCEDURE — 1111F PR DISCHARGE MEDS RECONCILED W/ CURRENT OUTPATIENT MED LIST: ICD-10-PCS | Mod: ,,, | Performed by: FAMILY MEDICINE

## 2022-07-11 PROCEDURE — 3078F DIAST BP <80 MM HG: CPT | Mod: ,,, | Performed by: FAMILY MEDICINE

## 2022-07-11 PROCEDURE — 4010F ACE/ARB THERAPY RXD/TAKEN: CPT | Mod: ,,, | Performed by: FAMILY MEDICINE

## 2022-07-11 PROCEDURE — 3066F PR DOCUMENTATION OF TREATMENT FOR NEPHROPATHY: ICD-10-PCS | Mod: ,,, | Performed by: FAMILY MEDICINE

## 2022-07-11 PROCEDURE — 1160F PR REVIEW ALL MEDS BY PRESCRIBER/CLIN PHARMACIST DOCUMENTED: ICD-10-PCS | Mod: ,,, | Performed by: FAMILY MEDICINE

## 2022-07-11 PROCEDURE — 85025 COMPLETE CBC W/AUTO DIFF WBC: CPT | Mod: ,,, | Performed by: CLINICAL MEDICAL LABORATORY

## 2022-07-11 PROCEDURE — 1159F MED LIST DOCD IN RCRD: CPT | Mod: ,,, | Performed by: FAMILY MEDICINE

## 2022-07-11 PROCEDURE — 3066F NEPHROPATHY DOC TX: CPT | Mod: ,,, | Performed by: FAMILY MEDICINE

## 2022-07-11 PROCEDURE — 85025 CBC WITH DIFFERENTIAL: ICD-10-PCS | Mod: ,,, | Performed by: CLINICAL MEDICAL LABORATORY

## 2022-07-11 RX ORDER — PANTOPRAZOLE SODIUM 40 MG/1
40 TABLET, DELAYED RELEASE ORAL DAILY
Qty: 90 TABLET | Refills: 1 | Status: SHIPPED | OUTPATIENT
Start: 2022-07-11 | End: 2023-11-02

## 2022-07-11 NOTE — LETTER
July 11, 2022    Dilma Cr  [address] [place] [address] [place] [address]         [address]  [place] [address]  Phone: [phone]  Fax: [phone] July 11, 2022     Patient: Dilma Cr   YOB: 1960   Date of Visit: 7/11/2022       To Whom It May Concern:    It is my medical opinion that Dilma Cr may return to work on 07/19/2022.    If you have any questions or concerns, please don't hesitate to call.    Sincerely,      Olga Potts MD

## 2022-07-11 NOTE — PROGRESS NOTES
"New Clinic Note    Dilma Cr is a 61 y.o. female     CC:   Chief Complaint   Patient presents with    Hypertension    Follow-up     Patient stated she is here for a one week follow up on elevated blood pressure, wants renewal of her pantoprazole and stated she needs repeat blood work.     Headache     Complains of waking up this am with a frontal headache.         Subjective  Patient is here for follow up of elevated blood pressure. She needs refills. She complains of a headache. She needs a repeat CBC to follow up on her anemia. Patient continues to complain of shortness of breath and chest pain. She complains of fatigue. She says she is "just not bouncing back as normal."    Presents to clinic for follow up on chronic health problems    Hypertension:    Takes medications as directed: yes  Checks blood pressure outside of clinic: yes  On a statin: yes  Cardiovascular exercise: no  Heart healthy diet: no    Diabetes, type 2:    Checks glucose outside of clinic:yes  Keeps log of glucoses: no  Eats a diabetic diet: no  Regular cardiovascular exercise: no  Monitors feet: yes  Most recent HbA1c values:   Hemoglobin A1C   Date Value Ref Range Status   06/15/2022 8.6 (H) 4.5 - 6.6 % Final     Comment:       Normal:               <5.7%  Pre-Diabetic:       5.7% to 6.4%  Diabetic:             >6.4%  Diabetic Goal:     <7%   12/20/2021 7.7 (H) 4.5 - 6.6 % Final     Comment:       Normal:               <5.7%  Pre-Diabetic:       5.7% to 6.4%  Diabetic:             >6.4%  Diabetic Goal:     <7%   06/25/2021 8.4 (H) 4.5 - 6.6 % Final     Comment:       Normal:               <5.7%  Pre-Diabetic:       5.7% to 6.4%  Diabetic:             >6.4%  Diabetic Goal:     <7%      Takes an aspirin: yes  On a statin: yes        Current Outpatient Medications:     ascorbic acid, vitamin C, 250 mg Chew, Take 250 mg by mouth once daily., Disp: , Rfl:     aspirin 81 MG Chew, Take 81 mg by mouth once daily., Disp: , Rfl:     " cyanocobalamin (VITAMIN B-12) 1000 MCG tablet, Take 100 mcg by mouth once daily., Disp: , Rfl:     cyclobenzaprine (FLEXERIL) 5 MG tablet, Take 5 mg by mouth 3 (three) times daily as needed., Disp: , Rfl:     ferrous sulfate 325 (65 FE) MG EC tablet, Take 1 tablet (325 mg total) by mouth once daily., Disp: 60 tablet, Rfl: 5    FREESTYLE BECCA 14 DAY SENSOR Kit, USE TO CHECK GLUCOSE 4 TIMES DAILY, Disp: , Rfl:     furosemide (LASIX) 20 MG tablet, Take 1 tablet (20 mg total) by mouth daily as needed (fluid)., Disp: 90 tablet, Rfl: 1    JARDIANCE 25 mg tablet, Take 1 tablet (25 mg total) by mouth once daily., Disp: 30 tablet, Rfl: 5    metoprolol tartrate (LOPRESSOR) 25 MG tablet, Take 1 tablet (25 mg total) by mouth 2 (two) times daily., Disp: 60 tablet, Rfl: 0    nitroGLYCERIN (NITROSTAT) 0.4 MG SL tablet, Place 1 tablet (0.4 mg total) under the tongue every 5 (five) minutes as needed for Chest pain., Disp: 30 tablet, Rfl: 0    NOVOLOG FLEXPEN U-100 INSULIN 100 unit/mL (3 mL) InPn pen, Inject 20 Units into the skin 2 (two) times a day., Disp: 12 each, Rfl: 1    olmesartan (BENICAR) 20 MG tablet, Take 1 tablet (20 mg total) by mouth once daily., Disp: 90 tablet, Rfl: 1    rosuvastatin (CRESTOR) 40 MG Tab, Take 1 tablet by mouth once daily, Disp: 90 tablet, Rfl: 0    traMADoL (ULTRAM) 50 mg tablet, Take 1 tablet (50 mg total) by mouth every 12 (twelve) hours as needed for Pain., Disp: 60 tablet, Rfl: 3    TRESIBA FLEXTOUCH U-100 100 unit/mL (3 mL) insulin pen, Inject into the skin., Disp: , Rfl:     clopidogreL (PLAVIX) 75 mg tablet, Take 1 tablet (75 mg total) by mouth once daily., Disp: 90 tablet, Rfl: 1    pantoprazole (PROTONIX) 40 MG tablet, Take 1 tablet (40 mg total) by mouth once daily., Disp: 90 tablet, Rfl: 1     Past Medical History:   Diagnosis Date    Acute superficial gastritis without hemorrhage 6/21/2022    Arthritis     Cancer     colon cancer    Chronic kidney disease, stage 3b      Colon cancer     Coronary artery disease     Depression     Diabetes mellitus, type 2     Diverticula, colon 6/22/2022    GERD (gastroesophageal reflux disease)     H/O right hemicolectomy 6/22/2022    HH (hiatus hernia) 6/21/2022    Hyperlipidemia     Myocardial infarction     2 stents in 05/14/2021 Dr Porter    Renal disorder         Family History   Problem Relation Age of Onset    Hypertension Mother     Diabetes Mother     Hypertension Father     Diabetes Father     Hypertension Sister     Breast cancer Sister     Hypertension Brother         Past Surgical History:   Procedure Laterality Date    BREAST SURGERY      C5-C6 RADHA  8-, 7-, 6-1-2016    Dr Fowler    CERVICAL SPINE SURGERY      COLON SURGERY      EPIDURAL STEROID INJECTION INTO LUMBAR SPINE N/A 3/29/2022    Procedure: L4-5 RADHA;  Surgeon: Gela Fowler MD;  Location: [facility];  Service: Pain Management;  Laterality: N/A;  PT AWARE ON OV TO BE TESTED\  PLAVIX TO BE HELD FOR 7 DAYS PRIOR TO PROCEDURE PER DR FOWLER AND DR PORTER  3-28  message left on vm re: covid    HYSTERECTOMY      LEFT HEART CATHETERIZATION Left 5/14/2021    Procedure: Left heart cath;  Surgeon: Mateus Guan MD;  Location: [facility] CATH LAB;  Service: Cardiology;  Laterality: Left;    LEFT HEART CATHETERIZATION Left 6/20/2022    Procedure: Left heart cath;  Surgeon: Oliverio aButista MD;  Location: [facility] CATH LAB;  Service: Cardiology;  Laterality: Left;    MEDIPORT REMOVAL  10/14/2021    Dr Kraft    right rotator cuff repair      in [place]    TOTAL REDUCTION MAMMOPLASTY          Review of Systems   Constitutional: Positive for fatigue. Negative for fever.   HENT: Negative for ear pain, postnasal drip, rhinorrhea and sinus pressure/congestion.    Respiratory: Positive for shortness of breath. Negative for cough.    Cardiovascular: Positive for chest pain.   Gastrointestinal: Negative for abdominal pain, diarrhea, nausea  "and vomiting.   Genitourinary: Negative for dysuria.   Neurological: Negative for headaches.        BP (!) 156/64 (BP Location: Left arm, Patient Position: Sitting, BP Method: Large (Manual))   Pulse 67   Temp 98.4 °F (36.9 °C) (Oral)   Resp 18   Ht 5' 1" (1.549 m)   Wt 94.3 kg (208 lb)   LMP  (LMP Unknown)   SpO2 100%   BMI 39.30 kg/m²      Physical Exam  HENT:      Head: Normocephalic and atraumatic.      Mouth/Throat:      Pharynx: Oropharynx is clear.   Cardiovascular:      Rate and Rhythm: Normal rate and regular rhythm.   Pulmonary:      Effort: Pulmonary effort is normal.      Breath sounds: Normal breath sounds.   Neurological:      Mental Status: She is alert and oriented to person, place, and time.   Psychiatric:         Mood and Affect: Mood normal.         Behavior: Behavior normal.          Assessment and Plan      ICD-10-CM ICD-9-CM   1. Primary hypertension  I10 401.9   2. Iron deficiency anemia due to chronic blood loss  D50.0 280.0   3. Nonintractable headache, unspecified chronicity pattern, unspecified headache type  R51.9 784.0   4. Gastroesophageal reflux disease, unspecified whether esophagitis present  K21.9 530.81        Problem List Items Addressed This Visit        Cardiac/Vascular    Hypertension - Primary (Chronic)       Oncology    Iron deficiency anemia due to chronic blood loss (Chronic)    Relevant Orders    CBC Auto Differential (Completed)      Other Visit Diagnoses     Nonintractable headache, unspecified chronicity pattern, unspecified headache type        Gastroesophageal reflux disease, unspecified whether esophagitis present        Relevant Medications    pantoprazole (PROTONIX) 40 MG tablet         Patient continues to complain of shortness of breath and chest pain. She has seen cardiology several times in follow up since NSTEMI. She has PFTs scheduled for tomorrow. Explained to patient that she has had a heart attack, GI bleed and blood transfusion. This can take a " "while to heal from so she may not "return to normal" immediately. Will recheck CBC.     Patient Instructions   1. Take Medications as directed  2. Monitor blood pressure outside of clinic  3. Eat a heart healthy diet and get plenty of cardiovascular exercise.         Follow up in about 2 months (around 9/11/2022).         "

## 2022-07-12 ENCOUNTER — CLINICAL SUPPORT (OUTPATIENT)
Dept: PULMONOLOGY | Facility: HOSPITAL | Age: 62
End: 2022-07-12
Attending: STUDENT IN AN ORGANIZED HEALTH CARE EDUCATION/TRAINING PROGRAM
Payer: COMMERCIAL

## 2022-07-12 VITALS — OXYGEN SATURATION: 99 %

## 2022-07-12 DIAGNOSIS — R06.02 SHORTNESS OF BREATH: ICD-10-CM

## 2022-07-12 PROCEDURE — 94729 DIFFUSING CAPACITY: CPT

## 2022-07-12 PROCEDURE — 94060 EVALUATION OF WHEEZING: CPT | Mod: 26,,, | Performed by: INTERNAL MEDICINE

## 2022-07-12 PROCEDURE — 94726 PLETHYSMOGRAPHY LUNG VOLUMES: CPT | Mod: 26,,, | Performed by: INTERNAL MEDICINE

## 2022-07-12 PROCEDURE — 94726 PULM FUNCT TST PLETHYSMOGRAP: ICD-10-PCS | Mod: 26,,, | Performed by: INTERNAL MEDICINE

## 2022-07-12 PROCEDURE — 94729 PR C02/MEMBANE DIFFUSE CAPACITY: ICD-10-PCS | Mod: 26,,, | Performed by: INTERNAL MEDICINE

## 2022-07-12 PROCEDURE — 94726 PLETHYSMOGRAPHY LUNG VOLUMES: CPT

## 2022-07-12 PROCEDURE — 94060 EVALUATION OF WHEEZING: CPT

## 2022-07-12 PROCEDURE — 94729 DIFFUSING CAPACITY: CPT | Mod: 26,,, | Performed by: INTERNAL MEDICINE

## 2022-07-12 PROCEDURE — 94060 PR EVAL OF BRONCHOSPASM: ICD-10-PCS | Mod: 26,,, | Performed by: INTERNAL MEDICINE

## 2022-07-13 ENCOUNTER — TELEPHONE (OUTPATIENT)
Dept: FAMILY MEDICINE | Facility: CLINIC | Age: 62
End: 2022-07-13
Payer: COMMERCIAL

## 2022-07-13 NOTE — TELEPHONE ENCOUNTER
----- Message from Olga Potts MD sent at 7/13/2022  4:44 PM CDT -----  Blood counts are improving. GI bleed does not appear active.   Patient aware that her GI bleed does not appear to active and her blood counts area all stable. Verbalized understanding.

## 2022-07-13 NOTE — PROCEDURES
1. Good quality studies  2. No obstruction, no significant bronchodilator response. Low FVC, still within normal limits.   3. Normal lung volumes  4. Normal DLCO, corrected for Hgb    Impression:   Non-specific ventilatory impairment with isolated low normal FVC.

## 2022-07-18 ENCOUNTER — OFFICE VISIT (OUTPATIENT)
Dept: FAMILY MEDICINE | Facility: CLINIC | Age: 62
End: 2022-07-18
Payer: COMMERCIAL

## 2022-07-18 VITALS
TEMPERATURE: 99 F | DIASTOLIC BLOOD PRESSURE: 68 MMHG | OXYGEN SATURATION: 98 % | HEIGHT: 61 IN | WEIGHT: 207 LBS | BODY MASS INDEX: 39.08 KG/M2 | SYSTOLIC BLOOD PRESSURE: 134 MMHG | HEART RATE: 81 BPM | RESPIRATION RATE: 18 BRPM

## 2022-07-18 DIAGNOSIS — I25.110 ATHEROSCLEROSIS OF NATIVE CORONARY ARTERY OF NATIVE HEART WITH UNSTABLE ANGINA PECTORIS: Primary | Chronic | ICD-10-CM

## 2022-07-18 PROCEDURE — 4010F PR ACE/ARB THEARPY RXD/TAKEN: ICD-10-PCS | Mod: ,,, | Performed by: FAMILY MEDICINE

## 2022-07-18 PROCEDURE — 1160F PR REVIEW ALL MEDS BY PRESCRIBER/CLIN PHARMACIST DOCUMENTED: ICD-10-PCS | Mod: ,,, | Performed by: FAMILY MEDICINE

## 2022-07-18 PROCEDURE — 3060F POS MICROALBUMINURIA REV: CPT | Mod: ,,, | Performed by: FAMILY MEDICINE

## 2022-07-18 PROCEDURE — 3052F HG A1C>EQUAL 8.0%<EQUAL 9.0%: CPT | Mod: ,,, | Performed by: FAMILY MEDICINE

## 2022-07-18 PROCEDURE — 3075F SYST BP GE 130 - 139MM HG: CPT | Mod: ,,, | Performed by: FAMILY MEDICINE

## 2022-07-18 PROCEDURE — 3066F NEPHROPATHY DOC TX: CPT | Mod: ,,, | Performed by: FAMILY MEDICINE

## 2022-07-18 PROCEDURE — 1111F PR DISCHARGE MEDS RECONCILED W/ CURRENT OUTPATIENT MED LIST: ICD-10-PCS | Mod: ,,, | Performed by: FAMILY MEDICINE

## 2022-07-18 PROCEDURE — 1160F RVW MEDS BY RX/DR IN RCRD: CPT | Mod: ,,, | Performed by: FAMILY MEDICINE

## 2022-07-18 PROCEDURE — 99212 OFFICE O/P EST SF 10 MIN: CPT | Mod: ,,, | Performed by: FAMILY MEDICINE

## 2022-07-18 PROCEDURE — 3052F PR MOST RECENT HEMOGLOBIN A1C LEVEL 8.0 - < 9.0%: ICD-10-PCS | Mod: ,,, | Performed by: FAMILY MEDICINE

## 2022-07-18 PROCEDURE — 1159F PR MEDICATION LIST DOCUMENTED IN MEDICAL RECORD: ICD-10-PCS | Mod: ,,, | Performed by: FAMILY MEDICINE

## 2022-07-18 PROCEDURE — 4010F ACE/ARB THERAPY RXD/TAKEN: CPT | Mod: ,,, | Performed by: FAMILY MEDICINE

## 2022-07-18 PROCEDURE — 1111F DSCHRG MED/CURRENT MED MERGE: CPT | Mod: ,,, | Performed by: FAMILY MEDICINE

## 2022-07-18 PROCEDURE — 99212 PR OFFICE/OUTPT VISIT, EST, LEVL II, 10-19 MIN: ICD-10-PCS | Mod: ,,, | Performed by: FAMILY MEDICINE

## 2022-07-18 PROCEDURE — 3078F PR MOST RECENT DIASTOLIC BLOOD PRESSURE < 80 MM HG: ICD-10-PCS | Mod: ,,, | Performed by: FAMILY MEDICINE

## 2022-07-18 PROCEDURE — 3060F PR POS MICROALBUMINURIA RESULT DOCUMENTED/REVIEW: ICD-10-PCS | Mod: ,,, | Performed by: FAMILY MEDICINE

## 2022-07-18 PROCEDURE — 3075F PR MOST RECENT SYSTOLIC BLOOD PRESS GE 130-139MM HG: ICD-10-PCS | Mod: ,,, | Performed by: FAMILY MEDICINE

## 2022-07-18 PROCEDURE — 3078F DIAST BP <80 MM HG: CPT | Mod: ,,, | Performed by: FAMILY MEDICINE

## 2022-07-18 PROCEDURE — 3008F PR BODY MASS INDEX (BMI) DOCUMENTED: ICD-10-PCS | Mod: ,,, | Performed by: FAMILY MEDICINE

## 2022-07-18 PROCEDURE — 3066F PR DOCUMENTATION OF TREATMENT FOR NEPHROPATHY: ICD-10-PCS | Mod: ,,, | Performed by: FAMILY MEDICINE

## 2022-07-18 PROCEDURE — 1159F MED LIST DOCD IN RCRD: CPT | Mod: ,,, | Performed by: FAMILY MEDICINE

## 2022-07-18 PROCEDURE — 3008F BODY MASS INDEX DOCD: CPT | Mod: ,,, | Performed by: FAMILY MEDICINE

## 2022-07-18 RX ORDER — CLOPIDOGREL BISULFATE 75 MG/1
75 TABLET ORAL DAILY
COMMUNITY
End: 2022-08-10 | Stop reason: SDUPTHER

## 2022-07-18 NOTE — LETTER
July 18, 2022    Dilma Cr  [address] [place] [address] [place] [address]         [address]  [place] [address]  Phone: [phone]  Fax: [phone] July 18, 2022     Patient: Dilma Cr   YOB: 1960   Date of Visit: 7/18/2022       To Whom It May Concern:    It is my medical opinion that Dilma Cr will be seeing  A cardiologist on Monday 07/25/2022. They will determine work status at that visit.    If you have any questions or concerns, please don't hesitate to call.    Sincerely,    Olga Potts MD

## 2022-07-18 NOTE — PROGRESS NOTES
"New Clinic Note    Dilma Cr is a 62 y.o. female     CC:   Chief Complaint   Patient presents with    Hypertension    Follow-up     Patient stated she is here for one week follow up on blood pressure and to discuss returning back to work. Stated she still feels weak and having little chest discomfort. Stated she thinks it is her heart. Stated she has a "sore" spot on back of left calf area.         Subjective  Patient is here for follow-up on blood pressure and fatigue.  She reports that her fatigue is not completely healed.  She does not think that she can go back to work yet.  She is scheduled to see Cardiology next week.    Presents to clinic for follow up on chronic health problems    Hypertension:    Takes medications as directed: yes  Checks blood pressure outside of clinic: yes  On a statin: yes  Cardiovascular exercise: no  Heart healthy diet: no    Diabetes, type 2:    Checks glucose outside of clinic:yes  Keeps log of glucoses: no  Eats a diabetic diet: no  Regular cardiovascular exercise: no  Monitors feet: yes  Most recent HbA1c values:   Hemoglobin A1C   Date Value Ref Range Status   06/15/2022 8.6 (H) 4.5 - 6.6 % Final     Comment:       Normal:               <5.7%  Pre-Diabetic:       5.7% to 6.4%  Diabetic:             >6.4%  Diabetic Goal:     <7%   12/20/2021 7.7 (H) 4.5 - 6.6 % Final     Comment:       Normal:               <5.7%  Pre-Diabetic:       5.7% to 6.4%  Diabetic:             >6.4%  Diabetic Goal:     <7%   06/25/2021 8.4 (H) 4.5 - 6.6 % Final     Comment:       Normal:               <5.7%  Pre-Diabetic:       5.7% to 6.4%  Diabetic:             >6.4%  Diabetic Goal:     <7%      Takes an aspirin: yes  On a statin: yesHistory of Present Illness HPI       Current Outpatient Medications:     ascorbic acid, vitamin C, 250 mg Chew, Take 250 mg by mouth once daily., Disp: , Rfl:     aspirin 81 MG Chew, Take 81 mg by mouth once daily., Disp: , Rfl:     cyanocobalamin (VITAMIN " B-12) 1000 MCG tablet, Take 100 mcg by mouth once daily., Disp: , Rfl:     cyclobenzaprine (FLEXERIL) 5 MG tablet, Take 5 mg by mouth 3 (three) times daily as needed., Disp: , Rfl:     ferrous sulfate 325 (65 FE) MG EC tablet, Take 1 tablet (325 mg total) by mouth once daily., Disp: 60 tablet, Rfl: 5    FREESTYLE BECCA 14 DAY SENSOR Kit, USE TO CHECK GLUCOSE 4 TIMES DAILY, Disp: , Rfl:     furosemide (LASIX) 20 MG tablet, Take 1 tablet (20 mg total) by mouth daily as needed (fluid)., Disp: 90 tablet, Rfl: 1    JARDIANCE 25 mg tablet, Take 1 tablet (25 mg total) by mouth once daily., Disp: 30 tablet, Rfl: 5    metoprolol tartrate (LOPRESSOR) 25 MG tablet, Take 1 tablet (25 mg total) by mouth 2 (two) times daily., Disp: 60 tablet, Rfl: 0    nitroGLYCERIN (NITROSTAT) 0.4 MG SL tablet, Place 1 tablet (0.4 mg total) under the tongue every 5 (five) minutes as needed for Chest pain., Disp: 30 tablet, Rfl: 0    NOVOLOG FLEXPEN U-100 INSULIN 100 unit/mL (3 mL) InPn pen, Inject 20 Units into the skin 2 (two) times a day., Disp: 12 each, Rfl: 1    olmesartan (BENICAR) 20 MG tablet, Take 1 tablet (20 mg total) by mouth once daily., Disp: 90 tablet, Rfl: 1    pantoprazole (PROTONIX) 40 MG tablet, Take 1 tablet (40 mg total) by mouth once daily., Disp: 90 tablet, Rfl: 1    rosuvastatin (CRESTOR) 40 MG Tab, Take 1 tablet by mouth once daily, Disp: 90 tablet, Rfl: 0    traMADoL (ULTRAM) 50 mg tablet, Take 1 tablet (50 mg total) by mouth every 12 (twelve) hours as needed for Pain., Disp: 60 tablet, Rfl: 3    TRESIBA FLEXTOUCH U-100 100 unit/mL (3 mL) insulin pen, Inject into the skin., Disp: , Rfl:     clopidogreL (PLAVIX) 75 mg tablet, Take 1 tablet (75 mg total) by mouth once daily., Disp: 90 tablet, Rfl: 1    iron-vitamin C 100-250 mg, ICAR-C, 100-250 mg Tab, Take 1 tablet by mouth 2 (two) times daily with meals., Disp: , Rfl:     Current Facility-Administered Medications:     ketorolac injection 60 mg, 60 mg,  Intramuscular, 1 time in Clinic/HOD, Olga Potts MD     Past Medical History:   Diagnosis Date    Acute superficial gastritis without hemorrhage 6/21/2022    Arthritis     Cancer     colon cancer    Chronic kidney disease, stage 3b     Colon cancer     Coronary artery disease     Depression     Diabetes mellitus, type 2     Diverticula, colon 6/22/2022    GERD (gastroesophageal reflux disease)     H/O right hemicolectomy 6/22/2022    HH (hiatus hernia) 6/21/2022    Hyperlipidemia     Myocardial infarction     2 stents in 05/14/2021 Dr Porter    Renal disorder         Family History   Problem Relation Age of Onset    Hypertension Mother     Diabetes Mother     Hypertension Father     Diabetes Father     Hypertension Sister     Breast cancer Sister     Hypertension Brother         Past Surgical History:   Procedure Laterality Date    BREAST SURGERY      C5-C6 RADHA  8-, 7-, 6-1-2016    Dr Fowler    CERVICAL SPINE SURGERY      COLON SURGERY      EPIDURAL STEROID INJECTION INTO LUMBAR SPINE N/A 3/29/2022    Procedure: L4-5 RADHA;  Surgeon: Gela Fowler MD;  Location: [facility];  Service: Pain Management;  Laterality: N/A;  PT AWARE ON OV TO BE TESTED\  PLAVIX TO BE HELD FOR 7 DAYS PRIOR TO PROCEDURE PER DR FOWLER AND DR PORTER  3-28  message left on vm re: covid    HYSTERECTOMY      LEFT HEART CATHETERIZATION Left 5/14/2021    Procedure: Left heart cath;  Surgeon: Mateus Guan MD;  Location: [facility] CATH LAB;  Service: Cardiology;  Laterality: Left;    LEFT HEART CATHETERIZATION Left 6/20/2022    Procedure: Left heart cath;  Surgeon: Oliverio Bautista MD;  Location: [facility] CATH LAB;  Service: Cardiology;  Laterality: Left;    MEDIPORT REMOVAL  10/14/2021    Dr Kraft    right rotator cuff repair      in [place]    TOTAL REDUCTION MAMMOPLASTY          Review of Systems   Constitutional: Positive for fatigue. Negative for fever.   HENT: Negative for  "ear pain, postnasal drip, rhinorrhea and sinus pressure/congestion.    Respiratory: Positive for chest tightness and shortness of breath. Negative for cough.    Cardiovascular: Negative for chest pain.   Gastrointestinal: Negative for abdominal pain, diarrhea, nausea and vomiting.   Genitourinary: Negative for dysuria.   Neurological: Negative for headaches.        /68 (BP Location: Right arm, Patient Position: Sitting, BP Method: Large (Automatic))   Pulse 81   Temp 98.6 °F (37 °C) (Oral)   Resp 18   Ht 5' 1" (1.549 m)   Wt 93.9 kg (207 lb)   LMP  (LMP Unknown)   SpO2 98%   BMI 39.11 kg/m²      Physical Exam  HENT:      Head: Normocephalic and atraumatic.   Cardiovascular:      Rate and Rhythm: Normal rate and regular rhythm.   Pulmonary:      Effort: Pulmonary effort is normal.      Breath sounds: Normal breath sounds.   Neurological:      Mental Status: She is alert and oriented to person, place, and time.   Psychiatric:         Mood and Affect: Mood normal.         Behavior: Behavior normal.          Assessment and Plan      ICD-10-CM ICD-9-CM   1. Atherosclerosis of native coronary artery of native heart with unstable angina pectoris  I25.110 414.01     411.1        Problem List Items Addressed This Visit        Cardiac/Vascular    Atherosclerosis of native coronary artery of native heart with unstable angina pectoris - Primary (Chronic)         Will give patient off until she sees her cardiologist next week.  After that if she is cleared by Cardiology she will return to work.    Follow up if symptoms worsen or fail to improve.         "

## 2022-07-20 ENCOUNTER — TELEPHONE (OUTPATIENT)
Dept: FAMILY MEDICINE | Facility: CLINIC | Age: 62
End: 2022-07-20
Payer: COMMERCIAL

## 2022-07-20 NOTE — LETTER
July 20, 2022    Dilma Cr  [address] [place] [address] [place] [address]         [address]  [place] [address]  Phone: [phone]  Fax: [phone] July 20, 2022     Patient: Dilma Cr   YOB: 1960   Date of Visit: 7/20/2022       To Whom It May Concern:    It is my medical opinion that Dilma Cr may return to work on 07/26/2022.    If you have any questions or concerns, please don't hesitate to call.    Sincerely,    Olga Potts MD

## 2022-07-21 ENCOUNTER — OFFICE VISIT (OUTPATIENT)
Dept: FAMILY MEDICINE | Facility: CLINIC | Age: 62
End: 2022-07-21
Payer: COMMERCIAL

## 2022-07-21 VITALS
DIASTOLIC BLOOD PRESSURE: 75 MMHG | HEIGHT: 61 IN | HEART RATE: 82 BPM | SYSTOLIC BLOOD PRESSURE: 149 MMHG | RESPIRATION RATE: 18 BRPM | WEIGHT: 208 LBS | BODY MASS INDEX: 39.27 KG/M2 | TEMPERATURE: 99 F | OXYGEN SATURATION: 100 %

## 2022-07-21 DIAGNOSIS — Z20.822 COUGH WITH EXPOSURE TO COVID-19 VIRUS: ICD-10-CM

## 2022-07-21 DIAGNOSIS — U07.1 COVID: Primary | ICD-10-CM

## 2022-07-21 DIAGNOSIS — R05.8 COUGH WITH EXPOSURE TO COVID-19 VIRUS: ICD-10-CM

## 2022-07-21 PROCEDURE — 99213 PR OFFICE/OUTPT VISIT, EST, LEVL III, 20-29 MIN: ICD-10-PCS | Mod: ,,, | Performed by: FAMILY MEDICINE

## 2022-07-21 PROCEDURE — 3008F PR BODY MASS INDEX (BMI) DOCUMENTED: ICD-10-PCS | Mod: ,,, | Performed by: FAMILY MEDICINE

## 2022-07-21 PROCEDURE — 3077F PR MOST RECENT SYSTOLIC BLOOD PRESSURE >= 140 MM HG: ICD-10-PCS | Mod: ,,, | Performed by: FAMILY MEDICINE

## 2022-07-21 PROCEDURE — 1160F PR REVIEW ALL MEDS BY PRESCRIBER/CLIN PHARMACIST DOCUMENTED: ICD-10-PCS | Mod: ,,, | Performed by: FAMILY MEDICINE

## 2022-07-21 PROCEDURE — 3052F PR MOST RECENT HEMOGLOBIN A1C LEVEL 8.0 - < 9.0%: ICD-10-PCS | Mod: ,,, | Performed by: FAMILY MEDICINE

## 2022-07-21 PROCEDURE — 87428 SARSCOV & INF VIR A&B AG IA: CPT | Mod: QW,,, | Performed by: FAMILY MEDICINE

## 2022-07-21 PROCEDURE — 1160F RVW MEDS BY RX/DR IN RCRD: CPT | Mod: ,,, | Performed by: FAMILY MEDICINE

## 2022-07-21 PROCEDURE — 4010F ACE/ARB THERAPY RXD/TAKEN: CPT | Mod: ,,, | Performed by: FAMILY MEDICINE

## 2022-07-21 PROCEDURE — 3060F PR POS MICROALBUMINURIA RESULT DOCUMENTED/REVIEW: ICD-10-PCS | Mod: ,,, | Performed by: FAMILY MEDICINE

## 2022-07-21 PROCEDURE — 3060F POS MICROALBUMINURIA REV: CPT | Mod: ,,, | Performed by: FAMILY MEDICINE

## 2022-07-21 PROCEDURE — 3066F PR DOCUMENTATION OF TREATMENT FOR NEPHROPATHY: ICD-10-PCS | Mod: ,,, | Performed by: FAMILY MEDICINE

## 2022-07-21 PROCEDURE — 1159F PR MEDICATION LIST DOCUMENTED IN MEDICAL RECORD: ICD-10-PCS | Mod: ,,, | Performed by: FAMILY MEDICINE

## 2022-07-21 PROCEDURE — 3077F SYST BP >= 140 MM HG: CPT | Mod: ,,, | Performed by: FAMILY MEDICINE

## 2022-07-21 PROCEDURE — 87428 POCT SARS-COV2 (COVID) WITH FLU ANTIGEN: ICD-10-PCS | Mod: QW,,, | Performed by: FAMILY MEDICINE

## 2022-07-21 PROCEDURE — 1111F PR DISCHARGE MEDS RECONCILED W/ CURRENT OUTPATIENT MED LIST: ICD-10-PCS | Mod: ,,, | Performed by: FAMILY MEDICINE

## 2022-07-21 PROCEDURE — 3078F PR MOST RECENT DIASTOLIC BLOOD PRESSURE < 80 MM HG: ICD-10-PCS | Mod: ,,, | Performed by: FAMILY MEDICINE

## 2022-07-21 PROCEDURE — 3078F DIAST BP <80 MM HG: CPT | Mod: ,,, | Performed by: FAMILY MEDICINE

## 2022-07-21 PROCEDURE — 4010F PR ACE/ARB THEARPY RXD/TAKEN: ICD-10-PCS | Mod: ,,, | Performed by: FAMILY MEDICINE

## 2022-07-21 PROCEDURE — 3008F BODY MASS INDEX DOCD: CPT | Mod: ,,, | Performed by: FAMILY MEDICINE

## 2022-07-21 PROCEDURE — 3066F NEPHROPATHY DOC TX: CPT | Mod: ,,, | Performed by: FAMILY MEDICINE

## 2022-07-21 PROCEDURE — 3052F HG A1C>EQUAL 8.0%<EQUAL 9.0%: CPT | Mod: ,,, | Performed by: FAMILY MEDICINE

## 2022-07-21 PROCEDURE — 99213 OFFICE O/P EST LOW 20 MIN: CPT | Mod: ,,, | Performed by: FAMILY MEDICINE

## 2022-07-21 PROCEDURE — 1159F MED LIST DOCD IN RCRD: CPT | Mod: ,,, | Performed by: FAMILY MEDICINE

## 2022-07-21 PROCEDURE — 1111F DSCHRG MED/CURRENT MED MERGE: CPT | Mod: ,,, | Performed by: FAMILY MEDICINE

## 2022-07-21 RX ORDER — BENZONATATE 100 MG/1
100 CAPSULE ORAL 3 TIMES DAILY PRN
Qty: 30 CAPSULE | Refills: 0 | Status: SHIPPED | OUTPATIENT
Start: 2022-07-21 | End: 2022-07-31

## 2022-07-21 NOTE — PROGRESS NOTES
New Clinic Note    Dilma Cr is a 61 y.o. female     CC:   Chief Complaint   Patient presents with    COVID-19 Concerns     Stated her symptoms started yesterday. Has sinus problems, headache, fatigue, headache, body aches. Stated she had the COVID vaccine and one booster. Wants to be tested for Covid. No exposure that she knows of.  and grandson are all also sick.     Sinusitis    Fatigue    Headache    Generalized Body Aches        Subjective  Patient complains of cough and congestion for 2 days.  She denies fever.  She has tried over-the-counter medications without relief.  She reports that her  and grandson are also sick.    Presents to clinic for follow up on chronic health problems    Hypertension:    Takes medications as directed: yes  Checks blood pressure outside of clinic: yes  On a statin: yes  Cardiovascular exercise: no  Heart healthy diet: no    Diabetes, type 2:    Checks glucose outside of clinic:yes  Keeps log of glucoses: no  Eats a diabetic diet: no  Regular cardiovascular exercise: no  Monitors feet: yes  Most recent HbA1c values:   Hemoglobin A1C   Date Value Ref Range Status   06/15/2022 8.6 (H) 4.5 - 6.6 % Final     Comment:       Normal:               <5.7%  Pre-Diabetic:       5.7% to 6.4%  Diabetic:             >6.4%  Diabetic Goal:     <7%   12/20/2021 7.7 (H) 4.5 - 6.6 % Final     Comment:       Normal:               <5.7%  Pre-Diabetic:       5.7% to 6.4%  Diabetic:             >6.4%  Diabetic Goal:     <7%   06/25/2021 8.4 (H) 4.5 - 6.6 % Final     Comment:       Normal:               <5.7%  Pre-Diabetic:       5.7% to 6.4%  Diabetic:             >6.4%  Diabetic Goal:     <7%      Takes an aspirin: yes  On a statin: yes      Current Outpatient Medications:     ascorbic acid, vitamin C, 250 mg Chew, Take 250 mg by mouth once daily., Disp: , Rfl:     aspirin 81 MG Chew, Take 81 mg by mouth once daily., Disp: , Rfl:     clopidogreL (PLAVIX) 75 mg tablet, Take  75 mg by mouth once daily., Disp: , Rfl:     cyanocobalamin (VITAMIN B-12) 1000 MCG tablet, Take 100 mcg by mouth once daily., Disp: , Rfl:     cyclobenzaprine (FLEXERIL) 5 MG tablet, Take 5 mg by mouth 3 (three) times daily as needed., Disp: , Rfl:     ferrous sulfate 325 (65 FE) MG EC tablet, Take 1 tablet (325 mg total) by mouth once daily., Disp: 60 tablet, Rfl: 5    FREESTYLE BECCA 14 DAY SENSOR Kit, USE TO CHECK GLUCOSE 4 TIMES DAILY, Disp: , Rfl:     furosemide (LASIX) 20 MG tablet, Take 1 tablet (20 mg total) by mouth daily as needed (fluid)., Disp: 90 tablet, Rfl: 1    JARDIANCE 25 mg tablet, Take 1 tablet (25 mg total) by mouth once daily., Disp: 30 tablet, Rfl: 5    metoprolol tartrate (LOPRESSOR) 25 MG tablet, Take 1 tablet (25 mg total) by mouth 2 (two) times daily., Disp: 60 tablet, Rfl: 0    nitroGLYCERIN (NITROSTAT) 0.4 MG SL tablet, Place 1 tablet (0.4 mg total) under the tongue every 5 (five) minutes as needed for Chest pain., Disp: 30 tablet, Rfl: 0    NOVOLOG FLEXPEN U-100 INSULIN 100 unit/mL (3 mL) InPn pen, Inject 20 Units into the skin 2 (two) times a day., Disp: 12 each, Rfl: 1    olmesartan (BENICAR) 20 MG tablet, Take 1 tablet (20 mg total) by mouth once daily., Disp: 90 tablet, Rfl: 1    pantoprazole (PROTONIX) 40 MG tablet, Take 1 tablet (40 mg total) by mouth once daily., Disp: 90 tablet, Rfl: 1    rosuvastatin (CRESTOR) 40 MG Tab, Take 1 tablet by mouth once daily, Disp: 90 tablet, Rfl: 0    traMADoL (ULTRAM) 50 mg tablet, Take 1 tablet (50 mg total) by mouth every 12 (twelve) hours as needed for Pain., Disp: 60 tablet, Rfl: 3    TRESIBA FLEXTOUCH U-100 100 unit/mL (3 mL) insulin pen, Inject into the skin., Disp: , Rfl:     benzonatate (TESSALON) 100 MG capsule, Take 1 capsule (100 mg total) by mouth 3 (three) times daily as needed for Cough., Disp: 30 capsule, Rfl: 0    molnupiravir 200 mg capsule (EUA), Take 4 capsules (800 mg total) by mouth every 12 (twelve) hours.  for 5 days, Disp: 40 capsule, Rfl: 0     Past Medical History:   Diagnosis Date    Acute superficial gastritis without hemorrhage 6/21/2022    Arthritis     Cancer     colon cancer    Chronic kidney disease, stage 3b     Colon cancer     Coronary artery disease     Depression     Diabetes mellitus, type 2     Diverticula, colon 6/22/2022    GERD (gastroesophageal reflux disease)     H/O right hemicolectomy 6/22/2022    HH (hiatus hernia) 6/21/2022    Hyperlipidemia     Myocardial infarction     2 stents in 05/14/2021 Dr Porter    Renal disorder         Family History   Problem Relation Age of Onset    Hypertension Mother     Diabetes Mother     Hypertension Father     Diabetes Father     Hypertension Sister     Breast cancer Sister     Hypertension Brother         Past Surgical History:   Procedure Laterality Date    BREAST SURGERY      C5-C6 RADHA  8-, 7-, 6-1-2016    Dr Fowler    CERVICAL SPINE SURGERY      COLON SURGERY      EPIDURAL STEROID INJECTION INTO LUMBAR SPINE N/A 3/29/2022    Procedure: L4-5 RADHA;  Surgeon: Gela Fowler MD;  Location: [facility];  Service: Pain Management;  Laterality: N/A;  PT AWARE ON OV TO BE TESTED\  PLAVIX TO BE HELD FOR 7 DAYS PRIOR TO PROCEDURE PER DR FOWLER AND DR PORTER  3-28  message left on vm re: covid    HYSTERECTOMY      LEFT HEART CATHETERIZATION Left 5/14/2021    Procedure: Left heart cath;  Surgeon: Mateus Guan MD;  Location: [facility] CATH LAB;  Service: Cardiology;  Laterality: Left;    LEFT HEART CATHETERIZATION Left 6/20/2022    Procedure: Left heart cath;  Surgeon: Oliverio Bautista MD;  Location: [facility] CATH LAB;  Service: Cardiology;  Laterality: Left;    MEDIPORT REMOVAL  10/14/2021    Dr Kraft    right rotator cuff repair      in [place]    TOTAL REDUCTION MAMMOPLASTY          Review of Systems   Constitutional: Negative for fatigue and fever.   HENT: Positive for nasal congestion, sinus  "pressure/congestion, sneezing and sore throat. Negative for ear pain, postnasal drip and rhinorrhea.    Respiratory: Positive for cough. Negative for shortness of breath.    Cardiovascular: Negative for chest pain.   Gastrointestinal: Negative for abdominal pain, diarrhea, nausea and vomiting.   Genitourinary: Negative for dysuria.   Neurological: Positive for headaches.        BP (!) 149/75 (BP Location: Right arm, Patient Position: Sitting, BP Method: Large (Automatic))   Pulse 82   Temp 99.2 °F (37.3 °C) (Oral)   Resp 18   Ht 5' 1" (1.549 m)   Wt 94.3 kg (208 lb)   LMP  (LMP Unknown)   SpO2 100%   BMI 39.30 kg/m²      Physical Exam  HENT:      Head: Normocephalic and atraumatic.      Nose: Rhinorrhea present.   Cardiovascular:      Rate and Rhythm: Normal rate and regular rhythm.   Pulmonary:      Effort: Pulmonary effort is normal.      Breath sounds: Normal breath sounds.   Neurological:      Mental Status: She is alert and oriented to person, place, and time.   Psychiatric:         Mood and Affect: Mood normal.         Behavior: Behavior normal.          Assessment and Plan      ICD-10-CM ICD-9-CM   1. COVID  U07.1 079.89   2. Cough with exposure to COVID-19 virus  R05.8 786.2    Z20.822 V01.79        Problem List Items Addressed This Visit    None     Visit Diagnoses     COVID    -  Primary    Relevant Medications    molnupiravir 200 mg capsule (EUA)    benzonatate (TESSALON) 100 MG capsule    Cough with exposure to COVID-19 virus        Relevant Orders    POCT SARS-COV2 (COVID) with Flu Antigen (Completed)       Information packet given for antiviral. Patient warned of possible birth defects.     Patient Instructions   1. Take medications as directed  2. Monitor temperature, if fever begins, tylenol or ibuprofen can be used as long as you have no history of abnormal reactions to these medications. Follow the instructions on the bottle or contact clinic with questions.   3. Please contact clinic if " symptoms begin to get worse.   4. Report to the ER if you feel your symptoms are severe and life threatening.         Follow up if symptoms worsen or fail to improve.

## 2022-07-25 ENCOUNTER — TELEPHONE (OUTPATIENT)
Dept: CARDIOLOGY | Facility: CLINIC | Age: 62
End: 2022-07-25
Payer: COMMERCIAL

## 2022-07-25 DIAGNOSIS — I48.91 ATRIAL FIBRILLATION, UNSPECIFIED TYPE: Primary | ICD-10-CM

## 2022-07-28 ENCOUNTER — DOCUMENTATION ONLY (OUTPATIENT)
Dept: CARDIOLOGY | Facility: CLINIC | Age: 62
End: 2022-07-28
Payer: COMMERCIAL

## 2022-07-28 NOTE — PROGRESS NOTES
The paperwork Ms Cr requested be filled out was given to Freda, . I previously explained to Mr and Mrs Cr we would need more information on the paperwork such as dates she was requesting the paperwork be filled out for. I was unsure of the dates for the hospital visit and if it would be Dr Lora who could help her. I asked her to let us know exactly what she needed.

## 2022-08-10 RX ORDER — CLOPIDOGREL BISULFATE 75 MG/1
75 TABLET ORAL DAILY
Qty: 90 TABLET | Refills: 1 | Status: SHIPPED | OUTPATIENT
Start: 2022-08-10

## 2022-08-17 ENCOUNTER — HOSPITAL ENCOUNTER (OUTPATIENT)
Dept: RADIOLOGY | Facility: HOSPITAL | Age: 62
Discharge: HOME | End: 2022-08-17
Attending: FAMILY MEDICINE
Payer: COMMERCIAL

## 2022-08-17 ENCOUNTER — OFFICE VISIT (OUTPATIENT)
Dept: FAMILY MEDICINE | Facility: CLINIC | Age: 62
End: 2022-08-17
Payer: COMMERCIAL

## 2022-08-17 VITALS
TEMPERATURE: 99 F | DIASTOLIC BLOOD PRESSURE: 96 MMHG | RESPIRATION RATE: 18 BRPM | HEART RATE: 72 BPM | HEIGHT: 61 IN | BODY MASS INDEX: 39.27 KG/M2 | OXYGEN SATURATION: 100 % | SYSTOLIC BLOOD PRESSURE: 164 MMHG | WEIGHT: 208 LBS

## 2022-08-17 DIAGNOSIS — M54.17 LUMBOSACRAL RADICULOPATHY: ICD-10-CM

## 2022-08-17 DIAGNOSIS — M79.605 PAIN IN BOTH LOWER EXTREMITIES: ICD-10-CM
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
## 2022-08-17 DIAGNOSIS — M54.17 LUMBOSACRAL RADICULOPATHY: Primary | Chronic | ICD-10-CM

## 2022-08-17 DIAGNOSIS — S80.862A TICK BITE OF LEFT LOWER LEG, INITIAL ENCOUNTER: ICD-10-CM

## 2022-08-17 DIAGNOSIS — M79.604 PAIN IN BOTH LOWER EXTREMITIES: ICD-10-CM

## 2022-08-17 PROCEDURE — 3080F PR MOST RECENT DIASTOLIC BLOOD PRESSURE >= 90 MM HG: ICD-10-PCS | Mod: ,,, | Performed by: FAMILY MEDICINE

## 2022-08-17 PROCEDURE — 86757 RICKETTSIA ANTIBODY: CPT | Mod: 90,,, | Performed by: CLINICAL MEDICAL LABORATORY

## 2022-08-17 PROCEDURE — 3077F PR MOST RECENT SYSTOLIC BLOOD PRESSURE >= 140 MM HG: ICD-10-PCS | Mod: ,,, | Performed by: FAMILY MEDICINE

## 2022-08-17 PROCEDURE — 99214 OFFICE O/P EST MOD 30 MIN: CPT | Mod: ,,, | Performed by: FAMILY MEDICINE

## 2022-08-17 PROCEDURE — 1159F MED LIST DOCD IN RCRD: CPT | Mod: ,,, | Performed by: FAMILY MEDICINE

## 2022-08-17 PROCEDURE — 3066F NEPHROPATHY DOC TX: CPT | Mod: ,,, | Performed by: FAMILY MEDICINE

## 2022-08-17 PROCEDURE — 86618 PR  LYME DISEASE ANTIBODY: ICD-10-PCS | Mod: ,,, | Performed by: CLINICAL MEDICAL LABORATORY

## 2022-08-17 PROCEDURE — 3066F PR DOCUMENTATION OF TREATMENT FOR NEPHROPATHY: ICD-10-PCS | Mod: ,,, | Performed by: FAMILY MEDICINE

## 2022-08-17 PROCEDURE — 86666 EHRLICHIA ANTIBODY PANEL: ICD-10-PCS | Mod: 90,,, | Performed by: CLINICAL MEDICAL LABORATORY

## 2022-08-17 PROCEDURE — 86788 WEST NILE ANTIBODIES, IGG AND IGM: ICD-10-PCS | Mod: 90,,, | Performed by: CLINICAL MEDICAL LABORATORY

## 2022-08-17 PROCEDURE — 3080F DIAST BP >= 90 MM HG: CPT | Mod: ,,, | Performed by: FAMILY MEDICINE

## 2022-08-17 PROCEDURE — 86789 WEST NILE VIRUS ANTIBODY: CPT | Mod: 90,,, | Performed by: CLINICAL MEDICAL LABORATORY

## 2022-08-17 PROCEDURE — 99214 PR OFFICE/OUTPT VISIT, EST, LEVL IV, 30-39 MIN: ICD-10-PCS | Mod: ,,, | Performed by: FAMILY MEDICINE

## 2022-08-17 PROCEDURE — 86753 BABESIA MICROTI ANTIBODY PANEL: ICD-10-PCS | Mod: 90,,, | Performed by: CLINICAL MEDICAL LABORATORY

## 2022-08-17 PROCEDURE — 3060F PR POS MICROALBUMINURIA RESULT DOCUMENTED/REVIEW: ICD-10-PCS | Mod: ,,, | Performed by: FAMILY MEDICINE

## 2022-08-17 PROCEDURE — 86789 WEST NILE ANTIBODIES, IGG AND IGM: ICD-10-PCS | Mod: 90,,, | Performed by: CLINICAL MEDICAL LABORATORY

## 2022-08-17 PROCEDURE — 1159F PR MEDICATION LIST DOCUMENTED IN MEDICAL RECORD: ICD-10-PCS | Mod: ,,, | Performed by: FAMILY MEDICINE

## 2022-08-17 PROCEDURE — 72110 X-RAY EXAM L-2 SPINE 4/>VWS: CPT | Mod: TC

## 2022-08-17 PROCEDURE — 1160F RVW MEDS BY RX/DR IN RCRD: CPT | Mod: ,,, | Performed by: FAMILY MEDICINE

## 2022-08-17 PROCEDURE — 1160F PR REVIEW ALL MEDS BY PRESCRIBER/CLIN PHARMACIST DOCUMENTED: ICD-10-PCS | Mod: ,,, | Performed by: FAMILY MEDICINE

## 2022-08-17 PROCEDURE — 3052F PR MOST RECENT HEMOGLOBIN A1C LEVEL 8.0 - < 9.0%: ICD-10-PCS | Mod: ,,, | Performed by: FAMILY MEDICINE

## 2022-08-17 PROCEDURE — 3052F HG A1C>EQUAL 8.0%<EQUAL 9.0%: CPT | Mod: ,,, | Performed by: FAMILY MEDICINE

## 2022-08-17 PROCEDURE — 86666 EHRLICHIA ANTIBODY: CPT | Mod: 90,,, | Performed by: CLINICAL MEDICAL LABORATORY

## 2022-08-17 PROCEDURE — 3008F PR BODY MASS INDEX (BMI) DOCUMENTED: ICD-10-PCS | Mod: ,,, | Performed by: FAMILY MEDICINE

## 2022-08-17 PROCEDURE — 3060F POS MICROALBUMINURIA REV: CPT | Mod: ,,, | Performed by: FAMILY MEDICINE

## 2022-08-17 PROCEDURE — 3008F BODY MASS INDEX DOCD: CPT | Mod: ,,, | Performed by: FAMILY MEDICINE

## 2022-08-17 PROCEDURE — 4010F PR ACE/ARB THEARPY RXD/TAKEN: ICD-10-PCS | Mod: ,,, | Performed by: FAMILY MEDICINE

## 2022-08-17 PROCEDURE — 86788 WEST NILE VIRUS AB IGM: CPT | Mod: 90,,, | Performed by: CLINICAL MEDICAL LABORATORY

## 2022-08-17 PROCEDURE — 86618 LYME DISEASE ANTIBODY: CPT | Mod: ,,, | Performed by: CLINICAL MEDICAL LABORATORY

## 2022-08-17 PROCEDURE — 3077F SYST BP >= 140 MM HG: CPT | Mod: ,,, | Performed by: FAMILY MEDICINE

## 2022-08-17 PROCEDURE — 86757 PR  RICKETTSIA: ICD-10-PCS | Mod: 90,,, | Performed by: CLINICAL MEDICAL LABORATORY

## 2022-08-17 PROCEDURE — 86753 PROTOZOA ANTIBODY NOS: CPT | Mod: 90,,, | Performed by: CLINICAL MEDICAL LABORATORY

## 2022-08-17 PROCEDURE — 4010F ACE/ARB THERAPY RXD/TAKEN: CPT | Mod: ,,, | Performed by: FAMILY MEDICINE

## 2022-08-17 RX ORDER — KETOROLAC TROMETHAMINE 30 MG/ML
60 INJECTION, SOLUTION INTRAMUSCULAR; INTRAVENOUS
Status: DISCONTINUED | OUTPATIENT
Start: 2022-08-17 | End: 2022-09-01

## 2022-08-17 RX ORDER — IRON,CARBONYL/ASCORBIC ACID 100-250 MG
1 TABLET ORAL 2 TIMES DAILY WITH MEALS
COMMUNITY
Start: 2022-07-21

## 2022-08-17 NOTE — PROGRESS NOTES
New Clinic Note    Dilma Cr is a 62 y.o. female     CC:   Chief Complaint   Patient presents with    Leg Pain     Complains of bilateral leg pain for past 2 weeks that is worsening. Stated she works at the [employer] and she just does not think she can work hurting as bad as she is today. Stated she thinks she had a tick bite on her RLE and wants to make sure this is only muscle and not something worse.     Hypertension     Stated she was seeing Dr Lora and he moved away to [place] and she was then moved over to Dr Oliverio Todd and he has cancelled her appointment with him 3 times and it has been months and months now and she is having palpitations and missed one day of work and they called her on her way down to [place]. Wants to use another heart doctor.     Hyperlipidemia    Gastroesophageal Reflux     Stated [facility] stated she could not have her Protonix 90 day supply unless she has a pre authorization.         Subjective  Patient complains of low back pain that radiates down bilateral legs for 2 weeks. Patient has taken tramadol without relief. Pain gets worse with certain movements.  She has had a recent tick bite.  She is afraid that these 2 are related.  He denies a rash, fever, headache or joint pain.    Presents to clinic for follow up on chronic health problems    Hypertension:    Takes medications as directed: yes  Checks blood pressure outside of clinic: yes  On a statin: yes  Cardiovascular exercise: no  Heart healthy diet: no    Diabetes, type 2:    Checks glucose outside of clinic:yes  Keeps log of glucoses: no  Eats a diabetic diet: no  Regular cardiovascular exercise: no  Monitors feet: yes  Most recent HbA1c values:   Hemoglobin A1C   Date Value Ref Range Status   06/15/2022 8.6 (H) 4.5 - 6.6 % Final     Comment:       Normal:               <5.7%  Pre-Diabetic:       5.7% to 6.4%  Diabetic:             >6.4%  Diabetic Goal:     <7%   12/20/2021 7.7 (H) 4.5 - 6.6 % Final     Comment:        Normal:               <5.7%  Pre-Diabetic:       5.7% to 6.4%  Diabetic:             >6.4%  Diabetic Goal:     <7%   06/25/2021 8.4 (H) 4.5 - 6.6 % Final     Comment:       Normal:               <5.7%  Pre-Diabetic:       5.7% to 6.4%  Diabetic:             >6.4%  Diabetic Goal:     <7%      Takes an aspirin: yes  On a statin: yes        Current Outpatient Medications:     ascorbic acid, vitamin C, 250 mg Chew, Take 250 mg by mouth once daily., Disp: , Rfl:     aspirin 81 MG Chew, Take 81 mg by mouth once daily., Disp: , Rfl:     clopidogreL (PLAVIX) 75 mg tablet, Take 1 tablet (75 mg total) by mouth once daily., Disp: 90 tablet, Rfl: 1    cyanocobalamin (VITAMIN B-12) 1000 MCG tablet, Take 100 mcg by mouth once daily., Disp: , Rfl:     cyclobenzaprine (FLEXERIL) 5 MG tablet, Take 5 mg by mouth 3 (three) times daily as needed., Disp: , Rfl:     FREESTYLE BECCA 14 DAY SENSOR Kit, USE TO CHECK GLUCOSE 4 TIMES DAILY, Disp: , Rfl:     furosemide (LASIX) 20 MG tablet, Take 1 tablet (20 mg total) by mouth daily as needed (fluid)., Disp: 90 tablet, Rfl: 1    iron-vitamin C 100-250 mg, ICAR-C, 100-250 mg Tab, Take 1 tablet by mouth 2 (two) times daily with meals., Disp: , Rfl:     JARDIANCE 25 mg tablet, Take 1 tablet (25 mg total) by mouth once daily., Disp: 30 tablet, Rfl: 5    nitroGLYCERIN (NITROSTAT) 0.4 MG SL tablet, Place 1 tablet (0.4 mg total) under the tongue every 5 (five) minutes as needed for Chest pain., Disp: 30 tablet, Rfl: 0    NOVOLOG FLEXPEN U-100 INSULIN 100 unit/mL (3 mL) InPn pen, Inject 20 Units into the skin 2 (two) times a day., Disp: 12 each, Rfl: 1    olmesartan (BENICAR) 20 MG tablet, Take 1 tablet (20 mg total) by mouth once daily., Disp: 90 tablet, Rfl: 1    pantoprazole (PROTONIX) 40 MG tablet, Take 1 tablet (40 mg total) by mouth once daily., Disp: 90 tablet, Rfl: 1    rosuvastatin (CRESTOR) 40 MG Tab, Take 1 tablet by mouth once daily, Disp: 90 tablet, Rfl: 0    traMADoL (ULTRAM) 50 mg  tablet, Take 1 tablet (50 mg total) by mouth every 12 (twelve) hours as needed for Pain., Disp: 60 tablet, Rfl: 3    metoprolol succinate (TOPROL-XL) 25 MG 24 hr tablet, Take 1 tablet (25 mg total) by mouth once daily., Disp: 90 tablet, Rfl: 3    TRESIBA FLEXTOUCH U-100 100 unit/mL (3 mL) insulin pen, Inject 24 Units into the skin once daily., Disp: 3 pen, Rfl: 1     Past Medical History:   Diagnosis Date    Acute superficial gastritis without hemorrhage 6/21/2022    Arthritis     Cancer     colon cancer    Chronic kidney disease, stage 3b     Colon cancer     Coronary artery disease     Depression     Diabetes mellitus, type 2     Diverticula, colon 6/22/2022    GERD (gastroesophageal reflux disease)     H/O right hemicolectomy 6/22/2022    HH (hiatus hernia) 6/21/2022    Hyperlipidemia     Myocardial infarction     2 stents in 05/14/2021 Dr Porter    Renal disorder         Family History   Problem Relation Age of Onset    Hypertension Mother     Diabetes Mother     Hypertension Father     Diabetes Father     Hypertension Sister     Breast cancer Sister     Hypertension Brother         Past Surgical History:   Procedure Laterality Date    BREAST SURGERY      C5-C6 RADHA  8-, 7-, 6-1-2016    Dr Fowler    CERVICAL SPINE SURGERY      COLON SURGERY      EPIDURAL STEROID INJECTION INTO LUMBAR SPINE N/A 3/29/2022    Procedure: L4-5 RADHA;  Surgeon: Gela Fowler MD;  Location: [facility] PAIN MGMT;  Service: Pain Management;  Laterality: N/A;  PT AWARE ON OV TO BE TESTED\  PLAVIX TO BE HELD FOR 7 DAYS PRIOR TO PROCEDURE PER DR FOWLER AND DR PORTER  3-28  message left on  re: covid    HYSTERECTOMY      LEFT HEART CATHETERIZATION Left 5/14/2021    Procedure: Left heart cath;  Surgeon: Mateus Guan MD;  Location: [facility] CATH LAB;  Service: Cardiology;  Laterality: Left;    LEFT HEART CATHETERIZATION Left 6/20/2022    Procedure: Left heart cath;  Surgeon: Oliverio Bautista MD;  Location: [facility] CATH  "LAB;  Service: Cardiology;  Laterality: Left;    MEDIPORT REMOVAL  10/14/2021    Dr Kraft    right rotator cuff repair      in [place]    TOTAL REDUCTION MAMMOPLASTY          Review of Systems   Constitutional:  Negative for fatigue and fever.   HENT:  Negative for ear pain, postnasal drip, rhinorrhea and sinus pressure/congestion.    Respiratory:  Negative for cough and shortness of breath.    Cardiovascular:  Negative for chest pain.   Gastrointestinal:  Negative for abdominal pain, diarrhea, nausea and vomiting.   Genitourinary:  Negative for dysuria.   Musculoskeletal:  Positive for back pain.   Neurological:  Negative for headaches.      BP (!) 164/96 (BP Location: Left arm, Patient Position: Sitting, BP Method: Large (Manual))   Pulse 72   Temp 98.9 °F (37.2 °C) (Oral)   Resp 18   Ht 5' 1" (1.549 m)   Wt 94.3 kg (208 lb)   LMP  (LMP Unknown)   SpO2 100%   BMI 39.30 kg/m²      Physical Exam  HENT:      Head: Normocephalic and atraumatic.      Mouth/Throat:      Pharynx: Oropharynx is clear.   Cardiovascular:      Rate and Rhythm: Normal rate and regular rhythm.   Pulmonary:      Effort: Pulmonary effort is normal.      Breath sounds: Normal breath sounds.   Musculoskeletal:      Lumbar back: Tenderness present. Decreased range of motion.   Neurological:      Mental Status: She is alert and oriented to person, place, and time.   Psychiatric:         Mood and Affect: Mood normal.         Behavior: Behavior normal.        Assessment and Plan      ICD-10-CM ICD-9-CM   1. Lumbosacral radiculopathy  M54.17 724.4   2. Tick bite of left lower leg, initial encounter  S80.862A 916.4    W57.XXXA E906.4   3. Pain in both lower extremities  M79.604 729.5    M79.605         Problem List Items Addressed This Visit          Neuro    Lumbosacral radiculopathy - Primary (Chronic)    Relevant Orders    X-Ray Lumbar Spine 5 View (Completed)    Ambulatory referral/consult to Physical/Occupational Therapy     Other Visit " Diagnoses       Tick bite of left lower leg, initial encounter        Relevant Orders    Ehrlichia Antibody Panel (Completed)    Lyme Antibody w/ Immunoblot Reflex (Completed)    West Nile Antibodies, IgG and IgM (Completed)    Babesia Microti Antibody Panel (Completed)    Spotted Fever Group Antibodies (Completed)    Pain in both lower extremities               Toradol IM for pain.     Follow up if symptoms worsen or fail to improve.

## 2022-08-19 LAB
A PHAGOCYTOPH IGG TITR SER IF: NORMAL TITER
B MICROTI IGG TITR SER: NORMAL TITER
E CHAFFEENSIS IGG TITR SER IF: NORMAL TITER

## 2022-08-20 LAB
RICK SF IGG TITR SER IF: NORMAL {TITER}
RICK SF IGM TITR SER IF: NORMAL {TITER}

## 2022-08-22 LAB
IMMUNOLOGIST REVIEW: NORMAL
WNV IGG SER QL IA: NEGATIVE
WNV IGM SER QL IA: NEGATIVE

## 2022-08-24 LAB — B BURGDOR IGG SER QL IB: NORMAL

## 2022-08-29 NOTE — PROGRESS NOTES
PCP: Olga Potts MD    Referring Provider:     Subjective:   Dilma Cr is a 62 y.o. female with hx of afib, NSTEMI, diabetes, hypertensio, h/p colon ca s/p hemicolectomy who presents for follow up.     8/30/22 -  Patient states she has not felt well since hospitalization.  She reports stabbing chest pain lasting 1-2 seconds, occurs twice weekly.  She states she has some difficulty breathing at night.  She denies snoring.    She states is having aching pain  to posterior legs, from calves to thighs, with difficulty walking for the past 3 weeks, worse in the morning.         She also has a history of CAF and was on eliquis but was taken off recently due anemia of chronic blood loss.            Fhx:  Shx:      EKG 8/31/22 - Normal sinus rhythm. EKG          6/18/22 -  Atrial fibrillation  with rapid ventricular response   ECHO 6/19/22-  LVEF 60-65%.                              Normal left ventricular diastolic function.  Normal right ventricular size with normal right ventricular systolic function.  [facility] 6/20/22 -    Single vessel CAD (90% mid-distal LAD in-stent restenosis)  2.  Normal left sided filling pressure (LVEDP - 10mmHg)  3.  S/p successful angioplasty with scoring balloon (angiosculpt) of mid-distal LAD in-stent restenosis  4.  Deferred stent placement in the setting of GI bleed s/p blood transfusion.   [facility] 5/14/21 - There was two vessel coronary artery disease.                           The Mid LAD-2 lesion was 90% stenosed.  The Dist RCA-2 lesion was 50% stenosed.  The Dist RCA-1 lesion was 90% stenosed                          PCI to LAD and RCA     Lab Results   Component Value Date     06/30/2022    K 4.0 06/30/2022     06/30/2022    CO2 25 06/30/2022    BUN 20 (H) 06/30/2022    CREATININE 1.03 (H) 06/30/2022    CALCIUM 8.5 06/30/2022    ANIONGAP 14 06/30/2022    ESTGFRAFRICA 59 (L) 12/20/2021    EGFRNONAA 58 (L) 06/30/2022       Lab Results   Component Value Date    CHOL 122  "06/18/2022    CHOL 176 05/10/2022    CHOL 143 06/16/2021     Lab Results   Component Value Date    HDL 52 06/18/2022    HDL 75 (H) 05/10/2022    HDL 61 (H) 06/16/2021     Lab Results   Component Value Date    LDLCALC 41 06/18/2022    LDLCALC 85 05/10/2022    LDLCALC 64 06/16/2021     Lab Results   Component Value Date    TRIG 143 06/18/2022    TRIG 81 05/10/2022    TRIG 90 06/16/2021     Lab Results   Component Value Date    CHOLHDL 2.3 06/18/2022    CHOLHDL 2.3 05/10/2022    CHOLHDL 2.3 06/16/2021       Lab Results   Component Value Date    WBC 8.27 07/11/2022    HGB 10.6 (L) 07/11/2022    HCT 34.7 (L) 07/11/2022    MCV 84.0 07/11/2022     (H) 07/11/2022           Review of Systems   Respiratory:  Negative for cough and shortness of breath.    Cardiovascular:  Positive for chest pain and leg swelling. Negative for palpitations, orthopnea, claudication and PND.       Objective:   /70   Pulse 64   Resp 18   Ht 5' 1" (1.549 m)   Wt 93.9 kg (207 lb)   LMP  (LMP Unknown)   BMI 39.11 kg/m²     Physical Exam  Constitutional:       General: She is not in acute distress.  Eyes:      Extraocular Movements: Extraocular movements intact.   Cardiovascular:      Rate and Rhythm: Normal rate and regular rhythm.      Pulses: Normal pulses.      Heart sounds: Normal heart sounds. No murmur heard.  Pulmonary:      Effort: Pulmonary effort is normal.      Breath sounds: Normal breath sounds.   Abdominal:      Palpations: Abdomen is soft.   Musculoskeletal:         General: No swelling.      Cervical back: Neck supple.   Skin:     Findings: No rash.   Neurological:      Mental Status: She is alert and oriented to person, place, and time.         Assessment:     1. Atrial fibrillation, unspecified type  Basic Metabolic Panel    Magnesium    Vitamin D    Ambulatory referral/consult to Electrophysiology      2. Atherosclerosis of native coronary artery of native heart without angina pectoris        3. Atrial " fibrillation with rapid ventricular response        4. Pain in both lower extremities              Plan:   Atherosclerosis of native coronary artery of native heart without angina pectoris  NSTEMI, status post PCI of LAD and RCA 5/14/21 followed by ISR with POBA in 5/2022  PCI to distal RCA, however stent at had diffuse disease with small PDA which is diffusely disease.  - has atypical CP   - Switch to metop xl 25mg qd  - Continue plavix    Atrial fibrillation with rapid ventricular response  Hx of paroxysmal afib - currently in sinus  Hx of GIB (hx of colon Ca s/p hemicolectomy) on AC  Off AC  Will refer to Dr. Ordonez for Watchman    Pain in both lower extremities  Bilateral lower ext pain radiating from hip to the calves  Likely neuropathic  Defer to PCP

## 2022-08-30 ENCOUNTER — OFFICE VISIT (OUTPATIENT)
Dept: CARDIOLOGY | Facility: CLINIC | Age: 62
End: 2022-08-30
Payer: COMMERCIAL

## 2022-08-30 VITALS
SYSTOLIC BLOOD PRESSURE: 138 MMHG | HEIGHT: 61 IN | HEART RATE: 64 BPM | DIASTOLIC BLOOD PRESSURE: 70 MMHG | RESPIRATION RATE: 18 BRPM | BODY MASS INDEX: 39.08 KG/M2 | WEIGHT: 207 LBS

## 2022-08-30 DIAGNOSIS — I48.91 ATRIAL FIBRILLATION, UNSPECIFIED TYPE: Primary | ICD-10-CM

## 2022-08-30 DIAGNOSIS — M79.605 PAIN IN BOTH LOWER EXTREMITIES: ICD-10-CM

## 2022-08-30 DIAGNOSIS — I25.10 ATHEROSCLEROSIS OF NATIVE CORONARY ARTERY OF NATIVE HEART WITHOUT ANGINA PECTORIS: ICD-10-CM

## 2022-08-30 DIAGNOSIS — I10 HYPERTENSION, UNSPECIFIED TYPE: Primary | ICD-10-CM

## 2022-08-30 DIAGNOSIS — I48.91 ATRIAL FIBRILLATION WITH RAPID VENTRICULAR RESPONSE: Chronic | ICD-10-CM

## 2022-08-30 DIAGNOSIS — M79.604 PAIN IN BOTH LOWER EXTREMITIES: ICD-10-CM

## 2022-08-30 DIAGNOSIS — I10 HYPERTENSION, UNSPECIFIED TYPE: ICD-10-CM

## 2022-08-30 PROCEDURE — 3052F PR MOST RECENT HEMOGLOBIN A1C LEVEL 8.0 - < 9.0%: ICD-10-PCS | Mod: ,,, | Performed by: STUDENT IN AN ORGANIZED HEALTH CARE EDUCATION/TRAINING PROGRAM

## 2022-08-30 PROCEDURE — 3066F PR DOCUMENTATION OF TREATMENT FOR NEPHROPATHY: ICD-10-PCS | Mod: ,,, | Performed by: STUDENT IN AN ORGANIZED HEALTH CARE EDUCATION/TRAINING PROGRAM

## 2022-08-30 PROCEDURE — 3008F BODY MASS INDEX DOCD: CPT | Mod: ,,, | Performed by: STUDENT IN AN ORGANIZED HEALTH CARE EDUCATION/TRAINING PROGRAM

## 2022-08-30 PROCEDURE — 3078F PR MOST RECENT DIASTOLIC BLOOD PRESSURE < 80 MM HG: ICD-10-PCS | Mod: ,,, | Performed by: STUDENT IN AN ORGANIZED HEALTH CARE EDUCATION/TRAINING PROGRAM

## 2022-08-30 PROCEDURE — 3060F POS MICROALBUMINURIA REV: CPT | Mod: ,,, | Performed by: STUDENT IN AN ORGANIZED HEALTH CARE EDUCATION/TRAINING PROGRAM

## 2022-08-30 PROCEDURE — 4010F PR ACE/ARB THEARPY RXD/TAKEN: ICD-10-PCS | Mod: ,,, | Performed by: STUDENT IN AN ORGANIZED HEALTH CARE EDUCATION/TRAINING PROGRAM

## 2022-08-30 PROCEDURE — 99214 PR OFFICE/OUTPT VISIT, EST, LEVL IV, 30-39 MIN: ICD-10-PCS | Mod: S$PBB,,, | Performed by: STUDENT IN AN ORGANIZED HEALTH CARE EDUCATION/TRAINING PROGRAM

## 2022-08-30 PROCEDURE — 99215 OFFICE O/P EST HI 40 MIN: CPT | Mod: PBBFAC | Performed by: STUDENT IN AN ORGANIZED HEALTH CARE EDUCATION/TRAINING PROGRAM

## 2022-08-30 PROCEDURE — 1159F PR MEDICATION LIST DOCUMENTED IN MEDICAL RECORD: ICD-10-PCS | Mod: ,,, | Performed by: STUDENT IN AN ORGANIZED HEALTH CARE EDUCATION/TRAINING PROGRAM

## 2022-08-30 PROCEDURE — 4010F ACE/ARB THERAPY RXD/TAKEN: CPT | Mod: ,,, | Performed by: STUDENT IN AN ORGANIZED HEALTH CARE EDUCATION/TRAINING PROGRAM

## 2022-08-30 PROCEDURE — 3008F PR BODY MASS INDEX (BMI) DOCUMENTED: ICD-10-PCS | Mod: ,,, | Performed by: STUDENT IN AN ORGANIZED HEALTH CARE EDUCATION/TRAINING PROGRAM

## 2022-08-30 PROCEDURE — 3066F NEPHROPATHY DOC TX: CPT | Mod: ,,, | Performed by: STUDENT IN AN ORGANIZED HEALTH CARE EDUCATION/TRAINING PROGRAM

## 2022-08-30 PROCEDURE — 93005 ELECTROCARDIOGRAM TRACING: CPT | Mod: PBBFAC | Performed by: STUDENT IN AN ORGANIZED HEALTH CARE EDUCATION/TRAINING PROGRAM

## 2022-08-30 PROCEDURE — 3060F PR POS MICROALBUMINURIA RESULT DOCUMENTED/REVIEW: ICD-10-PCS | Mod: ,,, | Performed by: STUDENT IN AN ORGANIZED HEALTH CARE EDUCATION/TRAINING PROGRAM

## 2022-08-30 PROCEDURE — 93010 EKG 12-LEAD: ICD-10-PCS | Mod: S$PBB,,, | Performed by: STUDENT IN AN ORGANIZED HEALTH CARE EDUCATION/TRAINING PROGRAM

## 2022-08-30 PROCEDURE — 93010 ELECTROCARDIOGRAM REPORT: CPT | Mod: S$PBB,,, | Performed by: STUDENT IN AN ORGANIZED HEALTH CARE EDUCATION/TRAINING PROGRAM

## 2022-08-30 PROCEDURE — 3075F PR MOST RECENT SYSTOLIC BLOOD PRESS GE 130-139MM HG: ICD-10-PCS | Mod: ,,, | Performed by: STUDENT IN AN ORGANIZED HEALTH CARE EDUCATION/TRAINING PROGRAM

## 2022-08-30 PROCEDURE — 3078F DIAST BP <80 MM HG: CPT | Mod: ,,, | Performed by: STUDENT IN AN ORGANIZED HEALTH CARE EDUCATION/TRAINING PROGRAM

## 2022-08-30 PROCEDURE — 1159F MED LIST DOCD IN RCRD: CPT | Mod: ,,, | Performed by: STUDENT IN AN ORGANIZED HEALTH CARE EDUCATION/TRAINING PROGRAM

## 2022-08-30 PROCEDURE — 3052F HG A1C>EQUAL 8.0%<EQUAL 9.0%: CPT | Mod: ,,, | Performed by: STUDENT IN AN ORGANIZED HEALTH CARE EDUCATION/TRAINING PROGRAM

## 2022-08-30 PROCEDURE — 99214 OFFICE O/P EST MOD 30 MIN: CPT | Mod: S$PBB,,, | Performed by: STUDENT IN AN ORGANIZED HEALTH CARE EDUCATION/TRAINING PROGRAM

## 2022-08-30 PROCEDURE — 3075F SYST BP GE 130 - 139MM HG: CPT | Mod: ,,, | Performed by: STUDENT IN AN ORGANIZED HEALTH CARE EDUCATION/TRAINING PROGRAM

## 2022-08-30 RX ORDER — METOPROLOL SUCCINATE 25 MG/1
25 TABLET, EXTENDED RELEASE ORAL DAILY
Qty: 90 TABLET | Refills: 3 | OUTPATIENT
Start: 2022-08-30 | End: 2023-10-28

## 2022-08-30 NOTE — ASSESSMENT & PLAN NOTE
Hx of paroxysmal afib - currently in sinus  Hx of GIB (hx of colon Ca s/p hemicolectomy) on AC  Off AC  Will refer to Dr. Ordonez for Watchman

## 2022-08-30 NOTE — PATIENT INSTRUCTIONS
Metoprolol succinate 25 mg daily.   Discontinue metoprolol tartrate due to headaches.  Lab work today - vit D, magnesium,  BMP    Refer to Dr. Ordonez - hx of atrial fibrillation, for Watchman

## 2022-08-30 NOTE — ASSESSMENT & PLAN NOTE
NSTEMI, status post PCI of LAD and RCA 5/14/21 followed by ISR with POBA in 5/2022  PCI to distal RCA, however stent at had diffuse disease with small PDA which is diffusely disease.  - has atypical CP   - Switch to metop xl 25mg qd  - Continue plavix

## 2022-08-31 ENCOUNTER — CLINICAL SUPPORT (OUTPATIENT)
Dept: FAMILY MEDICINE | Facility: CLINIC | Age: 62
End: 2022-08-31
Payer: COMMERCIAL

## 2022-08-31 VITALS — HEART RATE: 77 BPM | DIASTOLIC BLOOD PRESSURE: 82 MMHG | SYSTOLIC BLOOD PRESSURE: 176 MMHG | TEMPERATURE: 99 F

## 2022-08-31 NOTE — PROGRESS NOTES
Came in for a blood pressure check. It was elevated. Instructed to RTC in am to see her PCP. Denies SOB or CP stated she had Ferahem infusion at her oncologist clinic today. Went home slept and woke up with elevated blood sugar and elevated blood pressure. Not sure what she was premedicated with prior to Feraheme Infusion today. Estefani Cuevas ,EDMOND aware.

## 2022-09-01 ENCOUNTER — OFFICE VISIT (OUTPATIENT)
Dept: FAMILY MEDICINE | Facility: CLINIC | Age: 62
End: 2022-09-01
Payer: COMMERCIAL

## 2022-09-01 VITALS
HEART RATE: 67 BPM | BODY MASS INDEX: 39.08 KG/M2 | TEMPERATURE: 98 F | RESPIRATION RATE: 20 BRPM | OXYGEN SATURATION: 100 % | DIASTOLIC BLOOD PRESSURE: 69 MMHG | HEIGHT: 61 IN | SYSTOLIC BLOOD PRESSURE: 164 MMHG | WEIGHT: 207 LBS

## 2022-09-01 DIAGNOSIS — R51.9 NONINTRACTABLE HEADACHE, UNSPECIFIED CHRONICITY PATTERN, UNSPECIFIED HEADACHE TYPE: ICD-10-CM

## 2022-09-01 DIAGNOSIS — I10 PRIMARY HYPERTENSION: Chronic | ICD-10-CM

## 2022-09-01 DIAGNOSIS — E11.42 TYPE 2 DIABETES MELLITUS WITH DIABETIC POLYNEUROPATHY, WITH LONG-TERM CURRENT USE OF INSULIN: Primary | Chronic | ICD-10-CM

## 2022-09-01 DIAGNOSIS — R73.9 ELEVATED BLOOD SUGAR: ICD-10-CM

## 2022-09-01 DIAGNOSIS — Z79.4 TYPE 2 DIABETES MELLITUS WITH DIABETIC POLYNEUROPATHY, WITH LONG-TERM CURRENT USE OF INSULIN: Primary | Chronic | ICD-10-CM

## 2022-09-01 LAB — GLUCOSE SERPL-MCNC: 156 MG/DL (ref 70–110)

## 2022-09-01 PROCEDURE — 1160F RVW MEDS BY RX/DR IN RCRD: CPT | Mod: ,,, | Performed by: FAMILY MEDICINE

## 2022-09-01 PROCEDURE — 1160F PR REVIEW ALL MEDS BY PRESCRIBER/CLIN PHARMACIST DOCUMENTED: ICD-10-PCS | Mod: ,,, | Performed by: FAMILY MEDICINE

## 2022-09-01 PROCEDURE — 3060F PR POS MICROALBUMINURIA RESULT DOCUMENTED/REVIEW: ICD-10-PCS | Mod: ,,, | Performed by: FAMILY MEDICINE

## 2022-09-01 PROCEDURE — 3078F DIAST BP <80 MM HG: CPT | Mod: ,,, | Performed by: FAMILY MEDICINE

## 2022-09-01 PROCEDURE — 3008F PR BODY MASS INDEX (BMI) DOCUMENTED: ICD-10-PCS | Mod: ,,, | Performed by: FAMILY MEDICINE

## 2022-09-01 PROCEDURE — 3060F POS MICROALBUMINURIA REV: CPT | Mod: ,,, | Performed by: FAMILY MEDICINE

## 2022-09-01 PROCEDURE — 99214 OFFICE O/P EST MOD 30 MIN: CPT | Mod: 25,,, | Performed by: FAMILY MEDICINE

## 2022-09-01 PROCEDURE — 3008F BODY MASS INDEX DOCD: CPT | Mod: ,,, | Performed by: FAMILY MEDICINE

## 2022-09-01 PROCEDURE — 3066F NEPHROPATHY DOC TX: CPT | Mod: ,,, | Performed by: FAMILY MEDICINE

## 2022-09-01 PROCEDURE — 3077F PR MOST RECENT SYSTOLIC BLOOD PRESSURE >= 140 MM HG: ICD-10-PCS | Mod: ,,, | Performed by: FAMILY MEDICINE

## 2022-09-01 PROCEDURE — 96372 THER/PROPH/DIAG INJ SC/IM: CPT | Mod: ,,, | Performed by: FAMILY MEDICINE

## 2022-09-01 PROCEDURE — 3077F SYST BP >= 140 MM HG: CPT | Mod: ,,, | Performed by: FAMILY MEDICINE

## 2022-09-01 PROCEDURE — 4010F PR ACE/ARB THEARPY RXD/TAKEN: ICD-10-PCS | Mod: ,,, | Performed by: FAMILY MEDICINE

## 2022-09-01 PROCEDURE — 3078F PR MOST RECENT DIASTOLIC BLOOD PRESSURE < 80 MM HG: ICD-10-PCS | Mod: ,,, | Performed by: FAMILY MEDICINE

## 2022-09-01 PROCEDURE — 3051F PR MOST RECENT HEMOGLOBIN A1C LEVEL 7.0 - < 8.0%: ICD-10-PCS | Mod: ,,, | Performed by: FAMILY MEDICINE

## 2022-09-01 PROCEDURE — 99214 PR OFFICE/OUTPT VISIT, EST, LEVL IV, 30-39 MIN: ICD-10-PCS | Mod: 25,,, | Performed by: FAMILY MEDICINE

## 2022-09-01 PROCEDURE — 1159F PR MEDICATION LIST DOCUMENTED IN MEDICAL RECORD: ICD-10-PCS | Mod: ,,, | Performed by: FAMILY MEDICINE

## 2022-09-01 PROCEDURE — 4010F ACE/ARB THERAPY RXD/TAKEN: CPT | Mod: ,,, | Performed by: FAMILY MEDICINE

## 2022-09-01 PROCEDURE — 1159F MED LIST DOCD IN RCRD: CPT | Mod: ,,, | Performed by: FAMILY MEDICINE

## 2022-09-01 PROCEDURE — 96372 PR INJECTION,THERAP/PROPH/DIAG2ST, IM OR SUBCUT: ICD-10-PCS | Mod: ,,, | Performed by: FAMILY MEDICINE

## 2022-09-01 PROCEDURE — 3066F PR DOCUMENTATION OF TREATMENT FOR NEPHROPATHY: ICD-10-PCS | Mod: ,,, | Performed by: FAMILY MEDICINE

## 2022-09-01 PROCEDURE — 3051F HG A1C>EQUAL 7.0%<8.0%: CPT | Mod: ,,, | Performed by: FAMILY MEDICINE

## 2022-09-01 RX ORDER — INSULIN DEGLUDEC 100 U/ML
24 INJECTION, SOLUTION SUBCUTANEOUS DAILY
Qty: 3 PEN | Refills: 1 | Status: SHIPPED | OUTPATIENT
Start: 2022-09-01

## 2022-09-01 RX ORDER — KETOROLAC TROMETHAMINE 30 MG/ML
30 INJECTION, SOLUTION INTRAMUSCULAR; INTRAVENOUS
Status: COMPLETED | OUTPATIENT
Start: 2022-09-01 | End: 2022-09-01

## 2022-09-01 RX ADMIN — KETOROLAC TROMETHAMINE 30 MG: 30 INJECTION, SOLUTION INTRAMUSCULAR; INTRAVENOUS at 09:09

## 2022-09-01 NOTE — LETTER
September 1, 2022    Dilma Cr  [address] [place] [address] [place] [address]         [facility] [address] [place] [address]  Phone: [phone]  Fax: [phone] September 1, 2022     Patient: Dilma Cr   YOB: 1960   Date of Visit: 9/1/2022       To Whom It May Concern:    It is my medical opinion that Dilma Cr may return to work on 09/02/2022 .    If you have any questions or concerns, please don't hesitate to call.    Sincerely,    Olga Potts MD

## 2022-09-01 NOTE — PROGRESS NOTES
New Clinic Note    Dilma Cr is a 62 y.o. female     CC:   Chief Complaint   Patient presents with    Hypertension     Stated she is having problems with elevated blood pressure and headache. Saw her Cardiologist Tuesday and started on new Rx Metoprolol ER 25 mg.     Leg Pain     Complains of bilateral posterior pain. Stated she had a Iron Infusion yesterday with Dr Jolanta Tobin yesterday and after she got home she had bad headache. Came in late yesterday afternoon and had elevated blood pressure and blood sugar. Was instructed to return this am to see her PCP.     Diabetes     Came into clinic yesterday with FSG of 397. Today it is 156.    Headache     Stated she did not sleep well last night because of frontal headache. Stated it started yesterday after her Iron infusion.         Subjective  Patient complains of headache since getting her iron infusion. She reports that her blood pressure has been elevated since her headache started. She reports that her blood sugar has been elevated. Patient can not tolerate Metformin due to diarrrhea. She can not tolerate ozempic due to nausea.     Presents to clinic for follow up on chronic health problems    Hypertension:    Takes medications as directed: yes  Checks blood pressure outside of clinic: yes  On a statin: no  Cardiovascular exercise: no  Heart healthy diet: no   Diabetes, type 2:    Checks glucose outside of clinic:yes  Keeps log of glucoses: yes  Eats a diabetic diet: no  Regular cardiovascular exercise: no  Monitors feet: yes  Most recent HbA1c values:   Hemoglobin A1C   Date Value Ref Range Status   09/07/2022 7.2 (H) 4.5 - 6.6 % Final     Comment:       Normal:               <5.7%  Pre-Diabetic:       5.7% to 6.4%  Diabetic:             >6.4%  Diabetic Goal:     <7%   06/15/2022 8.6 (H) 4.5 - 6.6 % Final     Comment:       Normal:               <5.7%  Pre-Diabetic:       5.7% to 6.4%  Diabetic:             >6.4%  Diabetic Goal:     <7%    12/20/2021 7.7 (H) 4.5 - 6.6 % Final     Comment:       Normal:               <5.7%  Pre-Diabetic:       5.7% to 6.4%  Diabetic:             >6.4%  Diabetic Goal:     <7%      Takes an aspirin: yes  On a statin: no       Current Outpatient Medications:     ascorbic acid, vitamin C, 250 mg Chew, Take 250 mg by mouth once daily., Disp: , Rfl:     aspirin 81 MG Chew, Take 81 mg by mouth once daily., Disp: , Rfl:     clopidogreL (PLAVIX) 75 mg tablet, Take 1 tablet (75 mg total) by mouth once daily., Disp: 90 tablet, Rfl: 1    cyanocobalamin (VITAMIN B-12) 1000 MCG tablet, Take 100 mcg by mouth once daily., Disp: , Rfl:     cyclobenzaprine (FLEXERIL) 5 MG tablet, Take 5 mg by mouth 3 (three) times daily as needed., Disp: , Rfl:     FREESTYLE BECCA 14 DAY SENSOR Kit, USE TO CHECK GLUCOSE 4 TIMES DAILY, Disp: , Rfl:     furosemide (LASIX) 20 MG tablet, Take 1 tablet (20 mg total) by mouth daily as needed (fluid)., Disp: 90 tablet, Rfl: 1    iron-vitamin C 100-250 mg, ICAR-C, 100-250 mg Tab, Take 1 tablet by mouth 2 (two) times daily with meals., Disp: , Rfl:     JARDIANCE 25 mg tablet, Take 1 tablet (25 mg total) by mouth once daily., Disp: 30 tablet, Rfl: 5    metoprolol succinate (TOPROL-XL) 25 MG 24 hr tablet, Take 1 tablet (25 mg total) by mouth once daily., Disp: 90 tablet, Rfl: 3    nitroGLYCERIN (NITROSTAT) 0.4 MG SL tablet, Place 1 tablet (0.4 mg total) under the tongue every 5 (five) minutes as needed for Chest pain., Disp: 30 tablet, Rfl: 0    NOVOLOG FLEXPEN U-100 INSULIN 100 unit/mL (3 mL) InPn pen, Inject 20 Units into the skin 2 (two) times a day., Disp: 12 each, Rfl: 1    olmesartan (BENICAR) 20 MG tablet, Take 1 tablet (20 mg total) by mouth once daily., Disp: 90 tablet, Rfl: 1    pantoprazole (PROTONIX) 40 MG tablet, Take 1 tablet (40 mg total) by mouth once daily., Disp: 90 tablet, Rfl: 1    rosuvastatin (CRESTOR) 40 MG Tab, Take 1 tablet by mouth once daily, Disp: 90 tablet, Rfl: 0    traMADoL  (ULTRAM) 50 mg tablet, Take 1 tablet (50 mg total) by mouth every 12 (twelve) hours as needed for Pain., Disp: 60 tablet, Rfl: 1    TRESIBA FLEXTOUCH U-100 100 unit/mL (3 mL) insulin pen, Inject 24 Units into the skin once daily., Disp: 3 pen, Rfl: 1  No current facility-administered medications for this visit.     Past Medical History:   Diagnosis Date    Acute superficial gastritis without hemorrhage 6/21/2022    Arthritis     Cancer     colon cancer    Chronic kidney disease, stage 3b     Colon cancer     Coronary artery disease     Depression     Diabetes mellitus, type 2     Diverticula, colon 6/22/2022    GERD (gastroesophageal reflux disease)     H/O right hemicolectomy 6/22/2022    HH (hiatus hernia) 6/21/2022    Hyperlipidemia     Myocardial infarction     2 stents in 05/14/2021 Dr Porter    Renal disorder         Family History   Problem Relation Age of Onset    Hypertension Mother     Diabetes Mother     Hypertension Father     Diabetes Father     Hypertension Sister     Breast cancer Sister     Hypertension Brother         Past Surgical History:   Procedure Laterality Date    BREAST SURGERY      C5-C6 RADHA  8-, 7-, 6-1-2016    Dr Fowler    CERVICAL SPINE SURGERY      COLON SURGERY      EPIDURAL STEROID INJECTION INTO LUMBAR SPINE N/A 3/29/2022    Procedure: L4-5 RADHA;  Surgeon: Gela Fowler MD;  Location: [facility] PAIN MGMT;  Service: Pain Management;  Laterality: N/A;  PT AWARE ON OV TO BE TESTED\  PLAVIX TO BE HELD FOR 7 DAYS PRIOR TO PROCEDURE PER DR FOWLER AND DR PORTER  3-28  message left on  re: covid    HYSTERECTOMY      LEFT HEART CATHETERIZATION Left 5/14/2021    Procedure: Left heart cath;  Surgeon: Mateus Guan MD;  Location: [facility] CATH LAB;  Service: Cardiology;  Laterality: Left;    LEFT HEART CATHETERIZATION Left 6/20/2022    Procedure: Left heart cath;  Surgeon: Oliverio Bautista MD;  Location: [facility] CATH LAB;  Service: Cardiology;  Laterality: Left;     "MEDIPORT REMOVAL  10/14/2021    Dr Kraft    right rotator cuff repair      in [place]    TOTAL REDUCTION MAMMOPLASTY          Review of Systems   Constitutional:  Negative for fatigue and fever.   HENT:  Negative for ear pain, postnasal drip, rhinorrhea and sinus pressure/congestion.    Respiratory:  Negative for cough and shortness of breath.    Cardiovascular:  Negative for chest pain.   Gastrointestinal:  Negative for abdominal pain, diarrhea, nausea and vomiting.   Genitourinary:  Negative for dysuria.   Neurological:  Positive for headaches.      BP (!) 164/69 (BP Location: Right arm, Patient Position: Sitting, BP Method: Large (Automatic))   Pulse 67   Temp 97.5 °F (36.4 °C) (Oral)   Resp 20   Ht 5' 1" (1.549 m)   Wt 93.9 kg (207 lb)   LMP  (LMP Unknown)   SpO2 100%   BMI 39.11 kg/m²      Physical Exam  HENT:      Head: Normocephalic and atraumatic.      Mouth/Throat:      Pharynx: Oropharynx is clear.   Cardiovascular:      Rate and Rhythm: Normal rate and regular rhythm.   Pulmonary:      Effort: Pulmonary effort is normal.      Breath sounds: Normal breath sounds.   Neurological:      Mental Status: She is alert and oriented to person, place, and time.   Psychiatric:         Mood and Affect: Mood normal.         Behavior: Behavior normal.        Assessment and Plan      ICD-10-CM ICD-9-CM   1. Type 2 diabetes mellitus with diabetic polyneuropathy, with long-term current use of insulin  E11.42 250.60    Z79.4 357.2     V58.67   2. Elevated blood sugar  R73.9 790.29   3. Primary hypertension  I10 401.9   4. Nonintractable headache, unspecified chronicity pattern, unspecified headache type  R51.9 784.0        Problem List Items Addressed This Visit          Cardiac/Vascular    Hypertension (Chronic)       Endocrine    Type 2 diabetes mellitus with diabetic polyneuropathy, with long-term current use of insulin - Primary (Chronic)    Relevant Medications    TRESIBA FLEXTOUCH U-100 100 unit/mL (3 mL) " insulin pen     Other Visit Diagnoses       Elevated blood sugar        Relevant Orders    POCT glucose (Completed)    Nonintractable headache, unspecified chronicity pattern, unspecified headache type        Relevant Medications    ketorolac injection 30 mg (Completed)           Will start Tresiba for blood sugar. Toradol for headache. She will monitor blood pressure at home. If not down after headache resolves, she will let us know. Will recheck in 1 week.    Patient Instructions   Check glucose outside of clinic, records numbers, and bring to follow up visit.   Take medications as directed.   Eat a diabetic diet  Cardiovascular exercise at least 3 times per week.       Follow up in about 1 week (around 9/8/2022).

## 2022-09-07 ENCOUNTER — OFFICE VISIT (OUTPATIENT)
Dept: FAMILY MEDICINE | Facility: CLINIC | Age: 62
End: 2022-09-07
Payer: COMMERCIAL

## 2022-09-07 VITALS
HEIGHT: 60 IN | WEIGHT: 207 LBS | RESPIRATION RATE: 18 BRPM | OXYGEN SATURATION: 100 % | BODY MASS INDEX: 40.64 KG/M2 | SYSTOLIC BLOOD PRESSURE: 154 MMHG | HEART RATE: 83 BPM | TEMPERATURE: 99 F | DIASTOLIC BLOOD PRESSURE: 72 MMHG

## 2022-09-07 DIAGNOSIS — Z00.01 ANNUAL VISIT FOR GENERAL ADULT MEDICAL EXAMINATION WITH ABNORMAL FINDINGS: Primary | ICD-10-CM

## 2022-09-07 DIAGNOSIS — Z13.1 SCREENING FOR DIABETES MELLITUS: ICD-10-CM

## 2022-09-07 DIAGNOSIS — Z79.4 TYPE 2 DIABETES MELLITUS WITH STABLE PROLIFERATIVE RETINOPATHY OF BOTH EYES, WITH LONG-TERM CURRENT USE OF INSULIN: ICD-10-CM

## 2022-09-07 DIAGNOSIS — E11.3553 TYPE 2 DIABETES MELLITUS WITH STABLE PROLIFERATIVE RETINOPATHY OF BOTH EYES, WITH LONG-TERM CURRENT USE OF INSULIN: ICD-10-CM

## 2022-09-07 DIAGNOSIS — Z13.220 SCREENING FOR LIPOID DISORDERS: ICD-10-CM

## 2022-09-07 DIAGNOSIS — I10 PRIMARY HYPERTENSION: Chronic | ICD-10-CM

## 2022-09-07 LAB
CHOLEST SERPL-MCNC: 163 MG/DL (ref 0–200)
CHOLEST/HDLC SERPL: 2.5 {RATIO}
EST. AVERAGE GLUCOSE BLD GHB EST-MCNC: 154 MG/DL
GLUCOSE SERPL-MCNC: 223 MG/DL (ref 74–106)
HBA1C MFR BLD HPLC: 7.2 % (ref 4.5–6.6)
HDLC SERPL-MCNC: 66 MG/DL (ref 40–60)
LDLC SERPL CALC-MCNC: 75 MG/DL
LDLC/HDLC SERPL: 1.1 {RATIO}
NONHDLC SERPL-MCNC: 97 MG/DL
TRIGL SERPL-MCNC: 111 MG/DL (ref 35–150)
VLDLC SERPL-MCNC: 22 MG/DL

## 2022-09-07 PROCEDURE — 1160F RVW MEDS BY RX/DR IN RCRD: CPT | Mod: ,,, | Performed by: FAMILY MEDICINE

## 2022-09-07 PROCEDURE — 3066F NEPHROPATHY DOC TX: CPT | Mod: ,,, | Performed by: FAMILY MEDICINE

## 2022-09-07 PROCEDURE — 90471 IMMUNIZATION ADMIN: CPT | Mod: ,,, | Performed by: FAMILY MEDICINE

## 2022-09-07 PROCEDURE — 3066F PR DOCUMENTATION OF TREATMENT FOR NEPHROPATHY: ICD-10-PCS | Mod: ,,, | Performed by: FAMILY MEDICINE

## 2022-09-07 PROCEDURE — 80061 LIPID PANEL: CPT | Mod: ,,, | Performed by: CLINICAL MEDICAL LABORATORY

## 2022-09-07 PROCEDURE — 83036 HEMOGLOBIN A1C: ICD-10-PCS | Mod: ,,, | Performed by: CLINICAL MEDICAL LABORATORY

## 2022-09-07 PROCEDURE — 1159F PR MEDICATION LIST DOCUMENTED IN MEDICAL RECORD: ICD-10-PCS | Mod: ,,, | Performed by: FAMILY MEDICINE

## 2022-09-07 PROCEDURE — 1159F MED LIST DOCD IN RCRD: CPT | Mod: ,,, | Performed by: FAMILY MEDICINE

## 2022-09-07 PROCEDURE — 3077F PR MOST RECENT SYSTOLIC BLOOD PRESSURE >= 140 MM HG: ICD-10-PCS | Mod: ,,, | Performed by: FAMILY MEDICINE

## 2022-09-07 PROCEDURE — 1160F PR REVIEW ALL MEDS BY PRESCRIBER/CLIN PHARMACIST DOCUMENTED: ICD-10-PCS | Mod: ,,, | Performed by: FAMILY MEDICINE

## 2022-09-07 PROCEDURE — 83036 HEMOGLOBIN GLYCOSYLATED A1C: CPT | Mod: ,,, | Performed by: CLINICAL MEDICAL LABORATORY

## 2022-09-07 PROCEDURE — 3051F PR MOST RECENT HEMOGLOBIN A1C LEVEL 7.0 - < 8.0%: ICD-10-PCS | Mod: ,,, | Performed by: FAMILY MEDICINE

## 2022-09-07 PROCEDURE — 90471 FLU VACCINE (QUAD) GREATER THAN OR EQUAL TO 3YO PRESERVATIVE FREE IM: ICD-10-PCS | Mod: ,,, | Performed by: FAMILY MEDICINE

## 2022-09-07 PROCEDURE — 3078F DIAST BP <80 MM HG: CPT | Mod: ,,, | Performed by: FAMILY MEDICINE

## 2022-09-07 PROCEDURE — 3008F PR BODY MASS INDEX (BMI) DOCUMENTED: ICD-10-PCS | Mod: ,,, | Performed by: FAMILY MEDICINE

## 2022-09-07 PROCEDURE — 90686 IIV4 VACC NO PRSV 0.5 ML IM: CPT | Mod: ,,, | Performed by: FAMILY MEDICINE

## 2022-09-07 PROCEDURE — 99396 PREV VISIT EST AGE 40-64: CPT | Mod: 25,,, | Performed by: FAMILY MEDICINE

## 2022-09-07 PROCEDURE — 4010F PR ACE/ARB THEARPY RXD/TAKEN: ICD-10-PCS | Mod: ,,, | Performed by: FAMILY MEDICINE

## 2022-09-07 PROCEDURE — 3008F BODY MASS INDEX DOCD: CPT | Mod: ,,, | Performed by: FAMILY MEDICINE

## 2022-09-07 PROCEDURE — 3078F PR MOST RECENT DIASTOLIC BLOOD PRESSURE < 80 MM HG: ICD-10-PCS | Mod: ,,, | Performed by: FAMILY MEDICINE

## 2022-09-07 PROCEDURE — 82947 ASSAY GLUCOSE BLOOD QUANT: CPT | Mod: ,,, | Performed by: CLINICAL MEDICAL LABORATORY

## 2022-09-07 PROCEDURE — 3051F HG A1C>EQUAL 7.0%<8.0%: CPT | Mod: ,,, | Performed by: FAMILY MEDICINE

## 2022-09-07 PROCEDURE — 3060F PR POS MICROALBUMINURIA RESULT DOCUMENTED/REVIEW: ICD-10-PCS | Mod: ,,, | Performed by: FAMILY MEDICINE

## 2022-09-07 PROCEDURE — 3060F POS MICROALBUMINURIA REV: CPT | Mod: ,,, | Performed by: FAMILY MEDICINE

## 2022-09-07 PROCEDURE — 90686 FLU VACCINE (QUAD) GREATER THAN OR EQUAL TO 3YO PRESERVATIVE FREE IM: ICD-10-PCS | Mod: ,,, | Performed by: FAMILY MEDICINE

## 2022-09-07 PROCEDURE — 4010F ACE/ARB THERAPY RXD/TAKEN: CPT | Mod: ,,, | Performed by: FAMILY MEDICINE

## 2022-09-07 PROCEDURE — 3077F SYST BP >= 140 MM HG: CPT | Mod: ,,, | Performed by: FAMILY MEDICINE

## 2022-09-07 PROCEDURE — 80061 LIPID PANEL: ICD-10-PCS | Mod: ,,, | Performed by: CLINICAL MEDICAL LABORATORY

## 2022-09-07 PROCEDURE — 99396 PR PREVENTIVE VISIT,EST,40-64: ICD-10-PCS | Mod: 25,,, | Performed by: FAMILY MEDICINE

## 2022-09-07 PROCEDURE — 82947 GLUCOSE, RANDOM: ICD-10-PCS | Mod: ,,, | Performed by: CLINICAL MEDICAL LABORATORY

## 2022-09-07 NOTE — PROGRESS NOTES
New Clinic Note    Dilma Cr is a 62 y.o. female     CC:   Chief Complaint   Patient presents with    Healthy You     Patient stated she is here for a [facility] You Visit.         Subjective  Patient is here for a [employer] Healthy You.     Presents to clinic for follow up on chronic health problems    Hypertension:    Takes medications as directed: yes  Checks blood pressure outside of clinic: yes  On a statin: yes  Cardiovascular exercise: no  Heart healthy diet: no   Diabetes, type 2:    Checks glucose outside of clinic:yes  Keeps log of glucoses: no  Eats a diabetic diet: no  Regular cardiovascular exercise: no  Monitors feet: yes  Most recent HbA1c values:   Hemoglobin A1C   Date Value Ref Range Status   09/07/2022 7.2 (H) 4.5 - 6.6 % Final     Comment:       Normal:               <5.7%  Pre-Diabetic:       5.7% to 6.4%  Diabetic:             >6.4%  Diabetic Goal:     <7%   06/15/2022 8.6 (H) 4.5 - 6.6 % Final     Comment:       Normal:               <5.7%  Pre-Diabetic:       5.7% to 6.4%  Diabetic:             >6.4%  Diabetic Goal:     <7%   12/20/2021 7.7 (H) 4.5 - 6.6 % Final     Comment:       Normal:               <5.7%  Pre-Diabetic:       5.7% to 6.4%  Diabetic:             >6.4%  Diabetic Goal:     <7%      Takes an aspirin: yes  On a statin: yes         Current Outpatient Medications:     ascorbic acid, vitamin C, 250 mg Chew, Take 250 mg by mouth once daily., Disp: , Rfl:     aspirin 81 MG Chew, Take 81 mg by mouth once daily., Disp: , Rfl:     clopidogreL (PLAVIX) 75 mg tablet, Take 1 tablet (75 mg total) by mouth once daily., Disp: 90 tablet, Rfl: 1    cyanocobalamin (VITAMIN B-12) 1000 MCG tablet, Take 100 mcg by mouth once daily., Disp: , Rfl:     cyclobenzaprine (FLEXERIL) 5 MG tablet, Take 5 mg by mouth 3 (three) times daily as needed., Disp: , Rfl:     FREESTYLE BECCA 14 DAY SENSOR Kit, USE TO CHECK GLUCOSE 4 TIMES DAILY, Disp: , Rfl:     furosemide (LASIX) 20 MG tablet, Take  1 tablet (20 mg total) by mouth daily as needed (fluid)., Disp: 90 tablet, Rfl: 1    iron-vitamin C 100-250 mg, ICAR-C, 100-250 mg Tab, Take 1 tablet by mouth 2 (two) times daily with meals., Disp: , Rfl:     JARDIANCE 25 mg tablet, Take 1 tablet (25 mg total) by mouth once daily., Disp: 30 tablet, Rfl: 5    metoprolol succinate (TOPROL-XL) 25 MG 24 hr tablet, Take 1 tablet (25 mg total) by mouth once daily., Disp: 90 tablet, Rfl: 3    nitroGLYCERIN (NITROSTAT) 0.4 MG SL tablet, Place 1 tablet (0.4 mg total) under the tongue every 5 (five) minutes as needed for Chest pain., Disp: 30 tablet, Rfl: 0    NOVOLOG FLEXPEN U-100 INSULIN 100 unit/mL (3 mL) InPn pen, Inject 20 Units into the skin 2 (two) times a day., Disp: 12 each, Rfl: 1    olmesartan (BENICAR) 20 MG tablet, Take 1 tablet (20 mg total) by mouth once daily., Disp: 90 tablet, Rfl: 1    pantoprazole (PROTONIX) 40 MG tablet, Take 1 tablet (40 mg total) by mouth once daily., Disp: 90 tablet, Rfl: 1    rosuvastatin (CRESTOR) 40 MG Tab, Take 1 tablet by mouth once daily, Disp: 90 tablet, Rfl: 0    traMADoL (ULTRAM) 50 mg tablet, Take 1 tablet (50 mg total) by mouth every 12 (twelve) hours as needed for Pain., Disp: 60 tablet, Rfl: 3    TRESIBA FLEXTOUCH U-100 100 unit/mL (3 mL) insulin pen, Inject 24 Units into the skin once daily., Disp: 3 pen, Rfl: 1     Past Medical History:   Diagnosis Date    Acute superficial gastritis without hemorrhage 6/21/2022    Arthritis     Cancer     colon cancer    Chronic kidney disease, stage 3b     Colon cancer     Coronary artery disease     Depression     Diabetes mellitus, type 2     Diverticula, colon 6/22/2022    GERD (gastroesophageal reflux disease)     H/O right hemicolectomy 6/22/2022    HH (hiatus hernia) 6/21/2022    Hyperlipidemia     Myocardial infarction     2 stents in 05/14/2021 Dr Lora    Renal disorder         Family History   Problem Relation Age of Onset    Hypertension Mother     Diabetes Mother      Hypertension Father     Diabetes Father     Hypertension Sister     Breast cancer Sister     Hypertension Brother         Past Surgical History:   Procedure Laterality Date    BREAST SURGERY      C5-C6 RADHA  8-, 7-, 6-1-2016    Dr Fowler    CERVICAL SPINE SURGERY      COLON SURGERY      EPIDURAL STEROID INJECTION INTO LUMBAR SPINE N/A 3/29/2022    Procedure: L4-5 RADHA;  Surgeon: Gela Fowler MD;  Location: [facility] PAIN [facility];  Service: Pain Management;  Laterality: N/A;  PT AWARE ON OV TO BE TESTED\  PLAVIX TO BE HELD FOR 7 DAYS PRIOR TO PROCEDURE PER DR FOWLER AND DR PORTER  3-28  message left on vm re: covid    HYSTERECTOMY      LEFT HEART CATHETERIZATION Left 5/14/2021    Procedure: Left heart cath;  Surgeon: Mateus Guan MD;  Location: [facility] CATH LAB;  Service: Cardiology;  Laterality: Left;    LEFT HEART CATHETERIZATION Left 6/20/2022    Procedure: Left heart cath;  Surgeon: Oliverio Bautista MD;  Location: [facility] CATH LAB;  Service: Cardiology;  Laterality: Left;    MEDIPORT REMOVAL  10/14/2021    Dr Kraft    right rotator cuff repair      in [place]    TOTAL REDUCTION MAMMOPLASTY          Review of Systems   Constitutional:  Negative for fatigue and fever.   HENT:  Negative for ear pain, postnasal drip, rhinorrhea and sinus pressure/congestion.    Respiratory:  Negative for cough and shortness of breath.    Cardiovascular:  Negative for chest pain.   Gastrointestinal:  Negative for abdominal pain, diarrhea, nausea and vomiting.   Genitourinary:  Negative for dysuria.   Neurological:  Negative for headaches.      BP (!) 154/72 (BP Location: Left arm, Patient Position: Sitting, BP Method: Large (Manual))   Pulse 83   Temp 99.3 °F (37.4 °C) (Oral)   Resp 18   Ht 5' (1.524 m)   Wt 93.9 kg (207 lb)   LMP  (LMP Unknown)   SpO2 100%   BMI 40.43 kg/m²      Physical Exam  HENT:      Head: Normocephalic and atraumatic.   Cardiovascular:      Rate and Rhythm: Normal rate and regular  rhythm.   Pulmonary:      Effort: Pulmonary effort is normal.      Breath sounds: Normal breath sounds.   Neurological:      Mental Status: She is alert and oriented to person, place, and time.   Psychiatric:         Mood and Affect: Mood normal.         Behavior: Behavior normal.        Assessment and Plan      ICD-10-CM ICD-9-CM   1. Annual visit for general adult medical examination with abnormal findings  Z00.01 V70.0   2. Screening for diabetes mellitus  Z13.1 V77.1   3. Screening for lipoid disorders  Z13.220 V77.91   4. Type 2 diabetes mellitus with stable proliferative retinopathy of both eyes, with long-term current use of insulin  E11.3553 250.50    Z79.4 362.02     V58.67   5. Primary hypertension  I10 401.9        Problem List Items Addressed This Visit          Cardiac/Vascular    Hypertension (Chronic)       Endocrine    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)    Relevant Orders    Hemoglobin A1C (Completed)     Other Visit Diagnoses       Annual visit for general adult medical examination with abnormal findings    -  Primary    Screening for diabetes mellitus        Relevant Orders    Glucose, Random (Completed)    Hemoglobin A1C (Completed)    Screening for lipoid disorders        Relevant Orders    Lipid Panel (Completed)           Blood pressure is elevated. She will return in 1 month with a blood pressure log.     Patient Instructions   Patient Education       Type 2 Diabetes   The Basics   Written by the doctors and editors at [facility]   What is type 2 diabetes? -- Type 2 diabetes is a disorder that disrupts the way your body uses sugar. It is sometimes called type 2 diabetes mellitus.  All the cells in your body need sugar to work normally. Sugar gets into the cells with the help of a hormone called insulin. Insulin is made by the pancreas, an organ in the belly. If there is not enough insulin, or if your body stops responding to insulin, sugar builds up in the blood.  "That is what happens to people with diabetes.  There are two different types of diabetes:   Type 1 diabetes - In type 1 diabetes, the pancreas does not make insulin or makes very little insulin.  Type 2 diabetes - In most people with type 2 diabetes, the body stops responding to insulin normally. Then, over time, the pancreas stops making enough insulin.   Being overweight or obese increases a person's risk of developing type 2 diabetes. But people who are not overweight can get diabetes, too.  What are the symptoms of type 2 diabetes? -- Type 2 diabetes usually causes no symptoms. When symptoms do occur, they include:  Needing to urinate often  Intense thirst  Blurry vision  Can diabetes lead to other health problems? -- Yes. Type 2 diabetes might not make you feel sick. But if it is not managed, it can lead to serious problems over time, such as:  Heart attacks  Strokes  Kidney disease  Vision problems (or even blindness)  Pain or loss of feeling in the hands and feet  Needing to have fingers, toes, or other body parts removed (amputated)  How do I know if I have type 2 diabetes? -- To find out if you have type 2 diabetes, your doctor or nurse can do a blood test. There are 2 tests that can be used for this. Both involve measuring the amount of sugar in your blood, called your "blood sugar" or "blood glucose":   One of the tests measures your blood sugar at the time the blood sample is taken. This test is done in the morning. You can't eat or drink anything except water for at least 8 hours before the test.   The other test shows what your average blood sugar has been for the past 2 to 3 months. This blood test is called "hemoglobin A1C" or just "A1C." It can be checked at any time of the day, even if you have recently eaten.  How is type 2 diabetes treated? -- The goals of treatment are to control your blood sugar and lower the risk of future problems that can happen in people with diabetes. An important part of " managing diabetes is to eat healthy foods and get plenty of physical activity.  There are a few medicines that help control blood sugar. Some people need to take pills that help the body make more insulin or that help insulin do its job. Others need insulin shots.  Depending on what medicines you take, you might need to check your blood sugar regularly at home. But not everyone with type 2 diabetes needs to do this. Your doctor or nurse will tell you if you should be checking your blood sugar, and when and how to do this.  Sometimes, people with type 2 diabetes also need medicines to reduce the problems caused by the disease. For instance, medicines used to lower blood pressure can reduce the chances of a heart attack or stroke.  It's also important to get certain vaccines, such as vaccines to protect against the flu and coronavirus disease 2019 (COVID-19). Some people also need a vaccine to prevent pneumonia.  Can type 2 diabetes be prevented? -- Yes. To lower your chances of getting type 2 diabetes, the most important thing you can do is eat a healthy diet and get plenty of physical activity. This can help you lose weight if you are overweight. But eating well and being active are also good for your overall health. Even gentle activity, like walking, has benefits.  If you smoke, quitting can also lower your risk of type 2 diabetes. Quitting smoking can be hard to do, but your doctor or nurse can help.  All topics are updated as new evidence becomes available and our peer review process is complete.  This topic retrieved from [employer] on: Sep 21, 2021.  Topic 02278 Version 14.0  Release: 29.4.2 - C29.263  © 2021 UpToDate, Inc. and/or its affiliates. All rights reserved.  Consumer Information Use and Disclaimer   This information is not specific medical advice and does not replace information you receive from your health care provider. This is only a brief summary of general information. It does NOT include all  "information about conditions, illnesses, injuries, tests, procedures, treatments, therapies, discharge instructions or life-style choices that may apply to you. You must talk with your health care provider for complete information about your health and treatment options. This information should not be used to decide whether or not to accept your health care provider's advice, instructions or recommendations. Only your health care provider has the knowledge and training to provide advice that is right for you. The use of this information is governed by the [employer] End User License Agreement, available at [email or web address]://[email or web address].The use of [employer] content is governed by the [employer] Terms of Use. ©2021 UpToDate, Inc. All rights reserved.  Copyright   © 2021 UpToDate, Inc. and/or its affiliates. All rights reserved.    Patient Education       High Blood Pressure in Adults   The Basics   Written by the doctors and editors at [facility]   What is high blood pressure? -- High blood pressure is a condition that puts you at risk for heart attack, stroke, and kidney disease. It does not usually cause symptoms. But it can be serious.  When your doctor or nurse tells you your blood pressure, they will say 2 numbers. For instance, your doctor or nurse might say that your blood pressure is "130 over 80." The top number is the pressure inside your arteries when your heart is bipin. The bottom number is the pressure inside your arteries when your heart is relaxed.  "Elevated blood pressure" is a term doctors or nurses use as a warning. People with elevated blood pressure do not yet have high blood pressure. But their blood pressure is not as low as it should be for good health.  Many experts define high, elevated, and normal blood pressure as follows:  High - Top number of 130 or above and/or bottom number of 80 or above  Elevated - Top number between 120 and 129 and bottom number of 79 " "or below  Normal - Top number of 119 or below and bottom number of 79 or below  This information is also in the table (table 1).   How can I lower my blood pressure? -- If your doctor or nurse has prescribed blood pressure medicine, the most important thing you can do is to take it. If it causes side effects, do not just stop taking it. Instead, talk to your doctor or nurse about the problems it causes. They might be able to lower your dose or switch you to another medicine. If cost is a problem, mention that too. They might be able to put you on a less expensive medicine. Taking your blood pressure medicine can keep you from having a heart attack or stroke, and it can save your life!  Can I do anything on my own? -- You have a lot of control over your blood pressure. To lower it:  Lose weight (if you are overweight)  Choose a diet low in fat and rich in fruits, vegetables, and low-fat dairy products  Reduce the amount of salt you eat  Do something active for at least 30 minutes a day on most days of the week  Cut down on alcohol (if you drink more than 2 alcoholic drinks per day)  It's also a good idea to get a home blood pressure meter. People who check their own blood pressure at home do better at keeping it low and can sometimes even reduce the amount of medicine they take.  All topics are updated as new evidence becomes available and our peer review process is complete.  This topic retrieved from [employer] on: Sep 21, 2021.  Topic 52342 Version 15.0  Release: 29.4.2 - C29.263  © 2021 UpToDate, Inc. and/or its affiliates. All rights reserved.  table 1: Definition of normal and high blood pressure  Level  Top number  Bottom number    High 130 or above 80 or above   Elevated 120 to 129 79 or below   Normal 119 or below 79 or below   These definitions are from the American College of Cardiology/American Heart Association. Other expert groups might use slightly different definitions.  "Elevated blood pressure" is a " "term doctor or nurses use as a warning. It means you do not yet have high blood pressure, but your blood pressure is not as low as it should be for good health.  Graphic 16222 Version 6.0  Consumer Information Use and Disclaimer   This information is not specific medical advice and does not replace information you receive from your health care provider. This is only a brief summary of general information. It does NOT include all information about conditions, illnesses, injuries, tests, procedures, treatments, therapies, discharge instructions or life-style choices that may apply to you. You must talk with your health care provider for complete information about your health and treatment options. This information should not be used to decide whether or not to accept your health care provider's advice, instructions or recommendations. Only your health care provider has the knowledge and training to provide advice that is right for you. The use of this information is governed by the [employer] End User License Agreement, available at https://www.[employer]/en/solutions/[employer]/about/shawna.The use of [employer] content is governed by the [employer] Terms of Use. ©2021 UpToDate, Inc. All rights reserved.  Copyright   © 2021 UpToDate, Inc. and/or its affiliates. All rights reserved.    Patient Education       High Blood Pressure in Adults   The Basics   Written by the doctors and editors at [employer]   What is high blood pressure? -- High blood pressure is a condition that puts you at risk for heart attack, stroke, and kidney disease. It does not usually cause symptoms. But it can be serious.  When your doctor or nurse tells you your blood pressure, they will say 2 numbers. For instance, your doctor or nurse might say that your blood pressure is "130 over 80." The top number is the pressure inside your arteries when your heart is bipin. The bottom number is the pressure inside your arteries when your heart is " "relaxed.  "Elevated blood pressure" is a term doctors or nurses use as a warning. People with elevated blood pressure do not yet have high blood pressure. But their blood pressure is not as low as it should be for good health.  Many experts define high, elevated, and normal blood pressure as follows:  High - Top number of 130 or above and/or bottom number of 80 or above  Elevated - Top number between 120 and 129 and bottom number of 79 or below  Normal - Top number of 119 or below and bottom number of 79 or below  This information is also in the table (table 1).   How can I lower my blood pressure? -- If your doctor or nurse has prescribed blood pressure medicine, the most important thing you can do is to take it. If it causes side effects, do not just stop taking it. Instead, talk to your doctor or nurse about the problems it causes. They might be able to lower your dose or switch you to another medicine. If cost is a problem, mention that too. They might be able to put you on a less expensive medicine. Taking your blood pressure medicine can keep you from having a heart attack or stroke, and it can save your life!  Can I do anything on my own? -- You have a lot of control over your blood pressure. To lower it:  Lose weight (if you are overweight)  Choose a diet low in fat and rich in fruits, vegetables, and low-fat dairy products  Reduce the amount of salt you eat  Do something active for at least 30 minutes a day on most days of the week  Cut down on alcohol (if you drink more than 2 alcoholic drinks per day)  It's also a good idea to get a home blood pressure meter. People who check their own blood pressure at home do better at keeping it low and can sometimes even reduce the amount of medicine they take.  All topics are updated as new evidence becomes available and our peer review process is complete.  This topic retrieved from [employer] on: Sep 21, 2021.  Topic 82789 Version 15.0  Release: 29.4.2 - C29.263  © " "2021 UpToDate, Inc. and/or its affiliates. All rights reserved.  table 1: Definition of normal and high blood pressure  Level  Top number  Bottom number    High 130 or above 80 or above   Elevated 120 to 129 79 or below   Normal 119 or below 79 or below   These definitions are from the American College of Cardiology/American Heart Association. Other expert groups might use slightly different definitions.  "Elevated blood pressure" is a term doctor or nurses use as a warning. It means you do not yet have high blood pressure, but your blood pressure is not as low as it should be for good health.  Graphic 03482 Version 6.0  Consumer Information Use and Disclaimer   This information is not specific medical advice and does not replace information you receive from your health care provider. This is only a brief summary of general information. It does NOT include all information about conditions, illnesses, injuries, tests, procedures, treatments, therapies, discharge instructions or life-style choices that may apply to you. You must talk with your health care provider for complete information about your health and treatment options. This information should not be used to decide whether or not to accept your health care provider's advice, instructions or recommendations. Only your health care provider has the knowledge and training to provide advice that is right for you. The use of this information is governed by the [employer] End User License Agreement, available at https://www.[employer]/en/solutions/[employer]/about/shawna.The use of [employer] content is governed by the [employer] Terms of Use. ©2021 UpToDate, Inc. All rights reserved.  Copyright   © 2021 UpToDate, Inc. and/or its affiliates. All rights reserved.    Patient Education       Diet and Health   The Basics   Written by the doctors and editors at [employer]   Why is it important to eat a healthy diet? -- It's important to eat a healthy diet because eating the " "right foods can keep you healthy now and later on in life.  Which foods are especially healthy? -- Foods that are especially healthy include:  Fruits and vegetables - Eating a diet with lots of fruits and vegetables can help prevent heart disease and strokes. It might also help prevent certain types of cancers. Try to eat fruits and vegetables at each meal and also for snacks. If you don't have fresh fruits and vegetables available, you can eat frozen or canned ones instead. Doctors recommend eating at least 2 1/2 servings of vegetables and 2 servings of fruits each day.  Foods with fiber - Eating foods with a lot of fiber can help prevent heart disease and strokes. If you have type 2 diabetes, it can also help control your blood sugar. Foods that have a lot of fiber include vegetables, fruits, beans, nuts, oatmeal, and whole grain breads and cereals. You can tell how much fiber is in a food by reading the nutrition label (figure 1). Doctors recommend eating 25 to 36 grams of fiber each day.  Foods with folate (also called folic acid) - Folate is a vitamin that is important for pregnant people, since it helps prevent certain birth defects. Anyone who could get pregnant should get at least 400 micrograms of folic acid daily, whether or not they are actively trying to get pregnant. Folate is found in many breakfast cereals, oranges, orange juice, and green leafy vegetables.  Foods with calcium and vitamin D - Babies, children, and adults need calcium and vitamin D to help keep their bones strong. Adults also need calcium and vitamin D to help prevent osteoporosis. Osteoporosis is a condition that causes bones to get "thin" and break more easily than usual. Different foods and drinks have calcium and vitamin D in them (figure 2). People who don't get enough calcium and vitamin D in their diet might need to take a supplement.  Foods with protein - Protein helps your muscles stay strong. Healthy foods with a lot of " "protein include chicken, fish, eggs, beans, nuts, and soy products. Red meat also has a lot of protein, but it also contains fats, which can be unhealthy.  Some experts recommend a "Mediterranean diet." This involves eating a lot of fruits, vegetables, nuts, whole grains, and olive oil. It also includes some fish, poultry, and dairy products, but not a lot of red meat. Eating this way can help your overall health, and might even lower your risk of having a stroke.  What foods should I avoid or limit? -- To eat a healthy diet, there are some things you should avoid or limit. They include:   Fats - There are different types of fats. Some types of fats are better for your body than others.  Trans fats are especially unhealthy. They are found in margarines, many fast foods, and some store-bought baked goods. Trans fats can raise your cholesterol level and your increase your chance of getting heart disease. You should avoid eating foods with these types of fats.  The type of "polyunsaturated" fats found in fish seems to be healthy and can reduce your chance of getting heart disease. Other polyunsaturated fats might also be good for your health. When you cook, it's best to use oils with healthier fats, such as olive oil and canola oil.  Sugar - To have a healthy diet, it's important to limit or avoid sugar, sweets, and refined grains. Refined grains are found in white bread, white rice, most forms of pasta, and most packaged "snack" foods. Whole grains, such as whole-wheat bread and brown rice, have more fiber and are better for your health.  Avoiding sugar-sweetened beverages, like soda and sports drinks, can also help improve your health.  Red meat - Studies have shown that eating a lot of red meat can increase your risk of certain health problems, including heart disease and cancer. You should limit the amount of red meat that you eat.  Can I drink alcohol as part of a healthy diet? -- People who drink a small amount " "of alcohol each day might have a lower chance of getting heart disease. But drinking alcohol can lead to problems. For example, it can raise a person's chances of getting liver disease and certain types of cancers. In women, even 1 drink a day can increase the risk of getting breast cancer.  Most doctors recommend that adult women not have more than 1 drink a day and that adult men not have more than 2 drinks a day. The limits are different because most women's bodies take longer to break down alcohol.  How many calories do I need each day? -- The number of calories you need each day depends on your weight, height, age, sex, and how active you are.  Your doctor or nurse can tell you how many calories you should eat each day. If you are trying to lose weight, you should eat fewer calories each day.  What if I have questions? -- If you have questions about which foods you should or should not eat, ask your doctor or nurse. The right diet for you will depend, in part, on your health and any medical conditions you have.  All topics are updated as new evidence becomes available and our peer review process is complete.  This topic retrieved from [employer] on: Sep 21, 2021.  Topic 06759 Version 20.0  Release: 29.4.2 - C29.263  © 2021 [employer] and/or its affiliates. All rights reserved.  figure 1: Nutrition label - fiber     This is an example of a nutrition label. To figure out how much fiber is in a food, look for the line that says "Dietary Fiber." It's also important to look at the serving size. This food has 7 grams of fiber in each serving, and each serving is 1 cup.  Graphic 20526 Version 7.0    figure 2: Foods and drinks with calcium and vitamin D     Foods rich in calcium include ice cream, soy milk, breads, kale, broccoli, milk, cheese, cottage cheese, almonds, yogurt, ready-to-eat cereals, beans, and tofu. Foods rich in vitamin D include milk, fortified plant-based "milks" (soy, almond), canned tuna fish, " cod liver oil, yogurt, ready-to-eat-cereals, cooked salmon, canned sardines, mackerel, and eggs. Some of these foods are rich in both.  Graphic 59529 Version 4.0    Consumer Information Use and Disclaimer   This information is not specific medical advice and does not replace information you receive from your health care provider. This is only a brief summary of general information. It does NOT include all information about conditions, illnesses, injuries, tests, procedures, treatments, therapies, discharge instructions or life-style choices that may apply to you. You must talk with your health care provider for complete information about your health and treatment options. This information should not be used to decide whether or not to accept your health care provider's advice, instructions or recommendations. Only your health care provider has the knowledge and training to provide advice that is right for you. The use of this information is governed by the [employer] End User License Agreement, available at [email or web address].The use of [employer] content is governed by the [employer] Terms of Use. ©2021 [employer]Date, Inc. All rights reserved.  Copyright   © 2021 [employer], [employer] and/or its affiliates. All rights reserved.        Follow up in 3 months (on 12/7/2022).

## 2022-09-07 NOTE — PATIENT INSTRUCTIONS
"Patient Education       Type 2 Diabetes   The Basics   Written by the doctors and editors at [facility]   What is type 2 diabetes? -- Type 2 diabetes is a disorder that disrupts the way your body uses sugar. It is sometimes called type 2 diabetes mellitus.  All the cells in your body need sugar to work normally. Sugar gets into the cells with the help of a hormone called insulin. Insulin is made by the pancreas, an organ in the belly. If there is not enough insulin, or if your body stops responding to insulin, sugar builds up in the blood. That is what happens to people with diabetes.  There are two different types of diabetes:   Type 1 diabetes - In type 1 diabetes, the pancreas does not make insulin or makes very little insulin.  Type 2 diabetes - In most people with type 2 diabetes, the body stops responding to insulin normally. Then, over time, the pancreas stops making enough insulin.   Being overweight or obese increases a person's risk of developing type 2 diabetes. But people who are not overweight can get diabetes, too.  What are the symptoms of type 2 diabetes? -- Type 2 diabetes usually causes no symptoms. When symptoms do occur, they include:  Needing to urinate often  Intense thirst  Blurry vision  Can diabetes lead to other health problems? -- Yes. Type 2 diabetes might not make you feel sick. But if it is not managed, it can lead to serious problems over time, such as:  Heart attacks  Strokes  Kidney disease  Vision problems (or even blindness)  Pain or loss of feeling in the hands and feet  Needing to have fingers, toes, or other body parts removed (amputated)  How do I know if I have type 2 diabetes? -- To find out if you have type 2 diabetes, your doctor or nurse can do a blood test. There are 2 tests that can be used for this. Both involve measuring the amount of sugar in your blood, called your "blood sugar" or "blood glucose":   One of the tests measures your blood sugar at the time the blood " "sample is taken. This test is done in the morning. You can't eat or drink anything except water for at least 8 hours before the test.   The other test shows what your average blood sugar has been for the past 2 to 3 months. This blood test is called "hemoglobin A1C" or just "A1C." It can be checked at any time of the day, even if you have recently eaten.  How is type 2 diabetes treated? -- The goals of treatment are to control your blood sugar and lower the risk of future problems that can happen in people with diabetes. An important part of managing diabetes is to eat healthy foods and get plenty of physical activity.  There are a few medicines that help control blood sugar. Some people need to take pills that help the body make more insulin or that help insulin do its job. Others need insulin shots.  Depending on what medicines you take, you might need to check your blood sugar regularly at home. But not everyone with type 2 diabetes needs to do this. Your doctor or nurse will tell you if you should be checking your blood sugar, and when and how to do this.  Sometimes, people with type 2 diabetes also need medicines to reduce the problems caused by the disease. For instance, medicines used to lower blood pressure can reduce the chances of a heart attack or stroke.  It's also important to get certain vaccines, such as vaccines to protect against the flu and coronavirus disease 2019 (COVID-19). Some people also need a vaccine to prevent pneumonia.  Can type 2 diabetes be prevented? -- Yes. To lower your chances of getting type 2 diabetes, the most important thing you can do is eat a healthy diet and get plenty of physical activity. This can help you lose weight if you are overweight. But eating well and being active are also good for your overall health. Even gentle activity, like walking, has benefits.  If you smoke, quitting can also lower your risk of type 2 diabetes. Quitting smoking can be hard to do, but your " "doctor or nurse can help.  All topics are updated as new evidence becomes available and our peer review process is complete.  This topic retrieved from [employer] on: Sep 21, 2021.  Topic 11206 Version 14.0  Release: 29.4.2 - C29.263  © 2021 UpToDate, Inc. and/or its affiliates. All rights reserved.  Consumer Information Use and Disclaimer   This information is not specific medical advice and does not replace information you receive from your health care provider. This is only a brief summary of general information. It does NOT include all information about conditions, illnesses, injuries, tests, procedures, treatments, therapies, discharge instructions or life-style choices that may apply to you. You must talk with your health care provider for complete information about your health and treatment options. This information should not be used to decide whether or not to accept your health care provider's advice, instructions or recommendations. Only your health care provider has the knowledge and training to provide advice that is right for you. The use of this information is governed by the [employer] End User License Agreement, available at https://www.ScreenScape Networks/en/solutions/PollVaultr/about/shawna.The use of [employer] content is governed by the [employer] Terms of Use. ©2021 UpToDate, Inc. All rights reserved.  Copyright   © 2021 UpToDate, Inc. and/or its affiliates. All rights reserved.    Patient Education       High Blood Pressure in Adults   The Basics   Written by the doctors and editors at [facility]   What is high blood pressure? -- High blood pressure is a condition that puts you at risk for heart attack, stroke, and kidney disease. It does not usually cause symptoms. But it can be serious.  When your doctor or nurse tells you your blood pressure, they will say 2 numbers. For instance, your doctor or nurse might say that your blood pressure is "130 over 80." The top number is the pressure inside your arteries " "when your heart is bipin. The bottom number is the pressure inside your arteries when your heart is relaxed.  "Elevated blood pressure" is a term doctors or nurses use as a warning. People with elevated blood pressure do not yet have high blood pressure. But their blood pressure is not as low as it should be for good health.  Many experts define high, elevated, and normal blood pressure as follows:  High - Top number of 130 or above and/or bottom number of 80 or above  Elevated - Top number between 120 and 129 and bottom number of 79 or below  Normal - Top number of 119 or below and bottom number of 79 or below  This information is also in the table (table 1).   How can I lower my blood pressure? -- If your doctor or nurse has prescribed blood pressure medicine, the most important thing you can do is to take it. If it causes side effects, do not just stop taking it. Instead, talk to your doctor or nurse about the problems it causes. They might be able to lower your dose or switch you to another medicine. If cost is a problem, mention that too. They might be able to put you on a less expensive medicine. Taking your blood pressure medicine can keep you from having a heart attack or stroke, and it can save your life!  Can I do anything on my own? -- You have a lot of control over your blood pressure. To lower it:  Lose weight (if you are overweight)  Choose a diet low in fat and rich in fruits, vegetables, and low-fat dairy products  Reduce the amount of salt you eat  Do something active for at least 30 minutes a day on most days of the week  Cut down on alcohol (if you drink more than 2 alcoholic drinks per day)  It's also a good idea to get a home blood pressure meter. People who check their own blood pressure at home do better at keeping it low and can sometimes even reduce the amount of medicine they take.  All topics are updated as new evidence becomes available and our peer review process is complete.  This " "topic retrieved from [employer] on: Sep 21, 2021.  Topic 97576 Version 15.0  Release: 29.4.2 - C29.263  © 2021 UpToDate, Inc. and/or its affiliates. All rights reserved.  table 1: Definition of normal and high blood pressure  Level  Top number  Bottom number    High 130 or above 80 or above   Elevated 120 to 129 79 or below   Normal 119 or below 79 or below   These definitions are from the American College of Cardiology/American Heart Association. Other expert groups might use slightly different definitions.  "Elevated blood pressure" is a term doctor or nurses use as a warning. It means you do not yet have high blood pressure, but your blood pressure is not as low as it should be for good health.  Graphic 40915 Version 6.0  Consumer Information Use and Disclaimer   This information is not specific medical advice and does not replace information you receive from your health care provider. This is only a brief summary of general information. It does NOT include all information about conditions, illnesses, injuries, tests, procedures, treatments, therapies, discharge instructions or life-style choices that may apply to you. You must talk with your health care provider for complete information about your health and treatment options. This information should not be used to decide whether or not to accept your health care provider's advice, instructions or recommendations. Only your health care provider has the knowledge and training to provide advice that is right for you. The use of this information is governed by the [employer] End User License Agreement, available at https://www.Kuaishubao.com.AcademixDirect/en/solutions/[employer]/about/shawna.The use of [employer] content is governed by the [employer] Terms of Use. ©2021 UpToDate, Inc. All rights reserved.  Copyright   © 2021 UpToDate, Inc. and/or its affiliates. All rights reserved.    Patient Education       High Blood Pressure in Adults   The Basics   Written by the doctors and editors " "at [employer]   What is high blood pressure? -- High blood pressure is a condition that puts you at risk for heart attack, stroke, and kidney disease. It does not usually cause symptoms. But it can be serious.  When your doctor or nurse tells you your blood pressure, they will say 2 numbers. For instance, your doctor or nurse might say that your blood pressure is "130 over 80." The top number is the pressure inside your arteries when your heart is bipin. The bottom number is the pressure inside your arteries when your heart is relaxed.  "Elevated blood pressure" is a term doctors or nurses use as a warning. People with elevated blood pressure do not yet have high blood pressure. But their blood pressure is not as low as it should be for good health.  Many experts define high, elevated, and normal blood pressure as follows:  High - Top number of 130 or above and/or bottom number of 80 or above  Elevated - Top number between 120 and 129 and bottom number of 79 or below  Normal - Top number of 119 or below and bottom number of 79 or below  This information is also in the table (table 1).   How can I lower my blood pressure? -- If your doctor or nurse has prescribed blood pressure medicine, the most important thing you can do is to take it. If it causes side effects, do not just stop taking it. Instead, talk to your doctor or nurse about the problems it causes. They might be able to lower your dose or switch you to another medicine. If cost is a problem, mention that too. They might be able to put you on a less expensive medicine. Taking your blood pressure medicine can keep you from having a heart attack or stroke, and it can save your life!  Can I do anything on my own? -- You have a lot of control over your blood pressure. To lower it:  Lose weight (if you are overweight)  Choose a diet low in fat and rich in fruits, vegetables, and low-fat dairy products  Reduce the amount of salt you eat  Do something active " "for at least 30 minutes a day on most days of the week  Cut down on alcohol (if you drink more than 2 alcoholic drinks per day)  It's also a good idea to get a home blood pressure meter. People who check their own blood pressure at home do better at keeping it low and can sometimes even reduce the amount of medicine they take.  All topics are updated as new evidence becomes available and our peer review process is complete.  This topic retrieved from [employer] on: Sep 21, 2021.  Topic 83358 Version 15.0  Release: 29.4.2 - C29.263  © 2021 UpToDate, Inc. and/or its affiliates. All rights reserved.  table 1: Definition of normal and high blood pressure  Level  Top number  Bottom number    High 130 or above 80 or above   Elevated 120 to 129 79 or below   Normal 119 or below 79 or below   These definitions are from the American College of Cardiology/American Heart Association. Other expert groups might use slightly different definitions.  "Elevated blood pressure" is a term doctor or nurses use as a warning. It means you do not yet have high blood pressure, but your blood pressure is not as low as it should be for good health.  Graphic 60697 Version 6.0  Consumer Information Use and Disclaimer   This information is not specific medical advice and does not replace information you receive from your health care provider. This is only a brief summary of general information. It does NOT include all information about conditions, illnesses, injuries, tests, procedures, treatments, therapies, discharge instructions or life-style choices that may apply to you. You must talk with your health care provider for complete information about your health and treatment options. This information should not be used to decide whether or not to accept your health care provider's advice, instructions or recommendations. Only your health care provider has the knowledge and training to provide advice that is right for you. The use of this " information is governed by the [employer] End User License Agreement, available at https://www.Best Bid.SovTech/en/solutions/Fashionchick/about/shawna.The use of [employer] content is governed by the [employer] Terms of Use. ©2021 UpToDate, Inc. All rights reserved.  Copyright   © 2021 UpToDate, Inc. and/or its affiliates. All rights reserved.    Patient Education       Diet and Health   The Basics   Written by the doctors and editors at [facility]   Why is it important to eat a healthy diet? -- It's important to eat a healthy diet because eating the right foods can keep you healthy now and later on in life.  Which foods are especially healthy? -- Foods that are especially healthy include:  Fruits and vegetables - Eating a diet with lots of fruits and vegetables can help prevent heart disease and strokes. It might also help prevent certain types of cancers. Try to eat fruits and vegetables at each meal and also for snacks. If you don't have fresh fruits and vegetables available, you can eat frozen or canned ones instead. Doctors recommend eating at least 2 1/2 servings of vegetables and 2 servings of fruits each day.  Foods with fiber - Eating foods with a lot of fiber can help prevent heart disease and strokes. If you have type 2 diabetes, it can also help control your blood sugar. Foods that have a lot of fiber include vegetables, fruits, beans, nuts, oatmeal, and whole grain breads and cereals. You can tell how much fiber is in a food by reading the nutrition label (figure 1). Doctors recommend eating 25 to 36 grams of fiber each day.  Foods with folate (also called folic acid) - Folate is a vitamin that is important for pregnant people, since it helps prevent certain birth defects. Anyone who could get pregnant should get at least 400 micrograms of folic acid daily, whether or not they are actively trying to get pregnant. Folate is found in many breakfast cereals, oranges, orange juice, and green leafy vegetables.  Foods  "with calcium and vitamin D - Babies, children, and adults need calcium and vitamin D to help keep their bones strong. Adults also need calcium and vitamin D to help prevent osteoporosis. Osteoporosis is a condition that causes bones to get "thin" and break more easily than usual. Different foods and drinks have calcium and vitamin D in them (figure 2). People who don't get enough calcium and vitamin D in their diet might need to take a supplement.  Foods with protein - Protein helps your muscles stay strong. Healthy foods with a lot of protein include chicken, fish, eggs, beans, nuts, and soy products. Red meat also has a lot of protein, but it also contains fats, which can be unhealthy.  Some experts recommend a "Mediterranean diet." This involves eating a lot of fruits, vegetables, nuts, whole grains, and olive oil. It also includes some fish, poultry, and dairy products, but not a lot of red meat. Eating this way can help your overall health, and might even lower your risk of having a stroke.  What foods should I avoid or limit? -- To eat a healthy diet, there are some things you should avoid or limit. They include:   Fats - There are different types of fats. Some types of fats are better for your body than others.  Trans fats are especially unhealthy. They are found in margarines, many fast foods, and some store-bought baked goods. Trans fats can raise your cholesterol level and your increase your chance of getting heart disease. You should avoid eating foods with these types of fats.  The type of "polyunsaturated" fats found in fish seems to be healthy and can reduce your chance of getting heart disease. Other polyunsaturated fats might also be good for your health. When you cook, it's best to use oils with healthier fats, such as olive oil and canola oil.  Sugar - To have a healthy diet, it's important to limit or avoid sugar, sweets, and refined grains. Refined grains are found in white bread, white rice, " "most forms of pasta, and most packaged "snack" foods. Whole grains, such as whole-wheat bread and brown rice, have more fiber and are better for your health.  Avoiding sugar-sweetened beverages, like soda and sports drinks, can also help improve your health.  Red meat - Studies have shown that eating a lot of red meat can increase your risk of certain health problems, including heart disease and cancer. You should limit the amount of red meat that you eat.  Can I drink alcohol as part of a healthy diet? -- People who drink a small amount of alcohol each day might have a lower chance of getting heart disease. But drinking alcohol can lead to problems. For example, it can raise a person's chances of getting liver disease and certain types of cancers. In women, even 1 drink a day can increase the risk of getting breast cancer.  Most doctors recommend that adult women not have more than 1 drink a day and that adult men not have more than 2 drinks a day. The limits are different because most women's bodies take longer to break down alcohol.  How many calories do I need each day? -- The number of calories you need each day depends on your weight, height, age, sex, and how active you are.  Your doctor or nurse can tell you how many calories you should eat each day. If you are trying to lose weight, you should eat fewer calories each day.  What if I have questions? -- If you have questions about which foods you should or should not eat, ask your doctor or nurse. The right diet for you will depend, in part, on your health and any medical conditions you have.  All topics are updated as new evidence becomes available and our peer review process is complete.  This topic retrieved from [employer] on: Sep 21, 2021.  Topic 04985 Version 20.0  Release: 29.4.2 - C29.263  © 2021 UpToDate, Inc. and/or its affiliates. All rights reserved.  figure 1: Nutrition label - fiber     This is an example of a nutrition label. To figure out how " "much fiber is in a food, look for the line that says "Dietary Fiber." It's also important to look at the serving size. This food has 7 grams of fiber in each serving, and each serving is 1 cup.  Graphic 68930 Version 7.0    figure 2: Foods and drinks with calcium and vitamin D     Foods rich in calcium include ice cream, soy milk, breads, kale, broccoli, milk, cheese, cottage cheese, almonds, yogurt, ready-to-eat cereals, beans, and tofu. Foods rich in vitamin D include milk, fortified plant-based "milks" (soy, almond), canned tuna fish, cod liver oil, yogurt, ready-to-eat-cereals, cooked salmon, canned sardines, mackerel, and eggs. Some of these foods are rich in both.  Graphic 28949 Version 4.0    Consumer Information Use and Disclaimer   This information is not specific medical advice and does not replace information you receive from your health care provider. This is only a brief summary of general information. It does NOT include all information about conditions, illnesses, injuries, tests, procedures, treatments, therapies, discharge instructions or life-style choices that may apply to you. You must talk with your health care provider for complete information about your health and treatment options. This information should not be used to decide whether or not to accept your health care provider's advice, instructions or recommendations. Only your health care provider has the knowledge and training to provide advice that is right for you. The use of this information is governed by the [employer] End User License Agreement, available at https://www.[employer].WellFX/en/solutions/[facility]/about/shawna.The use of [employer] content is governed by the [employer] Terms of Use. ©2021 UpToDate, Inc. All rights reserved.  Copyright   © 2021 UpToDate, Inc. and/or its affiliates. All rights reserved.    "

## 2022-09-28 ENCOUNTER — OFFICE VISIT (OUTPATIENT)
Dept: PAIN MEDICINE | Facility: CLINIC | Age: 62
End: 2022-09-28
Payer: COMMERCIAL

## 2022-09-28 VITALS
BODY MASS INDEX: 38.68 KG/M2 | SYSTOLIC BLOOD PRESSURE: 200 MMHG | HEIGHT: 62 IN | DIASTOLIC BLOOD PRESSURE: 72 MMHG | HEART RATE: 73 BPM | WEIGHT: 210.19 LBS

## 2022-09-28 DIAGNOSIS — M89.49 OSTEOARTHROSIS MULTIPLE SITES, NOT SPECIFIED AS GENERALIZED: Chronic | ICD-10-CM

## 2022-09-28 DIAGNOSIS — M54.17 LUMBOSACRAL RADICULOPATHY: Primary | Chronic | ICD-10-CM

## 2022-09-28 DIAGNOSIS — Z79.899 ENCOUNTER FOR LONG-TERM (CURRENT) USE OF OTHER MEDICATIONS: ICD-10-CM

## 2022-09-28 PROBLEM — I25.10 ATHEROSCLEROSIS OF NATIVE CORONARY ARTERY OF NATIVE HEART WITHOUT ANGINA PECTORIS: Chronic | Status: ACTIVE | Noted: 2021-06-30

## 2022-09-28 LAB

## 2022-09-28 PROCEDURE — 3051F HG A1C>EQUAL 7.0%<8.0%: CPT | Mod: ,,, | Performed by: PHYSICIAN ASSISTANT

## 2022-09-28 PROCEDURE — 3077F SYST BP >= 140 MM HG: CPT | Mod: ,,, | Performed by: PHYSICIAN ASSISTANT

## 2022-09-28 PROCEDURE — 99214 OFFICE O/P EST MOD 30 MIN: CPT | Mod: S$PBB,25,, | Performed by: PHYSICIAN ASSISTANT

## 2022-09-28 PROCEDURE — 3060F POS MICROALBUMINURIA REV: CPT | Mod: ,,, | Performed by: PHYSICIAN ASSISTANT

## 2022-09-28 PROCEDURE — 4010F ACE/ARB THERAPY RXD/TAKEN: CPT | Mod: ,,, | Performed by: PHYSICIAN ASSISTANT

## 2022-09-28 PROCEDURE — 3066F NEPHROPATHY DOC TX: CPT | Mod: ,,, | Performed by: PHYSICIAN ASSISTANT

## 2022-09-28 PROCEDURE — 3066F PR DOCUMENTATION OF TREATMENT FOR NEPHROPATHY: ICD-10-PCS | Mod: ,,, | Performed by: PHYSICIAN ASSISTANT

## 2022-09-28 PROCEDURE — 3008F PR BODY MASS INDEX (BMI) DOCUMENTED: ICD-10-PCS | Mod: ,,, | Performed by: PHYSICIAN ASSISTANT

## 2022-09-28 PROCEDURE — 99214 OFFICE O/P EST MOD 30 MIN: CPT | Mod: PBBFAC | Performed by: PHYSICIAN ASSISTANT

## 2022-09-28 PROCEDURE — 3078F PR MOST RECENT DIASTOLIC BLOOD PRESSURE < 80 MM HG: ICD-10-PCS | Mod: ,,, | Performed by: PHYSICIAN ASSISTANT

## 2022-09-28 PROCEDURE — 1159F PR MEDICATION LIST DOCUMENTED IN MEDICAL RECORD: ICD-10-PCS | Mod: ,,, | Performed by: PHYSICIAN ASSISTANT

## 2022-09-28 PROCEDURE — 4010F PR ACE/ARB THEARPY RXD/TAKEN: ICD-10-PCS | Mod: ,,, | Performed by: PHYSICIAN ASSISTANT

## 2022-09-28 PROCEDURE — 1159F MED LIST DOCD IN RCRD: CPT | Mod: ,,, | Performed by: PHYSICIAN ASSISTANT

## 2022-09-28 PROCEDURE — 3077F PR MOST RECENT SYSTOLIC BLOOD PRESSURE >= 140 MM HG: ICD-10-PCS | Mod: ,,, | Performed by: PHYSICIAN ASSISTANT

## 2022-09-28 PROCEDURE — 3060F PR POS MICROALBUMINURIA RESULT DOCUMENTED/REVIEW: ICD-10-PCS | Mod: ,,, | Performed by: PHYSICIAN ASSISTANT

## 2022-09-28 PROCEDURE — 3008F BODY MASS INDEX DOCD: CPT | Mod: ,,, | Performed by: PHYSICIAN ASSISTANT

## 2022-09-28 PROCEDURE — 3078F DIAST BP <80 MM HG: CPT | Mod: ,,, | Performed by: PHYSICIAN ASSISTANT

## 2022-09-28 PROCEDURE — 3051F PR MOST RECENT HEMOGLOBIN A1C LEVEL 7.0 - < 8.0%: ICD-10-PCS | Mod: ,,, | Performed by: PHYSICIAN ASSISTANT

## 2022-09-28 PROCEDURE — 96372 THER/PROPH/DIAG INJ SC/IM: CPT | Mod: PBBFAC | Performed by: PHYSICIAN ASSISTANT

## 2022-09-28 PROCEDURE — 80305 DRUG TEST PRSMV DIR OPT OBS: CPT | Mod: PBBFAC | Performed by: PHYSICIAN ASSISTANT

## 2022-09-28 PROCEDURE — 99214 PR OFFICE/OUTPT VISIT, EST, LEVL IV, 30-39 MIN: ICD-10-PCS | Mod: S$PBB,25,, | Performed by: PHYSICIAN ASSISTANT

## 2022-09-28 RX ORDER — TRAMADOL HYDROCHLORIDE 50 MG/1
50 TABLET ORAL EVERY 12 HOURS PRN
Qty: 60 TABLET | Refills: 1 | Status: SHIPPED | OUTPATIENT
Start: 2022-09-28 | End: 2022-12-19 | Stop reason: SDUPTHER

## 2022-09-28 RX ORDER — KETOROLAC TROMETHAMINE 30 MG/ML
60 INJECTION, SOLUTION INTRAMUSCULAR; INTRAVENOUS
Status: COMPLETED | OUTPATIENT
Start: 2022-09-28 | End: 2022-09-28

## 2022-09-28 RX ADMIN — KETOROLAC TROMETHAMINE 60 MG: 30 INJECTION, SOLUTION INTRAMUSCULAR; INTRAVENOUS at 11:09

## 2022-09-28 NOTE — PROGRESS NOTES
Subjective:         Patient ID: Dilma Cr is a 62 y.o. female.    Chief Complaint: Low-back Pain and Leg Pain      Pain  This is a chronic problem. The current episode started more than 1 year ago. The problem occurs daily. The problem has been waxing and waning. Associated symptoms include arthralgias. Pertinent negatives include no anorexia, change in bowel habit, chills, coughing, diaphoresis, neck pain, rash, sore throat, vertigo or vomiting.   Review of Systems   Constitutional:  Negative for activity change, appetite change, chills, diaphoresis and unexpected weight change.   HENT:  Negative for drooling, ear pain, facial swelling, nosebleeds, sore throat, trouble swallowing, voice change and goiter.    Eyes:  Negative for photophobia, pain, discharge, redness and visual disturbance.   Respiratory:  Negative for apnea, cough, choking, chest tightness, shortness of breath, wheezing and stridor.    Cardiovascular:  Negative for palpitations and leg swelling.   Gastrointestinal:  Negative for abdominal distention, anorexia, change in bowel habit, diarrhea, rectal pain, vomiting, fecal incontinence and change in bowel habit.   Endocrine: Negative for cold intolerance, heat intolerance, polydipsia, polyphagia and polyuria.   Genitourinary:  Negative for flank pain, frequency and hot flashes.   Musculoskeletal:  Positive for arthralgias and back pain. Negative for neck pain and neck stiffness.   Integumentary:  Negative for color change, pallor and rash.   Allergic/Immunologic: Negative for immunocompromised state.   Neurological:  Negative for dizziness, vertigo, seizures, syncope, facial asymmetry, speech difficulty, light-headedness, coordination difficulties, memory loss and coordination difficulties.   Hematological:  Negative for adenopathy. Does not bruise/bleed easily.   Psychiatric/Behavioral:  Negative for agitation, behavioral problems, confusion, decreased concentration, dysphoric mood,  hallucinations, self-injury and suicidal ideas. The patient is not nervous/anxious and is not hyperactive.          Past Medical History:   Diagnosis Date    Acute superficial gastritis without hemorrhage 6/21/2022    Arthritis     Cancer     colon cancer    Chronic kidney disease, stage 3b     Colon cancer     Coronary artery disease     Depression     Diabetes mellitus, type 2     Diverticula, colon 6/22/2022    GERD (gastroesophageal reflux disease)     H/O right hemicolectomy 6/22/2022    HH (hiatus hernia) 6/21/2022    Hyperlipidemia     Myocardial infarction     2 stents in 05/14/2021 Dr Porter    Renal disorder      Past Surgical History:   Procedure Laterality Date    BREAST SURGERY      C5-C6 RADHA  8-, 7-, 6-1-2016    Dr Fowler    CERVICAL SPINE SURGERY      COLON SURGERY      EPIDURAL STEROID INJECTION INTO LUMBAR SPINE N/A 3/29/2022    Procedure: L4-5 RADHA;  Surgeon: Gela Fowler MD;  Location: [facility];  Service: Pain Management;  Laterality: N/A;  PT AWARE ON OV TO BE TESTED\  PLAVIX TO BE HELD FOR 7 DAYS PRIOR TO PROCEDURE PER DR FOWLER AND DR PORTER  3-28  message left on vm re: covid    HYSTERECTOMY      LEFT HEART CATHETERIZATION Left 5/14/2021    Procedure: Left heart cath;  Surgeon: Mateus Guan MD;  Location: [facility];  Service: Cardiology;  Laterality: Left;    LEFT HEART CATHETERIZATION Left 6/20/2022    Procedure: Left heart cath;  Surgeon: Oliverio Bautista MD;  Location: [facility]ATH [facility];  Service: Cardiology;  Laterality: Left;    MEDIPORT REMOVAL  10/14/2021    Dr Kraft    right rotator cuff repair      in [place]    TOTAL REDUCTION MAMMOPLASTY       Social History     Socioeconomic History    Marital status:      Spouse name: Jorge Alberto    Number of children: 2   Occupational History    Occupation:  at [employer] for past 27 years   Tobacco Use    Smoking status: Never    Smokeless tobacco: Never   Substance and Sexual Activity  "   Alcohol use: Never    Drug use: Never    Sexual activity: Yes     Family History   Problem Relation Age of Onset    Hypertension Mother     Diabetes Mother     Hypertension Father     Diabetes Father     Hypertension Sister     Breast cancer Sister     Hypertension Brother      Review of patient's allergies indicates:  No Known Allergies     Objective:  Vitals:    09/28/22 0955 09/28/22 0956   BP: (!) 200/72    Pulse: 73    Weight: 95.3 kg (210 lb 3.2 oz)    Height: 5' 2.4" (1.585 m)    PainSc:   9   9         Physical Exam  Vitals and nursing note reviewed. Exam conducted with a chaperone present.   Constitutional:       General: She is awake. She is not in acute distress.     Appearance: Normal appearance. She is not ill-appearing, toxic-appearing or diaphoretic.   HENT:      Head: Normocephalic and atraumatic.      Nose: Nose normal.      Mouth/Throat:      Mouth: Mucous membranes are moist.      Pharynx: Oropharynx is clear.   Eyes:      Conjunctiva/sclera: Conjunctivae normal.      Pupils: Pupils are equal, round, and reactive to light.   Cardiovascular:      Rate and Rhythm: Normal rate.   Pulmonary:      Effort: Pulmonary effort is normal. No respiratory distress.   Abdominal:      Palpations: Abdomen is soft.      Tenderness: There is no guarding.   Musculoskeletal:         General: Normal range of motion.      Cervical back: Normal range of motion and neck supple. No rigidity.      Thoracic back: Tenderness present.      Lumbar back: Tenderness present.   Skin:     General: Skin is warm and dry.      Coloration: Skin is not jaundiced or pale.   Neurological:      General: No focal deficit present.      Mental Status: She is alert and oriented to person, place, and time. Mental status is at baseline.      Cranial Nerves: No cranial nerve deficit (II-XII).   Psychiatric:         Mood and Affect: Mood normal.         Behavior: Behavior normal. Behavior is cooperative.         Thought Content: Thought " content normal.         X-Ray Lumbar Spine 5 View  Narrative: EXAMINATION:  XR LUMBAR SPINE COMPLETE 5 VIEW    CLINICAL HISTORY:  .  Radiculopathy, lumbosacral region    COMPARISON:  August 23, 2021 lumbar x-ray    TECHNIQUE:  Lumbar spine five views, to include bilateral obliques    FINDINGS:  There is no acute fracture.  There is straightening of the spine which may be positional or related to muscle spasm.  There is no spondylolisthesis.  There is prominent degenerative disc narrowing at L3-L4 through L5-S1.  There is endplate sclerosis and vacuum disc phenomena as well as moderate anterior spondylosis at L3-L4 and L4-L5.  There is moderate facet arthropathy.    There is no focal lytic or blastic lesion  Impression: No acute findings    Prominent degenerative disc disease    Straightening of the spine    Electronically signed by: Chandra Nagel  Date:    08/17/2022  Time:    15:49       Office Visit on 09/07/2022   Component Date Value Ref Range Status    Glucose 09/07/2022 223 (H)  74 - 106 mg/dL Final    Triglycerides 09/07/2022 111  35 - 150 mg/dL Final    Cholesterol 09/07/2022 163  0 - 200 mg/dL Final    HDL Cholesterol 09/07/2022 66 (H)  40 - 60 mg/dL Final    Cholesterol/HDL Ratio (Risk Factor) 09/07/2022 2.5   Final    Non-HDL 09/07/2022 97  mg/dL Final    LDL Calculated 09/07/2022 75  mg/dL Final    LDL/HDL 09/07/2022 1.1   Final    VLDL 09/07/2022 22  mg/dL Final    Hemoglobin A1C 09/07/2022 7.2 (H)  4.5 - 6.6 % Final    Estimated Average Glucose 09/07/2022 154  mg/dL Final   Office Visit on 09/01/2022   Component Date Value Ref Range Status    POC Glucose 09/01/2022 156 (A)  70 - 110 MG/DL Final   Lab Visit on 08/30/2022   Component Date Value Ref Range Status    Sodium 08/30/2022 140  136 - 145 mmol/L Final    Potassium 08/30/2022 4.3  3.5 - 5.1 mmol/L Final    Chloride 08/30/2022 106  98 - 107 mmol/L Final    CO2 08/30/2022 26  21 - 32 mmol/L Final    Anion Gap 08/30/2022 12  7 - 16 mmol/L Final     Glucose 08/30/2022 137 (H)  74 - 106 mg/dL Final    BUN 08/30/2022 22 (H)  7 - 18 mg/dL Final    Creatinine 08/30/2022 1.05 (H)  0.55 - 1.02 mg/dL Final    BUN/Creatinine Ratio 08/30/2022 21 (H)  6 - 20 Final    Calcium 08/30/2022 9.0  8.5 - 10.1 mg/dL Final    eGFR 08/30/2022 61  >=60 mL/min/1.73m² Final    Magnesium 08/30/2022 2.8 (H)  1.7 - 2.3 mg/dL Final    Vitamin D 25-Hydroxy, Blood 08/30/2022 42.8  ng/mL Final   Office Visit on 08/17/2022   Component Date Value Ref Range Status    Anaplasma phagocytophilum Ab, IgG,S 08/17/2022 <1:64  <1:64 titer Final    Ehrlichia Chaffeensis (HME) Ab, IgG 08/17/2022 <1:64  <1:64 titer Final    Lyme IgG/IgM 08/17/2022 Non-Reactive  Non-Reactive Final    West Nile Virus Ab, IgG 08/17/2022 Negative  Negative Final    West Nile Virus Ab, IgM 08/17/2022 Negative  Negative Final    West Nile Serum Interpretation 08/17/2022 SEE COMMENTS   Final    Babesia microti IgG Ab 08/17/2022 <1:64  <1:64 titer Final    Spotted Fever Group Ab, IgG, S 08/17/2022 <1:64  <1:64 Final    Spotted Fever Group Ab, IgM, S 08/17/2022 <1:64  <1:64 Final   Office Visit on 07/21/2022   Component Date Value Ref Range Status    SARS Coronavirus 2 Antigen 07/21/2022 Positive (A)  Negative Final    Rapid Influenza A Ag 07/21/2022 Negative  Negative Final    Rapid Influenza B Ag 07/21/2022 Negative  Negative Final     Acceptable 07/21/2022 Yes   Final   Office Visit on 07/11/2022   Component Date Value Ref Range Status    WBC 07/11/2022 8.27  4.50 - 11.00 K/uL Final    RBC 07/11/2022 4.13 (L)  4.20 - 5.40 M/uL Final    Hemoglobin 07/11/2022 10.6 (L)  12.0 - 16.0 g/dL Final    Hematocrit 07/11/2022 34.7 (L)  38.0 - 47.0 % Final    MCV 07/11/2022 84.0  80.0 - 96.0 fL Final    MCH 07/11/2022 25.7 (L)  27.0 - 31.0 pg Final    MCHC 07/11/2022 30.5 (L)  32.0 - 36.0 g/dL Final    RDW 07/11/2022 16.2 (H)  11.5 - 14.5 % Final    Platelet Count 07/11/2022 469 (H)  150 - 400 K/uL Final    MPV 07/11/2022  11.8  9.4 - 12.4 fL Final    Neutrophils % 07/11/2022 59.6  53.0 - 65.0 % Final    Lymphocytes % 07/11/2022 30.4  27.0 - 41.0 % Final    Monocytes % 07/11/2022 7.5 (H)  2.0 - 6.0 % Final    Eosinophils % 07/11/2022 1.7  1.0 - 4.0 % Final    Basophils % 07/11/2022 0.6  0.0 - 1.0 % Final    Immature Granulocytes % 07/11/2022 0.2  0.0 - 0.4 % Final    nRBC, Auto 07/11/2022 0.0  <=0.0 % Final    Neutrophils, Abs 07/11/2022 4.93  1.80 - 7.70 K/uL Final    Lymphocytes, Absolute 07/11/2022 2.51  1.00 - 4.80 K/uL Final    Monocytes, Absolute 07/11/2022 0.62  0.00 - 0.80 K/uL Final    Eosinophils, Absolute 07/11/2022 0.14  0.00 - 0.50 K/uL Final    Basophils, Absolute 07/11/2022 0.05  0.00 - 0.20 K/uL Final    Immature Granulocytes, Absolute 07/11/2022 0.02  0.00 - 0.04 K/uL Final    nRBC, Absolute 07/11/2022 0.00  <=0.00 x10e3/uL Final    Diff Type 07/11/2022 Auto   Final   Lab Visit on 06/30/2022   Component Date Value Ref Range Status    ProBNP 06/30/2022 257 (H)  1 - 125 pg/mL Final    Sodium 06/30/2022 141  136 - 145 mmol/L Final    Potassium 06/30/2022 4.0  3.5 - 5.1 mmol/L Final    Chloride 06/30/2022 106  98 - 107 mmol/L Final    CO2 06/30/2022 25  21 - 32 mmol/L Final    Anion Gap 06/30/2022 14  7 - 16 mmol/L Final    Glucose 06/30/2022 154 (H)  74 - 106 mg/dL Final    BUN 06/30/2022 20 (H)  7 - 18 mg/dL Final    Creatinine 06/30/2022 1.03 (H)  0.55 - 1.02 mg/dL Final    BUN/Creatinine Ratio 06/30/2022 19  6 - 20 Final    Calcium 06/30/2022 8.5  8.5 - 10.1 mg/dL Final    Total Protein 06/30/2022 7.1  6.4 - 8.2 g/dL Final    Albumin 06/30/2022 3.5  3.5 - 5.0 g/dL Final    Globulin 06/30/2022 3.6  2.0 - 4.0 g/dL Final    A/G Ratio 06/30/2022 1.0   Final    Bilirubin, Total 06/30/2022 0.2  0.0 - 1.2 mg/dL Final    Alk Phos 06/30/2022 114  46 - 118 U/L Final    ALT 06/30/2022 18  13 - 56 U/L Final    AST 06/30/2022 21  15 - 37 U/L Final    eGFR 06/30/2022 58 (L)  >=60 mL/min/1.73m² Final   Office Visit on 06/30/2022    Component Date Value Ref Range Status    Sodium 06/30/2022 141  136 - 145 mmol/L Final    Potassium 06/30/2022 4.0  3.5 - 5.1 mmol/L Final    Chloride 06/30/2022 108 (H)  98 - 107 mmol/L Final    CO2 06/30/2022 24  21 - 32 mmol/L Final    Anion Gap 06/30/2022 13  7 - 16 mmol/L Final    Glucose 06/30/2022 209 (H)  74 - 106 mg/dL Final    BUN 06/30/2022 21 (H)  7 - 18 mg/dL Final    Creatinine 06/30/2022 0.98  0.55 - 1.02 mg/dL Final    BUN/Creatinine Ratio 06/30/2022 21 (H)  6 - 20 Final    Calcium 06/30/2022 9.3  8.5 - 10.1 mg/dL Final    eGFR 06/30/2022 62  >=60 mL/min/1.73m² Final    WBC 06/30/2022 7.85  4.50 - 11.00 K/uL Final    RBC 06/30/2022 3.86 (L)  4.20 - 5.40 M/uL Final    Hemoglobin 06/30/2022 9.8 (L)  12.0 - 16.0 g/dL Final    Hematocrit 06/30/2022 31.8 (L)  38.0 - 47.0 % Final    MCV 06/30/2022 82.4  80.0 - 96.0 fL Final    MCH 06/30/2022 25.4 (L)  27.0 - 31.0 pg Final    MCHC 06/30/2022 30.8 (L)  32.0 - 36.0 g/dL Final    RDW 06/30/2022 16.1 (H)  11.5 - 14.5 % Final    Platelet Count 06/30/2022 421 (H)  150 - 400 K/uL Final    MPV 06/30/2022 11.7  9.4 - 12.4 fL Final    Neutrophils % 06/30/2022 69.1 (H)  53.0 - 65.0 % Final    Lymphocytes % 06/30/2022 22.9 (L)  27.0 - 41.0 % Final    Monocytes % 06/30/2022 5.7  2.0 - 6.0 % Final    Eosinophils % 06/30/2022 1.5  1.0 - 4.0 % Final    Basophils % 06/30/2022 0.5  0.0 - 1.0 % Final    Immature Granulocytes % 06/30/2022 0.3  0.0 - 0.4 % Final    nRBC, Auto 06/30/2022 0.0  <=0.0 % Final    Neutrophils, Abs 06/30/2022 5.42  1.80 - 7.70 K/uL Final    Lymphocytes, Absolute 06/30/2022 1.80  1.00 - 4.80 K/uL Final    Monocytes, Absolute 06/30/2022 0.45  0.00 - 0.80 K/uL Final    Eosinophils, Absolute 06/30/2022 0.12  0.00 - 0.50 K/uL Final    Basophils, Absolute 06/30/2022 0.04  0.00 - 0.20 K/uL Final    Immature Granulocytes, Absolute 06/30/2022 0.02  0.00 - 0.04 K/uL Final    nRBC, Absolute 06/30/2022 0.00  <=0.00 x10e3/uL Final    Diff Type 06/30/2022 Auto    Final   Office Visit on 06/28/2022   Component Date Value Ref Range Status    POC Amphetamines 06/28/2022 Negative  Negative, Inconclusive Final    POC Barbiturates 06/28/2022 Negative  Negative, Inconclusive Final    POC Benzodiazepines 06/28/2022 Negative  Negative, Inconclusive Final    POC Cocaine 06/28/2022 Negative  Negative, Inconclusive Final    POC THC 06/28/2022 Negative  Negative, Inconclusive Final    POC Methadone 06/28/2022 Negative  Negative, Inconclusive Final    POC Methamphetamine 06/28/2022 Negative  Negative, Inconclusive Final    POC Opiates 06/28/2022 Presumptive Positive (A)  Negative, Inconclusive Final    POC Oxycodone 06/28/2022 Negative  Negative, Inconclusive Final    POC Phencyclidine 06/28/2022 Negative  Negative, Inconclusive Final    POC Methylenedioxymethamphetamine * 06/28/2022 Negative  Negative, Inconclusive Final    POC Tricyclic Antidepressants 06/28/2022 Negative  Negative, Inconclusive Final    POC Buprenorphine 06/28/2022 Negative   Final     Acceptable 06/28/2022 Yes   Final    POC Temperature (Urine) 06/28/2022 92   Final    pH, UA 06/28/2022 6.5  5.0, 5.5, 6.0, 6.5, 7.0, 7.5, 8.0 pH Units Final    Specific Gravity, UA 06/28/2022 <=1.005  <=1.005, 1.010, 1.015, 1.020, 1.025, 1.030 Final    Creatinine, Urine 06/28/2022 <13 (L)  28 - 219 mg/dL Final    Refractometer, Urine 06/28/2022 1.006   Final    6-Acetylmorphine 06/28/2022 Negative  10 ng/mL Final    7-Aminoclonazepam 06/28/2022 Negative  Negative 25 ng/mL Final    a-Hydroxyalprazolam 06/28/2022 Negative  25 ng/mL Final    Acetyl Fentanyl 06/28/2022 Negative  2.5 ng/mL Final    Acetyl Norfentanyl Oxalate 06/28/2022 Negative  5 ng/mL Final    Benzoylecgonine 06/28/2022 Negative  100 ng/mL Final    Buprenorphine 06/28/2022 Negative  25 ng/mL Final    Codeine 06/28/2022 Negative  25 ng/mL Final    EDDP 06/28/2022 Negative  25 ng/mL Final    Fentanyl 06/28/2022 Negative  2.5 ng/mL Final    Hydrocodone  06/28/2022 Negative  25 ng/mL Final    Hydromorphone 06/28/2022 Negative  25 ng/mL Final    Lorazepam 06/28/2022 Negative  25 ng/mL Final    Morphine 06/28/2022 Negative  25 ng/mL Final    Norbuprenorphine 06/28/2022 Negative  25 ng/mL Final    Nordiazepam 06/28/2022 Negative  25 ng/mL Final    Norfentanyl Oxalate 06/28/2022 Negative  5 ng/mL Final    Norhydrocodone 06/28/2022 Negative  50 ng/mL Final    Noroxycodone HCL 06/28/2022 Negative  50 ng/mL Final    Oxazepam 06/28/2022 Negative  25 ng/mL Final    Oxymorphone 06/28/2022 Negative  25 ng/mL Final    Tapentadol 06/28/2022 Negative  25 ng/mL Final    Temazepam 06/28/2022 Negative  25 ng/mL Final    THC-COOH 06/28/2022 Negative  25 ng/mL Final    Tramadol 06/28/2022 Negative  100 ng/mL Final    Amphetamine, Urine 06/28/2022 Negative  Negative Final    Methamphetamines, Urine 06/28/2022 Negative  Negative Final    Methadone, Urine 06/28/2022 Negative  Negative 25 ng/mL Final    Oxycodone, Urine 06/28/2022 Negative  Negative 25 ng/mL Final   No results displayed because visit has over 200 results.      There may be more visits with results that are not included.         Orders Placed This Encounter   Procedures    POCT Urine Drug Screen Presump     Interpretive Information:     Negative:  No drug detected at the cut off level.   Positive:  This result represents presumptive positive for the   tested drug, other substances may yield a positive response other   than the analyte of interest. This result should be utilized for   diagnostic purpose only. Confirmation testing will be performed upon physician request only.            Requested Prescriptions     Signed Prescriptions Disp Refills    traMADoL (ULTRAM) 50 mg tablet 60 tablet 1     Sig: Take 1 tablet (50 mg total) by mouth every 12 (twelve) hours as needed for Pain.       Assessment:     1. Lumbosacral radiculopathy    2. Encounter for long-term (current) use of other medications    3. Osteoarthrosis multiple  sites, not specified as generalized         A's of Opioid Risk Assessment  Activity:Patient can perform ADL.   Analgesia:Patients pain is partially controlled by current medication. Patient has tried OTC medications such as Tylenol and Ibuprofen with out relief.   Adverse Effects: Patient denies constipation or sedation.  Aberrant Behavior:  reviewed with no aberrant drug seeking/taking behavior.  Overdose reversal drug naloxone discussed    Drug screen reviewed      Plan:    Anticoagulated Plavix    History Colon  Cancer    Presumptive drug screen positive for opiates today will order definitive for clarification patient takes tramadol    Complaint bilateral buttock and leg pain radicular in nature pain numbness and tingling     Reviewed MRI lumbar spine November 2021 [facility] central neuroforaminal stenosis L4/5 less so at L3/4 multiple level degenerative changes     She is requesting Toradol injection    Toradol 60 mg IM, tolerated well, considering repeating lumbar procedure    Continue home exercise program as directed    Continue current medication    Follow-up 3 months     Dr. Fowler, March 2023    Bring original prescription medication bottles/container/box with labels to each visit    Pill count    Physical therapy    Massage therapy declines

## 2022-10-19 ENCOUNTER — OFFICE VISIT (OUTPATIENT)
Dept: FAMILY MEDICINE | Facility: CLINIC | Age: 62
End: 2022-10-19
Payer: COMMERCIAL

## 2022-10-19 VITALS
HEIGHT: 62 IN | OXYGEN SATURATION: 99 % | WEIGHT: 208 LBS | TEMPERATURE: 99 F | SYSTOLIC BLOOD PRESSURE: 139 MMHG | HEART RATE: 67 BPM | BODY MASS INDEX: 38.28 KG/M2 | DIASTOLIC BLOOD PRESSURE: 79 MMHG | RESPIRATION RATE: 18 BRPM

## 2022-10-19 DIAGNOSIS — Z79.4 TYPE 2 DIABETES MELLITUS WITH RETINOPATHY OF BOTH EYES, WITH LONG-TERM CURRENT USE OF INSULIN, MACULAR EDEMA PRESENCE UNSPECIFIED, UNSPECIFIED RETINOPATHY SEVERITY: Chronic | ICD-10-CM

## 2022-10-19 DIAGNOSIS — E11.319 TYPE 2 DIABETES MELLITUS WITH RETINOPATHY OF BOTH EYES, WITH LONG-TERM CURRENT USE OF INSULIN, MACULAR EDEMA PRESENCE UNSPECIFIED, UNSPECIFIED RETINOPATHY SEVERITY: Chronic | ICD-10-CM

## 2022-10-19 DIAGNOSIS — I10 PRIMARY HYPERTENSION: Primary | ICD-10-CM

## 2022-10-19 PROCEDURE — 3066F NEPHROPATHY DOC TX: CPT | Mod: ,,, | Performed by: FAMILY MEDICINE

## 2022-10-19 PROCEDURE — 3060F POS MICROALBUMINURIA REV: CPT | Mod: ,,, | Performed by: FAMILY MEDICINE

## 2022-10-19 PROCEDURE — 3078F PR MOST RECENT DIASTOLIC BLOOD PRESSURE < 80 MM HG: ICD-10-PCS | Mod: ,,, | Performed by: FAMILY MEDICINE

## 2022-10-19 PROCEDURE — 3078F DIAST BP <80 MM HG: CPT | Mod: ,,, | Performed by: FAMILY MEDICINE

## 2022-10-19 PROCEDURE — 4010F ACE/ARB THERAPY RXD/TAKEN: CPT | Mod: ,,, | Performed by: FAMILY MEDICINE

## 2022-10-19 PROCEDURE — 4010F PR ACE/ARB THEARPY RXD/TAKEN: ICD-10-PCS | Mod: ,,, | Performed by: FAMILY MEDICINE

## 2022-10-19 PROCEDURE — 3051F HG A1C>EQUAL 7.0%<8.0%: CPT | Mod: ,,, | Performed by: FAMILY MEDICINE

## 2022-10-19 PROCEDURE — 3075F SYST BP GE 130 - 139MM HG: CPT | Mod: ,,, | Performed by: FAMILY MEDICINE

## 2022-10-19 PROCEDURE — 3075F PR MOST RECENT SYSTOLIC BLOOD PRESS GE 130-139MM HG: ICD-10-PCS | Mod: ,,, | Performed by: FAMILY MEDICINE

## 2022-10-19 PROCEDURE — 3051F PR MOST RECENT HEMOGLOBIN A1C LEVEL 7.0 - < 8.0%: ICD-10-PCS | Mod: ,,, | Performed by: FAMILY MEDICINE

## 2022-10-19 PROCEDURE — 99214 PR OFFICE/OUTPT VISIT, EST, LEVL IV, 30-39 MIN: ICD-10-PCS | Mod: ,,, | Performed by: FAMILY MEDICINE

## 2022-10-19 PROCEDURE — 1160F PR REVIEW ALL MEDS BY PRESCRIBER/CLIN PHARMACIST DOCUMENTED: ICD-10-PCS | Mod: ,,, | Performed by: FAMILY MEDICINE

## 2022-10-19 PROCEDURE — 3066F PR DOCUMENTATION OF TREATMENT FOR NEPHROPATHY: ICD-10-PCS | Mod: ,,, | Performed by: FAMILY MEDICINE

## 2022-10-19 PROCEDURE — 1159F MED LIST DOCD IN RCRD: CPT | Mod: ,,, | Performed by: FAMILY MEDICINE

## 2022-10-19 PROCEDURE — 1160F RVW MEDS BY RX/DR IN RCRD: CPT | Mod: ,,, | Performed by: FAMILY MEDICINE

## 2022-10-19 PROCEDURE — 1159F PR MEDICATION LIST DOCUMENTED IN MEDICAL RECORD: ICD-10-PCS | Mod: ,,, | Performed by: FAMILY MEDICINE

## 2022-10-19 PROCEDURE — 3060F PR POS MICROALBUMINURIA RESULT DOCUMENTED/REVIEW: ICD-10-PCS | Mod: ,,, | Performed by: FAMILY MEDICINE

## 2022-10-19 PROCEDURE — 99214 OFFICE O/P EST MOD 30 MIN: CPT | Mod: ,,, | Performed by: FAMILY MEDICINE

## 2022-10-19 RX ORDER — SEMAGLUTIDE 1.34 MG/ML
INJECTION, SOLUTION SUBCUTANEOUS
COMMUNITY
Start: 2022-09-21

## 2022-10-19 RX ORDER — FUROSEMIDE 20 MG/1
20 TABLET ORAL DAILY PRN
Qty: 90 TABLET | Refills: 1 | Status: SHIPPED | OUTPATIENT
Start: 2022-10-19 | End: 2023-06-07 | Stop reason: SDUPTHER

## 2022-10-19 RX ORDER — COVID-19 MOLECULAR TEST ASSAY
KIT MISCELLANEOUS
COMMUNITY
Start: 2022-09-07

## 2022-10-19 NOTE — PATIENT INSTRUCTIONS
"Patient Education       Type 2 Diabetes   The Basics   Written by the doctors and editors at [facility]   What is type 2 diabetes? -- Type 2 diabetes is a disorder that disrupts the way your body uses sugar. It is sometimes called type 2 diabetes mellitus.  All the cells in your body need sugar to work normally. Sugar gets into the cells with the help of a hormone called insulin. Insulin is made by the pancreas, an organ in the belly. If there is not enough insulin, or if your body stops responding to insulin, sugar builds up in the blood. That is what happens to people with diabetes.  There are two different types of diabetes:   Type 1 diabetes - In type 1 diabetes, the pancreas does not make insulin or makes very little insulin.  Type 2 diabetes - In most people with type 2 diabetes, the body stops responding to insulin normally. Then, over time, the pancreas stops making enough insulin.   Being overweight or obese increases a person's risk of developing type 2 diabetes. But people who are not overweight can get diabetes, too.  What are the symptoms of type 2 diabetes? -- Type 2 diabetes usually causes no symptoms. When symptoms do occur, they include:  Needing to urinate often  Intense thirst  Blurry vision  Can diabetes lead to other health problems? -- Yes. Type 2 diabetes might not make you feel sick. But if it is not managed, it can lead to serious problems over time, such as:  Heart attacks  Strokes  Kidney disease  Vision problems (or even blindness)  Pain or loss of feeling in the hands and feet  Needing to have fingers, toes, or other body parts removed (amputated)  How do I know if I have type 2 diabetes? -- To find out if you have type 2 diabetes, your doctor or nurse can do a blood test. There are 2 tests that can be used for this. Both involve measuring the amount of sugar in your blood, called your "blood sugar" or "blood glucose":   One of the tests measures your blood sugar at the time the blood " "sample is taken. This test is done in the morning. You can't eat or drink anything except water for at least 8 hours before the test.   The other test shows what your average blood sugar has been for the past 2 to 3 months. This blood test is called "hemoglobin A1C" or just "A1C." It can be checked at any time of the day, even if you have recently eaten.  How is type 2 diabetes treated? -- The goals of treatment are to control your blood sugar and lower the risk of future problems that can happen in people with diabetes. An important part of managing diabetes is to eat healthy foods and get plenty of physical activity.  There are a few medicines that help control blood sugar. Some people need to take pills that help the body make more insulin or that help insulin do its job. Others need insulin shots.  Depending on what medicines you take, you might need to check your blood sugar regularly at home. But not everyone with type 2 diabetes needs to do this. Your doctor or nurse will tell you if you should be checking your blood sugar, and when and how to do this.  Sometimes, people with type 2 diabetes also need medicines to reduce the problems caused by the disease. For instance, medicines used to lower blood pressure can reduce the chances of a heart attack or stroke.  It's also important to get certain vaccines, such as vaccines to protect against the flu and coronavirus disease 2019 (COVID-19). Some people also need a vaccine to prevent pneumonia.  Can type 2 diabetes be prevented? -- Yes. To lower your chances of getting type 2 diabetes, the most important thing you can do is eat a healthy diet and get plenty of physical activity. This can help you lose weight if you are overweight. But eating well and being active are also good for your overall health. Even gentle activity, like walking, has benefits.  If you smoke, quitting can also lower your risk of type 2 diabetes. Quitting smoking can be hard to do, but your " doctor or nurse can help.  All topics are updated as new evidence becomes available and our peer review process is complete.  This topic retrieved from [employer] on: Sep 21, 2021.  Topic 28295 Version 14.0  Release: 29.4.2 - C29.263  © 2021 UpToDate, Inc. and/or its affiliates. All rights reserved.  Consumer Information Use and Disclaimer   This information is not specific medical advice and does not replace information you receive from your health care provider. This is only a brief summary of general information. It does NOT include all information about conditions, illnesses, injuries, tests, procedures, treatments, therapies, discharge instructions or life-style choices that may apply to you. You must talk with your health care provider for complete information about your health and treatment options. This information should not be used to decide whether or not to accept your health care provider's advice, instructions or recommendations. Only your health care provider has the knowledge and training to provide advice that is right for you. The use of this information is governed by the [employer] End User License Agreement, available at [email or web address]://[email or web address]/[email or web address]/about/shawna.The use of [employer] content is governed by the [employer] Terms of Use. ©2021 UpToDate, Inc. All rights reserved.  Copyright   © 2021 UpToDate, Inc. and/or its affiliates. All rights reserved.    Patient Education       Diabetes and Diet   The Basics   Written by the doctors and editors at [facility]   Why is diet important in diabetes? -- Diet is important because it is part of diabetes treatment. Many people need to change what they eat and how much they eat to help treat their diabetes. It is important for people to treat their diabetes so that they:  Keep their blood sugar at or near a normal level  Prevent long-term problems, such as heart or kidney problems, that can happen in people with diabetes  Changing your diet  "can also help treat obesity, high blood pressure, and high cholesterol. These conditions can affect people with diabetes and can lead to future problems, such as heart attacks or strokes.  Who will work with me to change my diet? -- Your doctor or nurse will work with you to make a food plan to change your diet. They might also recommend that you work with a "dietitian." A dietitian is an expert on food and eating.  Do I need to eat at the same times every day? -- When and how often you should eat depends, in part, on the diabetes medicines you take. For example:  People who take about the same amount of insulin at the same time each day (called a "fixed regimen") should eat meals at the same times. This is also true for people who take pills that increase insulin levels, such as sulfonylureas. Eating meals at the same time every day helps prevent low blood sugar.  People who adjust the dose and timing of their insulin each day (called a "flexible regimen") do not always have to eat meals at the same time. That's because they can time their insulin dose for before they plan to eat, and also adjust the dose for how much they plan to eat.  People who take medicines that don't usually cause low blood sugar, such as metformin, don't have to eat meals at the same time every day.  What do I need to think about when planning what to eat? -- Our bodies break down the food we eat into small pieces called carbohydrates, proteins, and fats.  When planning what to eat, people with diabetes need to think about:  Carbohydrates (or "carbs") - Carbohydrates, which are sugars that our bodies use for energy, can raise a person's blood sugar level. Your doctor, nurse, or dietitian will tell you how many carbohydrates you should eat at each meal or snack. Foods that have carbohydrates include:  Bread, pasta, and rice  Vegetables and fruits  Dairy foods  Foods and drinks with added sugar  It is best to get your carbohydrates from " "fruits, vegetables, whole grains, and low-fat milk. It is best to avoid drinks with added sugar, like soda, juices, and sports drinks.   Protein - Your doctor, nurse, or dietitian will tell you how much protein you should eat each day. It is best to eat lean meats, fish, eggs, beans, peas, soy products, nuts, and seeds.  Fats - The type of fat you eat is more important than the amount of fat. "Saturated" and "trans" fats can increase your risk for heart problems, like a heart attack.  Foods that have saturated fats include meat, butter, cheese, and ice cream.  Foods that have trans fats include processed food with "partially hydrogenated oils" on the ingredient list. This may include fried foods, store bought cookies, muffins, pies, and cakes.  "Monounsaturated" and "polyunsaturated" fats are better for you. Foods with these types of fat include fish, avocado, olive oil, and nuts.  Calories - People need to eat a certain amount of calories each day to keep their weight the same. People who are overweight and want to lose weight need to eat fewer calories each day.  Fiber - Eating foods with a lot of fiber can help control a person's blood sugar level. Foods that have a lot of fiber include apples, green beans, peas, beans, lentils, nuts, oatmeal, and whole grains.  Salt - People who have high blood pressure should not eat foods that contain a lot of salt (also called sodium). People with high blood pressure should also eat healthy foods, such as fruits, vegetables, and low-fat dairy foods.  Alcohol - Having more than 1 drink (for women) or 2 drinks (for men) a day can raise blood sugar levels. Also, drinks that have fruit juice or soda in them can raise blood sugar levels.  What can I do if I need to lose weight? -- If you need to lose weight, you can:  Exercise - Try to get at least 30 minutes of physical activity a day, most days of the week. Even gentle exercise, like walking, is good for your health. Some people " with diabetes need to change their medicine dose before they exercise. They might also need to check their blood sugar levels before and after exercising.  Eat fewer calories - Your doctor, nurse, or dietitian can tell you how many calories you should eat each day in order to lose weight.  If you are worried about your weight, size, or shape, talk with your doctor, nurse, or dietitian. They can help you make changes to improve your health.  Can I eat the same foods as my family? -- Yes. You do not need to eat special foods if you have diabetes. You and your family can eat the same foods. Changing your diet is mostly about eating healthy foods and not eating too much.  What are the other parts of diabetes treatment? -- Besides changing your diet, the other parts of diabetes treatment are:  Exercise  Medicines  Some people with diabetes need to learn how to match their diet and exercise with their medicine dose. For example, people who use insulin might need to choose the dose of insulin they give themselves. To choose their dose, they need to think about:  What they plan to eat at the next meal  How much exercise they plan to do  What their blood sugar level is  If the diet and exercise do not match the medicine dose, a person's blood sugar level can get too low or too high. Blood sugar levels that are too low or too high can cause problems.  All topics are updated as new evidence becomes available and our peer review process is complete.  This topic retrieved from [employer] on: Sep 21, 2021.  Topic 29653 Version 7.0  Release: 29.4.2 - C29.263  © 2021 UpToDate, Inc. and/or its affiliates. All rights reserved.  Consumer Information Use and Disclaimer   This information is not specific medical advice and does not replace information you receive from your health care provider. This is only a brief summary of general information. It does NOT include all information about conditions, illnesses, injuries, tests, procedures,  treatments, therapies, discharge instructions or life-style choices that may apply to you. You must talk with your health care provider for complete information about your health and treatment options. This information should not be used to decide whether or not to accept your health care provider's advice, instructions or recommendations. Only your health care provider has the knowledge and training to provide advice that is right for you. The use of this information is governed by the [employer] End User License Agreement, available at https://[email or web address]/[email or web address]/about/[email or web address].The use of [employer] content is governed by the [employer] Terms of Use. ©2021 UpToDate, Inc. All rights reserved.  Copyright   © 2021 UpToDate, Inc. and/or its affiliates. All rights reserved.

## 2022-10-19 NOTE — PROGRESS NOTES
New Clinic Note    Dilma Cr is a 62 y.o. female     CC:   Chief Complaint   Patient presents with    Color Me Healthy     Patient stated she is here for a Color Me Healthy Visit 1 of 4 for HTN/Glucose and cholesterol.         Subjective  Patient is here for a color me healthy 1 of 4 for Hypertension and diabetes.     Presents to clinic for follow up on chronic health problems    Hypertension:    Takes medications as directed: yes  Checks blood pressure outside of clinic: yes  On a statin: yes  Cardiovascular exercise: no  Heart healthy diet: no         Current Outpatient Medications:     ascorbic acid, vitamin C, 250 mg Chew, Take 250 mg by mouth once daily., Disp: , Rfl:     aspirin 81 MG Chew, Take 81 mg by mouth once daily., Disp: , Rfl:     BINAXNOW COVID-19 AG SELF TEST Kit, Use as Directed on the Package, Disp: , Rfl:     clopidogreL (PLAVIX) 75 mg tablet, Take 1 tablet (75 mg total) by mouth once daily., Disp: 90 tablet, Rfl: 1    cyanocobalamin (VITAMIN B-12) 1000 MCG tablet, Take 100 mcg by mouth once daily., Disp: , Rfl:     cyclobenzaprine (FLEXERIL) 5 MG tablet, Take 5 mg by mouth 3 (three) times daily as needed., Disp: , Rfl:     FREESTYLE BECCA 14 DAY SENSOR Kit, USE TO CHECK GLUCOSE 4 TIMES DAILY, Disp: , Rfl:     iron-vitamin C 100-250 mg, ICAR-C, 100-250 mg Tab, Take 1 tablet by mouth 2 (two) times daily with meals., Disp: , Rfl:     JARDIANCE 25 mg tablet, Take 1 tablet (25 mg total) by mouth once daily., Disp: 30 tablet, Rfl: 5    metoprolol succinate (TOPROL-XL) 25 MG 24 hr tablet, Take 1 tablet (25 mg total) by mouth once daily., Disp: 90 tablet, Rfl: 3    nitroGLYCERIN (NITROSTAT) 0.4 MG SL tablet, Place 1 tablet (0.4 mg total) under the tongue every 5 (five) minutes as needed for Chest pain., Disp: 30 tablet, Rfl: 0    NOVOLOG FLEXPEN U-100 INSULIN 100 unit/mL (3 mL) InPn pen, Inject 20 Units into the skin 2 (two) times a day., Disp: 12 each, Rfl: 1    olmesartan (BENICAR) 20 MG  tablet, Take 1 tablet (20 mg total) by mouth once daily., Disp: 90 tablet, Rfl: 1    OZEMPIC 1 mg/dose (4 mg/3 mL), , Disp: , Rfl:     pantoprazole (PROTONIX) 40 MG tablet, Take 1 tablet (40 mg total) by mouth once daily., Disp: 90 tablet, Rfl: 1    traMADoL (ULTRAM) 50 mg tablet, Take 1 tablet (50 mg total) by mouth every 12 (twelve) hours as needed for Pain., Disp: 60 tablet, Rfl: 1    TRESIBA FLEXTOUCH U-100 100 unit/mL (3 mL) insulin pen, Inject 24 Units into the skin once daily., Disp: 3 pen, Rfl: 1    furosemide (LASIX) 20 MG tablet, Take 1 tablet (20 mg total) by mouth daily as needed (fluid)., Disp: 90 tablet, Rfl: 1    rosuvastatin (CRESTOR) 40 MG Tab, Take 1 tablet by mouth once daily, Disp: 90 tablet, Rfl: 0     Past Medical History:   Diagnosis Date    Acute superficial gastritis without hemorrhage 6/21/2022    Arthritis     Cancer     colon cancer    Chronic kidney disease, stage 3b     Colon cancer     Coronary artery disease     Depression     Diabetes mellitus, type 2     Diverticula, colon 6/22/2022    GERD (gastroesophageal reflux disease)     H/O right hemicolectomy 6/22/2022    HH (hiatus hernia) 6/21/2022    Hyperlipidemia     Myocardial infarction     2 stents in 05/14/2021 Dr Lora    Renal disorder         Family History   Problem Relation Age of Onset    Hypertension Mother     Diabetes Mother     Hypertension Father     Diabetes Father     Hypertension Sister     Breast cancer Sister     Hypertension Brother         Past Surgical History:   Procedure Laterality Date    BREAST SURGERY      C5-C6 RADHA  8-, 7-, 6-1-2016    Dr Fowler    CERVICAL SPINE SURGERY      COLON SURGERY      EPIDURAL STEROID INJECTION INTO LUMBAR SPINE N/A 3/29/2022    Procedure: L4-5 RADHA;  Surgeon: Gela Fowler MD;  Location: [facility];  Service: Pain Management;  Laterality: N/A;  PT AWARE ON OV TO BE TESTED\  PLAVIX TO BE HELD FOR 7 DAYS PRIOR TO PROCEDURE PER DR FOWLER AND DR LORA  3-28   "message left on  re: covid    HYSTERECTOMY      LEFT HEART CATHETERIZATION Left 5/14/2021    Procedure: Left heart cath;  Surgeon: Mateus Guan MD;  Location: [facility] CATH LAB;  Service: Cardiology;  Laterality: Left;    LEFT HEART CATHETERIZATION Left 6/20/2022    Procedure: Left heart cath;  Surgeon: Oliverio Bautista MD;  Location: [facility] CATH LAB;  Service: Cardiology;  Laterality: Left;    MEDIPORT REMOVAL  10/14/2021    Dr Kraft    right rotator cuff repair      in [place]    TOTAL REDUCTION MAMMOPLASTY          Review of Systems   Constitutional:  Negative for fatigue and fever.   HENT:  Negative for ear pain, postnasal drip, rhinorrhea and sinus pressure/congestion.    Respiratory:  Negative for cough and shortness of breath.    Cardiovascular:  Negative for chest pain.   Gastrointestinal:  Negative for abdominal pain, diarrhea, nausea and vomiting.   Genitourinary:  Negative for dysuria.   Neurological:  Negative for headaches.      /79 (BP Location: Right arm, Patient Position: Sitting, BP Method: Large (Automatic))   Pulse 67   Temp 98.5 °F (36.9 °C) (Oral)   Resp 18   Ht 5' 2" (1.575 m)   Wt 94.3 kg (208 lb)   LMP  (LMP Unknown)   SpO2 99%   BMI 38.04 kg/m²      Physical Exam  HENT:      Head: Normocephalic and atraumatic.      Mouth/Throat:      Pharynx: Oropharynx is clear.   Cardiovascular:      Rate and Rhythm: Normal rate and regular rhythm.   Pulmonary:      Effort: Pulmonary effort is normal.      Breath sounds: Normal breath sounds.   Neurological:      Mental Status: She is alert and oriented to person, place, and time.   Psychiatric:         Mood and Affect: Mood normal.         Behavior: Behavior normal.        Assessment and Plan      ICD-10-CM ICD-9-CM   1. Primary hypertension  I10 401.9   2. Type 2 diabetes mellitus with retinopathy of both eyes, with long-term current use of insulin, macular edema presence unspecified, unspecified retinopathy severity  E11.319 " 250.50    Z79.4 362.01     V58.67        Problem List Items Addressed This Visit          Cardiac/Vascular    Hypertension - Primary (Chronic)       Endocrine    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)    Relevant Medications    OZEMPIC 1 mg/dose (4 mg/3 mL)      Blood pressure is controlled. Continue current meds.     Patient Instructions   Patient Education       Type 2 Diabetes   The Basics   Written by the doctors and editors at [facility]   What is type 2 diabetes? -- Type 2 diabetes is a disorder that disrupts the way your body uses sugar. It is sometimes called type 2 diabetes mellitus.  All the cells in your body need sugar to work normally. Sugar gets into the cells with the help of a hormone called insulin. Insulin is made by the pancreas, an organ in the belly. If there is not enough insulin, or if your body stops responding to insulin, sugar builds up in the blood. That is what happens to people with diabetes.  There are two different types of diabetes:   Type 1 diabetes - In type 1 diabetes, the pancreas does not make insulin or makes very little insulin.  Type 2 diabetes - In most people with type 2 diabetes, the body stops responding to insulin normally. Then, over time, the pancreas stops making enough insulin.   Being overweight or obese increases a person's risk of developing type 2 diabetes. But people who are not overweight can get diabetes, too.  What are the symptoms of type 2 diabetes? -- Type 2 diabetes usually causes no symptoms. When symptoms do occur, they include:  Needing to urinate often  Intense thirst  Blurry vision  Can diabetes lead to other health problems? -- Yes. Type 2 diabetes might not make you feel sick. But if it is not managed, it can lead to serious problems over time, such as:  Heart attacks  Strokes  Kidney disease  Vision problems (or even blindness)  Pain or loss of feeling in the hands and feet  Needing to have fingers, toes, or other body parts  "removed (amputated)  How do I know if I have type 2 diabetes? -- To find out if you have type 2 diabetes, your doctor or nurse can do a blood test. There are 2 tests that can be used for this. Both involve measuring the amount of sugar in your blood, called your "blood sugar" or "blood glucose":   One of the tests measures your blood sugar at the time the blood sample is taken. This test is done in the morning. You can't eat or drink anything except water for at least 8 hours before the test.   The other test shows what your average blood sugar has been for the past 2 to 3 months. This blood test is called "hemoglobin A1C" or just "A1C." It can be checked at any time of the day, even if you have recently eaten.  How is type 2 diabetes treated? -- The goals of treatment are to control your blood sugar and lower the risk of future problems that can happen in people with diabetes. An important part of managing diabetes is to eat healthy foods and get plenty of physical activity.  There are a few medicines that help control blood sugar. Some people need to take pills that help the body make more insulin or that help insulin do its job. Others need insulin shots.  Depending on what medicines you take, you might need to check your blood sugar regularly at home. But not everyone with type 2 diabetes needs to do this. Your doctor or nurse will tell you if you should be checking your blood sugar, and when and how to do this.  Sometimes, people with type 2 diabetes also need medicines to reduce the problems caused by the disease. For instance, medicines used to lower blood pressure can reduce the chances of a heart attack or stroke.  It's also important to get certain vaccines, such as vaccines to protect against the flu and coronavirus disease 2019 (COVID-19). Some people also need a vaccine to prevent pneumonia.  Can type 2 diabetes be prevented? -- Yes. To lower your chances of getting type 2 diabetes, the most important " thing you can do is eat a healthy diet and get plenty of physical activity. This can help you lose weight if you are overweight. But eating well and being active are also good for your overall health. Even gentle activity, like walking, has benefits.  If you smoke, quitting can also lower your risk of type 2 diabetes. Quitting smoking can be hard to do, but your doctor or nurse can help.  All topics are updated as new evidence becomes available and our peer review process is complete.  This topic retrieved from [employer] on: Sep 21, 2021.  Topic 16352 Version 14.0  Release: 29.4.2 - C29.263  © 2021 UpToDate, Inc. and/or its affiliates. All rights reserved.  Consumer Information Use and Disclaimer   This information is not specific medical advice and does not replace information you receive from your health care provider. This is only a brief summary of general information. It does NOT include all information about conditions, illnesses, injuries, tests, procedures, treatments, therapies, discharge instructions or life-style choices that may apply to you. You must talk with your health care provider for complete information about your health and treatment options. This information should not be used to decide whether or not to accept your health care provider's advice, instructions or recommendations. Only your health care provider has the knowledge and training to provide advice that is right for you. The use of this information is governed by the [employer] End User License Agreement, available at [email or web address] use of [employer] content is governed by the [employer] Terms of Use. ©2021 UpToDate, Inc. All rights reserved.  Copyright   © 2021 UpToDate, Inc. and/or its affiliates. All rights reserved.    Patient Education       Diabetes and Diet   The Basics   Written by the doctors and editors at [employer]   Why is diet important in diabetes? -- Diet is important because  "it is part of diabetes treatment. Many people need to change what they eat and how much they eat to help treat their diabetes. It is important for people to treat their diabetes so that they:  Keep their blood sugar at or near a normal level  Prevent long-term problems, such as heart or kidney problems, that can happen in people with diabetes  Changing your diet can also help treat obesity, high blood pressure, and high cholesterol. These conditions can affect people with diabetes and can lead to future problems, such as heart attacks or strokes.  Who will work with me to change my diet? -- Your doctor or nurse will work with you to make a food plan to change your diet. They might also recommend that you work with a "dietitian." A dietitian is an expert on food and eating.  Do I need to eat at the same times every day? -- When and how often you should eat depends, in part, on the diabetes medicines you take. For example:  People who take about the same amount of insulin at the same time each day (called a "fixed regimen") should eat meals at the same times. This is also true for people who take pills that increase insulin levels, such as sulfonylureas. Eating meals at the same time every day helps prevent low blood sugar.  People who adjust the dose and timing of their insulin each day (called a "flexible regimen") do not always have to eat meals at the same time. That's because they can time their insulin dose for before they plan to eat, and also adjust the dose for how much they plan to eat.  People who take medicines that don't usually cause low blood sugar, such as metformin, don't have to eat meals at the same time every day.  What do I need to think about when planning what to eat? -- Our bodies break down the food we eat into small pieces called carbohydrates, proteins, and fats.  When planning what to eat, people with diabetes need to think about:  Carbohydrates (or "carbs") - Carbohydrates, which are " "sugars that our bodies use for energy, can raise a person's blood sugar level. Your doctor, nurse, or dietitian will tell you how many carbohydrates you should eat at each meal or snack. Foods that have carbohydrates include:  Bread, pasta, and rice  Vegetables and fruits  Dairy foods  Foods and drinks with added sugar  It is best to get your carbohydrates from fruits, vegetables, whole grains, and low-fat milk. It is best to avoid drinks with added sugar, like soda, juices, and sports drinks.   Protein - Your doctor, nurse, or dietitian will tell you how much protein you should eat each day. It is best to eat lean meats, fish, eggs, beans, peas, soy products, nuts, and seeds.  Fats - The type of fat you eat is more important than the amount of fat. "Saturated" and "trans" fats can increase your risk for heart problems, like a heart attack.  Foods that have saturated fats include meat, butter, cheese, and ice cream.  Foods that have trans fats include processed food with "partially hydrogenated oils" on the ingredient list. This may include fried foods, store bought cookies, muffins, pies, and cakes.  "Monounsaturated" and "polyunsaturated" fats are better for you. Foods with these types of fat include fish, avocado, olive oil, and nuts.  Calories - People need to eat a certain amount of calories each day to keep their weight the same. People who are overweight and want to lose weight need to eat fewer calories each day.  Fiber - Eating foods with a lot of fiber can help control a person's blood sugar level. Foods that have a lot of fiber include apples, green beans, peas, beans, lentils, nuts, oatmeal, and whole grains.  Salt - People who have high blood pressure should not eat foods that contain a lot of salt (also called sodium). People with high blood pressure should also eat healthy foods, such as fruits, vegetables, and low-fat dairy foods.  Alcohol - Having more than 1 drink (for women) or 2 drinks (for men) " a day can raise blood sugar levels. Also, drinks that have fruit juice or soda in them can raise blood sugar levels.  What can I do if I need to lose weight? -- If you need to lose weight, you can:  Exercise - Try to get at least 30 minutes of physical activity a day, most days of the week. Even gentle exercise, like walking, is good for your health. Some people with diabetes need to change their medicine dose before they exercise. They might also need to check their blood sugar levels before and after exercising.  Eat fewer calories - Your doctor, nurse, or dietitian can tell you how many calories you should eat each day in order to lose weight.  If you are worried about your weight, size, or shape, talk with your doctor, nurse, or dietitian. They can help you make changes to improve your health.  Can I eat the same foods as my family? -- Yes. You do not need to eat special foods if you have diabetes. You and your family can eat the same foods. Changing your diet is mostly about eating healthy foods and not eating too much.  What are the other parts of diabetes treatment? -- Besides changing your diet, the other parts of diabetes treatment are:  Exercise  Medicines  Some people with diabetes need to learn how to match their diet and exercise with their medicine dose. For example, people who use insulin might need to choose the dose of insulin they give themselves. To choose their dose, they need to think about:  What they plan to eat at the next meal  How much exercise they plan to do  What their blood sugar level is  If the diet and exercise do not match the medicine dose, a person's blood sugar level can get too low or too high. Blood sugar levels that are too low or too high can cause problems.  All topics are updated as new evidence becomes available and our peer review process is complete.  This topic retrieved from [employer] on: Sep 21, 2021.  Topic 52597 Version 7.0  Release: 29.4.2 - C29.263  © 2021  UpToDate, Inc. and/or its affiliates. All rights reserved.  Consumer Information Use and Disclaimer   This information is not specific medical advice and does not replace information you receive from your health care provider. This is only a brief summary of general information. It does NOT include all information about conditions, illnesses, injuries, tests, procedures, treatments, therapies, discharge instructions or life-style choices that may apply to you. You must talk with your health care provider for complete information about your health and treatment options. This information should not be used to decide whether or not to accept your health care provider's advice, instructions or recommendations. Only your health care provider has the knowledge and training to provide advice that is right for you. The use of this information is governed by the [employer] End User License Agreement, available at [email or web address] use of [employer] content is governed by the [employer] Terms of Use. ©2021 UpToDate, Inc. All rights reserved.  Copyright   © 2021 UpToDate, Inc. and/or its affiliates. All rights reserved.        Follow up in about 2 months (around 12/19/2022).

## 2022-12-13 ENCOUNTER — HOSPITAL ENCOUNTER (OUTPATIENT)
Dept: RADIOLOGY | Facility: HOSPITAL | Age: 62
Discharge: HOME | End: 2022-12-13
Payer: COMMERCIAL

## 2022-12-13 DIAGNOSIS — Z12.31 BREAST CANCER SCREENING BY MAMMOGRAM: ICD-10-CM

## 2022-12-13 PROCEDURE — 77067 SCR MAMMO BI INCL CAD: CPT | Mod: TC

## 2022-12-19 NOTE — PROGRESS NOTES
Subjective:         Patient ID: Dilma Cr is a 62 y.o. female.    Chief Complaint: Low-back Pain        Pain  This is a chronic problem. The current episode started more than 1 year ago. The problem occurs daily. The problem has been unchanged. Associated symptoms include arthralgias and neck pain. Pertinent negatives include no anorexia, change in bowel habit, chills, coughing, diaphoresis, fever, sore throat, vertigo or vomiting.   Review of Systems   Constitutional:  Negative for activity change, appetite change, chills, diaphoresis, fever and unexpected weight change.   HENT:  Negative for drooling, ear pain, facial swelling, nosebleeds, sore throat, trouble swallowing, voice change and goiter.    Eyes:  Negative for photophobia, pain, discharge, redness and visual disturbance.   Respiratory:  Negative for apnea, cough, choking, chest tightness, shortness of breath, wheezing and stridor.    Cardiovascular:  Negative for palpitations and leg swelling.   Gastrointestinal:  Negative for abdominal distention, anorexia, change in bowel habit, diarrhea, rectal pain, vomiting, fecal incontinence and change in bowel habit.   Endocrine: Negative for cold intolerance, heat intolerance, polydipsia, polyphagia and polyuria.   Genitourinary:  Negative for flank pain, frequency and hot flashes.   Musculoskeletal:  Positive for arthralgias, back pain and neck pain. Negative for neck stiffness.   Integumentary:  Negative for color change and pallor.   Allergic/Immunologic: Negative for immunocompromised state.   Neurological:  Negative for dizziness, vertigo, seizures, syncope, facial asymmetry, speech difficulty, light-headedness, coordination difficulties, memory loss and coordination difficulties.   Hematological:  Negative for adenopathy. Does not bruise/bleed easily.   Psychiatric/Behavioral:  Negative for agitation, behavioral problems, confusion, decreased concentration, dysphoric mood, hallucinations,  self-injury and suicidal ideas. The patient is not nervous/anxious and is not hyperactive.          Past Medical History:   Diagnosis Date    Acute superficial gastritis without hemorrhage 6/21/2022    Arthritis     Cancer     colon cancer    Chronic kidney disease, stage 3b     Colon cancer     Coronary artery disease     Depression     Diabetes mellitus, type 2     Diverticula, colon 6/22/2022    GERD (gastroesophageal reflux disease)     H/O right hemicolectomy 6/22/2022    HH (hiatus hernia) 6/21/2022    Hyperlipidemia     Myocardial infarction     2 stents in 05/14/2021 Dr Porter    Renal disorder      Past Surgical History:   Procedure Laterality Date    BREAST SURGERY      C5-C6 RADHA  8-, 7-, 6-1-2016    Dr Fowler    CERVICAL SPINE SURGERY      COLON SURGERY      EPIDURAL STEROID INJECTION INTO LUMBAR SPINE N/A 3/29/2022    Procedure: L4-5 RADHA;  Surgeon: Gela Fowler MD;  [facility]  Service: Pain Management;  Laterality: N/A;  PT AWARE ON OV TO BE TESTED\  PLAVIX TO BE HELD FOR 7 DAYS PRIOR TO PROCEDURE PER DR FOWLER AND DR PORTER  3-28  message left on vm re: covid    HYSTERECTOMY      LEFT HEART CATHETERIZATION Left 5/14/2021    Procedure: Left heart cath;  Surgeon: Mateus Guan MD;  Location: [facility] CATH LAB;  Service: Cardiology;  Laterality: Left;    LEFT HEART CATHETERIZATION Left 6/20/2022    Procedure: Left heart cath;  Surgeon: Oliverio Bautista MD;  Location: [facility] CATH LAB;  Service: Cardiology;  Laterality: Left;    MEDIPORT REMOVAL  10/14/2021    Dr Kraft    right rotator cuff repair      in [place]    TOTAL REDUCTION MAMMOPLASTY       Social History     Socioeconomic History    Marital status:      Spouse name: Jorge Alberto    Number of children: 2   Occupational History    Occupation:  at [employer] for past 27 years   Tobacco Use    Smoking status: Never    Smokeless tobacco: Never   Substance and Sexual Activity    Alcohol use:  "Never    Drug use: Never    Sexual activity: Yes     Family History   Problem Relation Age of Onset    Hypertension Mother     Diabetes Mother     Hypertension Father     Diabetes Father     Hypertension Sister     Breast cancer Maternal Grandmother     Hypertension Brother      Review of patient's allergies indicates:  No Known Allergies     Objective:  Vitals:    12/20/22 1031   BP: (!) 202/71   Pulse: 60   Resp: 18   Weight: 95.3 kg (210 lb)   Height: 5' 1" (1.549 m)   PainSc:   9         Physical Exam  Vitals and nursing note reviewed. Exam conducted with a chaperone present.   Constitutional:       General: She is awake. She is not in acute distress.     Appearance: Normal appearance. She is not ill-appearing, toxic-appearing or diaphoretic.   HENT:      Head: Normocephalic and atraumatic.      Nose: Nose normal.      Mouth/Throat:      Mouth: Mucous membranes are moist.      Pharynx: Oropharynx is clear.   Eyes:      Conjunctiva/sclera: Conjunctivae normal.      Pupils: Pupils are equal, round, and reactive to light.   Cardiovascular:      Rate and Rhythm: Normal rate.   Pulmonary:      Effort: Pulmonary effort is normal. No respiratory distress.   Abdominal:      Palpations: Abdomen is soft.      Tenderness: There is no guarding.   Musculoskeletal:         General: Normal range of motion.      Cervical back: Normal range of motion and neck supple. No rigidity.      Thoracic back: Tenderness present.      Lumbar back: Tenderness present.   Skin:     General: Skin is warm and dry.      Coloration: Skin is not jaundiced or pale.   Neurological:      General: No focal deficit present.      Mental Status: She is alert and oriented to person, place, and time. Mental status is at baseline.      Cranial Nerves: No cranial nerve deficit (II-XII).   Psychiatric:         Mood and Affect: Mood normal.         Behavior: Behavior normal. Behavior is cooperative.         Thought Content: Thought content normal.     "     Mammo Digital Screening Bilat  Narrative: Result:   Mammo Digital Screening Bilat     History:  Patient is 62 y.o. and is seen for a screening mammogram.    Films Compared:  Prior images (if available) were compared.     Findings:   The breasts have scattered areas of fibroglandular density. There is no   evidence of suspicious masses, calcifications, or other abnormal findings.  Impression: Bilateral  There is no mammographic evidence of malignancy.    BI-RADS Category:   Overall: 1 - Negative       Recommendation:  Routine screening mammogram in 1 year is recommended.    Your estimated lifetime risk of breast cancer (to age 85) based on   Tyrer-Cuzick risk assessment model is Tyrer-Cuzick: 10.2 %. According to   the American Cancer Society, patients with a lifetime breast cancer risk   of 20% or higher might benefit from supplemental screening tests.         Office Visit on 09/28/2022   Component Date Value Ref Range Status    POC Amphetamines 09/28/2022 Negative  Negative, Inconclusive Final    POC Barbiturates 09/28/2022 Negative  Negative, Inconclusive Final    POC Benzodiazepines 09/28/2022 Negative  Negative, Inconclusive Final    POC Cocaine 09/28/2022 Negative  Negative, Inconclusive Final    POC THC 09/28/2022 Negative  Negative, Inconclusive Final    POC Methadone 09/28/2022 Negative  Negative, Inconclusive Final    POC Methamphetamine 09/28/2022 Negative  Negative, Inconclusive Final    POC Opiates 09/28/2022 Negative  Negative, Inconclusive Final    POC Oxycodone 09/28/2022 Negative  Negative, Inconclusive Final    POC Phencyclidine 09/28/2022 Negative  Negative, Inconclusive Final    POC Methylenedioxymethamphetamine * 09/28/2022 Negative  Negative, Inconclusive Final    POC Tricyclic Antidepressants 09/28/2022 Negative  Negative, Inconclusive Final    POC Buprenorphine 09/28/2022 Negative   Final     Acceptable 09/28/2022 Yes   Final    POC Temperature (Urine) 09/28/2022 92   Final    Office Visit on 09/07/2022   Component Date Value Ref Range Status    Glucose 09/07/2022 223 (H)  74 - 106 mg/dL Final    Triglycerides 09/07/2022 111  35 - 150 mg/dL Final    Cholesterol 09/07/2022 163  0 - 200 mg/dL Final    HDL Cholesterol 09/07/2022 66 (H)  40 - 60 mg/dL Final    Cholesterol/HDL Ratio (Risk Factor) 09/07/2022 2.5   Final    Non-HDL 09/07/2022 97  mg/dL Final    LDL Calculated 09/07/2022 75  mg/dL Final    LDL/HDL 09/07/2022 1.1   Final    VLDL 09/07/2022 22  mg/dL Final    Hemoglobin A1C 09/07/2022 7.2 (H)  4.5 - 6.6 % Final    Estimated Average Glucose 09/07/2022 154  mg/dL Final   Office Visit on 09/01/2022   Component Date Value Ref Range Status    POC Glucose 09/01/2022 156 (A)  70 - 110 MG/DL Final   Lab Visit on 08/30/2022   Component Date Value Ref Range Status    Sodium 08/30/2022 140  136 - 145 mmol/L Final    Potassium 08/30/2022 4.3  3.5 - 5.1 mmol/L Final    Chloride 08/30/2022 106  98 - 107 mmol/L Final    CO2 08/30/2022 26  21 - 32 mmol/L Final    Anion Gap 08/30/2022 12  7 - 16 mmol/L Final    Glucose 08/30/2022 137 (H)  74 - 106 mg/dL Final    BUN 08/30/2022 22 (H)  7 - 18 mg/dL Final    Creatinine 08/30/2022 1.05 (H)  0.55 - 1.02 mg/dL Final    BUN/Creatinine Ratio 08/30/2022 21 (H)  6 - 20 Final    Calcium 08/30/2022 9.0  8.5 - 10.1 mg/dL Final    eGFR 08/30/2022 61  >=60 mL/min/1.73m² Final    Magnesium 08/30/2022 2.8 (H)  1.7 - 2.3 mg/dL Final    Vitamin D 25-Hydroxy, Blood 08/30/2022 42.8  ng/mL Final   Office Visit on 08/17/2022   Component Date Value Ref Range Status    Anaplasma phagocytophilum Ab, IgG,S 08/17/2022 <1:64  <1:64 titer Final    Ehrlichia Chaffeensis (HME) Ab, IgG 08/17/2022 <1:64  <1:64 titer Final    Lyme IgG/IgM 08/17/2022 Non-Reactive  Non-Reactive Final    West Nile Virus Ab, IgG 08/17/2022 Negative  Negative Final    West Nile Virus Ab, IgM 08/17/2022 Negative  Negative Final    West Nile Serum Interpretation 08/17/2022 SEE COMMENTS   Final     Babesia microti IgG Ab 08/17/2022 <1:64  <1:64 titer Final    Spotted Fever Group Ab, IgG, S 08/17/2022 <1:64  <1:64 Final    Spotted Fever Group Ab, IgM, S 08/17/2022 <1:64  <1:64 Final   Office Visit on 07/21/2022   Component Date Value Ref Range Status    SARS Coronavirus 2 Antigen 07/21/2022 Positive (A)  Negative Final    Rapid Influenza A Ag 07/21/2022 Negative  Negative Final    Rapid Influenza B Ag 07/21/2022 Negative  Negative Final     Acceptable 07/21/2022 Yes   Final   Office Visit on 07/11/2022   Component Date Value Ref Range Status    WBC 07/11/2022 8.27  4.50 - 11.00 K/uL Final    RBC 07/11/2022 4.13 (L)  4.20 - 5.40 M/uL Final    Hemoglobin 07/11/2022 10.6 (L)  12.0 - 16.0 g/dL Final    Hematocrit 07/11/2022 34.7 (L)  38.0 - 47.0 % Final    MCV 07/11/2022 84.0  80.0 - 96.0 fL Final    MCH 07/11/2022 25.7 (L)  27.0 - 31.0 pg Final    MCHC 07/11/2022 30.5 (L)  32.0 - 36.0 g/dL Final    RDW 07/11/2022 16.2 (H)  11.5 - 14.5 % Final    Platelet Count 07/11/2022 469 (H)  150 - 400 K/uL Final    MPV 07/11/2022 11.8  9.4 - 12.4 fL Final    Neutrophils % 07/11/2022 59.6  53.0 - 65.0 % Final    Lymphocytes % 07/11/2022 30.4  27.0 - 41.0 % Final    Monocytes % 07/11/2022 7.5 (H)  2.0 - 6.0 % Final    Eosinophils % 07/11/2022 1.7  1.0 - 4.0 % Final    Basophils % 07/11/2022 0.6  0.0 - 1.0 % Final    Immature Granulocytes % 07/11/2022 0.2  0.0 - 0.4 % Final    nRBC, Auto 07/11/2022 0.0  <=0.0 % Final    Neutrophils, Abs 07/11/2022 4.93  1.80 - 7.70 K/uL Final    Lymphocytes, Absolute 07/11/2022 2.51  1.00 - 4.80 K/uL Final    Monocytes, Absolute 07/11/2022 0.62  0.00 - 0.80 K/uL Final    Eosinophils, Absolute 07/11/2022 0.14  0.00 - 0.50 K/uL Final    Basophils, Absolute 07/11/2022 0.05  0.00 - 0.20 K/uL Final    Immature Granulocytes, Absolute 07/11/2022 0.02  0.00 - 0.04 K/uL Final    nRBC, Absolute 07/11/2022 0.00  <=0.00 x10e3/uL Final    Diff Type 07/11/2022 Auto   Final   Lab Visit on  06/30/2022   Component Date Value Ref Range Status    ProBNP 06/30/2022 257 (H)  1 - 125 pg/mL Final    Sodium 06/30/2022 141  136 - 145 mmol/L Final    Potassium 06/30/2022 4.0  3.5 - 5.1 mmol/L Final    Chloride 06/30/2022 106  98 - 107 mmol/L Final    CO2 06/30/2022 25  21 - 32 mmol/L Final    Anion Gap 06/30/2022 14  7 - 16 mmol/L Final    Glucose 06/30/2022 154 (H)  74 - 106 mg/dL Final    BUN 06/30/2022 20 (H)  7 - 18 mg/dL Final    Creatinine 06/30/2022 1.03 (H)  0.55 - 1.02 mg/dL Final    BUN/Creatinine Ratio 06/30/2022 19  6 - 20 Final    Calcium 06/30/2022 8.5  8.5 - 10.1 mg/dL Final    Total Protein 06/30/2022 7.1  6.4 - 8.2 g/dL Final    Albumin 06/30/2022 3.5  3.5 - 5.0 g/dL Final    Globulin 06/30/2022 3.6  2.0 - 4.0 g/dL Final    A/G Ratio 06/30/2022 1.0   Final    Bilirubin, Total 06/30/2022 0.2  0.0 - 1.2 mg/dL Final    Alk Phos 06/30/2022 114  46 - 118 U/L Final    ALT 06/30/2022 18  13 - 56 U/L Final    AST 06/30/2022 21  15 - 37 U/L Final    eGFR 06/30/2022 58 (L)  >=60 mL/min/1.73m² Final   Office Visit on 06/30/2022   Component Date Value Ref Range Status    Sodium 06/30/2022 141  136 - 145 mmol/L Final    Potassium 06/30/2022 4.0  3.5 - 5.1 mmol/L Final    Chloride 06/30/2022 108 (H)  98 - 107 mmol/L Final    CO2 06/30/2022 24  21 - 32 mmol/L Final    Anion Gap 06/30/2022 13  7 - 16 mmol/L Final    Glucose 06/30/2022 209 (H)  74 - 106 mg/dL Final    BUN 06/30/2022 21 (H)  7 - 18 mg/dL Final    Creatinine 06/30/2022 0.98  0.55 - 1.02 mg/dL Final    BUN/Creatinine Ratio 06/30/2022 21 (H)  6 - 20 Final    Calcium 06/30/2022 9.3  8.5 - 10.1 mg/dL Final    eGFR 06/30/2022 62  >=60 mL/min/1.73m² Final    WBC 06/30/2022 7.85  4.50 - 11.00 K/uL Final    RBC 06/30/2022 3.86 (L)  4.20 - 5.40 M/uL Final    Hemoglobin 06/30/2022 9.8 (L)  12.0 - 16.0 g/dL Final    Hematocrit 06/30/2022 31.8 (L)  38.0 - 47.0 % Final    MCV 06/30/2022 82.4  80.0 - 96.0 fL Final    MCH 06/30/2022 25.4 (L)  27.0 - 31.0 pg  Final    MCHC 06/30/2022 30.8 (L)  32.0 - 36.0 g/dL Final    RDW 06/30/2022 16.1 (H)  11.5 - 14.5 % Final    Platelet Count 06/30/2022 421 (H)  150 - 400 K/uL Final    MPV 06/30/2022 11.7  9.4 - 12.4 fL Final    Neutrophils % 06/30/2022 69.1 (H)  53.0 - 65.0 % Final    Lymphocytes % 06/30/2022 22.9 (L)  27.0 - 41.0 % Final    Monocytes % 06/30/2022 5.7  2.0 - 6.0 % Final    Eosinophils % 06/30/2022 1.5  1.0 - 4.0 % Final    Basophils % 06/30/2022 0.5  0.0 - 1.0 % Final    Immature Granulocytes % 06/30/2022 0.3  0.0 - 0.4 % Final    nRBC, Auto 06/30/2022 0.0  <=0.0 % Final    Neutrophils, Abs 06/30/2022 5.42  1.80 - 7.70 K/uL Final    Lymphocytes, Absolute 06/30/2022 1.80  1.00 - 4.80 K/uL Final    Monocytes, Absolute 06/30/2022 0.45  0.00 - 0.80 K/uL Final    Eosinophils, Absolute 06/30/2022 0.12  0.00 - 0.50 K/uL Final    Basophils, Absolute 06/30/2022 0.04  0.00 - 0.20 K/uL Final    Immature Granulocytes, Absolute 06/30/2022 0.02  0.00 - 0.04 K/uL Final    nRBC, Absolute 06/30/2022 0.00  <=0.00 x10e3/uL Final    Diff Type 06/30/2022 Auto   Final   Office Visit on 06/28/2022   Component Date Value Ref Range Status    POC Amphetamines 06/28/2022 Negative  Negative, Inconclusive Final    POC Barbiturates 06/28/2022 Negative  Negative, Inconclusive Final    POC Benzodiazepines 06/28/2022 Negative  Negative, Inconclusive Final    POC Cocaine 06/28/2022 Negative  Negative, Inconclusive Final    POC THC 06/28/2022 Negative  Negative, Inconclusive Final    POC Methadone 06/28/2022 Negative  Negative, Inconclusive Final    POC Methamphetamine 06/28/2022 Negative  Negative, Inconclusive Final    POC Opiates 06/28/2022 Presumptive Positive (A)  Negative, Inconclusive Final    POC Oxycodone 06/28/2022 Negative  Negative, Inconclusive Final    POC Phencyclidine 06/28/2022 Negative  Negative, Inconclusive Final    POC Methylenedioxymethamphetamine * 06/28/2022 Negative  Negative, Inconclusive Final    POC Tricyclic  Antidepressants 06/28/2022 Negative  Negative, Inconclusive Final    POC Buprenorphine 06/28/2022 Negative   Final     Acceptable 06/28/2022 Yes   Final    POC Temperature (Urine) 06/28/2022 92   Final    pH, UA 06/28/2022 6.5  5.0, 5.5, 6.0, 6.5, 7.0, 7.5, 8.0 pH Units Final    Specific Gravity, UA 06/28/2022 <=1.005  <=1.005, 1.010, 1.015, 1.020, 1.025, 1.030 Final    Creatinine, Urine 06/28/2022 <13 (L)  28 - 219 mg/dL Final    Refractometer, Urine 06/28/2022 1.006   Final    6-Acetylmorphine 06/28/2022 Negative  10 ng/mL Final    7-Aminoclonazepam 06/28/2022 Negative  Negative 25 ng/mL Final    a-Hydroxyalprazolam 06/28/2022 Negative  25 ng/mL Final    Acetyl Fentanyl 06/28/2022 Negative  2.5 ng/mL Final    Acetyl Norfentanyl Oxalate 06/28/2022 Negative  5 ng/mL Final    Benzoylecgonine 06/28/2022 Negative  100 ng/mL Final    Buprenorphine 06/28/2022 Negative  25 ng/mL Final    Codeine 06/28/2022 Negative  25 ng/mL Final    EDDP 06/28/2022 Negative  25 ng/mL Final    Fentanyl 06/28/2022 Negative  2.5 ng/mL Final    Hydrocodone 06/28/2022 Negative  25 ng/mL Final    Hydromorphone 06/28/2022 Negative  25 ng/mL Final    Lorazepam 06/28/2022 Negative  25 ng/mL Final    Morphine 06/28/2022 Negative  25 ng/mL Final    Norbuprenorphine 06/28/2022 Negative  25 ng/mL Final    Nordiazepam 06/28/2022 Negative  25 ng/mL Final    Norfentanyl Oxalate 06/28/2022 Negative  5 ng/mL Final    Norhydrocodone 06/28/2022 Negative  50 ng/mL Final    Noroxycodone HCL 06/28/2022 Negative  50 ng/mL Final    Oxazepam 06/28/2022 Negative  25 ng/mL Final    Oxymorphone 06/28/2022 Negative  25 ng/mL Final    Tapentadol 06/28/2022 Negative  25 ng/mL Final    Temazepam 06/28/2022 Negative  25 ng/mL Final    THC-COOH 06/28/2022 Negative  25 ng/mL Final    Tramadol 06/28/2022 Negative  100 ng/mL Final    Amphetamine, Urine 06/28/2022 Negative  Negative Final    Methamphetamines, Urine 06/28/2022 Negative  Negative Final     Methadone, Urine 2022 Negative  Negative 25 ng/mL Final    Oxycodone, Urine 2022 Negative  Negative 25 ng/mL Final   There may be more visits with results that are not included.         Orders Placed This Encounter   Procedures    POCT Urine Drug Screen Presump     Interpretive Information:     Negative:  No drug detected at the cut off level.   Positive:  This result represents presumptive positive for the   tested drug, other substances may yield a positive response other   than the analyte of interest. This result should be utilized for   diagnostic purpose only. Confirmation testing will be performed upon physician request only.            Requested Prescriptions     Signed Prescriptions Disp Refills    naloxone (NARCAN) 4 mg/actuation Spry 1 each 0     Si spray (4 mg total) by Nasal route once. for 1 dose    traMADoL (ULTRAM) 50 mg tablet 60 tablet 2     Sig: Take 1 tablet (50 mg total) by mouth every 12 (twelve) hours as needed for Pain.       Assessment:     1. Encounter for long-term (current) use of other medications    2. Lumbosacral radiculopathy    3. Osteoarthrosis multiple sites, not specified as generalized         A's of Opioid Risk Assessment  Activity:Patient can perform ADL.   Analgesia:Patients pain is partially controlled by current medication. Patient has tried OTC medications such as Tylenol and Ibuprofen with out relief.   Adverse Effects: Patient denies constipation or sedation.  Aberrant Behavior:  reviewed with no aberrant drug seeking/taking behavior.  Overdose reversal drug naloxone discussed    Drug screen reviewed      Plan:    Narcan 2022    Anticoagulated Plavix    History Colon  Cancer    Presumptive drug screen negative this is expected result, patient takes tramadol, cup does not test for tramadol    She would like to continue with conservative management    Continue home exercise program as directed    Continue current medication    Follow-up 3 months      Dr. Fowler, March 2023    Bring original prescription medication bottles/container/box with labels to each visit    Pill count    Physical therapy    Massage therapy declines

## 2022-12-20 ENCOUNTER — OFFICE VISIT (OUTPATIENT)
Dept: PAIN MEDICINE | Facility: CLINIC | Age: 62
End: 2022-12-20
Payer: COMMERCIAL

## 2022-12-20 VITALS
RESPIRATION RATE: 18 BRPM | HEIGHT: 61 IN | DIASTOLIC BLOOD PRESSURE: 71 MMHG | BODY MASS INDEX: 39.65 KG/M2 | HEART RATE: 60 BPM | SYSTOLIC BLOOD PRESSURE: 202 MMHG | WEIGHT: 210 LBS

## 2022-12-20 DIAGNOSIS — Z79.899 ENCOUNTER FOR LONG-TERM (CURRENT) USE OF OTHER MEDICATIONS: Primary | ICD-10-CM

## 2022-12-20 DIAGNOSIS — M89.49 OSTEOARTHROSIS MULTIPLE SITES, NOT SPECIFIED AS GENERALIZED: Chronic | ICD-10-CM

## 2022-12-20 DIAGNOSIS — M54.17 LUMBOSACRAL RADICULOPATHY: Chronic | ICD-10-CM

## 2022-12-20 LAB

## 2022-12-20 PROCEDURE — 3066F NEPHROPATHY DOC TX: CPT | Mod: ,,, | Performed by: PHYSICIAN ASSISTANT

## 2022-12-20 PROCEDURE — 3078F PR MOST RECENT DIASTOLIC BLOOD PRESSURE < 80 MM HG: ICD-10-PCS | Mod: ,,, | Performed by: PHYSICIAN ASSISTANT

## 2022-12-20 PROCEDURE — 99214 PR OFFICE/OUTPT VISIT, EST, LEVL IV, 30-39 MIN: ICD-10-PCS | Mod: S$PBB,,, | Performed by: PHYSICIAN ASSISTANT

## 2022-12-20 PROCEDURE — 4010F ACE/ARB THERAPY RXD/TAKEN: CPT | Mod: ,,, | Performed by: PHYSICIAN ASSISTANT

## 2022-12-20 PROCEDURE — 3008F BODY MASS INDEX DOCD: CPT | Mod: ,,, | Performed by: PHYSICIAN ASSISTANT

## 2022-12-20 PROCEDURE — 4010F PR ACE/ARB THEARPY RXD/TAKEN: ICD-10-PCS | Mod: ,,, | Performed by: PHYSICIAN ASSISTANT

## 2022-12-20 PROCEDURE — 1159F PR MEDICATION LIST DOCUMENTED IN MEDICAL RECORD: ICD-10-PCS | Mod: ,,, | Performed by: PHYSICIAN ASSISTANT

## 2022-12-20 PROCEDURE — 3060F PR POS MICROALBUMINURIA RESULT DOCUMENTED/REVIEW: ICD-10-PCS | Mod: ,,, | Performed by: PHYSICIAN ASSISTANT

## 2022-12-20 PROCEDURE — 99214 OFFICE O/P EST MOD 30 MIN: CPT | Mod: S$PBB,,, | Performed by: PHYSICIAN ASSISTANT

## 2022-12-20 PROCEDURE — 3078F DIAST BP <80 MM HG: CPT | Mod: ,,, | Performed by: PHYSICIAN ASSISTANT

## 2022-12-20 PROCEDURE — 3077F SYST BP >= 140 MM HG: CPT | Mod: ,,, | Performed by: PHYSICIAN ASSISTANT

## 2022-12-20 PROCEDURE — 3060F POS MICROALBUMINURIA REV: CPT | Mod: ,,, | Performed by: PHYSICIAN ASSISTANT

## 2022-12-20 PROCEDURE — 3008F PR BODY MASS INDEX (BMI) DOCUMENTED: ICD-10-PCS | Mod: ,,, | Performed by: PHYSICIAN ASSISTANT

## 2022-12-20 PROCEDURE — 1159F MED LIST DOCD IN RCRD: CPT | Mod: ,,, | Performed by: PHYSICIAN ASSISTANT

## 2022-12-20 PROCEDURE — 3051F PR MOST RECENT HEMOGLOBIN A1C LEVEL 7.0 - < 8.0%: ICD-10-PCS | Mod: ,,, | Performed by: PHYSICIAN ASSISTANT

## 2022-12-20 PROCEDURE — 99215 OFFICE O/P EST HI 40 MIN: CPT | Mod: PBBFAC | Performed by: PHYSICIAN ASSISTANT

## 2022-12-20 PROCEDURE — 3066F PR DOCUMENTATION OF TREATMENT FOR NEPHROPATHY: ICD-10-PCS | Mod: ,,, | Performed by: PHYSICIAN ASSISTANT

## 2022-12-20 PROCEDURE — 3051F HG A1C>EQUAL 7.0%<8.0%: CPT | Mod: ,,, | Performed by: PHYSICIAN ASSISTANT

## 2022-12-20 PROCEDURE — 3077F PR MOST RECENT SYSTOLIC BLOOD PRESSURE >= 140 MM HG: ICD-10-PCS | Mod: ,,, | Performed by: PHYSICIAN ASSISTANT

## 2022-12-20 PROCEDURE — 80305 DRUG TEST PRSMV DIR OPT OBS: CPT | Mod: PBBFAC | Performed by: PHYSICIAN ASSISTANT

## 2022-12-20 RX ORDER — NALOXONE HYDROCHLORIDE 4 MG/.1ML
1 SPRAY NASAL ONCE
Qty: 1 EACH | Refills: 0 | Status: SHIPPED | OUTPATIENT
Start: 2022-12-20 | End: 2022-12-20

## 2022-12-20 RX ORDER — TRAMADOL HYDROCHLORIDE 50 MG/1
50 TABLET ORAL EVERY 12 HOURS PRN
Qty: 60 TABLET | Refills: 2 | Status: SHIPPED | OUTPATIENT
Start: 2023-01-05 | End: 2023-03-29 | Stop reason: SDUPTHER

## 2022-12-28 ENCOUNTER — CLINICAL SUPPORT (OUTPATIENT)
Dept: FAMILY MEDICINE | Facility: CLINIC | Age: 62
End: 2022-12-28
Payer: COMMERCIAL

## 2022-12-28 VITALS — SYSTOLIC BLOOD PRESSURE: 170 MMHG | OXYGEN SATURATION: 98 % | DIASTOLIC BLOOD PRESSURE: 60 MMHG | HEART RATE: 57 BPM

## 2022-12-28 DIAGNOSIS — I10 PRIMARY HYPERTENSION: Primary | ICD-10-CM

## 2022-12-28 NOTE — PROGRESS NOTES
"Pt arrived for nurse visit with BP check.  Pt reports BP was elevated "in the 200s" when she was at the pain clinic and pt reports noticing having headaches more often.  BP taken x2 in right arm with manual BP cuff with results of 168/60 and 170/60.  Pt reports she had headache this am that eased with taking her usual BP meds.  Notified Dr. Potts of BP today and received instructions for pt to make appt asap for BP and evaluation but may need to be a walk-in due to minimal appts being available.  This RN notified pt of Dr. Potts's instructions and attempted to make pt appt with only slot available being tomorrow at 0900 and pt declined.  Pt to come as walk in tomorrow when she gets off work at 2pm.  Instructed pt to be seen immediately with any unrelieved headaches, vision changes or chest pain.  Understanding verbalized.      "

## 2023-01-18 ENCOUNTER — OFFICE VISIT (OUTPATIENT)
Dept: FAMILY MEDICINE | Facility: CLINIC | Age: 63
End: 2023-01-18
Payer: COMMERCIAL

## 2023-01-18 VITALS
OXYGEN SATURATION: 95 % | HEIGHT: 61 IN | HEART RATE: 66 BPM | BODY MASS INDEX: 39.08 KG/M2 | DIASTOLIC BLOOD PRESSURE: 72 MMHG | TEMPERATURE: 98 F | SYSTOLIC BLOOD PRESSURE: 148 MMHG | WEIGHT: 207 LBS | RESPIRATION RATE: 17 BRPM

## 2023-01-18 DIAGNOSIS — J34.89 SINUS DRAINAGE: ICD-10-CM

## 2023-01-18 DIAGNOSIS — R05.9 COUGH, UNSPECIFIED TYPE: ICD-10-CM

## 2023-01-18 DIAGNOSIS — J01.90 ACUTE NON-RECURRENT SINUSITIS, UNSPECIFIED LOCATION: Primary | ICD-10-CM

## 2023-01-18 DIAGNOSIS — I10 PRIMARY HYPERTENSION: Chronic | ICD-10-CM

## 2023-01-18 PROCEDURE — 3077F SYST BP >= 140 MM HG: CPT | Mod: ,,, | Performed by: FAMILY MEDICINE

## 2023-01-18 PROCEDURE — 87428 SARSCOV & INF VIR A&B AG IA: CPT | Mod: QW,,, | Performed by: FAMILY MEDICINE

## 2023-01-18 PROCEDURE — 4010F PR ACE/ARB THEARPY RXD/TAKEN: ICD-10-PCS | Mod: ,,, | Performed by: FAMILY MEDICINE

## 2023-01-18 PROCEDURE — 3077F PR MOST RECENT SYSTOLIC BLOOD PRESSURE >= 140 MM HG: ICD-10-PCS | Mod: ,,, | Performed by: FAMILY MEDICINE

## 2023-01-18 PROCEDURE — 1159F MED LIST DOCD IN RCRD: CPT | Mod: ,,, | Performed by: FAMILY MEDICINE

## 2023-01-18 PROCEDURE — 1159F PR MEDICATION LIST DOCUMENTED IN MEDICAL RECORD: ICD-10-PCS | Mod: ,,, | Performed by: FAMILY MEDICINE

## 2023-01-18 PROCEDURE — 99214 PR OFFICE/OUTPT VISIT, EST, LEVL IV, 30-39 MIN: ICD-10-PCS | Mod: ,,, | Performed by: FAMILY MEDICINE

## 2023-01-18 PROCEDURE — 4010F ACE/ARB THERAPY RXD/TAKEN: CPT | Mod: ,,, | Performed by: FAMILY MEDICINE

## 2023-01-18 PROCEDURE — 3078F PR MOST RECENT DIASTOLIC BLOOD PRESSURE < 80 MM HG: ICD-10-PCS | Mod: ,,, | Performed by: FAMILY MEDICINE

## 2023-01-18 PROCEDURE — 3078F DIAST BP <80 MM HG: CPT | Mod: ,,, | Performed by: FAMILY MEDICINE

## 2023-01-18 PROCEDURE — 1160F RVW MEDS BY RX/DR IN RCRD: CPT | Mod: ,,, | Performed by: FAMILY MEDICINE

## 2023-01-18 PROCEDURE — 3008F PR BODY MASS INDEX (BMI) DOCUMENTED: ICD-10-PCS | Mod: ,,, | Performed by: FAMILY MEDICINE

## 2023-01-18 PROCEDURE — 99214 OFFICE O/P EST MOD 30 MIN: CPT | Mod: ,,, | Performed by: FAMILY MEDICINE

## 2023-01-18 PROCEDURE — 1160F PR REVIEW ALL MEDS BY PRESCRIBER/CLIN PHARMACIST DOCUMENTED: ICD-10-PCS | Mod: ,,, | Performed by: FAMILY MEDICINE

## 2023-01-18 PROCEDURE — 87428 POCT SARS-COV2 (COVID) WITH FLU ANTIGEN: ICD-10-PCS | Mod: QW,,, | Performed by: FAMILY MEDICINE

## 2023-01-18 PROCEDURE — 3008F BODY MASS INDEX DOCD: CPT | Mod: ,,, | Performed by: FAMILY MEDICINE

## 2023-01-18 RX ORDER — BENZONATATE 100 MG/1
100 CAPSULE ORAL 3 TIMES DAILY PRN
Qty: 30 CAPSULE | Refills: 0 | Status: SHIPPED | OUTPATIENT
Start: 2023-01-18 | End: 2023-01-28

## 2023-01-18 RX ORDER — OLMESARTAN MEDOXOMIL 40 MG/1
40 TABLET ORAL DAILY
Qty: 90 TABLET | Refills: 1 | Status: SHIPPED | OUTPATIENT
Start: 2023-01-18 | End: 2023-01-25 | Stop reason: SDUPTHER

## 2023-01-18 RX ORDER — AZITHROMYCIN 250 MG/1
TABLET, FILM COATED ORAL
Qty: 6 TABLET | Refills: 0 | Status: SHIPPED | OUTPATIENT
Start: 2023-01-18 | End: 2023-01-23

## 2023-01-18 RX ORDER — LORATADINE 10 MG/1
10 TABLET ORAL DAILY
Qty: 30 TABLET | Refills: 0 | Status: SHIPPED | OUTPATIENT
Start: 2023-01-18 | End: 2023-11-02

## 2023-01-18 NOTE — PROGRESS NOTES
New Clinic Note    Dilma Cr is a 62 y.o. female     CC:   Chief Complaint   Patient presents with    Cough     State she have been having a cough that started 2 days ago. State it begin to get worse and have been coughing up white sputum.       Nasal Congestion    Sinusitis     State she has been having sinus drainage. Have took liquid tylenol.         Subjective    History of Present Illness HPI   Patient complains of cough and congestion for 2 days. She denies fever. She complains of laryngitis and headache. She has taken tylenol and cough drops with some relief.     Current Outpatient Medications:     ascorbic acid, vitamin C, 250 mg Chew, Take 250 mg by mouth once daily., Disp: , Rfl:     aspirin 81 MG Chew, Take 81 mg by mouth once daily., Disp: , Rfl:     clopidogreL (PLAVIX) 75 mg tablet, Take 1 tablet (75 mg total) by mouth once daily., Disp: 90 tablet, Rfl: 1    cyanocobalamin (VITAMIN B-12) 1000 MCG tablet, Take 100 mcg by mouth once daily., Disp: , Rfl:     cyclobenzaprine (FLEXERIL) 5 MG tablet, Take 5 mg by mouth 3 (three) times daily as needed., Disp: , Rfl:     furosemide (LASIX) 20 MG tablet, Take 1 tablet (20 mg total) by mouth daily as needed (fluid)., Disp: 90 tablet, Rfl: 1    iron-vitamin C 100-250 mg, ICAR-C, 100-250 mg Tab, Take 1 tablet by mouth 2 (two) times daily with meals., Disp: , Rfl:     JARDIANCE 25 mg tablet, Take 1 tablet (25 mg total) by mouth once daily., Disp: 30 tablet, Rfl: 5    metoprolol succinate (TOPROL-XL) 25 MG 24 hr tablet, Take 1 tablet (25 mg total) by mouth once daily., Disp: 90 tablet, Rfl: 3    nitroGLYCERIN (NITROSTAT) 0.4 MG SL tablet, Place 1 tablet (0.4 mg total) under the tongue every 5 (five) minutes as needed for Chest pain., Disp: 30 tablet, Rfl: 0    NOVOLOG FLEXPEN U-100 INSULIN 100 unit/mL (3 mL) InPn pen, Inject 20 Units into the skin 2 (two) times a day., Disp: 12 each, Rfl: 1    pantoprazole (PROTONIX) 40 MG tablet, Take 1 tablet (40 mg total)  by mouth once daily., Disp: 90 tablet, Rfl: 1    rosuvastatin (CRESTOR) 40 MG Tab, Take 1 tablet by mouth once daily, Disp: 90 tablet, Rfl: 0    traMADoL (ULTRAM) 50 mg tablet, Take 1 tablet (50 mg total) by mouth every 12 (twelve) hours as needed for Pain., Disp: 60 tablet, Rfl: 2    TRESIBA FLEXTOUCH U-100 100 unit/mL (3 mL) insulin pen, Inject 24 Units into the skin once daily., Disp: 3 pen, Rfl: 1    azithromycin (Z-JAC) 250 MG tablet, Take 2 tablets by mouth on day 1; Take 1 tablet by mouth on days 2-5, Disp: 6 tablet, Rfl: 0    benzonatate (TESSALON) 100 MG capsule, Take 1 capsule (100 mg total) by mouth 3 (three) times daily as needed for Cough., Disp: 30 capsule, Rfl: 0    BINAXNOW COVID-19 AG SELF TEST Kit, Use as Directed on the Package, Disp: , Rfl:     FREESTYLE BECCA 14 DAY SENSOR Kit, USE TO CHECK GLUCOSE 4 TIMES DAILY, Disp: , Rfl:     loratadine (CLARITIN) 10 mg tablet, Take 1 tablet (10 mg total) by mouth once daily., Disp: 30 tablet, Rfl: 0    olmesartan (BENICAR) 40 MG tablet, Take 1 tablet (40 mg total) by mouth once daily., Disp: 90 tablet, Rfl: 1    OZEMPIC 1 mg/dose (4 mg/3 mL), , Disp: , Rfl:      Past Medical History:   Diagnosis Date    Acute superficial gastritis without hemorrhage 6/21/2022    Arthritis     Cancer     colon cancer    Chronic kidney disease, stage 3b     Colon cancer     Coronary artery disease     Depression     Diabetes mellitus, type 2     Diverticula, colon 6/22/2022    GERD (gastroesophageal reflux disease)     H/O right hemicolectomy 6/22/2022    HH (hiatus hernia) 6/21/2022    Hyperlipidemia     Myocardial infarction     2 stents in 05/14/2021 Dr Lora    Renal disorder         Family History   Problem Relation Age of Onset    Hypertension Mother     Diabetes Mother     Hypertension Father     Diabetes Father     Hypertension Sister     Breast cancer Maternal Grandmother     Hypertension Brother         Past Surgical History:   Procedure Laterality Date    BREAST  "SURGERY      C5-C6 RADHA  8-, 7-, 6-1-2016    Dr Fowler    CERVICAL SPINE SURGERY      COLON SURGERY      EPIDURAL STEROID INJECTION INTO LUMBAR SPINE N/A 3/29/2022    Procedure: L4-5 RADHA;  Surgeon: Gela Fowler MD;  Location: [facility] PAIN MGMT;  Service: Pain Management;  Laterality: N/A;  PT AWARE ON OV TO BE TESTED\  PLAVIX TO BE HELD FOR 7 DAYS PRIOR TO PROCEDURE PER DR FOWLER AND DR PORTER  3-28  message left on vm re: covid    HYSTERECTOMY      LEFT HEART CATHETERIZATION Left 5/14/2021    Procedure: Left heart cath;  Surgeon: Maetus Guan MD;  Location: [facility] CATH LAB;  Service: Cardiology;  Laterality: Left;    LEFT HEART CATHETERIZATION Left 6/20/2022    Procedure: Left heart cath;  Surgeon: Oliverio Bautista MD;  Location: [facility] CATH LAB;  Service: Cardiology;  Laterality: Left;    MEDIPORT REMOVAL  10/14/2021    Dr Kraft    right rotator cuff repair      in [place]    TOTAL REDUCTION MAMMOPLASTY          Review of Systems   Constitutional:  Negative for fatigue and fever.   HENT:  Positive for nasal congestion, postnasal drip, rhinorrhea and sinus pressure/congestion. Negative for ear pain.    Respiratory:  Positive for cough. Negative for shortness of breath.    Cardiovascular:  Negative for chest pain.   Gastrointestinal:  Negative for abdominal pain, diarrhea, nausea and vomiting.   Genitourinary:  Negative for dysuria.   Neurological:  Negative for headaches.      BP (!) 148/72 (BP Location: Left arm, Patient Position: Sitting, BP Method: Large (Manual))   Pulse 66   Temp 97.8 °F (36.6 °C) (Oral)   Resp 17   Ht 5' 1" (1.549 m)   Wt 93.9 kg (207 lb)   LMP  (LMP Unknown)   SpO2 95%   BMI 39.11 kg/m²      Physical Exam  HENT:      Head: Normocephalic and atraumatic.      Nose: Rhinorrhea present.   Cardiovascular:      Rate and Rhythm: Normal rate and regular rhythm.   Pulmonary:      Effort: Pulmonary effort is normal.      Breath sounds: Normal breath sounds. "   Neurological:      Mental Status: She is alert and oriented to person, place, and time.   Psychiatric:         Mood and Affect: Mood normal.         Behavior: Behavior normal.        Assessment and Plan      ICD-10-CM ICD-9-CM   1. Acute non-recurrent sinusitis, unspecified location  J01.90 461.9   2. Sinus drainage  J34.89 478.19   3. Cough, unspecified type  R05.9 786.2   4. Primary hypertension  I10 401.9        Problem List Items Addressed This Visit          Cardiac/Vascular    Hypertension (Chronic)    Current Assessment & Plan     Uncontrolled. Increase benicar to 40mg. Recheck in 3-4 weeks.          Relevant Medications    olmesartan (BENICAR) 40 MG tablet     Other Visit Diagnoses       Acute non-recurrent sinusitis, unspecified location    -  Primary    Relevant Medications    azithromycin (Z-JAC) 250 MG tablet    benzonatate (TESSALON) 100 MG capsule    loratadine (CLARITIN) 10 mg tablet    Sinus drainage        Relevant Orders    POCT SARS-COV2 (COVID) with Flu Antigen (Completed)    Cough, unspecified type        Relevant Orders    POCT SARS-COV2 (COVID) with Flu Antigen (Completed)             Patient Instructions   Take medications as directed  Monitor temperature, if fever begins, tylenol or ibuprofen can be used as long as you have no history of abnormal reactions to these medications. Follow the instructions on the bottle or contact clinic with questions.   Please contact clinic if symptoms begin to get worse.   Report to the ER if you feel your symptoms are severe and life threatening.       Follow up if symptoms worsen or fail to improve.

## 2023-01-25 ENCOUNTER — OFFICE VISIT (OUTPATIENT)
Dept: FAMILY MEDICINE | Facility: CLINIC | Age: 63
End: 2023-01-25
Payer: COMMERCIAL

## 2023-01-25 VITALS
TEMPERATURE: 99 F | WEIGHT: 206 LBS | HEART RATE: 63 BPM | HEIGHT: 61 IN | RESPIRATION RATE: 17 BRPM | BODY MASS INDEX: 38.89 KG/M2 | SYSTOLIC BLOOD PRESSURE: 136 MMHG | DIASTOLIC BLOOD PRESSURE: 76 MMHG | OXYGEN SATURATION: 96 %

## 2023-01-25 DIAGNOSIS — E11.8 DIABETES MELLITUS TYPE 2 WITH COMPLICATIONS: Primary | ICD-10-CM

## 2023-01-25 DIAGNOSIS — I10 PRIMARY HYPERTENSION: ICD-10-CM

## 2023-01-25 LAB
ALBUMIN SERPL BCP-MCNC: 3.5 G/DL (ref 3.5–5)
ALBUMIN/GLOB SERPL: 0.9 {RATIO}
ALP SERPL-CCNC: 88 U/L (ref 50–130)
ALT SERPL W P-5'-P-CCNC: 25 U/L (ref 13–56)
ANION GAP SERPL CALCULATED.3IONS-SCNC: 6 MMOL/L (ref 7–16)
AST SERPL W P-5'-P-CCNC: 20 U/L (ref 15–37)
BILIRUB SERPL-MCNC: 0.2 MG/DL (ref ?–1.2)
BUN SERPL-MCNC: 22 MG/DL (ref 7–18)
BUN/CREAT SERPL: 20 (ref 6–20)
CALCIUM SERPL-MCNC: 9.2 MG/DL (ref 8.5–10.1)
CHLORIDE SERPL-SCNC: 111 MMOL/L (ref 98–107)
CO2 SERPL-SCNC: 29 MMOL/L (ref 21–32)
CREAT SERPL-MCNC: 1.1 MG/DL (ref 0.55–1.02)
CREAT UR-MCNC: 57 MG/DL (ref 28–219)
EGFR (NO RACE VARIABLE) (RUSH/TITUS): 58 ML/MIN/1.73M²
EST. AVERAGE GLUCOSE BLD GHB EST-MCNC: 187 MG/DL
GLOBULIN SER-MCNC: 3.9 G/DL (ref 2–4)
GLUCOSE SERPL-MCNC: 150 MG/DL (ref 74–106)
HBA1C MFR BLD HPLC: 8.2 % (ref 4.5–6.6)
MICROALBUMIN UR-MCNC: 48.5 MG/DL (ref 0–2.8)
MICROALBUMIN/CREAT RATIO PNL UR: 850.9 MG/G (ref 0–30)
POTASSIUM SERPL-SCNC: 4.1 MMOL/L (ref 3.5–5.1)
PROT SERPL-MCNC: 7.4 G/DL (ref 6.4–8.2)
SODIUM SERPL-SCNC: 142 MMOL/L (ref 136–145)

## 2023-01-25 PROCEDURE — 82570 ASSAY OF URINE CREATININE: CPT | Mod: ,,, | Performed by: CLINICAL MEDICAL LABORATORY

## 2023-01-25 PROCEDURE — 82043 UR ALBUMIN QUANTITATIVE: CPT | Mod: ,,, | Performed by: CLINICAL MEDICAL LABORATORY

## 2023-01-25 PROCEDURE — 99214 OFFICE O/P EST MOD 30 MIN: CPT | Mod: ,,, | Performed by: FAMILY MEDICINE

## 2023-01-25 PROCEDURE — 3008F PR BODY MASS INDEX (BMI) DOCUMENTED: ICD-10-PCS | Mod: ,,, | Performed by: FAMILY MEDICINE

## 2023-01-25 PROCEDURE — 3078F PR MOST RECENT DIASTOLIC BLOOD PRESSURE < 80 MM HG: ICD-10-PCS | Mod: ,,, | Performed by: FAMILY MEDICINE

## 2023-01-25 PROCEDURE — 1160F RVW MEDS BY RX/DR IN RCRD: CPT | Mod: ,,, | Performed by: FAMILY MEDICINE

## 2023-01-25 PROCEDURE — 1159F PR MEDICATION LIST DOCUMENTED IN MEDICAL RECORD: ICD-10-PCS | Mod: ,,, | Performed by: FAMILY MEDICINE

## 2023-01-25 PROCEDURE — 3075F SYST BP GE 130 - 139MM HG: CPT | Mod: ,,, | Performed by: FAMILY MEDICINE

## 2023-01-25 PROCEDURE — 80053 COMPREHENSIVE METABOLIC PANEL: ICD-10-PCS | Mod: ,,, | Performed by: CLINICAL MEDICAL LABORATORY

## 2023-01-25 PROCEDURE — 99214 PR OFFICE/OUTPT VISIT, EST, LEVL IV, 30-39 MIN: ICD-10-PCS | Mod: ,,, | Performed by: FAMILY MEDICINE

## 2023-01-25 PROCEDURE — 1160F PR REVIEW ALL MEDS BY PRESCRIBER/CLIN PHARMACIST DOCUMENTED: ICD-10-PCS | Mod: ,,, | Performed by: FAMILY MEDICINE

## 2023-01-25 PROCEDURE — 82570 MICROALBUMIN / CREATININE RATIO URINE: ICD-10-PCS | Mod: ,,, | Performed by: CLINICAL MEDICAL LABORATORY

## 2023-01-25 PROCEDURE — 3008F BODY MASS INDEX DOCD: CPT | Mod: ,,, | Performed by: FAMILY MEDICINE

## 2023-01-25 PROCEDURE — 80053 COMPREHEN METABOLIC PANEL: CPT | Mod: ,,, | Performed by: CLINICAL MEDICAL LABORATORY

## 2023-01-25 PROCEDURE — 83036 HEMOGLOBIN GLYCOSYLATED A1C: CPT | Mod: ,,, | Performed by: CLINICAL MEDICAL LABORATORY

## 2023-01-25 PROCEDURE — 83036 HEMOGLOBIN A1C: ICD-10-PCS | Mod: ,,, | Performed by: CLINICAL MEDICAL LABORATORY

## 2023-01-25 PROCEDURE — 82043 MICROALBUMIN / CREATININE RATIO URINE: ICD-10-PCS | Mod: ,,, | Performed by: CLINICAL MEDICAL LABORATORY

## 2023-01-25 PROCEDURE — 3078F DIAST BP <80 MM HG: CPT | Mod: ,,, | Performed by: FAMILY MEDICINE

## 2023-01-25 PROCEDURE — 3075F PR MOST RECENT SYSTOLIC BLOOD PRESS GE 130-139MM HG: ICD-10-PCS | Mod: ,,, | Performed by: FAMILY MEDICINE

## 2023-01-25 PROCEDURE — 1159F MED LIST DOCD IN RCRD: CPT | Mod: ,,, | Performed by: FAMILY MEDICINE

## 2023-01-25 RX ORDER — OLMESARTAN MEDOXOMIL AND HYDROCHLOROTHIAZIDE 40/25 40; 25 MG/1; MG/1
1 TABLET ORAL DAILY
COMMUNITY
Start: 2023-01-12

## 2023-01-25 RX ORDER — NALOXONE HYDROCHLORIDE 4 MG/.1ML
1 SPRAY NASAL ONCE AS NEEDED
COMMUNITY
Start: 2022-12-20

## 2023-01-25 NOTE — PATIENT INSTRUCTIONS
"Patient Education       Type 2 Diabetes   The Basics   Written by the doctors and editors at [facility]   What is type 2 diabetes? -- Type 2 diabetes is a disorder that disrupts the way your body uses sugar. It is sometimes called type 2 diabetes mellitus.  All the cells in your body need sugar to work normally. Sugar gets into the cells with the help of a hormone called insulin. Insulin is made by the pancreas, an organ in the belly. If there is not enough insulin, or if your body stops responding to insulin, sugar builds up in the blood. That is what happens to people with diabetes.  There are two different types of diabetes:   Type 1 diabetes - In type 1 diabetes, the pancreas does not make insulin or makes very little insulin.  Type 2 diabetes - In most people with type 2 diabetes, the body stops responding to insulin normally. Then, over time, the pancreas stops making enough insulin.   Being overweight or obese increases a person's risk of developing type 2 diabetes. But people who are not overweight can get diabetes, too.  What are the symptoms of type 2 diabetes? -- Type 2 diabetes usually causes no symptoms. When symptoms do occur, they include:  Needing to urinate often  Intense thirst  Blurry vision  Can diabetes lead to other health problems? -- Yes. Type 2 diabetes might not make you feel sick. But if it is not managed, it can lead to serious problems over time, such as:  Heart attacks  Strokes  Kidney disease  Vision problems (or even blindness)  Pain or loss of feeling in the hands and feet  Needing to have fingers, toes, or other body parts removed (amputated)  How do I know if I have type 2 diabetes? -- To find out if you have type 2 diabetes, your doctor or nurse can do a blood test. There are 2 tests that can be used for this. Both involve measuring the amount of sugar in your blood, called your "blood sugar" or "blood glucose":   One of the tests measures your blood sugar at the time the blood " "sample is taken. This test is done in the morning. You can't eat or drink anything except water for at least 8 hours before the test.   The other test shows what your average blood sugar has been for the past 2 to 3 months. This blood test is called "hemoglobin A1C" or just "A1C." It can be checked at any time of the day, even if you have recently eaten.  How is type 2 diabetes treated? -- The goals of treatment are to control your blood sugar and lower the risk of future problems that can happen in people with diabetes. An important part of managing diabetes is to eat healthy foods and get plenty of physical activity.  There are a few medicines that help control blood sugar. Some people need to take pills that help the body make more insulin or that help insulin do its job. Others need insulin shots.  Depending on what medicines you take, you might need to check your blood sugar regularly at home. But not everyone with type 2 diabetes needs to do this. Your doctor or nurse will tell you if you should be checking your blood sugar, and when and how to do this.  Sometimes, people with type 2 diabetes also need medicines to reduce the problems caused by the disease. For instance, medicines used to lower blood pressure can reduce the chances of a heart attack or stroke.  It's also important to get certain vaccines, such as vaccines to protect against the flu and coronavirus disease 2019 (COVID-19). Some people also need a vaccine to prevent pneumonia.  Can type 2 diabetes be prevented? -- Yes. To lower your chances of getting type 2 diabetes, the most important thing you can do is eat a healthy diet and get plenty of physical activity. This can help you lose weight if you are overweight. But eating well and being active are also good for your overall health. Even gentle activity, like walking, has benefits.  If you smoke, quitting can also lower your risk of type 2 diabetes. Quitting smoking can be hard to do, but your " "doctor or nurse can help.  All topics are updated as new evidence becomes available and our peer review process is complete.  This topic retrieved from [employer] on: Sep 21, 2021.  Topic 00403 Version 14.0  Release: 29.4.2 - C29.263  © 2021 UpToDate, Inc. and/or its affiliates. All rights reserved.  Consumer Information Use and Disclaimer   This information is not specific medical advice and does not replace information you receive from your health care provider. This is only a brief summary of general information. It does NOT include all information about conditions, illnesses, injuries, tests, procedures, treatments, therapies, discharge instructions or life-style choices that may apply to you. You must talk with your health care provider for complete information about your health and treatment options. This information should not be used to decide whether or not to accept your health care provider's advice, instructions or recommendations. Only your health care provider has the knowledge and training to provide advice that is right for you. The use of this information is governed by the [employer] End User License Agreement, available at https://www.[employer]/en/solutions/[employer]/about/shawna.The use of [employer] content is governed by the [employer] Terms of Use. ©2021 UpToDate, Inc. All rights reserved.  Copyright   © 2021 UpToDate, Inc. and/or its affiliates. All rights reserved.    Patient Education       High Blood Pressure in Adults   The Basics   Written by the doctors and editors at [facility]   What is high blood pressure? -- High blood pressure is a condition that puts you at risk for heart attack, stroke, and kidney disease. It does not usually cause symptoms. But it can be serious.  When your doctor or nurse tells you your blood pressure, they will say 2 numbers. For instance, your doctor or nurse might say that your blood pressure is "130 over 80." The top number is the pressure inside your arteries " "when your heart is bipin. The bottom number is the pressure inside your arteries when your heart is relaxed.  "Elevated blood pressure" is a term doctors or nurses use as a warning. People with elevated blood pressure do not yet have high blood pressure. But their blood pressure is not as low as it should be for good health.  Many experts define high, elevated, and normal blood pressure as follows:  High - Top number of 130 or above and/or bottom number of 80 or above  Elevated - Top number between 120 and 129 and bottom number of 79 or below  Normal - Top number of 119 or below and bottom number of 79 or below  This information is also in the table (table 1).   How can I lower my blood pressure? -- If your doctor or nurse has prescribed blood pressure medicine, the most important thing you can do is to take it. If it causes side effects, do not just stop taking it. Instead, talk to your doctor or nurse about the problems it causes. They might be able to lower your dose or switch you to another medicine. If cost is a problem, mention that too. They might be able to put you on a less expensive medicine. Taking your blood pressure medicine can keep you from having a heart attack or stroke, and it can save your life!  Can I do anything on my own? -- You have a lot of control over your blood pressure. To lower it:  Lose weight (if you are overweight)  Choose a diet low in fat and rich in fruits, vegetables, and low-fat dairy products  Reduce the amount of salt you eat  Do something active for at least 30 minutes a day on most days of the week  Cut down on alcohol (if you drink more than 2 alcoholic drinks per day)  It's also a good idea to get a home blood pressure meter. People who check their own blood pressure at home do better at keeping it low and can sometimes even reduce the amount of medicine they take.  All topics are updated as new evidence becomes available and our peer review process is complete.  This " "topic retrieved from [employer] on: Sep 21, 2021.  Topic 30589 Version 15.0  Release: 29.4.2 - C29.263  © 2021 UpToDate, Inc. and/or its affiliates. All rights reserved.  table 1: Definition of normal and high blood pressure  Level  Top number  Bottom number    High 130 or above 80 or above   Elevated 120 to 129 79 or below   Normal 119 or below 79 or below   These definitions are from the American College of Cardiology/American Heart Association. Other expert groups might use slightly different definitions.  "Elevated blood pressure" is a term doctor or nurses use as a warning. It means you do not yet have high blood pressure, but your blood pressure is not as low as it should be for good health.  Graphic 35831 Version 6.0  Consumer Information Use and Disclaimer   This information is not specific medical advice and does not replace information you receive from your health care provider. This is only a brief summary of general information. It does NOT include all information about conditions, illnesses, injuries, tests, procedures, treatments, therapies, discharge instructions or life-style choices that may apply to you. You must talk with your health care provider for complete information about your health and treatment options. This information should not be used to decide whether or not to accept your health care provider's advice, instructions or recommendations. Only your health care provider has the knowledge and training to provide advice that is right for you. The use of this information is governed by the [employer] End User License Agreement, available at https://www.[employer].eCullet/en/solutions/[employer]/about/shawna.The use of [employer] content is governed by the [employer] Terms of Use. ©2021 UpToDate, Inc. All rights reserved.  Copyright   © 2021 UpToDate, Inc. and/or its affiliates. All rights reserved.    Patient Education       Diabetes and Diet   The Basics   Written by the doctors and editors at [employer] " "  Why is diet important in diabetes? -- Diet is important because it is part of diabetes treatment. Many people need to change what they eat and how much they eat to help treat their diabetes. It is important for people to treat their diabetes so that they:  Keep their blood sugar at or near a normal level  Prevent long-term problems, such as heart or kidney problems, that can happen in people with diabetes  Changing your diet can also help treat obesity, high blood pressure, and high cholesterol. These conditions can affect people with diabetes and can lead to future problems, such as heart attacks or strokes.  Who will work with me to change my diet? -- Your doctor or nurse will work with you to make a food plan to change your diet. They might also recommend that you work with a "dietitian." A dietitian is an expert on food and eating.  Do I need to eat at the same times every day? -- When and how often you should eat depends, in part, on the diabetes medicines you take. For example:  People who take about the same amount of insulin at the same time each day (called a "fixed regimen") should eat meals at the same times. This is also true for people who take pills that increase insulin levels, such as sulfonylureas. Eating meals at the same time every day helps prevent low blood sugar.  People who adjust the dose and timing of their insulin each day (called a "flexible regimen") do not always have to eat meals at the same time. That's because they can time their insulin dose for before they plan to eat, and also adjust the dose for how much they plan to eat.  People who take medicines that don't usually cause low blood sugar, such as metformin, don't have to eat meals at the same time every day.  What do I need to think about when planning what to eat? -- Our bodies break down the food we eat into small pieces called carbohydrates, proteins, and fats.  When planning what to eat, people with diabetes need to think " "about:  Carbohydrates (or "carbs") - Carbohydrates, which are sugars that our bodies use for energy, can raise a person's blood sugar level. Your doctor, nurse, or dietitian will tell you how many carbohydrates you should eat at each meal or snack. Foods that have carbohydrates include:  Bread, pasta, and rice  Vegetables and fruits  Dairy foods  Foods and drinks with added sugar  It is best to get your carbohydrates from fruits, vegetables, whole grains, and low-fat milk. It is best to avoid drinks with added sugar, like soda, juices, and sports drinks.   Protein - Your doctor, nurse, or dietitian will tell you how much protein you should eat each day. It is best to eat lean meats, fish, eggs, beans, peas, soy products, nuts, and seeds.  Fats - The type of fat you eat is more important than the amount of fat. "Saturated" and "trans" fats can increase your risk for heart problems, like a heart attack.  Foods that have saturated fats include meat, butter, cheese, and ice cream.  Foods that have trans fats include processed food with "partially hydrogenated oils" on the ingredient list. This may include fried foods, store bought cookies, muffins, pies, and cakes.  "Monounsaturated" and "polyunsaturated" fats are better for you. Foods with these types of fat include fish, avocado, olive oil, and nuts.  Calories - People need to eat a certain amount of calories each day to keep their weight the same. People who are overweight and want to lose weight need to eat fewer calories each day.  Fiber - Eating foods with a lot of fiber can help control a person's blood sugar level. Foods that have a lot of fiber include apples, green beans, peas, beans, lentils, nuts, oatmeal, and whole grains.  Salt - People who have high blood pressure should not eat foods that contain a lot of salt (also called sodium). People with high blood pressure should also eat healthy foods, such as fruits, vegetables, and low-fat dairy foods.  Alcohol " - Having more than 1 drink (for women) or 2 drinks (for men) a day can raise blood sugar levels. Also, drinks that have fruit juice or soda in them can raise blood sugar levels.  What can I do if I need to lose weight? -- If you need to lose weight, you can:  Exercise - Try to get at least 30 minutes of physical activity a day, most days of the week. Even gentle exercise, like walking, is good for your health. Some people with diabetes need to change their medicine dose before they exercise. They might also need to check their blood sugar levels before and after exercising.  Eat fewer calories - Your doctor, nurse, or dietitian can tell you how many calories you should eat each day in order to lose weight.  If you are worried about your weight, size, or shape, talk with your doctor, nurse, or dietitian. They can help you make changes to improve your health.  Can I eat the same foods as my family? -- Yes. You do not need to eat special foods if you have diabetes. You and your family can eat the same foods. Changing your diet is mostly about eating healthy foods and not eating too much.  What are the other parts of diabetes treatment? -- Besides changing your diet, the other parts of diabetes treatment are:  Exercise  Medicines  Some people with diabetes need to learn how to match their diet and exercise with their medicine dose. For example, people who use insulin might need to choose the dose of insulin they give themselves. To choose their dose, they need to think about:  What they plan to eat at the next meal  How much exercise they plan to do  What their blood sugar level is  If the diet and exercise do not match the medicine dose, a person's blood sugar level can get too low or too high. Blood sugar levels that are too low or too high can cause problems.  All topics are updated as new evidence becomes available and our peer review process is complete.  This topic retrieved from [employer] on: Sep 21, 2021.  Topic  19577 Version 7.0  Release: 29.4.2 - C29.263  © 2021 UpToDate, Inc. and/or its affiliates. All rights reserved.  Consumer Information Use and Disclaimer   This information is not specific medical advice and does not replace information you receive from your health care provider. This is only a brief summary of general information. It does NOT include all information about conditions, illnesses, injuries, tests, procedures, treatments, therapies, discharge instructions or life-style choices that may apply to you. You must talk with your health care provider for complete information about your health and treatment options. This information should not be used to decide whether or not to accept your health care provider's advice, instructions or recommendations. Only your health care provider has the knowledge and training to provide advice that is right for you. The use of this information is governed by the [employer] End User License Agreement, available at https://www.[employer]/en/solutions/[employer]/about/shawna.The use of [employer] content is governed by the [employer] Terms of Use. ©2021 UpToDate, Inc. All rights reserved.  Copyright   © 2021 UpToDate, Inc. and/or its affiliates. All rights reserved.

## 2023-01-25 NOTE — PROGRESS NOTES
Subjective     Patient ID: Dilma Cr is a 62 y.o. female.    Chief Complaint: Color Me Healthy (Patient here for Color Me Healthy # 2 of 4 for Glucose and HTN.)    Patient is here for a [employer] Color Me Healthy for hypertension and diabetes.     Review of Systems   Constitutional:  Negative for fatigue and fever.   HENT:  Negative for ear pain, postnasal drip, rhinorrhea and sinus pressure/congestion.    Respiratory:  Negative for cough and shortness of breath.    Cardiovascular:  Negative for chest pain.   Gastrointestinal:  Negative for abdominal pain, diarrhea, nausea and vomiting.   Genitourinary:  Negative for dysuria.   Neurological:  Negative for headaches.     Tobacco Use: Low Risk     Smoking Tobacco Use: Never    Smokeless Tobacco Use: Never    Passive Exposure: Not on file     Review of patient's allergies indicates:  No Known Allergies  Current Outpatient Medications   Medication Instructions    ascorbic acid (vitamin C) 250 mg, Oral, Daily    aspirin 81 mg, Oral, Daily    benzonatate (TESSALON) 100 mg, Oral, 3 times daily PRN    BINAXNOW COVID-19 AG SELF TEST Kit Use as Directed on the Package    clopidogreL (PLAVIX) 75 mg, Oral, Daily    cyanocobalamin (VITAMIN B-12) 100 mcg, Oral, Daily    cyclobenzaprine (FLEXERIL) 5 mg, Oral, 3 times daily PRN    FREESTYLE BECCA 14 DAY SENSOR Kit USE TO CHECK GLUCOSE 4 TIMES DAILY    furosemide (LASIX) 20 mg, Oral, Daily PRN    iron-vitamin C 100-250 mg, ICAR-C, 100-250 mg Tab 1 tablet, Oral, 2 times daily with meals    JARDIANCE 25 mg, Oral, Daily    loratadine (CLARITIN) 10 mg, Oral, Daily    metoprolol succinate (TOPROL-XL) 25 mg, Oral, Daily    naloxone (NARCAN) 4 mg/actuation Spry 1 spray, Nasal, Once as needed    nitroGLYCERIN (NITROSTAT) 0.4 mg, Sublingual, Every 5 min PRN    NovoLOG FlexPen U-100 Insulin 20 Units, Subcutaneous, 2 times daily    olmesartan-hydrochlorothiazide (BENICAR HCT) 40-25 mg per tablet 1 tablet, Oral, Daily    OZEMPIC 1  "mg/dose (4 mg/3 mL) No dose, route, or frequency recorded.    pantoprazole (PROTONIX) 40 mg, Oral, Daily    rosuvastatin (CRESTOR) 40 MG Tab Take 1 tablet by mouth once daily    traMADoL (ULTRAM) 50 mg, Oral, Every 12 hours PRN    TRESIBA FLEXTOUCH U-100 24 Units, Subcutaneous, Daily     Medications Discontinued During This Encounter   Medication Reason    olmesartan (BENICAR) 40 MG tablet Duplicate Order       Past Medical History:   Diagnosis Date    Acute superficial gastritis without hemorrhage 6/21/2022    Arthritis     Cancer     colon cancer    Chronic kidney disease, stage 3b     Colon cancer     Coronary artery disease     Depression     Diabetes mellitus, type 2     Diverticula, colon 6/22/2022    GERD (gastroesophageal reflux disease)     H/O right hemicolectomy 6/22/2022    HH (hiatus hernia) 6/21/2022    Hyperlipidemia     Myocardial infarction     2 stents in 05/14/2021 Dr Lora    Renal disorder      Health Maintenance Topics with due status: Not Due       Topic Last Completion Date    Diabetes Urine Screening 06/15/2022    Colorectal Cancer Screening 06/22/2022    Lipid Panel 09/07/2022    Mammogram 12/13/2022    High Dose Statin 01/18/2023     Immunization History   Administered Date(s) Administered    COVID-19, MRNA, LN-S, PF (Pfizer) (Purple Cap) 02/24/2021, 03/18/2021    Influenza 11/01/2021    Influenza - Quadrivalent - PF *Preferred* (6 months and older) 09/07/2022       Objective     Body mass index is 38.92 kg/m².  Wt Readings from Last 3 Encounters:   01/25/23 93.4 kg (206 lb)   01/18/23 93.9 kg (207 lb)   12/20/22 95.3 kg (210 lb)     Ht Readings from Last 3 Encounters:   01/25/23 5' 1" (1.549 m)   01/18/23 5' 1" (1.549 m)   12/20/22 5' 1" (1.549 m)     BP Readings from Last 3 Encounters:   01/25/23 136/76   01/18/23 (!) 148/72   12/28/22 (!) 170/60     Temp Readings from Last 3 Encounters:   01/25/23 98.8 °F (37.1 °C) (Oral)   01/18/23 97.8 °F (36.6 °C) (Oral)   10/19/22 98.5 °F (36.9 °C) " (Oral)     Pulse Readings from Last 3 Encounters:   01/25/23 63   01/18/23 66   12/28/22 (!) 57     Resp Readings from Last 3 Encounters:   01/25/23 17   01/18/23 17   12/20/22 18     PF Readings from Last 3 Encounters:   07/12/22 362 L/min       Physical Exam  HENT:      Head: Normocephalic and atraumatic.   Cardiovascular:      Rate and Rhythm: Normal rate and regular rhythm.   Pulmonary:      Effort: Pulmonary effort is normal.      Breath sounds: Normal breath sounds.   Neurological:      Mental Status: She is alert and oriented to person, place, and time.   Psychiatric:         Mood and Affect: Mood normal.         Behavior: Behavior normal.       Assessment and Plan     Problem List Items Addressed This Visit          Cardiac/Vascular    Hypertension (Chronic)    Relevant Orders    Comprehensive Metabolic Panel     Other Visit Diagnoses       Diabetes mellitus type 2 with complications    -  Primary    Relevant Orders    Hemoglobin A1C    Microalbumin/Creatinine Ratio, Urine            Plan:       I have reviewed the medications, allergies, and problem list.     Goal Actions:    What type of visit is the patient here for today?: Color Me Healthy  Which Color Me Healthy program is the patient enrolling in?: Blood Pressure (Hypertension)  Is this a Wellness Follow Up?: No  What is your overall wellness goal? (select at least one): Improve overall health  Choose 3: Lifestyle, Nutrition, Exercise  Lifestyle Actions : Take medications as prescribed  Nurtrition Actions: Eat a well-balanced diet  Exercise Actions: Recommend physical activity 30 minutes per day 3-5 times/week

## 2023-03-29 ENCOUNTER — OFFICE VISIT (OUTPATIENT)
Dept: PAIN MEDICINE | Facility: CLINIC | Age: 63
End: 2023-03-29
Payer: COMMERCIAL

## 2023-03-29 VITALS
BODY MASS INDEX: 38.28 KG/M2 | SYSTOLIC BLOOD PRESSURE: 169 MMHG | WEIGHT: 208 LBS | RESPIRATION RATE: 18 BRPM | HEIGHT: 62 IN | DIASTOLIC BLOOD PRESSURE: 60 MMHG | HEART RATE: 57 BPM

## 2023-03-29 DIAGNOSIS — Z79.899 ENCOUNTER FOR LONG-TERM (CURRENT) USE OF OTHER MEDICATIONS: ICD-10-CM

## 2023-03-29 DIAGNOSIS — M89.49 OSTEOARTHROSIS MULTIPLE SITES, NOT SPECIFIED AS GENERALIZED: Chronic | ICD-10-CM

## 2023-03-29 DIAGNOSIS — M54.17 LUMBOSACRAL RADICULOPATHY: Primary | Chronic | ICD-10-CM

## 2023-03-29 LAB

## 2023-03-29 PROCEDURE — 99214 OFFICE O/P EST MOD 30 MIN: CPT | Mod: S$PBB,,, | Performed by: PHYSICIAN ASSISTANT

## 2023-03-29 PROCEDURE — 3078F PR MOST RECENT DIASTOLIC BLOOD PRESSURE < 80 MM HG: ICD-10-PCS | Mod: ,,, | Performed by: PHYSICIAN ASSISTANT

## 2023-03-29 PROCEDURE — 3062F POS MACROALBUMINURIA REV: CPT | Mod: ,,, | Performed by: PHYSICIAN ASSISTANT

## 2023-03-29 PROCEDURE — 3052F HG A1C>EQUAL 8.0%<EQUAL 9.0%: CPT | Mod: ,,, | Performed by: PHYSICIAN ASSISTANT

## 2023-03-29 PROCEDURE — 3062F PR POS MACROALBUMINURIA RESULT DOCUMENTED/REVIEW: ICD-10-PCS | Mod: ,,, | Performed by: PHYSICIAN ASSISTANT

## 2023-03-29 PROCEDURE — 1159F MED LIST DOCD IN RCRD: CPT | Mod: ,,, | Performed by: PHYSICIAN ASSISTANT

## 2023-03-29 PROCEDURE — 3077F SYST BP >= 140 MM HG: CPT | Mod: ,,, | Performed by: PHYSICIAN ASSISTANT

## 2023-03-29 PROCEDURE — 3078F DIAST BP <80 MM HG: CPT | Mod: ,,, | Performed by: PHYSICIAN ASSISTANT

## 2023-03-29 PROCEDURE — 80305 DRUG TEST PRSMV DIR OPT OBS: CPT | Mod: PBBFAC | Performed by: PHYSICIAN ASSISTANT

## 2023-03-29 PROCEDURE — 3052F PR MOST RECENT HEMOGLOBIN A1C LEVEL 8.0 - < 9.0%: ICD-10-PCS | Mod: ,,, | Performed by: PHYSICIAN ASSISTANT

## 2023-03-29 PROCEDURE — 3008F BODY MASS INDEX DOCD: CPT | Mod: ,,, | Performed by: PHYSICIAN ASSISTANT

## 2023-03-29 PROCEDURE — 99215 OFFICE O/P EST HI 40 MIN: CPT | Mod: PBBFAC | Performed by: PHYSICIAN ASSISTANT

## 2023-03-29 PROCEDURE — 99214 PR OFFICE/OUTPT VISIT, EST, LEVL IV, 30-39 MIN: ICD-10-PCS | Mod: S$PBB,,, | Performed by: PHYSICIAN ASSISTANT

## 2023-03-29 PROCEDURE — 3077F PR MOST RECENT SYSTOLIC BLOOD PRESSURE >= 140 MM HG: ICD-10-PCS | Mod: ,,, | Performed by: PHYSICIAN ASSISTANT

## 2023-03-29 PROCEDURE — 3066F PR DOCUMENTATION OF TREATMENT FOR NEPHROPATHY: ICD-10-PCS | Mod: ,,, | Performed by: PHYSICIAN ASSISTANT

## 2023-03-29 PROCEDURE — 3008F PR BODY MASS INDEX (BMI) DOCUMENTED: ICD-10-PCS | Mod: ,,, | Performed by: PHYSICIAN ASSISTANT

## 2023-03-29 PROCEDURE — 3066F NEPHROPATHY DOC TX: CPT | Mod: ,,, | Performed by: PHYSICIAN ASSISTANT

## 2023-03-29 PROCEDURE — 1159F PR MEDICATION LIST DOCUMENTED IN MEDICAL RECORD: ICD-10-PCS | Mod: ,,, | Performed by: PHYSICIAN ASSISTANT

## 2023-03-29 RX ORDER — TRAMADOL HYDROCHLORIDE 50 MG/1
50 TABLET ORAL EVERY 12 HOURS PRN
Qty: 60 TABLET | Refills: 2 | Status: SHIPPED | OUTPATIENT
Start: 2023-03-29 | End: 2024-03-28 | Stop reason: SDUPTHER

## 2023-03-29 NOTE — PROGRESS NOTES
Subjective:         Patient ID: Dilma Cr is a 62 y.o. female.    Chief Complaint: Low-back Pain        Pain  This is a chronic problem. The current episode started more than 1 year ago. The problem occurs daily. The problem has been waxing and waning. Associated symptoms include arthralgias and neck pain. Pertinent negatives include no anorexia, chest pain, chills, coughing, diaphoresis, fatigue, fever, sore throat, vertigo or vomiting.   Review of Systems   Constitutional:  Negative for activity change, appetite change, chills, diaphoresis, fatigue, fever and unexpected weight change.   HENT:  Negative for drooling, ear pain, facial swelling, nosebleeds, sore throat, trouble swallowing, voice change and goiter.    Eyes:  Negative for photophobia, pain, discharge, redness and visual disturbance.   Respiratory:  Negative for apnea, cough, choking, chest tightness, shortness of breath, wheezing and stridor.    Cardiovascular:  Negative for chest pain, palpitations and leg swelling.   Gastrointestinal:  Negative for abdominal distention, anorexia, diarrhea, rectal pain, vomiting and fecal incontinence.   Endocrine: Negative for cold intolerance, heat intolerance, polydipsia, polyphagia and polyuria.   Genitourinary:  Negative for flank pain, frequency and hot flashes.   Musculoskeletal:  Positive for arthralgias, back pain and neck pain. Negative for neck stiffness.   Integumentary:  Negative for color change and pallor.   Allergic/Immunologic: Negative for immunocompromised state.   Neurological:  Negative for dizziness, vertigo, seizures, syncope, facial asymmetry, speech difficulty, light-headedness, coordination difficulties, memory loss and coordination difficulties.   Hematological:  Negative for adenopathy. Does not bruise/bleed easily.   Psychiatric/Behavioral:  Negative for agitation, behavioral problems, confusion, decreased concentration, dysphoric mood, hallucinations, self-injury and suicidal  ideas. The patient is not nervous/anxious and is not hyperactive.          Past Medical History:   Diagnosis Date    Acute superficial gastritis without hemorrhage 06/21/2022    Arthritis     Cancer     colon cancer    Chronic kidney disease, stage 3b     Colon cancer     Coronary artery disease     Depression     Diabetes mellitus, type 2     Diverticula, colon 06/22/2022    GERD (gastroesophageal reflux disease)     H/O right hemicolectomy 06/22/2022    HH (hiatus hernia) 06/21/2022    Hyperlipidemia     Myocardial infarction     2 stents in 05/14/2021 Dr Porter    Renal disorder      Past Surgical History:   Procedure Laterality Date    BREAST SURGERY      C5-C6 RADHA  8-, 7-, 6-1-2016    Dr Fowler    CARDIAC SURGERY  02/07/2023    CERVICAL SPINE SURGERY      COLON SURGERY      EPIDURAL STEROID INJECTION INTO LUMBAR SPINE N/A 03/29/2022    Procedure: L4-5 RADHA;  Surgeon: Gela Fowler MD;  Location: [facility];  Service: Pain Management;  Laterality: N/A;  PT AWARE ON OV TO BE TESTED\  PLAVIX TO BE HELD FOR 7 DAYS PRIOR TO PROCEDURE PER DR FOWLER AND DR PORTER  3-28  message left on vm re: covid    HYSTERECTOMY      LEFT HEART CATHETERIZATION Left 05/14/2021    Procedure: Left heart cath;  Surgeon: Mateus Guan MD;  Location: [facility] CATH LAB;  Service: Cardiology;  Laterality: Left;    LEFT HEART CATHETERIZATION Left 06/20/2022    Procedure: Left heart cath;  Surgeon: Oliverio Bautista MD;  Location: [facility] CATH LAB;  Service: Cardiology;  Laterality: Left;    MEDIPORT REMOVAL  10/14/2021    Dr Kraft    right rotator cuff repair      in [place]    TOTAL REDUCTION MAMMOPLASTY       Social History     Socioeconomic History    Marital status:      Spouse name: Jorge Alberto    Number of children: 2   Occupational History    Occupation:  at [employer] for past 27 years   Tobacco Use    Smoking status: Never    Smokeless tobacco: Never   Substance and Sexual Activity     "Alcohol use: Never    Drug use: Never    Sexual activity: Yes     Family History   Problem Relation Age of Onset    Hypertension Mother     Diabetes Mother     Hypertension Father     Diabetes Father     Hypertension Sister     Breast cancer Maternal Grandmother     Hypertension Brother      Review of patient's allergies indicates:  No Known Allergies     Objective:  Vitals:    03/29/23 0924   BP: (!) 169/60   Pulse: (!) 57   Resp: 18   Weight: 94.3 kg (208 lb)   Height: 5' 2" (1.575 m)   PainSc:   8         Physical Exam  Vitals and nursing note reviewed. Exam conducted with a chaperone present.   Constitutional:       General: She is awake. She is not in acute distress.     Appearance: Normal appearance. She is not ill-appearing or diaphoretic.   HENT:      Head: Normocephalic and atraumatic.      Nose: Nose normal.      Mouth/Throat:      Mouth: Mucous membranes are moist.      Pharynx: Oropharynx is clear.   Eyes:      Conjunctiva/sclera: Conjunctivae normal.      Pupils: Pupils are equal, round, and reactive to light.   Cardiovascular:      Rate and Rhythm: Normal rate.   Pulmonary:      Effort: Pulmonary effort is normal. No respiratory distress.   Abdominal:      Palpations: Abdomen is soft.      Tenderness: There is no guarding.   Musculoskeletal:         General: Normal range of motion.      Cervical back: Normal range of motion and neck supple. No rigidity.      Thoracic back: Tenderness present.      Lumbar back: Tenderness present.   Skin:     General: Skin is warm and dry.      Coloration: Skin is not jaundiced or pale.   Neurological:      General: No focal deficit present.      Mental Status: She is alert and oriented to person, place, and time. Mental status is at baseline.      Cranial Nerves: No cranial nerve deficit (II-XII).   Psychiatric:         Mood and Affect: Mood normal.         Behavior: Behavior normal. Behavior is cooperative.         Thought Content: Thought content normal.     "     Mammo Digital Screening Bilat  Narrative: Result:   Mammo Digital Screening Bilat     History:  Patient is 62 y.o. and is seen for a screening mammogram.    Films Compared:  Prior images (if available) were compared.     Findings:   The breasts have scattered areas of fibroglandular density. There is no   evidence of suspicious masses, calcifications, or other abnormal findings.  Impression: Bilateral  There is no mammographic evidence of malignancy.    BI-RADS Category:   Overall: 1 - Negative       Recommendation:  Routine screening mammogram in 1 year is recommended.    Your estimated lifetime risk of breast cancer (to age 85) based on   Tyrer-Cuzick risk assessment model is Tyrer-Cuzick: 10.2 %. According to   the American Cancer Society, patients with a lifetime breast cancer risk   of 20% or higher might benefit from supplemental screening tests.         Office Visit on 01/25/2023   Component Date Value Ref Range Status    Hemoglobin A1C 01/25/2023 8.2 (H)  4.5 - 6.6 % Final    Estimated Average Glucose 01/25/2023 187  mg/dL Final    Creatinine, Urine 01/25/2023 57  28 - 219 mg/dL Final    Microalbumin 01/25/2023 48.5 (H)  0.0 - 2.8 mg/dL Final    Microalbumin/Creatinine Ratio 01/25/2023 850.9 (H)  0.0 - 30.0 mg/g Final    Sodium 01/25/2023 142  136 - 145 mmol/L Final    Potassium 01/25/2023 4.1  3.5 - 5.1 mmol/L Final    Chloride 01/25/2023 111 (H)  98 - 107 mmol/L Final    CO2 01/25/2023 29  21 - 32 mmol/L Final    Anion Gap 01/25/2023 6 (L)  7 - 16 mmol/L Final    Glucose 01/25/2023 150 (H)  74 - 106 mg/dL Final    BUN 01/25/2023 22 (H)  7 - 18 mg/dL Final    Creatinine 01/25/2023 1.10 (H)  0.55 - 1.02 mg/dL Final    BUN/Creatinine Ratio 01/25/2023 20  6 - 20 Final    Calcium 01/25/2023 9.2  8.5 - 10.1 mg/dL Final    Total Protein 01/25/2023 7.4  6.4 - 8.2 g/dL Final    Albumin 01/25/2023 3.5  3.5 - 5.0 g/dL Final    Globulin 01/25/2023 3.9  2.0 - 4.0 g/dL Final    A/G Ratio 01/25/2023 0.9   Final     Bilirubin, Total 01/25/2023 0.2  >0.0 - 1.2 mg/dL Final    Alk Phos 01/25/2023 88  50 - 130 U/L Final    ALT 01/25/2023 25  13 - 56 U/L Final    AST 01/25/2023 20  15 - 37 U/L Final    eGFR 01/25/2023 58 (L)  >=60 mL/min/1.73m² Final   Office Visit on 01/18/2023   Component Date Value Ref Range Status    SARS Coronavirus 2 Antigen 01/18/2023 Negative  Negative Final    Rapid Influenza A Ag 01/18/2023 Negative  Negative Final    Rapid Influenza B Ag 01/18/2023 Negative  Negative Final     Acceptable 01/18/2023 Yes   Final   Office Visit on 12/20/2022   Component Date Value Ref Range Status    POC Amphetamines 12/20/2022 Negative  Negative, Inconclusive Final    POC Barbiturates 12/20/2022 Negative  Negative, Inconclusive Final    POC Benzodiazepines 12/20/2022 Negative  Negative, Inconclusive Final    POC Cocaine 12/20/2022 Negative  Negative, Inconclusive Final    POC THC 12/20/2022 Negative  Negative, Inconclusive Final    POC Methadone 12/20/2022 Negative  Negative, Inconclusive Final    POC Methamphetamine 12/20/2022 Negative  Negative, Inconclusive Final    POC Opiates 12/20/2022 Negative  Negative, Inconclusive Final    POC Oxycodone 12/20/2022 Negative  Negative, Inconclusive Final    POC Phencyclidine 12/20/2022 Negative  Negative, Inconclusive Final    POC Methylenedioxymethamphetamine * 12/20/2022 Negative  Negative, Inconclusive Final    POC Tricyclic Antidepressants 12/20/2022 Negative  Negative, Inconclusive Final    POC Buprenorphine 12/20/2022 Negative   Final     Acceptable 12/20/2022 Yes   Final    POC Temperature (Urine) 12/20/2022 92   Final         Orders Placed This Encounter   Procedures    POCT Urine Drug Screen Presump     Interpretive Information:     Negative:  No drug detected at the cut off level.   Positive:  This result represents presumptive positive for the   tested drug, other substances may yield a positive response other   than the analyte of interest.  This result should be utilized for   diagnostic purpose only. Confirmation testing will be performed upon physician request only.            Requested Prescriptions     Signed Prescriptions Disp Refills    traMADoL (ULTRAM) 50 mg tablet 60 tablet 2     Sig: Take 1 tablet (50 mg total) by mouth every 12 (twelve) hours as needed for Pain.       Assessment:     1. Lumbosacral radiculopathy    2. Encounter for long-term (current) use of other medications    3. Osteoarthrosis multiple sites, not specified as generalized         A's of Opioid Risk Assessment  Activity:Patient can perform ADL.   Analgesia:Patients pain is partially controlled by current medication. Patient has tried OTC medications such as Tylenol and Ibuprofen with out relief.   Adverse Effects: Patient denies constipation or sedation.  Aberrant Behavior:  reviewed with no aberrant drug seeking/taking behavior.  Overdose reversal drug naloxone discussed    Drug screen reviewed      Plan:    Narcan December 2022    Anticoagulated Plavix    History Colon  Cancer    Presumptive drug screen negative this is expected result, patient takes tramadol, cup does not test for tramadol    No new complaints she states current medications helping control her discomfort    Continue home exercise program as directed    Continue current medication    Follow-up 3 months     Dr. Fowler, March 2023    Bring original prescription medication bottles/container/box with labels to each visit    Pill count    Physical therapy    Massage therapy declines

## 2023-04-26 ENCOUNTER — OFFICE VISIT (OUTPATIENT)
Dept: FAMILY MEDICINE | Facility: CLINIC | Age: 63
End: 2023-04-26
Payer: COMMERCIAL

## 2023-04-26 VITALS
OXYGEN SATURATION: 96 % | DIASTOLIC BLOOD PRESSURE: 62 MMHG | RESPIRATION RATE: 18 BRPM | BODY MASS INDEX: 36.99 KG/M2 | WEIGHT: 201 LBS | SYSTOLIC BLOOD PRESSURE: 134 MMHG | HEIGHT: 62 IN | TEMPERATURE: 98 F | HEART RATE: 66 BPM

## 2023-04-26 DIAGNOSIS — Z95.818 PRESENCE OF WATCHMAN LEFT ATRIAL APPENDAGE CLOSURE DEVICE: ICD-10-CM

## 2023-04-26 DIAGNOSIS — E11.42 TYPE 2 DIABETES MELLITUS WITH DIABETIC POLYNEUROPATHY, WITH LONG-TERM CURRENT USE OF INSULIN: Primary | Chronic | ICD-10-CM

## 2023-04-26 DIAGNOSIS — Z79.4 TYPE 2 DIABETES MELLITUS WITH DIABETIC POLYNEUROPATHY, WITH LONG-TERM CURRENT USE OF INSULIN: Primary | Chronic | ICD-10-CM

## 2023-04-26 LAB
EST. AVERAGE GLUCOSE BLD GHB EST-MCNC: 164 MG/DL
HBA1C MFR BLD HPLC: 7.5 % (ref 4.5–6.6)

## 2023-04-26 PROCEDURE — 83036 HEMOGLOBIN GLYCOSYLATED A1C: CPT | Mod: ,,, | Performed by: CLINICAL MEDICAL LABORATORY

## 2023-04-26 PROCEDURE — 3008F PR BODY MASS INDEX (BMI) DOCUMENTED: ICD-10-PCS | Mod: ,,, | Performed by: FAMILY MEDICINE

## 2023-04-26 PROCEDURE — 1159F MED LIST DOCD IN RCRD: CPT | Mod: ,,, | Performed by: FAMILY MEDICINE

## 2023-04-26 PROCEDURE — 3062F POS MACROALBUMINURIA REV: CPT | Mod: ,,, | Performed by: FAMILY MEDICINE

## 2023-04-26 PROCEDURE — 83036 HEMOGLOBIN A1C: ICD-10-PCS | Mod: ,,, | Performed by: CLINICAL MEDICAL LABORATORY

## 2023-04-26 PROCEDURE — 3008F BODY MASS INDEX DOCD: CPT | Mod: ,,, | Performed by: FAMILY MEDICINE

## 2023-04-26 PROCEDURE — 1160F PR REVIEW ALL MEDS BY PRESCRIBER/CLIN PHARMACIST DOCUMENTED: ICD-10-PCS | Mod: ,,, | Performed by: FAMILY MEDICINE

## 2023-04-26 PROCEDURE — 3066F NEPHROPATHY DOC TX: CPT | Mod: ,,, | Performed by: FAMILY MEDICINE

## 2023-04-26 PROCEDURE — 1160F RVW MEDS BY RX/DR IN RCRD: CPT | Mod: ,,, | Performed by: FAMILY MEDICINE

## 2023-04-26 PROCEDURE — 3075F PR MOST RECENT SYSTOLIC BLOOD PRESS GE 130-139MM HG: ICD-10-PCS | Mod: ,,, | Performed by: FAMILY MEDICINE

## 2023-04-26 PROCEDURE — 3051F HG A1C>EQUAL 7.0%<8.0%: CPT | Mod: ,,, | Performed by: FAMILY MEDICINE

## 2023-04-26 PROCEDURE — 1159F PR MEDICATION LIST DOCUMENTED IN MEDICAL RECORD: ICD-10-PCS | Mod: ,,, | Performed by: FAMILY MEDICINE

## 2023-04-26 PROCEDURE — 3051F PR MOST RECENT HEMOGLOBIN A1C LEVEL 7.0 - < 8.0%: ICD-10-PCS | Mod: ,,, | Performed by: FAMILY MEDICINE

## 2023-04-26 PROCEDURE — 99214 PR OFFICE/OUTPT VISIT, EST, LEVL IV, 30-39 MIN: ICD-10-PCS | Mod: ,,, | Performed by: FAMILY MEDICINE

## 2023-04-26 PROCEDURE — 3078F DIAST BP <80 MM HG: CPT | Mod: ,,, | Performed by: FAMILY MEDICINE

## 2023-04-26 PROCEDURE — 3078F PR MOST RECENT DIASTOLIC BLOOD PRESSURE < 80 MM HG: ICD-10-PCS | Mod: ,,, | Performed by: FAMILY MEDICINE

## 2023-04-26 PROCEDURE — 3066F PR DOCUMENTATION OF TREATMENT FOR NEPHROPATHY: ICD-10-PCS | Mod: ,,, | Performed by: FAMILY MEDICINE

## 2023-04-26 PROCEDURE — 3062F PR POS MACROALBUMINURIA RESULT DOCUMENTED/REVIEW: ICD-10-PCS | Mod: ,,, | Performed by: FAMILY MEDICINE

## 2023-04-26 PROCEDURE — 99214 OFFICE O/P EST MOD 30 MIN: CPT | Mod: ,,, | Performed by: FAMILY MEDICINE

## 2023-04-26 PROCEDURE — 3075F SYST BP GE 130 - 139MM HG: CPT | Mod: ,,, | Performed by: FAMILY MEDICINE

## 2023-04-26 RX ORDER — EZETIMIBE 10 MG/1
10 TABLET ORAL
COMMUNITY
Start: 2023-03-20

## 2023-04-26 RX ORDER — SOTALOL HYDROCHLORIDE 120 MG/1
120 TABLET ORAL EVERY 12 HOURS
COMMUNITY
Start: 2023-04-06

## 2023-04-26 RX ORDER — BLOOD-GLUCOSE SENSOR
EACH MISCELLANEOUS
COMMUNITY
Start: 2023-03-15

## 2023-04-26 RX ORDER — WITCH HAZEL 50 %
1 PADS, MEDICATED (EA) TOPICAL DAILY
COMMUNITY

## 2023-04-26 NOTE — PROGRESS NOTES
New Clinic Note    Dilma Cr is a 62 y.o. female     CC:   Chief Complaint   Patient presents with    Diabetes     Patient stated at her last visit her A1C was elevated and her Jardiance was increased. Stated she is here for her 3 month follow up on diabetes. Stated she had stent placed at [facility].    Follow-up    Hypertension    Hyperlipidemia    Did not bring medication in for review     Patient did not bring her home medications in for review. Provided a list printed off of [employer] but she is not sure about them. Was reminded again to please bring her medications in for review or an updated list.         Subjective    History of Present Illness HPI   Patient is here to follow up on diabetes. Her cardiologist sent her to [facility] to have a watchman procedure. Patient is tolerating well.     Current Outpatient Medications:     ascorbic acid, vitamin C, 250 mg Chew, Take 250 mg by mouth once daily., Disp: , Rfl:     aspirin 81 MG Chew, Take 81 mg by mouth once daily., Disp: , Rfl:     BINAXNOW COVID-19 AG SELF TEST Kit, Use as Directed on the Package, Disp: , Rfl:     clopidogreL (PLAVIX) 75 mg tablet, Take 1 tablet (75 mg total) by mouth once daily., Disp: 90 tablet, Rfl: 1    cyanocobalamin (VITAMIN B-12) 1000 MCG tablet, Take 100 mcg by mouth once daily., Disp: , Rfl:     cyclobenzaprine (FLEXERIL) 5 MG tablet, Take 5 mg by mouth 3 (three) times daily as needed., Disp: , Rfl:     ezetimibe (ZETIA) 10 mg tablet, Take 10 mg by mouth., Disp: , Rfl:     FREESTYLE BECCA 14 DAY SENSOR Kit, USE TO CHECK GLUCOSE 4 TIMES DAILY, Disp: , Rfl:     furosemide (LASIX) 20 MG tablet, Take 1 tablet (20 mg total) by mouth daily as needed (fluid)., Disp: 90 tablet, Rfl: 1    iron-vitamin C 100-250 mg, ICAR-C, 100-250 mg Tab, Take 1 tablet by mouth 2 (two) times daily with meals., Disp: , Rfl:     JARDIANCE 25 mg tablet, Take 1 tablet (25 mg total) by mouth once daily., Disp: 30 tablet, Rfl: 5     loratadine (CLARITIN) 10 mg tablet, Take 1 tablet (10 mg total) by mouth once daily., Disp: 30 tablet, Rfl: 0    metoprolol succinate (TOPROL-XL) 25 MG 24 hr tablet, Take 1 tablet (25 mg total) by mouth once daily., Disp: 90 tablet, Rfl: 3    naloxone (NARCAN) 4 mg/actuation Spry, 1 spray by Nasal route once as needed., Disp: , Rfl:     nitroGLYCERIN (NITROSTAT) 0.4 MG SL tablet, Place 1 tablet (0.4 mg total) under the tongue every 5 (five) minutes as needed for Chest pain., Disp: 30 tablet, Rfl: 0    NOVOLOG FLEXPEN U-100 INSULIN 100 unit/mL (3 mL) InPn pen, Inject 20 Units into the skin 2 (two) times a day., Disp: 12 each, Rfl: 1    olmesartan-hydrochlorothiazide (BENICAR HCT) 40-25 mg per tablet, Take 1 tablet by mouth once daily., Disp: , Rfl:     OZEMPIC 1 mg/dose (4 mg/3 mL), , Disp: , Rfl:     pantoprazole (PROTONIX) 40 MG tablet, Take 1 tablet (40 mg total) by mouth once daily., Disp: 90 tablet, Rfl: 1    rosuvastatin (CRESTOR) 40 MG Tab, Take 1 tablet by mouth once daily, Disp: 90 tablet, Rfl: 0    sotaloL (BETAPACE) 120 MG Tab, Take 120 mg by mouth every 12 (twelve) hours., Disp: , Rfl:     traMADoL (ULTRAM) 50 mg tablet, Take 1 tablet (50 mg total) by mouth every 12 (twelve) hours as needed for Pain., Disp: 60 tablet, Rfl: 2    TRESIBA FLEXTOUCH U-100 100 unit/mL (3 mL) insulin pen, Inject 24 Units into the skin once daily., Disp: 3 pen, Rfl: 1    cyanocobalamin 2000 MCG tablet, Take 1 tablet by mouth once daily., Disp: , Rfl:     FREESTYLE BECCA 3 SENSOR Aleena, USE 1 PATCH TOPICALLY EVERY DAY FOR 2 WEEKS, Disp: , Rfl:      Past Medical History:   Diagnosis Date    Acute superficial gastritis without hemorrhage 06/21/2022    Arthritis     Cancer     colon cancer    Chronic kidney disease, stage 3b     Colon cancer     Coronary artery disease     Depression     Diabetes mellitus, type 2     Diverticula, colon 06/22/2022    GERD (gastroesophageal reflux disease)     H/O right hemicolectomy 06/22/2022    HH  (hiatus hernia) 06/21/2022    Hyperlipidemia     Myocardial infarction     2 stents in 05/14/2021 Dr Porter    Renal disorder         Family History   Problem Relation Age of Onset    Hypertension Mother     Diabetes Mother     Hypertension Father     Diabetes Father     Hypertension Sister     Breast cancer Maternal Grandmother     Hypertension Brother         Past Surgical History:   Procedure Laterality Date    BREAST SURGERY      C5-C6 RADHA  8-, 7-, 6-1-2016    Dr Fowler    CARDIAC SURGERY  02/07/2023    CERVICAL SPINE SURGERY      COLON SURGERY      EPIDURAL STEROID INJECTION INTO LUMBAR SPINE N/A 03/29/2022    Procedure: L4-5 RADHA;  Surgeon: Gela Fowler MD;  Location: [facility];  Service: Pain Management;  Laterality: N/A;  PT AWARE ON OV TO BE TESTED\  PLAVIX TO BE HELD FOR 7 DAYS PRIOR TO PROCEDURE PER DR FOWLER AND DR PORTER  3-28  message left on vm re: covid    HYSTERECTOMY      LEFT HEART CATHETERIZATION Left 05/14/2021    Procedure: Left heart cath;  Surgeon: Mateus Guan MD;  Location: [facility];  Service: Cardiology;  Laterality: Left;    LEFT HEART CATHETERIZATION Left 06/20/2022    Procedure: Left heart cath;  Surgeon: Oliverio Bautista MD;  Location: [facility];  Service: Cardiology;  Laterality: Left;    MEDIPORT REMOVAL  10/14/2021    Dr Kraft    right rotator cuff repair      in [place]    TOTAL REDUCTION MAMMOPLASTY          Review of Systems   Constitutional:  Negative for fatigue and fever.   HENT:  Negative for ear pain, postnasal drip, rhinorrhea and sinus pressure/congestion.    Respiratory:  Negative for cough and shortness of breath.    Cardiovascular:  Negative for chest pain.   Gastrointestinal:  Negative for abdominal pain, diarrhea, nausea and vomiting.   Genitourinary:  Negative for dysuria.   Neurological:  Negative for headaches.      /62 (BP Location: Left arm, Patient Position: Sitting, BP Method: Large (Manual))   Pulse 66   " Temp 98 °F (36.7 °C) (Oral)   Resp 18   Ht 5' 2" (1.575 m)   Wt 91.2 kg (201 lb)   LMP  (LMP Unknown)   SpO2 96%   BMI 36.76 kg/m²      Physical Exam  HENT:      Head: Normocephalic and atraumatic.   Cardiovascular:      Rate and Rhythm: Normal rate and regular rhythm.   Pulmonary:      Effort: Pulmonary effort is normal.      Breath sounds: Normal breath sounds.   Neurological:      Mental Status: She is alert and oriented to person, place, and time.   Psychiatric:         Mood and Affect: Mood normal.         Behavior: Behavior normal.        Assessment and Plan      ICD-10-CM ICD-9-CM   1. Type 2 diabetes mellitus with diabetic polyneuropathy, with long-term current use of insulin  E11.42 250.60    Z79.4 357.2     V58.67   2. Presence of Watchman left atrial appendage closure device  Z95.818 V45.09        1. Type 2 diabetes mellitus with diabetic polyneuropathy, with long-term current use of insulin  Has been uncontrolled. Recheck lab today  -     Hemoglobin A1C; Future; Expected date: 04/26/2023    2. Presence of Watchman left atrial appendage closure device  Stable        Follow up in about 3 months (around 7/26/2023).       "

## 2023-04-26 NOTE — LETTER
AUTHORIZATION FOR RELEASE OF   CONFIDENTIAL INFORMATION    Dear Dr Sienna Hoskins    We are seeing Dilma Cr, date of birth 1960, in the clinic at [facility]. Olga Potts MD is the patient's PCP. Dilma Cr has an outstanding lab/procedure at the time we reviewed her chart. In order to help keep her health information updated, she has authorized us to request the following medical record(s):        (  )  MAMMOGRAM                                      (  )  COLONOSCOPY      (  )  PAP SMEAR                                          (  )  OUTSIDE LAB RESULTS     (  )  DEXA SCAN                                          (  x)  EYE EXAM            (  )  FOOT EXAM                                          (  )  ENTIRE RECORD     (  )  OUTSIDE IMMUNIZATIONS                 (  )  _______________         Please fax records to [facility], Krista L Boyette, MD, [phone]     If you have any questions, please contact [facility] at [phone]          Patient Name: Dilma Cr  : 1960  Patient Phone #: [phone]

## 2023-04-27 PROBLEM — Z95.818 PRESENCE OF WATCHMAN LEFT ATRIAL APPENDAGE CLOSURE DEVICE: Status: ACTIVE | Noted: 2023-04-27

## 2023-04-27 PROBLEM — E66.01 SEVERE OBESITY (BMI >= 40): Chronic | Status: RESOLVED | Noted: 2022-06-22 | Resolved: 2023-04-27

## 2023-05-17 ENCOUNTER — OUTSIDE PLACE OF SERVICE (OUTPATIENT)
Dept: CARDIOLOGY | Facility: HOSPITAL | Age: 63
End: 2023-05-17
Payer: COMMERCIAL

## 2023-06-07 ENCOUNTER — OFFICE VISIT (OUTPATIENT)
Dept: FAMILY MEDICINE | Facility: CLINIC | Age: 63
End: 2023-06-07
Payer: COMMERCIAL

## 2023-06-07 ENCOUNTER — HOSPITAL ENCOUNTER (OUTPATIENT)
Dept: RADIOLOGY | Facility: HOSPITAL | Age: 63
Discharge: HOME | End: 2023-06-07
Attending: FAMILY MEDICINE
Payer: COMMERCIAL

## 2023-06-07 VITALS
WEIGHT: 201 LBS | DIASTOLIC BLOOD PRESSURE: 60 MMHG | BODY MASS INDEX: 36.99 KG/M2 | RESPIRATION RATE: 18 BRPM | SYSTOLIC BLOOD PRESSURE: 132 MMHG | HEIGHT: 62 IN | OXYGEN SATURATION: 100 %

## 2023-06-07 DIAGNOSIS — M25.562 CHRONIC PAIN OF LEFT KNEE: ICD-10-CM

## 2023-06-07 DIAGNOSIS — K59.09 CHRONIC CONSTIPATION: Primary | ICD-10-CM

## 2023-06-07 DIAGNOSIS — G25.81 RESTLESS LEG SYNDROME: ICD-10-CM

## 2023-06-07 DIAGNOSIS — G89.29 CHRONIC PAIN OF LEFT KNEE: ICD-10-CM

## 2023-06-07 DIAGNOSIS — I10 PRIMARY HYPERTENSION: Chronic | ICD-10-CM

## 2023-06-07 PROCEDURE — 3008F PR BODY MASS INDEX (BMI) DOCUMENTED: ICD-10-PCS | Mod: ,,, | Performed by: FAMILY MEDICINE

## 2023-06-07 PROCEDURE — 3008F BODY MASS INDEX DOCD: CPT | Mod: ,,, | Performed by: FAMILY MEDICINE

## 2023-06-07 PROCEDURE — 1160F PR REVIEW ALL MEDS BY PRESCRIBER/CLIN PHARMACIST DOCUMENTED: ICD-10-PCS | Mod: ,,, | Performed by: FAMILY MEDICINE

## 2023-06-07 PROCEDURE — 3066F NEPHROPATHY DOC TX: CPT | Mod: ,,, | Performed by: FAMILY MEDICINE

## 2023-06-07 PROCEDURE — 3078F DIAST BP <80 MM HG: CPT | Mod: ,,, | Performed by: FAMILY MEDICINE

## 2023-06-07 PROCEDURE — 1160F RVW MEDS BY RX/DR IN RCRD: CPT | Mod: ,,, | Performed by: FAMILY MEDICINE

## 2023-06-07 PROCEDURE — 3066F PR DOCUMENTATION OF TREATMENT FOR NEPHROPATHY: ICD-10-PCS | Mod: ,,, | Performed by: FAMILY MEDICINE

## 2023-06-07 PROCEDURE — 3075F SYST BP GE 130 - 139MM HG: CPT | Mod: ,,, | Performed by: FAMILY MEDICINE

## 2023-06-07 PROCEDURE — 1159F PR MEDICATION LIST DOCUMENTED IN MEDICAL RECORD: ICD-10-PCS | Mod: ,,, | Performed by: FAMILY MEDICINE

## 2023-06-07 PROCEDURE — 3062F POS MACROALBUMINURIA REV: CPT | Mod: ,,, | Performed by: FAMILY MEDICINE

## 2023-06-07 PROCEDURE — 1159F MED LIST DOCD IN RCRD: CPT | Mod: ,,, | Performed by: FAMILY MEDICINE

## 2023-06-07 PROCEDURE — 99214 PR OFFICE/OUTPT VISIT, EST, LEVL IV, 30-39 MIN: ICD-10-PCS | Mod: ,,, | Performed by: FAMILY MEDICINE

## 2023-06-07 PROCEDURE — 3062F PR POS MACROALBUMINURIA RESULT DOCUMENTED/REVIEW: ICD-10-PCS | Mod: ,,, | Performed by: FAMILY MEDICINE

## 2023-06-07 PROCEDURE — 3078F PR MOST RECENT DIASTOLIC BLOOD PRESSURE < 80 MM HG: ICD-10-PCS | Mod: ,,, | Performed by: FAMILY MEDICINE

## 2023-06-07 PROCEDURE — 3051F PR MOST RECENT HEMOGLOBIN A1C LEVEL 7.0 - < 8.0%: ICD-10-PCS | Mod: ,,, | Performed by: FAMILY MEDICINE

## 2023-06-07 PROCEDURE — 3075F PR MOST RECENT SYSTOLIC BLOOD PRESS GE 130-139MM HG: ICD-10-PCS | Mod: ,,, | Performed by: FAMILY MEDICINE

## 2023-06-07 PROCEDURE — 99214 OFFICE O/P EST MOD 30 MIN: CPT | Mod: ,,, | Performed by: FAMILY MEDICINE

## 2023-06-07 PROCEDURE — 3051F HG A1C>EQUAL 7.0%<8.0%: CPT | Mod: ,,, | Performed by: FAMILY MEDICINE

## 2023-06-07 PROCEDURE — 73562 X-RAY EXAM OF KNEE 3: CPT | Mod: TC,PN,LT

## 2023-06-07 RX ORDER — PRAMIPEXOLE DIHYDROCHLORIDE 0.12 MG/1
0.12 TABLET ORAL NIGHTLY
Qty: 30 TABLET | Refills: 5 | Status: SHIPPED | OUTPATIENT
Start: 2023-06-07 | End: 2024-06-06

## 2023-06-07 RX ORDER — FUROSEMIDE 20 MG/1
20 TABLET ORAL DAILY PRN
Qty: 90 TABLET | Refills: 1 | Status: SHIPPED | OUTPATIENT
Start: 2023-06-07 | End: 2023-11-15 | Stop reason: SDUPTHER

## 2023-06-07 RX ORDER — POLYETHYLENE GLYCOL 3350 17 G/17G
17 POWDER, FOR SOLUTION ORAL DAILY PRN
Qty: 30 EACH | Refills: 2 | Status: SHIPPED | OUTPATIENT
Start: 2023-06-07 | End: 2023-11-16

## 2023-06-07 NOTE — PROGRESS NOTES
New Clinic Note    Dilma Cr is a 62 y.o. female     CC:   Chief Complaint   Patient presents with    GI Problem     Patient stated after her colon surgery she has had problems with constipation. She is in the process of weekly labs and Iron Infusion at [facility].  Sees Dr Guillermo Stover Oncologist.     Knee Pain     Complains of recurrent bilateral knee pain after a fall at her home inside her home.  Wants a Xray of left knee. Hurts worse in am behind her knee like a pull when she wakes up.  Stated her discomfort with her knees and legs is making it hard to continue to work at [employer] for past 29 years. Had MCI and Coronary artery stenting and heart is doing well.  Has Iron deficiency anemia due to her anti platelet therapy for heart and had the Watchman procedure. Having restless leg syndrome type discomfort.    Diabetes     Uses Free Style Curtis and stated her blood sugars usually run around 160-170.         Subjective    History of Present Illness HPI   Patient complains of chronic constipation. She is requiring iron infusions after her colectomy. She has tried OTC laxatives without relief. Patient complains of left knee pain. She reports that she fell in her home and she wants to be sure she did not break anything. She complains of restless legs at night. She would like something for this. She needs refills.     Current Outpatient Medications:     ascorbic acid, vitamin C, 250 mg Chew, Take 250 mg by mouth once daily., Disp: , Rfl:     aspirin 81 MG Chew, Take 81 mg by mouth once daily., Disp: , Rfl:     clopidogreL (PLAVIX) 75 mg tablet, Take 1 tablet (75 mg total) by mouth once daily., Disp: 90 tablet, Rfl: 1    cyanocobalamin 2000 MCG tablet, Take 1 tablet by mouth once daily., Disp: , Rfl:     cyclobenzaprine (FLEXERIL) 5 MG tablet, Take 5 mg by mouth 3 (three) times daily as needed., Disp: , Rfl:     ezetimibe (ZETIA) 10 mg tablet, Take 10 mg by mouth., Disp: , Rfl:     FREESTYLE CURTIS  14 DAY SENSOR Kit, USE TO CHECK GLUCOSE 4 TIMES DAILY, Disp: , Rfl:     FREESTYLE BECCA 3 SENSOR Aleena, USE 1 PATCH TOPICALLY EVERY DAY FOR 2 WEEKS, Disp: , Rfl:     iron-vitamin C 100-250 mg, ICAR-C, 100-250 mg Tab, Take 1 tablet by mouth 2 (two) times daily with meals., Disp: , Rfl:     JARDIANCE 25 mg tablet, Take 1 tablet (25 mg total) by mouth once daily., Disp: 30 tablet, Rfl: 5    loratadine (CLARITIN) 10 mg tablet, Take 1 tablet (10 mg total) by mouth once daily., Disp: 30 tablet, Rfl: 0    metoprolol succinate (TOPROL-XL) 25 MG 24 hr tablet, Take 1 tablet (25 mg total) by mouth once daily., Disp: 90 tablet, Rfl: 3    naloxone (NARCAN) 4 mg/actuation Spry, 1 spray by Nasal route once as needed., Disp: , Rfl:     nitroGLYCERIN (NITROSTAT) 0.4 MG SL tablet, Place 1 tablet (0.4 mg total) under the tongue every 5 (five) minutes as needed for Chest pain., Disp: 30 tablet, Rfl: 0    NOVOLOG FLEXPEN U-100 INSULIN 100 unit/mL (3 mL) InPn pen, Inject 20 Units into the skin 2 (two) times a day., Disp: 12 each, Rfl: 1    olmesartan-hydrochlorothiazide (BENICAR HCT) 40-25 mg per tablet, Take 1 tablet by mouth once daily., Disp: , Rfl:     OZEMPIC 1 mg/dose (4 mg/3 mL), , Disp: , Rfl:     pantoprazole (PROTONIX) 40 MG tablet, Take 1 tablet (40 mg total) by mouth once daily., Disp: 90 tablet, Rfl: 1    rosuvastatin (CRESTOR) 40 MG Tab, Take 1 tablet by mouth once daily, Disp: 90 tablet, Rfl: 0    sotaloL (BETAPACE) 120 MG Tab, Take 120 mg by mouth every 12 (twelve) hours., Disp: , Rfl:     traMADoL (ULTRAM) 50 mg tablet, Take 1 tablet (50 mg total) by mouth every 12 (twelve) hours as needed for Pain., Disp: 60 tablet, Rfl: 2    TRESIBA FLEXTOUCH U-100 100 unit/mL (3 mL) insulin pen, Inject 24 Units into the skin once daily., Disp: 3 pen, Rfl: 1    BINAXNOW COVID-19 AG SELF TEST Kit, Use as Directed on the Package, Disp: , Rfl:     cyanocobalamin (VITAMIN B-12) 1000 MCG tablet, Take 100 mcg by mouth once daily., Disp: ,  Rfl:     EScitalopram oxalate (LEXAPRO) 10 MG tablet, Take 1 tablet (10 mg total) by mouth once daily for 30 days, THEN 2 tablets (20 mg total) once daily., Disp: 390 tablet, Rfl: 1    furosemide (LASIX) 20 MG tablet, Take 1 tablet (20 mg total) by mouth daily as needed (fluid)., Disp: 90 tablet, Rfl: 1    polyethylene glycol (MIRALAX) 17 gram PwPk, Take 17 g by mouth daily as needed (constipation)., Disp: 30 each, Rfl: 2    pramipexole (MIRAPEX) 0.125 MG tablet, Take 1 tablet (0.125 mg total) by mouth every evening., Disp: 30 tablet, Rfl: 5    traMADoL (ULTRAM) 50 mg tablet, Take 1 tablet (50 mg total) by mouth every 12 (twelve) hours as needed for Pain., Disp: 60 tablet, Rfl: 2     Past Medical History:   Diagnosis Date    Acute superficial gastritis without hemorrhage 06/21/2022    Arthritis     Cancer     colon cancer    Chronic kidney disease, stage 3b     Colon cancer     Coronary artery disease     Depression     Diabetes mellitus, type 2     Diverticula, colon 06/22/2022    GERD (gastroesophageal reflux disease)     H/O right hemicolectomy 06/22/2022    HH (hiatus hernia) 06/21/2022    Hyperlipidemia     Myocardial infarction     2 stents in 05/14/2021 Dr Lora    Renal disorder         Family History   Problem Relation Age of Onset    Hypertension Mother     Diabetes Mother     Hypertension Father     Diabetes Father     Hypertension Sister     Breast cancer Maternal Grandmother     Hypertension Brother         Past Surgical History:   Procedure Laterality Date    BREAST SURGERY      C5-C6 RADHA  8-, 7-, 6-1-2016    Dr Fowler    CARDIAC SURGERY  02/07/2023    CERVICAL SPINE SURGERY      COLON SURGERY      EPIDURAL STEROID INJECTION INTO LUMBAR SPINE N/A 03/29/2022    Procedure: L4-5 RADHA;  Surgeon: Gela Fowler MD;  Location: [facility];  Service: Pain Management;  Laterality: N/A;  PT AWARE ON OV TO BE TESTED\  PLAVIX TO BE HELD FOR 7 DAYS PRIOR TO PROCEDURE PER DR FOWLER AND   "[place]  3-28  message left on vm re: covid    HYSTERECTOMY      LEFT HEART CATHETERIZATION Left 05/14/2021    Procedure: Left heart cath;  Surgeon: Mateus Guan MD;  Location: [facility] CATH LAB;  Service: Cardiology;  Laterality: Left;    LEFT HEART CATHETERIZATION Left 06/20/2022    Procedure: Left heart cath;  Surgeon: Oliverio Bautista MD;  Location: [facility] CATH LAB;  Service: Cardiology;  Laterality: Left;    MEDIPORT REMOVAL  10/14/2021    Dr Kraft    right rotator cuff repair      in [place]    TOTAL REDUCTION MAMMOPLASTY          Review of Systems   Constitutional:  Negative for fatigue and fever.   HENT:  Negative for ear pain, postnasal drip, rhinorrhea and sinus pressure/congestion.    Respiratory:  Negative for cough and shortness of breath.    Cardiovascular:  Negative for chest pain.   Gastrointestinal:  Negative for abdominal pain, diarrhea, nausea and vomiting.   Genitourinary:  Negative for dysuria.   Neurological:  Negative for headaches.      /60 (BP Location: Left arm, Patient Position: Sitting, BP Method: Large (Automatic))   Resp 18   Ht 5' 2" (1.575 m)   Wt 91.2 kg (201 lb)   LMP  (LMP Unknown)   SpO2 100%   BMI 36.76 kg/m²      Physical Exam  HENT:      Head: Normocephalic and atraumatic.   Cardiovascular:      Rate and Rhythm: Normal rate and regular rhythm.   Pulmonary:      Effort: Pulmonary effort is normal.      Breath sounds: Normal breath sounds.   Musculoskeletal:      Left knee: No swelling. Decreased range of motion. Tenderness present.   Neurological:      Mental Status: She is alert and oriented to person, place, and time.   Psychiatric:         Mood and Affect: Mood normal.         Behavior: Behavior normal.        Assessment and Plan      ICD-10-CM ICD-9-CM   1. Chronic constipation  K59.09 564.00   2. Primary hypertension  I10 401.9   3. Chronic pain of left knee  M25.562 719.46    G89.29 338.29   4. Restless leg syndrome  G25.81 333.94        1. Chronic " constipation  Not controlled. Try taking miralax daily. If she does not receive relief she needs to let us know.   -     polyethylene glycol (MIRALAX) 17 gram PwPk; Take 17 g by mouth daily as needed (constipation).  Dispense: 30 each; Refill: 2    2. Primary hypertension  The current medical regimen is effective;  continue present plan and medications.  -     furosemide (LASIX) 20 MG tablet; Take 1 tablet (20 mg total) by mouth daily as needed (fluid).  Dispense: 90 tablet; Refill: 1    3. Chronic pain of left knee  No fractures noticed on Xray.  -     X-Ray Knee AP LAT with [facility] Left; Future; Expected date: 06/07/2023    4. Restless leg syndrome  Not controlled. Start pramipexole.   -     pramipexole (MIRAPEX) 0.125 MG tablet; Take 1 tablet (0.125 mg total) by mouth every evening.  Dispense: 30 tablet; Refill: 5        Follow up if symptoms worsen or fail to improve.

## 2023-06-21 ENCOUNTER — OFFICE VISIT (OUTPATIENT)
Dept: PAIN MEDICINE | Facility: CLINIC | Age: 63
End: 2023-06-21
Payer: COMMERCIAL

## 2023-06-21 VITALS
WEIGHT: 200.38 LBS | HEART RATE: 59 BPM | RESPIRATION RATE: 18 BRPM | DIASTOLIC BLOOD PRESSURE: 51 MMHG | HEIGHT: 61 IN | BODY MASS INDEX: 37.83 KG/M2 | SYSTOLIC BLOOD PRESSURE: 167 MMHG

## 2023-06-21 DIAGNOSIS — G89.29 CHRONIC BILATERAL LOW BACK PAIN WITH BILATERAL SCIATICA: Chronic | ICD-10-CM

## 2023-06-21 DIAGNOSIS — M54.17 LUMBOSACRAL RADICULOPATHY: Chronic | ICD-10-CM

## 2023-06-21 DIAGNOSIS — M89.49 OSTEOARTHROSIS MULTIPLE SITES, NOT SPECIFIED AS GENERALIZED: Chronic | ICD-10-CM

## 2023-06-21 DIAGNOSIS — M54.42 CHRONIC BILATERAL LOW BACK PAIN WITH BILATERAL SCIATICA: Chronic | ICD-10-CM

## 2023-06-21 DIAGNOSIS — Z79.899 ENCOUNTER FOR LONG-TERM (CURRENT) USE OF OTHER MEDICATIONS: Primary | ICD-10-CM

## 2023-06-21 DIAGNOSIS — M54.41 CHRONIC BILATERAL LOW BACK PAIN WITH BILATERAL SCIATICA: Chronic | ICD-10-CM

## 2023-06-21 LAB

## 2023-06-21 PROCEDURE — 3078F PR MOST RECENT DIASTOLIC BLOOD PRESSURE < 80 MM HG: ICD-10-PCS | Mod: ,,, | Performed by: PAIN MEDICINE

## 2023-06-21 PROCEDURE — 80305 DRUG TEST PRSMV DIR OPT OBS: CPT | Mod: PBBFAC | Performed by: PAIN MEDICINE

## 2023-06-21 PROCEDURE — 3051F PR MOST RECENT HEMOGLOBIN A1C LEVEL 7.0 - < 8.0%: ICD-10-PCS | Mod: ,,, | Performed by: PAIN MEDICINE

## 2023-06-21 PROCEDURE — 1159F PR MEDICATION LIST DOCUMENTED IN MEDICAL RECORD: ICD-10-PCS | Mod: ,,, | Performed by: PAIN MEDICINE

## 2023-06-21 PROCEDURE — 99214 OFFICE O/P EST MOD 30 MIN: CPT | Mod: S$PBB,,, | Performed by: PAIN MEDICINE

## 2023-06-21 PROCEDURE — 99213 OFFICE O/P EST LOW 20 MIN: CPT | Mod: PBBFAC | Performed by: PAIN MEDICINE

## 2023-06-21 PROCEDURE — 3062F POS MACROALBUMINURIA REV: CPT | Mod: ,,, | Performed by: PAIN MEDICINE

## 2023-06-21 PROCEDURE — 3066F NEPHROPATHY DOC TX: CPT | Mod: ,,, | Performed by: PAIN MEDICINE

## 2023-06-21 PROCEDURE — 3077F SYST BP >= 140 MM HG: CPT | Mod: ,,, | Performed by: PAIN MEDICINE

## 2023-06-21 PROCEDURE — 3062F PR POS MACROALBUMINURIA RESULT DOCUMENTED/REVIEW: ICD-10-PCS | Mod: ,,, | Performed by: PAIN MEDICINE

## 2023-06-21 PROCEDURE — 3051F HG A1C>EQUAL 7.0%<8.0%: CPT | Mod: ,,, | Performed by: PAIN MEDICINE

## 2023-06-21 PROCEDURE — 3008F PR BODY MASS INDEX (BMI) DOCUMENTED: ICD-10-PCS | Mod: ,,, | Performed by: PAIN MEDICINE

## 2023-06-21 PROCEDURE — 1159F MED LIST DOCD IN RCRD: CPT | Mod: ,,, | Performed by: PAIN MEDICINE

## 2023-06-21 PROCEDURE — 3066F PR DOCUMENTATION OF TREATMENT FOR NEPHROPATHY: ICD-10-PCS | Mod: ,,, | Performed by: PAIN MEDICINE

## 2023-06-21 PROCEDURE — 3008F BODY MASS INDEX DOCD: CPT | Mod: ,,, | Performed by: PAIN MEDICINE

## 2023-06-21 PROCEDURE — 3077F PR MOST RECENT SYSTOLIC BLOOD PRESSURE >= 140 MM HG: ICD-10-PCS | Mod: ,,, | Performed by: PAIN MEDICINE

## 2023-06-21 PROCEDURE — 99214 PR OFFICE/OUTPT VISIT, EST, LEVL IV, 30-39 MIN: ICD-10-PCS | Mod: S$PBB,,, | Performed by: PAIN MEDICINE

## 2023-06-21 PROCEDURE — 3078F DIAST BP <80 MM HG: CPT | Mod: ,,, | Performed by: PAIN MEDICINE

## 2023-06-21 RX ORDER — TRAMADOL HYDROCHLORIDE 50 MG/1
50 TABLET ORAL EVERY 12 HOURS PRN
Qty: 60 TABLET | Refills: 2 | Status: SHIPPED | OUTPATIENT
Start: 2023-06-30 | End: 2023-09-28

## 2023-06-21 NOTE — PROGRESS NOTES
She Disclaimer: This note has been generated using voice-recognition software. There may be typographical errors that have been missed during proof-reading        Patient ID: Dilma Cr is a 62 y.o. female.      Chief Complaint: Low-back Pain and Leg Pain (Bilateral leg pain)      62-year-old female returns for re-evaluation of lower back and bilateral lumbar radicular pain.  She received an L4-5 epidural steroid injection March 29, 2022 and experienced greater than 50 percent pain relief for several months.  The pain has returned but she is unable to discontinue anticoagulants for a repeat procedure.  She has a cardiology evaluation in August and discussion to discontinue anticoagulants will be determined at that time.  Ultram is providing some relief.  She has a history of colon cancer from 2018 and is currently in remission.  She is unable to tolerate NSAIDs due to chronic renal sufficiency, anticoagulants and coronary artery disease.  We will consider an L4-5 repeat epidural steroid injection at the next office visit based on anticoagulation protocol.              Pain Assessment  Pain Assessment: 0-10  Pain Score: 10-Worst pain ever  Pain Location: Back (lower back pain and bilateral leg pain)  Pain Descriptors: Aching, Burning  Pain Frequency: Constant/continuous  Pain Onset: Awakened from sleep  Clinical Progression: Not changed  Aggravating Factors: Walking  Pain Intervention(s): Medication (See eMAR), Home medication, Heat applied      A's of Opioid Risk Assessment  Activity:Patient can partially perform ADL.   Analgesia:Patients pain is not controlled by current medication.   Adverse Effects: Patient denies constipation or sedation.  Aberrant Behavior:  reviewed with no aberrant drug seeking/taking behavior.      Patient denies any suicidal or homicidal ideations    Physical Therapy/Home Exercise: yes, no relief          Review of Systems   Constitutional: Negative.    HENT: Negative.     Eyes:  Negative.    Respiratory: Negative.     Cardiovascular: Negative.    Gastrointestinal: Negative.    Endocrine: Negative.    Genitourinary: Negative.    Musculoskeletal:  Positive for arthralgias and leg pain.   Integumentary:  Negative.   Neurological:  Positive for weakness (BLE) and numbness (BLE).   Hematological: Negative.    Psychiatric/Behavioral: Negative.             Past Medical History:   Diagnosis Date    Acute superficial gastritis without hemorrhage 06/21/2022    Arthritis     Cancer     colon cancer    Chronic kidney disease, stage 3b     Colon cancer     Coronary artery disease     Depression     Diabetes mellitus, type 2     Diverticula, colon 06/22/2022    GERD (gastroesophageal reflux disease)     H/O right hemicolectomy 06/22/2022    HH (hiatus hernia) 06/21/2022    Hyperlipidemia     Myocardial infarction     2 stents in 05/14/2021 Dr Porter    Renal disorder      Past Surgical History:   Procedure Laterality Date    BREAST SURGERY      C5-C6 RADHA  8-, 7-, 6-1-2016    Dr Fowler    CARDIAC SURGERY  02/07/2023    CERVICAL SPINE SURGERY      COLON SURGERY      EPIDURAL STEROID INJECTION INTO LUMBAR SPINE N/A 03/29/2022    Procedure: L4-5 RADHA;  Surgeon: Gela Fowler MD;  [facility]: [facility];  Service: Pain Management;  Laterality: N/A;  PT AWARE ON OV TO BE TESTED\  PLAVIX TO BE HELD FOR 7 DAYS PRIOR TO PROCEDURE PER DR FOWLER AND DR PORTER  3-28  message left on vm re: covid    HYSTERECTOMY      LEFT HEART CATHETERIZATION Left 05/14/2021    Procedure: Left heart cath;  Surgeon: Mateus Guan MD;  Location: [facility] CATH LAB;  Service: Cardiology;  Laterality: Left;    LEFT HEART CATHETERIZATION Left 06/20/2022    Procedure: Left heart cath;  Surgeon: Oliverio Bautista MD;  Location: [facility] CATH LAB;  Service: Cardiology;  Laterality: Left;    MEDIPORT REMOVAL  10/14/2021    Dr Kraft    right rotator cuff repair      in [place]    TOTAL REDUCTION MAMMOPLASTY        Social History     Socioeconomic History    Marital status:      Spouse name: Jorge Alberto    Number of children: 2   Occupational History    Occupation:  at [employer] for past 27 years   Tobacco Use    Smoking status: Never     Passive exposure: Never    Smokeless tobacco: Never   Substance and Sexual Activity    Alcohol use: Never    Drug use: Never    Sexual activity: Yes   Social History Narrative    Worked at [employer] for past 29 years. H/O colon cancer. Watchman procedure. Sees Dr Stover Oncologist.      Family History   Problem Relation Age of Onset    Hypertension Mother     Diabetes Mother     Hypertension Father     Diabetes Father     Hypertension Sister     Breast cancer Maternal Grandmother     Hypertension Brother      Review of patient's allergies indicates:  No Known Allergies  has a current medication list which includes the following prescription(s): ascorbic acid (vitamin c), aspirin, binaxnow covid-19 ag self test, clopidogrel, cyanocobalamin, cyanocobalamin, cyclobenzaprine, ezetimibe, freestyle padma 14 day sensor, freestyle padma 3 sensor, furosemide, iron-vitamin c 100-250 mg (icar-c), jardiance, loratadine, metoprolol succinate, naloxone, nitroglycerin, novolog flexpen u-100 insulin, olmesartan-hydrochlorothiazide, ozempic, pantoprazole, polyethylene glycol, pramipexole, rosuvastatin, sotalol, tramadol, tresiba flextouch u-100, [START ON 6/30/2023] tramadol, and [DISCONTINUED] isosorbide mononitrate.      Objective:  Vitals:    06/21/23 0957   BP: (!) 167/51   Pulse: (!) 59   Resp: 18        Physical Exam  Vitals and nursing note reviewed.   Constitutional:       General: She is not in acute distress.     Appearance: Normal appearance. She is not ill-appearing, toxic-appearing or diaphoretic.   HENT:      Head: Normocephalic and atraumatic.      Nose: Nose normal.      Mouth/Throat:      Mouth: Mucous membranes are moist.   Eyes:      Extraocular Movements:  Extraocular movements intact.      Pupils: Pupils are equal, round, and reactive to light.   Cardiovascular:      Rate and Rhythm: Normal rate and regular rhythm.      Heart sounds: Normal heart sounds.   Pulmonary:      Effort: Pulmonary effort is normal. No respiratory distress.      Breath sounds: Normal breath sounds. No stridor. No wheezing or rhonchi.   Abdominal:      General: Bowel sounds are normal.      Palpations: Abdomen is soft.   Musculoskeletal:         General: No swelling or deformity.      Cervical back: Normal and normal range of motion. No spasms or tenderness. No pain with movement. Normal range of motion.      Thoracic back: Normal.      Lumbar back: Tenderness and bony tenderness present. No spasms. Decreased range of motion. Negative right straight leg raise test and negative left straight leg raise test. No scoliosis.      Right lower leg: No edema.      Left lower leg: No edema.      Comments: Lumbar pain with flexion, extension and lateral rotation   Skin:     General: Skin is warm.   Neurological:      General: No focal deficit present.      Mental Status: She is alert and oriented to person, place, and time. Mental status is at baseline.      Cranial Nerves: No cranial nerve deficit.      Sensory: Sensation is intact. No sensory deficit.      Motor: No weakness.      Coordination: Coordination normal.      Gait: Gait normal.      Deep Tendon Reflexes: Reflexes are normal and symmetric.   Psychiatric:         Mood and Affect: Mood normal.         Behavior: Behavior normal.         Assessment:      1. Encounter for long-term (current) use of other medications    2. Lumbosacral radiculopathy    3. Osteoarthrosis multiple sites, not specified as generalized    4. Chronic bilateral low back pain with bilateral sciatica          Plan:  1. reviewed  2.Addiction, Dependency, Tolerance, Opioid abuse-misuse, Death, Diversion Discussed. Overdose reversal drug Naloxone  discussed.  3.Refill/Continue medications for pain control and function       Requested Prescriptions     Signed Prescriptions Disp Refills    traMADoL (ULTRAM) 50 mg tablet 60 tablet 2     Sig: Take 1 tablet (50 mg total) by mouth every 12 (twelve) hours as needed for Pain.     4. Urine drug screen point of care obtained and consistent with prescribed medications and medication refill date    Orders Placed This Encounter   Procedures    POCT Urine Drug Screen ([facility])     Interpretive Information:     Negative:  No drug detected at the cut off level.   Positive:  This result represents presumptive positive for the   tested drug, other substances may yield a positive response other   than the analyte of interest. This result should be utilized for   diagnostic purpose only. Confirmation testing will be performed upon physician request only.         5. Return in 3 months for re-evaluation and consider possible epidural steroid injection  for intractable lumbar radiculopathy     report:  Reviewed and consistent with medication use as prescribed.      The total time spent for evaluation and management on 06/23/2023 including reviewing separately obtained history, performing a medically appropriate exam and evaluation, documenting clinical information in the health record, independently interpreting results and communicating them to the patient/family/caregiver, and ordering medications/tests/procedures was between 15-29 minutes.    The above plan and management options were discussed at length with patient. Patient is in agreement with the above and verbalized understanding. It will be communicated with the referring physician via electronic record, fax, or mail.

## 2023-07-26 ENCOUNTER — OFFICE VISIT (OUTPATIENT)
Dept: CARDIOLOGY | Facility: CLINIC | Age: 63
End: 2023-07-26
Payer: COMMERCIAL

## 2023-07-26 ENCOUNTER — OFFICE VISIT (OUTPATIENT)
Dept: FAMILY MEDICINE | Facility: CLINIC | Age: 63
End: 2023-07-26
Payer: COMMERCIAL

## 2023-07-26 VITALS
BODY MASS INDEX: 37.57 KG/M2 | SYSTOLIC BLOOD PRESSURE: 130 MMHG | WEIGHT: 199 LBS | HEIGHT: 61 IN | RESPIRATION RATE: 18 BRPM | HEART RATE: 55 BPM | DIASTOLIC BLOOD PRESSURE: 70 MMHG

## 2023-07-26 VITALS
HEART RATE: 61 BPM | HEIGHT: 61 IN | WEIGHT: 200 LBS | TEMPERATURE: 98 F | SYSTOLIC BLOOD PRESSURE: 135 MMHG | OXYGEN SATURATION: 98 % | RESPIRATION RATE: 18 BRPM | DIASTOLIC BLOOD PRESSURE: 68 MMHG | BODY MASS INDEX: 37.76 KG/M2

## 2023-07-26 DIAGNOSIS — G89.29 CHRONIC PAIN OF LEFT KNEE: ICD-10-CM

## 2023-07-26 DIAGNOSIS — E11.42 TYPE 2 DIABETES MELLITUS WITH DIABETIC POLYNEUROPATHY, WITH LONG-TERM CURRENT USE OF INSULIN: Chronic | ICD-10-CM

## 2023-07-26 DIAGNOSIS — I48.91 ATRIAL FIBRILLATION WITH RAPID VENTRICULAR RESPONSE: Chronic | ICD-10-CM

## 2023-07-26 DIAGNOSIS — I48.91 ATRIAL FIBRILLATION, UNSPECIFIED TYPE: Primary | ICD-10-CM

## 2023-07-26 DIAGNOSIS — I10 PRIMARY HYPERTENSION: Chronic | ICD-10-CM

## 2023-07-26 DIAGNOSIS — I10 PRIMARY HYPERTENSION: Primary | Chronic | ICD-10-CM

## 2023-07-26 DIAGNOSIS — F32.A DEPRESSION, UNSPECIFIED DEPRESSION TYPE: ICD-10-CM

## 2023-07-26 DIAGNOSIS — I25.10 ATHEROSCLEROSIS OF NATIVE CORONARY ARTERY OF NATIVE HEART WITHOUT ANGINA PECTORIS: Chronic | ICD-10-CM

## 2023-07-26 DIAGNOSIS — Z79.4 TYPE 2 DIABETES MELLITUS WITH DIABETIC POLYNEUROPATHY, WITH LONG-TERM CURRENT USE OF INSULIN: Chronic | ICD-10-CM

## 2023-07-26 DIAGNOSIS — R00.2 PALPITATIONS: ICD-10-CM

## 2023-07-26 DIAGNOSIS — I10 ESSENTIAL HYPERTENSION: Primary | ICD-10-CM

## 2023-07-26 DIAGNOSIS — M25.562 CHRONIC PAIN OF LEFT KNEE: ICD-10-CM

## 2023-07-26 PROBLEM — K59.09 CHRONIC CONSTIPATION: Status: ACTIVE | Noted: 2023-07-26

## 2023-07-26 PROBLEM — G25.81 RESTLESS LEG SYNDROME: Status: ACTIVE | Noted: 2023-07-26

## 2023-07-26 LAB
EST. AVERAGE GLUCOSE BLD GHB EST-MCNC: 160 MG/DL
HBA1C MFR BLD HPLC: 7.4 % (ref 4.5–6.6)

## 2023-07-26 PROCEDURE — 3066F PR DOCUMENTATION OF TREATMENT FOR NEPHROPATHY: ICD-10-PCS | Mod: ,,, | Performed by: STUDENT IN AN ORGANIZED HEALTH CARE EDUCATION/TRAINING PROGRAM

## 2023-07-26 PROCEDURE — 3066F NEPHROPATHY DOC TX: CPT | Mod: ,,, | Performed by: FAMILY MEDICINE

## 2023-07-26 PROCEDURE — 99214 OFFICE O/P EST MOD 30 MIN: CPT | Mod: S$PBB,,, | Performed by: STUDENT IN AN ORGANIZED HEALTH CARE EDUCATION/TRAINING PROGRAM

## 2023-07-26 PROCEDURE — 3008F BODY MASS INDEX DOCD: CPT | Mod: ,,, | Performed by: FAMILY MEDICINE

## 2023-07-26 PROCEDURE — 1160F RVW MEDS BY RX/DR IN RCRD: CPT | Mod: ,,, | Performed by: FAMILY MEDICINE

## 2023-07-26 PROCEDURE — 99214 PR OFFICE/OUTPT VISIT, EST, LEVL IV, 30-39 MIN: ICD-10-PCS | Mod: S$PBB,,, | Performed by: STUDENT IN AN ORGANIZED HEALTH CARE EDUCATION/TRAINING PROGRAM

## 2023-07-26 PROCEDURE — 1159F MED LIST DOCD IN RCRD: CPT | Mod: ,,, | Performed by: STUDENT IN AN ORGANIZED HEALTH CARE EDUCATION/TRAINING PROGRAM

## 2023-07-26 PROCEDURE — 93010 EKG 12-LEAD: ICD-10-PCS | Mod: S$PBB,,, | Performed by: STUDENT IN AN ORGANIZED HEALTH CARE EDUCATION/TRAINING PROGRAM

## 2023-07-26 PROCEDURE — 3008F BODY MASS INDEX DOCD: CPT | Mod: ,,, | Performed by: STUDENT IN AN ORGANIZED HEALTH CARE EDUCATION/TRAINING PROGRAM

## 2023-07-26 PROCEDURE — 99214 OFFICE O/P EST MOD 30 MIN: CPT | Mod: ,,, | Performed by: FAMILY MEDICINE

## 2023-07-26 PROCEDURE — 83036 HEMOGLOBIN GLYCOSYLATED A1C: CPT | Mod: ,,, | Performed by: CLINICAL MEDICAL LABORATORY

## 2023-07-26 PROCEDURE — 3008F PR BODY MASS INDEX (BMI) DOCUMENTED: ICD-10-PCS | Mod: ,,, | Performed by: STUDENT IN AN ORGANIZED HEALTH CARE EDUCATION/TRAINING PROGRAM

## 2023-07-26 PROCEDURE — 3062F PR POS MACROALBUMINURIA RESULT DOCUMENTED/REVIEW: ICD-10-PCS | Mod: ,,, | Performed by: FAMILY MEDICINE

## 2023-07-26 PROCEDURE — 83036 HEMOGLOBIN A1C: ICD-10-PCS | Mod: ,,, | Performed by: CLINICAL MEDICAL LABORATORY

## 2023-07-26 PROCEDURE — 3062F POS MACROALBUMINURIA REV: CPT | Mod: ,,, | Performed by: STUDENT IN AN ORGANIZED HEALTH CARE EDUCATION/TRAINING PROGRAM

## 2023-07-26 PROCEDURE — 3051F HG A1C>EQUAL 7.0%<8.0%: CPT | Mod: ,,, | Performed by: FAMILY MEDICINE

## 2023-07-26 PROCEDURE — 99213 OFFICE O/P EST LOW 20 MIN: CPT | Mod: PBBFAC | Performed by: STUDENT IN AN ORGANIZED HEALTH CARE EDUCATION/TRAINING PROGRAM

## 2023-07-26 PROCEDURE — 1159F PR MEDICATION LIST DOCUMENTED IN MEDICAL RECORD: ICD-10-PCS | Mod: ,,, | Performed by: FAMILY MEDICINE

## 2023-07-26 PROCEDURE — 3051F PR MOST RECENT HEMOGLOBIN A1C LEVEL 7.0 - < 8.0%: ICD-10-PCS | Mod: ,,, | Performed by: FAMILY MEDICINE

## 2023-07-26 PROCEDURE — 93010 ELECTROCARDIOGRAM REPORT: CPT | Mod: S$PBB,,, | Performed by: STUDENT IN AN ORGANIZED HEALTH CARE EDUCATION/TRAINING PROGRAM

## 2023-07-26 PROCEDURE — 3078F PR MOST RECENT DIASTOLIC BLOOD PRESSURE < 80 MM HG: ICD-10-PCS | Mod: ,,, | Performed by: STUDENT IN AN ORGANIZED HEALTH CARE EDUCATION/TRAINING PROGRAM

## 2023-07-26 PROCEDURE — 3075F SYST BP GE 130 - 139MM HG: CPT | Mod: ,,, | Performed by: FAMILY MEDICINE

## 2023-07-26 PROCEDURE — 1159F MED LIST DOCD IN RCRD: CPT | Mod: ,,, | Performed by: FAMILY MEDICINE

## 2023-07-26 PROCEDURE — 3078F PR MOST RECENT DIASTOLIC BLOOD PRESSURE < 80 MM HG: ICD-10-PCS | Mod: ,,, | Performed by: FAMILY MEDICINE

## 2023-07-26 PROCEDURE — 99214 PR OFFICE/OUTPT VISIT, EST, LEVL IV, 30-39 MIN: ICD-10-PCS | Mod: ,,, | Performed by: FAMILY MEDICINE

## 2023-07-26 PROCEDURE — 3062F PR POS MACROALBUMINURIA RESULT DOCUMENTED/REVIEW: ICD-10-PCS | Mod: ,,, | Performed by: STUDENT IN AN ORGANIZED HEALTH CARE EDUCATION/TRAINING PROGRAM

## 2023-07-26 PROCEDURE — 3066F PR DOCUMENTATION OF TREATMENT FOR NEPHROPATHY: ICD-10-PCS | Mod: ,,, | Performed by: FAMILY MEDICINE

## 2023-07-26 PROCEDURE — 3075F SYST BP GE 130 - 139MM HG: CPT | Mod: ,,, | Performed by: STUDENT IN AN ORGANIZED HEALTH CARE EDUCATION/TRAINING PROGRAM

## 2023-07-26 PROCEDURE — 1160F PR REVIEW ALL MEDS BY PRESCRIBER/CLIN PHARMACIST DOCUMENTED: ICD-10-PCS | Mod: ,,, | Performed by: FAMILY MEDICINE

## 2023-07-26 PROCEDURE — 3078F DIAST BP <80 MM HG: CPT | Mod: ,,, | Performed by: FAMILY MEDICINE

## 2023-07-26 PROCEDURE — 3051F PR MOST RECENT HEMOGLOBIN A1C LEVEL 7.0 - < 8.0%: ICD-10-PCS | Mod: ,,, | Performed by: STUDENT IN AN ORGANIZED HEALTH CARE EDUCATION/TRAINING PROGRAM

## 2023-07-26 PROCEDURE — 1159F PR MEDICATION LIST DOCUMENTED IN MEDICAL RECORD: ICD-10-PCS | Mod: ,,, | Performed by: STUDENT IN AN ORGANIZED HEALTH CARE EDUCATION/TRAINING PROGRAM

## 2023-07-26 PROCEDURE — 3062F POS MACROALBUMINURIA REV: CPT | Mod: ,,, | Performed by: FAMILY MEDICINE

## 2023-07-26 PROCEDURE — 93005 ELECTROCARDIOGRAM TRACING: CPT | Mod: PBBFAC | Performed by: STUDENT IN AN ORGANIZED HEALTH CARE EDUCATION/TRAINING PROGRAM

## 2023-07-26 PROCEDURE — 3078F DIAST BP <80 MM HG: CPT | Mod: ,,, | Performed by: STUDENT IN AN ORGANIZED HEALTH CARE EDUCATION/TRAINING PROGRAM

## 2023-07-26 PROCEDURE — 3008F PR BODY MASS INDEX (BMI) DOCUMENTED: ICD-10-PCS | Mod: ,,, | Performed by: FAMILY MEDICINE

## 2023-07-26 PROCEDURE — 3075F PR MOST RECENT SYSTOLIC BLOOD PRESS GE 130-139MM HG: ICD-10-PCS | Mod: ,,, | Performed by: FAMILY MEDICINE

## 2023-07-26 PROCEDURE — 3075F PR MOST RECENT SYSTOLIC BLOOD PRESS GE 130-139MM HG: ICD-10-PCS | Mod: ,,, | Performed by: STUDENT IN AN ORGANIZED HEALTH CARE EDUCATION/TRAINING PROGRAM

## 2023-07-26 PROCEDURE — 3051F HG A1C>EQUAL 7.0%<8.0%: CPT | Mod: ,,, | Performed by: STUDENT IN AN ORGANIZED HEALTH CARE EDUCATION/TRAINING PROGRAM

## 2023-07-26 PROCEDURE — 3066F NEPHROPATHY DOC TX: CPT | Mod: ,,, | Performed by: STUDENT IN AN ORGANIZED HEALTH CARE EDUCATION/TRAINING PROGRAM

## 2023-07-26 RX ORDER — ESCITALOPRAM OXALATE 10 MG/1
TABLET ORAL
Qty: 390 TABLET | Refills: 1 | OUTPATIENT
Start: 2023-07-26 | End: 2023-10-28

## 2023-07-26 NOTE — ASSESSMENT & PLAN NOTE
NSTEMI, status post PCI of LAD and RCA 5/14/21 followed by ISR with POBA in 5/2022  PCI to distal RCA, however stent at had diffuse disease with small PDA which is diffusely disease.  - has atypical CP   - continue metop xl 25mg qd  - Continue plavix

## 2023-07-26 NOTE — ASSESSMENT & PLAN NOTE
Report low mood and sometime crying at home  Low energy as well  Will start lexapro 10mg qd followed by 20mg qd in 1 month  EKG in 1 week for QTc evaluation

## 2023-07-26 NOTE — PROGRESS NOTES
Subjective     Patient ID: Dilma Cr is a 62 y.o. female.    Chief Complaint: Color Me Healthy (Patient stated she is her for a Color Me Healthy You Visit 4 of 4 for HTN/ Glucose. ), Diabetes (Stated she uses the [employer] to check her blood sugars. Stated she had Diabetic Foot exam in [place] at first of the year by Dr Koko Rivera and records requested.), Hypertension, and Hyperlipidemia      Review of Systems   Constitutional:  Negative for fatigue and fever.   HENT:  Negative for ear pain, postnasal drip, rhinorrhea and sinus pressure/congestion.    Respiratory:  Negative for cough and shortness of breath.    Cardiovascular:  Negative for chest pain.   Gastrointestinal:  Negative for abdominal pain, diarrhea, nausea and vomiting.   Genitourinary:  Negative for dysuria.   Neurological:  Negative for headaches.       Tobacco Use: Low Risk  (7/26/2023)    Patient History     Smoking Tobacco Use: Never     Smokeless Tobacco Use: Never     Passive Exposure: Never     Review of patient's allergies indicates:  No Known Allergies  Current Outpatient Medications   Medication Instructions    ascorbic acid (vitamin C) 250 mg, Oral, Daily    aspirin 81 mg, Oral, Daily    BINAXNOW COVID-19 AG SELF TEST Kit Use as Directed on the Package    clopidogreL (PLAVIX) 75 mg, Oral, Daily    cyanocobalamin (VITAMIN B-12) 100 mcg, Oral, Daily    cyanocobalamin 2000 MCG tablet 1 tablet, Oral, Daily    cyclobenzaprine (FLEXERIL) 5 mg, Oral, 3 times daily PRN    EScitalopram oxalate (LEXAPRO) 10 MG tablet Take 1 tablet (10 mg total) by mouth once daily for 30 days, THEN 2 tablets (20 mg total) once daily.    ezetimibe (ZETIA) 10 mg, Oral    FREESTYLE CURTIS 14 DAY SENSOR Kit USE TO CHECK GLUCOSE 4 TIMES DAILY    FREESTYLE CURTIS 3 SENSOR Aleena USE 1 PATCH TOPICALLY EVERY DAY FOR 2 WEEKS    furosemide (LASIX) 20 mg, Oral, Daily PRN    iron-vitamin C 100-250 mg, ICAR-C, 100-250 mg Tab 1 tablet, Oral, 2 times daily with meals     JARDIANCE 25 mg, Oral, Daily    loratadine (CLARITIN) 10 mg, Oral, Daily    metoprolol succinate (TOPROL-XL) 25 mg, Oral, Daily    naloxone (NARCAN) 4 mg/actuation Spry 1 spray, Nasal, Once as needed    nitroGLYCERIN (NITROSTAT) 0.4 mg, Sublingual, Every 5 min PRN    NovoLOG FlexPen U-100 Insulin 20 Units, Subcutaneous, 2 times daily    olmesartan-hydrochlorothiazide (BENICAR HCT) 40-25 mg per tablet 1 tablet, Oral, Daily    OZEMPIC 1 mg/dose (4 mg/3 mL) No dose, route, or frequency recorded.    pantoprazole (PROTONIX) 40 mg, Oral, Daily    polyethylene glycol (MIRALAX) 17 g, Oral, Daily PRN    pramipexole (MIRAPEX) 0.125 mg, Oral, Nightly    rosuvastatin (CRESTOR) 40 MG Tab Take 1 tablet by mouth once daily    sotaloL (BETAPACE) 120 mg, Oral, Every 12 hours    traMADoL (ULTRAM) 50 mg, Oral, Every 12 hours PRN    traMADoL (ULTRAM) 50 mg, Oral, Every 12 hours PRN    TRESIBA FLEXTOUCH U-100 24 Units, Subcutaneous, Daily     There are no discontinued medications.    Past Medical History:   Diagnosis Date    Acute superficial gastritis without hemorrhage 06/21/2022    Arthritis     Cancer     colon cancer    Chronic kidney disease, stage 3b     Colon cancer     Coronary artery disease     Depression     Diabetes mellitus, type 2     Diverticula, colon 06/22/2022    GERD (gastroesophageal reflux disease)     H/O right hemicolectomy 06/22/2022    HH (hiatus hernia) 06/21/2022    Hyperlipidemia     Myocardial infarction     2 stents in 05/14/2021 Dr Lora    Renal disorder      Health Maintenance Topics with due status: Not Due       Topic Last Completion Date    Colorectal Cancer Screening 06/22/2022    Influenza Vaccine 09/07/2022    Mammogram 12/13/2022    Diabetes Urine Screening 01/25/2023    Eye Exam 04/26/2023    High Dose Statin 07/26/2023    Aspirin/Antiplatelet Therapy 07/26/2023    Hemoglobin A1c 07/26/2023     Immunization History   Administered Date(s) Administered    COVID-19, MRNA, LN-S, PF (Pfizer) (Purple  "Cap) 02/24/2021, 03/18/2021    Influenza 11/01/2021    Influenza - Quadrivalent - PF *Preferred* (6 months and older) 09/07/2022       Objective     Body mass index is 37.79 kg/m².  Wt Readings from Last 3 Encounters:   07/26/23 90.7 kg (200 lb)   07/26/23 90.3 kg (199 lb)   06/21/23 90.9 kg (200 lb 6.4 oz)     Ht Readings from Last 3 Encounters:   07/26/23 5' 1" (1.549 m)   07/26/23 5' 1" (1.549 m)   06/21/23 5' 1" (1.549 m)     BP Readings from Last 3 Encounters:   07/26/23 135/68   07/26/23 130/70   06/21/23 (!) 167/51     Temp Readings from Last 3 Encounters:   07/26/23 98.4 °F (36.9 °C) (Oral)   04/26/23 98 °F (36.7 °C) (Oral)   01/25/23 98.8 °F (37.1 °C) (Oral)     Pulse Readings from Last 3 Encounters:   07/26/23 61   07/26/23 (!) 55   06/21/23 (!) 59     Resp Readings from Last 3 Encounters:   07/26/23 18   07/26/23 18   06/21/23 18     PF Readings from Last 3 Encounters:   07/12/22 362 L/min       Physical Exam  HENT:      Head: Normocephalic and atraumatic.   Cardiovascular:      Rate and Rhythm: Normal rate and regular rhythm.   Pulmonary:      Effort: Pulmonary effort is normal.      Breath sounds: Normal breath sounds.   Neurological:      Mental Status: She is alert and oriented to person, place, and time.   Psychiatric:         Mood and Affect: Mood normal.         Behavior: Behavior normal.         Assessment and Plan     Problem List Items Addressed This Visit          Endocrine    Type 2 diabetes mellitus with diabetic polyneuropathy, with long-term current use of insulin (Chronic)    Relevant Orders    Hemoglobin A1C (Completed)       Orthopedic    Chronic pain of left knee    Relevant Orders    Ambulatory referral/consult to Physical/Occupational Therapy     Other Visit Diagnoses       Essential hypertension    -  Primary            Plan: Labs done. Educational material given.      I have reviewed the medications, allergies, and problem list.     Goal Actions:    What type of visit is the patient " here for today?: Color Me Healthy  Which Color Me Healthy program is the patient enrolling in?: Blood Pressure (Hypertension)  Is this a Wellness Follow Up?: No  What is your overall wellness goal? (select at least one): Lifestyle modifications, Lose weight, Understand disease process, Improve overall health  Choose 3: Biometric, Lifestyle, Nutrition  Biometric Actions: Attend regularly scheduled office visits  Lifestyle Actions : Take medications as prescribed  Nurtrition Actions: Eat a well-balanced diet

## 2023-07-26 NOTE — ASSESSMENT & PLAN NOTE
Hx of paroxysmal afib - currently in sinus on sotalol  Hx of GIB (hx of colon Ca s/p hemicolectomy) on AC  Off AC  S/p watchman in 2/2023

## 2023-07-26 NOTE — PROGRESS NOTES
PCP: Olga Potts MD    Referring Provider:     Subjective:   Dilma Cr is a 62 y.o. female with hx of afib, NSTEMI, diabetes, hypertensio, h/p colon ca s/p hemicolectomy who presents for follow up.     7/26/23 - Patient is s/p Watchman on 2/2023. Reports having some night time palpitations that she describes as skipped beats. She reports low energy and low mood.     8/30/22 -  Patient states she has not felt well since hospitalization.  She reports stabbing chest pain lasting 1-2 seconds, occurs twice weekly.  She states she has some difficulty breathing at night.  She denies snoring.    She states is having aching pain  to posterior legs, from calves to thighs, with difficulty walking for the past 3 weeks, worse in the morning.         She also has a history of CAF and was on eliquis but was taken off recently due anemia of chronic blood loss.            Fhx:  Shx:      EKG 8/31/22 - Normal sinus rhythm. EKG          6/18/22 -  Atrial fibrillation  with rapid ventricular response   ECHO 6/19/22-  LVEF 60-65%.                              Normal left ventricular diastolic function.  Normal right ventricular size with normal right ventricular systolic function.  [facility] 6/20/22 -    Single vessel CAD (90% mid-distal LAD in-stent restenosis)  2.  Normal left sided filling pressure (LVEDP - 10mmHg)  3.  S/p successful angioplasty with scoring balloon (angiosculpt) of mid-distal LAD in-stent restenosis  4.  Deferred stent placement in the setting of GI bleed s/p blood transfusion.   [facility] 5/14/21 - There was two vessel coronary artery disease.                           The Mid LAD-2 lesion was 90% stenosed.  The Dist RCA-2 lesion was 50% stenosed.  The Dist RCA-1 lesion was 90% stenosed                          PCI to LAD and RCA     Lab Results   Component Value Date     01/25/2023    K 4.1 01/25/2023     (H) 01/25/2023    CO2 29 01/25/2023    BUN 22 (H) 01/25/2023    CREATININE 1.10 (H) 01/25/2023     "CALCIUM 9.2 01/25/2023    ANIONGAP 6 (L) 01/25/2023    ESTGFRAFRICA 59 (L) 12/20/2021    EGFRNONAA 58 (L) 06/30/2022       Lab Results   Component Value Date    CHOL 163 09/07/2022    CHOL 122 06/18/2022    CHOL 176 05/10/2022     Lab Results   Component Value Date    HDL 66 (H) 09/07/2022    HDL 52 06/18/2022    HDL 75 (H) 05/10/2022     Lab Results   Component Value Date    LDLCALC 75 09/07/2022    LDLCALC 41 06/18/2022    LDLCALC 85 05/10/2022     Lab Results   Component Value Date    TRIG 111 09/07/2022    TRIG 143 06/18/2022    TRIG 81 05/10/2022     Lab Results   Component Value Date    CHOLHDL 2.5 09/07/2022    CHOLHDL 2.3 06/18/2022    CHOLHDL 2.3 05/10/2022       Lab Results   Component Value Date    WBC 8.27 07/11/2022    HGB 10.6 (L) 07/11/2022    HCT 34.7 (L) 07/11/2022    MCV 84.0 07/11/2022     (H) 07/11/2022           Review of Systems   Constitutional:  Positive for malaise/fatigue.   Respiratory:  Negative for cough and shortness of breath.    Cardiovascular:  Positive for palpitations. Negative for chest pain, orthopnea, claudication, leg swelling and PND.   Psychiatric/Behavioral:  Positive for depression.        Objective:   /70   Pulse (!) 55   Resp 18   Ht 5' 1" (1.549 m)   Wt 90.3 kg (199 lb)   LMP  (LMP Unknown)   BMI 37.60 kg/m²     Physical Exam  Constitutional:       General: She is not in acute distress.  Eyes:      Extraocular Movements: Extraocular movements intact.   Cardiovascular:      Rate and Rhythm: Normal rate and regular rhythm.      Pulses: Normal pulses.      Heart sounds: Normal heart sounds. No murmur heard.  Pulmonary:      Effort: Pulmonary effort is normal.      Breath sounds: Normal breath sounds.   Abdominal:      Palpations: Abdomen is soft.   Musculoskeletal:         General: No swelling.      Cervical back: Neck supple.   Skin:     Findings: No rash.   Neurological:      Mental Status: She is alert and oriented to person, place, and time. "         Assessment:     1. Atrial fibrillation, unspecified type  IN OFFICE EKG 12-LEAD (to Muse)      2. Palpitations  Holter monitor - 24 hour    Holter monitor - 24 hour      3. Atrial fibrillation with rapid ventricular response        4. Atherosclerosis of native coronary artery of native heart without angina pectoris        5. Depression, unspecified depression type              Plan:   Atrial fibrillation with rapid ventricular response  Hx of paroxysmal afib - currently in sinus on sotalol  Hx of GIB (hx of colon Ca s/p hemicolectomy) on AC  Off AC  S/p watchman in 2/2023      Atherosclerosis of native coronary artery of native heart without angina pectoris  NSTEMI, status post PCI of LAD and RCA 5/14/21 followed by ISR with POBA in 5/2022  PCI to distal RCA, however stent at had diffuse disease with small PDA which is diffusely disease.  - has atypical CP   - continue metop xl 25mg qd  - Continue plavix    Palpitations  Will get Holter for palpitations    Depression  Report low mood and sometime crying at home  Low energy as well  Will start lexapro 10mg qd followed by 20mg qd in 1 month  EKG in 1 week for QTc evaluation

## 2023-07-26 NOTE — PATIENT INSTRUCTIONS
"Patient Education       Type 2 Diabetes   The Basics   Written by the doctors and editors at [facility]   What is type 2 diabetes? -- Type 2 diabetes is a disorder that disrupts the way your body uses sugar. It is sometimes called type 2 diabetes mellitus.  All the cells in your body need sugar to work normally. Sugar gets into the cells with the help of a hormone called insulin. Insulin is made by the pancreas, an organ in the belly. If there is not enough insulin, or if your body stops responding to insulin, sugar builds up in the blood. That is what happens to people with diabetes.  There are two different types of diabetes:   Type 1 diabetes - In type 1 diabetes, the pancreas does not make insulin or makes very little insulin.  Type 2 diabetes - In most people with type 2 diabetes, the body stops responding to insulin normally. Then, over time, the pancreas stops making enough insulin.   Being overweight or obese increases a person's risk of developing type 2 diabetes. But people who are not overweight can get diabetes, too.  What are the symptoms of type 2 diabetes? -- Type 2 diabetes usually causes no symptoms. When symptoms do occur, they include:  Needing to urinate often  Intense thirst  Blurry vision  Can diabetes lead to other health problems? -- Yes. Type 2 diabetes might not make you feel sick. But if it is not managed, it can lead to serious problems over time, such as:  Heart attacks  Strokes  Kidney disease  Vision problems (or even blindness)  Pain or loss of feeling in the hands and feet  Needing to have fingers, toes, or other body parts removed (amputated)  How do I know if I have type 2 diabetes? -- To find out if you have type 2 diabetes, your doctor or nurse can do a blood test. There are 2 tests that can be used for this. Both involve measuring the amount of sugar in your blood, called your "blood sugar" or "blood glucose":   One of the tests measures your blood sugar at the time the blood " "sample is taken. This test is done in the morning. You can't eat or drink anything except water for at least 8 hours before the test.   The other test shows what your average blood sugar has been for the past 2 to 3 months. This blood test is called "hemoglobin A1C" or just "A1C." It can be checked at any time of the day, even if you have recently eaten.  How is type 2 diabetes treated? -- The goals of treatment are to control your blood sugar and lower the risk of future problems that can happen in people with diabetes. An important part of managing diabetes is to eat healthy foods and get plenty of physical activity.  There are a few medicines that help control blood sugar. Some people need to take pills that help the body make more insulin or that help insulin do its job. Others need insulin shots.  Depending on what medicines you take, you might need to check your blood sugar regularly at home. But not everyone with type 2 diabetes needs to do this. Your doctor or nurse will tell you if you should be checking your blood sugar, and when and how to do this.  Sometimes, people with type 2 diabetes also need medicines to reduce the problems caused by the disease. For instance, medicines used to lower blood pressure can reduce the chances of a heart attack or stroke.  It's also important to get certain vaccines, such as vaccines to protect against the flu and coronavirus disease 2019 (COVID-19). Some people also need a vaccine to prevent pneumonia.  Can type 2 diabetes be prevented? -- Yes. To lower your chances of getting type 2 diabetes, the most important thing you can do is eat a healthy diet and get plenty of physical activity. This can help you lose weight if you are overweight. But eating well and being active are also good for your overall health. Even gentle activity, like walking, has benefits.  If you smoke, quitting can also lower your risk of type 2 diabetes. Quitting smoking can be hard to do, but your " doctor or nurse can help.  All topics are updated as new evidence becomes available and our peer review process is complete.  This topic retrieved from [employer] on: Sep 21, 2021.  Topic 91622 Version 14.0  Release: 29.4.2 - C29.263  © 2021 UpToDate, Inc. and/or its affiliates. All rights reserved.  Consumer Information Use and Disclaimer   This information is not specific medical advice and does not replace information you receive from your health care provider. This is only a brief summary of general information. It does NOT include all information about conditions, illnesses, injuries, tests, procedures, treatments, therapies, discharge instructions or life-style choices that may apply to you. You must talk with your health care provider for complete information about your health and treatment options. This information should not be used to decide whether or not to accept your health care provider's advice, instructions or recommendations. Only your health care provider has the knowledge and training to provide advice that is right for you. The use of this information is governed by the [employer] End User License Agreement, available at https://www."[employer]/en/solutions/[employer]/about/shawna.The use of [employer] content is governed by the [employer] Terms of Use. ©2021 UpToDate, Inc. All rights reserved.  Copyright   © 2021 UpToDate, Inc. and/or its affiliates. All rights reserved.    Patient Education       Diabetes and Diet   The Basics   Written by the doctors and editors at [facility]   Why is diet important in diabetes? -- Diet is important because it is part of diabetes treatment. Many people need to change what they eat and how much they eat to help treat their diabetes. It is important for people to treat their diabetes so that they:  Keep their blood sugar at or near a normal level  Prevent long-term problems, such as heart or kidney problems, that can happen in people with diabetes  Changing your diet  "can also help treat obesity, high blood pressure, and high cholesterol. These conditions can affect people with diabetes and can lead to future problems, such as heart attacks or strokes.  Who will work with me to change my diet? -- Your doctor or nurse will work with you to make a food plan to change your diet. They might also recommend that you work with a "dietitian." A dietitian is an expert on food and eating.  Do I need to eat at the same times every day? -- When and how often you should eat depends, in part, on the diabetes medicines you take. For example:  People who take about the same amount of insulin at the same time each day (called a "fixed regimen") should eat meals at the same times. This is also true for people who take pills that increase insulin levels, such as sulfonylureas. Eating meals at the same time every day helps prevent low blood sugar.  People who adjust the dose and timing of their insulin each day (called a "flexible regimen") do not always have to eat meals at the same time. That's because they can time their insulin dose for before they plan to eat, and also adjust the dose for how much they plan to eat.  People who take medicines that don't usually cause low blood sugar, such as metformin, don't have to eat meals at the same time every day.  What do I need to think about when planning what to eat? -- Our bodies break down the food we eat into small pieces called carbohydrates, proteins, and fats.  When planning what to eat, people with diabetes need to think about:  Carbohydrates (or "carbs") - Carbohydrates, which are sugars that our bodies use for energy, can raise a person's blood sugar level. Your doctor, nurse, or dietitian will tell you how many carbohydrates you should eat at each meal or snack. Foods that have carbohydrates include:  Bread, pasta, and rice  Vegetables and fruits  Dairy foods  Foods and drinks with added sugar  It is best to get your carbohydrates from " "fruits, vegetables, whole grains, and low-fat milk. It is best to avoid drinks with added sugar, like soda, juices, and sports drinks.   Protein - Your doctor, nurse, or dietitian will tell you how much protein you should eat each day. It is best to eat lean meats, fish, eggs, beans, peas, soy products, nuts, and seeds.  Fats - The type of fat you eat is more important than the amount of fat. "Saturated" and "trans" fats can increase your risk for heart problems, like a heart attack.  Foods that have saturated fats include meat, butter, cheese, and ice cream.  Foods that have trans fats include processed food with "partially hydrogenated oils" on the ingredient list. This may include fried foods, store bought cookies, muffins, pies, and cakes.  "Monounsaturated" and "polyunsaturated" fats are better for you. Foods with these types of fat include fish, avocado, olive oil, and nuts.  Calories - People need to eat a certain amount of calories each day to keep their weight the same. People who are overweight and want to lose weight need to eat fewer calories each day.  Fiber - Eating foods with a lot of fiber can help control a person's blood sugar level. Foods that have a lot of fiber include apples, green beans, peas, beans, lentils, nuts, oatmeal, and whole grains.  Salt - People who have high blood pressure should not eat foods that contain a lot of salt (also called sodium). People with high blood pressure should also eat healthy foods, such as fruits, vegetables, and low-fat dairy foods.  Alcohol - Having more than 1 drink (for women) or 2 drinks (for men) a day can raise blood sugar levels. Also, drinks that have fruit juice or soda in them can raise blood sugar levels.  What can I do if I need to lose weight? -- If you need to lose weight, you can:  Exercise - Try to get at least 30 minutes of physical activity a day, most days of the week. Even gentle exercise, like walking, is good for your health. Some people " with diabetes need to change their medicine dose before they exercise. They might also need to check their blood sugar levels before and after exercising.  Eat fewer calories - Your doctor, nurse, or dietitian can tell you how many calories you should eat each day in order to lose weight.  If you are worried about your weight, size, or shape, talk with your doctor, nurse, or dietitian. They can help you make changes to improve your health.  Can I eat the same foods as my family? -- Yes. You do not need to eat special foods if you have diabetes. You and your family can eat the same foods. Changing your diet is mostly about eating healthy foods and not eating too much.  What are the other parts of diabetes treatment? -- Besides changing your diet, the other parts of diabetes treatment are:  Exercise  Medicines  Some people with diabetes need to learn how to match their diet and exercise with their medicine dose. For example, people who use insulin might need to choose the dose of insulin they give themselves. To choose their dose, they need to think about:  What they plan to eat at the next meal  How much exercise they plan to do  What their blood sugar level is  If the diet and exercise do not match the medicine dose, a person's blood sugar level can get too low or too high. Blood sugar levels that are too low or too high can cause problems.  All topics are updated as new evidence becomes available and our peer review process is complete.  This topic retrieved from [employer] on: Sep 21, 2021.  Topic 07527 Version 7.0  Release: 29.4.2 - C29.263  © 2021 UpToDate, Inc. and/or its affiliates. All rights reserved.  Consumer Information Use and Disclaimer   This information is not specific medical advice and does not replace information you receive from your health care provider. This is only a brief summary of general information. It does NOT include all information about conditions, illnesses, injuries, tests, procedures,  treatments, therapies, discharge instructions or life-style choices that may apply to you. You must talk with your health care provider for complete information about your health and treatment options. This information should not be used to decide whether or not to accept your health care provider's advice, instructions or recommendations. Only your health care provider has the knowledge and training to provide advice that is right for you. The use of this information is governed by the [employer] End User License Agreement, available at https://www.[employer].com/en/solutions/[employer]/about/shawna.The use of [employer] content is governed by the [employer] Terms of Use. ©2021 UpToDate, Inc. All rights reserved.  Copyright   © 2021 UpToDate, Inc. and/or its affiliates. All rights reserved.

## 2023-07-27 ENCOUNTER — PATIENT OUTREACH (OUTPATIENT)
Dept: ADMINISTRATIVE | Facility: HOSPITAL | Age: 63
End: 2023-07-27

## 2023-07-27 NOTE — PROGRESS NOTES
Post visit Population Health review of encounter with date of service  7/26 with Katlyn.  All required [facility] components in encounter.

## 2023-08-15 DIAGNOSIS — M79.605 BILATERAL LEG PAIN: Primary | ICD-10-CM

## 2023-08-15 DIAGNOSIS — M79.604 BILATERAL LEG PAIN: Primary | ICD-10-CM

## 2023-08-16 ENCOUNTER — HOSPITAL ENCOUNTER (OUTPATIENT)
Dept: RADIOLOGY | Facility: HOSPITAL | Age: 63
Discharge: HOME | End: 2023-08-16
Attending: NURSE PRACTITIONER
Payer: COMMERCIAL

## 2023-08-16 ENCOUNTER — OFFICE VISIT (OUTPATIENT)
Dept: ORTHOPEDICS | Facility: CLINIC | Age: 63
End: 2023-08-16
Payer: COMMERCIAL

## 2023-08-16 VITALS
OXYGEN SATURATION: 98 % | SYSTOLIC BLOOD PRESSURE: 166 MMHG | HEART RATE: 78 BPM | TEMPERATURE: 98 F | WEIGHT: 200 LBS | BODY MASS INDEX: 37.76 KG/M2 | RESPIRATION RATE: 16 BRPM | DIASTOLIC BLOOD PRESSURE: 76 MMHG | HEIGHT: 61 IN

## 2023-08-16 DIAGNOSIS — M17.0 PRIMARY OSTEOARTHRITIS OF BOTH KNEES: Primary | ICD-10-CM

## 2023-08-16 DIAGNOSIS — M79.604 BILATERAL LEG PAIN: ICD-10-CM

## 2023-08-16 DIAGNOSIS — M25.562 ACUTE PAIN OF BOTH KNEES: ICD-10-CM

## 2023-08-16 DIAGNOSIS — M25.562 ACUTE PAIN OF BOTH KNEES: Primary | ICD-10-CM

## 2023-08-16 DIAGNOSIS — M79.605 BILATERAL LEG PAIN: ICD-10-CM

## 2023-08-16 DIAGNOSIS — M25.561 ACUTE PAIN OF BOTH KNEES: Primary | ICD-10-CM

## 2023-08-16 DIAGNOSIS — M25.561 ACUTE PAIN OF BOTH KNEES: ICD-10-CM

## 2023-08-16 PROCEDURE — 3062F POS MACROALBUMINURIA REV: CPT | Mod: ,,, | Performed by: NURSE PRACTITIONER

## 2023-08-16 PROCEDURE — 3078F PR MOST RECENT DIASTOLIC BLOOD PRESSURE < 80 MM HG: ICD-10-PCS | Mod: ,,, | Performed by: NURSE PRACTITIONER

## 2023-08-16 PROCEDURE — 99215 OFFICE O/P EST HI 40 MIN: CPT | Mod: PBBFAC | Performed by: NURSE PRACTITIONER

## 2023-08-16 PROCEDURE — 1159F PR MEDICATION LIST DOCUMENTED IN MEDICAL RECORD: ICD-10-PCS | Mod: ,,, | Performed by: NURSE PRACTITIONER

## 2023-08-16 PROCEDURE — 3008F PR BODY MASS INDEX (BMI) DOCUMENTED: ICD-10-PCS | Mod: ,,, | Performed by: NURSE PRACTITIONER

## 2023-08-16 PROCEDURE — 3066F PR DOCUMENTATION OF TREATMENT FOR NEPHROPATHY: ICD-10-PCS | Mod: ,,, | Performed by: NURSE PRACTITIONER

## 2023-08-16 PROCEDURE — 99203 PR OFFICE/OUTPT VISIT, NEW, LEVL III, 30-44 MIN: ICD-10-PCS | Mod: S$PBB,25,, | Performed by: NURSE PRACTITIONER

## 2023-08-16 PROCEDURE — 73562 XR KNEE AP LAT WITH SUNRISE BILAT: ICD-10-PCS | Mod: 26,,, | Performed by: RADIOLOGY

## 2023-08-16 PROCEDURE — 99203 OFFICE O/P NEW LOW 30 MIN: CPT | Mod: S$PBB,25,, | Performed by: NURSE PRACTITIONER

## 2023-08-16 PROCEDURE — 3078F DIAST BP <80 MM HG: CPT | Mod: ,,, | Performed by: NURSE PRACTITIONER

## 2023-08-16 PROCEDURE — 3066F NEPHROPATHY DOC TX: CPT | Mod: ,,, | Performed by: NURSE PRACTITIONER

## 2023-08-16 PROCEDURE — 73590 XR TIBIA FIBULA BILATERAL: ICD-10-PCS | Mod: 26,,, | Performed by: RADIOLOGY

## 2023-08-16 PROCEDURE — 73562 X-RAY EXAM OF KNEE 3: CPT | Mod: 26,,, | Performed by: RADIOLOGY

## 2023-08-16 PROCEDURE — 20610 LARGE JOINT ASPIRATION/INJECTION: BILATERAL KNEE: ICD-10-PCS | Mod: 50,S$PBB,, | Performed by: NURSE PRACTITIONER

## 2023-08-16 PROCEDURE — 73590 X-RAY EXAM OF LOWER LEG: CPT | Mod: TC,50

## 2023-08-16 PROCEDURE — 20610 DRAIN/INJ JOINT/BURSA W/O US: CPT | Mod: 50,PBBFAC | Performed by: NURSE PRACTITIONER

## 2023-08-16 PROCEDURE — 1159F MED LIST DOCD IN RCRD: CPT | Mod: ,,, | Performed by: NURSE PRACTITIONER

## 2023-08-16 PROCEDURE — 73562 X-RAY EXAM OF KNEE 3: CPT | Mod: TC,50

## 2023-08-16 PROCEDURE — 3077F PR MOST RECENT SYSTOLIC BLOOD PRESSURE >= 140 MM HG: ICD-10-PCS | Mod: ,,, | Performed by: NURSE PRACTITIONER

## 2023-08-16 PROCEDURE — 3008F BODY MASS INDEX DOCD: CPT | Mod: ,,, | Performed by: NURSE PRACTITIONER

## 2023-08-16 PROCEDURE — 1160F RVW MEDS BY RX/DR IN RCRD: CPT | Mod: ,,, | Performed by: NURSE PRACTITIONER

## 2023-08-16 PROCEDURE — 3077F SYST BP >= 140 MM HG: CPT | Mod: ,,, | Performed by: NURSE PRACTITIONER

## 2023-08-16 PROCEDURE — 1160F PR REVIEW ALL MEDS BY PRESCRIBER/CLIN PHARMACIST DOCUMENTED: ICD-10-PCS | Mod: ,,, | Performed by: NURSE PRACTITIONER

## 2023-08-16 PROCEDURE — 3051F PR MOST RECENT HEMOGLOBIN A1C LEVEL 7.0 - < 8.0%: ICD-10-PCS | Mod: ,,, | Performed by: NURSE PRACTITIONER

## 2023-08-16 PROCEDURE — 3062F PR POS MACROALBUMINURIA RESULT DOCUMENTED/REVIEW: ICD-10-PCS | Mod: ,,, | Performed by: NURSE PRACTITIONER

## 2023-08-16 PROCEDURE — 99999PBSHW PR PBB SHADOW TECHNICAL ONLY FILED TO HB: ICD-10-PCS | Mod: PBBFAC,,,

## 2023-08-16 PROCEDURE — 3051F HG A1C>EQUAL 7.0%<8.0%: CPT | Mod: ,,, | Performed by: NURSE PRACTITIONER

## 2023-08-16 PROCEDURE — 99999PBSHW PR PBB SHADOW TECHNICAL ONLY FILED TO HB: Mod: PBBFAC,,,

## 2023-08-16 PROCEDURE — 73590 X-RAY EXAM OF LOWER LEG: CPT | Mod: 26,,, | Performed by: RADIOLOGY

## 2023-08-16 RX ORDER — TRIAMCINOLONE ACETONIDE 40 MG/ML
40 INJECTION, SUSPENSION INTRA-ARTICULAR; INTRAMUSCULAR
Status: DISCONTINUED | OUTPATIENT
Start: 2023-08-16 | End: 2023-08-16 | Stop reason: HOSPADM

## 2023-08-16 RX ORDER — BUPIVACAINE HYDROCHLORIDE 2.5 MG/ML
1 INJECTION, SOLUTION INFILTRATION; PERINEURAL
Status: DISCONTINUED | OUTPATIENT
Start: 2023-08-16 | End: 2023-08-16 | Stop reason: HOSPADM

## 2023-08-16 RX ADMIN — TRIAMCINOLONE ACETONIDE 40 MG: 40 INJECTION, SUSPENSION INTRA-ARTICULAR; INTRAMUSCULAR at 09:08

## 2023-08-16 RX ADMIN — BUPIVACAINE HYDROCHLORIDE 1 ML: 2.5 INJECTION, SOLUTION INFILTRATION; PERINEURAL at 09:08

## 2023-08-16 NOTE — PROCEDURES
Large Joint Aspiration/Injection: bilateral knee    Date/Time: 8/16/2023 9:00 AM    Performed by: Mateus Mathews FNP  Authorized by: Mateus Mathews FNP    Consent Done?:  Not Needed  Indications:  Arthritis and pain  Timeout: prior to procedure the correct patient, procedure, and site was verified    Local anesthesia used?: No      Details:  Needle Size:  25 G  Ultrasonic Guidance for needle placement?: No    Approach:  Anterolateral  Location:  Knee  Laterality:  Bilateral  Site:  Bilateral knee  Medications (Right):  1 mL BUPivacaine 0.25% (2.5 mg/ml) 0.25 % (2.5 mg/mL); 40 mg triamcinolone acetonide 40 mg/mL  Medications (Left):  1 mL BUPivacaine 0.25% (2.5 mg/ml) 0.25 % (2.5 mg/mL); 40 mg triamcinolone acetonide 40 mg/mL  Patient tolerance:  Patient tolerated the procedure well with no immediate complications     Right knee prepped with Betadine.  Left knee prepped with Betadine.
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
## 2023-08-16 NOTE — PATIENT INSTRUCTIONS
Safety guidelines and activity restrictions are discussed with the patient.  Recommend continued use of your NSAID as labeled.  May consider the use of topical NSAIDs such as diclofenac every 6 hours as needed.  Verbalized understanding.  We discussed intra-articular steroid injections fact that can only be given once every 4 months.  She wishes to proceed with intra-articular steroid injection bilateral knee.  Right knee prepped with Betadine.  Intra-articular triamcinolone 40 mg and 0.25% bupivacaine 1 cc instilled without difficulty.  Left knee prepped with Betadine.  Intra-articular triamcinolone 40 mg and 0.25% bupivacaine 1 cc instilled without difficulty.  Discussed use of viscosupplementation in the future.  Understands that is 1 shot every 7-10 days to she is gotten 3.  May be repeated once every 6 months as needed unless insurance approves earlier.  We will begin physical therapy for range of motion strengthening exercises bilateral knees.  She fails to improve she is to call back for scheduling with Dr. Art.

## 2023-08-31 ENCOUNTER — OFFICE VISIT (OUTPATIENT)
Dept: PAIN MEDICINE | Facility: CLINIC | Age: 63
End: 2023-08-31
Payer: COMMERCIAL

## 2023-08-31 VITALS
SYSTOLIC BLOOD PRESSURE: 151 MMHG | HEIGHT: 61 IN | BODY MASS INDEX: 37 KG/M2 | HEART RATE: 55 BPM | DIASTOLIC BLOOD PRESSURE: 49 MMHG | WEIGHT: 196 LBS

## 2023-08-31 DIAGNOSIS — M54.9 DORSALGIA, UNSPECIFIED: ICD-10-CM

## 2023-08-31 DIAGNOSIS — M89.49 OSTEOARTHROSIS MULTIPLE SITES, NOT SPECIFIED AS GENERALIZED: Chronic | ICD-10-CM

## 2023-08-31 DIAGNOSIS — M54.17 LUMBOSACRAL RADICULOPATHY: Primary | Chronic | ICD-10-CM

## 2023-08-31 DIAGNOSIS — M54.16 LUMBAR RADICULOPATHY, CHRONIC: ICD-10-CM

## 2023-08-31 PROCEDURE — 99214 OFFICE O/P EST MOD 30 MIN: CPT | Mod: S$PBB,25,, | Performed by: PHYSICIAN ASSISTANT

## 2023-08-31 PROCEDURE — 3051F PR MOST RECENT HEMOGLOBIN A1C LEVEL 7.0 - < 8.0%: ICD-10-PCS | Mod: ,,, | Performed by: PHYSICIAN ASSISTANT

## 2023-08-31 PROCEDURE — 3008F PR BODY MASS INDEX (BMI) DOCUMENTED: ICD-10-PCS | Mod: ,,, | Performed by: PHYSICIAN ASSISTANT

## 2023-08-31 PROCEDURE — 99214 PR OFFICE/OUTPT VISIT, EST, LEVL IV, 30-39 MIN: ICD-10-PCS | Mod: S$PBB,25,, | Performed by: PHYSICIAN ASSISTANT

## 2023-08-31 PROCEDURE — 3078F PR MOST RECENT DIASTOLIC BLOOD PRESSURE < 80 MM HG: ICD-10-PCS | Mod: ,,, | Performed by: PHYSICIAN ASSISTANT

## 2023-08-31 PROCEDURE — 3066F NEPHROPATHY DOC TX: CPT | Mod: ,,, | Performed by: PHYSICIAN ASSISTANT

## 2023-08-31 PROCEDURE — 3008F BODY MASS INDEX DOCD: CPT | Mod: ,,, | Performed by: PHYSICIAN ASSISTANT

## 2023-08-31 PROCEDURE — 96372 THER/PROPH/DIAG INJ SC/IM: CPT | Mod: PBBFAC | Performed by: PHYSICIAN ASSISTANT

## 2023-08-31 PROCEDURE — 3062F PR POS MACROALBUMINURIA RESULT DOCUMENTED/REVIEW: ICD-10-PCS | Mod: ,,, | Performed by: PHYSICIAN ASSISTANT

## 2023-08-31 PROCEDURE — 99999PBSHW PR PBB SHADOW TECHNICAL ONLY FILED TO HB: ICD-10-PCS | Mod: PBBFAC,,,

## 2023-08-31 PROCEDURE — 1159F MED LIST DOCD IN RCRD: CPT | Mod: ,,, | Performed by: PHYSICIAN ASSISTANT

## 2023-08-31 PROCEDURE — 3066F PR DOCUMENTATION OF TREATMENT FOR NEPHROPATHY: ICD-10-PCS | Mod: ,,, | Performed by: PHYSICIAN ASSISTANT

## 2023-08-31 PROCEDURE — 3051F HG A1C>EQUAL 7.0%<8.0%: CPT | Mod: ,,, | Performed by: PHYSICIAN ASSISTANT

## 2023-08-31 PROCEDURE — 99999PBSHW PR PBB SHADOW TECHNICAL ONLY FILED TO HB: Mod: PBBFAC,,,

## 2023-08-31 PROCEDURE — 3077F PR MOST RECENT SYSTOLIC BLOOD PRESSURE >= 140 MM HG: ICD-10-PCS | Mod: ,,, | Performed by: PHYSICIAN ASSISTANT

## 2023-08-31 PROCEDURE — 3062F POS MACROALBUMINURIA REV: CPT | Mod: ,,, | Performed by: PHYSICIAN ASSISTANT

## 2023-08-31 PROCEDURE — 3078F DIAST BP <80 MM HG: CPT | Mod: ,,, | Performed by: PHYSICIAN ASSISTANT

## 2023-08-31 PROCEDURE — 99215 OFFICE O/P EST HI 40 MIN: CPT | Mod: PBBFAC | Performed by: PHYSICIAN ASSISTANT

## 2023-08-31 PROCEDURE — 3077F SYST BP >= 140 MM HG: CPT | Mod: ,,, | Performed by: PHYSICIAN ASSISTANT

## 2023-08-31 PROCEDURE — 1159F PR MEDICATION LIST DOCUMENTED IN MEDICAL RECORD: ICD-10-PCS | Mod: ,,, | Performed by: PHYSICIAN ASSISTANT

## 2023-08-31 RX ORDER — KETOROLAC TROMETHAMINE 30 MG/ML
60 INJECTION, SOLUTION INTRAMUSCULAR; INTRAVENOUS
Status: COMPLETED | OUTPATIENT
Start: 2023-08-31 | End: 2023-08-31

## 2023-08-31 RX ORDER — HYDROCODONE BITARTRATE AND ACETAMINOPHEN 7.5; 325 MG/1; MG/1
1 TABLET ORAL EVERY 8 HOURS PRN
Qty: 90 TABLET | Refills: 0 | Status: SHIPPED | OUTPATIENT
Start: 2023-09-01 | End: 2023-09-21 | Stop reason: SDUPTHER

## 2023-08-31 RX ORDER — TRAMADOL HYDROCHLORIDE 50 MG/1
50 TABLET ORAL EVERY 12 HOURS PRN
Qty: 60 TABLET | Refills: 2 | Status: CANCELLED | OUTPATIENT
Start: 2023-08-31

## 2023-08-31 RX ADMIN — KETOROLAC TROMETHAMINE 60 MG: 60 INJECTION, SOLUTION INTRAMUSCULAR at 02:08

## 2023-08-31 NOTE — PROGRESS NOTES
Subjective:         Patient ID: Dilma Cr is a 63 y.o. female.    Chief Complaint: Low-back Pain and Leg Pain        Pain  This is a chronic problem. The current episode started more than 1 year ago. The problem occurs daily. The problem has been unchanged. Associated symptoms include arthralgias and neck pain. Pertinent negatives include no anorexia, chest pain, chills, coughing, diaphoresis, fever, sore throat, vertigo or vomiting.     Review of Systems   Constitutional:  Negative for activity change, appetite change, chills, diaphoresis, fever and unexpected weight change.   HENT:  Negative for drooling, ear pain, facial swelling, nosebleeds, sore throat, trouble swallowing, voice change and goiter.    Eyes:  Negative for photophobia, pain, discharge, redness and visual disturbance.   Respiratory:  Negative for apnea, cough, choking, chest tightness, shortness of breath, wheezing and stridor.    Cardiovascular:  Negative for chest pain, palpitations and leg swelling.   Gastrointestinal:  Negative for abdominal distention, anorexia, diarrhea, rectal pain, vomiting and fecal incontinence.   Endocrine: Negative for cold intolerance, heat intolerance, polydipsia, polyphagia and polyuria.   Genitourinary:  Negative for flank pain, frequency and hot flashes.   Musculoskeletal:  Positive for arthralgias, back pain and neck pain. Negative for neck stiffness.   Integumentary:  Negative for color change and pallor.   Allergic/Immunologic: Negative for immunocompromised state.   Neurological:  Negative for dizziness, vertigo, seizures, syncope, facial asymmetry, speech difficulty, light-headedness, coordination difficulties, memory loss and coordination difficulties.   Hematological:  Negative for adenopathy. Does not bruise/bleed easily.   Psychiatric/Behavioral:  Negative for agitation, behavioral problems, confusion, decreased concentration, dysphoric mood, hallucinations, self-injury and suicidal ideas. The  patient is not nervous/anxious and is not hyperactive.            Past Medical History:   Diagnosis Date    Acute superficial gastritis without hemorrhage 06/21/2022    Arthritis     Cancer     colon cancer    Chronic kidney disease, stage 3b     Colon cancer     Coronary artery disease     Depression     Diabetes mellitus, type 2     Diverticula, colon 06/22/2022    GERD (gastroesophageal reflux disease)     H/O right hemicolectomy 06/22/2022    HH (hiatus hernia) 06/21/2022    Hyperlipidemia     Myocardial infarction     2 stents in 05/14/2021 Dr Porter    Renal disorder      Past Surgical History:   Procedure Laterality Date    BREAST SURGERY      C5-C6 RADHA  8-, 7-, 6-1-2016    Dr Fowler    CARDIAC SURGERY  02/07/2023    CERVICAL SPINE SURGERY      COLON SURGERY      EPIDURAL STEROID INJECTION INTO LUMBAR SPINE N/A 03/29/2022    Procedure: L4-5 RADHA;  Surgeon: Gela Fowler MD;  Location: [facility];  Service: Pain Management;  Laterality: N/A;  PT AWARE ON OV TO BE TESTED\  PLAVIX TO BE HELD FOR 7 DAYS PRIOR TO PROCEDURE PER DR FOWLER AND DR PORTER  3-28  message left on vm re: covid    HYSTERECTOMY      LEFT HEART CATHETERIZATION Left 05/14/2021    Procedure: Left heart cath;  Surgeon: Mateus Guan MD;  Location: [facility] CATH [facility];  Service: Cardiology;  Laterality: Left;    LEFT HEART CATHETERIZATION Left 06/20/2022    Procedure: Left heart cath;  Surgeon: Oliverio Bautista MD;  Location: [facility] CATH LAB;  Service: Cardiology;  Laterality: Left;    MEDIPORT REMOVAL  10/14/2021    Dr Kraft    right rotator cuff repair      in [place]    TOTAL REDUCTION MAMMOPLASTY       Social History     Socioeconomic History    Marital status:      Spouse name: Jorge Alberto    Number of children: 2   Occupational History    Occupation:  at [employer] for past 27 years   Tobacco Use    Smoking status: Never     Passive exposure: Never    Smokeless tobacco: Never   Substance and  "Sexual Activity    Alcohol use: Never    Drug use: Never    Sexual activity: Yes   Social History Narrative    Worked at [employer] as [employer] for past 29 years. H/O colon cancer. Watchman procedure back in Feb (heart).  Sees Dr Stover Oncologist. Cardiologist Dr Alina Garcia ([place]). Sees Endocrinologist Koko Rivera with [facility].      Family History   Problem Relation Age of Onset    Hypertension Mother     Diabetes Mother     Hypertension Father     Diabetes Father     Hypertension Sister     Breast cancer Maternal Grandmother     Hypertension Brother      Review of patient's allergies indicates:  No Known Allergies     Objective:  Vitals:    08/31/23 1327   BP: (!) 151/49   Pulse: (!) 55   Weight: 88.9 kg (196 lb)   Height: 5' 1" (1.549 m)   PainSc:   9         Physical Exam  Vitals and nursing note reviewed. Exam conducted with a chaperone present.   Constitutional:       General: She is awake. She is not in acute distress.     Appearance: Normal appearance. She is not ill-appearing, toxic-appearing or diaphoretic.   HENT:      Head: Normocephalic and atraumatic.      Nose: Nose normal.      Mouth/Throat:      Mouth: Mucous membranes are moist.      Pharynx: Oropharynx is clear.   Eyes:      Conjunctiva/sclera: Conjunctivae normal.      Pupils: Pupils are equal, round, and reactive to light.   Cardiovascular:      Rate and Rhythm: Normal rate.   Pulmonary:      Effort: Pulmonary effort is normal. No respiratory distress.   Abdominal:      Palpations: Abdomen is soft.      Tenderness: There is no guarding.   Musculoskeletal:         General: Normal range of motion.      Cervical back: Normal range of motion and neck supple. No rigidity.      Thoracic back: Tenderness present.      Lumbar back: Tenderness present.   Skin:     General: Skin is warm and dry.      Coloration: Skin is not jaundiced or pale.   Neurological:      General: No focal deficit present.      Mental Status: She is alert and " oriented to person, place, and time. Mental status is at baseline.      Cranial Nerves: No cranial nerve deficit (II-XII).   Psychiatric:         Mood and Affect: Mood normal.         Behavior: Behavior normal. Behavior is cooperative.         Thought Content: Thought content normal.           X-Ray Knee AP LAT with [place] Bilat  Narrative: EXAMINATION:  XR KNEE AP LAT WITH SUNRISE BILAT    CLINICAL HISTORY:  Pain in right knee    TECHNIQUE:  AP, lateral, sunrise views of the right and left knee    COMPARISON:  06/07/2023    FINDINGS:  Mild tricompartmental osteoarthritic changes with osteophyte formation are seen. No acute fracture or dislocation.  Impression: Mild tricompartmental osteoarthritic changes of both knees.    Electronically signed by: Skyler Rosa  Date:    08/16/2023  Time:    11:07  X-Ray Tibia Fibula Bilateral  Narrative: EXAMINATION:  XR TIBIA FIBULA BILATERAL    CLINICAL HISTORY:  .  Pain in right leg.  Pain in left leg    COMPARISON:  Left knee x-ray June 7, 2023    TECHNIQUE:  AP and lateral views of the tibia and fibula bilaterally.  Four total views    FINDINGS:  There is no acute fracture or dislocation..  Osseous structures are well mineralized.  Impression: No acute bony abnormality    Electronically signed by: Chandra Nagel  Date:    08/16/2023  Time:    10:21         Office Visit on 07/26/2023   Component Date Value Ref Range Status    Hemoglobin A1C 07/26/2023 7.4 (H)  4.5 - 6.6 % Final    Estimated Average Glucose 07/26/2023 160  mg/dL Final   Office Visit on 06/21/2023   Component Date Value Ref Range Status    POC Amphetamines 06/21/2023 Negative  Negative, Inconclusive Final    POC Barbiturates 06/21/2023 Negative  Negative, Inconclusive Final    POC Benzodiazepines 06/21/2023 Negative  Negative, Inconclusive Final    POC Cocaine 06/21/2023 Negative  Negative, Inconclusive Final    POC THC 06/21/2023 Negative  Negative, Inconclusive Final    POC Methadone 06/21/2023 Negative   Negative, Inconclusive Final    POC Methamphetamine 06/21/2023 Negative  Negative, Inconclusive Final    POC Opiates 06/21/2023 Negative  Negative, Inconclusive Final    POC Oxycodone 06/21/2023 Negative  Negative, Inconclusive Final    POC Phencyclidine 06/21/2023 Negative  Negative, Inconclusive Final    POC Methylenedioxymethamphetamine * 06/21/2023 Negative  Negative, Inconclusive Final    POC Tricyclic Antidepressants 06/21/2023 Negative  Negative, Inconclusive Final    POC Buprenorphine 06/21/2023 Negative   Final     Acceptable 06/21/2023 Yes   Final    POC Temperature (Urine) 06/21/2023 92   Final   Office Visit on 04/26/2023   Component Date Value Ref Range Status    Hemoglobin A1C 04/26/2023 7.5 (H)  4.5 - 6.6 % Final    Estimated Average Glucose 04/26/2023 164  mg/dL Final   Office Visit on 03/29/2023   Component Date Value Ref Range Status    POC Amphetamines 03/29/2023 Negative  Negative, Inconclusive Final    POC Barbiturates 03/29/2023 Negative  Negative, Inconclusive Final    POC Benzodiazepines 03/29/2023 Negative  Negative, Inconclusive Final    POC Cocaine 03/29/2023 Negative  Negative, Inconclusive Final    POC THC 03/29/2023 Negative  Negative, Inconclusive Final    POC Methadone 03/29/2023 Negative  Negative, Inconclusive Final    POC Methamphetamine 03/29/2023 Negative  Negative, Inconclusive Final    POC Opiates 03/29/2023 Negative  Negative, Inconclusive Final    POC Oxycodone 03/29/2023 Negative  Negative, Inconclusive Final    POC Phencyclidine 03/29/2023 Negative  Negative, Inconclusive Final    POC Methylenedioxymethamphetamine * 03/29/2023 Negative  Negative, Inconclusive Final    POC Tricyclic Antidepressants 03/29/2023 Negative  Negative, Inconclusive Final    POC Buprenorphine 03/29/2023 Negative   Final     Acceptable 03/29/2023 Yes   Final    POC Temperature (Urine) 03/29/2023 92   Final         No orders of the defined types were placed in this  encounter.        Requested Prescriptions     Pending Prescriptions Disp Refills    traMADoL (ULTRAM) 50 mg tablet 60 tablet 2     Sig: Take 1 tablet (50 mg total) by mouth every 12 (twelve) hours as needed for Pain.       Assessment:     1. Lumbosacral radiculopathy    2. Osteoarthrosis multiple sites, not specified as generalized         A's of Opioid Risk Assessment  Activity:Patient can perform ADL.   Analgesia:Patients pain is partially controlled by current medication. Patient has tried OTC medications such as Tylenol and Ibuprofen with out relief.   Adverse Effects: Patient denies constipation or sedation.  Aberrant Behavior:  reviewed with no aberrant drug seeking/taking behavior.  Overdose reversal drug naloxone discussed    Drug screen reviewed      Plan:    Narcan December 2022    Anticoagulated Plavix    History Colon  Cancer    Presumptive drug screen negative this is expected result, patient takes tramadol, cup does not test for tramadol    Complaining of severe left greater than right leg pain numbness and tingling radicular in nature she has known lumbar stenosis     Reviewed MRI lumbar spine [facility] 2021 multiple level degenerative changes stenosis multiple levels central canal and bilateral foraminal narrowing most notable L4/5 L5/S1    After discussing options she would like to proceed with    Hold tramadol     [place] 7.51 p.o. q.8 hours    Toradol 60 mg IM, tolerated well    Continue home exercise program as directed    Continue current medication    X-ray lumbar spine pelvis and hips    MRI lumbar spine  MRI for consideration procedure/surgery    I have given the patient medically directed home exercise program    Patient has tried NSAIDs and neuromodulators (neurontin, lyrica, elavil, or cymbalta)        Resume tramadol After pain subsides      Follow-up  after MRI lumbar spine    Dr. Fowler, June 2024    Bring original prescription medication bottles/container/box with labels to each  visit

## 2023-09-14 ENCOUNTER — TELEPHONE (OUTPATIENT)
Dept: CARDIOLOGY | Facility: CLINIC | Age: 63
End: 2023-09-14
Payer: COMMERCIAL

## 2023-09-14 NOTE — TELEPHONE ENCOUNTER
Spoke to Nicole Oliveira at Dr. Ordonez's office regarding blood thinner recommendations advised to stop aspirin continue plavix 75mg daily per Dr. Lily Oliveira voiced understanding. ( Will fax instructions to Dr. Ordonez's office)

## 2023-09-19 DIAGNOSIS — I10 PRIMARY HYPERTENSION: Chronic | ICD-10-CM

## 2023-09-21 NOTE — PROGRESS NOTES
Subjective:         Patient ID: Dilma Cr is a 63 y.o. female.    Chief Complaint: Back Pain        Pain  This is a chronic problem. The current episode started more than 1 year ago. The problem occurs daily. The problem has been waxing and waning. Associated symptoms include arthralgias and neck pain. Pertinent negatives include no anorexia, chest pain, chills, coughing, diaphoresis, fever, sore throat, vertigo or vomiting.     Review of Systems   Constitutional:  Negative for activity change, appetite change, chills, diaphoresis, fever and unexpected weight change.   HENT:  Negative for drooling, ear pain, facial swelling, nosebleeds, sore throat, trouble swallowing, voice change and goiter.    Eyes:  Negative for photophobia, pain, discharge, redness and visual disturbance.   Respiratory:  Negative for apnea, cough, choking, chest tightness, shortness of breath, wheezing and stridor.    Cardiovascular:  Negative for chest pain, palpitations and leg swelling.   Gastrointestinal:  Negative for abdominal distention, anorexia, diarrhea, rectal pain, vomiting and fecal incontinence.   Endocrine: Negative for cold intolerance, heat intolerance, polydipsia, polyphagia and polyuria.   Genitourinary:  Negative for flank pain, frequency and hot flashes.   Musculoskeletal:  Positive for arthralgias, back pain and neck pain. Negative for neck stiffness.   Integumentary:  Negative for color change and pallor.   Allergic/Immunologic: Negative for immunocompromised state.   Neurological:  Negative for dizziness, vertigo, seizures, syncope, facial asymmetry, speech difficulty, light-headedness, memory loss and coordination difficulties.   Hematological:  Negative for adenopathy. Does not bruise/bleed easily.   Psychiatric/Behavioral:  Negative for agitation, behavioral problems, confusion, decreased concentration, dysphoric mood, hallucinations, self-injury and suicidal ideas. The patient is not nervous/anxious and is  not hyperactive.            Past Medical History:   Diagnosis Date    Acute superficial gastritis without hemorrhage 06/21/2022    Arthritis     Cancer     colon cancer    Chronic kidney disease, stage 3b     Colon cancer     Coronary artery disease     Depression     Diabetes mellitus, type 2     Diverticula, colon 06/22/2022    GERD (gastroesophageal reflux disease)     H/O right hemicolectomy 06/22/2022    HH (hiatus hernia) 06/21/2022    Hyperlipidemia     Myocardial infarction     2 stents in 05/14/2021 Dr Porter    Renal disorder      Past Surgical History:   Procedure Laterality Date    BREAST SURGERY      C5-C6 RADHA  8-, 7-, 6-1-2016    Dr Fowler    CARDIAC SURGERY  02/07/2023    CERVICAL SPINE SURGERY      COLON SURGERY      EPIDURAL STEROID INJECTION INTO LUMBAR SPINE N/A 03/29/2022    Procedure: L4-5 RADHA;  Surgeon: Gela Fowler MD;  [facility];  Service: Pain Management;  Laterality: N/A;  PT AWARE ON OV TO BE TESTED\  PLAVIX TO BE HELD FOR 7 DAYS PRIOR TO PROCEDURE PER DR FOWLER AND DR PORTER  3-28  message left on vm re: covid    HYSTERECTOMY      LEFT HEART CATHETERIZATION Left 05/14/2021    Procedure: Left heart cath;  Surgeon: Mateus Guan MD;  Location: [facility] CATH LAB;  Service: Cardiology;  Laterality: Left;    LEFT HEART CATHETERIZATION Left 06/20/2022    Procedure: Left heart cath;  Surgeon: Oliverio Bautista MD;  Location: [facility] CATH LAB;  Service: Cardiology;  Laterality: Left;    MEDIPORT REMOVAL  10/14/2021    Dr Kraft    right rotator cuff repair      in [place]    TOTAL REDUCTION MAMMOPLASTY       Social History     Socioeconomic History    Marital status:      Spouse name: Jorge Alberto    Number of children: 2   Occupational History    Occupation:  at [employer] for past 27 years   Tobacco Use    Smoking status: Never     Passive exposure: Never    Smokeless tobacco: Never   Substance and Sexual Activity    Alcohol use: Never  "   Drug use: Never    Sexual activity: Yes   Social History Narrative    Worked at [employer] as  for past 29 years. H/O colon cancer. Watchman procedure back in Feb (heart).  Sees Dr Stover Oncologist. Cardiologist Dr Alina Garcia ([place]). Sees Endocrinologist Koko Rivera with [facility].      Family History   Problem Relation Age of Onset    Hypertension Mother     Diabetes Mother     Hypertension Father     Diabetes Father     Hypertension Sister     Breast cancer Maternal Grandmother     Hypertension Brother      Review of patient's allergies indicates:  No Known Allergies     Objective:  Vitals:    09/25/23 1021 09/25/23 1024   BP: 138/64    Pulse: 60    Weight: 91.2 kg (201 lb)    Height: 5' 1.5" (1.562 m)    PainSc:   9   9         Physical Exam  Vitals and nursing note reviewed. Exam conducted with a chaperone present.   Constitutional:       General: She is awake. She is not in acute distress.     Appearance: Normal appearance. She is not ill-appearing, toxic-appearing or diaphoretic.   HENT:      Head: Normocephalic and atraumatic.      Nose: Nose normal.      Mouth/Throat:      Mouth: Mucous membranes are moist.      Pharynx: Oropharynx is clear.   Eyes:      Conjunctiva/sclera: Conjunctivae normal.      Pupils: Pupils are equal, round, and reactive to light.   Cardiovascular:      Rate and Rhythm: Normal rate.   Pulmonary:      Effort: Pulmonary effort is normal. No respiratory distress.   Abdominal:      Palpations: Abdomen is soft.      Tenderness: There is no guarding.   Musculoskeletal:         General: Normal range of motion.      Cervical back: Normal range of motion and neck supple. No rigidity.      Thoracic back: Tenderness present.      Lumbar back: Tenderness present.   Skin:     General: Skin is warm and dry.      Coloration: Skin is not jaundiced or pale.   Neurological:      General: No focal deficit present.      Mental Status: She is alert and oriented to person, " place, and time. Mental status is at baseline.      Cranial Nerves: No cranial nerve deficit (II-XII).   Psychiatric:         Mood and Affect: Mood normal.         Behavior: Behavior normal. Behavior is cooperative.         Thought Content: Thought content normal.           MRI Lumbar Spine Without Contrast  Narrative: EXAMINATION:  MRI LUMBAR SPINE WITHOUT CONTRAST    CLINICAL HISTORY:  Lumbar radiculopathy, symptoms persist with conservative treatment; Radiculopathy, lumbar region    TECHNIQUE:  Axial and sagittal imaging of the spine is performed using T1, T2, STIR and T2 fat sat sequences without contrast.    COMPARISON:  1 December 2021    FINDINGS:  Vertebral bodies are normal in height.  Alignment appears similar to previous.    L2-3: There is diffuse disc bulge with moderate central canal stenosis from disc and facet joint hypertrophy.  There is are mild moderate right and mild left foraminal stenosis.  Fluid present in the facet joints.    L3-4:    There is loss of disc height with degenerative endplate signal changes present.  There is a diffuse disc bulge with the or air mild-to-moderate central canal stenosis.  There mild left foraminal stenosis from disc and facet joint hypertrophy.    L4-5:    There is loss of disc height with degenerative endplate signal changes present.    There is diffuse disc bulge with a mild-to-moderate central canal stenosis.  There is mild-to-moderate bilateral foraminal stenosis from disc and facet joint hypertrophy.    L5-S1: There is partial osseous fusion at this level knee degenerative endplate signal changes and loss of disc height.  There is disc osteophyte complex with mild to moderate canal stenosis.  There is mild bilateral foraminal stenosis.    Spinal cord signal appears within normal limits.    Osseous marrow signal appears within normal limits.  Impression: Multilevel degenerative changes as described above.    Electronically signed by: Keegan  Ratna  Date:    09/19/2023  Time:    12:54         Office Visit on 07/26/2023   Component Date Value Ref Range Status    Hemoglobin A1C 07/26/2023 7.4 (H)  4.5 - 6.6 % Final    Estimated Average Glucose 07/26/2023 160  mg/dL Final   Office Visit on 06/21/2023   Component Date Value Ref Range Status    POC Amphetamines 06/21/2023 Negative  Negative, Inconclusive Final    POC Barbiturates 06/21/2023 Negative  Negative, Inconclusive Final    POC Benzodiazepines 06/21/2023 Negative  Negative, Inconclusive Final    POC Cocaine 06/21/2023 Negative  Negative, Inconclusive Final    POC THC 06/21/2023 Negative  Negative, Inconclusive Final    POC Methadone 06/21/2023 Negative  Negative, Inconclusive Final    POC Methamphetamine 06/21/2023 Negative  Negative, Inconclusive Final    POC Opiates 06/21/2023 Negative  Negative, Inconclusive Final    POC Oxycodone 06/21/2023 Negative  Negative, Inconclusive Final    POC Phencyclidine 06/21/2023 Negative  Negative, Inconclusive Final    POC Methylenedioxymethamphetamine * 06/21/2023 Negative  Negative, Inconclusive Final    POC Tricyclic Antidepressants 06/21/2023 Negative  Negative, Inconclusive Final    POC Buprenorphine 06/21/2023 Negative   Final     Acceptable 06/21/2023 Yes   Final    POC Temperature (Urine) 06/21/2023 92   Final   Office Visit on 04/26/2023   Component Date Value Ref Range Status    Hemoglobin A1C 04/26/2023 7.5 (H)  4.5 - 6.6 % Final    Estimated Average Glucose 04/26/2023 164  mg/dL Final   Office Visit on 03/29/2023   Component Date Value Ref Range Status    POC Amphetamines 03/29/2023 Negative  Negative, Inconclusive Final    POC Barbiturates 03/29/2023 Negative  Negative, Inconclusive Final    POC Benzodiazepines 03/29/2023 Negative  Negative, Inconclusive Final    POC Cocaine 03/29/2023 Negative  Negative, Inconclusive Final    POC THC 03/29/2023 Negative  Negative, Inconclusive Final    POC Methadone 03/29/2023 Negative  Negative,  Inconclusive Final    POC Methamphetamine 03/29/2023 Negative  Negative, Inconclusive Final    POC Opiates 03/29/2023 Negative  Negative, Inconclusive Final    POC Oxycodone 03/29/2023 Negative  Negative, Inconclusive Final    POC Phencyclidine 03/29/2023 Negative  Negative, Inconclusive Final    POC Methylenedioxymethamphetamine * 03/29/2023 Negative  Negative, Inconclusive Final    POC Tricyclic Antidepressants 03/29/2023 Negative  Negative, Inconclusive Final    POC Buprenorphine 03/29/2023 Negative   Final     Acceptable 03/29/2023 Yes   Final    POC Temperature (Urine) 03/29/2023 92   Final         Orders Placed This Encounter   Procedures    POCT Urine Drug Screen Presump     Interpretive Information:     Negative:  No drug detected at the cut off level.   Positive:  This result represents presumptive positive for the   tested drug, other substances may yield a positive response other   than the analyte of interest. This result should be utilized for   diagnostic purpose only. Confirmation testing will be performed upon physician request only.            Requested Prescriptions     Pending Prescriptions Disp Refills    HYDROcodone-acetaminophen (NORCO) 7.5-325 mg per tablet 90 tablet 0     Sig: Take 1 tablet by mouth every 8 (eight) hours as needed for Pain.       Assessment:     1. Lumbosacral radiculopathy    2. Osteoarthrosis multiple sites, not specified as generalized    3. Encounter for long-term (current) use of other medications         A's of Opioid Risk Assessment  Activity:Patient can perform ADL.   Analgesia:Patients pain is partially controlled by current medication. Patient has tried OTC medications such as Tylenol and Ibuprofen with out relief.   Adverse Effects: Patient denies constipation or sedation.  Aberrant Behavior:  reviewed with no aberrant drug seeking/taking behavior.  Overdose reversal drug naloxone discussed    Drug screen reviewed      Plan:    Narcan December  2022    Anticoagulated Plavix    History Colon  Cancer    Requesting lab work increasing back pain joint pain    Will draw CBC CMP ESR CRP    She states her oncologist told her cancer has not returned    Presumptive drug screen negative this is expected result, patient takes tramadol, cup does not test for tramadol    Complaining of severe left greater than right leg pain numbness and tingling radicular in nature she has known lumbar stenosis     MRI lumbar spine [facility] September 19, 2023, multiple levels stenosis, multiple level degenerative changes stenosis multiple levels central canal and bilateral foraminal narrowing most notable L4/5 L5/S1    After discussing options she would like to proceed with    Hold tramadol     [place] 7.51 p.o. q.8 hours    Toradol 60 mg IM, tolerated well    Considering surgical evaluation her lumbar stenosis    Consider repeating lumbar procedure    Continue home exercise program as directed    Continue current medication    Resume tramadol After pain subsides    Follow-up 2 months    Dr. Fowler, June 2024    Bring original prescription medication bottles/container/box with labels to each visit

## 2023-09-25 ENCOUNTER — PATIENT OUTREACH (OUTPATIENT)
Dept: ADMINISTRATIVE | Facility: HOSPITAL | Age: 63
End: 2023-09-25

## 2023-09-25 ENCOUNTER — OFFICE VISIT (OUTPATIENT)
Dept: PAIN MEDICINE | Facility: CLINIC | Age: 63
End: 2023-09-25
Payer: COMMERCIAL

## 2023-09-25 VITALS
SYSTOLIC BLOOD PRESSURE: 138 MMHG | HEART RATE: 60 BPM | BODY MASS INDEX: 36.99 KG/M2 | HEIGHT: 62 IN | WEIGHT: 201 LBS | DIASTOLIC BLOOD PRESSURE: 64 MMHG

## 2023-09-25 DIAGNOSIS — M54.17 LUMBOSACRAL RADICULOPATHY: Primary | Chronic | ICD-10-CM

## 2023-09-25 DIAGNOSIS — Z79.899 ENCOUNTER FOR LONG-TERM (CURRENT) USE OF OTHER MEDICATIONS: ICD-10-CM

## 2023-09-25 DIAGNOSIS — M89.49 OSTEOARTHROSIS MULTIPLE SITES, NOT SPECIFIED AS GENERALIZED: Chronic | ICD-10-CM

## 2023-09-25 LAB
CTP QC/QA: YES
POC (AMP) AMPHETAMINE: NEGATIVE
POC (BAR) BARBITURATES: NEGATIVE
POC (BUP) BUPRENORPHINE: NEGATIVE
POC (BZO) BENZODIAZEPINES: NEGATIVE
POC (COC) COCAINE: NEGATIVE
POC (MDMA) METHYLENEDIOXYMETHAMPHETAMINE 3,4: NEGATIVE
POC (MET) METHAMPHETAMINE: NEGATIVE
POC (MOP) OPIATES: NEGATIVE
POC (MTD) METHADONE: NEGATIVE
POC (OXY) OXYCODONE: NEGATIVE
POC (PCP) PHENCYCLIDINE: NEGATIVE
POC (TCA) TRICYCLIC ANTIDEPRESSANTS: NEGATIVE
POC TEMPERATURE (URINE): 90
POC THC: NEGATIVE

## 2023-09-25 PROCEDURE — 3062F POS MACROALBUMINURIA REV: CPT | Mod: ,,, | Performed by: PHYSICIAN ASSISTANT

## 2023-09-25 PROCEDURE — 96372 THER/PROPH/DIAG INJ SC/IM: CPT | Mod: PBBFAC | Performed by: PHYSICIAN ASSISTANT

## 2023-09-25 PROCEDURE — 99215 OFFICE O/P EST HI 40 MIN: CPT | Mod: PBBFAC | Performed by: PHYSICIAN ASSISTANT

## 2023-09-25 PROCEDURE — 99999PBSHW PR PBB SHADOW TECHNICAL ONLY FILED TO HB: ICD-10-PCS | Mod: PBBFAC,,,

## 2023-09-25 PROCEDURE — 3008F BODY MASS INDEX DOCD: CPT | Mod: ,,, | Performed by: PHYSICIAN ASSISTANT

## 2023-09-25 PROCEDURE — 3075F PR MOST RECENT SYSTOLIC BLOOD PRESS GE 130-139MM HG: ICD-10-PCS | Mod: ,,, | Performed by: PHYSICIAN ASSISTANT

## 2023-09-25 PROCEDURE — 3075F SYST BP GE 130 - 139MM HG: CPT | Mod: ,,, | Performed by: PHYSICIAN ASSISTANT

## 2023-09-25 PROCEDURE — 99214 PR OFFICE/OUTPT VISIT, EST, LEVL IV, 30-39 MIN: ICD-10-PCS | Mod: S$PBB,25,, | Performed by: PHYSICIAN ASSISTANT

## 2023-09-25 PROCEDURE — 3066F NEPHROPATHY DOC TX: CPT | Mod: ,,, | Performed by: PHYSICIAN ASSISTANT

## 2023-09-25 PROCEDURE — 99999PBSHW PR PBB SHADOW TECHNICAL ONLY FILED TO HB: Mod: PBBFAC,,,

## 2023-09-25 PROCEDURE — 3051F HG A1C>EQUAL 7.0%<8.0%: CPT | Mod: ,,, | Performed by: PHYSICIAN ASSISTANT

## 2023-09-25 PROCEDURE — 3078F PR MOST RECENT DIASTOLIC BLOOD PRESSURE < 80 MM HG: ICD-10-PCS | Mod: ,,, | Performed by: PHYSICIAN ASSISTANT

## 2023-09-25 PROCEDURE — 3051F PR MOST RECENT HEMOGLOBIN A1C LEVEL 7.0 - < 8.0%: ICD-10-PCS | Mod: ,,, | Performed by: PHYSICIAN ASSISTANT

## 2023-09-25 PROCEDURE — 99999PBSHW POCT URINE DRUG SCREEN PRESUMP: Mod: PBBFAC,,,

## 2023-09-25 PROCEDURE — G0481 DRUG TEST DEF 8-14 CLASSES: HCPCS | Mod: ,,, | Performed by: CLINICAL MEDICAL LABORATORY

## 2023-09-25 PROCEDURE — 3062F PR POS MACROALBUMINURIA RESULT DOCUMENTED/REVIEW: ICD-10-PCS | Mod: ,,, | Performed by: PHYSICIAN ASSISTANT

## 2023-09-25 PROCEDURE — 99214 OFFICE O/P EST MOD 30 MIN: CPT | Mod: S$PBB,25,, | Performed by: PHYSICIAN ASSISTANT

## 2023-09-25 PROCEDURE — 80305 DRUG TEST PRSMV DIR OPT OBS: CPT | Mod: PBBFAC | Performed by: PHYSICIAN ASSISTANT

## 2023-09-25 PROCEDURE — 1159F PR MEDICATION LIST DOCUMENTED IN MEDICAL RECORD: ICD-10-PCS | Mod: ,,, | Performed by: PHYSICIAN ASSISTANT

## 2023-09-25 PROCEDURE — 1159F MED LIST DOCD IN RCRD: CPT | Mod: ,,, | Performed by: PHYSICIAN ASSISTANT

## 2023-09-25 PROCEDURE — 3078F DIAST BP <80 MM HG: CPT | Mod: ,,, | Performed by: PHYSICIAN ASSISTANT

## 2023-09-25 PROCEDURE — 3008F PR BODY MASS INDEX (BMI) DOCUMENTED: ICD-10-PCS | Mod: ,,, | Performed by: PHYSICIAN ASSISTANT

## 2023-09-25 PROCEDURE — G0481 DRUG SCREEN DEFINITIVE 14, URINE: ICD-10-PCS | Mod: ,,, | Performed by: CLINICAL MEDICAL LABORATORY

## 2023-09-25 PROCEDURE — 3066F PR DOCUMENTATION OF TREATMENT FOR NEPHROPATHY: ICD-10-PCS | Mod: ,,, | Performed by: PHYSICIAN ASSISTANT

## 2023-09-25 RX ORDER — HYDROCODONE BITARTRATE AND ACETAMINOPHEN 7.5; 325 MG/1; MG/1
1 TABLET ORAL EVERY 8 HOURS PRN
Qty: 90 TABLET | Refills: 0 | Status: SHIPPED | OUTPATIENT
Start: 2023-10-30 | End: 2023-11-27 | Stop reason: SDUPTHER

## 2023-09-25 RX ORDER — HYDROCODONE BITARTRATE AND ACETAMINOPHEN 7.5; 325 MG/1; MG/1
1 TABLET ORAL EVERY 8 HOURS PRN
Qty: 90 TABLET | Refills: 0 | Status: SHIPPED | OUTPATIENT
Start: 2023-10-01 | End: 2023-10-27 | Stop reason: CLARIF

## 2023-09-25 RX ORDER — KETOROLAC TROMETHAMINE 30 MG/ML
60 INJECTION, SOLUTION INTRAMUSCULAR; INTRAVENOUS
Status: COMPLETED | OUTPATIENT
Start: 2023-09-25 | End: 2023-09-25

## 2023-09-25 RX ADMIN — KETOROLAC TROMETHAMINE 60 MG: 60 INJECTION, SOLUTION INTRAMUSCULAR at 11:09

## 2023-09-25 NOTE — PROGRESS NOTES
.Population Health Review...  Per [facility] website, insurance is active and pt is listed on the attributed list needing a healthy you performed in 2023  HY scheduled for 10/11/2023

## 2023-09-29 LAB
6-ACETYLMORPHINE, URINE (RUSH): NEGATIVE 10 NG/ML
7-AMINOCLONAZEPAM, URINE (RUSH): NEGATIVE 25 NG/ML
A-HYDROXYALPRAZOLAM, URINE (RUSH): NEGATIVE 25 NG/ML
ACETYL FENTANYL, URINE (RUSH): NEGATIVE 2.5 NG/ML
ACETYL NORFENTANYL OXALATE, URINE (RUSH): NEGATIVE 5 NG/ML
AMPHET UR QL SCN: NEGATIVE
BENZOYLECGONINE, URINE (RUSH): NEGATIVE 100 NG/ML
BUPRENORPHINE UR QL SCN: NEGATIVE 25 NG/ML
CODEINE, URINE (RUSH): NEGATIVE 25 NG/ML
CREAT UR-MCNC: 53 MG/DL (ref 28–219)
EDDP, URINE (RUSH): NEGATIVE 25 NG/ML
FENTANYL, URINE (RUSH): NEGATIVE 2.5 NG/ML
HYDROCODONE, URINE (RUSH): >250 25 NG/ML
HYDROMORPHONE, URINE (RUSH): 157.1 25 NG/ML
LORAZEPAM, URINE (RUSH): NEGATIVE 25 NG/ML
METHADONE UR QL SCN: NEGATIVE 25 NG/ML
METHAMPHET UR QL SCN: NEGATIVE
MORPHINE, URINE (RUSH): NEGATIVE 25 NG/ML
NORBUPRENORPHINE, URINE (RUSH): NEGATIVE 25 NG/ML
NORDIAZEPAM, URINE (RUSH): NEGATIVE 25 NG/ML
NORFENTANYL OXALATE, URINE (RUSH): NEGATIVE 5 NG/ML
NORHYDROCODONE, URINE (RUSH): >500 50 NG/ML
NOROXYCODONE HCL, URINE (RUSH): NEGATIVE 50 NG/ML
OXAZEPAM, URINE (RUSH): NEGATIVE 25 NG/ML
OXYCODONE UR QL SCN: NEGATIVE 25 NG/ML
OXYMORPHONE, URINE (RUSH): NEGATIVE 25 NG/ML
PH UR STRIP: 5 PH UNITS
SP GR UR STRIP: 1.02
TAPENTADOL, URINE (RUSH): NEGATIVE 25 NG/ML
TEMAZEPAM, URINE (RUSH): NEGATIVE 25 NG/ML
THC-COOH, URINE (RUSH): NEGATIVE 25 NG/ML
TRAMADOL, URINE (RUSH): NEGATIVE 100 NG/ML

## 2023-10-27 ENCOUNTER — HOSPITAL ENCOUNTER (OUTPATIENT)
Facility: HOSPITAL | Age: 63
Discharge: HOME | End: 2023-10-28
Attending: EMERGENCY MEDICINE | Admitting: INTERNAL MEDICINE
Payer: COMMERCIAL

## 2023-10-27 DIAGNOSIS — I48.91 A-FIB: ICD-10-CM

## 2023-10-27 DIAGNOSIS — K44.9 HH (HIATUS HERNIA): Chronic | ICD-10-CM

## 2023-10-27 DIAGNOSIS — I63.9 STROKE: ICD-10-CM

## 2023-10-27 DIAGNOSIS — I49.9 ARRHYTHMIA: ICD-10-CM

## 2023-10-27 DIAGNOSIS — M17.0 PRIMARY OSTEOARTHRITIS OF BOTH KNEES: ICD-10-CM

## 2023-10-27 DIAGNOSIS — I48.91 ATRIAL FIBRILLATION WITH RAPID VENTRICULAR RESPONSE: ICD-10-CM

## 2023-10-27 DIAGNOSIS — G45.9 TIA (TRANSIENT ISCHEMIC ATTACK): ICD-10-CM

## 2023-10-27 DIAGNOSIS — I63.9 STROKE DUE TO EMBOLISM: ICD-10-CM

## 2023-10-27 DIAGNOSIS — R07.9 CHEST PAIN: ICD-10-CM

## 2023-10-27 DIAGNOSIS — R20.2 PARESTHESIAS IN LEFT HAND: Primary | ICD-10-CM

## 2023-10-27 LAB
ALBUMIN SERPL BCP-MCNC: 3.2 G/DL (ref 3.5–5)
ALBUMIN/GLOB SERPL: 0.8 {RATIO}
ALP SERPL-CCNC: 83 U/L (ref 50–130)
ALT SERPL W P-5'-P-CCNC: 42 U/L (ref 13–56)
ANION GAP SERPL CALCULATED.3IONS-SCNC: 9 MMOL/L (ref 7–16)
AORTIC ROOT ANNULUS: 2 CM
AORTIC VALVE CUSP SEPERATION: 1.41 CM
AST SERPL W P-5'-P-CCNC: 30 U/L (ref 15–37)
AV INDEX (PROSTH): 0.76
AV MEAN GRADIENT: 4 MMHG
AV PEAK GRADIENT: 9 MMHG
AV VALVE AREA BY VELOCITY RATIO: 2.1 CM²
AV VALVE AREA: 2.15 CM²
AV VELOCITY RATIO: 0.74
BASOPHILS # BLD AUTO: 0.03 K/UL (ref 0–0.2)
BASOPHILS NFR BLD AUTO: 0.5 % (ref 0–1)
BILIRUB SERPL-MCNC: 0.4 MG/DL (ref ?–1.2)
BSA FOR ECHO PROCEDURE: 1.96 M2
BUN SERPL-MCNC: 24 MG/DL (ref 7–18)
BUN/CREAT SERPL: 22 (ref 6–20)
CALCIUM SERPL-MCNC: 8.7 MG/DL (ref 8.5–10.1)
CHLORIDE SERPL-SCNC: 110 MMOL/L (ref 98–107)
CHOLEST SERPL-MCNC: 153 MG/DL (ref 0–200)
CHOLEST/HDLC SERPL: 2 {RATIO}
CO2 SERPL-SCNC: 24 MMOL/L (ref 21–32)
CREAT SERPL-MCNC: 1.07 MG/DL (ref 0.55–1.02)
CV ECHO LV RWT: 0.89 CM
DIFFERENTIAL METHOD BLD: ABNORMAL
DOP CALC AO PEAK VEL: 1.47 M/S
DOP CALC AO VTI: 36.6 CM
DOP CALC LVOT AREA: 2.8 CM2
DOP CALC LVOT DIAMETER: 1.9 CM
DOP CALC LVOT PEAK VEL: 1.09 M/S
DOP CALC LVOT STROKE VOLUME: 78.78 CM3
DOP CALCLVOT PEAK VEL VTI: 27.8 CM
E WAVE DECELERATION TIME: 202.26 MSEC
E/A RATIO: 1.51
E/E' RATIO: 14.56 M/S
ECHO LV POSTERIOR WALL: 1.7 CM (ref 0.6–1.1)
EGFR (NO RACE VARIABLE) (RUSH/TITUS): 59 ML/MIN/1.73M2
EOSINOPHIL # BLD AUTO: 0.08 K/UL (ref 0–0.5)
EOSINOPHIL NFR BLD AUTO: 1.3 % (ref 1–4)
ERYTHROCYTE [DISTWIDTH] IN BLOOD BY AUTOMATED COUNT: 14.1 % (ref 11.5–14.5)
EST. AVERAGE GLUCOSE BLD GHB EST-MCNC: 174 MG/DL
FRACTIONAL SHORTENING: 42 % (ref 28–44)
GLOBULIN SER-MCNC: 3.8 G/DL (ref 2–4)
GLUCOSE SERPL-MCNC: 103 MG/DL (ref 70–105)
GLUCOSE SERPL-MCNC: 125 MG/DL (ref 74–106)
GLUCOSE SERPL-MCNC: 143 MG/DL (ref 70–105)
GLUCOSE SERPL-MCNC: 170 MG/DL (ref 70–105)
HBA1C MFR BLD HPLC: 7.8 % (ref 4.5–6.6)
HCT VFR BLD AUTO: 37.7 % (ref 38–47)
HDLC SERPL-MCNC: 78 MG/DL (ref 40–60)
HGB BLD-MCNC: 11.9 G/DL (ref 12–16)
IMM GRANULOCYTES # BLD AUTO: 0.01 K/UL (ref 0–0.04)
IMM GRANULOCYTES NFR BLD: 0.2 % (ref 0–0.4)
INR BLD: 0.97
INTERVENTRICULAR SEPTUM: 1.1 CM (ref 0.6–1.1)
IVC DIAMETER: 1.23 CM
LA MAJOR: 3.32 CM
LDLC SERPL CALC-MCNC: 64 MG/DL
LEFT ATRIUM SIZE: 2.89 CM
LEFT INTERNAL DIMENSION IN SYSTOLE: 2.2 CM (ref 2.1–4)
LEFT VENTRICLE DIASTOLIC VOLUME INDEX: 33.44 ML/M2
LEFT VENTRICLE DIASTOLIC VOLUME: 62.54 ML
LEFT VENTRICLE MASS INDEX: 104 G/M2
LEFT VENTRICLE SYSTOLIC VOLUME INDEX: 12.4 ML/M2
LEFT VENTRICLE SYSTOLIC VOLUME: 23.15 ML
LEFT VENTRICULAR INTERNAL DIMENSION IN DIASTOLE: 3.8 CM (ref 3.5–6)
LEFT VENTRICULAR MASS: 194.14 G
LV LATERAL E/E' RATIO: 14.56 M/S
LV SEPTAL E/E' RATIO: 14.56 M/S
LVOT MG: 2.56 MMHG
LVOT MV: 0.76 CM/S
LYMPHOCYTES # BLD AUTO: 2.26 K/UL (ref 1–4.8)
LYMPHOCYTES NFR BLD AUTO: 36.5 % (ref 27–41)
MCH RBC QN AUTO: 27.2 PG (ref 27–31)
MCHC RBC AUTO-ENTMCNC: 31.6 G/DL (ref 32–36)
MCV RBC AUTO: 86.1 FL (ref 80–96)
MONOCYTES # BLD AUTO: 0.43 K/UL (ref 0–0.8)
MONOCYTES NFR BLD AUTO: 6.9 % (ref 2–6)
MPC BLD CALC-MCNC: 11.5 FL (ref 9.4–12.4)
MV PEAK A VEL: 0.87 M/S
MV PEAK E VEL: 1.31 M/S
NEUTROPHILS # BLD AUTO: 3.38 K/UL (ref 1.8–7.7)
NEUTROPHILS NFR BLD AUTO: 54.6 % (ref 53–65)
NONHDLC SERPL-MCNC: 75 MG/DL
NRBC # BLD AUTO: 0 X10E3/UL
NRBC, AUTO (.00): 0 %
OHS LV EJECTION FRACTION SIMPSONS BIPLANE MOD: 57 %
PISA TR MAX VEL: 2.11 M/S
PLATELET # BLD AUTO: 334 K/UL (ref 150–400)
POTASSIUM SERPL-SCNC: 4 MMOL/L (ref 3.5–5.1)
PROT SERPL-MCNC: 7 G/DL (ref 6.4–8.2)
PROTHROMBIN TIME: 12.8 SECONDS (ref 11.7–14.7)
PV PEAK GRADIENT: 4 MMHG
PV PEAK VELOCITY: 1.06 M/S
RA MAJOR: 3.75 CM
RA PRESSURE ESTIMATED: 3 MMHG
RBC # BLD AUTO: 4.38 M/UL (ref 4.2–5.4)
RIGHT VENTRICULAR END-DIASTOLIC DIMENSION: 3.79 CM
RV TB RVSP: 5 MMHG
SODIUM SERPL-SCNC: 139 MMOL/L (ref 136–145)
TDI LATERAL: 0.09 M/S
TDI SEPTAL: 0.09 M/S
TDI: 0.09 M/S
TR MAX PG: 18 MMHG
TRICUSPID ANNULAR PLANE SYSTOLIC EXCURSION: 1.46 CM
TRIGL SERPL-MCNC: 55 MG/DL (ref 35–150)
TROPONIN I SERPL DL<=0.01 NG/ML-MCNC: 9.1 PG/ML
TSH SERPL DL<=0.005 MIU/L-ACNC: 1.5 UIU/ML (ref 0.36–3.74)
TV REST PULMONARY ARTERY PRESSURE: 21 MMHG
VLDLC SERPL-MCNC: 11 MG/DL
WBC # BLD AUTO: 6.19 K/UL (ref 4.5–11)
Z-SCORE OF LEFT VENTRICULAR DIMENSION IN END DIASTOLE: -3.16
Z-SCORE OF LEFT VENTRICULAR DIMENSION IN END SYSTOLE: -3

## 2023-10-27 PROCEDURE — 93005 ELECTROCARDIOGRAM TRACING: CPT

## 2023-10-27 PROCEDURE — 92610 EVALUATE SWALLOWING FUNCTION: CPT

## 2023-10-27 PROCEDURE — 96372 THER/PROPH/DIAG INJ SC/IM: CPT | Performed by: INTERNAL MEDICINE

## 2023-10-27 PROCEDURE — 84443 ASSAY THYROID STIM HORMONE: CPT | Performed by: EMERGENCY MEDICINE

## 2023-10-27 PROCEDURE — 83036 HEMOGLOBIN GLYCOSYLATED A1C: CPT | Performed by: INTERNAL MEDICINE

## 2023-10-27 PROCEDURE — 93010 EKG 12-LEAD: ICD-10-PCS | Mod: ,,, | Performed by: STUDENT IN AN ORGANIZED HEALTH CARE EDUCATION/TRAINING PROGRAM

## 2023-10-27 PROCEDURE — 85025 COMPLETE CBC W/AUTO DIFF WBC: CPT | Performed by: EMERGENCY MEDICINE

## 2023-10-27 PROCEDURE — 99447 PR INTERPROF, PHONE/INTERNET/EHR, CONSULT, 11-20 MINS: ICD-10-PCS | Mod: ,,, | Performed by: STUDENT IN AN ORGANIZED HEALTH CARE EDUCATION/TRAINING PROGRAM

## 2023-10-27 PROCEDURE — 63600175 PHARM REV CODE 636 W HCPCS: Performed by: INTERNAL MEDICINE

## 2023-10-27 PROCEDURE — 85610 PROTHROMBIN TIME: CPT | Performed by: EMERGENCY MEDICINE

## 2023-10-27 PROCEDURE — 25000003 PHARM REV CODE 250: Performed by: EMERGENCY MEDICINE

## 2023-10-27 PROCEDURE — 99223 PR INITIAL HOSPITAL CARE,LEVL III: ICD-10-PCS | Mod: ,,, | Performed by: INTERNAL MEDICINE

## 2023-10-27 PROCEDURE — 80053 COMPREHEN METABOLIC PANEL: CPT | Performed by: EMERGENCY MEDICINE

## 2023-10-27 PROCEDURE — 99285 EMERGENCY DEPT VISIT HI MDM: CPT | Mod: 25

## 2023-10-27 PROCEDURE — 80061 LIPID PANEL: CPT | Performed by: EMERGENCY MEDICINE

## 2023-10-27 PROCEDURE — 99223 1ST HOSP IP/OBS HIGH 75: CPT | Mod: ,,, | Performed by: INTERNAL MEDICINE

## 2023-10-27 PROCEDURE — 93010 ELECTROCARDIOGRAM REPORT: CPT | Mod: ,,, | Performed by: STUDENT IN AN ORGANIZED HEALTH CARE EDUCATION/TRAINING PROGRAM

## 2023-10-27 PROCEDURE — G0378 HOSPITAL OBSERVATION PER HR: HCPCS

## 2023-10-27 PROCEDURE — 25000003 PHARM REV CODE 250: Performed by: INTERNAL MEDICINE

## 2023-10-27 PROCEDURE — 96361 HYDRATE IV INFUSION ADD-ON: CPT

## 2023-10-27 PROCEDURE — 96374 THER/PROPH/DIAG INJ IV PUSH: CPT | Mod: 59

## 2023-10-27 PROCEDURE — 84484 ASSAY OF TROPONIN QUANT: CPT | Performed by: EMERGENCY MEDICINE

## 2023-10-27 PROCEDURE — 99285 EMERGENCY DEPT VISIT HI MDM: CPT | Mod: ,,, | Performed by: EMERGENCY MEDICINE

## 2023-10-27 PROCEDURE — 82962 GLUCOSE BLOOD TEST: CPT

## 2023-10-27 PROCEDURE — 99285 PR EMERGENCY DEPT VISIT,LEVEL V: ICD-10-PCS | Mod: ,,, | Performed by: EMERGENCY MEDICINE

## 2023-10-27 PROCEDURE — 25500020 PHARM REV CODE 255: Performed by: INTERNAL MEDICINE

## 2023-10-27 PROCEDURE — 99447 NTRPROF PH1/NTRNET/EHR 11-20: CPT | Mod: ,,, | Performed by: STUDENT IN AN ORGANIZED HEALTH CARE EDUCATION/TRAINING PROGRAM

## 2023-10-27 RX ORDER — BISACODYL 10 MG
10 SUPPOSITORY, RECTAL RECTAL DAILY PRN
Status: DISCONTINUED | OUTPATIENT
Start: 2023-10-27 | End: 2023-10-28 | Stop reason: HOSPADM

## 2023-10-27 RX ORDER — PNV NO.95/FERROUS FUM/FOLIC AC 28MG-0.8MG
100 TABLET ORAL DAILY
Status: DISCONTINUED | OUTPATIENT
Start: 2023-10-27 | End: 2023-10-28 | Stop reason: HOSPADM

## 2023-10-27 RX ORDER — AMOXICILLIN 250 MG
1 CAPSULE ORAL 2 TIMES DAILY
Status: DISCONTINUED | OUTPATIENT
Start: 2023-10-27 | End: 2023-10-28 | Stop reason: HOSPADM

## 2023-10-27 RX ORDER — INSULIN ASPART 100 [IU]/ML
0-10 INJECTION, SOLUTION INTRAVENOUS; SUBCUTANEOUS
Status: DISCONTINUED | OUTPATIENT
Start: 2023-10-27 | End: 2023-10-28 | Stop reason: HOSPADM

## 2023-10-27 RX ORDER — ACETAMINOPHEN 325 MG/1
650 TABLET ORAL EVERY 8 HOURS PRN
Status: DISCONTINUED | OUTPATIENT
Start: 2023-10-27 | End: 2023-10-28 | Stop reason: HOSPADM

## 2023-10-27 RX ORDER — CLOPIDOGREL 300 MG/1
300 TABLET, FILM COATED ORAL
Status: COMPLETED | OUTPATIENT
Start: 2023-10-27 | End: 2023-10-27

## 2023-10-27 RX ORDER — ENOXAPARIN SODIUM 100 MG/ML
30 INJECTION SUBCUTANEOUS EVERY 24 HOURS
Status: DISCONTINUED | OUTPATIENT
Start: 2023-10-28 | End: 2023-10-27

## 2023-10-27 RX ORDER — ONDANSETRON 2 MG/ML
4 INJECTION INTRAMUSCULAR; INTRAVENOUS EVERY 8 HOURS PRN
Status: DISCONTINUED | OUTPATIENT
Start: 2023-10-27 | End: 2023-10-28 | Stop reason: HOSPADM

## 2023-10-27 RX ORDER — SERTRALINE HYDROCHLORIDE 25 MG/1
25 TABLET, FILM COATED ORAL DAILY
Status: DISCONTINUED | OUTPATIENT
Start: 2023-10-27 | End: 2023-10-28 | Stop reason: HOSPADM

## 2023-10-27 RX ORDER — ASPIRIN 325 MG
325 TABLET ORAL
Status: COMPLETED | OUTPATIENT
Start: 2023-10-27 | End: 2023-10-27

## 2023-10-27 RX ORDER — CLOPIDOGREL BISULFATE 75 MG/1
75 TABLET ORAL DAILY
Status: DISCONTINUED | OUTPATIENT
Start: 2023-10-28 | End: 2023-10-28 | Stop reason: HOSPADM

## 2023-10-27 RX ORDER — SODIUM CHLORIDE, SODIUM GLUCONATE, SODIUM ACETATE, POTASSIUM CHLORIDE AND MAGNESIUM CHLORIDE 30; 37; 368; 526; 502 MG/100ML; MG/100ML; MG/100ML; MG/100ML; MG/100ML
INJECTION, SOLUTION INTRAVENOUS CONTINUOUS
Status: DISCONTINUED | OUTPATIENT
Start: 2023-10-27 | End: 2023-10-27

## 2023-10-27 RX ORDER — ENOXAPARIN SODIUM 100 MG/ML
40 INJECTION SUBCUTANEOUS EVERY 24 HOURS
Status: DISCONTINUED | OUTPATIENT
Start: 2023-10-28 | End: 2023-10-28 | Stop reason: HOSPADM

## 2023-10-27 RX ORDER — HYDROCODONE BITARTRATE AND ACETAMINOPHEN 7.5; 325 MG/1; MG/1
1 TABLET ORAL EVERY 6 HOURS PRN
Status: DISCONTINUED | OUTPATIENT
Start: 2023-10-27 | End: 2023-10-28 | Stop reason: HOSPADM

## 2023-10-27 RX ORDER — PRAMIPEXOLE DIHYDROCHLORIDE 0.12 MG/1
0.12 TABLET ORAL NIGHTLY
Status: DISCONTINUED | OUTPATIENT
Start: 2023-10-27 | End: 2023-10-28 | Stop reason: HOSPADM

## 2023-10-27 RX ORDER — MAG HYDROX/ALUMINUM HYD/SIMETH 200-200-20
30 SUSPENSION, ORAL (FINAL DOSE FORM) ORAL 4 TIMES DAILY PRN
Status: DISCONTINUED | OUTPATIENT
Start: 2023-10-27 | End: 2023-10-28 | Stop reason: HOSPADM

## 2023-10-27 RX ORDER — ATORVASTATIN CALCIUM 80 MG/1
80 TABLET, FILM COATED ORAL NIGHTLY
Status: DISCONTINUED | OUTPATIENT
Start: 2023-10-27 | End: 2023-10-28 | Stop reason: HOSPADM

## 2023-10-27 RX ORDER — MORPHINE SULFATE 2 MG/ML
2 INJECTION, SOLUTION INTRAMUSCULAR; INTRAVENOUS ONCE
Status: COMPLETED | OUTPATIENT
Start: 2023-10-27 | End: 2023-10-27

## 2023-10-27 RX ORDER — SIMETHICONE 80 MG
1 TABLET,CHEWABLE ORAL 4 TIMES DAILY PRN
Status: DISCONTINUED | OUTPATIENT
Start: 2023-10-27 | End: 2023-10-28 | Stop reason: HOSPADM

## 2023-10-27 RX ORDER — ACETAMINOPHEN 325 MG/1
650 TABLET ORAL EVERY 4 HOURS PRN
Status: DISCONTINUED | OUTPATIENT
Start: 2023-10-27 | End: 2023-10-28 | Stop reason: HOSPADM

## 2023-10-27 RX ORDER — NALOXONE HCL 0.4 MG/ML
0.02 VIAL (ML) INJECTION
Status: DISCONTINUED | OUTPATIENT
Start: 2023-10-27 | End: 2023-10-28 | Stop reason: HOSPADM

## 2023-10-27 RX ORDER — PANTOPRAZOLE SODIUM 40 MG/1
40 TABLET, DELAYED RELEASE ORAL DAILY
Status: DISCONTINUED | OUTPATIENT
Start: 2023-10-27 | End: 2023-10-28 | Stop reason: HOSPADM

## 2023-10-27 RX ORDER — NAPROXEN SODIUM 220 MG/1
81 TABLET, FILM COATED ORAL DAILY
Status: DISCONTINUED | OUTPATIENT
Start: 2023-10-28 | End: 2023-10-28 | Stop reason: HOSPADM

## 2023-10-27 RX ORDER — GLUCAGON 1 MG
1 KIT INJECTION
Status: DISCONTINUED | OUTPATIENT
Start: 2023-10-27 | End: 2023-10-28 | Stop reason: HOSPADM

## 2023-10-27 RX ORDER — EZETIMIBE 10 MG/1
10 TABLET ORAL DAILY
Status: DISCONTINUED | OUTPATIENT
Start: 2023-10-27 | End: 2023-10-28 | Stop reason: HOSPADM

## 2023-10-27 RX ORDER — POLYETHYLENE GLYCOL 3350 17 G/17G
17 POWDER, FOR SOLUTION ORAL DAILY PRN
Status: DISCONTINUED | OUTPATIENT
Start: 2023-10-27 | End: 2023-10-28 | Stop reason: HOSPADM

## 2023-10-27 RX ORDER — IBUPROFEN 200 MG
24 TABLET ORAL
Status: DISCONTINUED | OUTPATIENT
Start: 2023-10-27 | End: 2023-10-28 | Stop reason: HOSPADM

## 2023-10-27 RX ORDER — IBUPROFEN 200 MG
16 TABLET ORAL
Status: DISCONTINUED | OUTPATIENT
Start: 2023-10-27 | End: 2023-10-28 | Stop reason: HOSPADM

## 2023-10-27 RX ORDER — POLYETHYLENE GLYCOL 3350 17 G/17G
17 POWDER, FOR SOLUTION ORAL DAILY
Status: DISCONTINUED | OUTPATIENT
Start: 2023-10-27 | End: 2023-10-28 | Stop reason: HOSPADM

## 2023-10-27 RX ORDER — TALC
6 POWDER (GRAM) TOPICAL NIGHTLY PRN
Status: DISCONTINUED | OUTPATIENT
Start: 2023-10-27 | End: 2023-10-28 | Stop reason: HOSPADM

## 2023-10-27 RX ADMIN — PRAMIPEXOLE DIHYDROCHLORIDE 0.12 MG: 0.12 TABLET ORAL at 09:10

## 2023-10-27 RX ADMIN — SENNOSIDES AND DOCUSATE SODIUM 1 TABLET: 50; 8.6 TABLET ORAL at 09:10

## 2023-10-27 RX ADMIN — IOPAMIDOL 100 ML: 755 INJECTION, SOLUTION INTRAVENOUS at 01:10

## 2023-10-27 RX ADMIN — ATORVASTATIN CALCIUM 80 MG: 80 TABLET, FILM COATED ORAL at 09:10

## 2023-10-27 RX ADMIN — SODIUM CHLORIDE, SODIUM GLUCONATE, SODIUM ACETATE, POTASSIUM CHLORIDE AND MAGNESIUM CHLORIDE: 526; 502; 368; 37; 30 INJECTION, SOLUTION INTRAVENOUS at 02:10

## 2023-10-27 RX ADMIN — Medication 100 MCG: at 03:10

## 2023-10-27 RX ADMIN — SODIUM CHLORIDE, SODIUM GLUCONATE, SODIUM ACETATE, POTASSIUM CHLORIDE AND MAGNESIUM CHLORIDE: 526; 502; 368; 37; 30 INJECTION, SOLUTION INTRAVENOUS at 03:10

## 2023-10-27 RX ADMIN — HYDROCODONE BITARTRATE AND ACETAMINOPHEN 1 TABLET: 7.5; 325 TABLET ORAL at 03:10

## 2023-10-27 RX ADMIN — CLOPIDOGREL BISULFATE 300 MG: 300 TABLET, FILM COATED ORAL at 01:10

## 2023-10-27 RX ADMIN — INSULIN DETEMIR 10 UNITS: 100 INJECTION, SOLUTION SUBCUTANEOUS at 09:10

## 2023-10-27 RX ADMIN — SERTRALINE HYDROCHLORIDE 25 MG: 25 TABLET ORAL at 03:10

## 2023-10-27 RX ADMIN — MORPHINE SULFATE 2 MG: 2 INJECTION, SOLUTION INTRAMUSCULAR; INTRAVENOUS at 01:10

## 2023-10-27 RX ADMIN — EZETIMIBE 10 MG: 10 TABLET ORAL at 03:10

## 2023-10-27 RX ADMIN — ASPIRIN 325 MG ORAL TABLET 325 MG: 325 PILL ORAL at 10:10

## 2023-10-27 NOTE — ASSESSMENT & PLAN NOTE
C/O lower back pain radiating to LLE, get CT lumbar spine  Consult ortho spine, to see if any further imaging is waranted, appreicate recs.

## 2023-10-27 NOTE — PROGRESS NOTES
Pharmacist Renal Dose Adjustment Note    Dilma Cr is a 63 y.o. female being treated with the medication enoxaparin    Patient Data:    Vital Signs (Most Recent):  Temp: 98.6 °F (37 °C) (10/27/23 0900)  Pulse: (!) 56 (10/27/23 1245)  Resp: 20 (10/27/23 1309)  BP: (!) 168/63 (10/27/23 1245)  SpO2: 100 % (10/27/23 1100) Vital Signs (72h Range):  Temp:  [98.6 °F (37 °C)]   Pulse:  [53-65]   Resp:  [8-20]   BP: (152-170)/(50-63)   SpO2:  [99 %-100 %]      Recent Labs   Lab 10/27/23  0934   CREATININE 1.07*     Serum creatinine: 1.07 mg/dL (H) 10/27/23 0934  Estimated creatinine clearance: 56 mL/min (A)    Medication:enoxaparin dose: 30 mg frequency daily will be changed to medication:enoxaparin dose:40 mg frequency:daily    Pharmacist's Name: Helen Kamara  Pharmacist's Extension: [phone]

## 2023-10-27 NOTE — ASSESSMENT & PLAN NOTE
Antithrombotics for secondary stroke prevention: Antiplatelets: Aspirin: 81 mg daily  Clopidogrel: 75 mg daily    Statins for secondary stroke prevention and hyperlipidemia, if present:   Statins: Atorvastatin- 80 mg daily    Aggressive risk factor modification: HTN     Rehab efforts: The patient has been evaluated by a stroke team provider and the therapy needs have been fully considered based off the presenting complaints and exam findings. The following therapy evaluations are needed: PT evaluate and treat, OT evaluate and treat, SLP evaluate and treat    Diagnostics ordered/pending: CT scan of head without contrast to asses brain parenchyma, CTA Head to assess vasculature , CTA Neck/Arch to assess vasculature, HgbA1C to assess blood glucose levels    VTE prophylaxis: Enoxaparin 40 mg SQ every 24 hours    BP parameters: Infarct: No intervention, SBP <220

## 2023-10-27 NOTE — PT/OT/SLP EVAL
Speech Language Pathology Evaluation  Bedside Swallow    Patient Name:  Dilma Cr   MRN:  [id number]  Admitting Diagnosis: <principal problem not specified>    Recommendations:                 General Recommendations:  Follow-up not indicated  Diet recommendations:  Regular, Thin   Aspiration Precautions: 1 bite/sip at a time, HOB to 90 degrees, Remain upright 30 minutes post meal, Small bites/sips, and Standard aspiration precautions   General Precautions: Standard,    Communication strategies:  none    Assessment:     Dilma Cr is a 63 y.o. female with an SLP diagnosis of  possible dysphagia .  She presents with no difficulties during BEDSIDE SWALLOW EVALUATION.    History:     Past Medical History:   Diagnosis Date    Acute superficial gastritis without hemorrhage 06/21/2022    Arthritis     Cancer     colon cancer    Chronic kidney disease, stage 3b     Colon cancer     Coronary artery disease     Depression     Diabetes mellitus, type 2     Diverticula, colon 06/22/2022    GERD (gastroesophageal reflux disease)     H/O right hemicolectomy 06/22/2022    HH (hiatus hernia) 06/21/2022    Hyperlipidemia     Myocardial infarction     2 stents in 05/14/2021 Dr Porter    Renal disorder        Past Surgical History:   Procedure Laterality Date    BREAST SURGERY      C5-C6 RADHA  8-, 7-, 6-1-2016    Dr Fowler    CARDIAC SURGERY  02/07/2023    CERVICAL SPINE SURGERY      COLON SURGERY      EPIDURAL STEROID INJECTION INTO LUMBAR SPINE N/A 03/29/2022    Procedure: L4-5 RADHA;  Surgeon: Gela Fowler MD;  Location: [facility] PAIN [facility];  Service: Pain Management;  Laterality: N/A;  PT AWARE ON OV TO BE TESTED\  PLAVIX TO BE HELD FOR 7 DAYS PRIOR TO PROCEDURE PER DR FOWLER AND DR PORTER  3-28  message left on  re: covid    HYSTERECTOMY      LEFT HEART CATHETERIZATION Left 05/14/2021    Procedure: Left heart cath;  Surgeon: Mateus Guan MD;  Location: [facility] CATH LAB;  Service: Cardiology;   Laterality: Left;    LEFT HEART CATHETERIZATION Left 06/20/2022    Procedure: Left heart cath;  Surgeon: Oliverio Bautista MD;  Location: [facility] CATH LAB;  Service: Cardiology;  Laterality: Left;    MEDIPORT REMOVAL  10/14/2021    Dr Kraft    right rotator cuff repair      in [place]    TOTAL REDUCTION MAMMOPLASTY         Social History: Patient lives at home with her family.    Prior Intubation HX:  n/a    Modified Barium Swallow: n/a    Chest X-Rays: see chart    Prior diet: regular consistency at home.    Occupation/hobbies/homemaking: not stated.    Subjective     Patient lying in bed awake and alert, waiting on MRI. Patient reports no previous swallowing difficulties. Patient says her left side of her face still feels numb; however, it does not seem to be affecting her speech/swallowing.   Patient goals: to go home     Pain/Comfort:       Respiratory Status: Room air    Objective:     Oral Musculature Evaluation  Oral Musculature: WFL  Dentition: present and adequate  Secretion Management: adequate  Mucosal Quality: adequate  Oral Labial Strength and Mobility: WFL  Lingual Strength and Mobility: WF    Bedside Swallow Eval:   Consistencies Assessed:  Thin liquids No difficulties noted with small sips of water via a straw.   Puree No difficulties noted with small bites of pudding.   Soft solids No difficulties noted with small pieces of a sam cracker. No overt s/s of aspiration noted with any trials given.       Oral Phase:   WFL    Pharyngeal Phase:   no overt clinical signs/symptoms of aspiration  no overt clinical signs/symptoms of pharyngeal dysphagia    Compensatory Strategies  None    Treatment: Rec a regular consistency diet with thin liquids as tolerated. Skilled SPEECH THERAPY not indicated.     Goals:   Multidisciplinary Problems       SLP Goals       Not on file                    Plan:     Patient to be seen:      Plan of Care expires:     Plan of Care reviewed with:      SLP Follow-Up:  No        Discharge recommendations:      Barriers to Discharge:  None    Time Tracking:     SLP Treatment Date:      Speech Start Time:  1320  Speech Stop Time:  1340     Speech Total Time (min):  20 min    Billable Minutes: Eval Swallow and Oral Function 20    10/27/2023

## 2023-10-27 NOTE — ED PROVIDER NOTES
Encounter Date: 10/27/2023       History     Chief Complaint   Patient presents with    Tingling    Numbness     Left side     Patient is a 63-year-old female who presents to the emergency department this morning complaining of multiple complaints.  She is complaining of chronic lower back pain which radiates into bilateral legs which has been gradually getting worse lately.  Patient is also complaining of some vague left-sided intermittent chest pain but patient states the main reason she came to the emergency department today was that at about 6:00 a.m. this morning she began noticing a numb and tingling sensation to her left face and arm.  This began while she was at work.  Patient denies any headache, shortness of breath, nausea, vomiting.  Patient denies previous history of stroke.  No other acute problems or complaints at this time.      Review of patient's allergies indicates:  No Known Allergies  Past Medical History:   Diagnosis Date    Acute superficial gastritis without hemorrhage 06/21/2022    Arthritis     Cancer     colon cancer    Chronic kidney disease, stage 3b     Colon cancer     Coronary artery disease     Depression     Diabetes mellitus, type 2     Diverticula, colon 06/22/2022    GERD (gastroesophageal reflux disease)     H/O right hemicolectomy 06/22/2022    HH (hiatus hernia) 06/21/2022    Hyperlipidemia     Myocardial infarction     2 stents in 05/14/2021 Dr Lora    Renal disorder      Past Surgical History:   Procedure Laterality Date    BREAST SURGERY      C5-C6 RADHA  8-, 7-, 6-1-2016    Dr Fowler    CARDIAC SURGERY  02/07/2023    CERVICAL SPINE SURGERY      COLON SURGERY      EPIDURAL STEROID INJECTION INTO LUMBAR SPINE N/A 03/29/2022    Procedure: L4-5 RADHA;  Surgeon: Gela Fowler MD;  Location: [facility];  Service: Pain Management;  Laterality: N/A;  PT AWARE ON OV TO BE TESTED\  PLAVIX TO BE HELD FOR 7 DAYS PRIOR TO PROCEDURE PER DR FOWLER AND DR LORA  3-28   message left on vm re: covid    HYSTERECTOMY      LEFT HEART CATHETERIZATION Left 05/14/2021    Procedure: Left heart cath;  Surgeon: Mateus Guan MD;  Location: [facility] CATH LAB;  Service: Cardiology;  Laterality: Left;    LEFT HEART CATHETERIZATION Left 06/20/2022    Procedure: Left heart cath;  Surgeon: Oliverio Bautista MD;  Location: [facility] CATH LAB;  Service: Cardiology;  Laterality: Left;    MEDIPORT REMOVAL  10/14/2021    Dr Kraft    right rotator cuff repair      in [place]    TOTAL REDUCTION MAMMOPLASTY       Family History   Problem Relation Age of Onset    Hypertension Mother     Diabetes Mother     Hypertension Father     Diabetes Father     Hypertension Sister     Breast cancer Maternal Grandmother     Hypertension Brother      Social History     Tobacco Use    Smoking status: Never     Passive exposure: Never    Smokeless tobacco: Never   Substance Use Topics    Alcohol use: Never    Drug use: Never     Review of Systems   Cardiovascular:  Positive for chest pain.   Musculoskeletal:  Positive for back pain.   Neurological:  Positive for numbness.        Tingling in left face and left arm.   All other systems reviewed and are negative.      Physical Exam     Initial Vitals [10/27/23 0900]   BP Pulse Resp Temp SpO2   (!) 170/56 (!) 55 18 98.6 °F (37 °C) 100 %      MAP       --         Physical Exam    Nursing note and vitals reviewed.  Constitutional: She appears well-developed and well-nourished.   HENT:   Head: Normocephalic.   Eyes: Pupils are equal, round, and reactive to light.   Cardiovascular:  Normal rate.           Pulmonary/Chest: Breath sounds normal.   Abdominal: Abdomen is soft.   Musculoskeletal:         General: Normal range of motion.     Neurological: She is alert and oriented to person, place, and time. She has normal strength.   Altered sensation to fine touch with decreased sensation involving left face and left arm.  This also involves left leg.   Skin: Skin is warm.  Capillary refill takes less than 2 seconds.   Psychiatric: She has a normal mood and affect.         Medical Screening Exam   See Full Note    ED Course   Procedures  Labs Reviewed   COMPREHENSIVE METABOLIC PANEL - Abnormal; Notable for the following components:       Result Value    Chloride 110 (*)     Glucose 125 (*)     BUN 24 (*)     Creatinine 1.07 (*)     BUN/Creatinine Ratio 22 (*)     Albumin 3.2 (*)     eGFR 59 (*)     All other components within normal limits   LIPID PANEL - Abnormal; Notable for the following components:    HDL Cholesterol 78 (*)     All other components within normal limits   CBC WITH DIFFERENTIAL - Abnormal; Notable for the following components:    Hemoglobin 11.9 (*)     Hematocrit 37.7 (*)     MCHC 31.6 (*)     Monocytes % 6.9 (*)     All other components within normal limits   POCT GLUCOSE MONITORING CONTINUOUS - Abnormal; Notable for the following components:    POC Glucose 143 (*)     All other components within normal limits   PROTIME-INR - Normal   TSH - Normal   TROPONIN I - Normal   CBC W/ AUTO DIFFERENTIAL    Narrative:     The following orders were created for panel order CBC W/ AUTO DIFFERENTIAL.  Procedure                               Abnormality         Status                     ---------                               -----------         ------                     CBC with Differential[[id number]]       Abnormal            Final result                 Please view results for these tests on the individual orders.   HEMOGLOBIN A1C   POCT GLUCOSE MONITORING CONTINUOUS   POCT GLUCOSE MONITORING CONTINUOUS          Imaging Results              CTA Head (In process)                      CTA Neck (In process)                      CT Head Without Contrast (Final result)  Result time 10/27/23 09:29:28      Final result by Keegan Segundo II, MD (10/27/23 09:29:28)                   Impression:      No evidence of abnormality demonstrated.      Electronically signed  by: Keegan Segundo  Date:    10/27/2023  Time:    09:29               Narrative:    EXAMINATION:  CT HEAD WITHOUT CONTRAST    CLINICAL HISTORY:  Neuro deficit, acute, stroke suspected;    TECHNIQUE:  Axial CT imaging of the brain is performed without contrast with 3 mm increments.    CT dose reduction technique used - Dose Rite and tube current modulation.    COMPARISON:  18 June 2022    FINDINGS:  No evidence of hemorrhage, mass, mass effect, midline shift or acute infarct seen.  The brain parenchyma attenuation and differentiation appears within normal limits. The ventricles and cisterns are normal in caliber.  No cranial or skull base abnormality is identified.                                       Medications   aspirin tablet 325 mg (has no administration in time range)   clopidogreL tablet 300 mg (has no administration in time range)   electrolyte-A infusion (has no administration in time range)   aspirin chewable tablet 81 mg (has no administration in time range)   clopidogreL tablet 75 mg (has no administration in time range)   atorvastatin tablet 80 mg (has no administration in time range)   cyanocobalamin tablet 100 mcg (has no administration in time range)   ezetimibe tablet 10 mg (has no administration in time range)   pantoprazole EC tablet 40 mg (has no administration in time range)   polyethylene glycol packet 17 g (has no administration in time range)   pramipexole tablet 0.125 mg (has no administration in time range)   sertraline tablet 25 mg (has no administration in time range)   melatonin tablet 6 mg (has no administration in time range)   ondansetron injection 4 mg (has no administration in time range)   polyethylene glycol packet 17 g (has no administration in time range)   senna-docusate 8.6-50 mg per tablet 1 tablet (has no administration in time range)   bisacodyL suppository 10 mg (has no administration in time range)   acetaminophen tablet 650 mg (has no administration in time range)    simethicone chewable tablet 80 mg (has no administration in time range)   aluminum-magnesium hydroxide-simethicone 200-200-20 mg/5 mL suspension 30 mL (has no administration in time range)   acetaminophen tablet 650 mg (has no administration in time range)   naloxone 0.4 mg/mL injection 0.02 mg (has no administration in time range)   glucagon (human recombinant) injection 1 mg (has no administration in time range)   enoxaparin injection 30 mg (has no administration in time range)   dextrose 10% bolus 125 mL 125 mL (has no administration in time range)   dextrose 10% bolus 250 mL 250 mL (has no administration in time range)   glucose chewable tablet 16 g (has no administration in time range)   glucose chewable tablet 24 g (has no administration in time range)   glucagon (human recombinant) injection 1 mg (has no administration in time range)   insulin aspart U-100 injection 0-10 Units (has no administration in time range)   dextrose 10% bolus 125 mL 125 mL (has no administration in time range)   dextrose 10% bolus 250 mL 250 mL (has no administration in time range)   insulin detemir U-100 injection 10 Units (has no administration in time range)     Medical Decision Making  Amount and/or Complexity of Data Reviewed  Labs: ordered.  Radiology: ordered.    Risk  OTC drugs.  Prescription drug management.               ED Course as of 10/27/23 1058   Fri Oct 27, 2023   0914 Medical decision-making:  Differential diagnosis includes stroke, TIA, anxiety, paresthesia, chronic back pain, chest pain, STEMI, NSTEMI.  All labs and imaging ordered and interpreted by me. [BB]   1005 Discussed case with tele stroke neurologist who recommended treating with Plavix 300 p.o. and admitting patient for further stroke workup including CTA head and neck and MRI brain. [BB]   1012 I made the decision to admit patient for treatment of stroke.  I discussed case with internal medicine hospitalist on-call who agrees with admission. [BB]       ED Course User Index  [BB] Ruslan Patricia MD                    Clinical Impression:   Final diagnoses:  [R07.9] Chest pain  [I63.9] Stroke  [G45.9] TIA (transient ischemic attack)        ED Disposition Condition    Observation                 Ruslan Patricia MD  10/27/23 7773

## 2023-10-27 NOTE — ASSESSMENT & PLAN NOTE
"Patient's FSGs are controlled on current medication regimen.  Last A1c reviewed-   Lab Results   Component Value Date    HGBA1C 7.8 (H) 10/27/2023     Most recent fingerstick glucose reviewed- No results for input(s): "POCTGLUCOSE" in the last 24 hours.  Current correctional scale  Medium  Maintain anti-hyperglycemic dose as follows-   Antihyperglycemics (From admission, onward)    Start     Stop Route Frequency Ordered    10/27/23 2100  insulin detemir U-100 injection 10 Units         -- SubQ Nightly 10/27/23 1058    10/27/23 1157  insulin aspart U-100 injection 0-10 Units         -- SubQ Before meals & nightly PRN 10/27/23 1058        Hold Oral hypoglycemics while patient is in the hospital.  "

## 2023-10-27 NOTE — HPI
Patient is a 63-year-old female with past medical history of hypertension, hyperlipidemia, coronary artery disease, diabetes mellitus, AFib, status post Watchman procedure, osteoarthritis, degenerative lumbar spine, colon ca, obesity, who presents to the emergency room for left-sided paresthesias of her face.  Patient is accompanied by her , she tells me that she is been battling with chronic back pain for a while now and sees spine surgeon at [facility], however this morning her lower back pain worsened to the point that she could no longer ambulate and therefore presented to the emergency room.  She also noted that left side of her face was numb and she felt like she had tingling sensations there.  Overall she feels like her numbness is getting better however her joint pains are at the same level of pain, described as 9/10.  The emergency room physician performed tele neuro consult in the setting of new lives sided paresthesias, who recommended inpatient admission for stroke workup including CTA of head and neck and MRI brain along with echocardiogram.  Currently on examination patient does not seem to have significant motor deficits in the upper or lower extremity, both sides are almost similar, however it is hard to tell whether she has left-sided lower extremity weakness because she has a lot of pain when moving her hip joint.  When I tried to lift her left leg she was screaming in pain.  Otherwise bilateral upper extremities have equal strength, she also has equal sensation in her bilateral lower extremities and there is no sensory loss there.  Otherwise she denies any chest pain shortness O of breath fever chills nausea vomiting or diarrhea.

## 2023-10-27 NOTE — H&P
[facility] - Emergency Department  Hospital Medicine  History & Physical    Patient Name: Dilma Cr  MRN: [id number]  Patient Class: OP- Observation  Admission Date: 10/27/2023  Attending Physician: Nancy Caballero MD   Primary Care Provider: Olga Potts MD         Patient information was obtained from patient, spouse/SO and ER records.     Subjective:     Principal Problem:TIA (transient ischemic attack)    Chief Complaint:   Chief Complaint   Patient presents with    Tingling    Numbness     Left side        HPI: Patient is a 63-year-old female with past medical history of hypertension, hyperlipidemia, coronary artery disease, diabetes mellitus, AFib, status post Watchman procedure, osteoarthritis, degenerative lumbar spine, colon ca, obesity, who presents to the emergency room for left-sided paresthesias of her face.  Patient is accompanied by her , she tells me that she is been battling with chronic back pain for a while now and sees spine surgeon at [facility], however this morning her lower back pain worsened to the point that she could no longer ambulate and therefore presented to the emergency room.  She also noted that left side of her face was numb and she felt like she had tingling sensations there.  Overall she feels like her numbness is getting better however her joint pains are at the same level of pain, described as 9/10.  The emergency room physician performed tele neuro consult in the setting of new lives sided paresthesias, who recommended inpatient admission for stroke workup including CTA of head and neck and MRI brain along with echocardiogram.  Currently on examination patient does not seem to have significant motor deficits in the upper or lower extremity, both sides are almost similar, however it is hard to tell whether she has left-sided lower extremity weakness because she has a lot of pain when moving her hip joint.  When I tried to lift her left  leg she was screaming in pain.  Otherwise bilateral upper extremities have equal strength, she also has equal sensation in her bilateral lower extremities and there is no sensory loss there.  Otherwise she denies any chest pain shortness O of breath fever chills nausea vomiting or diarrhea.      Past Medical History:   Diagnosis Date    Acute superficial gastritis without hemorrhage 06/21/2022    Arthritis     Cancer     colon cancer    Chronic kidney disease, stage 3b     Colon cancer     Coronary artery disease     Depression     Diabetes mellitus, type 2     Diverticula, colon 06/22/2022    GERD (gastroesophageal reflux disease)     H/O right hemicolectomy 06/22/2022    HH (hiatus hernia) 06/21/2022    Hyperlipidemia     Myocardial infarction     2 stents in 05/14/2021 Dr Porter    Renal disorder        Past Surgical History:   Procedure Laterality Date    BREAST SURGERY      C5-C6 RADHA  8-, 7-, 6-1-2016    Dr Fowler    CARDIAC SURGERY  02/07/2023    CERVICAL SPINE SURGERY      COLON SURGERY      EPIDURAL STEROID INJECTION INTO LUMBAR SPINE N/A 03/29/2022    Procedure: L4-5 RADHA;  Surgeon: Gela Fowler MD;  Location: [facility]IN [facility];  Service: Pain Management;  Laterality: N/A;  PT AWARE ON OV TO BE TESTED\  PLAVIX TO BE HELD FOR 7 DAYS PRIOR TO PROCEDURE PER DR FOWLER AND DR PORTER  3-28  message left on vm re: covid    HYSTERECTOMY      LEFT HEART CATHETERIZATION Left 05/14/2021    Procedure: Left heart cath;  Surgeon: Mateus Guan MD;  Location: [facility] CATH LAB;  Service: Cardiology;  Laterality: Left;    LEFT HEART CATHETERIZATION Left 06/20/2022    Procedure: Left heart cath;  Surgeon: Oliverio Bautista MD;  Location: [facility] CATH LAB;  Service: Cardiology;  Laterality: Left;    MEDIPORT REMOVAL  10/14/2021    Dr Kraft    right rotator cuff repair      in [place]    TOTAL REDUCTION MAMMOPLASTY         Review of patient's allergies indicates:  No Known  Allergies    No current facility-administered medications on file prior to encounter.     Current Outpatient Medications on File Prior to Encounter   Medication Sig    ascorbic acid, vitamin C, 250 mg Chew Take 250 mg by mouth once daily.    aspirin 81 MG Chew Take 81 mg by mouth once daily.    BINAXNOW COVID-19 AG SELF TEST Kit Use as Directed on the Package    clopidogreL (PLAVIX) 75 mg tablet Take 1 tablet (75 mg total) by mouth once daily.    cyanocobalamin (VITAMIN B-12) 1000 MCG tablet Take 100 mcg by mouth once daily.    cyanocobalamin 2000 MCG tablet Take 1 tablet by mouth once daily.    cyclobenzaprine (FLEXERIL) 5 MG tablet Take 5 mg by mouth 3 (three) times daily as needed.    EScitalopram oxalate (LEXAPRO) 10 MG tablet Take 1 tablet (10 mg total) by mouth once daily for 30 days, THEN 2 tablets (20 mg total) once daily.    ezetimibe (ZETIA) 10 mg tablet Take 10 mg by mouth.    FREESTYLE BECCA 14 DAY SENSOR Kit USE TO CHECK GLUCOSE 4 TIMES DAILY    FREESTYLE BECCA 3 SENSOR Aleena USE 1 PATCH TOPICALLY EVERY DAY FOR 2 WEEKS    furosemide (LASIX) 20 MG tablet Take 1 tablet (20 mg total) by mouth daily as needed (fluid).    [START ON 10/30/2023] HYDROcodone-acetaminophen (NORCO) 7.5-325 mg per tablet Take 1 tablet by mouth every 8 (eight) hours as needed for Pain.    iron-vitamin C 100-250 mg, ICAR-C, 100-250 mg Tab Take 1 tablet by mouth 2 (two) times daily with meals.    JARDIANCE 25 mg tablet Take 1 tablet (25 mg total) by mouth once daily.    loratadine (CLARITIN) 10 mg tablet Take 1 tablet (10 mg total) by mouth once daily.    metoprolol succinate (TOPROL-XL) 25 MG 24 hr tablet Take 1 tablet (25 mg total) by mouth once daily.    naloxone (NARCAN) 4 mg/actuation Spry 1 spray by Nasal route once as needed.    nitroGLYCERIN (NITROSTAT) 0.4 MG SL tablet Place 1 tablet (0.4 mg total) under the tongue every 5 (five) minutes as needed for Chest pain.    NOVOLOG FLEXPEN U-100 INSULIN 100  unit/mL (3 mL) InPn pen Inject 20 Units into the skin 2 (two) times a day.    olmesartan-hydrochlorothiazide (BENICAR HCT) 40-25 mg per tablet Take 1 tablet by mouth once daily.    OZEMPIC 1 mg/dose (4 mg/3 mL)     pantoprazole (PROTONIX) 40 MG tablet Take 1 tablet (40 mg total) by mouth once daily.    polyethylene glycol (MIRALAX) 17 gram PwPk Take 17 g by mouth daily as needed (constipation).    pramipexole (MIRAPEX) 0.125 MG tablet Take 1 tablet (0.125 mg total) by mouth every evening.    prochlorperazine (COMPAZINE) 10 MG tablet Take 1 tablet by mouth daily as needed.    rosuvastatin (CRESTOR) 40 MG Tab Take 1 tablet by mouth once daily    sertraline (ZOLOFT) 25 MG tablet Take 1 tablet by mouth once daily.    sotaloL (BETAPACE) 120 MG Tab Take 120 mg by mouth every 12 (twelve) hours.    traMADoL (ULTRAM) 50 mg tablet Take 1 tablet (50 mg total) by mouth every 12 (twelve) hours as needed for Pain.    TRESIBA FLEXTOUCH U-100 100 unit/mL (3 mL) insulin pen Inject 24 Units into the skin once daily.    [DISCONTINUED] HYDROcodone-acetaminophen (NORCO) 7.5-325 mg per tablet Take 1 tablet by mouth every 8 (eight) hours as needed for Pain.    [DISCONTINUED] isosorbide mononitrate (IMDUR) 30 MG 24 hr tablet Take 1 tablet (30 mg total) by mouth once daily.     Family History       Problem Relation (Age of Onset)    Breast cancer Maternal Grandmother    Diabetes Mother, Father    Hypertension Mother, Father, Sister, Brother          Tobacco Use    Smoking status: Never     Passive exposure: Never    Smokeless tobacco: Never   Substance and Sexual Activity    Alcohol use: Never    Drug use: Never    Sexual activity: Yes     Review of Systems   Constitutional: Negative.  Negative for chills, fatigue and fever.   HENT: Negative.  Negative for congestion and rhinorrhea.    Respiratory:  Negative for apnea, cough, chest tightness and shortness of breath.    Cardiovascular: Negative.  Negative for chest pain and  leg swelling.   Gastrointestinal: Negative.  Negative for abdominal pain, diarrhea, nausea and vomiting.   Endocrine: Negative.    Genitourinary: Negative.    Musculoskeletal:  Positive for arthralgias, back pain and gait problem. Negative for myalgias, neck pain and neck stiffness.   Skin: Negative.    Allergic/Immunologic: Negative.    Neurological:  Positive for weakness and numbness. Negative for tremors, seizures, syncope and speech difficulty.   Hematological: Negative.    Psychiatric/Behavioral: Negative.       Objective:     Vital Signs (Most Recent):  Temp: 98.6 °F (37 °C) (10/27/23 0900)  Pulse: (!) 56 (10/27/23 1245)  Resp: 20 (10/27/23 1309)  BP: (!) 168/63 (10/27/23 1245)  SpO2: 100 % (10/27/23 1100) Vital Signs (24h Range):  Temp:  [98.6 °F (37 °C)] 98.6 °F (37 °C)  Pulse:  [53-65] 56  Resp:  [8-20] 20  SpO2:  [99 %-100 %] 100 %  BP: (152-170)/(50-63) 168/63     Weight: 88.9 kg (196 lb)  Body mass index is 37.03 kg/m².     Physical Exam  Vitals reviewed.   Constitutional:       Appearance: Normal appearance. She is obese.   HENT:      Head: Normocephalic and atraumatic.      Nose: Nose normal. No congestion.      Mouth/Throat:      Pharynx: Oropharynx is clear.   Eyes:      Extraocular Movements: Extraocular movements intact.      Conjunctiva/sclera: Conjunctivae normal.      Pupils: Pupils are equal, round, and reactive to light.   Cardiovascular:      Rate and Rhythm: Normal rate and regular rhythm.      Pulses: Normal pulses.      Heart sounds: Normal heart sounds.   Pulmonary:      Effort: Pulmonary effort is normal. No respiratory distress.      Breath sounds: Normal breath sounds. No wheezing.   Abdominal:      General: Bowel sounds are normal. There is no distension.      Palpations: Abdomen is soft.      Tenderness: There is no abdominal tenderness.   Musculoskeletal:         General: No swelling or tenderness. Normal range of motion.   Lymphadenopathy:      Cervical: No cervical adenopathy.    Skin:     General: Skin is warm.   Neurological:      Mental Status: She is alert and oriented to person, place, and time.      Comments: UE 5/5 B/L  RLE MOTOR AND SENSORY INTACT, LLE sensory intact, can not assess motor fx and she is in pain when I try to do any movement such as raising her leg.    Psychiatric:         Mood and Affect: Mood normal.              CRANIAL NERVES     CN III, IV, VI   Pupils are equal, round, and reactive to light.       Significant Labs: All pertinent labs within the past 24 hours have been reviewed.    Significant Imaging: I have reviewed all pertinent imaging results/findings within the past 24 hours.    Assessment/Plan:     * TIA (transient ischemic attack)    Antithrombotics for secondary stroke prevention: Antiplatelets: Aspirin: 81 mg daily  Clopidogrel: 75 mg daily    Statins for secondary stroke prevention and hyperlipidemia, if present:   Statins: Atorvastatin- 80 mg daily    Aggressive risk factor modification: HTN     Rehab efforts: The patient has been evaluated by a stroke team provider and the therapy needs have been fully considered based off the presenting complaints and exam findings. The following therapy evaluations are needed: PT evaluate and treat, OT evaluate and treat, SLP evaluate and treat    Diagnostics ordered/pending: CT scan of head without contrast to asses brain parenchyma, CTA Head to assess vasculature , CTA Neck/Arch to assess vasculature, HgbA1C to assess blood glucose levels    VTE prophylaxis: Enoxaparin 40 mg SQ every 24 hours    BP parameters: Infarct: No intervention, SBP <220        Paresthesias in left hand    See above    Primary osteoarthritis of both knees    Pain control  follwos w/ pain clinic.     Depression  Chronic controlled  Cont home medicine.       Lumbosacral radiculopathy    C/O lower back pain radiating to LLE, get CT lumbar spine  Consult ortho spine, to see if any further imaging is waranted, appreicate recs.     Type 2  "diabetes mellitus with diabetic polyneuropathy, with long-term current use of insulin  Patient's FSGs are controlled on current medication regimen.  Last A1c reviewed-   Lab Results   Component Value Date    HGBA1C 7.8 (H) 10/27/2023     Most recent fingerstick glucose reviewed- No results for input(s): "POCTGLUCOSE" in the last 24 hours.  Current correctional scale  Medium  Maintain anti-hyperglycemic dose as follows-   Antihyperglycemics (From admission, onward)    Start     Stop Route Frequency Ordered    10/27/23 2100  insulin detemir U-100 injection 10 Units         -- SubQ Nightly 10/27/23 1058    10/27/23 1157  insulin aspart U-100 injection 0-10 Units         -- SubQ Before meals & nightly PRN 10/27/23 1058        Hold Oral hypoglycemics while patient is in the hospital.    Atherosclerosis of native coronary artery of native heart without angina pectoris  Cont asa,statin, plavix      Hyperlipidemia  Cont statin       Hypertension  Hold home bp meds to allow for permissive hypertension      A-fib  Paroxysmal afib  Not on AC as she has had watchman procedure  Currently sinus and rate controlled      VTE Risk Mitigation (From admission, onward)         Ordered     enoxaparin injection 40 mg  Daily         10/27/23 1420     IP VTE HIGH RISK PATIENT  Once         10/27/23 1057     Place sequential compression device  Until discontinued         10/27/23 1057                   On 10/27/2023, patient should be placed in hospital observation services under my care.            Nancy Caballero MD  Department of Hospital Medicine  [facility] - Emergency Department      "

## 2023-10-27 NOTE — CONSULTS
[facility]  Vascular Neurology  Comprehensive Stroke Center  TeleVascular Neurology Interprofessional Consult Note    Telestroke Consultation converted to phone consult due to Case characteristics such as timeline or symptoms . Discussed w/ KIMBERLEE BEEBE at  over via phone through [facility].    64 yo female w/ PMHx of Colon Ca, CKD, CAD, DM II, HLD, Hx of MI s/p stenting x2  who presents w/ isolated L sided paresthesias.   Started to note the paresthesias this AM.   NIHSS 1.   CTH w/o evidence of ICH or early ischemic changes.   Not a lytic candidate as patient w/ mild/nondisabling symptoms and NIHSS 1.     Recommend admission for stroke w/u.     Recommendations:  CTA Head & Neck ASAP to evaluate cervico-cephalic vasculature for high risk culprit vessel disease or large/medium vessel occlusion  MRI brain to evaluate for presence and/or extent of ischemic injury  Allow for permissive HTNsion up to 220/110 until AIS is r/o w/ imaging   Lipid profile, A1c, TSH  ECHO w/o bubble study  IVF to allow for maximum cerebral perfusion  HOB flat   Load aspirin 325 mg & clopidogrel 300 mg x 1 now, followed by daily aspirin 81 mg /  clopidogrel 75 mg x 30 days followed by monotherapy thereafter  High intensity statin for LDL goal <70 long term       Imaging personally interpreted:  CT Brain: no evidence of early or subacute ischemic changes, intracerebral hemorrhage or hyperdense vessel signs  CTA Head & Neck: Not performed at time of consultation    I spent approximately 12 minutes on this encounter which includes chart review, imaging interpretation if available, medical decision making including triage and planning for diagnostics, management and disposition. More than half of that time was spent communicating with the consulting provider and coordinating patient care.    Maris Beckham MD  Vascular Neurology  [facility]    This encounter was conducted as an interprofessional  communication between providers at the [facility] and vascular neurologist. The interaction was completed over the phone or via secure messaging (electronic medical record - [facility] Secure Chat).    Once this note was completed, a written copy was sent back to the provider via fax or electronic medical record.

## 2023-10-27 NOTE — SUBJECTIVE & OBJECTIVE
Past Medical History:   Diagnosis Date    Acute superficial gastritis without hemorrhage 06/21/2022    Arthritis     Cancer     colon cancer    Chronic kidney disease, stage 3b     Colon cancer     Coronary artery disease     Depression     Diabetes mellitus, type 2     Diverticula, colon 06/22/2022    GERD (gastroesophageal reflux disease)     H/O right hemicolectomy 06/22/2022    HH (hiatus hernia) 06/21/2022    Hyperlipidemia     Myocardial infarction     2 stents in 05/14/2021 Dr Porter    Renal disorder        Past Surgical History:   Procedure Laterality Date    BREAST SURGERY      C5-C6 RADHA  8-, 7-, 6-1-2016    Dr Fowler    CARDIAC SURGERY  02/07/2023    CERVICAL SPINE SURGERY      COLON SURGERY      EPIDURAL STEROID INJECTION INTO LUMBAR SPINE N/A 03/29/2022    Procedure: L4-5 RADHA;  Surgeon: Gela Fowler MD;  Location: [facility] PAIN [facility];  Service: Pain Management;  Laterality: N/A;  PT AWARE ON OV TO BE TESTED\  PLAVIX TO BE HELD FOR 7 DAYS PRIOR TO PROCEDURE PER DR FOWLER AND DR PORTER  3-28  message left on vm re: covid    HYSTERECTOMY      LEFT HEART CATHETERIZATION Left 05/14/2021    Procedure: Left heart cath;  Surgeon: Mateus Guan MD;  Location: [facility] CATH LAB;  Service: Cardiology;  Laterality: Left;    LEFT HEART CATHETERIZATION Left 06/20/2022    Procedure: Left heart cath;  Surgeon: Oliverio Bautista MD;  Location: [facility] CATH LAB;  Service: Cardiology;  Laterality: Left;    MEDIPORT REMOVAL  10/14/2021    Dr Kraft    right rotator cuff repair      in [place]    TOTAL REDUCTION MAMMOPLASTY         Review of patient's allergies indicates:  No Known Allergies    No current facility-administered medications on file prior to encounter.     Current Outpatient Medications on File Prior to Encounter   Medication Sig    ascorbic acid, vitamin C, 250 mg Chew Take 250 mg by mouth once daily.    aspirin 81 MG Chew Take 81 mg by mouth once daily.    BINAXNOW COVID-19 AG SELF TEST  Kit Use as Directed on the Package    clopidogreL (PLAVIX) 75 mg tablet Take 1 tablet (75 mg total) by mouth once daily.    cyanocobalamin (VITAMIN B-12) 1000 MCG tablet Take 100 mcg by mouth once daily.    cyanocobalamin 2000 MCG tablet Take 1 tablet by mouth once daily.    cyclobenzaprine (FLEXERIL) 5 MG tablet Take 5 mg by mouth 3 (three) times daily as needed.    EScitalopram oxalate (LEXAPRO) 10 MG tablet Take 1 tablet (10 mg total) by mouth once daily for 30 days, THEN 2 tablets (20 mg total) once daily.    ezetimibe (ZETIA) 10 mg tablet Take 10 mg by mouth.    FREESTYLE BECCA 14 DAY SENSOR Kit USE TO CHECK GLUCOSE 4 TIMES DAILY    FREESTYLE BECCA 3 SENSOR Aleena USE 1 PATCH TOPICALLY EVERY DAY FOR 2 WEEKS    furosemide (LASIX) 20 MG tablet Take 1 tablet (20 mg total) by mouth daily as needed (fluid).    [START ON 10/30/2023] HYDROcodone-acetaminophen (NORCO) 7.5-325 mg per tablet Take 1 tablet by mouth every 8 (eight) hours as needed for Pain.    iron-vitamin C 100-250 mg, ICAR-C, 100-250 mg Tab Take 1 tablet by mouth 2 (two) times daily with meals.    JARDIANCE 25 mg tablet Take 1 tablet (25 mg total) by mouth once daily.    loratadine (CLARITIN) 10 mg tablet Take 1 tablet (10 mg total) by mouth once daily.    metoprolol succinate (TOPROL-XL) 25 MG 24 hr tablet Take 1 tablet (25 mg total) by mouth once daily.    naloxone (NARCAN) 4 mg/actuation Spry 1 spray by Nasal route once as needed.    nitroGLYCERIN (NITROSTAT) 0.4 MG SL tablet Place 1 tablet (0.4 mg total) under the tongue every 5 (five) minutes as needed for Chest pain.    NOVOLOG FLEXPEN U-100 INSULIN 100 unit/mL (3 mL) InPn pen Inject 20 Units into the skin 2 (two) times a day.    olmesartan-hydrochlorothiazide (BENICAR HCT) 40-25 mg per tablet Take 1 tablet by mouth once daily.    OZEMPIC 1 mg/dose (4 mg/3 mL)     pantoprazole (PROTONIX) 40 MG tablet Take 1 tablet (40 mg total) by mouth once daily.    polyethylene glycol (MIRALAX) 17 gram PwPk Take  17 g by mouth daily as needed (constipation).    pramipexole (MIRAPEX) 0.125 MG tablet Take 1 tablet (0.125 mg total) by mouth every evening.    prochlorperazine (COMPAZINE) 10 MG tablet Take 1 tablet by mouth daily as needed.    rosuvastatin (CRESTOR) 40 MG Tab Take 1 tablet by mouth once daily    sertraline (ZOLOFT) 25 MG tablet Take 1 tablet by mouth once daily.    sotaloL (BETAPACE) 120 MG Tab Take 120 mg by mouth every 12 (twelve) hours.    traMADoL (ULTRAM) 50 mg tablet Take 1 tablet (50 mg total) by mouth every 12 (twelve) hours as needed for Pain.    TRESIBA FLEXTOUCH U-100 100 unit/mL (3 mL) insulin pen Inject 24 Units into the skin once daily.    [DISCONTINUED] HYDROcodone-acetaminophen (NORCO) 7.5-325 mg per tablet Take 1 tablet by mouth every 8 (eight) hours as needed for Pain.    [DISCONTINUED] isosorbide mononitrate (IMDUR) 30 MG 24 hr tablet Take 1 tablet (30 mg total) by mouth once daily.     Family History       Problem Relation (Age of Onset)    Breast cancer Maternal Grandmother    Diabetes Mother, Father    Hypertension Mother, Father, Sister, Brother          Tobacco Use    Smoking status: Never     Passive exposure: Never    Smokeless tobacco: Never   Substance and Sexual Activity    Alcohol use: Never    Drug use: Never    Sexual activity: Yes     Review of Systems   Constitutional: Negative.  Negative for chills, fatigue and fever.   HENT: Negative.  Negative for congestion and rhinorrhea.    Respiratory:  Negative for apnea, cough, chest tightness and shortness of breath.    Cardiovascular: Negative.  Negative for chest pain and leg swelling.   Gastrointestinal: Negative.  Negative for abdominal pain, diarrhea, nausea and vomiting.   Endocrine: Negative.    Genitourinary: Negative.    Musculoskeletal:  Positive for arthralgias, back pain and gait problem. Negative for myalgias, neck pain and neck stiffness.   Skin: Negative.    Allergic/Immunologic: Negative.    Neurological:  Positive for  weakness and numbness. Negative for tremors, seizures, syncope and speech difficulty.   Hematological: Negative.    Psychiatric/Behavioral: Negative.       Objective:     Vital Signs (Most Recent):  Temp: 98.6 °F (37 °C) (10/27/23 0900)  Pulse: (!) 56 (10/27/23 1245)  Resp: 20 (10/27/23 1309)  BP: (!) 168/63 (10/27/23 1245)  SpO2: 100 % (10/27/23 1100) Vital Signs (24h Range):  Temp:  [98.6 °F (37 °C)] 98.6 °F (37 °C)  Pulse:  [53-65] 56  Resp:  [8-20] 20  SpO2:  [99 %-100 %] 100 %  BP: (152-170)/(50-63) 168/63     Weight: 88.9 kg (196 lb)  Body mass index is 37.03 kg/m².     Physical Exam  Vitals reviewed.   Constitutional:       Appearance: Normal appearance. She is obese.   HENT:      Head: Normocephalic and atraumatic.      Nose: Nose normal. No congestion.      Mouth/Throat:      Pharynx: Oropharynx is clear.   Eyes:      Extraocular Movements: Extraocular movements intact.      Conjunctiva/sclera: Conjunctivae normal.      Pupils: Pupils are equal, round, and reactive to light.   Cardiovascular:      Rate and Rhythm: Normal rate and regular rhythm.      Pulses: Normal pulses.      Heart sounds: Normal heart sounds.   Pulmonary:      Effort: Pulmonary effort is normal. No respiratory distress.      Breath sounds: Normal breath sounds. No wheezing.   Abdominal:      General: Bowel sounds are normal. There is no distension.      Palpations: Abdomen is soft.      Tenderness: There is no abdominal tenderness.   Musculoskeletal:         General: No swelling or tenderness. Normal range of motion.   Lymphadenopathy:      Cervical: No cervical adenopathy.   Skin:     General: Skin is warm.   Neurological:      Mental Status: She is alert and oriented to person, place, and time.      Comments: UE 5/5 B/L  RLE MOTOR AND SENSORY INTACT, LLE sensory intact, can not assess motor fx and she is in pain when I try to do any movement such as raising her leg.    Psychiatric:         Mood and Affect: Mood normal.               CRANIAL NERVES     CN III, IV, VI   Pupils are equal, round, and reactive to light.       Significant Labs: All pertinent labs within the past 24 hours have been reviewed.    Significant Imaging: I have reviewed all pertinent imaging results/findings within the past 24 hours.

## 2023-10-28 VITALS
DIASTOLIC BLOOD PRESSURE: 51 MMHG | HEART RATE: 78 BPM | WEIGHT: 196 LBS | RESPIRATION RATE: 12 BRPM | SYSTOLIC BLOOD PRESSURE: 136 MMHG | BODY MASS INDEX: 37 KG/M2 | OXYGEN SATURATION: 97 % | TEMPERATURE: 98 F | HEIGHT: 61 IN

## 2023-10-28 LAB
ALBUMIN SERPL BCP-MCNC: 3 G/DL (ref 3.5–5)
ALBUMIN/GLOB SERPL: 0.9 {RATIO}
ALP SERPL-CCNC: 79 U/L (ref 50–130)
ALT SERPL W P-5'-P-CCNC: 34 U/L (ref 13–56)
ANION GAP SERPL CALCULATED.3IONS-SCNC: 12 MMOL/L (ref 7–16)
AST SERPL W P-5'-P-CCNC: 20 U/L (ref 15–37)
BASOPHILS # BLD AUTO: 0.03 K/UL (ref 0–0.2)
BASOPHILS NFR BLD AUTO: 0.4 % (ref 0–1)
BILIRUB SERPL-MCNC: 0.5 MG/DL (ref ?–1.2)
BUN SERPL-MCNC: 24 MG/DL (ref 7–18)
BUN/CREAT SERPL: 24 (ref 6–20)
CALCIUM SERPL-MCNC: 8.7 MG/DL (ref 8.5–10.1)
CHLORIDE SERPL-SCNC: 107 MMOL/L (ref 98–107)
CO2 SERPL-SCNC: 24 MMOL/L (ref 21–32)
CREAT SERPL-MCNC: 1.01 MG/DL (ref 0.55–1.02)
DIFFERENTIAL METHOD BLD: ABNORMAL
EGFR (NO RACE VARIABLE) (RUSH/TITUS): 63 ML/MIN/1.73M2
EOSINOPHIL # BLD AUTO: 0.03 K/UL (ref 0–0.5)
EOSINOPHIL NFR BLD AUTO: 0.4 % (ref 1–4)
ERYTHROCYTE [DISTWIDTH] IN BLOOD BY AUTOMATED COUNT: 13.7 % (ref 11.5–14.5)
GLOBULIN SER-MCNC: 3.4 G/DL (ref 2–4)
GLUCOSE SERPL-MCNC: 109 MG/DL (ref 70–105)
GLUCOSE SERPL-MCNC: 172 MG/DL (ref 70–105)
GLUCOSE SERPL-MCNC: 188 MG/DL (ref 70–105)
GLUCOSE SERPL-MCNC: 191 MG/DL (ref 70–105)
GLUCOSE SERPL-MCNC: 193 MG/DL (ref 74–106)
HCT VFR BLD AUTO: 34.6 % (ref 38–47)
HGB BLD-MCNC: 11.3 G/DL (ref 12–16)
IMM GRANULOCYTES # BLD AUTO: 0.03 K/UL (ref 0–0.04)
IMM GRANULOCYTES NFR BLD: 0.4 % (ref 0–0.4)
LYMPHOCYTES # BLD AUTO: 1.17 K/UL (ref 1–4.8)
LYMPHOCYTES NFR BLD AUTO: 13.7 % (ref 27–41)
MAGNESIUM SERPL-MCNC: 2.2 MG/DL (ref 1.7–2.3)
MCH RBC QN AUTO: 27.2 PG (ref 27–31)
MCHC RBC AUTO-ENTMCNC: 32.7 G/DL (ref 32–36)
MCV RBC AUTO: 83.2 FL (ref 80–96)
MONOCYTES # BLD AUTO: 0.39 K/UL (ref 0–0.8)
MONOCYTES NFR BLD AUTO: 4.6 % (ref 2–6)
MPC BLD CALC-MCNC: 11.3 FL (ref 9.4–12.4)
NEUTROPHILS # BLD AUTO: 6.88 K/UL (ref 1.8–7.7)
NEUTROPHILS NFR BLD AUTO: 80.5 % (ref 53–65)
NRBC # BLD AUTO: 0 X10E3/UL
NRBC, AUTO (.00): 0 %
PHOSPHATE SERPL-MCNC: 4.1 MG/DL (ref 2.5–4.5)
PLATELET # BLD AUTO: 310 K/UL (ref 150–400)
POTASSIUM SERPL-SCNC: 3.7 MMOL/L (ref 3.5–5.1)
PROT SERPL-MCNC: 6.4 G/DL (ref 6.4–8.2)
RBC # BLD AUTO: 4.16 M/UL (ref 4.2–5.4)
SODIUM SERPL-SCNC: 139 MMOL/L (ref 136–145)
TROPONIN I SERPL DL<=0.01 NG/ML-MCNC: 10.9 PG/ML
TROPONIN I SERPL DL<=0.01 NG/ML-MCNC: 59 PG/ML
WBC # BLD AUTO: 8.53 K/UL (ref 4.5–11)

## 2023-10-28 PROCEDURE — 85025 COMPLETE CBC W/AUTO DIFF WBC: CPT | Performed by: INTERNAL MEDICINE

## 2023-10-28 PROCEDURE — 96375 TX/PRO/DX INJ NEW DRUG ADDON: CPT

## 2023-10-28 PROCEDURE — 25000003 PHARM REV CODE 250: Performed by: INTERNAL MEDICINE

## 2023-10-28 PROCEDURE — 84484 ASSAY OF TROPONIN QUANT: CPT | Mod: 91 | Performed by: INTERNAL MEDICINE

## 2023-10-28 PROCEDURE — 25000003 PHARM REV CODE 250

## 2023-10-28 PROCEDURE — 80053 COMPREHEN METABOLIC PANEL: CPT | Performed by: INTERNAL MEDICINE

## 2023-10-28 PROCEDURE — 93010 EKG 12-LEAD: ICD-10-PCS | Mod: 76,,, | Performed by: INTERNAL MEDICINE

## 2023-10-28 PROCEDURE — 96376 TX/PRO/DX INJ SAME DRUG ADON: CPT

## 2023-10-28 PROCEDURE — G0378 HOSPITAL OBSERVATION PER HR: HCPCS

## 2023-10-28 PROCEDURE — 82962 GLUCOSE BLOOD TEST: CPT

## 2023-10-28 PROCEDURE — 84100 ASSAY OF PHOSPHORUS: CPT | Performed by: INTERNAL MEDICINE

## 2023-10-28 PROCEDURE — 25000242 PHARM REV CODE 250 ALT 637 W/ HCPCS: Performed by: INTERNAL MEDICINE

## 2023-10-28 PROCEDURE — 63600175 PHARM REV CODE 636 W HCPCS: Performed by: INTERNAL MEDICINE

## 2023-10-28 PROCEDURE — 93010 ELECTROCARDIOGRAM REPORT: CPT | Mod: 76,,, | Performed by: INTERNAL MEDICINE

## 2023-10-28 PROCEDURE — 99239 PR HOSPITAL DISCHARGE DAY,>30 MIN: ICD-10-PCS | Mod: ,,, | Performed by: INTERNAL MEDICINE

## 2023-10-28 PROCEDURE — 93005 ELECTROCARDIOGRAM TRACING: CPT

## 2023-10-28 PROCEDURE — 99239 HOSP IP/OBS DSCHRG MGMT >30: CPT | Mod: ,,, | Performed by: INTERNAL MEDICINE

## 2023-10-28 PROCEDURE — 96372 THER/PROPH/DIAG INJ SC/IM: CPT | Performed by: INTERNAL MEDICINE

## 2023-10-28 PROCEDURE — 83735 ASSAY OF MAGNESIUM: CPT | Performed by: INTERNAL MEDICINE

## 2023-10-28 RX ORDER — DIGOXIN 0.25 MG/ML
125 INJECTION INTRAMUSCULAR; INTRAVENOUS ONCE
Status: DISCONTINUED | OUTPATIENT
Start: 2023-10-28 | End: 2023-10-28 | Stop reason: HOSPADM

## 2023-10-28 RX ORDER — NITROGLYCERIN 0.4 MG/1
0.4 TABLET SUBLINGUAL EVERY 5 MIN PRN
Status: DISCONTINUED | OUTPATIENT
Start: 2023-10-28 | End: 2023-10-28 | Stop reason: HOSPADM

## 2023-10-28 RX ORDER — DILTIAZEM HYDROCHLORIDE 5 MG/ML
INJECTION INTRAVENOUS
Status: COMPLETED
Start: 2023-10-28 | End: 2023-10-28

## 2023-10-28 RX ORDER — DILTIAZEM HYDROCHLORIDE 5 MG/ML
20 INJECTION INTRAVENOUS
Status: COMPLETED | OUTPATIENT
Start: 2023-10-28 | End: 2023-10-28

## 2023-10-28 RX ORDER — DILTIAZEM HYDROCHLORIDE 30 MG/1
30 TABLET, FILM COATED ORAL EVERY 6 HOURS
Status: DISCONTINUED | OUTPATIENT
Start: 2023-10-28 | End: 2023-10-28

## 2023-10-28 RX ORDER — MORPHINE SULFATE 2 MG/ML
2 INJECTION, SOLUTION INTRAMUSCULAR; INTRAVENOUS EVERY 4 HOURS PRN
Status: DISCONTINUED | OUTPATIENT
Start: 2023-10-28 | End: 2023-10-28 | Stop reason: HOSPADM

## 2023-10-28 RX ORDER — SOTALOL HYDROCHLORIDE 80 MG/1
120 TABLET ORAL 2 TIMES DAILY
Status: DISCONTINUED | OUTPATIENT
Start: 2023-10-28 | End: 2023-10-28 | Stop reason: HOSPADM

## 2023-10-28 RX ORDER — ASPIRIN 325 MG
325 TABLET ORAL ONCE
Status: COMPLETED | OUTPATIENT
Start: 2023-10-28 | End: 2023-10-28

## 2023-10-28 RX ADMIN — MORPHINE SULFATE 2 MG: 2 INJECTION, SOLUTION INTRAMUSCULAR; INTRAVENOUS at 01:10

## 2023-10-28 RX ADMIN — DILTIAZEM HYDROCHLORIDE 30 MG: 30 TABLET, FILM COATED ORAL at 04:10

## 2023-10-28 RX ADMIN — SOTALOL HYDROCHLORIDE 120 MG: 80 TABLET ORAL at 07:10

## 2023-10-28 RX ADMIN — ONDANSETRON 4 MG: 2 INJECTION INTRAMUSCULAR; INTRAVENOUS at 12:10

## 2023-10-28 RX ADMIN — ASPIRIN 325 MG ORAL TABLET 325 MG: 325 PILL ORAL at 01:10

## 2023-10-28 RX ADMIN — DILTIAZEM HYDROCHLORIDE 20 MG: 5 INJECTION INTRAVENOUS at 12:10

## 2023-10-28 RX ADMIN — NITROGLYCERIN 0.4 MG: 0.4 TABLET SUBLINGUAL at 01:10

## 2023-10-28 RX ADMIN — INSULIN ASPART 2 UNITS: 100 INJECTION, SOLUTION INTRAVENOUS; SUBCUTANEOUS at 07:10

## 2023-10-28 NOTE — PT/OT/SLP PROGRESS
Physical Therapy Screen      PT screen completed with HELDER Orourke. Per nursing, pt was ambulatory in room and gama to toilet, unassisted with no AD. Pt did not demo any unsteadiness or LOB. No PT warranted at this time.           Roula Trent, PT, DPT

## 2023-10-28 NOTE — CARE UPDATE
Patient developed atrial fibrillation with RVR with diffuse ST depressions.  Patient is complaining of heart palpitation and chest pain. Will provide patient with 20 mg of IV Cardizem followed by 40 mg p.o. q.6 h. along with aspirin, high-intensity statin, sublingual nitroglycerin, and morphine sulfate. Will proceed with echocardiogram and trend troponin levels

## 2023-10-28 NOTE — DISCHARGE SUMMARY
[facility] - Emergency Department  Hospital Medicine  Discharge Summary      Patient Name: Dilma Cr  MRN: [id number]  DEIRDRE: [id number]  Patient Class: OP- Observation  Admission Date: 10/27/2023  Hospital Length of Stay: 0 days  Discharge Date and Time:  10/28/2023 9:21 AM  Attending Physician: Nancy Caballero MD   Discharging Provider: Nancy Caballero MD  Primary Care Provider: Olga Potts MD    Primary Care Team: Networked reference to record PCT     HPI:   Patient is a 63-year-old female with past medical history of hypertension, hyperlipidemia, coronary artery disease, diabetes mellitus, AFib, status post Watchman procedure, osteoarthritis, degenerative lumbar spine, colon ca, obesity, who presents to the emergency room for left-sided paresthesias of her face.  Patient is accompanied by her , she tells me that she is been battling with chronic back pain for a while now and sees spine surgeon at [facility], however this morning her lower back pain worsened to the point that she could no longer ambulate and therefore presented to the emergency room.  She also noted that left side of her face was numb and she felt like she had tingling sensations there.  Overall she feels like her numbness is getting better however her joint pains are at the same level of pain, described as 9/10.  The emergency room physician performed tele neuro consult in the setting of new lives sided paresthesias, who recommended inpatient admission for stroke workup including CTA of head and neck and MRI brain along with echocardiogram.  Currently on examination patient does not seem to have significant motor deficits in the upper or lower extremity, both sides are almost similar, however it is hard to tell whether she has left-sided lower extremity weakness because she has a lot of pain when moving her hip joint.  When I tried to lift her left leg she was screaming in pain.  Otherwise bilateral upper  extremities have equal strength, she also has equal sensation in her bilateral lower extremities and there is no sensory loss there.  Otherwise she denies any chest pain shortness O of breath fever chills nausea vomiting or diarrhea.      * No surgery found *      Hospital Course:   For a more detailed hospital course see IP notes briefly, pt was a/w parasthesias c/f CVA, tele stroke was called who recommended IP admission for further w/u, pt underwent brain imaging (CTH,CTA,MRI Brain) which did not show any acute stroke, today patient states she is feeling better, likely her initial complain was TIA? She is already on dapt. Of note overnight she develped afib w/ rvr (she missed her sotalol all day yesterday) however today she got her medicine and converted back to sinus w/ resulting EKG wnl, she is asymptoamtic now, no CP or SOB or Palpitation, and wishes to go home w/ follow up with her spine surgeon on coming Tuesday.       Goals of Care Treatment Preferences:  Code Status: Full Code      Consults:   Consults (From admission, onward)        Status Ordering Provider     Inpatient consult to Orthopedic Surgery  Once        Provider:  Chris Tellez MD    Acknowledged BEVERLY, REHMAT U     Consult to Telemedicine - Acute Stroke  Once        Provider:  (Not yet assigned)    KIMBERLEE Sanches          No new Assessment & Plan notes have been filed under this hospital service since the last note was generated.  Service: Hospital Medicine    Final Active Diagnoses:    Diagnosis Date Noted POA    PRINCIPAL PROBLEM:  TIA (transient ischemic attack) [G45.9] 10/27/2023 Unknown    Paresthesias in left hand [R20.2] 10/27/2023 Unknown    Primary osteoarthritis of both knees [M17.0] 08/16/2023 Yes    Depression [F32.A] 07/26/2023 Yes    Lumbosacral radiculopathy [M54.17] 11/30/2021 Yes     Chronic    Type 2 diabetes mellitus with diabetic polyneuropathy, with long-term current use of insulin [E11.42, Z79.4]  08/30/2021 Not Applicable     Chronic    Atherosclerosis of native coronary artery of native heart without angina pectoris [I25.10] 06/30/2021 Yes     Chronic    Hyperlipidemia [E78.5] 06/16/2021 Yes     Chronic    Hypertension [I10] 06/16/2021 Yes     Chronic    A-fib [I48.91] 05/14/2021 Unknown      Problems Resolved During this Admission:       Discharged Condition: stable    Disposition: Home or Self Care    Follow Up:   Follow-up Information     Olga Potts MD Follow up in 1 week(s).    Specialty: Family Medicine  Contact information:  [address] [facility] [place] [address]  [phone]             Oliverio Bautista MD Follow up in 1 week(s).    Specialties: Interventional Cardiology, Cardiology  Contact information:  [address] [place] [address]  [phone]             Chandra Amato MD Follow up in 1 week(s).    Specialty: Neurology  Contact information:  [address] [place] [address]  [phone]                       Patient Instructions:      Ambulatory referral/consult to Neurology   Standing Status: Future   Referral Priority: Routine Referral Type: Consultation   Referral Reason: Specialty Services Required   Requested Specialty: Neurology   Number of Visits Requested: 1     Diet Cardiac     Diet diabetic     Notify your health care provider if you experience any of the following:  temperature >100.4     Notify your health care provider if you experience any of the following:  persistent nausea and vomiting or diarrhea     Notify your health care provider if you experience any of the following:  severe uncontrolled pain     Notify your health care provider if you experience any of the following:  difficulty breathing or increased cough     Notify your health care provider if you experience any of the following:  severe persistent headache     Notify your health care provider if you experience any of the following:  worsening rash      Notify your health care provider if you experience any of the following:  persistent dizziness, light-headedness, or visual disturbances     Notify your health care provider if you experience any of the following:  increased confusion or weakness     Activity as tolerated       Significant Diagnostic Studies: N/A    Pending Diagnostic Studies:     None         Medications:  Reconciled Home Medications:      Medication List      CONTINUE taking these medications    ascorbic acid (vitamin C) 250 mg Chew  Take 250 mg by mouth once daily.     aspirin 81 MG Chew  Take 81 mg by mouth once daily.     BINAXNOW COVID-19 AG SELF TEST Kit  Generic drug: COVID-19 antigen test  Use as Directed on the Package     clopidogreL 75 mg tablet  Commonly known as: PLAVIX  Take 1 tablet (75 mg total) by mouth once daily.     * cyanocobalamin 1000 MCG tablet  Commonly known as: VITAMIN B-12  Take 100 mcg by mouth once daily.     * cyanocobalamin 2000 MCG tablet  Take 1 tablet by mouth once daily.     cyclobenzaprine 5 MG tablet  Commonly known as: FLEXERIL  Take 5 mg by mouth 3 (three) times daily as needed.     ezetimibe 10 mg tablet  Commonly known as: ZETIA  Take 10 mg by mouth.     FREESTYLE BECCA 14 DAY SENSOR Kit  Generic drug: flash glucose sensor  USE TO CHECK GLUCOSE 4 TIMES DAILY     FREESTYLE BECCA 3 SENSOR Aleena  Generic drug: blood-glucose sensor  USE 1 PATCH TOPICALLY EVERY DAY FOR 2 WEEKS     furosemide 20 MG tablet  Commonly known as: LASIX  Take 1 tablet (20 mg total) by mouth daily as needed (fluid).     HYDROcodone-acetaminophen 7.5-325 mg per tablet  Commonly known as: NORCO  Take 1 tablet by mouth every 8 (eight) hours as needed for Pain.  Start taking on: October 30, 2023     iron-vitamin C 100-250 mg (ICAR-C) 100-250 mg Tab  Take 1 tablet by mouth 2 (two) times daily with meals.     JARDIANCE 25 mg tablet  Generic drug: empagliflozin  Take 1 tablet (25 mg total) by mouth once daily.     loratadine 10 mg  tablet  Commonly known as: CLARITIN  Take 1 tablet (10 mg total) by mouth once daily.     naloxone 4 mg/actuation Spry  Commonly known as: NARCAN  1 spray by Nasal route once as needed.     nitroGLYCERIN 0.4 MG SL tablet  Commonly known as: NITROSTAT  Place 1 tablet (0.4 mg total) under the tongue every 5 (five) minutes as needed for Chest pain.     NovoLOG FlexPen U-100 Insulin 100 unit/mL (3 mL) Inpn pen  Generic drug: insulin aspart U-100  Inject 20 Units into the skin 2 (two) times a day.     olmesartan-hydrochlorothiazide 40-25 mg per tablet  Commonly known as: BENICAR HCT  Take 1 tablet by mouth once daily.     OZEMPIC 1 mg/dose (4 mg/3 mL)  Generic drug: semaglutide     pantoprazole 40 MG tablet  Commonly known as: PROTONIX  Take 1 tablet (40 mg total) by mouth once daily.     polyethylene glycol 17 gram Pwpk  Commonly known as: MIRALAX  Take 17 g by mouth daily as needed (constipation).     pramipexole 0.125 MG tablet  Commonly known as: MIRAPEX  Take 1 tablet (0.125 mg total) by mouth every evening.     prochlorperazine 10 MG tablet  Commonly known as: COMPAZINE  Take 1 tablet by mouth daily as needed.     rosuvastatin 40 MG Tab  Commonly known as: CRESTOR  Take 1 tablet by mouth once daily     sertraline 25 MG tablet  Commonly known as: ZOLOFT  Take 1 tablet by mouth once daily.     sotaloL 120 MG Tab  Commonly known as: BETAPACE  Take 120 mg by mouth every 12 (twelve) hours.     traMADoL 50 mg tablet  Commonly known as: ULTRAM  Take 1 tablet (50 mg total) by mouth every 12 (twelve) hours as needed for Pain.     TRESIBA FLEXTOUCH U-100 100 unit/mL (3 mL) insulin pen  Generic drug: insulin degludec  Inject 24 Units into the skin once daily.         * This list has 2 medication(s) that are the same as other medications prescribed for you. Read the directions carefully, and ask your doctor or other care provider to review them with you.            STOP taking these medications    EScitalopram oxalate 10 MG  tablet  Commonly known as: LEXAPRO     metoprolol succinate 25 MG 24 hr tablet  Commonly known as: TOPROL-XL            Indwelling Lines/Drains at time of discharge:   Lines/Drains/Airways     None                 Time spent on the discharge of patient: >30 minutes         Rehmat PO Caballero MD  Department of Hospital Medicine  [facility] - Emergency Department

## 2023-10-28 NOTE — ED NOTES
Patient provided with breakfast tray- Patient resting comfortably and denies any needs at this time.

## 2023-10-28 NOTE — HOSPITAL COURSE
For a more detailed hospital course see IP notes briefly, pt was a/w parasthesias c/f CVA, tele stroke was called who recommended IP admission for further w/u, pt underwent brain imaging (CTH,CTA,MRI Brain) which did not show any acute stroke, today patient states she is feeling better, likely her initial complain was TIA? She is already on dapt. Of note overnight she develped afib w/ rvr (she missed her sotalol all day yesterday) however today she got her medicine and converted back to sinus w/ resulting EKG wnl, she is asymptoamtic now, no CP or SOB or Palpitation, and wishes to go home w/ follow up with her spine surgeon on coming Tuesday.

## 2023-10-28 NOTE — ED NOTES
Patient contacted  at this time to pick her up from ER since being discharged.  Patient's  on the way.

## 2023-10-30 ENCOUNTER — OFFICE VISIT (OUTPATIENT)
Dept: FAMILY MEDICINE | Facility: CLINIC | Age: 63
End: 2023-10-30
Payer: COMMERCIAL

## 2023-10-30 VITALS
DIASTOLIC BLOOD PRESSURE: 70 MMHG | TEMPERATURE: 98 F | WEIGHT: 201 LBS | HEIGHT: 61 IN | SYSTOLIC BLOOD PRESSURE: 159 MMHG | HEART RATE: 53 BPM | RESPIRATION RATE: 18 BRPM | OXYGEN SATURATION: 100 % | BODY MASS INDEX: 37.95 KG/M2

## 2023-10-30 DIAGNOSIS — G45.9 TIA (TRANSIENT ISCHEMIC ATTACK): ICD-10-CM

## 2023-10-30 DIAGNOSIS — I48.91 ATRIAL FIBRILLATION WITH RVR: Primary | ICD-10-CM

## 2023-10-30 PROCEDURE — 3051F HG A1C>EQUAL 7.0%<8.0%: CPT | Mod: ,,, | Performed by: FAMILY MEDICINE

## 2023-10-30 PROCEDURE — 1160F PR REVIEW ALL MEDS BY PRESCRIBER/CLIN PHARMACIST DOCUMENTED: ICD-10-PCS | Mod: ,,, | Performed by: FAMILY MEDICINE

## 2023-10-30 PROCEDURE — 3008F PR BODY MASS INDEX (BMI) DOCUMENTED: ICD-10-PCS | Mod: ,,, | Performed by: FAMILY MEDICINE

## 2023-10-30 PROCEDURE — 3078F DIAST BP <80 MM HG: CPT | Mod: ,,, | Performed by: FAMILY MEDICINE

## 2023-10-30 PROCEDURE — 3077F SYST BP >= 140 MM HG: CPT | Mod: ,,, | Performed by: FAMILY MEDICINE

## 2023-10-30 PROCEDURE — 3008F BODY MASS INDEX DOCD: CPT | Mod: ,,, | Performed by: FAMILY MEDICINE

## 2023-10-30 PROCEDURE — 1159F PR MEDICATION LIST DOCUMENTED IN MEDICAL RECORD: ICD-10-PCS | Mod: ,,, | Performed by: FAMILY MEDICINE

## 2023-10-30 PROCEDURE — 3062F POS MACROALBUMINURIA REV: CPT | Mod: ,,, | Performed by: FAMILY MEDICINE

## 2023-10-30 PROCEDURE — 3077F PR MOST RECENT SYSTOLIC BLOOD PRESSURE >= 140 MM HG: ICD-10-PCS | Mod: ,,, | Performed by: FAMILY MEDICINE

## 2023-10-30 PROCEDURE — 99214 OFFICE O/P EST MOD 30 MIN: CPT | Mod: ,,, | Performed by: FAMILY MEDICINE

## 2023-10-30 PROCEDURE — 3062F PR POS MACROALBUMINURIA RESULT DOCUMENTED/REVIEW: ICD-10-PCS | Mod: ,,, | Performed by: FAMILY MEDICINE

## 2023-10-30 PROCEDURE — 1160F RVW MEDS BY RX/DR IN RCRD: CPT | Mod: ,,, | Performed by: FAMILY MEDICINE

## 2023-10-30 PROCEDURE — 3066F NEPHROPATHY DOC TX: CPT | Mod: ,,, | Performed by: FAMILY MEDICINE

## 2023-10-30 PROCEDURE — 1159F MED LIST DOCD IN RCRD: CPT | Mod: ,,, | Performed by: FAMILY MEDICINE

## 2023-10-30 PROCEDURE — 99214 PR OFFICE/OUTPT VISIT, EST, LEVL IV, 30-39 MIN: ICD-10-PCS | Mod: ,,, | Performed by: FAMILY MEDICINE

## 2023-10-30 PROCEDURE — 3051F PR MOST RECENT HEMOGLOBIN A1C LEVEL 7.0 - < 8.0%: ICD-10-PCS | Mod: ,,, | Performed by: FAMILY MEDICINE

## 2023-10-30 PROCEDURE — 3066F PR DOCUMENTATION OF TREATMENT FOR NEPHROPATHY: ICD-10-PCS | Mod: ,,, | Performed by: FAMILY MEDICINE

## 2023-10-30 PROCEDURE — 3078F PR MOST RECENT DIASTOLIC BLOOD PRESSURE < 80 MM HG: ICD-10-PCS | Mod: ,,, | Performed by: FAMILY MEDICINE

## 2023-10-30 NOTE — PROGRESS NOTES
"New Clinic Note    Dilma Cr is a 63 y.o. female     CC:   Chief Complaint   Patient presents with    Hospital Follow Up     Patient stated she was seen in ER at [facility] on Friday 10/27/2023 with CP and diagnosed with Atrial Fibrillation and stated she stayed in the ER for 24 hours. Stated she was checked for possible CVA and heart attack. Stated her face is still numb on left and weakness of left arm. Stated she was given pain medication for her heart pain and it "knocked her out". Stated she is here today because "something is not right." Stated she still does not feel right and wants to discuss this with her PCP.     Hypertension     Blood pressure is elevated this am.         Subjective    History of Present Illness HPI   Patient was admitted into [facility] on 10/27/2023 for chest pain. She was diagnosed with Atrial Fibrillation with RVR and a TIA. Here rate was controlled and she was discharged on 10/28/23. She complains of still having numbness to the left side of her face. She denies chest pain or palpitations. The ER recommended that she follow up with Dr. Bautista. She is scheduled to see him in January. She is scheduled to see Chandra Merchant NP with neurology on 12/22/2023.    Current Outpatient Medications:     ascorbic acid, vitamin C, 250 mg Chew, Take 250 mg by mouth once daily., Disp: , Rfl:     aspirin 81 MG Chew, Take 81 mg by mouth once daily., Disp: , Rfl:     BINAXNOW COVID-19 AG SELF TEST Kit, Use as Directed on the Package, Disp: , Rfl:     clopidogreL (PLAVIX) 75 mg tablet, Take 1 tablet (75 mg total) by mouth once daily., Disp: 90 tablet, Rfl: 1    cyanocobalamin (VITAMIN B-12) 1000 MCG tablet, Take 100 mcg by mouth once daily., Disp: , Rfl:     cyanocobalamin 2000 MCG tablet, Take 1 tablet by mouth once daily., Disp: , Rfl:     cyclobenzaprine (FLEXERIL) 5 MG tablet, Take 5 mg by mouth 3 (three) times daily as needed., Disp: , Rfl:     ezetimibe (ZETIA) 10 mg tablet, Take 10 mg by " mouth., Disp: , Rfl:     FREESTYLE BECCA 14 DAY SENSOR Kit, USE TO CHECK GLUCOSE 4 TIMES DAILY, Disp: , Rfl:     FREESTYLE BECCA 3 SENSOR Aleena, USE 1 PATCH TOPICALLY EVERY DAY FOR 2 WEEKS, Disp: , Rfl:     furosemide (LASIX) 20 MG tablet, Take 1 tablet (20 mg total) by mouth daily as needed (fluid)., Disp: 90 tablet, Rfl: 1    HYDROcodone-acetaminophen (NORCO) 7.5-325 mg per tablet, Take 1 tablet by mouth every 8 (eight) hours as needed for Pain., Disp: 90 tablet, Rfl: 0    iron-vitamin C 100-250 mg, ICAR-C, 100-250 mg Tab, Take 1 tablet by mouth 2 (two) times daily with meals., Disp: , Rfl:     JARDIANCE 25 mg tablet, Take 1 tablet (25 mg total) by mouth once daily., Disp: 30 tablet, Rfl: 5    loratadine (CLARITIN) 10 mg tablet, Take 1 tablet (10 mg total) by mouth once daily., Disp: 30 tablet, Rfl: 0    naloxone (NARCAN) 4 mg/actuation Spry, 1 spray by Nasal route once as needed., Disp: , Rfl:     NOVOLOG FLEXPEN U-100 INSULIN 100 unit/mL (3 mL) InPn pen, Inject 20 Units into the skin 2 (two) times a day., Disp: 12 each, Rfl: 1    olmesartan-hydrochlorothiazide (BENICAR HCT) 40-25 mg per tablet, Take 1 tablet by mouth once daily., Disp: , Rfl:     OZEMPIC 1 mg/dose (4 mg/3 mL), , Disp: , Rfl:     pantoprazole (PROTONIX) 40 MG tablet, Take 1 tablet (40 mg total) by mouth once daily., Disp: 90 tablet, Rfl: 1    polyethylene glycol (MIRALAX) 17 gram PwPk, Take 17 g by mouth daily as needed (constipation)., Disp: 30 each, Rfl: 2    prochlorperazine (COMPAZINE) 10 MG tablet, Take 1 tablet by mouth daily as needed., Disp: , Rfl:     rosuvastatin (CRESTOR) 40 MG Tab, Take 1 tablet by mouth once daily, Disp: 90 tablet, Rfl: 0    sertraline (ZOLOFT) 25 MG tablet, Take 1 tablet by mouth once daily., Disp: , Rfl:     sotaloL (BETAPACE) 120 MG Tab, Take 120 mg by mouth every 12 (twelve) hours., Disp: , Rfl:     traMADoL (ULTRAM) 50 mg tablet, Take 1 tablet (50 mg total) by mouth every 12 (twelve) hours as needed for Pain.,  Disp: 60 tablet, Rfl: 2    TRESIBA FLEXTOUCH U-100 100 unit/mL (3 mL) insulin pen, Inject 24 Units into the skin once daily., Disp: 3 pen, Rfl: 1    nitroGLYCERIN (NITROSTAT) 0.4 MG SL tablet, Place 1 tablet (0.4 mg total) under the tongue every 5 (five) minutes as needed for Chest pain., Disp: 30 tablet, Rfl: 0    pramipexole (MIRAPEX) 0.125 MG tablet, Take 1 tablet (0.125 mg total) by mouth every evening., Disp: 30 tablet, Rfl: 5     Past Medical History:   Diagnosis Date    Acute superficial gastritis without hemorrhage 06/21/2022    Arthritis     Cancer     colon cancer    Chronic kidney disease, stage 3b     Colon cancer     Coronary artery disease     Depression     Diabetes mellitus, type 2     Diverticula, colon 06/22/2022    GERD (gastroesophageal reflux disease)     H/O right hemicolectomy 06/22/2022    HH (hiatus hernia) 06/21/2022    Hyperlipidemia     Myocardial infarction     2 stents in 05/14/2021 Dr Porter    Renal disorder         Family History   Problem Relation Age of Onset    Hypertension Mother     Diabetes Mother     Hypertension Father     Diabetes Father     Hypertension Sister     Breast cancer Maternal Grandmother     Hypertension Brother         Past Surgical History:   Procedure Laterality Date    BREAST SURGERY      C5-C6 RADHA  8-, 7-, 6-1-2016    Dr Fowler    CARDIAC SURGERY  02/07/2023    CERVICAL SPINE SURGERY      COLON SURGERY      EPIDURAL STEROID INJECTION INTO LUMBAR SPINE N/A 03/29/2022    Procedure: L4-5 RADHA;  Surgeon: Gela Fowler MD;  Location: [facility];  Service: Pain Management;  Laterality: N/A;  PT AWARE ON OV TO BE TESTED\  PLAVIX TO BE HELD FOR 7 DAYS PRIOR TO PROCEDURE PER DR FOWLER AND DR PORTER  3-28  message left on vm re: covid    HYSTERECTOMY      LEFT HEART CATHETERIZATION Left 05/14/2021    Procedure: Left heart cath;  Surgeon: Mateus Guan MD;  Location: [facility] CATH LAB;  Service: Cardiology;  Laterality: Left;    LEFT HEART  "CATHETERIZATION Left 06/20/2022    Procedure: Left heart cath;  Surgeon: Oliverio Bautista MD;  Location: [facility] CATH LAB;  Service: Cardiology;  Laterality: Left;    MEDIPORT REMOVAL  10/14/2021    Dr Kraft    right rotator cuff repair      in [place]    TOTAL REDUCTION MAMMOPLASTY          Social History     Socioeconomic History    Marital status:      Spouse name: Jorge Alberto    Number of children: 2   Occupational History    Occupation:  at [employer] for past 27 years   Tobacco Use    Smoking status: Never     Passive exposure: Never    Smokeless tobacco: Never   Substance and Sexual Activity    Alcohol use: Never    Drug use: Never    Sexual activity: Yes   Social History Narrative    Worked at [employer] as  for past 29 years. H/O colon cancer. Watchman procedure back in Feb (heart).  Sees Dr Stover Oncologist. Cardiologist Dr Alina Garcia ([place]). Sees Endocrinologist Koko Rivera with [facility].         Review of Systems   Constitutional:  Negative for fatigue and fever.   HENT:  Negative for ear pain, postnasal drip, rhinorrhea and sinus pressure/congestion.    Respiratory:  Negative for cough and shortness of breath.    Cardiovascular:  Negative for chest pain.   Gastrointestinal:  Negative for abdominal pain, diarrhea, nausea and vomiting.   Genitourinary:  Negative for dysuria.   Neurological:  Negative for headaches.        BP (!) 159/70 (BP Location: Right arm, Patient Position: Sitting, BP Method: Large (Automatic))   Pulse (!) 53   Temp 98.3 °F (36.8 °C) (Oral)   Resp 18   Ht 5' 1" (1.549 m)   Wt 91.2 kg (201 lb)   LMP  (LMP Unknown)   SpO2 100%   BMI 37.98 kg/m²      Physical Exam  HENT:      Head: Normocephalic and atraumatic.   Cardiovascular:      Rate and Rhythm: Normal rate and regular rhythm.   Pulmonary:      Effort: Pulmonary effort is normal.      Breath sounds: Normal breath sounds.   Neurological:      Mental Status: She is alert and " oriented to person, place, and time.      Cranial Nerves: No cranial nerve deficit.      Sensory: Sensory deficit present.      Motor: No weakness.      Coordination: Coordination normal.      Gait: Gait normal.      Comments: Mild numbness on left side of face.   Strength is 5/5 in upper and lower extremities.    Psychiatric:         Mood and Affect: Mood normal.         Behavior: Behavior normal.          Assessment and Plan      ICD-10-CM ICD-9-CM   1. Atrial fibrillation with RVR  I48.91 427.31   2. TIA (transient ischemic attack)  G45.9 435.9        1. Atrial fibrillation with RVR  Controlled at this time. Called Dr. Bautista's office. He will see her on November 2 at [facility]. Continue current medications for now.     2. TIA (transient ischemic attack)  Follow up with Neurology.           Follow up if symptoms worsen or fail to improve.       This information was created by a scribe under my supervision and in my presence. I have reviewed and agree with the scribe's documentation.

## 2023-10-30 NOTE — LETTER
October 30, 2023    Dilma Cr  [address] [place] [address] [place] [address]         [facility] [address] [place] [address]  Phone: [phone]  Fax: [phone] October 30, 2023     Patient: Dilma Cr   YOB: 1960   Date of Visit: 10/30/2023       To Whom It May Concern:    It is my medical opinion that Dilma Cr may return to work on 11/03/2023 .    If you have any questions or concerns, please don't hesitate to call.    Sincerely,    Olga Potts MD

## 2023-11-02 ENCOUNTER — OFFICE VISIT (OUTPATIENT)
Dept: CARDIOLOGY | Facility: CLINIC | Age: 63
End: 2023-11-02
Payer: COMMERCIAL

## 2023-11-02 VITALS
HEIGHT: 61 IN | DIASTOLIC BLOOD PRESSURE: 60 MMHG | SYSTOLIC BLOOD PRESSURE: 120 MMHG | BODY MASS INDEX: 38.14 KG/M2 | WEIGHT: 202 LBS | RESPIRATION RATE: 18 BRPM | HEART RATE: 72 BPM

## 2023-11-02 DIAGNOSIS — I48.0 PAROXYSMAL ATRIAL FIBRILLATION: ICD-10-CM

## 2023-11-02 DIAGNOSIS — I25.10 ATHEROSCLEROSIS OF NATIVE CORONARY ARTERY OF NATIVE HEART WITHOUT ANGINA PECTORIS: Chronic | ICD-10-CM

## 2023-11-02 PROCEDURE — 3062F PR POS MACROALBUMINURIA RESULT DOCUMENTED/REVIEW: ICD-10-PCS | Mod: ,,, | Performed by: STUDENT IN AN ORGANIZED HEALTH CARE EDUCATION/TRAINING PROGRAM

## 2023-11-02 PROCEDURE — 1159F MED LIST DOCD IN RCRD: CPT | Mod: ,,, | Performed by: STUDENT IN AN ORGANIZED HEALTH CARE EDUCATION/TRAINING PROGRAM

## 2023-11-02 PROCEDURE — 3074F PR MOST RECENT SYSTOLIC BLOOD PRESSURE < 130 MM HG: ICD-10-PCS | Mod: ,,, | Performed by: STUDENT IN AN ORGANIZED HEALTH CARE EDUCATION/TRAINING PROGRAM

## 2023-11-02 PROCEDURE — 1159F PR MEDICATION LIST DOCUMENTED IN MEDICAL RECORD: ICD-10-PCS | Mod: ,,, | Performed by: STUDENT IN AN ORGANIZED HEALTH CARE EDUCATION/TRAINING PROGRAM

## 2023-11-02 PROCEDURE — 3078F DIAST BP <80 MM HG: CPT | Mod: ,,, | Performed by: STUDENT IN AN ORGANIZED HEALTH CARE EDUCATION/TRAINING PROGRAM

## 2023-11-02 PROCEDURE — 3062F POS MACROALBUMINURIA REV: CPT | Mod: ,,, | Performed by: STUDENT IN AN ORGANIZED HEALTH CARE EDUCATION/TRAINING PROGRAM

## 2023-11-02 PROCEDURE — 3008F PR BODY MASS INDEX (BMI) DOCUMENTED: ICD-10-PCS | Mod: ,,, | Performed by: STUDENT IN AN ORGANIZED HEALTH CARE EDUCATION/TRAINING PROGRAM

## 2023-11-02 PROCEDURE — 3008F BODY MASS INDEX DOCD: CPT | Mod: ,,, | Performed by: STUDENT IN AN ORGANIZED HEALTH CARE EDUCATION/TRAINING PROGRAM

## 2023-11-02 PROCEDURE — 3051F HG A1C>EQUAL 7.0%<8.0%: CPT | Mod: ,,, | Performed by: STUDENT IN AN ORGANIZED HEALTH CARE EDUCATION/TRAINING PROGRAM

## 2023-11-02 PROCEDURE — 3066F PR DOCUMENTATION OF TREATMENT FOR NEPHROPATHY: ICD-10-PCS | Mod: ,,, | Performed by: STUDENT IN AN ORGANIZED HEALTH CARE EDUCATION/TRAINING PROGRAM

## 2023-11-02 PROCEDURE — 3051F PR MOST RECENT HEMOGLOBIN A1C LEVEL 7.0 - < 8.0%: ICD-10-PCS | Mod: ,,, | Performed by: STUDENT IN AN ORGANIZED HEALTH CARE EDUCATION/TRAINING PROGRAM

## 2023-11-02 PROCEDURE — 99214 OFFICE O/P EST MOD 30 MIN: CPT | Mod: ,,, | Performed by: STUDENT IN AN ORGANIZED HEALTH CARE EDUCATION/TRAINING PROGRAM

## 2023-11-02 PROCEDURE — 3078F PR MOST RECENT DIASTOLIC BLOOD PRESSURE < 80 MM HG: ICD-10-PCS | Mod: ,,, | Performed by: STUDENT IN AN ORGANIZED HEALTH CARE EDUCATION/TRAINING PROGRAM

## 2023-11-02 PROCEDURE — 3074F SYST BP LT 130 MM HG: CPT | Mod: ,,, | Performed by: STUDENT IN AN ORGANIZED HEALTH CARE EDUCATION/TRAINING PROGRAM

## 2023-11-02 PROCEDURE — 3066F NEPHROPATHY DOC TX: CPT | Mod: ,,, | Performed by: STUDENT IN AN ORGANIZED HEALTH CARE EDUCATION/TRAINING PROGRAM

## 2023-11-02 PROCEDURE — 99214 PR OFFICE/OUTPT VISIT, EST, LEVL IV, 30-39 MIN: ICD-10-PCS | Mod: ,,, | Performed by: STUDENT IN AN ORGANIZED HEALTH CARE EDUCATION/TRAINING PROGRAM

## 2023-11-02 RX ORDER — SERTRALINE HYDROCHLORIDE 25 MG/1
25 TABLET, FILM COATED ORAL DAILY
Qty: 90 TABLET | Refills: 3 | Status: SHIPPED | OUTPATIENT
Start: 2023-11-02

## 2023-11-02 NOTE — PROGRESS NOTES
PCP: Olga Potts MD    Referring Provider:     Subjective:   Dilma Cr is a 63 y.o. female with hx of afib s/p watchman, Chornic back pain, NSTEMI, diabetes, hypertensio, h/p colon ca s/p hemicolectomy who presents for follow up.     11/2/23 - Patient was recently in the ER with CP, afib with RVR and left face numbness. She was underwent MRI brain that was negative for acute stroke. She converted back to sinus after her sotaolol was restarted. Of note, patient lexapro was stopped by EP for Qtc prolongation and started on zoloft; however it appears the patient has not started her zoloft    7/26/23 - Patient is s/p Watchman on 2/2023. Reports having some night time palpitations that she describes as skipped beats. She reports low energy and low mood.     8/30/22 -  Patient states she has not felt well since hospitalization.  She reports stabbing chest pain lasting 1-2 seconds, occurs twice weekly.  She states she has some difficulty breathing at night.  She denies snoring.    She states is having aching pain  to posterior legs, from calves to thighs, with difficulty walking for the past 3 weeks, worse in the morning.         She also has a history of CAF and was on eliquis but was taken off recently due anemia of chronic blood loss.            Fhx:  Shx:      EKG 8/31/22 - Normal sinus rhythm. EKG          6/18/22 -  Atrial fibrillation  with rapid ventricular response   ECHO 6/19/22-  LVEF 60-65%.                              Normal left ventricular diastolic function.  Normal right ventricular size with normal right ventricular systolic function.  [facility] 6/20/22 -    Single vessel CAD (90% mid-distal LAD in-stent restenosis)  2.  Normal left sided filling pressure (LVEDP - 10mmHg)  3.  S/p successful angioplasty with scoring balloon (angiosculpt) of mid-distal LAD in-stent restenosis  4.  Deferred stent placement in the setting of GI bleed s/p blood transfusion.   [facility] 5/14/21 - There was two vessel coronary  "artery disease.                           The Mid LAD-2 lesion was 90% stenosed.  The Dist RCA-2 lesion was 50% stenosed.  The Dist RCA-1 lesion was 90% stenosed                          PCI to LAD and RCA     Lab Results   Component Value Date     10/28/2023    K 3.7 10/28/2023     10/28/2023    CO2 24 10/28/2023    BUN 24 (H) 10/28/2023    CREATININE 1.01 10/28/2023    CALCIUM 8.7 10/28/2023    ANIONGAP 12 10/28/2023    ESTGFRAFRICA 59 (L) 12/20/2021    EGFRNONAA 58 (L) 06/30/2022       Lab Results   Component Value Date    CHOL 153 10/27/2023    CHOL 163 09/07/2022    CHOL 122 06/18/2022     Lab Results   Component Value Date    HDL 78 (H) 10/27/2023    HDL 66 (H) 09/07/2022    HDL 52 06/18/2022     Lab Results   Component Value Date    LDLCALC 64 10/27/2023    LDLCALC 75 09/07/2022    LDLCALC 41 06/18/2022     Lab Results   Component Value Date    TRIG 55 10/27/2023    TRIG 111 09/07/2022    TRIG 143 06/18/2022     Lab Results   Component Value Date    CHOLHDL 2.0 10/27/2023    CHOLHDL 2.5 09/07/2022    CHOLHDL 2.3 06/18/2022       Lab Results   Component Value Date    WBC 8.53 10/28/2023    HGB 11.3 (L) 10/28/2023    HCT 34.6 (L) 10/28/2023    MCV 83.2 10/28/2023     10/28/2023           Review of Systems   Constitutional:  Positive for malaise/fatigue.   Respiratory:  Negative for cough and shortness of breath.    Cardiovascular:  Positive for palpitations. Negative for chest pain, orthopnea, claudication, leg swelling and PND.   Psychiatric/Behavioral:  Positive for depression.          Objective:   /60   Pulse 72   Resp 18   Ht 5' 1" (1.549 m)   Wt 91.6 kg (202 lb)   LMP  (LMP Unknown)   BMI 38.17 kg/m²     Physical Exam  Constitutional:       General: She is not in acute distress.  Eyes:      Extraocular Movements: Extraocular movements intact.   Cardiovascular:      Rate and Rhythm: Normal rate and regular rhythm.      Pulses: Normal pulses.      Heart sounds: Normal heart " sounds. No murmur heard.  Pulmonary:      Effort: Pulmonary effort is normal.      Breath sounds: Normal breath sounds.   Abdominal:      Palpations: Abdomen is soft.   Musculoskeletal:         General: No swelling.      Cervical back: Neck supple.   Skin:     Findings: No rash.   Neurological:      Mental Status: She is alert and oriented to person, place, and time.           Assessment:     1. Atherosclerosis of native coronary artery of native heart without angina pectoris        2. Paroxysmal atrial fibrillation                Plan:   Atherosclerosis of native coronary artery of native heart without angina pectoris  NSTEMI, status post PCI of LAD and RCA 5/14/21 followed by ISR with POBA in 5/2022  PCI to distal RCA, however stent at had diffuse disease with small PDA which is diffusely disease.  - has atypical CP   - continue metop xl 25mg qd  - Continue plavix    A-fib  Hx of paroxysmal afib - currently in sinus on sotalol  Hx of GIB (hx of colon Ca s/p hemicolectomy) on AC  Off AC  S/p watchman in 2/2023  Stop aspirin; continue Plavix 75mg qd  Stop lexapro (For QTC prolongation) and start zoloft 25mg qd

## 2023-11-02 NOTE — ASSESSMENT & PLAN NOTE
Hx of paroxysmal afib - currently in sinus on sotalol  Hx of GIB (hx of colon Ca s/p hemicolectomy) on AC  Off AC  S/p watchman in 2/2023  Stop aspirin; continue Plavix 75mg qd  Stop lexapro (For QTC prolongation) and start zoloft 25mg qd

## 2023-11-06 ENCOUNTER — PATIENT OUTREACH (OUTPATIENT)
Dept: ADMINISTRATIVE | Facility: HOSPITAL | Age: 63
End: 2023-11-06

## 2023-11-06 NOTE — PROGRESS NOTES
Population Health Review...  Working on [facility] Provider Activity Report  Patient needs hy appt scheduled for asap  Sent to PES to schedule appt via secure chat

## 2023-11-15 ENCOUNTER — OFFICE VISIT (OUTPATIENT)
Dept: FAMILY MEDICINE | Facility: CLINIC | Age: 63
End: 2023-11-15
Payer: COMMERCIAL

## 2023-11-15 VITALS
SYSTOLIC BLOOD PRESSURE: 135 MMHG | HEART RATE: 50 BPM | OXYGEN SATURATION: 100 % | WEIGHT: 202 LBS | BODY MASS INDEX: 38.14 KG/M2 | TEMPERATURE: 98 F | DIASTOLIC BLOOD PRESSURE: 81 MMHG | HEIGHT: 61 IN | RESPIRATION RATE: 18 BRPM

## 2023-11-15 DIAGNOSIS — Z13.1 SCREENING FOR DIABETES MELLITUS: ICD-10-CM

## 2023-11-15 DIAGNOSIS — Z13.220 SCREENING FOR LIPOID DISORDERS: ICD-10-CM

## 2023-11-15 DIAGNOSIS — Z00.01 ANNUAL VISIT FOR GENERAL ADULT MEDICAL EXAMINATION WITH ABNORMAL FINDINGS: Primary | ICD-10-CM

## 2023-11-15 DIAGNOSIS — I10 PRIMARY HYPERTENSION: Chronic | ICD-10-CM

## 2023-11-15 LAB
CHOLEST SERPL-MCNC: 150 MG/DL (ref 0–200)
CHOLEST/HDLC SERPL: 2.1 {RATIO}
GLUCOSE SERPL-MCNC: 105 MG/DL (ref 74–106)
HDLC SERPL-MCNC: 73 MG/DL (ref 40–60)
LDLC SERPL CALC-MCNC: 67 MG/DL
NONHDLC SERPL-MCNC: 77 MG/DL
TRIGL SERPL-MCNC: 48 MG/DL (ref 35–150)
VLDLC SERPL-MCNC: 10 MG/DL

## 2023-11-15 PROCEDURE — 3062F POS MACROALBUMINURIA REV: CPT | Mod: ,,, | Performed by: FAMILY MEDICINE

## 2023-11-15 PROCEDURE — 3075F PR MOST RECENT SYSTOLIC BLOOD PRESS GE 130-139MM HG: ICD-10-PCS | Mod: ,,, | Performed by: FAMILY MEDICINE

## 2023-11-15 PROCEDURE — 3062F PR POS MACROALBUMINURIA RESULT DOCUMENTED/REVIEW: ICD-10-PCS | Mod: ,,, | Performed by: FAMILY MEDICINE

## 2023-11-15 PROCEDURE — 82947 GLUCOSE, FASTING: ICD-10-PCS | Mod: ,,, | Performed by: CLINICAL MEDICAL LABORATORY

## 2023-11-15 PROCEDURE — 82947 ASSAY GLUCOSE BLOOD QUANT: CPT | Mod: ,,, | Performed by: CLINICAL MEDICAL LABORATORY

## 2023-11-15 PROCEDURE — 80061 LIPID PANEL: CPT | Mod: ,,, | Performed by: CLINICAL MEDICAL LABORATORY

## 2023-11-15 PROCEDURE — 3051F PR MOST RECENT HEMOGLOBIN A1C LEVEL 7.0 - < 8.0%: ICD-10-PCS | Mod: ,,, | Performed by: FAMILY MEDICINE

## 2023-11-15 PROCEDURE — 1160F RVW MEDS BY RX/DR IN RCRD: CPT | Mod: ,,, | Performed by: FAMILY MEDICINE

## 2023-11-15 PROCEDURE — 3066F NEPHROPATHY DOC TX: CPT | Mod: ,,, | Performed by: FAMILY MEDICINE

## 2023-11-15 PROCEDURE — 1159F PR MEDICATION LIST DOCUMENTED IN MEDICAL RECORD: ICD-10-PCS | Mod: ,,, | Performed by: FAMILY MEDICINE

## 2023-11-15 PROCEDURE — 80061 LIPID PANEL: ICD-10-PCS | Mod: ,,, | Performed by: CLINICAL MEDICAL LABORATORY

## 2023-11-15 PROCEDURE — 99396 PR PREVENTIVE VISIT,EST,40-64: ICD-10-PCS | Mod: ,,, | Performed by: FAMILY MEDICINE

## 2023-11-15 PROCEDURE — 3075F SYST BP GE 130 - 139MM HG: CPT | Mod: ,,, | Performed by: FAMILY MEDICINE

## 2023-11-15 PROCEDURE — 3051F HG A1C>EQUAL 7.0%<8.0%: CPT | Mod: ,,, | Performed by: FAMILY MEDICINE

## 2023-11-15 PROCEDURE — 3066F PR DOCUMENTATION OF TREATMENT FOR NEPHROPATHY: ICD-10-PCS | Mod: ,,, | Performed by: FAMILY MEDICINE

## 2023-11-15 PROCEDURE — 3008F PR BODY MASS INDEX (BMI) DOCUMENTED: ICD-10-PCS | Mod: ,,, | Performed by: FAMILY MEDICINE

## 2023-11-15 PROCEDURE — 3079F DIAST BP 80-89 MM HG: CPT | Mod: ,,, | Performed by: FAMILY MEDICINE

## 2023-11-15 PROCEDURE — 3008F BODY MASS INDEX DOCD: CPT | Mod: ,,, | Performed by: FAMILY MEDICINE

## 2023-11-15 PROCEDURE — 1159F MED LIST DOCD IN RCRD: CPT | Mod: ,,, | Performed by: FAMILY MEDICINE

## 2023-11-15 PROCEDURE — 3079F PR MOST RECENT DIASTOLIC BLOOD PRESSURE 80-89 MM HG: ICD-10-PCS | Mod: ,,, | Performed by: FAMILY MEDICINE

## 2023-11-15 PROCEDURE — 99396 PREV VISIT EST AGE 40-64: CPT | Mod: ,,, | Performed by: FAMILY MEDICINE

## 2023-11-15 PROCEDURE — 1160F PR REVIEW ALL MEDS BY PRESCRIBER/CLIN PHARMACIST DOCUMENTED: ICD-10-PCS | Mod: ,,, | Performed by: FAMILY MEDICINE

## 2023-11-15 RX ORDER — FUROSEMIDE 20 MG/1
20 TABLET ORAL DAILY PRN
Qty: 90 TABLET | Refills: 1 | Status: SHIPPED | OUTPATIENT
Start: 2023-11-15

## 2023-11-15 NOTE — PROGRESS NOTES
"Subjective     Patient ID: Dilma Cr is a 63 y.o. female.    Chief Complaint: Healthy You (Patient stated she is here for a [facility] Healthy You Visit. She is "fasting" for labs. Stated she had labs last month. She request refill on Lasix sent to [facility]. ), Diabetes, Hypertension, and Hyperlipidemia    Patient is here for a [employer] [facility] healthy you. Patient needs refills.           Review of Systems   Constitutional:  Negative for fatigue and fever.   HENT:  Negative for ear pain, postnasal drip, rhinorrhea and sinus pressure/congestion.    Respiratory:  Negative for cough and shortness of breath.    Cardiovascular:  Negative for chest pain.   Gastrointestinal:  Negative for abdominal pain, diarrhea, nausea and vomiting.   Genitourinary:  Negative for dysuria.   Neurological:  Negative for headaches.       Tobacco Use: Low Risk  (11/15/2023)    Patient History     Smoking Tobacco Use: Never     Smokeless Tobacco Use: Never     Passive Exposure: Never     Review of patient's allergies indicates:  No Known Allergies  Current Outpatient Medications   Medication Instructions    ascorbic acid (vitamin C) 250 mg, Oral, Daily    BINAXNOW COVID-19 AG SELF TEST Kit Use as Directed on the Package    clopidogreL (PLAVIX) 75 mg, Oral, Daily    cyanocobalamin (VITAMIN B-12) 100 mcg, Oral, Daily    cyanocobalamin 2000 MCG tablet 1 tablet, Oral, Daily    cyclobenzaprine (FLEXERIL) 5 mg, Oral, 3 times daily PRN    ezetimibe (ZETIA) 10 mg, Oral    FREESTYLE BECCA 14 DAY SENSOR Kit USE TO CHECK GLUCOSE 4 TIMES DAILY    FREESTYLE BECCA 3 SENSOR Aleena USE 1 PATCH TOPICALLY EVERY DAY FOR 2 WEEKS    furosemide (LASIX) 20 mg, Oral, Daily PRN    HYDROcodone-acetaminophen (NORCO) 7.5-325 mg per tablet 1 tablet, Oral, Every 8 hours PRN    iron-vitamin C 100-250 mg, ICAR-C, 100-250 mg Tab 1 tablet, Oral, 2 times daily with meals    JARDIANCE 25 mg, Oral, Daily    naloxone (NARCAN) 4 mg/actuation Spry 1 spray, Nasal, Once " as needed    nitroGLYCERIN (NITROSTAT) 0.4 mg, Sublingual, Every 5 min PRN    NovoLOG Flexpen U-100 Insulin 20 Units, Subcutaneous, 2 times daily    olmesartan-hydrochlorothiazide (BENICAR HCT) 40-25 mg per tablet 1 tablet, Oral, Daily    OZEMPIC 1 mg/dose (4 mg/3 mL) No dose, route, or frequency recorded.    polyethylene glycol (MIRALAX) 17 g, Oral, Daily PRN    pramipexole (MIRAPEX) 0.125 mg, Oral, Nightly    prochlorperazine (COMPAZINE) 10 MG tablet 1 tablet, Oral, Daily PRN    rosuvastatin (CRESTOR) 40 MG Tab Take 1 tablet by mouth once daily    sertraline (ZOLOFT) 25 mg, Oral, Daily    sotaloL (BETAPACE) 120 mg, Oral, Every 12 hours    traMADoL (ULTRAM) 50 mg, Oral, Every 12 hours PRN    TRESIBA FLEXTOUCH U-100 24 Units, Subcutaneous, Daily     Medications Discontinued During This Encounter   Medication Reason    furosemide (LASIX) 20 MG tablet Reorder       Past Medical History:   Diagnosis Date    Acute superficial gastritis without hemorrhage 06/21/2022    Arthritis     Cancer     colon cancer    Chronic kidney disease, stage 3b     Colon cancer     Coronary artery disease     Depression     Diabetes mellitus, type 2     Diverticula, colon 06/22/2022    GERD (gastroesophageal reflux disease)     H/O right hemicolectomy 06/22/2022    HH (hiatus hernia) 06/21/2022    Hyperlipidemia     Myocardial infarction     2 stents in 05/14/2021 Dr Lora    Renal disorder      Health Maintenance Topics with due status: Not Due       Topic Last Completion Date    Colorectal Cancer Screening 06/22/2022    Diabetes Urine Screening 01/25/2023    Eye Exam 04/26/2023    Hemoglobin A1c 10/27/2023    High Dose Statin 11/15/2023    Lipid Panel 11/15/2023     Immunization History   Administered Date(s) Administered    COVID-19, MRNA, LN-S, PF (Pfizer) (Purple Cap) 02/24/2021, 03/18/2021    Influenza 11/01/2021    Influenza - Quadrivalent - PF *Preferred* (6 months and older) 09/07/2022       Objective     Body mass index is 38.17  "kg/m².  Wt Readings from Last 3 Encounters:   11/15/23 91.6 kg (202 lb)   11/02/23 91.6 kg (202 lb)   10/30/23 91.2 kg (201 lb)     Ht Readings from Last 3 Encounters:   11/15/23 5' 1" (1.549 m)   11/02/23 5' 1" (1.549 m)   10/30/23 5' 1" (1.549 m)     BP Readings from Last 3 Encounters:   11/15/23 135/81   11/02/23 120/60   10/30/23 (!) 159/70     Temp Readings from Last 3 Encounters:   11/15/23 98 °F (36.7 °C) (Oral)   10/30/23 98.3 °F (36.8 °C) (Oral)   10/28/23 97.8 °F (36.6 °C) (Oral)     Pulse Readings from Last 3 Encounters:   11/15/23 (!) 50   11/02/23 72   10/30/23 (!) 53     Resp Readings from Last 3 Encounters:   11/15/23 18   11/02/23 18   10/30/23 18     PF Readings from Last 3 Encounters:   07/12/22 362 L/min       Physical Exam  HENT:      Head: Normocephalic and atraumatic.   Cardiovascular:      Rate and Rhythm: Normal rate and regular rhythm.   Pulmonary:      Effort: Pulmonary effort is normal.      Breath sounds: Normal breath sounds.   Neurological:      Mental Status: She is alert and oriented to person, place, and time.   Psychiatric:         Mood and Affect: Mood normal.         Behavior: Behavior normal.         Assessment and Plan     Problem List Items Addressed This Visit          Cardiac/Vascular    Hypertension (Chronic)    Relevant Medications    furosemide (LASIX) 20 MG tablet     Other Visit Diagnoses       Annual visit for general adult medical examination with abnormal findings    -  Primary    Screening for diabetes mellitus        Relevant Orders    Glucose, Fasting (Completed)    Screening for lipoid disorders        Relevant Orders    Lipid Panel (Completed)            Plan: Refill medications. Labs done. Educational material given.      I have reviewed the medications, allergies, and problem list.     Goal Actions:    What type of visit is the patient here for today?: Healthy You  Does the patient consent to enroll in [employer]?: Yes  Is this a Wellness Follow Up?: " No  What is your overall wellness goal? (select at least one): Lifestyle modifications, Lose weight, Understand disease process, Improve overall health  Choose 3: Biometric, Lifestyle, Nutrition  Biometric Actions: Attend regularly scheduled office visits  Lifestyle Actions : Take medications as prescribed  Nurtrition Actions: Eat a well-balanced diet

## 2023-11-15 NOTE — PATIENT INSTRUCTIONS
"Patient Education       Type 2 Diabetes   The Basics   Written by the doctors and editors at [facility]   What is type 2 diabetes? -- Type 2 diabetes is a disorder that disrupts the way your body uses sugar. It is sometimes called type 2 diabetes mellitus.  All the cells in your body need sugar to work normally. Sugar gets into the cells with the help of a hormone called insulin. Insulin is made by the pancreas, an organ in the belly. If there is not enough insulin, or if your body stops responding to insulin, sugar builds up in the blood. That is what happens to people with diabetes.  There are two different types of diabetes:   Type 1 diabetes - In type 1 diabetes, the pancreas does not make insulin or makes very little insulin.  Type 2 diabetes - In most people with type 2 diabetes, the body stops responding to insulin normally. Then, over time, the pancreas stops making enough insulin.   Being overweight or obese increases a person's risk of developing type 2 diabetes. But people who are not overweight can get diabetes, too.  What are the symptoms of type 2 diabetes? -- Type 2 diabetes usually causes no symptoms. When symptoms do occur, they include:  Needing to urinate often  Intense thirst  Blurry vision  Can diabetes lead to other health problems? -- Yes. Type 2 diabetes might not make you feel sick. But if it is not managed, it can lead to serious problems over time, such as:  Heart attacks  Strokes  Kidney disease  Vision problems (or even blindness)  Pain or loss of feeling in the hands and feet  Needing to have fingers, toes, or other body parts removed (amputated)  How do I know if I have type 2 diabetes? -- To find out if you have type 2 diabetes, your doctor or nurse can do a blood test. There are 2 tests that can be used for this. Both involve measuring the amount of sugar in your blood, called your "blood sugar" or "blood glucose":   One of the tests measures your blood sugar at the time the blood " "sample is taken. This test is done in the morning. You can't eat or drink anything except water for at least 8 hours before the test.   The other test shows what your average blood sugar has been for the past 2 to 3 months. This blood test is called "hemoglobin A1C" or just "A1C." It can be checked at any time of the day, even if you have recently eaten.  How is type 2 diabetes treated? -- The goals of treatment are to control your blood sugar and lower the risk of future problems that can happen in people with diabetes. An important part of managing diabetes is to eat healthy foods and get plenty of physical activity.  There are a few medicines that help control blood sugar. Some people need to take pills that help the body make more insulin or that help insulin do its job. Others need insulin shots.  Depending on what medicines you take, you might need to check your blood sugar regularly at home. But not everyone with type 2 diabetes needs to do this. Your doctor or nurse will tell you if you should be checking your blood sugar, and when and how to do this.  Sometimes, people with type 2 diabetes also need medicines to reduce the problems caused by the disease. For instance, medicines used to lower blood pressure can reduce the chances of a heart attack or stroke.  It's also important to get certain vaccines, such as vaccines to protect against the flu and coronavirus disease 2019 (COVID-19). Some people also need a vaccine to prevent pneumonia.  Can type 2 diabetes be prevented? -- Yes. To lower your chances of getting type 2 diabetes, the most important thing you can do is eat a healthy diet and get plenty of physical activity. This can help you lose weight if you are overweight. But eating well and being active are also good for your overall health. Even gentle activity, like walking, has benefits.  If you smoke, quitting can also lower your risk of type 2 diabetes. Quitting smoking can be hard to do, but your " "doctor or nurse can help.  All topics are updated as new evidence becomes available and our peer review process is complete.  This topic retrieved from [employer] on: Sep 21, 2021.  Topic 10734 Version 14.0  Release: 29.4.2 - C29.263  © 2021 UpToDate, Inc. and/or its affiliates. All rights reserved.  Consumer Information Use and Disclaimer   This information is not specific medical advice and does not replace information you receive from your health care provider. This is only a brief summary of general information. It does NOT include all information about conditions, illnesses, injuries, tests, procedures, treatments, therapies, discharge instructions or life-style choices that may apply to you. You must talk with your health care provider for complete information about your health and treatment options. This information should not be used to decide whether or not to accept your health care provider's advice, instructions or recommendations. Only your health care provider has the knowledge and training to provide advice that is right for you. The use of this information is governed by the [employer] End User License Agreement, available at [email or web address].The use of [employer] content is governed by the [employer] Terms of Use. ©2021 UpToDate, Inc. All rights reserved.  Copyright   © 2021 UpToDate, Inc. and/or its affiliates. All rights reserved.    Patient Education       High Blood Pressure in Adults   The Basics   Written by the doctors and editors at [facility]   What is high blood pressure? -- High blood pressure is a condition that puts you at risk for heart attack, stroke, and kidney disease. It does not usually cause symptoms. But it can be serious.  When your doctor or nurse tells you your blood pressure, they will say 2 numbers. For instance, your doctor or nurse might say that your blood pressure is "130 over 80." The top number is the pressure inside your arteries " "when your heart is bipin. The bottom number is the pressure inside your arteries when your heart is relaxed.  "Elevated blood pressure" is a term doctors or nurses use as a warning. People with elevated blood pressure do not yet have high blood pressure. But their blood pressure is not as low as it should be for good health.  Many experts define high, elevated, and normal blood pressure as follows:  High - Top number of 130 or above and/or bottom number of 80 or above  Elevated - Top number between 120 and 129 and bottom number of 79 or below  Normal - Top number of 119 or below and bottom number of 79 or below  This information is also in the table (table 1).   How can I lower my blood pressure? -- If your doctor or nurse has prescribed blood pressure medicine, the most important thing you can do is to take it. If it causes side effects, do not just stop taking it. Instead, talk to your doctor or nurse about the problems it causes. They might be able to lower your dose or switch you to another medicine. If cost is a problem, mention that too. They might be able to put you on a less expensive medicine. Taking your blood pressure medicine can keep you from having a heart attack or stroke, and it can save your life!  Can I do anything on my own? -- You have a lot of control over your blood pressure. To lower it:  Lose weight (if you are overweight)  Choose a diet low in fat and rich in fruits, vegetables, and low-fat dairy products  Reduce the amount of salt you eat  Do something active for at least 30 minutes a day on most days of the week  Cut down on alcohol (if you drink more than 2 alcoholic drinks per day)  It's also a good idea to get a home blood pressure meter. People who check their own blood pressure at home do better at keeping it low and can sometimes even reduce the amount of medicine they take.  All topics are updated as new evidence becomes available and our peer review process is complete.  This " "topic retrieved from [employer] on: Sep 21, 2021.  Topic 60945 Version 15.0  Release: 29.4.2 - C29.263  © 2021 UpToDate, Inc. and/or its affiliates. All rights reserved.  table 1: Definition of normal and high blood pressure  Level  Top number  Bottom number    High 130 or above 80 or above   Elevated 120 to 129 79 or below   Normal 119 or below 79 or below   These definitions are from the American College of Cardiology/American Heart Association. Other expert groups might use slightly different definitions.  "Elevated blood pressure" is a term doctor or nurses use as a warning. It means you do not yet have high blood pressure, but your blood pressure is not as low as it should be for good health.  Graphic 62024 Version 6.0  Consumer Information Use and Disclaimer   This information is not specific medical advice and does not replace information you receive from your health care provider. This is only a brief summary of general information. It does NOT include all information about conditions, illnesses, injuries, tests, procedures, treatments, therapies, discharge instructions or life-style choices that may apply to you. You must talk with your health care provider for complete information about your health and treatment options. This information should not be used to decide whether or not to accept your health care provider's advice, instructions or recommendations. Only your health care provider has the knowledge and training to provide advice that is right for you. The use of this information is governed by the [employer] End User License Agreement, available at [email or web address]://[email or web address]/en/[email or web address]/[employer]/[email or web address]/[email or web address].The use of [employer] content is governed by the [employer] Terms of Use. ©2021 UpToDate, Inc. All rights reserved.  Copyright   © 2021 UpToDate, Inc. and/or its affiliates. All rights reserved.    Patient Education       High Cholesterol   The Basics   Written by the doctors and editors at [employer] " "  What is cholesterol? -- Cholesterol is a substance that is found in the blood. Everyone has some. It is needed for good health. The problem is, people sometimes have too much cholesterol. Compared with people with normal cholesterol, people with high cholesterol have a higher risk of heart attacks, strokes, and other health problems. The higher your cholesterol, the higher your risk of these problems.  Are there different types of cholesterol? -- Yes, there are a few different types. If you get a cholesterol test, you might hear your doctor or nurse talk about:  Total cholesterol  LDL cholesterol - Some people call this the "bad" cholesterol. That's because having high LDL levels raises your risk of heart attacks, strokes, and other health problems.  HDL cholesterol - Some people call this the "good" cholesterol. That's because people with high HDL levels tend to have a lower risk of heart attacks, strokes, and other health problems.   Non-HDL cholesterol - Non-HDL cholesterol is your total cholesterol minus your HDL cholesterol.  Triglycerides - Triglycerides are not cholesterol. They are another type of fat. But they often get measured when cholesterol is measured. (Having high triglycerides also seems to increase the risk of heart attacks and strokes.)  What should my numbers be? -- Ask your doctor or nurse what your numbers should be. Different people need different goals. (If you live outside the [place], see the table (table 1)). In general, people who do not already have heart disease should aim for:  Total cholesterol below 200  LDL cholesterol below 130 - or much lower, if they are at risk of heart attacks or strokes  HDL cholesterol above 60  Non-HDL cholesterol below 160 - or lower, if they are at risk of heart attacks or strokes  Triglycerides below 150  Keep in mind, though, that many people who cannot meet these goals still have a low risk of heart attacks and strokes.  What should I do if my " "doctor tells me I have high cholesterol? -- Ask your doctor what your overall risk of heart attacks and strokes is. High cholesterol, by itself, is not always a reason to worry. Having high cholesterol is just one of many things that can increase your risk of heart attacks and strokes. Other factors that increase your risk include:  Cigarette smoking  High blood pressure  Having a parent, sister, or brother who got heart disease at a young age - Young, in this case, means younger than 55 for men and younger than 65 for women.  A diet that is not heart healthy - A "heart-healthy" diet includes lots of fruits and vegetables, fiber, and healthy fats (like those found in fish and certain oils). It also means limiting sugar and unhealthy fats.  Older age  If you are at high risk of heart attacks and strokes, having high cholesterol is a problem. On the other hand, if you are at low risk, having high cholesterol might not lead to treatment.  Should I take medicine to lower cholesterol? -- Not everyone who has high cholesterol needs medicines. Your doctor or nurse will decide if you need them based on your age, family history, and other health concerns.  You should probably take a cholesterol-lowering medicine called a statin if you:  Already had a heart attack or stroke  Have known heart disease  Have diabetes  Have a condition called peripheral artery disease, which makes it painful to walk, and happens when the arteries in your legs get clogged with fatty deposits  Have an abdominal aortic aneurysm, which is a widening of the main artery in the belly  Most people with any of the conditions listed above should take a statin no matter what their cholesterol level is. If your doctor or nurse puts you on a statin, stay on it. The medicine might not make you feel any different. But it can help prevent heart attacks, strokes, and death.  Can I lower my cholesterol without medicines? -- Yes, you can lower your cholesterol some " by:  Avoiding red meat, butter, fried foods, cheese, and other foods that have a lot of saturated fat  Losing weight (if you are overweight)  Being more active  Even if these steps do little to change your cholesterol, they can improve your health in many ways.  All topics are updated as new evidence becomes available and our peer review process is complete.  This topic retrieved from [employer] on: Sep 21, 2021.  Topic 69673 Version 19.0  Release: 29.4.2 - C29.263  © 2021 UpToDate, Inc. and/or its affiliates. All rights reserved.  table 1: Cholesterol and triglyceride measurements in the [place] and elsewhere     Measurement used within the [place] Milligrams/deciliter (mg/dL)  Measurement used most places outside the [place] Millimoles/liter (mmol/Liter)     Level to aim for  Level to aim for    Total cholesterol  Below 200 Below 5.17   LDL cholesterol  Below 130 - or much lower if at risk of heart attack and stroke Below 3.36 - or much lower if at risk of heart attack and stroke   HDL cholesterol  Above 60 Above 1.55   Triglycerides  Below 150 Below 1.7   Cholesterol is measured differently in the [place] than it is in most other countries. This table shows values used within and outside the [place]. It includes the cholesterol and triglyceride levels that most people who do not have heart disease should aim for.  Graphic 90115 Version 3.0  Consumer Information Use and Disclaimer   This information is not specific medical advice and does not replace information you receive from your health care provider. This is only a brief summary of general information. It does NOT include all information about conditions, illnesses, injuries, tests, procedures, treatments, therapies, discharge instructions or life-style choices that may apply to you. You must talk with your health care provider for complete information about your health and treatment options. This information should not be used to  decide whether or not to accept your health care provider's advice, instructions or recommendations. Only your health care provider has the knowledge and training to provide advice that is right for you. The use of this information is governed by the [employer] End User License Agreement, available at [email or web address] use of [employer] content is governed by the [employer] Terms of Use. ©2021 UpToDate, Inc. All rights reserved.  Copyright   © 2021 UpToDate, Inc. and/or its affiliates. All rights reserved.    Patient Education       Type 2 Diabetes   The Basics   Written by the doctors and editors at [employer]   What is type 2 diabetes? -- Type 2 diabetes is a disorder that disrupts the way your body uses sugar. It is sometimes called type 2 diabetes mellitus.  All the cells in your body need sugar to work normally. Sugar gets into the cells with the help of a hormone called insulin. Insulin is made by the pancreas, an organ in the belly. If there is not enough insulin, or if your body stops responding to insulin, sugar builds up in the blood. That is what happens to people with diabetes.  There are two different types of diabetes:   Type 1 diabetes - In type 1 diabetes, the pancreas does not make insulin or makes very little insulin.  Type 2 diabetes - In most people with type 2 diabetes, the body stops responding to insulin normally. Then, over time, the pancreas stops making enough insulin.   Being overweight or obese increases a person's risk of developing type 2 diabetes. But people who are not overweight can get diabetes, too.  What are the symptoms of type 2 diabetes? -- Type 2 diabetes usually causes no symptoms. When symptoms do occur, they include:  Needing to urinate often  Intense thirst  Blurry vision  Can diabetes lead to other health problems? -- Yes. Type 2 diabetes might not make you feel sick. But if it is not managed, it can lead to serious problems over  "time, such as:  Heart attacks  Strokes  Kidney disease  Vision problems (or even blindness)  Pain or loss of feeling in the hands and feet  Needing to have fingers, toes, or other body parts removed (amputated)  How do I know if I have type 2 diabetes? -- To find out if you have type 2 diabetes, your doctor or nurse can do a blood test. There are 2 tests that can be used for this. Both involve measuring the amount of sugar in your blood, called your "blood sugar" or "blood glucose":   One of the tests measures your blood sugar at the time the blood sample is taken. This test is done in the morning. You can't eat or drink anything except water for at least 8 hours before the test.   The other test shows what your average blood sugar has been for the past 2 to 3 months. This blood test is called "hemoglobin A1C" or just "A1C." It can be checked at any time of the day, even if you have recently eaten.  How is type 2 diabetes treated? -- The goals of treatment are to control your blood sugar and lower the risk of future problems that can happen in people with diabetes. An important part of managing diabetes is to eat healthy foods and get plenty of physical activity.  There are a few medicines that help control blood sugar. Some people need to take pills that help the body make more insulin or that help insulin do its job. Others need insulin shots.  Depending on what medicines you take, you might need to check your blood sugar regularly at home. But not everyone with type 2 diabetes needs to do this. Your doctor or nurse will tell you if you should be checking your blood sugar, and when and how to do this.  Sometimes, people with type 2 diabetes also need medicines to reduce the problems caused by the disease. For instance, medicines used to lower blood pressure can reduce the chances of a heart attack or stroke.  It's also important to get certain vaccines, such as vaccines to protect against the flu and coronavirus " disease 2019 (COVID-19). Some people also need a vaccine to prevent pneumonia.  Can type 2 diabetes be prevented? -- Yes. To lower your chances of getting type 2 diabetes, the most important thing you can do is eat a healthy diet and get plenty of physical activity. This can help you lose weight if you are overweight. But eating well and being active are also good for your overall health. Even gentle activity, like walking, has benefits.  If you smoke, quitting can also lower your risk of type 2 diabetes. Quitting smoking can be hard to do, but your doctor or nurse can help.  All topics are updated as new evidence becomes available and our peer review process is complete.  This topic retrieved from [employer] on: Sep 21, 2021.  Topic 37982 Version 14.0  Release: 29.4.2 - C29.263  © 2021 UpToDate, Inc. and/or its affiliates. All rights reserved.  Consumer Information Use and Disclaimer   This information is not specific medical advice and does not replace information you receive from your health care provider. This is only a brief summary of general information. It does NOT include all information about conditions, illnesses, injuries, tests, procedures, treatments, therapies, discharge instructions or life-style choices that may apply to you. You must talk with your health care provider for complete information about your health and treatment options. This information should not be used to decide whether or not to accept your health care provider's advice, instructions or recommendations. Only your health care provider has the knowledge and training to provide advice that is right for you. The use of this information is governed by the [employer] End User License Agreement, available at [email or web address].The use of [employer] content is governed by the [employer] Terms of Use. ©2021 UpToDate, Inc. All rights reserved.  Copyright   © 2021 UpToDate, Inc. and/or its affiliates. All  "rights reserved.    Patient Education       Diabetes and Diet   The Basics   Written by the doctors and editors at [facility]   Why is diet important in diabetes? -- Diet is important because it is part of diabetes treatment. Many people need to change what they eat and how much they eat to help treat their diabetes. It is important for people to treat their diabetes so that they:  Keep their blood sugar at or near a normal level  Prevent long-term problems, such as heart or kidney problems, that can happen in people with diabetes  Changing your diet can also help treat obesity, high blood pressure, and high cholesterol. These conditions can affect people with diabetes and can lead to future problems, such as heart attacks or strokes.  Who will work with me to change my diet? -- Your doctor or nurse will work with you to make a food plan to change your diet. They might also recommend that you work with a "dietitian." A dietitian is an expert on food and eating.  Do I need to eat at the same times every day? -- When and how often you should eat depends, in part, on the diabetes medicines you take. For example:  People who take about the same amount of insulin at the same time each day (called a "fixed regimen") should eat meals at the same times. This is also true for people who take pills that increase insulin levels, such as sulfonylureas. Eating meals at the same time every day helps prevent low blood sugar.  People who adjust the dose and timing of their insulin each day (called a "flexible regimen") do not always have to eat meals at the same time. That's because they can time their insulin dose for before they plan to eat, and also adjust the dose for how much they plan to eat.  People who take medicines that don't usually cause low blood sugar, such as metformin, don't have to eat meals at the same time every day.  What do I need to think about when planning what to eat? -- Our bodies break down the food we eat " "into small pieces called carbohydrates, proteins, and fats.  When planning what to eat, people with diabetes need to think about:  Carbohydrates (or "carbs") - Carbohydrates, which are sugars that our bodies use for energy, can raise a person's blood sugar level. Your doctor, nurse, or dietitian will tell you how many carbohydrates you should eat at each meal or snack. Foods that have carbohydrates include:  Bread, pasta, and rice  Vegetables and fruits  Dairy foods  Foods and drinks with added sugar  It is best to get your carbohydrates from fruits, vegetables, whole grains, and low-fat milk. It is best to avoid drinks with added sugar, like soda, juices, and sports drinks.   Protein - Your doctor, nurse, or dietitian will tell you how much protein you should eat each day. It is best to eat lean meats, fish, eggs, beans, peas, soy products, nuts, and seeds.  Fats - The type of fat you eat is more important than the amount of fat. "Saturated" and "trans" fats can increase your risk for heart problems, like a heart attack.  Foods that have saturated fats include meat, butter, cheese, and ice cream.  Foods that have trans fats include processed food with "partially hydrogenated oils" on the ingredient list. This may include fried foods, store bought cookies, muffins, pies, and cakes.  "Monounsaturated" and "polyunsaturated" fats are better for you. Foods with these types of fat include fish, avocado, olive oil, and nuts.  Calories - People need to eat a certain amount of calories each day to keep their weight the same. People who are overweight and want to lose weight need to eat fewer calories each day.  Fiber - Eating foods with a lot of fiber can help control a person's blood sugar level. Foods that have a lot of fiber include apples, green beans, peas, beans, lentils, nuts, oatmeal, and whole grains.  Salt - People who have high blood pressure should not eat foods that contain a lot of salt (also called sodium). " People with high blood pressure should also eat healthy foods, such as fruits, vegetables, and low-fat dairy foods.  Alcohol - Having more than 1 drink (for women) or 2 drinks (for men) a day can raise blood sugar levels. Also, drinks that have fruit juice or soda in them can raise blood sugar levels.  What can I do if I need to lose weight? -- If you need to lose weight, you can:  Exercise - Try to get at least 30 minutes of physical activity a day, most days of the week. Even gentle exercise, like walking, is good for your health. Some people with diabetes need to change their medicine dose before they exercise. They might also need to check their blood sugar levels before and after exercising.  Eat fewer calories - Your doctor, nurse, or dietitian can tell you how many calories you should eat each day in order to lose weight.  If you are worried about your weight, size, or shape, talk with your doctor, nurse, or dietitian. They can help you make changes to improve your health.  Can I eat the same foods as my family? -- Yes. You do not need to eat special foods if you have diabetes. You and your family can eat the same foods. Changing your diet is mostly about eating healthy foods and not eating too much.  What are the other parts of diabetes treatment? -- Besides changing your diet, the other parts of diabetes treatment are:  Exercise  Medicines  Some people with diabetes need to learn how to match their diet and exercise with their medicine dose. For example, people who use insulin might need to choose the dose of insulin they give themselves. To choose their dose, they need to think about:  What they plan to eat at the next meal  How much exercise they plan to do  What their blood sugar level is  If the diet and exercise do not match the medicine dose, a person's blood sugar level can get too low or too high. Blood sugar levels that are too low or too high can cause problems.  All topics are updated as new  evidence becomes available and our peer review process is complete.  This topic retrieved from [employer] on: Sep 21, 2021.  Topic 90811 Version 7.0  Release: 29.4.2 - C29.263  © 2021 UpToDate, Inc. and/or its affiliates. All rights reserved.  Consumer Information Use and Disclaimer   This information is not specific medical advice and does not replace information you receive from your health care provider. This is only a brief summary of general information. It does NOT include all information about conditions, illnesses, injuries, tests, procedures, treatments, therapies, discharge instructions or life-style choices that may apply to you. You must talk with your health care provider for complete information about your health and treatment options. This information should not be used to decide whether or not to accept your health care provider's advice, instructions or recommendations. Only your health care provider has the knowledge and training to provide advice that is right for you. The use of this information is governed by the [employer] End User License Agreement, available at [email or web address] use of [employer] content is governed by the [employer] Terms of Use. ©2021 Up[employer]Date, Inc. All rights reserved.  Copyright   © 2021 UpToDate, Inc. and/or its affiliates. All rights reserved.

## 2023-11-16 ENCOUNTER — PATIENT OUTREACH (OUTPATIENT)
Dept: ADMINISTRATIVE | Facility: HOSPITAL | Age: 63
End: 2023-11-16

## 2023-11-16 NOTE — PROGRESS NOTES
Population Health Chart Review & Patient Outreach Details      Further Action Needed If Patient Returns Outreach:            Updates Requested / Reviewed:     []  Care Everywhere    []     []  External Sources ([facility], [facility], [facility], etc.)    [] [facility]   [] [facility]   [] Other:    []  Care Team Updated   []  Removed  or Duplicate Orders   []  Immunization Reconciliation Completed / Queried    [] [place]   [] [place]   [] [place]   [] [place]      Health Maintenance Topics Addressed and Outreach Outcomes / Actions Taken:             Breast Cancer Screening []  Mammogram Order Placed    []  Mammogram Screening Scheduled    []  External Records Requested & Care Team Updated if Applicable    []  External Records Uploaded & Care Team Updated if Applicable    []  Pt Declined Scheduling Mammogram    []  Pt Will Schedule with External Provider / Order Routed & Care Team Updated if Applicable              Cervical Cancer Screening []  Pap Smear Scheduled in Primary Care or OBGYN    []  External Records Requested & Care Team Updated if Applicable       []  External Records Uploaded, Care Team Updated, & History Updated if Applicable    []  Patient Declined Scheduling Pap Smear    []  Patient Will Schedule with External Provider & Care Team Updated if Applicable                  Colorectal Cancer Screening []  Colonoscopy Case Request / Referral / Home Test Order Placed    []  External Records Requested & Care Team Updated if Applicable    []  External Records Uploaded, Care Team Updated, & History Updated if Applicable    []  Patient Declined Completing Colon Cancer Screening    []  Patient Will Schedule with External Provider & Care Team Updated if Applicable    []  Fit Kit Mailed (add the SmartPhrase under additional notes)    []  Reminded Patient to Complete Home Test                Diabetic Eye Exam []  Eye Exam Screening Order Placed    []  Eye Camera Scheduled or Optometry/Ophthalmology Referral  Placed    []  External Records Requested & Care Team Updated if Applicable    []  External Records Uploaded, Care Team Updated, & History Updated if Applicable    []  Patient Declined Scheduling Eye Exam    []  Patient Will Schedule with External Provider & Care Team Updated if Applicable             Blood Pressure Control []  Primary Care Follow Up Visit Scheduled     []  Remote Blood Pressure Reading Captured    []  Patient Declined Remote Reading or Scheduling Appt - Escalated to PCP    []  Patient Will Call Back or Send Portal Message with Reading                 HbA1c & Other Labs []  Overdue Lab(s) Ordered    []  Overdue Lab(s) Scheduled    []  External Records Uploaded & Care Team Updated if Applicable    []  Primary Care Follow Up Visit Scheduled     []  Reminded Patient to Complete A1c Home Test    []  Patient Declined Scheduling Labs or Will Call Back to Schedule    []  Patient Will Schedule with External Provider / Order Routed, & Care Team Updated if Applicable           Primary Care Appointment []  Primary Care Appt Scheduled    []  Patient Declined Scheduling or Will Call Back to Schedule    []  Pt Established with External Provider, Updated Care Team, & Informed Pt to Notify Payor if Applicable           Medication Adherence /    Statin Use []  Primary Care Appointment Scheduled    []  Patient Reminded to  Prescription    []  Patient Declined, Provider Notified if Needed    []  Sent Provider Message to Review to Evaluate Pt for Statin, Add Exclusion Dx Codes, Document   Exclusion in Problem List, Change Statin Intensity Level to Moderate or High Intensity if Applicable                Osteoporosis Screening []  Dexa Order Placed    []  Dexa Appointment Scheduled    []  External Records Requested & Care Team Updated    []  External Records Uploaded, Care Team Updated, & History Updated if Applicable    []  Patient Declined Scheduling Dexa or Will Call Back to Schedule    []  Patient Will Schedule  with External Provider / Order Routed & Care Team Updated if Applicable       Additional Notes:.  Post visit Population Health review of encounter with date of service  11/15/23 with William. Not  all required HY components in encounter chart is open has wellness plan and labs.  Followup appt for: 11/27/24 HY  11/20/23 Chart closed and wellness plan and labs included.Kellen

## 2023-11-27 RX ORDER — TRAMADOL HYDROCHLORIDE 50 MG/1
50 TABLET ORAL EVERY 12 HOURS PRN
Qty: 60 TABLET | Refills: 2 | Status: CANCELLED | OUTPATIENT
Start: 2023-11-27

## 2023-11-27 NOTE — PROGRESS NOTES
Subjective:         Patient ID: Dilma Cr is a 63 y.o. female.    Chief Complaint: Low-back Pain and Leg Pain        Pain  This is a chronic problem. The current episode started more than 1 year ago. The problem occurs daily. The problem has been unchanged. Associated symptoms include arthralgias and neck pain. Pertinent negatives include no anorexia, chest pain, chills, coughing, diaphoresis, fever, sore throat, vertigo or vomiting.     Review of Systems   Constitutional:  Negative for activity change, appetite change, chills, diaphoresis, fever and unexpected weight change.   HENT:  Negative for drooling, ear pain, facial swelling, nosebleeds, sore throat, trouble swallowing, voice change and goiter.    Eyes:  Negative for photophobia, pain, discharge, redness and visual disturbance.   Respiratory:  Negative for apnea, cough, choking, chest tightness, shortness of breath, wheezing and stridor.    Cardiovascular:  Negative for chest pain, palpitations and leg swelling.   Gastrointestinal:  Negative for abdominal distention, anorexia, diarrhea, rectal pain, vomiting and fecal incontinence.   Endocrine: Negative for cold intolerance, heat intolerance, polydipsia, polyphagia and polyuria.   Genitourinary:  Negative for flank pain, frequency and hot flashes.   Musculoskeletal:  Positive for arthralgias, back pain and neck pain. Negative for neck stiffness.   Integumentary:  Negative for color change and pallor.   Allergic/Immunologic: Negative for immunocompromised state.   Neurological:  Negative for dizziness, vertigo, seizures, syncope, facial asymmetry, speech difficulty, light-headedness, memory loss and coordination difficulties.   Hematological:  Negative for adenopathy. Does not bruise/bleed easily.   Psychiatric/Behavioral:  Negative for agitation, behavioral problems, confusion, decreased concentration, dysphoric mood, hallucinations, self-injury and suicidal ideas. The patient is not  nervous/anxious and is not hyperactive.            Past Medical History:   Diagnosis Date    Acute superficial gastritis without hemorrhage 06/21/2022    Arthritis     Cancer     colon cancer    Chronic kidney disease, stage 3b     Colon cancer     Coronary artery disease     Depression     Diabetes mellitus, type 2     Diverticula, colon 06/22/2022    GERD (gastroesophageal reflux disease)     H/O right hemicolectomy 06/22/2022    HH (hiatus hernia) 06/21/2022    Hyperlipidemia     Myocardial infarction     2 stents in 05/14/2021 Dr Porter    Renal disorder      Past Surgical History:   Procedure Laterality Date    BREAST SURGERY      C5-C6 RADHA  8-, 7-, 6-1-2016    Dr Fowler    CARDIAC SURGERY  02/07/2023    CERVICAL SPINE SURGERY      COLON SURGERY      EPIDURAL STEROID INJECTION INTO LUMBAR SPINE N/A 03/29/2022    Procedure: L4-5 RADHA;  Surgeon: Gela Fowler MD;  Location: [facility] PAIN MGMT;  Service: Pain Management;  Laterality: N/A;  PT AWARE ON OV TO BE TESTED\  PLAVIX TO BE HELD FOR 7 DAYS PRIOR TO PROCEDURE PER DR FOWLER AND DR PORTER  3-28  message left on vm re: covid    HYSTERECTOMY      LEFT HEART CATHETERIZATION Left 05/14/2021    Procedure: Left heart cath;  Surgeon: Mateus Guan MD;  Location: [facility] CATH LAB;  Service: Cardiology;  Laterality: Left;    LEFT HEART CATHETERIZATION Left 06/20/2022    Procedure: Left heart cath;  Surgeon: Oliverio Bautista MD;  Location: [facility] CATH LAB;  Service: Cardiology;  Laterality: Left;    MEDIPORT REMOVAL  10/14/2021    Dr Kraft    right rotator cuff repair      in [place]    TOTAL REDUCTION MAMMOPLASTY       Social History     Socioeconomic History    Marital status:      Spouse name: Jorge Alberto    Number of children: 2   Occupational History    Occupation:  at [employer] for past 27 years   Tobacco Use    Smoking status: Never     Passive exposure: Never    Smokeless tobacco: Never  "  Substance and Sexual Activity    Alcohol use: Never    Drug use: Never    Sexual activity: Yes   Social History Narrative    Worked at [employer] as [employer] for past 29 years. H/O colon cancer. Watchman procedure back in Feb (heart).  Sees Dr Stover Oncologist. Cardiologist Dr Alina Garcia ([place]). Sees Endocrinologist Koko Rivera with [facility].      Family History   Problem Relation Age of Onset    Hypertension Mother     Diabetes Mother     Hypertension Father     Diabetes Father     Hypertension Sister     Breast cancer Maternal Grandmother     Hypertension Brother      Review of patient's allergies indicates:  No Known Allergies     Objective:  Vitals:    11/28/23 1020   BP: (!) 194/61   Pulse: (!) 57   Resp: 18   Weight: 93.4 kg (206 lb)   Height: 5' 1" (1.549 m)   PainSc:   9         Physical Exam  Vitals and nursing note reviewed. Exam conducted with a chaperone present.   Constitutional:       General: She is awake. She is not in acute distress.     Appearance: Normal appearance. She is not ill-appearing, toxic-appearing or diaphoretic.   HENT:      Head: Normocephalic and atraumatic.      Nose: Nose normal.      Mouth/Throat:      Mouth: Mucous membranes are moist.      Pharynx: Oropharynx is clear.   Eyes:      Conjunctiva/sclera: Conjunctivae normal.      Pupils: Pupils are equal, round, and reactive to light.   Cardiovascular:      Rate and Rhythm: Normal rate.   Pulmonary:      Effort: Pulmonary effort is normal. No respiratory distress.   Abdominal:      Palpations: Abdomen is soft.      Tenderness: There is no guarding.   Musculoskeletal:         General: Normal range of motion.      Cervical back: Normal range of motion and neck supple. No rigidity.      Thoracic back: Tenderness present.      Lumbar back: Tenderness present.   Skin:     General: Skin is warm and dry.      Coloration: Skin is not jaundiced or pale.   Neurological:      General: No focal deficit present. "      Mental Status: She is alert and oriented to person, place, and time. Mental status is at baseline.      Cranial Nerves: No cranial nerve deficit (II-XII).   Psychiatric:         Mood and Affect: Mood normal.         Behavior: Behavior normal. Behavior is cooperative.         Thought Content: Thought content normal.         Echo Saline Bubble? Yes    Left Ventricle: The left ventricle is normal in size. Increased   ventricular mass. Increased wall thickness. There is mild concentric   hypertrophy. Normal wall motion. There is normal systolic function with a   visually estimated ejection fraction of 55 - 60%. Biplane (2D) method of   discs ejection fraction is 57%. Global longitudinal strain is normal.   There is normal diastolic function.    Right Ventricle: Normal right ventricular cavity size. Systolic   function is normal.    Tricuspid Valve: There is mild regurgitation.    Pulmonary Artery: The estimated pulmonary artery systolic pressure is   21 mmHg.    IVC/SVC: Normal venous pressure at 3 mmHg.  MRI Brain Without Contrast  Narrative: EXAMINATION:  MRI BRAIN WITHOUT CONTRAST    CLINICAL HISTORY:  Stroke, follow up;    TECHNIQUE:  Multiplanar multisequence MR imaging of the brain was performed without contrast.    COMPARISON:  Head CT 10/27/2023    FINDINGS:  There is no restricted diffusion.  There are a few foci of increased T2 and FLAIR signal seen centrally in the rajeev as well as in both middle cerebellar peduncles.  A few scattered additional periventricular and subcortical white matter supratentorial T2 and FLAIR hyperintense foci.  No intra or extra-axial collection.  No midline shift.  No herniation.  Midline structures nondisplaced.  Mastoid air cells and paranasal sinuses are clear.  Impression: Mild chronic microvascular ischemic changes.  Punctate areas of chronic infarct seen of the middle cerebellar peduncles and of the rajeev.    No acute infarct identified.  No acute  abnormality.    Electronically signed by: Skyler Rosa  Date:    10/27/2023  Time:    14:50  CTA Neck  Narrative: EXAMINATION:  CTA NECK    CLINICAL HISTORY:  Stroke, follow up;    TECHNIQUE:  Thin spiral CT sections were obtained through the neck during the dynamic IV administration of 100 ml of Isovue 370.  In addition to multiplanar reconstruction images, 3-D images were also generated, archived, and analyzed.    The CT examination was performed using one or more of the following dose reduction techniques: Automated exposure control, adjustment of the mA and kV according to patient's size, use of acute or iterative reconstruction techniques.    COMPARISON:  No previous similar    FINDINGS:  Extracranial:    There is 3 vessel arch anatomy.  There is no hemodynamically significant stenosis at the origins of the great vessels.  Vertebral arteries are patent bilaterally, right vertebral is dominant to the left.    Common carotid arteries knee are widely patent bilaterally.  External carotid arteries are widely patent bilaterally.    There is a centrally 0% diameter reduction stenosis of either internal carotid artery.  Normal right ICA measures 3.4 mm diameter; normal left measures 4.1 mm.    There is no obvious neck mass where visualized.    Partially visualized lung apices are generally clear.    There has been previous anterior cervical fusion at C4-C5.  There is moderate narrowing of the spinal canal at C4-C5 secondary to some protruding right posterior paracentral osteophyte complex at that level.  Impression: No hemodynamically significant internal carotid artery stenosis at the cervical level    Measurements based on NASCET criteria.    All CT scans at this facility use dose modulation, iterative reconstruction, and/or weight base dosing when appropriate to reduce radiation dose to as low as reasonably achievable.    Electronically signed by: Chandra Nagel  Date:    10/27/2023  Time:    13:52  CTA  Head  Narrative: EXAMINATION:  CTA HEAD    CLINICAL HISTORY:  Stroke, follow up;.    COMPARISON:  No previous similar    TECHNIQUE:  Thin spiral CT sections were obtained through the head during the dynamic IV administration of 100 ml of Isovue 370.  In addition to multiplanar reconstruction images, 3-D images were also generated, archived, and analyzed.    The CT examination was performed using one or more of the following dose reduction techniques: Automated exposure control, adjustment of the mA and kV according to patient's size, use of acute or iterative reconstruction techniques.    FINDINGS:  There is critical stenosis versus short segment occlusion of the superior horizontal segment of the right carotid siphon.  Left carotid siphon has mild to moderate diffuse atherosclerotic narrowing.    Anterior and middle cerebral arteries are patent bilaterally.  Right posterior communicating artery is patent.  Distal vertebral arteries are patent, with distal right vertebral dominant to the left.  Basilar artery is patent without occlusion or significant focal stenosis.  There is patency of the posteroinferior cerebellar arteries bilaterally as well as superior cerebellar arteries bilaterally.  Posterior cerebral arteries are patent.    There is no large vessel arterial occlusion at the cerebral artery level.  There is no obvious patent intracranial aneurysm.    There is no enhancing brain mass compared to the earlier noncontrast CT head from the same day.  There is no obvious dural venous sinus thrombosis of the major venous sinuses.  Impression: Critical stenosis involving the superior horizontal segment of the right carotid siphon.  Small short segment occlusion of the right carotid siphon could not be excluded because of vessel calcification in this area.    Electronically signed by: Chandra Nagel  Date:    10/27/2023  Time:    13:46  CT Lumbar Spine Without Contrast  Narrative: EXAMINATION:  CT LUMBAR SPINE  WITHOUT CONTRAST    CLINICAL HISTORY:  Low back pain, increased fracture risk;    TECHNIQUE:  Low-dose axial, sagittal and coronal reformations are obtained through the lumbar spine.  Contrast was not administered.    COMPARISON:  MRI 09/19/2023    FINDINGS:  No acute fracture detected.  No dislocation.  The vertebral body heights are maintained.  Very minimal anterolisthesis L2 on L3 of a mm.  Advanced degenerative endplate and facet changes notably L2-3 through L5-S1 with osteophyte formation.  Moderate foraminal narrowing L2-3 through L5-S1 and spinal canal stenosis at these levels.  The overall degree of spinal canal and foraminal narrowing is similar to what was seen on MRI allowing for differences in technique.  Impression: Advanced multilevel degenerative changes.  No acute fracture or dislocation.    Electronically signed by: Skyler Rosa  Date:    10/27/2023  Time:    13:20  CT Head Without Contrast  Narrative: EXAMINATION:  CT HEAD WITHOUT CONTRAST    CLINICAL HISTORY:  Neuro deficit, acute, stroke suspected;    TECHNIQUE:  Axial CT imaging of the brain is performed without contrast with 3 mm increments.    CT dose reduction technique used - Dose Rite and tube current modulation.    COMPARISON:  18 June 2022    FINDINGS:  No evidence of hemorrhage, mass, mass effect, midline shift or acute infarct seen.  The brain parenchyma attenuation and differentiation appears within normal limits. The ventricles and cisterns are normal in caliber.  No cranial or skull base abnormality is identified.  Impression: No evidence of abnormality demonstrated.    Electronically signed by: Keegan Segundo  Date:    10/27/2023  Time:    09:29         Office Visit on 11/15/2023   Component Date Value Ref Range Status    Glucose, Fasting 11/15/2023 105  74 - 106 mg/dL Final    Triglycerides 11/15/2023 48  35 - 150 mg/dL Final    Cholesterol 11/15/2023 150  0 - 200 mg/dL Final    HDL Cholesterol 11/15/2023 73 (H)  40 - 60 mg/dL  Final    Cholesterol/HDL Ratio (Risk Factor) 11/15/2023 2.1   Final    Non-HDL 11/15/2023 77  mg/dL Final    LDL Calculated 11/15/2023 67  mg/dL Final    VLDL 11/15/2023 10  mg/dL Final   Admission on 10/27/2023, Discharged on 10/28/2023   Component Date Value Ref Range Status    Sodium 10/27/2023 139  136 - 145 mmol/L Final    Potassium 10/27/2023 4.0  3.5 - 5.1 mmol/L Final    Chloride 10/27/2023 110 (H)  98 - 107 mmol/L Final    CO2 10/27/2023 24  21 - 32 mmol/L Final    Anion Gap 10/27/2023 9  7 - 16 mmol/L Final    Glucose 10/27/2023 125 (H)  74 - 106 mg/dL Final    BUN 10/27/2023 24 (H)  7 - 18 mg/dL Final    Creatinine 10/27/2023 1.07 (H)  0.55 - 1.02 mg/dL Final    BUN/Creatinine Ratio 10/27/2023 22 (H)  6 - 20 Final    Calcium 10/27/2023 8.7  8.5 - 10.1 mg/dL Final    Total Protein 10/27/2023 7.0  6.4 - 8.2 g/dL Final    Albumin 10/27/2023 3.2 (L)  3.5 - 5.0 g/dL Final    Globulin 10/27/2023 3.8  2.0 - 4.0 g/dL Final    A/G Ratio 10/27/2023 0.8   Final    Bilirubin, Total 10/27/2023 0.4  >0.0 - 1.2 mg/dL Final    Alk Phos 10/27/2023 83  50 - 130 U/L Final    ALT 10/27/2023 42  13 - 56 U/L Final    AST 10/27/2023 30  15 - 37 U/L Final    eGFR 10/27/2023 59 (L)  >=60 mL/min/1.73m2 Final    PT 10/27/2023 12.8  11.7 - 14.7 seconds Final    INR 10/27/2023 0.97  <=3.30 Final    TSH 10/27/2023 1.500  0.358 - 3.740 uIU/mL Final    Triglycerides 10/27/2023 55  35 - 150 mg/dL Final    Cholesterol 10/27/2023 153  0 - 200 mg/dL Final    HDL Cholesterol 10/27/2023 78 (H)  40 - 60 mg/dL Final    Cholesterol/HDL Ratio (Risk Factor) 10/27/2023 2.0   Final    Non-HDL 10/27/2023 75  mg/dL Final    LDL Calculated 10/27/2023 64  mg/dL Final    VLDL 10/27/2023 11  mg/dL Final    WBC 10/27/2023 6.19  4.50 - 11.00 K/uL Final    RBC 10/27/2023 4.38  4.20 - 5.40 M/uL Final    Hemoglobin 10/27/2023 11.9 (L)  12.0 - 16.0 g/dL Final    Hematocrit 10/27/2023 37.7 (L)  38.0 - 47.0 % Final    MCV  10/27/2023 86.1  80.0 - 96.0 fL Final    MCH 10/27/2023 27.2  27.0 - 31.0 pg Final    MCHC 10/27/2023 31.6 (L)  32.0 - 36.0 g/dL Final    RDW 10/27/2023 14.1  11.5 - 14.5 % Final    Platelet Count 10/27/2023 334  150 - 400 K/uL Final    MPV 10/27/2023 11.5  9.4 - 12.4 fL Final    Neutrophils % 10/27/2023 54.6  53.0 - 65.0 % Final    Lymphocytes % 10/27/2023 36.5  27.0 - 41.0 % Final    Monocytes % 10/27/2023 6.9 (H)  2.0 - 6.0 % Final    Eosinophils % 10/27/2023 1.3  1.0 - 4.0 % Final    Basophils % 10/27/2023 0.5  0.0 - 1.0 % Final    Immature Granulocytes % 10/27/2023 0.2  0.0 - 0.4 % Final    nRBC, Auto 10/27/2023 0.0  <=0.0 % Final    Neutrophils, Abs 10/27/2023 3.38  1.80 - 7.70 K/uL Final    Lymphocytes, Absolute 10/27/2023 2.26  1.00 - 4.80 K/uL Final    Monocytes, Absolute 10/27/2023 0.43  0.00 - 0.80 K/uL Final    Eosinophils, Absolute 10/27/2023 0.08  0.00 - 0.50 K/uL Final    Basophils, Absolute 10/27/2023 0.03  0.00 - 0.20 K/uL Final    Immature Granulocytes, Absolute 10/27/2023 0.01  0.00 - 0.04 K/uL Final    nRBC, Absolute 10/27/2023 0.00  <=0.00 x10e3/uL Final    Diff Type 10/27/2023 Auto   Final    Troponin I High Sensitivity 10/27/2023 9.1  <=60.4 pg/mL Final    POC Glucose 10/27/2023 143 (H)  70 - 105 mg/dL Final    BSA 10/27/2023 1.96  m2 Final    LVOT stroke volume 10/27/2023 78.78  cm3 Final    LVIDd 10/27/2023 3.8  3.5 - 6.0 cm Final    LV Systolic Volume 10/27/2023 23.15  mL Final    LV Systolic Volume Index 10/27/2023 12.4  mL/m2 Final    LVIDs 10/27/2023 2.2  2.1 - 4.0 cm Final    LV Diastolic Volume 10/27/2023 62.54  mL Final    LV Diastolic Volume Index 10/27/2023 33.44  mL/m2 Final    IVS 10/27/2023 1.1  0.6 - 1.1 cm Final    LVOT diameter 10/27/2023 1.9  cm Final    LVOT area 10/27/2023 2.8  cm2 Final    FS 10/27/2023 42  28 - 44 % Final    Left Ventricle Relative Wall Thick* 10/27/2023 0.89  cm Final    Posterior Wall 10/27/2023 1.7 (A)  0.6 - 1.1 cm  Final    LV mass 10/27/2023 194.14  g Final    LV Mass Index 10/27/2023 104  g/m2 Final    MV Peak E Fran 10/27/2023 1.31  m/s Final    TDI LATERAL 10/27/2023 0.09  m/s Final    TDI SEPTAL 10/27/2023 0.09  m/s Final    E/E' ratio 10/27/2023 14.56  m/s Final    MV Peak A Fran 10/27/2023 0.87  m/s Final    TR Max Fran 10/27/2023 2.11  m/s Final    E/A ratio 10/27/2023 1.51   Final    E wave deceleration time 10/27/2023 202.26  msec Final    LV SEPTAL E/E' RATIO 10/27/2023 14.56  m/s Final    LV LATERAL E/E' RATIO 10/27/2023 14.56  m/s Final    LVOT peak fran 10/27/2023 1.09  m/s Final    Left Ventricular Outflow Tract Yanna* 10/27/2023 0.76  cm/s Final    Left Ventricular Outflow Tract Yanna* 10/27/2023 2.56  mmHg Final    LA size 10/27/2023 2.89  cm Final    Left Atrium Major Axis 10/27/2023 3.32  cm Final    RVDD 10/27/2023 3.79  cm Final    TAPSE 10/27/2023 1.46  cm Final    RA Major Axis 10/27/2023 3.75  cm Final    AV mean gradient 10/27/2023 4  mmHg Final    AV peak gradient 10/27/2023 9  mmHg Final    Ao peak fran 10/27/2023 1.47  m/s Final    Ao VTI 10/27/2023 36.60  cm Final    LVOT peak VTI 10/27/2023 27.80  cm Final    AV valve area 10/27/2023 2.15  cm² Final    AV Velocity Ratio 10/27/2023 0.74   Final    AV index (prosthetic) 10/27/2023 0.76   Final    [place] by Velocity Ratio 10/27/2023 2.10  cm² Final    Triscuspid Valve Regurgitation Pea* 10/27/2023 18  mmHg Final    PV PEAK VELOCITY 10/27/2023 1.06  m/s Final    PV peak gradient 10/27/2023 4  mmHg Final    Ao root annulus 10/27/2023 2.00  cm Final    IVC diameter 10/27/2023 1.23  cm Final    Mean e' 10/27/2023 0.09  m/s Final    ZLVIDS 10/27/2023 -3.00   Final    ZLVIDD 10/27/2023 -3.16   Final    AORTIC VALVE CUSP SEPERATION 10/27/2023 1.41  cm Final    TV resting pulmonary artery pressu* 10/27/2023 21  mmHg Final    RV TB RVSP 10/27/2023 5  mmHg Final    Est. RA pres 10/27/2023 3  mmHg Final    Cody's Biplane MOD  Ejection Fra* 10/27/2023 57  % Final    Hemoglobin A1C 10/27/2023 7.8 (H)  4.5 - 6.6 % Final    Estimated Average Glucose 10/27/2023 174  mg/dL Final    POC Glucose 10/27/2023 103  70 - 105 mg/dL Final    POC Glucose 10/27/2023 170 (H)  70 - 105 mg/dL Final    POC Glucose 10/27/2023 109 (H)  70 - 105 mg/dL Final    Troponin I High Sensitivity 10/28/2023 10.9  <=60.4 pg/mL Final    Sodium 10/28/2023 139  136 - 145 mmol/L Final    Potassium 10/28/2023 3.7  3.5 - 5.1 mmol/L Final    Chloride 10/28/2023 107  98 - 107 mmol/L Final    CO2 10/28/2023 24  21 - 32 mmol/L Final    Anion Gap 10/28/2023 12  7 - 16 mmol/L Final    Glucose 10/28/2023 193 (H)  74 - 106 mg/dL Final    BUN 10/28/2023 24 (H)  7 - 18 mg/dL Final    Creatinine 10/28/2023 1.01  0.55 - 1.02 mg/dL Final    BUN/Creatinine Ratio 10/28/2023 24 (H)  6 - 20 Final    Calcium 10/28/2023 8.7  8.5 - 10.1 mg/dL Final    Total Protein 10/28/2023 6.4  6.4 - 8.2 g/dL Final    Albumin 10/28/2023 3.0 (L)  3.5 - 5.0 g/dL Final    Globulin 10/28/2023 3.4  2.0 - 4.0 g/dL Final    A/G Ratio 10/28/2023 0.9   Final    Bilirubin, Total 10/28/2023 0.5  >0.0 - 1.2 mg/dL Final    Alk Phos 10/28/2023 79  50 - 130 U/L Final    ALT 10/28/2023 34  13 - 56 U/L Final    AST 10/28/2023 20  15 - 37 U/L Final    eGFR 10/28/2023 63  >=60 mL/min/1.73m2 Final    Magnesium 10/28/2023 2.2  1.7 - 2.3 mg/dL Final    Phosphorus 10/28/2023 4.1  2.5 - 4.5 mg/dL Final    Troponin I High Sensitivity 10/28/2023 59.0  <=60.4 pg/mL Final    WBC 10/28/2023 8.53  4.50 - 11.00 K/uL Final    RBC 10/28/2023 4.16 (L)  4.20 - 5.40 M/uL Final    Hemoglobin 10/28/2023 11.3 (L)  12.0 - 16.0 g/dL Final    Hematocrit 10/28/2023 34.6 (L)  38.0 - 47.0 % Final    MCV 10/28/2023 83.2  80.0 - 96.0 fL Final    MCH 10/28/2023 27.2  27.0 - 31.0 pg Final    MCHC 10/28/2023 32.7  32.0 - 36.0 g/dL Final    RDW 10/28/2023 13.7  11.5 - 14.5 % Final    Platelet Count 10/28/2023 310  150 - 400  K/uL Final    MPV 10/28/2023 11.3  9.4 - 12.4 fL Final    Neutrophils % 10/28/2023 80.5 (H)  53.0 - 65.0 % Final    Lymphocytes % 10/28/2023 13.7 (L)  27.0 - 41.0 % Final    Monocytes % 10/28/2023 4.6  2.0 - 6.0 % Final    Eosinophils % 10/28/2023 0.4 (L)  1.0 - 4.0 % Final    Basophils % 10/28/2023 0.4  0.0 - 1.0 % Final    Immature Granulocytes % 10/28/2023 0.4  0.0 - 0.4 % Final    nRBC, Auto 10/28/2023 0.0  <=0.0 % Final    Neutrophils, Abs 10/28/2023 6.88  1.80 - 7.70 K/uL Final    Lymphocytes, Absolute 10/28/2023 1.17  1.00 - 4.80 K/uL Final    Monocytes, Absolute 10/28/2023 0.39  0.00 - 0.80 K/uL Final    Eosinophils, Absolute 10/28/2023 0.03  0.00 - 0.50 K/uL Final    Basophils, Absolute 10/28/2023 0.03  0.00 - 0.20 K/uL Final    Immature Granulocytes, Absolute 10/28/2023 0.03  0.00 - 0.04 K/uL Final    nRBC, Absolute 10/28/2023 0.00  <=0.00 x10e3/uL Final    Diff Type 10/28/2023 Auto   Final    POC Glucose 10/28/2023 172 (H)  70 - 105 mg/dL Final    POC Glucose 10/28/2023 188 (H)  70 - 105 mg/dL Final    POC Glucose 10/28/2023 191 (H)  70 - 105 mg/dL Final   Lab Visit on 09/25/2023   Component Date Value Ref Range Status    Sodium 09/25/2023 142  136 - 145 mmol/L Final    Potassium 09/25/2023 4.2  3.5 - 5.1 mmol/L Final    Chloride 09/25/2023 113 (H)  98 - 107 mmol/L Final    CO2 09/25/2023 24  21 - 32 mmol/L Final    Anion Gap 09/25/2023 9  7 - 16 mmol/L Final    Glucose 09/25/2023 140 (H)  74 - 106 mg/dL Final    BUN 09/25/2023 37 (H)  7 - 18 mg/dL Final    Creatinine 09/25/2023 1.16 (H)  0.55 - 1.02 mg/dL Final    BUN/Creatinine Ratio 09/25/2023 32 (H)  6 - 20 Final    Calcium 09/25/2023 8.2 (L)  8.5 - 10.1 mg/dL Final    Total Protein 09/25/2023 6.2 (L)  6.4 - 8.2 g/dL Final    Albumin 09/25/2023 3.2 (L)  3.5 - 5.0 g/dL Final    Globulin 09/25/2023 3.0  2.0 - 4.0 g/dL Final    A/G Ratio 09/25/2023 1.1   Final    Bilirubin, Total 09/25/2023 0.2  >0.0 - 1.2 mg/dL Final     Alk Phos 09/25/2023 85  50 - 130 U/L Final    ALT 09/25/2023 60 (H)  13 - 56 U/L Final    AST 09/25/2023 34  15 - 37 U/L Final    eGFR 09/25/2023 54 (L)  >=60 mL/min/1.73m2 Final    CRP 09/25/2023 <0.29  0.00 - 0.80 mg/dL Final    ESR Westergren 09/25/2023 55 (H)  0 - 30 mm/Hr Final    WBC 09/25/2023 7.42  4.50 - 11.00 K/uL Final    RBC 09/25/2023 4.01 (L)  4.20 - 5.40 M/uL Final    Hemoglobin 09/25/2023 10.9 (L)  12.0 - 16.0 g/dL Final    Hematocrit 09/25/2023 34.9 (L)  38.0 - 47.0 % Final    MCV 09/25/2023 87.0  80.0 - 96.0 fL Final    MCH 09/25/2023 27.2  27.0 - 31.0 pg Final    MCHC 09/25/2023 31.2 (L)  32.0 - 36.0 g/dL Final    RDW 09/25/2023 14.5  11.5 - 14.5 % Final    Platelet Count 09/25/2023 300  150 - 400 K/uL Final    MPV 09/25/2023 11.6  9.4 - 12.4 fL Final    Neutrophils % 09/25/2023 67.9 (H)  53.0 - 65.0 % Final    Lymphocytes % 09/25/2023 24.4 (L)  27.0 - 41.0 % Final    Monocytes % 09/25/2023 6.3 (H)  2.0 - 6.0 % Final    Eosinophils % 09/25/2023 0.7 (L)  1.0 - 4.0 % Final    Basophils % 09/25/2023 0.3  0.0 - 1.0 % Final    Immature Granulocytes % 09/25/2023 0.4  0.0 - 0.4 % Final    nRBC, Auto 09/25/2023 0.0  <=0.0 % Final    Neutrophils, Abs 09/25/2023 5.04  1.80 - 7.70 K/uL Final    Lymphocytes, Absolute 09/25/2023 1.81  1.00 - 4.80 K/uL Final    Monocytes, Absolute 09/25/2023 0.47  0.00 - 0.80 K/uL Final    Eosinophils, Absolute 09/25/2023 0.05  0.00 - 0.50 K/uL Final    Basophils, Absolute 09/25/2023 0.02  0.00 - 0.20 K/uL Final    Immature Granulocytes, Absolute 09/25/2023 0.03  0.00 - 0.04 K/uL Final    nRBC, Absolute 09/25/2023 0.00  <=0.00 x10e3/uL Final    Diff Type 09/25/2023 Auto   Final   Office Visit on 09/25/2023   Component Date Value Ref Range Status    POC Amphetamines 09/25/2023 Negative  Negative, Inconclusive Final    POC Barbiturates 09/25/2023 Negative  Negative, Inconclusive Final    POC Benzodiazepines 09/25/2023 Negative  Negative,  Inconclusive Final    POC Cocaine 09/25/2023 Negative  Negative, Inconclusive Final    POC THC 09/25/2023 Negative  Negative, Inconclusive Final    POC Methadone 09/25/2023 Negative  Negative, Inconclusive Final    POC Methamphetamine 09/25/2023 Negative  Negative, Inconclusive Final    POC Opiates 09/25/2023 Negative  Negative, Inconclusive Final    POC Oxycodone 09/25/2023 Negative  Negative, Inconclusive Final    POC Phencyclidine 09/25/2023 Negative  Negative, Inconclusive Final    POC Methylenedioxymethamphetamine * 09/25/2023 Negative  Negative, Inconclusive Final    POC Tricyclic Antidepressants 09/25/2023 Negative  Negative, Inconclusive Final    POC Buprenorphine 09/25/2023 Negative   Final     Acceptable 09/25/2023 Yes   Final    POC Temperature (Urine) 09/25/2023 90   Final    pH, UA 09/25/2023 5.0  5.0 to 8.0 pH Units Final    Creatinine, Urine 09/25/2023 53  28 - 219 mg/dL Final    6-Acetylmorphine 09/25/2023 Negative  10 ng/mL Final    7-Aminoclonazepam 09/25/2023 Negative  Negative 25 ng/mL Final    a-Hydroxyalprazolam 09/25/2023 Negative  Negative 25 ng/mL Final    Acetyl Fentanyl 09/25/2023 Negative  Negative 2.5 ng/mL Final    Acetyl Norfentanyl Oxalate 09/25/2023 Negative  Negative 5 ng/mL Final    Benzoylecgonine 09/25/2023 Negative  Negative 100 ng/mL Final    Buprenorphine 09/25/2023 Negative  25 ng/mL Final    Codeine 09/25/2023 Negative  Negative 25 ng/mL Final    EDDP 09/25/2023 Negative  Negative 25 ng/mL Final    Fentanyl 09/25/2023 Negative  Negative 2.5 ng/mL Final    Hydrocodone 09/25/2023 >250.0 (H)  <25.0 25 ng/mL Final    Hydromorphone 09/25/2023 157.1 (H)  <25.0 25 ng/mL Final    Lorazepam 09/25/2023 Negative  Negative 25 ng/mL Final    Morphine 09/25/2023 Negative  Negative 25 ng/mL Final    Norbuprenorphine 09/25/2023 Negative  Negative 25 ng/mL Final    Nordiazepam 09/25/2023 Negative  Negative 25 ng/mL Final    Norfentanyl Oxalate  09/25/2023 Negative  Negative 5 ng/mL Final    Norhydrocodone 09/25/2023 >500.0 (H)  <50.0 50 ng/mL Final    Noroxycodone HCL 09/25/2023 Negative  Negative 50 ng/mL Final    Oxazepam 09/25/2023 Negative  Negative 25 ng/mL Final    Oxymorphone 09/25/2023 Negative  Negative 25 ng/mL Final    Tapentadol 09/25/2023 Negative  Negative 25 ng/mL Final    Temazepam 09/25/2023 Negative  Negative 25 ng/mL Final    THC-COOH 09/25/2023 Negative  Negative 25 ng/mL Final    Tramadol 09/25/2023 Negative  Negative 100 ng/mL Final    Amphetamine, Urine 09/25/2023 Negative  Negative Final    Methamphetamines, Urine 09/25/2023 Negative  Negative Final    Methadone, Urine 09/25/2023 Negative  Negative 25 ng/mL Final    Oxycodone, Urine 09/25/2023 Negative  Negative 25 ng/mL Final    Specific Dickinson, UA 09/25/2023 1.020  <=1.030 Final   Office Visit on 07/26/2023   Component Date Value Ref Range Status    Hemoglobin A1C 07/26/2023 7.4 (H)  4.5 - 6.6 % Final    Estimated Average Glucose 07/26/2023 160  mg/dL Final   Office Visit on 06/21/2023   Component Date Value Ref Range Status    POC Amphetamines 06/21/2023 Negative  Negative, Inconclusive Final    POC Barbiturates 06/21/2023 Negative  Negative, Inconclusive Final    POC Benzodiazepines 06/21/2023 Negative  Negative, Inconclusive Final    POC Cocaine 06/21/2023 Negative  Negative, Inconclusive Final    POC THC 06/21/2023 Negative  Negative, Inconclusive Final    POC Methadone 06/21/2023 Negative  Negative, Inconclusive Final    POC Methamphetamine 06/21/2023 Negative  Negative, Inconclusive Final    POC Opiates 06/21/2023 Negative  Negative, Inconclusive Final    POC Oxycodone 06/21/2023 Negative  Negative, Inconclusive Final    POC Phencyclidine 06/21/2023 Negative  Negative, Inconclusive Final    POC Methylenedioxymethamphetamine * 06/21/2023 Negative  Negative, Inconclusive Final    POC Tricyclic Antidepressants 06/21/2023 Negative  Negative,  Inconclusive Final    POC Buprenorphine 06/21/2023 Negative   Final     Acceptable 06/21/2023 Yes   Final    POC Temperature (Urine) 06/21/2023 92   Final         No orders of the defined types were placed in this encounter.        Requested Prescriptions     Signed Prescriptions Disp Refills    HYDROcodone-acetaminophen (NORCO) 7.5-325 mg per tablet 90 tablet 0     Sig: Take 1 tablet by mouth every 8 (eight) hours as needed for Pain.    HYDROcodone-acetaminophen (NORCO) 7.5-325 mg per tablet 90 tablet 0     Sig: Take 1 tablet by mouth every 8 (eight) hours as needed for Pain.       Assessment:     1. Lumbosacral radiculopathy    2. Osteoarthrosis multiple sites, not specified as generalized         A's of Opioid Risk Assessment  Activity:Patient can perform ADL.   Analgesia:Patients pain is partially controlled by current medication. Patient has tried OTC medications such as Tylenol and Ibuprofen with out relief.   Adverse Effects: Patient denies constipation or sedation.  Aberrant Behavior:  reviewed with no aberrant drug seeking/taking behavior.  Overdose reversal drug naloxone discussed    Drug screen reviewed      Plan:    Narcan December 2022    Anticoagulated Plavix    History Colon  Cancer    Follows oncology    Again today severe left greater than right leg pain numbness and tingling radicular in nature she has known lumbar stenosis     Following spine surgery [facility] spine considering surgery she states she was told it would be a fusion    MRI lumbar spine [facility] September 19, 2023, multiple levels stenosis, multiple level degenerative changes stenosis multiple levels central canal and bilateral foraminal narrowing most notable L4/5 L5/S1    After discussing options she would like to proceed with    Hold tramadol     [place] 7.51 p.o. q.8 hours    Toradol 60 mg IM, tolerated well    Considering surgical evaluation her lumbar stenosis    Consider repeating lumbar  procedure    Continue home exercise program as directed    Continue current medication    Resume tramadol After pain subsides    Follow-up 2 months    Dr. Fowler, June 2024    Bring original prescription medication bottles/container/box with labels to each visit

## 2023-11-28 ENCOUNTER — OFFICE VISIT (OUTPATIENT)
Dept: PAIN MEDICINE | Facility: CLINIC | Age: 63
End: 2023-11-28
Payer: COMMERCIAL

## 2023-11-28 VITALS
WEIGHT: 206 LBS | SYSTOLIC BLOOD PRESSURE: 194 MMHG | DIASTOLIC BLOOD PRESSURE: 61 MMHG | HEIGHT: 61 IN | RESPIRATION RATE: 18 BRPM | HEART RATE: 57 BPM | BODY MASS INDEX: 38.89 KG/M2

## 2023-11-28 DIAGNOSIS — M54.17 LUMBOSACRAL RADICULOPATHY: Primary | Chronic | ICD-10-CM

## 2023-11-28 DIAGNOSIS — M89.49 OSTEOARTHROSIS MULTIPLE SITES, NOT SPECIFIED AS GENERALIZED: Chronic | ICD-10-CM

## 2023-11-28 PROCEDURE — 99999PBSHW PR PBB SHADOW TECHNICAL ONLY FILED TO HB: ICD-10-PCS | Mod: PBBFAC,,,

## 2023-11-28 PROCEDURE — 3077F SYST BP >= 140 MM HG: CPT | Mod: ,,, | Performed by: PHYSICIAN ASSISTANT

## 2023-11-28 PROCEDURE — 3062F POS MACROALBUMINURIA REV: CPT | Mod: ,,, | Performed by: PHYSICIAN ASSISTANT

## 2023-11-28 PROCEDURE — 99999PBSHW PR PBB SHADOW TECHNICAL ONLY FILED TO HB: Mod: PBBFAC,,,

## 2023-11-28 PROCEDURE — 1159F PR MEDICATION LIST DOCUMENTED IN MEDICAL RECORD: ICD-10-PCS | Mod: ,,, | Performed by: PHYSICIAN ASSISTANT

## 2023-11-28 PROCEDURE — 99215 OFFICE O/P EST HI 40 MIN: CPT | Mod: PBBFAC | Performed by: PHYSICIAN ASSISTANT

## 2023-11-28 PROCEDURE — 1159F MED LIST DOCD IN RCRD: CPT | Mod: ,,, | Performed by: PHYSICIAN ASSISTANT

## 2023-11-28 PROCEDURE — 96372 THER/PROPH/DIAG INJ SC/IM: CPT | Mod: PBBFAC | Performed by: PHYSICIAN ASSISTANT

## 2023-11-28 PROCEDURE — 3062F PR POS MACROALBUMINURIA RESULT DOCUMENTED/REVIEW: ICD-10-PCS | Mod: ,,, | Performed by: PHYSICIAN ASSISTANT

## 2023-11-28 PROCEDURE — 3051F PR MOST RECENT HEMOGLOBIN A1C LEVEL 7.0 - < 8.0%: ICD-10-PCS | Mod: ,,, | Performed by: PHYSICIAN ASSISTANT

## 2023-11-28 PROCEDURE — 3078F PR MOST RECENT DIASTOLIC BLOOD PRESSURE < 80 MM HG: ICD-10-PCS | Mod: ,,, | Performed by: PHYSICIAN ASSISTANT

## 2023-11-28 PROCEDURE — 3077F PR MOST RECENT SYSTOLIC BLOOD PRESSURE >= 140 MM HG: ICD-10-PCS | Mod: ,,, | Performed by: PHYSICIAN ASSISTANT

## 2023-11-28 PROCEDURE — 3078F DIAST BP <80 MM HG: CPT | Mod: ,,, | Performed by: PHYSICIAN ASSISTANT

## 2023-11-28 PROCEDURE — 3066F NEPHROPATHY DOC TX: CPT | Mod: ,,, | Performed by: PHYSICIAN ASSISTANT

## 2023-11-28 PROCEDURE — 3066F PR DOCUMENTATION OF TREATMENT FOR NEPHROPATHY: ICD-10-PCS | Mod: ,,, | Performed by: PHYSICIAN ASSISTANT

## 2023-11-28 PROCEDURE — 99214 PR OFFICE/OUTPT VISIT, EST, LEVL IV, 30-39 MIN: ICD-10-PCS | Mod: S$PBB,25,, | Performed by: PHYSICIAN ASSISTANT

## 2023-11-28 PROCEDURE — 3008F BODY MASS INDEX DOCD: CPT | Mod: ,,, | Performed by: PHYSICIAN ASSISTANT

## 2023-11-28 PROCEDURE — 99214 OFFICE O/P EST MOD 30 MIN: CPT | Mod: S$PBB,25,, | Performed by: PHYSICIAN ASSISTANT

## 2023-11-28 PROCEDURE — 3051F HG A1C>EQUAL 7.0%<8.0%: CPT | Mod: ,,, | Performed by: PHYSICIAN ASSISTANT

## 2023-11-28 PROCEDURE — 3008F PR BODY MASS INDEX (BMI) DOCUMENTED: ICD-10-PCS | Mod: ,,, | Performed by: PHYSICIAN ASSISTANT

## 2023-11-28 RX ORDER — HYDROCODONE BITARTRATE AND ACETAMINOPHEN 7.5; 325 MG/1; MG/1
1 TABLET ORAL EVERY 8 HOURS PRN
Qty: 90 TABLET | Refills: 0 | Status: SHIPPED | OUTPATIENT
Start: 2023-12-31 | End: 2024-01-25 | Stop reason: SDUPTHER

## 2023-11-28 RX ORDER — KETOROLAC TROMETHAMINE 30 MG/ML
60 INJECTION, SOLUTION INTRAMUSCULAR; INTRAVENOUS
Status: COMPLETED | OUTPATIENT
Start: 2023-11-28 | End: 2023-11-28

## 2023-11-28 RX ORDER — HYDROCODONE BITARTRATE AND ACETAMINOPHEN 7.5; 325 MG/1; MG/1
1 TABLET ORAL EVERY 8 HOURS PRN
Qty: 90 TABLET | Refills: 0 | Status: SHIPPED | OUTPATIENT
Start: 2023-12-01 | End: 2024-01-25 | Stop reason: SDUPTHER

## 2023-11-28 RX ADMIN — KETOROLAC TROMETHAMINE 60 MG: 30 INJECTION, SOLUTION INTRAMUSCULAR at 10:11

## 2023-12-05 ENCOUNTER — TELEPHONE (OUTPATIENT)
Dept: PAIN MEDICINE | Facility: CLINIC | Age: 63
End: 2023-12-05
Payer: COMMERCIAL

## 2023-12-05 NOTE — TELEPHONE ENCOUNTER
----- Message from Amna Campa sent at 12/5/2023 12:01 PM CST -----  Regarding: needs to speak to nurse  Needs to speak to nurse please call pt back at [phone]    MONICA WILLIAM   12/05/2023   2:30 PM   Attempted to call no answer, left message.

## 2023-12-06 ENCOUNTER — PATIENT OUTREACH (OUTPATIENT)
Dept: ADMINISTRATIVE | Facility: HOSPITAL | Age: 63
End: 2023-12-06

## 2023-12-06 NOTE — PROGRESS NOTES
Population Health Chart Review & Patient Outreach Details    Per [employer] website, insurance is active and patient is enrolled in [facility]:  [facility] for htn  Changed may appt to [facility]      Updates Requested / Reviewed:     []  Care Everywhere    []     []  External Sources ([facility], [facility], DIS, etc.)    [] [facility]   [] [facility]   [] Other:    []  Care Team Updated   []  Removed  or Duplicate Orders   []  Immunization Reconciliation Completed / Queried    [] [place]   [] [place]   [] [place]   [] [place]      Health Maintenance Topics Addressed and Outreach Outcomes / Actions Taken:             Breast Cancer Screening []  Mammogram Order Placed    []  Mammogram Screening Scheduled    []  External Records Requested & Care Team Updated if Applicable    []  External Records Uploaded & Care Team Updated if Applicable    []  Pt Declined Scheduling Mammogram    []  Pt Will Schedule with External Provider / Order Routed & Care Team Updated if Applicable              Cervical Cancer Screening []  Pap Smear Scheduled in Primary Care or OBGYN    []  External Records Requested & Care Team Updated if Applicable       []  External Records Uploaded, Care Team Updated, & History Updated if Applicable    []  Patient Declined Scheduling Pap Smear    []  Patient Will Schedule with External Provider & Care Team Updated if Applicable                  Colorectal Cancer Screening []  Colonoscopy Case Request / Referral / Home Test Order Placed    []  External Records Requested & Care Team Updated if Applicable    []  External Records Uploaded, Care Team Updated, & History Updated if Applicable    []  Patient Declined Completing Colon Cancer Screening    []  Patient Will Schedule with External Provider & Care Team Updated if Applicable    []  Fit Kit Mailed (add the SmartPhrase under additional notes)    []  Reminded Patient to Complete Home Test                Diabetic Eye Exam []  Eye Exam Screening Order Placed    []  Eye Camera  Scheduled or Optometry/Ophthalmology Referral Placed    []  External Records Requested & Care Team Updated if Applicable    []  External Records Uploaded, Care Team Updated, & History Updated if Applicable    []  Patient Declined Scheduling Eye Exam    []  Patient Will Schedule with External Provider & Care Team Updated if Applicable             Blood Pressure Control []  Primary Care Follow Up Visit Scheduled     []  Remote Blood Pressure Reading Captured    []  Patient Declined Remote Reading or Scheduling Appt - Escalated to PCP    []  Patient Will Call Back or Send Portal Message with Reading                 HbA1c & Other Labs []  Overdue Lab(s) Ordered    []  Overdue Lab(s) Scheduled    []  External Records Uploaded & Care Team Updated if Applicable    []  Primary Care Follow Up Visit Scheduled     []  Reminded Patient to Complete A1c Home Test    []  Patient Declined Scheduling Labs or Will Call Back to Schedule    []  Patient Will Schedule with External Provider / Order Routed, & Care Team Updated if Applicable           Primary Care Appointment []  Primary Care Appt Scheduled    []  Patient Declined Scheduling or Will Call Back to Schedule    []  Pt Established with External Provider, Updated Care Team, & Informed Pt to Notify Payor if Applicable           Medication Adherence /    Statin Use []  Primary Care Appointment Scheduled    []  Patient Reminded to  Prescription    []  Patient Declined, Provider Notified if Needed    []  Sent Provider Message to Review to Evaluate Pt for Statin, Add Exclusion Dx Codes, Document   Exclusion in Problem List, Change Statin Intensity Level to Moderate or High Intensity if Applicable                Osteoporosis Screening []  Dexa Order Placed    []  Dexa Appointment Scheduled    []  External Records Requested & Care Team Updated    []  External Records Uploaded, Care Team Updated, & History Updated if Applicable    []  Patient Declined Scheduling Dexa or Will Call  Back to Schedule    []  Patient Will Schedule with External Provider / Order Routed & Care Team Updated if Applicable       Additional Notes:

## 2023-12-13 ENCOUNTER — OFFICE VISIT (OUTPATIENT)
Dept: FAMILY MEDICINE | Facility: CLINIC | Age: 63
End: 2023-12-13
Payer: COMMERCIAL

## 2023-12-13 VITALS
OXYGEN SATURATION: 100 % | DIASTOLIC BLOOD PRESSURE: 70 MMHG | TEMPERATURE: 98 F | BODY MASS INDEX: 38.71 KG/M2 | WEIGHT: 205 LBS | RESPIRATION RATE: 18 BRPM | SYSTOLIC BLOOD PRESSURE: 150 MMHG | HEIGHT: 61 IN | HEART RATE: 54 BPM

## 2023-12-13 DIAGNOSIS — M25.50 ARTHRALGIA, UNSPECIFIED JOINT: Primary | ICD-10-CM

## 2023-12-13 LAB
CRP SERPL-MCNC: 0.41 MG/DL (ref 0–0.8)
ERYTHROCYTE [SEDIMENTATION RATE] IN BLOOD BY WESTERGREN METHOD: 11 MM/HR (ref 0–30)
RHEUMATOID FACT SER NEPH-ACNC: NEGATIVE [IU]/ML
URATE SERPL-MCNC: 6.3 MG/DL (ref 2.6–6)

## 2023-12-13 PROCEDURE — 3078F PR MOST RECENT DIASTOLIC BLOOD PRESSURE < 80 MM HG: ICD-10-PCS | Mod: ,,, | Performed by: FAMILY MEDICINE

## 2023-12-13 PROCEDURE — 3051F HG A1C>EQUAL 7.0%<8.0%: CPT | Mod: ,,, | Performed by: FAMILY MEDICINE

## 2023-12-13 PROCEDURE — 86140 C-REACTIVE PROTEIN: CPT | Mod: ,,, | Performed by: CLINICAL MEDICAL LABORATORY

## 2023-12-13 PROCEDURE — 3077F PR MOST RECENT SYSTOLIC BLOOD PRESSURE >= 140 MM HG: ICD-10-PCS | Mod: ,,, | Performed by: FAMILY MEDICINE

## 2023-12-13 PROCEDURE — 86430 RHEUMATOID FACTOR SCREEN: ICD-10-PCS | Mod: ,,, | Performed by: CLINICAL MEDICAL LABORATORY

## 2023-12-13 PROCEDURE — 86038 ANTINUCLEAR ANTIBODIES: CPT | Mod: ,,, | Performed by: CLINICAL MEDICAL LABORATORY

## 2023-12-13 PROCEDURE — 3062F POS MACROALBUMINURIA REV: CPT | Mod: ,,, | Performed by: FAMILY MEDICINE

## 2023-12-13 PROCEDURE — 84550 URIC ACID: ICD-10-PCS | Mod: ,,, | Performed by: CLINICAL MEDICAL LABORATORY

## 2023-12-13 PROCEDURE — 3066F NEPHROPATHY DOC TX: CPT | Mod: ,,, | Performed by: FAMILY MEDICINE

## 2023-12-13 PROCEDURE — 1159F PR MEDICATION LIST DOCUMENTED IN MEDICAL RECORD: ICD-10-PCS | Mod: ,,, | Performed by: FAMILY MEDICINE

## 2023-12-13 PROCEDURE — 96372 PR INJECTION,THERAP/PROPH/DIAG2ST, IM OR SUBCUT: ICD-10-PCS | Mod: ,,, | Performed by: FAMILY MEDICINE

## 2023-12-13 PROCEDURE — 3062F PR POS MACROALBUMINURIA RESULT DOCUMENTED/REVIEW: ICD-10-PCS | Mod: ,,, | Performed by: FAMILY MEDICINE

## 2023-12-13 PROCEDURE — 3066F PR DOCUMENTATION OF TREATMENT FOR NEPHROPATHY: ICD-10-PCS | Mod: ,,, | Performed by: FAMILY MEDICINE

## 2023-12-13 PROCEDURE — 84550 ASSAY OF BLOOD/URIC ACID: CPT | Mod: ,,, | Performed by: CLINICAL MEDICAL LABORATORY

## 2023-12-13 PROCEDURE — 3078F DIAST BP <80 MM HG: CPT | Mod: ,,, | Performed by: FAMILY MEDICINE

## 2023-12-13 PROCEDURE — 1160F RVW MEDS BY RX/DR IN RCRD: CPT | Mod: ,,, | Performed by: FAMILY MEDICINE

## 2023-12-13 PROCEDURE — 1160F PR REVIEW ALL MEDS BY PRESCRIBER/CLIN PHARMACIST DOCUMENTED: ICD-10-PCS | Mod: ,,, | Performed by: FAMILY MEDICINE

## 2023-12-13 PROCEDURE — 3051F PR MOST RECENT HEMOGLOBIN A1C LEVEL 7.0 - < 8.0%: ICD-10-PCS | Mod: ,,, | Performed by: FAMILY MEDICINE

## 2023-12-13 PROCEDURE — 86430 RHEUMATOID FACTOR TEST QUAL: CPT | Mod: ,,, | Performed by: CLINICAL MEDICAL LABORATORY

## 2023-12-13 PROCEDURE — 96372 THER/PROPH/DIAG INJ SC/IM: CPT | Mod: ,,, | Performed by: FAMILY MEDICINE

## 2023-12-13 PROCEDURE — 85651 RBC SED RATE NONAUTOMATED: CPT | Mod: ,,, | Performed by: CLINICAL MEDICAL LABORATORY

## 2023-12-13 PROCEDURE — 99214 PR OFFICE/OUTPT VISIT, EST, LEVL IV, 30-39 MIN: ICD-10-PCS | Mod: 25,,, | Performed by: FAMILY MEDICINE

## 2023-12-13 PROCEDURE — 86038 ANA EIA W/REFLEX DSDNA/ENA: ICD-10-PCS | Mod: ,,, | Performed by: CLINICAL MEDICAL LABORATORY

## 2023-12-13 PROCEDURE — 1159F MED LIST DOCD IN RCRD: CPT | Mod: ,,, | Performed by: FAMILY MEDICINE

## 2023-12-13 PROCEDURE — 3008F BODY MASS INDEX DOCD: CPT | Mod: ,,, | Performed by: FAMILY MEDICINE

## 2023-12-13 PROCEDURE — 99214 OFFICE O/P EST MOD 30 MIN: CPT | Mod: 25,,, | Performed by: FAMILY MEDICINE

## 2023-12-13 PROCEDURE — 3077F SYST BP >= 140 MM HG: CPT | Mod: ,,, | Performed by: FAMILY MEDICINE

## 2023-12-13 PROCEDURE — 85651 SEDIMENTATION RATE, AUTOMATED: ICD-10-PCS | Mod: ,,, | Performed by: CLINICAL MEDICAL LABORATORY

## 2023-12-13 PROCEDURE — 3008F PR BODY MASS INDEX (BMI) DOCUMENTED: ICD-10-PCS | Mod: ,,, | Performed by: FAMILY MEDICINE

## 2023-12-13 PROCEDURE — 86140 C-REACTIVE PROTEIN: ICD-10-PCS | Mod: ,,, | Performed by: CLINICAL MEDICAL LABORATORY

## 2023-12-13 RX ORDER — METHYLPREDNISOLONE ACETATE 40 MG/ML
40 INJECTION, SUSPENSION INTRA-ARTICULAR; INTRALESIONAL; INTRAMUSCULAR; SOFT TISSUE
Status: CANCELLED | OUTPATIENT
Start: 2023-12-13 | End: 2023-12-13

## 2023-12-13 RX ORDER — KETOROLAC TROMETHAMINE 30 MG/ML
30 INJECTION, SOLUTION INTRAMUSCULAR; INTRAVENOUS
Status: COMPLETED | OUTPATIENT
Start: 2023-12-13 | End: 2023-12-13

## 2023-12-13 RX ORDER — DEXAMETHASONE SODIUM PHOSPHATE 4 MG/ML
4 INJECTION, SOLUTION INTRA-ARTICULAR; INTRALESIONAL; INTRAMUSCULAR; INTRAVENOUS; SOFT TISSUE
Status: CANCELLED | OUTPATIENT
Start: 2023-12-13 | End: 2023-12-13

## 2023-12-13 RX ADMIN — KETOROLAC TROMETHAMINE 30 MG: 30 INJECTION, SOLUTION INTRAMUSCULAR; INTRAVENOUS at 10:12

## 2023-12-13 NOTE — PROGRESS NOTES
"New Clinic Note    Dilma Cr is a 63 y.o. female     CC:   Chief Complaint   Patient presents with    Leg Pain     Complains of bilateral leg pain and tingling. Had vitamin infusion at [facility] yesterday and stated she has been nauseated ever since.  Patient  stated the infusion she received yesterday at [facility] ran her blood sugar up to 500 last night. Took it again in triage area this am and her finger stick glucose was down to 200. Wants a arthritis referral due to back pain. Stated her last Xrays showed arthritis.     Hypertension     Blood pressure is elevated during this visit.     Diabetes    Hyperlipidemia    Anxiety        Subjective    History of Present Illness HPI   Patient complains of lower back and left knee pain for months. Patient asked to be referred to an "arthritis doctor".  Patient is seen at [facility] for her bulging disk. She also sees Dr. Fowler for pain treatment. She has no known autoimmune disease.     Current Outpatient Medications:     ascorbic acid, vitamin C, 250 mg Chew, Take 250 mg by mouth once daily., Disp: , Rfl:     BINAXNOW COVID-19 AG SELF TEST Kit, Use as Directed on the Package, Disp: , Rfl:     clopidogreL (PLAVIX) 75 mg tablet, Take 1 tablet (75 mg total) by mouth once daily., Disp: 90 tablet, Rfl: 1    cyanocobalamin 2000 MCG tablet, Take 1 tablet by mouth once daily., Disp: , Rfl:     cyclobenzaprine (FLEXERIL) 5 MG tablet, Take 5 mg by mouth 3 (three) times daily as needed., Disp: , Rfl:     ezetimibe (ZETIA) 10 mg tablet, Take 10 mg by mouth., Disp: , Rfl:     FREESTYLE BECCA 14 DAY SENSOR Kit, USE TO CHECK GLUCOSE 4 TIMES DAILY, Disp: , Rfl:     FREESTYLE BECCA 3 SENSOR Aleena, USE 1 PATCH TOPICALLY EVERY DAY FOR 2 WEEKS, Disp: , Rfl:     furosemide (LASIX) 20 MG tablet, Take 1 tablet (20 mg total) by mouth daily as needed (fluid)., Disp: 90 tablet, Rfl: 1    HYDROcodone-acetaminophen (NORCO) 7.5-325 mg per tablet, Take 1 tablet by mouth " every 8 (eight) hours as needed for Pain., Disp: 90 tablet, Rfl: 0    [START ON 12/31/2023] HYDROcodone-acetaminophen (NORCO) 7.5-325 mg per tablet, Take 1 tablet by mouth every 8 (eight) hours as needed for Pain., Disp: 90 tablet, Rfl: 0    iron-vitamin C 100-250 mg, ICAR-C, 100-250 mg Tab, Take 1 tablet by mouth 2 (two) times daily with meals., Disp: , Rfl:     JARDIANCE 25 mg tablet, Take 1 tablet (25 mg total) by mouth once daily., Disp: 30 tablet, Rfl: 5    NOVOLOG FLEXPEN U-100 INSULIN 100 unit/mL (3 mL) InPn pen, Inject 20 Units into the skin 2 (two) times a day., Disp: 12 each, Rfl: 1    olmesartan-hydrochlorothiazide (BENICAR HCT) 40-25 mg per tablet, Take 1 tablet by mouth once daily., Disp: , Rfl:     pramipexole (MIRAPEX) 0.125 MG tablet, Take 1 tablet (0.125 mg total) by mouth every evening., Disp: 30 tablet, Rfl: 5    prochlorperazine (COMPAZINE) 10 MG tablet, Take 1 tablet by mouth daily as needed., Disp: , Rfl:     rosuvastatin (CRESTOR) 40 MG Tab, Take 1 tablet by mouth once daily, Disp: 90 tablet, Rfl: 0    sertraline (ZOLOFT) 25 MG tablet, Take 1 tablet (25 mg total) by mouth once daily., Disp: 90 tablet, Rfl: 3    sotaloL (BETAPACE) 120 MG Tab, Take 120 mg by mouth every 12 (twelve) hours., Disp: , Rfl:     traMADoL (ULTRAM) 50 mg tablet, Take 1 tablet (50 mg total) by mouth every 12 (twelve) hours as needed for Pain., Disp: 60 tablet, Rfl: 2    TRESIBA FLEXTOUCH U-100 100 unit/mL (3 mL) insulin pen, Inject 24 Units into the skin once daily., Disp: 3 pen, Rfl: 1    naloxone (NARCAN) 4 mg/actuation Spry, 1 spray by Nasal route once as needed., Disp: , Rfl:     nitroGLYCERIN (NITROSTAT) 0.4 MG SL tablet, Place 1 tablet (0.4 mg total) under the tongue every 5 (five) minutes as needed for Chest pain., Disp: 30 tablet, Rfl: 0    OZEMPIC 1 mg/dose (4 mg/3 mL), , Disp: , Rfl:      Past Medical History:   Diagnosis Date    Acute superficial gastritis without hemorrhage 06/21/2022    Arthritis     Cancer      colon cancer    Chronic kidney disease, stage 3b     Colon cancer     Coronary artery disease     Depression     Diabetes mellitus, type 2     Diverticula, colon 06/22/2022    GERD (gastroesophageal reflux disease)     H/O right hemicolectomy 06/22/2022    HH (hiatus hernia) 06/21/2022    Hyperlipidemia     Myocardial infarction     2 stents in 05/14/2021 Dr Porter    Renal disorder         Family History   Problem Relation Age of Onset    Hypertension Mother     Diabetes Mother     Hypertension Father     Diabetes Father     Breast cancer Sister     Hypertension Sister     Hypertension Brother         Past Surgical History:   Procedure Laterality Date    BREAST SURGERY      C5-C6 RADHA  8-, 7-, 6-1-2016    Dr Fowler    CARDIAC SURGERY  02/07/2023    CERVICAL SPINE SURGERY      COLON SURGERY      EPIDURAL STEROID INJECTION INTO LUMBAR SPINE N/A 03/29/2022    Procedure: L4-5 RADHA;  Surgeon: Gela Fowler MD;  Location: [facility];  Service: Pain Management;  Laterality: N/A;  PT AWARE ON OV TO BE TESTED\  PLAVIX TO BE HELD FOR 7 DAYS PRIOR TO PROCEDURE PER DR FOWLER AND DR PORTER  3-28  message left on vm re: covid    HYSTERECTOMY      LEFT HEART CATHETERIZATION Left 05/14/2021    Procedure: Left heart cath;  Surgeon: Mateus Guan MD;  Location: [facility] CATH LAB;  Service: Cardiology;  Laterality: Left;    LEFT HEART CATHETERIZATION Left 06/20/2022    Procedure: Left heart cath;  Surgeon: Oliverio Bautista MD;  Location: [facility] CATH LAB;  Service: Cardiology;  Laterality: Left;    MEDIPORT REMOVAL  10/14/2021    Dr Kraft    OOPHORECTOMY      right rotator cuff repair      in [place]    TOTAL REDUCTION MAMMOPLASTY          Social History     Socioeconomic History    Marital status:      Spouse name: Jorge Alberto    Number of children: 2   Occupational History    Occupation:  at [employer] for past 27 years   Tobacco Use    Smoking status: Never     Passive exposure:  "Never    Smokeless tobacco: Never   Substance and Sexual Activity    Alcohol use: Never    Drug use: Never    Sexual activity: Yes   Social History Narrative    Worked at [employer] as  for past 29 years. H/O colon cancer. Watchman procedure back in Feb (heart).  Sees Dr Stover Oncologist. Cardiologist Dr Alina Garcia ([place]). Sees Endocrinologist Koko Rivera with  [facility].         Review of Systems   Constitutional:  Negative for fatigue and fever.   HENT:  Negative for ear pain, postnasal drip, rhinorrhea and sinus pressure/congestion.    Respiratory:  Negative for cough and shortness of breath.    Cardiovascular:  Negative for chest pain.   Gastrointestinal:  Negative for abdominal pain, diarrhea, nausea and vomiting.   Genitourinary:  Negative for dysuria.   Musculoskeletal:  Positive for arthralgias.   Neurological:  Negative for headaches.        BP (!) 150/70 (BP Location: Left arm, Patient Position: Sitting) Comment: Manual  Pulse (!) 54   Temp 97.8 °F (36.6 °C) (Oral)   Resp 18   Ht 5' 1" (1.549 m)   Wt 93 kg (205 lb)   LMP  (LMP Unknown)   SpO2 100%   BMI 38.73 kg/m²      Physical Exam  HENT:      Head: Normocephalic and atraumatic.   Cardiovascular:      Rate and Rhythm: Normal rate and regular rhythm.   Pulmonary:      Effort: Pulmonary effort is normal.      Breath sounds: Normal breath sounds.   Neurological:      Mental Status: She is alert and oriented to person, place, and time.   Psychiatric:         Mood and Affect: Mood normal.         Behavior: Behavior normal.          Assessment and Plan      ICD-10-CM ICD-9-CM   1. Arthralgia, unspecified joint  M25.50 719.40        1. Arthralgia, unspecified joint  Not controlled. Explained to patient that we would check her for an autoimmune disease. If she is negative, explained to patient there would not be another specialist to send her to for back pain. Can refer her to ortho for her knees.   -     Uric Acid; Future; " Expected date: 12/13/2023  -     Rheumatoid Factor Screen; Future; Expected date: 12/13/2023  -     C-Reactive Protein; Future; Expected date: 12/13/2023  -     DANIEL EIA w/ Reflex to dsDNA/CROW; Future; Expected date: 12/13/2023  -     Sedimentation Rate; Future; Expected date: 12/13/2023  -     ketorolac injection 30 mg         Follow up if symptoms worsen or fail to improve.

## 2023-12-14 LAB — ANA SER QL: NEGATIVE

## 2023-12-19 ENCOUNTER — HOSPITAL ENCOUNTER (OUTPATIENT)
Dept: RADIOLOGY | Facility: HOSPITAL | Age: 63
Discharge: HOME | End: 2023-12-19
Attending: FAMILY MEDICINE
Payer: COMMERCIAL

## 2023-12-19 VITALS — HEIGHT: 62 IN | BODY MASS INDEX: 37.91 KG/M2 | WEIGHT: 206 LBS

## 2023-12-19 DIAGNOSIS — Z12.31 BREAST CANCER SCREENING BY MAMMOGRAM: ICD-10-CM

## 2023-12-19 PROCEDURE — 77067 SCR MAMMO BI INCL CAD: CPT | Mod: TC

## 2024-01-25 NOTE — PROGRESS NOTES
Subjective:         Patient ID: Dilma Cr is a 63 y.o. female.    Chief Complaint: Low-back Pain        Pain  This is a chronic problem. The current episode started more than 1 year ago. The problem occurs daily. The problem has been waxing and waning. Associated symptoms include arthralgias and neck pain. Pertinent negatives include no anorexia, chest pain, chills, coughing, diaphoresis, fever, sore throat, vertigo or vomiting.     Review of Systems   Constitutional:  Negative for activity change, appetite change, chills, diaphoresis, fever and unexpected weight change.   HENT:  Negative for drooling, ear pain, facial swelling, nosebleeds, sore throat, trouble swallowing, voice change and goiter.    Eyes:  Negative for photophobia, pain, discharge, redness and visual disturbance.   Respiratory:  Negative for apnea, cough, choking, chest tightness, shortness of breath, wheezing and stridor.    Cardiovascular:  Negative for chest pain, palpitations and leg swelling.   Gastrointestinal:  Negative for abdominal distention, anorexia, diarrhea, rectal pain, vomiting and fecal incontinence.   Endocrine: Negative for cold intolerance, heat intolerance, polydipsia, polyphagia and polyuria.   Genitourinary:  Negative for flank pain, frequency and hot flashes.   Musculoskeletal:  Positive for arthralgias, back pain and neck pain. Negative for neck stiffness.   Integumentary:  Negative for color change and pallor.   Allergic/Immunologic: Negative for immunocompromised state.   Neurological:  Negative for dizziness, vertigo, seizures, syncope, facial asymmetry, speech difficulty, light-headedness, memory loss and coordination difficulties.   Hematological:  Negative for adenopathy. Does not bruise/bleed easily.   Psychiatric/Behavioral:  Negative for agitation, behavioral problems, confusion, decreased concentration, dysphoric mood, hallucinations, self-injury and suicidal ideas. The patient is not nervous/anxious and  is not hyperactive.            Past Medical History:   Diagnosis Date    Acute superficial gastritis without hemorrhage 06/21/2022    Arthritis     Cancer     colon cancer    Chronic kidney disease, stage 3b     Colon cancer     Coronary artery disease     Depression     Diabetes mellitus, type 2     Diverticula, colon 06/22/2022    GERD (gastroesophageal reflux disease)     H/O right hemicolectomy 06/22/2022    HH (hiatus hernia) 06/21/2022    Hyperlipidemia     Myocardial infarction     2 stents in 05/14/2021 Dr Porter    Renal disorder      Past Surgical History:   Procedure Laterality Date    BREAST SURGERY      C5-C6 RADHA  8-, 7-, 6-1-2016    Dr Fowler    CARDIAC SURGERY  02/07/2023    CERVICAL SPINE SURGERY      COLON SURGERY      EPIDURAL STEROID INJECTION INTO LUMBAR SPINE N/A 03/29/2022    Procedure: L4-5 RADHA;  Surgeon: Gela Fowler MD;  Location: [facility];  Service: Pain Management;  Laterality: N/A;  PT AWARE ON OV TO BE TESTED\  PLAVIX TO BE HELD FOR 7 DAYS PRIOR TO PROCEDURE PER DR FOWLER AND DR PORTER  3-28  message left on vm re: covid    HYSTERECTOMY      LEFT HEART CATHETERIZATION Left 05/14/2021    Procedure: Left heart cath;  Surgeon: Mateus Guan MD;  Location: [facility] CATH LAB;  Service: Cardiology;  Laterality: Left;    LEFT HEART CATHETERIZATION Left 06/20/2022    Procedure: Left heart cath;  Surgeon: Oliverio Bautista MD;  Location: [facility] CATH LAB;  Service: Cardiology;  Laterality: Left;    LUMBAR SPINE SURGERY  12/28/2023    MEDIPORT REMOVAL  10/14/2021    Dr Kraft    OOPHORECTOMY      right rotator cuff repair      in [place]    TOTAL REDUCTION MAMMOPLASTY       Social History     Socioeconomic History    Marital status:      Spouse name: Jorge Alberto    Number of children: 2   Occupational History    Occupation:  at [employer] for past 27 years   Tobacco Use    Smoking status: Never     Passive exposure: Never    Smokeless tobacco:  "Never   Substance and Sexual Activity    Alcohol use: Never    Drug use: Never    Sexual activity: Yes   Social History Narrative    Worked at [employer] as  for past 29 years. H/O colon cancer. Watchman procedure back in Feb (heart).  Sees Dr Stover Oncologist. Cardiologist Dr Alina Garcia ([place]). Sees Endocrinologist Koko Rivera with [facility].      Family History   Problem Relation Age of Onset    Hypertension Mother     Diabetes Mother     Hypertension Father     Diabetes Father     Breast cancer Sister     Hypertension Sister     Hypertension Brother      Review of patient's allergies indicates:  No Known Allergies     Objective:  Vitals:    01/30/24 1001   BP: (!) 150/58   Pulse: 61   Resp: 18   Weight: 89.8 kg (198 lb)   Height: 5' 2" (1.575 m)   PainSc:   9         Physical Exam  Vitals and nursing note reviewed. Exam conducted with a chaperone present.   Constitutional:       General: She is awake. She is not in acute distress.     Appearance: Normal appearance. She is not ill-appearing, toxic-appearing or diaphoretic.   HENT:      Head: Normocephalic and atraumatic.      Nose: Nose normal.      Mouth/Throat:      Mouth: Mucous membranes are moist.      Pharynx: Oropharynx is clear.   Eyes:      Conjunctiva/sclera: Conjunctivae normal.      Pupils: Pupils are equal, round, and reactive to light.   Cardiovascular:      Rate and Rhythm: Normal rate.   Pulmonary:      Effort: Pulmonary effort is normal. No respiratory distress.   Abdominal:      Palpations: Abdomen is soft.      Tenderness: There is no guarding.   Musculoskeletal:         General: Normal range of motion.      Cervical back: Normal range of motion and neck supple. No rigidity.      Thoracic back: Tenderness present.      Lumbar back: Tenderness present.   Skin:     General: Skin is warm and dry.      Coloration: Skin is not jaundiced or pale.   Neurological:      General: No focal deficit present.      Mental Status: " She is alert and oriented to person, place, and time. Mental status is at baseline.      Cranial Nerves: No cranial nerve deficit (II-XII).   Psychiatric:         Mood and Affect: Mood normal.         Behavior: Behavior normal. Behavior is cooperative.         Thought Content: Thought content normal.           Mammo Digital Screening Bilat w/ Michael  Narrative: Result:   Mammo Digital Screening Bilat w/ Michael     History:  Patient is 63 y.o. and is seen for a screening mammogram.    Films Compared:  Prior images (if available) were compared.     Findings:  This procedure was performed using tomosynthesis. Computer-aided detection   was utilized in the interpretation of this examination.  The breasts have scattered areas of fibroglandular density. There is no   evidence of suspicious masses, calcifications, or other abnormal findings.  Impression: Bilateral  There is no mammographic evidence of malignancy.    BI-RADS Category:   Overall: 1 - Negative       Recommendation:  Routine screening mammogram in 1 year is recommended.    Your estimated lifetime risk of breast cancer (to age 85) based on   Tyrer-Cuzick risk assessment model is Tyrer-Cuzick: 9.46 %. According to   the American Cancer Society, patients with a lifetime breast cancer risk   of 20% or higher might benefit from supplemental screening tests.         Office Visit on 12/13/2023   Component Date Value Ref Range Status    Uric Acid 12/13/2023 6.3 (H)  2.6 - 6.0 mg/dL Final    RA 12/13/2023 Negative  Negative Final    CRP 12/13/2023 0.41  0.00 - 0.80 mg/dL Final    DANIEL Screen 12/13/2023 Negative  Negative Final    ESR Westergren 12/13/2023 11  0 - 30 mm/Hr Final   Office Visit on 11/15/2023   Component Date Value Ref Range Status    Glucose, Fasting 11/15/2023 105  74 - 106 mg/dL Final    Triglycerides 11/15/2023 48  35 - 150 mg/dL Final    Cholesterol 11/15/2023 150  0 - 200 mg/dL Final    HDL Cholesterol 11/15/2023 73 (H)  40 - 60 mg/dL Final     Cholesterol/HDL Ratio (Risk Factor) 11/15/2023 2.1   Final    Non-HDL 11/15/2023 77  mg/dL Final    LDL Calculated 11/15/2023 67  mg/dL Final    VLDL 11/15/2023 10  mg/dL Final   Admission on 10/27/2023, Discharged on 10/28/2023   Component Date Value Ref Range Status    Sodium 10/27/2023 139  136 - 145 mmol/L Final    Potassium 10/27/2023 4.0  3.5 - 5.1 mmol/L Final    Chloride 10/27/2023 110 (H)  98 - 107 mmol/L Final    CO2 10/27/2023 24  21 - 32 mmol/L Final    Anion Gap 10/27/2023 9  7 - 16 mmol/L Final    Glucose 10/27/2023 125 (H)  74 - 106 mg/dL Final    BUN 10/27/2023 24 (H)  7 - 18 mg/dL Final    Creatinine 10/27/2023 1.07 (H)  0.55 - 1.02 mg/dL Final    BUN/Creatinine Ratio 10/27/2023 22 (H)  6 - 20 Final    Calcium 10/27/2023 8.7  8.5 - 10.1 mg/dL Final    Total Protein 10/27/2023 7.0  6.4 - 8.2 g/dL Final    Albumin 10/27/2023 3.2 (L)  3.5 - 5.0 g/dL Final    Globulin 10/27/2023 3.8  2.0 - 4.0 g/dL Final    A/G Ratio 10/27/2023 0.8   Final    Bilirubin, Total 10/27/2023 0.4  >0.0 - 1.2 mg/dL Final    Alk Phos 10/27/2023 83  50 - 130 U/L Final    ALT 10/27/2023 42  13 - 56 U/L Final    AST 10/27/2023 30  15 - 37 U/L Final    eGFR 10/27/2023 59 (L)  >=60 mL/min/1.73m2 Final    PT 10/27/2023 12.8  11.7 - 14.7 seconds Final    INR 10/27/2023 0.97  <=3.30 Final    TSH 10/27/2023 1.500  0.358 - 3.740 uIU/mL Final    Triglycerides 10/27/2023 55  35 - 150 mg/dL Final    Cholesterol 10/27/2023 153  0 - 200 mg/dL Final    HDL Cholesterol 10/27/2023 78 (H)  40 - 60 mg/dL Final    Cholesterol/HDL Ratio (Risk Factor) 10/27/2023 2.0   Final    Non-HDL 10/27/2023 75  mg/dL Final    LDL Calculated 10/27/2023 64  mg/dL Final    VLDL 10/27/2023 11  mg/dL Final    WBC 10/27/2023 6.19  4.50 - 11.00 K/uL Final    RBC 10/27/2023 4.38  4.20 - 5.40 M/uL Final    Hemoglobin 10/27/2023 11.9 (L)  12.0 - 16.0 g/dL Final    Hematocrit 10/27/2023 37.7 (L)  38.0 - 47.0 % Final    MCV 10/27/2023 86.1  80.0 - 96.0 fL Final    MCH  10/27/2023 27.2  27.0 - 31.0 pg Final    MCHC 10/27/2023 31.6 (L)  32.0 - 36.0 g/dL Final    RDW 10/27/2023 14.1  11.5 - 14.5 % Final    Platelet Count 10/27/2023 334  150 - 400 K/uL Final    MPV 10/27/2023 11.5  9.4 - 12.4 fL Final    Neutrophils % 10/27/2023 54.6  53.0 - 65.0 % Final    Lymphocytes % 10/27/2023 36.5  27.0 - 41.0 % Final    Monocytes % 10/27/2023 6.9 (H)  2.0 - 6.0 % Final    Eosinophils % 10/27/2023 1.3  1.0 - 4.0 % Final    Basophils % 10/27/2023 0.5  0.0 - 1.0 % Final    Immature Granulocytes % 10/27/2023 0.2  0.0 - 0.4 % Final    nRBC, Auto 10/27/2023 0.0  <=0.0 % Final    Neutrophils, Abs 10/27/2023 3.38  1.80 - 7.70 K/uL Final    Lymphocytes, Absolute 10/27/2023 2.26  1.00 - 4.80 K/uL Final    Monocytes, Absolute 10/27/2023 0.43  0.00 - 0.80 K/uL Final    Eosinophils, Absolute 10/27/2023 0.08  0.00 - 0.50 K/uL Final    Basophils, Absolute 10/27/2023 0.03  0.00 - 0.20 K/uL Final    Immature Granulocytes, Absolute 10/27/2023 0.01  0.00 - 0.04 K/uL Final    nRBC, Absolute 10/27/2023 0.00  <=0.00 x10e3/uL Final    Diff Type 10/27/2023 Auto   Final    Troponin I High Sensitivity 10/27/2023 9.1  <=60.4 pg/mL Final    POC Glucose 10/27/2023 143 (H)  70 - 105 mg/dL Final    BSA 10/27/2023 1.96  m2 Final    LVOT stroke volume 10/27/2023 78.78  cm3 Final    LVIDd 10/27/2023 3.8  3.5 - 6.0 cm Final    LV Systolic Volume 10/27/2023 23.15  mL Final    LV Systolic Volume Index 10/27/2023 12.4  mL/m2 Final    LVIDs 10/27/2023 2.2  2.1 - 4.0 cm Final    LV Diastolic Volume 10/27/2023 62.54  mL Final    LV Diastolic Volume Index 10/27/2023 33.44  mL/m2 Final    IVS 10/27/2023 1.1  0.6 - 1.1 cm Final    LVOT diameter 10/27/2023 1.9  cm Final    LVOT area 10/27/2023 2.8  cm2 Final    FS 10/27/2023 42  28 - 44 % Final    Left Ventricle Relative Wall Thick* 10/27/2023 0.89  cm Final    Posterior Wall 10/27/2023 1.7 (A)  0.6 - 1.1 cm Final    LV mass 10/27/2023 194.14  g Final    LV Mass Index 10/27/2023 104  g/m2  Final    MV Peak E Fran 10/27/2023 1.31  m/s Final    TDI LATERAL 10/27/2023 0.09  m/s Final    TDI SEPTAL 10/27/2023 0.09  m/s Final    E/E' ratio 10/27/2023 14.56  m/s Final    MV Peak A Fran 10/27/2023 0.87  m/s Final    TR Max Fran 10/27/2023 2.11  m/s Final    E/A ratio 10/27/2023 1.51   Final    E wave deceleration time 10/27/2023 202.26  msec Final    LV SEPTAL E/E' RATIO 10/27/2023 14.56  m/s Final    LV LATERAL E/E' RATIO 10/27/2023 14.56  m/s Final    LVOT peak fran 10/27/2023 1.09  m/s Final    Left Ventricular Outflow Tract Yanna* 10/27/2023 0.76  cm/s Final    Left Ventricular Outflow Tract Yanna* 10/27/2023 2.56  mmHg Final    LA size 10/27/2023 2.89  cm Final    Left Atrium Major Axis 10/27/2023 3.32  cm Final    RVDD 10/27/2023 3.79  cm Final    TAPSE 10/27/2023 1.46  cm Final    RA Major Axis 10/27/2023 3.75  cm Final    AV mean gradient 10/27/2023 4  mmHg Final    AV peak gradient 10/27/2023 9  mmHg Final    Ao peak fran 10/27/2023 1.47  m/s Final    Ao VTI 10/27/2023 36.60  cm Final    LVOT peak VTI 10/27/2023 27.80  cm Final    AV valve area 10/27/2023 2.15  cm² Final    AV Velocity Ratio 10/27/2023 0.74   Final    AV index (prosthetic) 10/27/2023 0.76   Final    CHINA by Velocity Ratio 10/27/2023 2.10  cm² Final    Triscuspid Valve Regurgitation Pea* 10/27/2023 18  mmHg Final    PV PEAK VELOCITY 10/27/2023 1.06  m/s Final    PV peak gradient 10/27/2023 4  mmHg Final    Ao root annulus 10/27/2023 2.00  cm Final    IVC diameter 10/27/2023 1.23  cm Final    Mean e' 10/27/2023 0.09  m/s Final    ZLVIDS 10/27/2023 -3.00   Final    ZLVIDD 10/27/2023 -3.16   Final    AORTIC VALVE CUSP SEPERATION 10/27/2023 1.41  cm Final    TV resting pulmonary artery pressu* 10/27/2023 21  mmHg Final    RV TB RVSP 10/27/2023 5  mmHg Final    Est. RA pres 10/27/2023 3  mmHg Final    Cody's Biplane MOD Ejection Fra* 10/27/2023 57  % Final    Hemoglobin A1C 10/27/2023 7.8 (H)  4.5 - 6.6 % Final    Estimated Average Glucose  10/27/2023 174  mg/dL Final    POC Glucose 10/27/2023 103  70 - 105 mg/dL Final    POC Glucose 10/27/2023 170 (H)  70 - 105 mg/dL Final    POC Glucose 10/27/2023 109 (H)  70 - 105 mg/dL Final    Troponin I High Sensitivity 10/28/2023 10.9  <=60.4 pg/mL Final    Sodium 10/28/2023 139  136 - 145 mmol/L Final    Potassium 10/28/2023 3.7  3.5 - 5.1 mmol/L Final    Chloride 10/28/2023 107  98 - 107 mmol/L Final    CO2 10/28/2023 24  21 - 32 mmol/L Final    Anion Gap 10/28/2023 12  7 - 16 mmol/L Final    Glucose 10/28/2023 193 (H)  74 - 106 mg/dL Final    BUN 10/28/2023 24 (H)  7 - 18 mg/dL Final    Creatinine 10/28/2023 1.01  0.55 - 1.02 mg/dL Final    BUN/Creatinine Ratio 10/28/2023 24 (H)  6 - 20 Final    Calcium 10/28/2023 8.7  8.5 - 10.1 mg/dL Final    Total Protein 10/28/2023 6.4  6.4 - 8.2 g/dL Final    Albumin 10/28/2023 3.0 (L)  3.5 - 5.0 g/dL Final    Globulin 10/28/2023 3.4  2.0 - 4.0 g/dL Final    A/G Ratio 10/28/2023 0.9   Final    Bilirubin, Total 10/28/2023 0.5  >0.0 - 1.2 mg/dL Final    Alk Phos 10/28/2023 79  50 - 130 U/L Final    ALT 10/28/2023 34  13 - 56 U/L Final    AST 10/28/2023 20  15 - 37 U/L Final    eGFR 10/28/2023 63  >=60 mL/min/1.73m2 Final    Magnesium 10/28/2023 2.2  1.7 - 2.3 mg/dL Final    Phosphorus 10/28/2023 4.1  2.5 - 4.5 mg/dL Final    Troponin I High Sensitivity 10/28/2023 59.0  <=60.4 pg/mL Final    WBC 10/28/2023 8.53  4.50 - 11.00 K/uL Final    RBC 10/28/2023 4.16 (L)  4.20 - 5.40 M/uL Final    Hemoglobin 10/28/2023 11.3 (L)  12.0 - 16.0 g/dL Final    Hematocrit 10/28/2023 34.6 (L)  38.0 - 47.0 % Final    MCV 10/28/2023 83.2  80.0 - 96.0 fL Final    MCH 10/28/2023 27.2  27.0 - 31.0 pg Final    MCHC 10/28/2023 32.7  32.0 - 36.0 g/dL Final    RDW 10/28/2023 13.7  11.5 - 14.5 % Final    Platelet Count 10/28/2023 310  150 - 400 K/uL Final    MPV 10/28/2023 11.3  9.4 - 12.4 fL Final    Neutrophils % 10/28/2023 80.5 (H)  53.0 - 65.0 % Final    Lymphocytes % 10/28/2023 13.7 (L)  27.0 -  41.0 % Final    Monocytes % 10/28/2023 4.6  2.0 - 6.0 % Final    Eosinophils % 10/28/2023 0.4 (L)  1.0 - 4.0 % Final    Basophils % 10/28/2023 0.4  0.0 - 1.0 % Final    Immature Granulocytes % 10/28/2023 0.4  0.0 - 0.4 % Final    nRBC, Auto 10/28/2023 0.0  <=0.0 % Final    Neutrophils, Abs 10/28/2023 6.88  1.80 - 7.70 K/uL Final    Lymphocytes, Absolute 10/28/2023 1.17  1.00 - 4.80 K/uL Final    Monocytes, Absolute 10/28/2023 0.39  0.00 - 0.80 K/uL Final    Eosinophils, Absolute 10/28/2023 0.03  0.00 - 0.50 K/uL Final    Basophils, Absolute 10/28/2023 0.03  0.00 - 0.20 K/uL Final    Immature Granulocytes, Absolute 10/28/2023 0.03  0.00 - 0.04 K/uL Final    nRBC, Absolute 10/28/2023 0.00  <=0.00 x10e3/uL Final    Diff Type 10/28/2023 Auto   Final    POC Glucose 10/28/2023 172 (H)  70 - 105 mg/dL Final    POC Glucose 10/28/2023 188 (H)  70 - 105 mg/dL Final    POC Glucose 10/28/2023 191 (H)  70 - 105 mg/dL Final   Lab Visit on 09/25/2023   Component Date Value Ref Range Status    Sodium 09/25/2023 142  136 - 145 mmol/L Final    Potassium 09/25/2023 4.2  3.5 - 5.1 mmol/L Final    Chloride 09/25/2023 113 (H)  98 - 107 mmol/L Final    CO2 09/25/2023 24  21 - 32 mmol/L Final    Anion Gap 09/25/2023 9  7 - 16 mmol/L Final    Glucose 09/25/2023 140 (H)  74 - 106 mg/dL Final    BUN 09/25/2023 37 (H)  7 - 18 mg/dL Final    Creatinine 09/25/2023 1.16 (H)  0.55 - 1.02 mg/dL Final    BUN/Creatinine Ratio 09/25/2023 32 (H)  6 - 20 Final    Calcium 09/25/2023 8.2 (L)  8.5 - 10.1 mg/dL Final    Total Protein 09/25/2023 6.2 (L)  6.4 - 8.2 g/dL Final    Albumin 09/25/2023 3.2 (L)  3.5 - 5.0 g/dL Final    Globulin 09/25/2023 3.0  2.0 - 4.0 g/dL Final    A/G Ratio 09/25/2023 1.1   Final    Bilirubin, Total 09/25/2023 0.2  >0.0 - 1.2 mg/dL Final    Alk Phos 09/25/2023 85  50 - 130 U/L Final    ALT 09/25/2023 60 (H)  13 - 56 U/L Final    AST 09/25/2023 34  15 - 37 U/L Final    eGFR 09/25/2023 54 (L)  >=60 mL/min/1.73m2 Final    CRP  09/25/2023 <0.29  0.00 - 0.80 mg/dL Final    ESR Mianergren 09/25/2023 55 (H)  0 - 30 mm/Hr Final    WBC 09/25/2023 7.42  4.50 - 11.00 K/uL Final    RBC 09/25/2023 4.01 (L)  4.20 - 5.40 M/uL Final    Hemoglobin 09/25/2023 10.9 (L)  12.0 - 16.0 g/dL Final    Hematocrit 09/25/2023 34.9 (L)  38.0 - 47.0 % Final    MCV 09/25/2023 87.0  80.0 - 96.0 fL Final    MCH 09/25/2023 27.2  27.0 - 31.0 pg Final    MCHC 09/25/2023 31.2 (L)  32.0 - 36.0 g/dL Final    RDW 09/25/2023 14.5  11.5 - 14.5 % Final    Platelet Count 09/25/2023 300  150 - 400 K/uL Final    MPV 09/25/2023 11.6  9.4 - 12.4 fL Final    Neutrophils % 09/25/2023 67.9 (H)  53.0 - 65.0 % Final    Lymphocytes % 09/25/2023 24.4 (L)  27.0 - 41.0 % Final    Monocytes % 09/25/2023 6.3 (H)  2.0 - 6.0 % Final    Eosinophils % 09/25/2023 0.7 (L)  1.0 - 4.0 % Final    Basophils % 09/25/2023 0.3  0.0 - 1.0 % Final    Immature Granulocytes % 09/25/2023 0.4  0.0 - 0.4 % Final    nRBC, Auto 09/25/2023 0.0  <=0.0 % Final    Neutrophils, Abs 09/25/2023 5.04  1.80 - 7.70 K/uL Final    Lymphocytes, Absolute 09/25/2023 1.81  1.00 - 4.80 K/uL Final    Monocytes, Absolute 09/25/2023 0.47  0.00 - 0.80 K/uL Final    Eosinophils, Absolute 09/25/2023 0.05  0.00 - 0.50 K/uL Final    Basophils, Absolute 09/25/2023 0.02  0.00 - 0.20 K/uL Final    Immature Granulocytes, Absolute 09/25/2023 0.03  0.00 - 0.04 K/uL Final    nRBC, Absolute 09/25/2023 0.00  <=0.00 x10e3/uL Final    Diff Type 09/25/2023 Auto   Final   Office Visit on 09/25/2023   Component Date Value Ref Range Status    POC Amphetamines 09/25/2023 Negative  Negative, Inconclusive Final    POC Barbiturates 09/25/2023 Negative  Negative, Inconclusive Final    POC Benzodiazepines 09/25/2023 Negative  Negative, Inconclusive Final    POC Cocaine 09/25/2023 Negative  Negative, Inconclusive Final    POC THC 09/25/2023 Negative  Negative, Inconclusive Final    POC Methadone 09/25/2023 Negative  Negative, Inconclusive Final    POC  Methamphetamine 09/25/2023 Negative  Negative, Inconclusive Final    POC Opiates 09/25/2023 Negative  Negative, Inconclusive Final    POC Oxycodone 09/25/2023 Negative  Negative, Inconclusive Final    POC Phencyclidine 09/25/2023 Negative  Negative, Inconclusive Final    POC Methylenedioxymethamphetamine * 09/25/2023 Negative  Negative, Inconclusive Final    POC Tricyclic Antidepressants 09/25/2023 Negative  Negative, Inconclusive Final    POC Buprenorphine 09/25/2023 Negative   Final     Acceptable 09/25/2023 Yes   Final    POC Temperature (Urine) 09/25/2023 90   Final    pH, UA 09/25/2023 5.0  5.0 to 8.0 pH Units Final    Creatinine, Urine 09/25/2023 53  28 - 219 mg/dL Final    6-Acetylmorphine 09/25/2023 Negative  10 ng/mL Final    7-Aminoclonazepam 09/25/2023 Negative  Negative 25 ng/mL Final    a-Hydroxyalprazolam 09/25/2023 Negative  Negative 25 ng/mL Final    Acetyl Fentanyl 09/25/2023 Negative  Negative 2.5 ng/mL Final    Acetyl Norfentanyl Oxalate 09/25/2023 Negative  Negative 5 ng/mL Final    Benzoylecgonine 09/25/2023 Negative  Negative 100 ng/mL Final    Buprenorphine 09/25/2023 Negative  25 ng/mL Final    Codeine 09/25/2023 Negative  Negative 25 ng/mL Final    EDDP 09/25/2023 Negative  Negative 25 ng/mL Final    Fentanyl 09/25/2023 Negative  Negative 2.5 ng/mL Final    Hydrocodone 09/25/2023 >250.0 (H)  <25.0 25 ng/mL Final    Hydromorphone 09/25/2023 157.1 (H)  <25.0 25 ng/mL Final    Lorazepam 09/25/2023 Negative  Negative 25 ng/mL Final    Morphine 09/25/2023 Negative  Negative 25 ng/mL Final    Norbuprenorphine 09/25/2023 Negative  Negative 25 ng/mL Final    Nordiazepam 09/25/2023 Negative  Negative 25 ng/mL Final    Norfentanyl Oxalate 09/25/2023 Negative  Negative 5 ng/mL Final    Norhydrocodone 09/25/2023 >500.0 (H)  <50.0 50 ng/mL Final    Noroxycodone HCL 09/25/2023 Negative  Negative 50 ng/mL Final    Oxazepam 09/25/2023 Negative  Negative 25 ng/mL Final    Oxymorphone  09/25/2023 Negative  Negative 25 ng/mL Final    Tapentadol 09/25/2023 Negative  Negative 25 ng/mL Final    Temazepam 09/25/2023 Negative  Negative 25 ng/mL Final    THC-COOH 09/25/2023 Negative  Negative 25 ng/mL Final    Tramadol 09/25/2023 Negative  Negative 100 ng/mL Final    Amphetamine, Urine 09/25/2023 Negative  Negative Final    Methamphetamines, Urine 09/25/2023 Negative  Negative Final    Methadone, Urine 09/25/2023 Negative  Negative 25 ng/mL Final    Oxycodone, Urine 09/25/2023 Negative  Negative 25 ng/mL Final    Specific Gravity, UA 09/25/2023 1.020  <=1.030 Final         Orders Placed This Encounter   Procedures    X-Ray Lumbar Spine 2 Or 3 Views     Standing Status:   Future     Standing Expiration Date:   4/30/2024     Order Specific Question:   May the Radiologist modify the order per protocol to meet the clinical needs of the patient?     Answer:   Yes     Order Specific Question:   Does the patient have a neck collar or brace on?     Answer:   No    POCT Urine Drug Screen Presump     Interpretive Information:     Negative:  No drug detected at the cut off level.   Positive:  This result represents presumptive positive for the   tested drug, other substances may yield a positive response other   than the analyte of interest. This result should be utilized for   diagnostic purpose only. Confirmation testing will be performed upon physician request only.            Requested Prescriptions     Signed Prescriptions Disp Refills    HYDROcodone-acetaminophen (NORCO) 7.5-325 mg per tablet 90 tablet 0     Sig: Take 1 tablet by mouth every 8 (eight) hours as needed for Pain.    HYDROcodone-acetaminophen (NORCO) 7.5-325 mg per tablet 90 tablet 0     Sig: Take 1 tablet by mouth every 8 (eight) hours as needed for Pain.       Assessment:     1. Lumbosacral radiculopathy    2. Osteoarthrosis multiple sites, not specified as generalized    3. Encounter for long-term (current) use of other medications    4.  Dorsalgia, unspecified         A's of Opioid Risk Assessment  Activity:Patient can perform ADL.   Analgesia:Patients pain is partially controlled by current medication. Patient has tried OTC medications such as Tylenol and Ibuprofen with out relief.   Adverse Effects: Patient denies constipation or sedation.  Aberrant Behavior:  reviewed with no aberrant drug seeking/taking behavior.  Overdose reversal drug naloxone discussed    Drug screen reviewed        MRI lumbar spine [facility] September 19, 2023, multiple levels stenosis, multiple level degenerative changes stenosis multiple levels central canal and bilateral foraminal narrowing most notable L4/5 L5/S1        Plan:    Narcan December 2022    Anticoagulated Plavix    History Colon  Cancer    Follows oncology    Following spine surgery [facility] recovering after recent lumbar surgery      Complaint of back pain after lumbar surgery    She is requesting to continue with Machias until complete rehab     X-ray lumbar spine today    Hold tramadol     Machias 7.51 p.o. q.8 hours    Continue home exercise program as directed    Continue current medication    Resume tramadol After pain subsides    Follow-up 2 months    Dr. Fowler, June 2024    Bring original prescription medication bottles/container/box with labels to each visit

## 2024-01-29 PROBLEM — G45.9 TIA (TRANSIENT ISCHEMIC ATTACK): Status: RESOLVED | Noted: 2023-10-27 | Resolved: 2024-01-29

## 2024-01-30 ENCOUNTER — HOSPITAL ENCOUNTER (OUTPATIENT)
Dept: RADIOLOGY | Facility: HOSPITAL | Age: 64
Discharge: HOME | End: 2024-01-30
Attending: PHYSICIAN ASSISTANT
Payer: COMMERCIAL

## 2024-01-30 ENCOUNTER — OFFICE VISIT (OUTPATIENT)
Dept: PAIN MEDICINE | Facility: CLINIC | Age: 64
End: 2024-01-30
Payer: COMMERCIAL

## 2024-01-30 VITALS
HEART RATE: 61 BPM | DIASTOLIC BLOOD PRESSURE: 58 MMHG | SYSTOLIC BLOOD PRESSURE: 150 MMHG | RESPIRATION RATE: 18 BRPM | BODY MASS INDEX: 36.44 KG/M2 | HEIGHT: 62 IN | WEIGHT: 198 LBS

## 2024-01-30 DIAGNOSIS — M54.9 DORSALGIA, UNSPECIFIED: ICD-10-CM

## 2024-01-30 DIAGNOSIS — Z79.899 ENCOUNTER FOR LONG-TERM (CURRENT) USE OF OTHER MEDICATIONS: ICD-10-CM

## 2024-01-30 DIAGNOSIS — M54.17 LUMBOSACRAL RADICULOPATHY: Primary | Chronic | ICD-10-CM

## 2024-01-30 DIAGNOSIS — M89.49 OSTEOARTHROSIS MULTIPLE SITES, NOT SPECIFIED AS GENERALIZED: Chronic | ICD-10-CM

## 2024-01-30 PROCEDURE — 1159F MED LIST DOCD IN RCRD: CPT | Mod: ,,, | Performed by: PHYSICIAN ASSISTANT

## 2024-01-30 PROCEDURE — 72100 X-RAY EXAM L-S SPINE 2/3 VWS: CPT | Mod: TC

## 2024-01-30 PROCEDURE — 3008F BODY MASS INDEX DOCD: CPT | Mod: ,,, | Performed by: PHYSICIAN ASSISTANT

## 2024-01-30 PROCEDURE — 99999PBSHW POCT URINE DRUG SCREEN PRESUMP: Mod: PBBFAC,,,

## 2024-01-30 PROCEDURE — 72100 X-RAY EXAM L-S SPINE 2/3 VWS: CPT | Mod: 26,,, | Performed by: STUDENT IN AN ORGANIZED HEALTH CARE EDUCATION/TRAINING PROGRAM

## 2024-01-30 PROCEDURE — 99215 OFFICE O/P EST HI 40 MIN: CPT | Mod: PBBFAC | Performed by: PHYSICIAN ASSISTANT

## 2024-01-30 PROCEDURE — 3078F DIAST BP <80 MM HG: CPT | Mod: ,,, | Performed by: PHYSICIAN ASSISTANT

## 2024-01-30 PROCEDURE — 99214 OFFICE O/P EST MOD 30 MIN: CPT | Mod: S$PBB,,, | Performed by: PHYSICIAN ASSISTANT

## 2024-01-30 PROCEDURE — 80305 DRUG TEST PRSMV DIR OPT OBS: CPT | Mod: PBBFAC | Performed by: PHYSICIAN ASSISTANT

## 2024-01-30 PROCEDURE — 3077F SYST BP >= 140 MM HG: CPT | Mod: ,,, | Performed by: PHYSICIAN ASSISTANT

## 2024-01-30 RX ORDER — HYDROCODONE BITARTRATE AND ACETAMINOPHEN 7.5; 325 MG/1; MG/1
1 TABLET ORAL EVERY 8 HOURS PRN
Qty: 90 TABLET | Refills: 0 | Status: SHIPPED | OUTPATIENT
Start: 2024-02-29 | End: 2024-03-28

## 2024-01-30 RX ORDER — HYDROCODONE BITARTRATE AND ACETAMINOPHEN 7.5; 325 MG/1; MG/1
1 TABLET ORAL EVERY 8 HOURS PRN
Qty: 90 TABLET | Refills: 0 | Status: SHIPPED | OUTPATIENT
Start: 2024-01-30 | End: 2024-03-28

## 2024-02-19 ENCOUNTER — OFFICE VISIT (OUTPATIENT)
Dept: NEUROLOGY | Facility: CLINIC | Age: 64
End: 2024-02-19
Payer: COMMERCIAL

## 2024-02-19 VITALS
BODY MASS INDEX: 36.44 KG/M2 | OXYGEN SATURATION: 99 % | DIASTOLIC BLOOD PRESSURE: 60 MMHG | HEART RATE: 62 BPM | WEIGHT: 198 LBS | HEIGHT: 62 IN | SYSTOLIC BLOOD PRESSURE: 148 MMHG

## 2024-02-19 DIAGNOSIS — I65.21 CAROTID ARTERY STENOSIS WITHOUT CEREBRAL INFARCTION, RIGHT: ICD-10-CM

## 2024-02-19 DIAGNOSIS — I63.9 CEREBROVASCULAR ACCIDENT (CVA), UNSPECIFIED MECHANISM: Primary | ICD-10-CM

## 2024-02-19 DIAGNOSIS — K44.9 HH (HIATUS HERNIA): Chronic | ICD-10-CM

## 2024-02-19 DIAGNOSIS — I48.91 ATRIAL FIBRILLATION, UNSPECIFIED TYPE: ICD-10-CM

## 2024-02-19 DIAGNOSIS — E78.5 HYPERLIPIDEMIA LDL GOAL <70: ICD-10-CM

## 2024-02-19 DIAGNOSIS — I65.21 STENOSIS OF RIGHT CAROTID ARTERY: ICD-10-CM

## 2024-02-19 DIAGNOSIS — I10 HYPERTENSION, UNSPECIFIED TYPE: ICD-10-CM

## 2024-02-19 PROCEDURE — 99215 OFFICE O/P EST HI 40 MIN: CPT | Mod: PBBFAC | Performed by: NURSE PRACTITIONER

## 2024-02-19 PROCEDURE — 3078F DIAST BP <80 MM HG: CPT | Mod: ,,, | Performed by: NURSE PRACTITIONER

## 2024-02-19 PROCEDURE — 99204 OFFICE O/P NEW MOD 45 MIN: CPT | Mod: S$PBB,,, | Performed by: NURSE PRACTITIONER

## 2024-02-19 PROCEDURE — 3044F HG A1C LEVEL LT 7.0%: CPT | Mod: ,,, | Performed by: NURSE PRACTITIONER

## 2024-02-19 PROCEDURE — 1160F RVW MEDS BY RX/DR IN RCRD: CPT | Mod: ,,, | Performed by: NURSE PRACTITIONER

## 2024-02-19 PROCEDURE — 1159F MED LIST DOCD IN RCRD: CPT | Mod: ,,, | Performed by: NURSE PRACTITIONER

## 2024-02-19 PROCEDURE — 3008F BODY MASS INDEX DOCD: CPT | Mod: ,,, | Performed by: NURSE PRACTITIONER

## 2024-02-19 PROCEDURE — 3077F SYST BP >= 140 MM HG: CPT | Mod: ,,, | Performed by: NURSE PRACTITIONER

## 2024-02-19 NOTE — PROGRESS NOTES
Subjective:       Patient ID: Dilma Cr is a 63 y.o. female     Chief Complaint:    Chief Complaint   Patient presents with    Stroke     New Patient.        Allergies:  Patient has no known allergies.    Current Medications:    Outpatient Encounter Medications as of 2/19/2024   Medication Sig Dispense Refill    ascorbic acid, vitamin C, 250 mg Chew Take 250 mg by mouth once daily.      clopidogreL (PLAVIX) 75 mg tablet Take 1 tablet (75 mg total) by mouth once daily. 90 tablet 1    cyanocobalamin 2000 MCG tablet Take 1 tablet by mouth once daily.      FREESTYLE BECCA 14 DAY SENSOR Kit USE TO CHECK GLUCOSE 4 TIMES DAILY      FREESTYLE BECCA 3 SENSOR Aleena USE 1 PATCH TOPICALLY EVERY DAY FOR 2 WEEKS      iron-vitamin C 100-250 mg, ICAR-C, 100-250 mg Tab Take 1 tablet by mouth 2 (two) times daily with meals.      JARDIANCE 25 mg tablet Take 1 tablet (25 mg total) by mouth once daily. 30 tablet 5    NOVOLOG FLEXPEN U-100 INSULIN 100 unit/mL (3 mL) InPn pen Inject 20 Units into the skin 2 (two) times a day. 12 each 1    olmesartan-hydrochlorothiazide (BENICAR HCT) 40-25 mg per tablet Take 1 tablet by mouth once daily.      rosuvastatin (CRESTOR) 40 MG Tab Take 1 tablet by mouth once daily 90 tablet 0    sotaloL (BETAPACE) 120 MG Tab Take 120 mg by mouth every 12 (twelve) hours.      traMADoL (ULTRAM) 50 mg tablet Take 1 tablet (50 mg total) by mouth every 12 (twelve) hours as needed for Pain. 60 tablet 2    BINAXNOW COVID-19 AG SELF TEST Kit Use as Directed on the Package      cyclobenzaprine (FLEXERIL) 5 MG tablet Take 5 mg by mouth 3 (three) times daily as needed.      ezetimibe (ZETIA) 10 mg tablet Take 10 mg by mouth.      furosemide (LASIX) 20 MG tablet Take 1 tablet (20 mg total) by mouth daily as needed (fluid). (Patient not taking: Reported on 2/19/2024) 90 tablet 1    [START ON 2/29/2024] HYDROcodone-acetaminophen (NORCO) 7.5-325 mg per tablet Take 1 tablet by mouth every 8 (eight) hours as needed  for Pain. (Patient not taking: Reported on 2/19/2024) 90 tablet 0    HYDROcodone-acetaminophen (NORCO) 7.5-325 mg per tablet Take 1 tablet by mouth every 8 (eight) hours as needed for Pain. (Patient not taking: Reported on 2/19/2024) 90 tablet 0    naloxone (NARCAN) 4 mg/actuation Spry 1 spray by Nasal route once as needed.      nitroGLYCERIN (NITROSTAT) 0.4 MG SL tablet Place 1 tablet (0.4 mg total) under the tongue every 5 (five) minutes as needed for Chest pain. 30 tablet 0    OZEMPIC 1 mg/dose (4 mg/3 mL)       pramipexole (MIRAPEX) 0.125 MG tablet Take 1 tablet (0.125 mg total) by mouth every evening. (Patient not taking: Reported on 2/19/2024) 30 tablet 5    prochlorperazine (COMPAZINE) 10 MG tablet Take 1 tablet by mouth daily as needed.      sertraline (ZOLOFT) 25 MG tablet Take 1 tablet (25 mg total) by mouth once daily. (Patient not taking: Reported on 2/19/2024) 90 tablet 3    TRESIBA FLEXTOUCH U-100 100 unit/mL (3 mL) insulin pen Inject 24 Units into the skin once daily. (Patient not taking: Reported on 2/19/2024) 3 pen 1    [DISCONTINUED] HYDROcodone-acetaminophen (NORCO) 7.5-325 mg per tablet Take 1 tablet by mouth every 8 (eight) hours as needed for Pain. 90 tablet 0    [DISCONTINUED] HYDROcodone-acetaminophen (NORCO) 7.5-325 mg per tablet Take 1 tablet by mouth every 8 (eight) hours as needed for Pain. 90 tablet 0    [DISCONTINUED] isosorbide mononitrate (IMDUR) 30 MG 24 hr tablet Take 1 tablet (30 mg total) by mouth once daily. 30 tablet 11     Facility-Administered Encounter Medications as of 2/19/2024   Medication Dose Route Frequency Provider Last Rate Last Admin    [DISCONTINUED] acetaminophen tablet  650 mg Oral Q6H PRN Provider, Generic External Data        [DISCONTINUED] aspirin chewable tablet  81 mg Oral  Provider, Generic External Data        [DISCONTINUED] atorvastatin tablet  40 mg Oral  Provider, Generic External Data        [DISCONTINUED] dextrose 40 % gel   Oral  Provider, Generic  External Data        [DISCONTINUED] dextrose 50 % in water (D50W) injection  20 mL Intravenous  Provider, Generic External Data        [DISCONTINUED] dextrose 50 % in water (D50W) injection  50 mL Intravenous  Provider, Generic External Data        [DISCONTINUED] enoxaparin injection  40 mg Subcutaneous  Provider, Generic External Data        [DISCONTINUED] GENERIC EXTERNAL MEDICATION     Provider, Generic External Data        [DISCONTINUED] glucagon SolR  1 mg Intramuscular  Provider, Generic External Data        [DISCONTINUED] HYDROcodone-acetaminophen 7.5-325 mg per tablet  1 tablet Oral Q4H PRN Provider, Generic External Data        [DISCONTINUED] insulin lispro injection  0-10 Units Subcutaneous  Provider, Generic External Data           History of Present Illness  62 y/o female new referral to neurology for possible TIA, but findings of prior CVA noted on imaging.  Her original appointment with us was in December of 2023, but she cancelled the appointment.    She was seen in the hospital in October of 2023 with symptoms of numbness in left face and arm.  She had known history of A-fib with prior watchman in 2/2023.  Prior on eliquis, but it was d/c due to anemia.  Currently following with Dr. Reeves, on plavix.  Her workup during the admit included an MRI brain which was not revealing of acute findings, but did show chronic infarcts middle cerebellar peduncles and rajeev.  CTA head negative, but CTA neck showed critical stenosis right carotid siphon, do not see any prior encounter with vascular.  She denies being told the prior MRI showed a CVA, and denies being referred to vascular surgery for evaluation.  I would like her to see Dr. Olivarez and get his opinion on the CTA, she is in agreement.    Seen at [facility]/24 for reported slurred speech and facial numbness.  Their notes indicate MRI brain negative for acute findings, CTA head without occlusion, echo showed 70-75%, negative bubble study.       She is currently on ASA 81mg daily, plavix 75mg daily.  Was prior on eliquis but had to stop due to GI bleeding in early 2023, followed by Dr. Bautista in cardiology here, with Watchman device.           Review of Systems  Review of Systems   Constitutional:  Negative for diaphoresis and fever.   HENT:  Negative for congestion, hearing loss and tinnitus.    Eyes:  Negative for blurred vision, double vision, photophobia, discharge and redness.   Respiratory:  Negative for cough and shortness of breath.    Cardiovascular:  Negative for chest pain.   Gastrointestinal:  Negative for abdominal pain, nausea and vomiting.   Musculoskeletal:  Positive for falls. Negative for back pain, joint pain, myalgias and neck pain.   Skin:  Negative for itching and rash.   Neurological:  Positive for sensory change and focal weakness. Negative for dizziness, tremors, speech change, seizures, loss of consciousness, weakness and headaches.   Psychiatric/Behavioral:  Negative for depression, hallucinations and memory loss. The patient does not have insomnia.    All other systems reviewed and are negative.     Objective:     NEUROLOGICAL EXAMINATION:     MENTAL STATUS   Oriented to person, place, and time.   Attention: normal. Concentration: normal.   Speech: speech is normal   Level of consciousness: alert  Knowledge: good and consistent with education.   Normal comprehension.     CRANIAL NERVES     CN II   Visual fields full to confrontation.   Visual acuity: normal  Right visual field deficit: none  Left visual field deficit: none     CN III, IV, VI   Pupils are equal, round, and reactive to light.  Extraocular motions are normal.   Right pupil: Size: 3 mm. Shape: regular. Reactivity: brisk. Consensual response: intact. Accommodation: intact.   Left pupil: Size: 3 mm. Shape: regular. Reactivity: brisk. Consensual response: intact. Accommodation: intact.   CN III: no CN III palsy  CN VI: no CN VI palsy  Nystagmus: none   Diplopia:  none  Upgaze: normal  Downgaze: normal  Conjugate gaze: present  Vestibulo-ocular reflex: present    CN V   Facial sensation intact.   Right facial sensation deficit: none  Left facial sensation deficit: none  Right corneal reflex: normal  Left corneal reflex: normal    CN VII   Facial expression full, symmetric.   Right facial weakness: none  Left facial weakness: none  Right taste: normal  Left taste: normal    CN VIII   CN VIII normal.   Hearing: intact    CN IX, X   CN IX normal.   CN X normal.   Palate: symmetric    CN XI   CN XI normal.   Right sternocleidomastoid strength: normal  Left sternocleidomastoid strength: normal  Right trapezius strength: normal  Left trapezius strength: normal    CN XII   CN XII normal.   Tongue: not atrophic  Fasciculations: absent  Tongue deviation: none    MOTOR EXAM   Muscle bulk: normal  Overall muscle tone: normal  Right arm tone: normal  Left arm tone: normal  Right arm pronator drift: absent  Left arm pronator drift: absent  Right leg tone: normal  Left leg tone: normal    Strength   Right neck flexion: 5/5  Left neck flexion: 5/5  Right neck extension: 5/5  Left neck extension: 5/5  Right deltoid: 5/5  Left deltoid: 5/5  Right biceps: 5/5  Left biceps: 5/5  Right triceps: 5/5  Left triceps: 5/5  Right wrist flexion: 5/5  Left wrist flexion: 5/5  Right wrist extension: 5/5  Left wrist extension: 5/5  Right interossei: 5/5  Left interossei: 5/5  Right iliopsoas: 5/5  Left iliopsoas: 5/5  Right quadriceps: 5/5  Left quadriceps: 5/5  Right hamstrin/5  Left hamstrin/5  Right anterior tibial: 5/5  Left anterior tibial: 5/5  Right posterior tibial: 5/5  Left posterior tibial: 5/5  Right gastroc: 5/5  Left gastroc: 5/5    REFLEXES     Reflexes   Right brachioradialis: 2+  Left brachioradialis: 2+  Right biceps: 2+  Left biceps: 2+  Right triceps: 2+  Left triceps: 2+  Right patellar: 2+  Left patellar: 2+  Right achilles: 2+  Left achilles: 2+  Right plantar: normal  Left  plantar: normal  Right Delaney: absent  Left Delaney: absent  Right ankle clonus: absent  Left ankle clonus: absent  Right pendular knee jerk: absent  Left pendular knee jerk: absent    SENSORY EXAM   Light touch normal.   Right arm light touch: normal  Left arm light touch: normal  Right leg light touch: normal  Left leg light touch: normal  Vibration normal.   Right arm vibration: normal  Left arm vibration: normal  Right leg vibration: normal  Left leg vibration: normal  Proprioception normal.   Right arm proprioception: normal  Left arm proprioception: normal  Right leg proprioception: normal  Left leg proprioception: normal  Pinprick normal.   Right arm pinprick: normal  Left arm pinprick: normal  Right leg pinprick: normal  Left leg pinprick: normal  Graphesthesia: normal  Romberg: negative  Stereognosis: normal    GAIT AND COORDINATION     Gait  Gait: wide-based     Coordination   Finger to nose coordination: normal  Heel to shin coordination: normal  Tandem walking coordination: normal    Tremor   Resting tremor: absent  Intention tremor: absent  Action tremor: absent       Physical Exam  Vitals and nursing note reviewed.   Constitutional:       Appearance: Normal appearance.   HENT:      Head: Normocephalic.   Eyes:      Extraocular Movements: Extraocular movements intact and EOM normal.      Pupils: Pupils are equal, round, and reactive to light.   Cardiovascular:      Rate and Rhythm: Normal rate and regular rhythm.   Pulmonary:      Effort: Pulmonary effort is normal.      Breath sounds: Normal breath sounds.   Musculoskeletal:         General: No swelling or tenderness. Normal range of motion.      Cervical back: Normal range of motion and neck supple.      Right lower leg: No edema.      Left lower leg: No edema.   Skin:     General: Skin is warm and dry.      Coloration: Skin is not jaundiced.      Findings: No rash.   Neurological:      General: No focal deficit present.      Mental Status: She is  alert and oriented to person, place, and time.      GCS: GCS eye subscore is 4. GCS verbal subscore is 5. GCS motor subscore is 6.      Cranial Nerves: No cranial nerve deficit.      Sensory: No sensory deficit.      Motor: Motor function is intact. No weakness.      Coordination: Coordination is intact. Coordination normal. Finger-Nose-Finger Test, Heel to Shin Test and Romberg Test normal.      Gait: Gait and tandem walk normal.      Deep Tendon Reflexes: Reflexes normal.      Reflex Scores:       Tricep reflexes are 2+ on the right side and 2+ on the left side.       Bicep reflexes are 2+ on the right side and 2+ on the left side.       Brachioradialis reflexes are 2+ on the right side and 2+ on the left side.       Patellar reflexes are 2+ on the right side and 2+ on the left side.       Achilles reflexes are 2+ on the right side and 2+ on the left side.  Psychiatric:         Mood and Affect: Mood normal.         Speech: Speech normal.         Behavior: Behavior normal.          Assessment:     Problem List Items Addressed This Visit          Neuro    Cerebrovascular accident (CVA) - Primary       Cardiac/Vascular    Hypertension (Chronic)    A-fib    Hyperlipidemia LDL goal <70    Carotid artery stenosis without cerebral infarction, right       GI    HH (hiatus hernia) (Chronic)        Primary Diagnosis and ICD10  Cerebrovascular accident (CVA), unspecified mechanism [I63.9]    Plan:     Patient Instructions   Reviewed recent imaging reports and inpatient records  Evaluation with Dr. Olivarez due to prior CTA carotid results  Continue current plavix and aspirin  Continue current statin treatment  Continue current blood pressure medications  Keep follow-up with cardiology    There are no discontinued medications.    Requested Prescriptions      No prescriptions requested or ordered in this encounter       Orders Placed This Encounter   Procedures    Ambulatory referral/consult to Vascular Surgery

## 2024-02-19 NOTE — PATIENT INSTRUCTIONS
Reviewed recent imaging reports and inpatient records  Evaluation with Dr. Olivarez due to prior CTA carotid results  Continue current plavix and aspirin  Continue current statin treatment  Continue current blood pressure medications  Keep follow-up with cardiology

## 2024-03-05 ENCOUNTER — INITIAL CONSULT (OUTPATIENT)
Dept: VASCULAR SURGERY | Facility: CLINIC | Age: 64
End: 2024-03-05
Payer: COMMERCIAL

## 2024-03-05 VITALS — BODY MASS INDEX: 36.44 KG/M2 | WEIGHT: 198 LBS | HEIGHT: 62 IN

## 2024-03-05 DIAGNOSIS — R09.89 BRUIT: Primary | ICD-10-CM

## 2024-03-05 DIAGNOSIS — I65.21 STENOSIS OF RIGHT CAROTID ARTERY: ICD-10-CM

## 2024-03-05 DIAGNOSIS — I63.9 CEREBROVASCULAR ACCIDENT (CVA), UNSPECIFIED MECHANISM: ICD-10-CM

## 2024-03-05 PROCEDURE — 99215 OFFICE O/P EST HI 40 MIN: CPT | Mod: PBBFAC | Performed by: NURSE PRACTITIONER

## 2024-03-05 PROCEDURE — 99204 OFFICE O/P NEW MOD 45 MIN: CPT | Mod: S$PBB,,, | Performed by: NURSE PRACTITIONER

## 2024-03-05 NOTE — PROGRESS NOTES
Subjective:       Patient ID: Dilma Cr is a 63 y.o. female.    Chief Complaint: Carotid Artery Disease  New referral for carotid stenosis [place] recent back surgery patient with paresthesia right side of face right side of body, her speech difficulty has resolved she still has problems with her balance coordination frequent fall last month was observed at [facility] after a fall with reported closed head injury, due to no beds at higher level of care she reports her Plavix was held for 72 hours and has been resumed has had stroke workup at [facility] in October of 2023 January of 2024 was evaluated in [place] with MRI brain findings chronic left pontine infarct CTA neck significant stenosis CTA head suggest carotid siphon  calciphication 50% right supraclinoid ICA narrowing without large vessel acute occlusive disease,   Patient not a candidate for oral anticoagulation and has had Watchman procedure in [place] for AFib past medical history to include diabetes coronary artery disease with PCI she is on aspirin and Plavix remote history of colon cancer  family history includes Breast cancer in her sister; Diabetes in her father and mother; Hypertension in her brother, father, mother, and sister.  Past Medical History:   Diagnosis Date    Acute superficial gastritis without hemorrhage 06/21/2022    Arthritis     Cancer     colon cancer    Chronic kidney disease, stage 3b     Colon cancer     Coronary artery disease     Depression     Diabetes mellitus, type 2     Diverticula, colon 06/22/2022    GERD (gastroesophageal reflux disease)     H/O right hemicolectomy 06/22/2022    HH (hiatus hernia) 06/21/2022    Hyperlipidemia     Myocardial infarction     2 stents in 05/14/2021 Dr Lora    Renal disorder       Past Surgical History:   Procedure Laterality Date    BREAST SURGERY      C5-C6 RADHA  8-, 7-, 6-1-2016    Dr Fowler    CARDIAC SURGERY  02/07/2023  "   CERVICAL SPINE SURGERY      COLON SURGERY      EPIDURAL STEROID INJECTION INTO LUMBAR SPINE N/A 03/29/2022    Procedure: L4-5 RADHA;  Surgeon: Gela Fowler MD;  Location: [facility] include [facility] beds at the [facility];  Service: Pain Management;  Laterality: N/A;  PT AWARE ON OV TO BE TESTED\  PLAVIX TO BE HELD FOR 7 DAYS PRIOR TO PROCEDURE PER DR FOWLER AND DR PORTER  3-28  message left on vm re: covid    HYSTERECTOMY      LEFT HEART CATHETERIZATION Left 05/14/2021    Procedure: Left heart cath;  Surgeon: Mateus Guan MD;  Location: [facility] CATH LAB;  Service: Cardiology;  Laterality: Left;    LEFT HEART CATHETERIZATION Left 06/20/2022    Procedure: Left heart cath;  Surgeon: Oliverio Bautista MD;  Location: [facility] CATH LAB;  Service: Cardiology;  Laterality: Left;    LUMBAR SPINE SURGERY  12/28/2023    MEDIPORT REMOVAL  10/14/2021    Dr Kraft    OOPHORECTOMY      right rotator cuff repair      in [place]    TOTAL REDUCTION MAMMOPLASTY         reports that she has never smoked. She has never been exposed to tobacco smoke. She has never used smokeless tobacco. She reports that she does not drink alcohol and does not use drugs.   HPI  Review of Systems   Constitutional:  Positive for activity change and fatigue.   Neurological:  Positive for numbness, memory loss and coordination difficulties.        Frequent falls         Objective:      Ht 5' 2" (1.575 m)   Wt 89.8 kg (198 lb)   LMP  (LMP Unknown)   BMI 36.21 kg/m²    Physical Exam  Vitals and nursing note reviewed.   Constitutional:       Appearance: Normal appearance.   HENT:      Head: Normocephalic.      Mouth/Throat:      Mouth: Mucous membranes are moist.   Eyes:      Conjunctiva/sclera: Conjunctivae normal.   Cardiovascular:      Rate and Rhythm: Normal rate and regular rhythm.   Pulmonary:      Effort: Pulmonary effort is normal.   Abdominal:      Palpations: Abdomen is soft.   Skin:     General: Skin is warm and dry.   Neurological:      General: No focal deficit " present.      Mental Status: She is alert and oriented to person, place, and time.   Psychiatric:         Mood and Affect: Mood normal.         Behavior: Behavior normal.           Assessment:       1. Bruit    2. Stenosis of right carotid artery    3. Cerebrovascular accident (CVA), unspecified mechanism        Plan:     Continue antiplatelets aspirin Plavix statin  patient is not a candidate for anticoagulation due to history of recent falls and GI bleed on Eliquis  Asymptomatic carotid stenosis with 50% right supralinoid ICA stenosis  not accessible  to surgical intervention   Follow-up with carotid duplex in 1 year

## 2024-03-20 ENCOUNTER — OFFICE VISIT (OUTPATIENT)
Dept: NEUROLOGY | Facility: CLINIC | Age: 64
End: 2024-03-20
Payer: COMMERCIAL

## 2024-03-20 VITALS
HEART RATE: 66 BPM | HEIGHT: 62 IN | BODY MASS INDEX: 36.8 KG/M2 | SYSTOLIC BLOOD PRESSURE: 132 MMHG | WEIGHT: 200 LBS | OXYGEN SATURATION: 99 % | RESPIRATION RATE: 18 BRPM | DIASTOLIC BLOOD PRESSURE: 60 MMHG

## 2024-03-20 DIAGNOSIS — I63.9 CEREBROVASCULAR ACCIDENT (CVA), UNSPECIFIED MECHANISM: Primary | ICD-10-CM

## 2024-03-20 DIAGNOSIS — I10 HYPERTENSION, UNSPECIFIED TYPE: ICD-10-CM

## 2024-03-20 DIAGNOSIS — I48.91 ATRIAL FIBRILLATION, UNSPECIFIED TYPE: ICD-10-CM

## 2024-03-20 DIAGNOSIS — I65.21 STENOSIS OF RIGHT CAROTID ARTERY: ICD-10-CM

## 2024-03-20 DIAGNOSIS — E78.5 HYPERLIPIDEMIA LDL GOAL <70: ICD-10-CM

## 2024-03-20 PROCEDURE — 1159F MED LIST DOCD IN RCRD: CPT | Mod: ,,, | Performed by: NURSE PRACTITIONER

## 2024-03-20 PROCEDURE — 99215 OFFICE O/P EST HI 40 MIN: CPT | Mod: PBBFAC | Performed by: NURSE PRACTITIONER

## 2024-03-20 PROCEDURE — 1160F RVW MEDS BY RX/DR IN RCRD: CPT | Mod: ,,, | Performed by: NURSE PRACTITIONER

## 2024-03-20 PROCEDURE — 3008F BODY MASS INDEX DOCD: CPT | Mod: ,,, | Performed by: NURSE PRACTITIONER

## 2024-03-20 PROCEDURE — 3078F DIAST BP <80 MM HG: CPT | Mod: ,,, | Performed by: NURSE PRACTITIONER

## 2024-03-20 PROCEDURE — 99212 OFFICE O/P EST SF 10 MIN: CPT | Mod: S$PBB,,, | Performed by: NURSE PRACTITIONER

## 2024-03-20 PROCEDURE — 3075F SYST BP GE 130 - 139MM HG: CPT | Mod: ,,, | Performed by: NURSE PRACTITIONER

## 2024-03-20 PROCEDURE — 3044F HG A1C LEVEL LT 7.0%: CPT | Mod: ,,, | Performed by: NURSE PRACTITIONER

## 2024-03-20 RX ORDER — ASPIRIN 81 MG/1
81 TABLET ORAL DAILY
COMMUNITY

## 2024-03-20 NOTE — PATIENT INSTRUCTIONS
Reviewed recent evaluation with Dr. Olivarez, will keep follow-up with him yearly  Continue current plavix and aspirin  Continue current statin treatment  Continue current blood pressure medications  Keep follow-up with cardiology

## 2024-03-20 NOTE — PROGRESS NOTES
Subjective:       Patient ID: Dilma Cr is a 63 y.o. female     Chief Complaint:    Chief Complaint   Patient presents with    Follow-up    Stroke        Allergies:  Patient has no known allergies.    Current Medications:    Outpatient Encounter Medications as of 3/20/2024   Medication Sig Dispense Refill    ascorbic acid, vitamin C, 250 mg Chew Take 250 mg by mouth once daily.      aspirin (ECOTRIN) 81 MG EC tablet Take 81 mg by mouth once daily.      clopidogreL (PLAVIX) 75 mg tablet Take 1 tablet (75 mg total) by mouth once daily. 90 tablet 1    cyanocobalamin 2000 MCG tablet Take 1 tablet by mouth once daily.      cyclobenzaprine (FLEXERIL) 5 MG tablet Take 5 mg by mouth 3 (three) times daily as needed.      FREESTYLE BECCA 14 DAY SENSOR Kit USE TO CHECK GLUCOSE 4 TIMES DAILY      FREESTYLE BECCA 3 SENSOR Aleena USE 1 PATCH TOPICALLY EVERY DAY FOR 2 WEEKS      iron-vitamin C 100-250 mg, ICAR-C, 100-250 mg Tab Take 1 tablet by mouth 2 (two) times daily with meals.      JARDIANCE 25 mg tablet Take 1 tablet (25 mg total) by mouth once daily. 30 tablet 5    NOVOLOG FLEXPEN U-100 INSULIN 100 unit/mL (3 mL) InPn pen Inject 20 Units into the skin 2 (two) times a day. 12 each 1    olmesartan-hydrochlorothiazide (BENICAR HCT) 40-25 mg per tablet Take 1 tablet by mouth once daily.      OZEMPIC 1 mg/dose (4 mg/3 mL)       rosuvastatin (CRESTOR) 40 MG Tab Take 1 tablet by mouth once daily 90 tablet 0    sotaloL (BETAPACE) 120 MG Tab Take 120 mg by mouth every 12 (twelve) hours.      traMADoL (ULTRAM) 50 mg tablet Take 1 tablet (50 mg total) by mouth every 12 (twelve) hours as needed for Pain. 60 tablet 2    TRESIBA FLEXTOUCH U-100 100 unit/mL (3 mL) insulin pen Inject 24 Units into the skin once daily. 3 pen 1    BINAXNOW COVID-19 AG SELF TEST Kit Use as Directed on the Package      ezetimibe (ZETIA) 10 mg tablet Take 10 mg by mouth.      furosemide (LASIX) 20 MG tablet Take 1 tablet (20 mg total) by mouth daily  as needed (fluid). (Patient not taking: Reported on 2/19/2024) 90 tablet 1    HYDROcodone-acetaminophen (NORCO) 7.5-325 mg per tablet Take 1 tablet by mouth every 8 (eight) hours as needed for Pain. (Patient not taking: Reported on 2/19/2024) 90 tablet 0    HYDROcodone-acetaminophen (NORCO) 7.5-325 mg per tablet Take 1 tablet by mouth every 8 (eight) hours as needed for Pain. (Patient not taking: Reported on 2/19/2024) 90 tablet 0    naloxone (NARCAN) 4 mg/actuation Spry 1 spray by Nasal route once as needed.      nitroGLYCERIN (NITROSTAT) 0.4 MG SL tablet Place 1 tablet (0.4 mg total) under the tongue every 5 (five) minutes as needed for Chest pain. 30 tablet 0    pramipexole (MIRAPEX) 0.125 MG tablet Take 1 tablet (0.125 mg total) by mouth every evening. (Patient not taking: Reported on 2/19/2024) 30 tablet 5    prochlorperazine (COMPAZINE) 10 MG tablet Take 1 tablet by mouth daily as needed.      sertraline (ZOLOFT) 25 MG tablet Take 1 tablet (25 mg total) by mouth once daily. (Patient not taking: Reported on 2/19/2024) 90 tablet 3    [DISCONTINUED] isosorbide mononitrate (IMDUR) 30 MG 24 hr tablet Take 1 tablet (30 mg total) by mouth once daily. 30 tablet 11     No facility-administered encounter medications on file as of 3/20/2024.       History of Present Illness  64 y/o female following in neurology for possible TIA, but findings of prior CVA noted on imaging.    She was seen in the hospital in October of 2023 with symptoms of numbness in left face and arm.  She had known history of A-fib with prior watchman in 2/2023.  Prior on eliquis, but it was d/c due to anemia.  Currently following with Dr. Reeves, on plavix.  Her workup during the admit included an MRI brain which was not revealing of acute findings, but did show chronic infarcts middle cerebellar peduncles and rajeev.  CTA head negative, but CTA neck showed critical stenosis right carotid siphon, do not see any prior encounter with vascular.  She  denies being told the prior MRI showed a CVA, and denies being referred to vascular surgery for evaluation.  I referred her to Dr. Olivarez, who felt was non surgical but plans yearly follow-up for further evaluation.    Seen at [facility] 1/31/24 for reported slurred speech and facial numbness.  Their notes indicate MRI brain negative for acute findings, CTA head without occlusion, echo showed 70-75%, negative bubble study.      She is currently on ASA 81mg daily, plavix 75mg daily.  Was prior on eliquis but had to stop due to GI bleeding in early 2023, followed by Dr. Bautista in cardiology here, with Watchman device.    Of interest, she was seen by neurology at [facility] literally the day after her last visit with us, they agreed with current plan.  She reports wishes to follow with us which we are glad to do, whichever one she prefers.    She reports soon to return tot work, has concern about her weakness and cognitive issues that she has been experiencing lately.  She does desire to return to working at [employer], but will see how she does with it and if needed we can assist her given her history.           Review of Systems  Review of Systems   Constitutional:  Negative for diaphoresis and fever.   HENT:  Negative for congestion, hearing loss and tinnitus.    Eyes:  Negative for blurred vision, double vision, photophobia, discharge and redness.   Respiratory:  Negative for cough and shortness of breath.    Cardiovascular:  Negative for chest pain.   Gastrointestinal:  Negative for abdominal pain, nausea and vomiting.   Musculoskeletal:  Positive for falls. Negative for back pain, joint pain, myalgias and neck pain.   Skin:  Negative for itching and rash.   Neurological:  Positive for sensory change and focal weakness. Negative for dizziness, tremors, speech change, seizures, loss of consciousness, weakness and headaches.   Psychiatric/Behavioral:  Negative for depression, hallucinations and memory loss. The  patient does not have insomnia.    All other systems reviewed and are negative.     Objective:     NEUROLOGICAL EXAMINATION:     MENTAL STATUS   Oriented to person, place, and time.   Attention: normal. Concentration: normal.   Speech: speech is normal   Level of consciousness: alert  Knowledge: good and consistent with education.   Normal comprehension.     CRANIAL NERVES     CN II   Visual fields full to confrontation.   Visual acuity: normal  Right visual field deficit: none  Left visual field deficit: none     CN III, IV, VI   Pupils are equal, round, and reactive to light.  Extraocular motions are normal.   Right pupil: Size: 3 mm. Shape: regular. Reactivity: brisk. Consensual response: intact. Accommodation: intact.   Left pupil: Size: 3 mm. Shape: regular. Reactivity: brisk. Consensual response: intact. Accommodation: intact.   CN III: no CN III palsy  CN VI: no CN VI palsy  Nystagmus: none   Diplopia: none  Upgaze: normal  Downgaze: normal  Conjugate gaze: present  Vestibulo-ocular reflex: present    CN V   Facial sensation intact.   Right facial sensation deficit: none  Left facial sensation deficit: none  Right corneal reflex: normal  Left corneal reflex: normal    CN VII   Facial expression full, symmetric.   Right facial weakness: none  Left facial weakness: none  Right taste: normal  Left taste: normal    CN VIII   CN VIII normal.   Hearing: intact    CN IX, X   CN IX normal.   CN X normal.   Palate: symmetric    CN XI   CN XI normal.   Right sternocleidomastoid strength: normal  Left sternocleidomastoid strength: normal  Right trapezius strength: normal  Left trapezius strength: normal    CN XII   CN XII normal.   Tongue: not atrophic  Fasciculations: absent  Tongue deviation: none    MOTOR EXAM   Muscle bulk: normal  Overall muscle tone: normal  Right arm tone: normal  Left arm tone: normal  Right arm pronator drift: absent  Left arm pronator drift: absent  Right leg tone: normal  Left leg tone:  normal    Strength   Right neck flexion: 5/5  Left neck flexion: 5/5  Right neck extension: 5/5  Left neck extension: 5/5  Right deltoid: 5/5  Left deltoid: 5/5  Right biceps: 5/5  Left biceps: 5/5  Right triceps: 5/5  Left triceps: 5/5  Right wrist flexion: 5/5  Left wrist flexion: 5/5  Right wrist extension: 5/5  Left wrist extension: 5/5  Right interossei: 5/5  Left interossei: 5/5  Right iliopsoas: 5  Left iliopsoas: 5  Right quadriceps: 55  Left quadriceps: 5/  Right hamstrin/5  Left hamstrin/5  Right anterior tibial:   Left anterior tibial:   Right posterior tibial:   Left posterior tibial:   Right gastroc:   Left gastroc: 5/5    REFLEXES     Reflexes   Right brachioradialis: 2+  Left brachioradialis: 2+  Right biceps: 2+  Left biceps: 2+  Right triceps: 2+  Left triceps: 2+  Right patellar: 2+  Left patellar: 2+  Right achilles: 2+  Left achilles: 2+  Right plantar: normal  Left plantar: normal  Right Delaney: absent  Left Delaney: absent  Right ankle clonus: absent  Left ankle clonus: absent  Right pendular knee jerk: absent  Left pendular knee jerk: absent    SENSORY EXAM   Light touch normal.   Right arm light touch: normal  Left arm light touch: normal  Right leg light touch: normal  Left leg light touch: normal  Vibration normal.   Right arm vibration: normal  Left arm vibration: normal  Right leg vibration: normal  Left leg vibration: normal  Proprioception normal.   Right arm proprioception: normal  Left arm proprioception: normal  Right leg proprioception: normal  Left leg proprioception: normal  Pinprick normal.   Right arm pinprick: normal  Left arm pinprick: normal  Right leg pinprick: normal  Left leg pinprick: normal  Graphesthesia: normal  Romberg: negative  Stereognosis: normal    GAIT AND COORDINATION     Gait  Gait: wide-based     Coordination   Finger to nose coordination: normal  Heel to shin coordination: normal  Tandem walking coordination: normal    Tremor    Resting tremor: absent  Intention tremor: absent  Action tremor: absent       Physical Exam  Vitals and nursing note reviewed.   Constitutional:       Appearance: Normal appearance.   HENT:      Head: Normocephalic.   Eyes:      Extraocular Movements: Extraocular movements intact and EOM normal.      Pupils: Pupils are equal, round, and reactive to light.   Cardiovascular:      Rate and Rhythm: Normal rate and regular rhythm.   Pulmonary:      Effort: Pulmonary effort is normal.      Breath sounds: Normal breath sounds.   Musculoskeletal:         General: No swelling or tenderness. Normal range of motion.      Cervical back: Normal range of motion and neck supple.      Right lower leg: No edema.      Left lower leg: No edema.   Skin:     General: Skin is warm and dry.      Coloration: Skin is not jaundiced.      Findings: No rash.   Neurological:      General: No focal deficit present.      Mental Status: She is alert and oriented to person, place, and time.      GCS: GCS eye subscore is 4. GCS verbal subscore is 5. GCS motor subscore is 6.      Cranial Nerves: No cranial nerve deficit.      Sensory: No sensory deficit.      Motor: Motor function is intact. No weakness.      Coordination: Coordination is intact. Coordination normal. Finger-Nose-Finger Test, Heel to Shin Test and Romberg Test normal.      Gait: Gait and tandem walk normal.      Deep Tendon Reflexes: Reflexes normal.      Reflex Scores:       Tricep reflexes are 2+ on the right side and 2+ on the left side.       Bicep reflexes are 2+ on the right side and 2+ on the left side.       Brachioradialis reflexes are 2+ on the right side and 2+ on the left side.       Patellar reflexes are 2+ on the right side and 2+ on the left side.       Achilles reflexes are 2+ on the right side and 2+ on the left side.  Psychiatric:         Mood and Affect: Mood normal.         Speech: Speech normal.         Behavior: Behavior normal.          Assessment:     Problem  List Items Addressed This Visit          Neuro    Cerebrovascular accident (CVA) - Primary       Cardiac/Vascular    Hypertension (Chronic)    A-fib    Hyperlipidemia LDL goal <70    Stenosis of right carotid artery          Primary Diagnosis and ICD10  Cerebrovascular accident (CVA), unspecified mechanism [I63.9]    Plan:     Patient Instructions   Reviewed recent evaluation with Dr. Olivarez, will keep follow-up with him yearly  Continue current plavix and aspirin  Continue current statin treatment  Continue current blood pressure medications  Keep follow-up with cardiology    There are no discontinued medications.    Requested Prescriptions      No prescriptions requested or ordered in this encounter       No orders of the defined types were placed in this encounter.

## 2024-03-28 ENCOUNTER — OFFICE VISIT (OUTPATIENT)
Dept: PAIN MEDICINE | Facility: CLINIC | Age: 64
End: 2024-03-28
Payer: COMMERCIAL

## 2024-03-28 VITALS
SYSTOLIC BLOOD PRESSURE: 131 MMHG | HEART RATE: 70 BPM | RESPIRATION RATE: 18 BRPM | DIASTOLIC BLOOD PRESSURE: 58 MMHG | BODY MASS INDEX: 36.99 KG/M2 | WEIGHT: 201 LBS | HEIGHT: 62 IN

## 2024-03-28 DIAGNOSIS — M89.49 OSTEOARTHROSIS MULTIPLE SITES, NOT SPECIFIED AS GENERALIZED: Chronic | ICD-10-CM

## 2024-03-28 DIAGNOSIS — M54.17 LUMBOSACRAL RADICULOPATHY: Chronic | ICD-10-CM

## 2024-03-28 PROCEDURE — 3008F BODY MASS INDEX DOCD: CPT | Mod: ,,, | Performed by: PHYSICIAN ASSISTANT

## 2024-03-28 PROCEDURE — 99214 OFFICE O/P EST MOD 30 MIN: CPT | Mod: S$PBB,,, | Performed by: PHYSICIAN ASSISTANT

## 2024-03-28 PROCEDURE — 1159F MED LIST DOCD IN RCRD: CPT | Mod: ,,, | Performed by: PHYSICIAN ASSISTANT

## 2024-03-28 PROCEDURE — 3044F HG A1C LEVEL LT 7.0%: CPT | Mod: ,,, | Performed by: PHYSICIAN ASSISTANT

## 2024-03-28 PROCEDURE — 99215 OFFICE O/P EST HI 40 MIN: CPT | Mod: PBBFAC | Performed by: PHYSICIAN ASSISTANT

## 2024-03-28 PROCEDURE — 3078F DIAST BP <80 MM HG: CPT | Mod: ,,, | Performed by: PHYSICIAN ASSISTANT

## 2024-03-28 PROCEDURE — 3075F SYST BP GE 130 - 139MM HG: CPT | Mod: ,,, | Performed by: PHYSICIAN ASSISTANT

## 2024-03-28 RX ORDER — TRAMADOL HYDROCHLORIDE 50 MG/1
50 TABLET ORAL EVERY 12 HOURS PRN
Qty: 60 TABLET | Refills: 1 | Status: SHIPPED | OUTPATIENT
Start: 2024-03-28 | End: 2024-05-20 | Stop reason: SDUPTHER

## 2024-03-28 NOTE — PROGRESS NOTES
Subjective:         Patient ID: Dilma Cr is a 63 y.o. female.    Chief Complaint: Low-back Pain        Pain  This is a chronic problem. The current episode started more than 1 year ago. The problem occurs daily. The problem has been waxing and waning. Associated symptoms include arthralgias and neck pain. Pertinent negatives include no anorexia, chest pain, chills, coughing, diaphoresis, fever, sore throat, vertigo or vomiting.     Review of Systems   Constitutional:  Negative for activity change, appetite change, chills, diaphoresis, fever and unexpected weight change.   HENT:  Negative for drooling, ear pain, facial swelling, nosebleeds, sore throat, trouble swallowing, voice change and goiter.    Eyes:  Negative for photophobia, pain, discharge, redness and visual disturbance.   Respiratory:  Negative for apnea, cough, choking, chest tightness, shortness of breath, wheezing and stridor.    Cardiovascular:  Negative for chest pain, palpitations and leg swelling.   Gastrointestinal:  Negative for abdominal distention, anorexia, diarrhea, rectal pain, vomiting and fecal incontinence.   Endocrine: Negative for cold intolerance, heat intolerance, polydipsia, polyphagia and polyuria.   Genitourinary:  Negative for flank pain, frequency and hot flashes.   Musculoskeletal:  Positive for arthralgias, back pain and neck pain. Negative for neck stiffness.   Integumentary:  Negative for color change and pallor.   Allergic/Immunologic: Negative for immunocompromised state.   Neurological:  Negative for dizziness, vertigo, seizures, syncope, facial asymmetry, speech difficulty, light-headedness, memory loss and coordination difficulties.   Hematological:  Negative for adenopathy. Does not bruise/bleed easily.   Psychiatric/Behavioral:  Negative for agitation, behavioral problems, confusion, decreased concentration, dysphoric mood, hallucinations, self-injury and suicidal ideas. The patient is not nervous/anxious and  is not hyperactive.            Past Medical History:   Diagnosis Date    Acute superficial gastritis without hemorrhage 06/21/2022    Arthritis     Cancer     colon cancer    Chronic kidney disease, stage 3b     Colon cancer     Coronary artery disease     Depression     Diabetes mellitus, type 2     Diverticula, colon 06/22/2022    GERD (gastroesophageal reflux disease)     H/O right hemicolectomy 06/22/2022    HH (hiatus hernia) 06/21/2022    Hyperlipidemia     Myocardial infarction     2 stents in 05/14/2021 Dr Porter    Renal disorder      Past Surgical History:   Procedure Laterality Date    BREAST SURGERY      C5-C6 RADHA  8-, 7-, 6-1-2016    Dr Fowler    CARDIAC SURGERY  02/07/2023    CERVICAL SPINE SURGERY      COLON SURGERY      EPIDURAL STEROID INJECTION INTO LUMBAR SPINE N/A 03/29/2022    Procedure: L4-5 RADHA;  Surgeon: Gela Fowler MD;  [facility];  Service: Pain Management;  Laterality: N/A;  PT AWARE ON OV TO BE TESTED\  PLAVIX TO BE HELD FOR 7 DAYS PRIOR TO PROCEDURE PER DR FOWLER AND DR PORTER  3-28  message left on vm re: covid    HYSTERECTOMY      LEFT HEART CATHETERIZATION Left 05/14/2021    Procedure: Left heart cath;  Surgeon: Mateus Guan MD;  Location: [facility] CATH LAB;  Service: Cardiology;  Laterality: Left;    LEFT HEART CATHETERIZATION Left 06/20/2022    Procedure: Left heart cath;  Surgeon: Oliverio Bautista MD;  Location: [facility] CATH LAB;  Service: Cardiology;  Laterality: Left;    LUMBAR SPINE SURGERY  12/28/2023    MEDIPORT REMOVAL  10/14/2021    Dr Kraft    OOPHORECTOMY      right rotator cuff repair      in [place]    TOTAL REDUCTION MAMMOPLASTY       Social History     Socioeconomic History    Marital status:      Spouse name: Jorge Alberto    Number of children: 2   Occupational History    Occupation:  at [employer] for past 27 years   Tobacco Use    Smoking status: Never     Passive exposure: Never    Smokeless tobacco:  "Never   Substance and Sexual Activity    Alcohol use: Never    Drug use: Never    Sexual activity: Yes   Social History Narrative    Worked at [employer] as  for past 29 years. H/O colon cancer. Watchman procedure back in Feb (heart).  Sees Dr Stover Oncologist. Cardiologist Dr Alina Garcia ([place]). Sees Endocrinologist Koko Rivera with [facility].      Family History   Problem Relation Age of Onset    Hypertension Mother     Diabetes Mother     Hypertension Father     Diabetes Father     Breast cancer Sister     Hypertension Sister     Hypertension Brother      Review of patient's allergies indicates:  No Known Allergies     Objective:  Vitals:    03/28/24 1021   BP: (!) 131/58   Pulse: 70   Resp: 18   Weight: 91.2 kg (201 lb)   Height: 5' 2" (1.575 m)   PainSc:   7           Physical Exam  Vitals and nursing note reviewed. Exam conducted with a chaperone present.   Constitutional:       General: She is awake. She is not in acute distress.     Appearance: Normal appearance. She is not ill-appearing, toxic-appearing or diaphoretic.   HENT:      Head: Normocephalic and atraumatic.      Nose: Nose normal.      Mouth/Throat:      Mouth: Mucous membranes are moist.      Pharynx: Oropharynx is clear.   Eyes:      Conjunctiva/sclera: Conjunctivae normal.      Pupils: Pupils are equal, round, and reactive to light.   Cardiovascular:      Rate and Rhythm: Normal rate.   Pulmonary:      Effort: Pulmonary effort is normal. No respiratory distress.   Abdominal:      Palpations: Abdomen is soft.      Tenderness: There is no guarding.   Musculoskeletal:         General: Normal range of motion.      Cervical back: Normal range of motion and neck supple. No rigidity.      Thoracic back: Tenderness present.      Lumbar back: Tenderness present.   Skin:     General: Skin is warm and dry.      Coloration: Skin is not jaundiced or pale.   Neurological:      General: No focal deficit present.      Mental Status: " She is alert and oriented to person, place, and time. Mental status is at baseline.      Cranial Nerves: No cranial nerve deficit (II-XII).   Psychiatric:         Mood and Affect: Mood normal.         Behavior: Behavior normal. Behavior is cooperative.         Thought Content: Thought content normal.           X-Ray Lumbar Spine 2 Or 3 Views  Narrative: EXAMINATION:  XR LUMBAR SPINE 2 OR 3 VIEWS    CLINICAL HISTORY:  Low back pain, symptoms persist with > 6wks conservative treatment;.  Dorsalgia, unspecified    TECHNIQUE:  XR LUMBAR SPINE 2 OR 3 VIEWS    COMPARISON:  10/27/2023    FINDINGS:  No acute fracture or dislocation.  Posterior spinal fusion hardware and intervertebral disc spacer at L2-L3, which is in expected position.  Moderate to severe degenerative disc disease L3-L4, L4-5 and L5-S1.  Multilevel endplate change and multilevel anterior osteophytes.  Multilevel facet change in neural foraminal narrowing, similar relative to 10/27/2023.  Impression: No acute fracture or dislocation. Posterior spinal fusion hardware and intervertebral disc spacer at L2-L3, which is in expected position. Moderate to severe degenerative disc disease L3-L4, L4-5 and L5-S1.  Multilevel endplate change and multilevel anterior osteophytes. Multilevel facet change in neural foraminal narrowing, similar relative to 10/27/2023.    Electronically signed by: Alexandre May  Date:    01/30/2024  Time:    14:09         Office Visit on 01/30/2024   Component Date Value Ref Range Status    POC Amphetamines 01/30/2024 Negative  Negative, Inconclusive Final    POC Barbiturates 01/30/2024 Negative  Negative, Inconclusive Final    POC Benzodiazepines 01/30/2024 Negative  Negative, Inconclusive Final    POC Cocaine 01/30/2024 Negative  Negative, Inconclusive Final    POC THC 01/30/2024 Negative  Negative, Inconclusive Final    POC Methadone 01/30/2024 Negative  Negative, Inconclusive Final    POC Methamphetamine 01/30/2024 Negative   Negative, Inconclusive Final    POC Opiates 01/30/2024 Presumptive Positive (A)  Negative, Inconclusive Final    POC Oxycodone 01/30/2024 Negative  Negative, Inconclusive Final    POC Phencyclidine 01/30/2024 Negative  Negative, Inconclusive Final    POC Methylenedioxymethamphetamine * 01/30/2024 Negative  Negative, Inconclusive Final    POC Tricyclic Antidepressants 01/30/2024 Negative  Negative, Inconclusive Final    POC Buprenorphine 01/30/2024 Negative   Final     Acceptable 01/30/2024 Yes   Final    POC Temperature (Urine) 01/30/2024 92   Final   Office Visit on 12/13/2023   Component Date Value Ref Range Status    Uric Acid 12/13/2023 6.3 (H)  2.6 - 6.0 mg/dL Final    RA 12/13/2023 Negative  Negative Final    CRP 12/13/2023 0.41  0.00 - 0.80 mg/dL Final    DANIEL Screen 12/13/2023 Negative  Negative Final    ESR Westergren 12/13/2023 11  0 - 30 mm/Hr Final   Office Visit on 11/15/2023   Component Date Value Ref Range Status    Glucose, Fasting 11/15/2023 105  74 - 106 mg/dL Final    Triglycerides 11/15/2023 48  35 - 150 mg/dL Final    Cholesterol 11/15/2023 150  0 - 200 mg/dL Final    HDL Cholesterol 11/15/2023 73 (H)  40 - 60 mg/dL Final    Cholesterol/HDL Ratio (Risk Factor) 11/15/2023 2.1   Final    Non-HDL 11/15/2023 77  mg/dL Final    LDL Calculated 11/15/2023 67  mg/dL Final    VLDL 11/15/2023 10  mg/dL Final   Admission on 10/27/2023, Discharged on 10/28/2023   Component Date Value Ref Range Status    Sodium 10/27/2023 139  136 - 145 mmol/L Final    Potassium 10/27/2023 4.0  3.5 - 5.1 mmol/L Final    Chloride 10/27/2023 110 (H)  98 - 107 mmol/L Final    CO2 10/27/2023 24  21 - 32 mmol/L Final    Anion Gap 10/27/2023 9  7 - 16 mmol/L Final    Glucose 10/27/2023 125 (H)  74 - 106 mg/dL Final    BUN 10/27/2023 24 (H)  7 - 18 mg/dL Final    Creatinine 10/27/2023 1.07 (H)  0.55 - 1.02 mg/dL Final    BUN/Creatinine Ratio 10/27/2023 22 (H)  6 - 20 Final    Calcium 10/27/2023 8.7  8.5 - 10.1 mg/dL  Final    Total Protein 10/27/2023 7.0  6.4 - 8.2 g/dL Final    Albumin 10/27/2023 3.2 (L)  3.5 - 5.0 g/dL Final    Globulin 10/27/2023 3.8  2.0 - 4.0 g/dL Final    A/G Ratio 10/27/2023 0.8   Final    Bilirubin, Total 10/27/2023 0.4  >0.0 - 1.2 mg/dL Final    Alk Phos 10/27/2023 83  50 - 130 U/L Final    ALT 10/27/2023 42  13 - 56 U/L Final    AST 10/27/2023 30  15 - 37 U/L Final    eGFR 10/27/2023 59 (L)  >=60 mL/min/1.73m2 Final    PT 10/27/2023 12.8  11.7 - 14.7 seconds Final    INR 10/27/2023 0.97  <=3.30 Final    TSH 10/27/2023 1.500  0.358 - 3.740 uIU/mL Final    Triglycerides 10/27/2023 55  35 - 150 mg/dL Final    Cholesterol 10/27/2023 153  0 - 200 mg/dL Final    HDL Cholesterol 10/27/2023 78 (H)  40 - 60 mg/dL Final    Cholesterol/HDL Ratio (Risk Factor) 10/27/2023 2.0   Final    Non-HDL 10/27/2023 75  mg/dL Final    LDL Calculated 10/27/2023 64  mg/dL Final    VLDL 10/27/2023 11  mg/dL Final    WBC 10/27/2023 6.19  4.50 - 11.00 K/uL Final    RBC 10/27/2023 4.38  4.20 - 5.40 M/uL Final    Hemoglobin 10/27/2023 11.9 (L)  12.0 - 16.0 g/dL Final    Hematocrit 10/27/2023 37.7 (L)  38.0 - 47.0 % Final    MCV 10/27/2023 86.1  80.0 - 96.0 fL Final    MCH 10/27/2023 27.2  27.0 - 31.0 pg Final    MCHC 10/27/2023 31.6 (L)  32.0 - 36.0 g/dL Final    RDW 10/27/2023 14.1  11.5 - 14.5 % Final    Platelet Count 10/27/2023 334  150 - 400 K/uL Final    MPV 10/27/2023 11.5  9.4 - 12.4 fL Final    Neutrophils % 10/27/2023 54.6  53.0 - 65.0 % Final    Lymphocytes % 10/27/2023 36.5  27.0 - 41.0 % Final    Monocytes % 10/27/2023 6.9 (H)  2.0 - 6.0 % Final    Eosinophils % 10/27/2023 1.3  1.0 - 4.0 % Final    Basophils % 10/27/2023 0.5  0.0 - 1.0 % Final    Immature Granulocytes % 10/27/2023 0.2  0.0 - 0.4 % Final    nRBC, Auto 10/27/2023 0.0  <=0.0 % Final    Neutrophils, Abs 10/27/2023 3.38  1.80 - 7.70 K/uL Final    Lymphocytes, Absolute 10/27/2023 2.26  1.00 - 4.80 K/uL Final    Monocytes, Absolute 10/27/2023 0.43  0.00 - 0.80  K/uL Final    Eosinophils, Absolute 10/27/2023 0.08  0.00 - 0.50 K/uL Final    Basophils, Absolute 10/27/2023 0.03  0.00 - 0.20 K/uL Final    Immature Granulocytes, Absolute 10/27/2023 0.01  0.00 - 0.04 K/uL Final    nRBC, Absolute 10/27/2023 0.00  <=0.00 x10e3/uL Final    Diff Type 10/27/2023 Auto   Final    Troponin I High Sensitivity 10/27/2023 9.1  <=60.4 pg/mL Final    POC Glucose 10/27/2023 143 (H)  70 - 105 mg/dL Final    BSA 10/27/2023 1.96  m2 Final    LVOT stroke volume 10/27/2023 78.78  cm3 Final    LVIDd 10/27/2023 3.8  3.5 - 6.0 cm Final    LV Systolic Volume 10/27/2023 23.15  mL Final    LV Systolic Volume Index 10/27/2023 12.4  mL/m2 Final    LVIDs 10/27/2023 2.2  2.1 - 4.0 cm Final    LV Diastolic Volume 10/27/2023 62.54  mL Final    LV Diastolic Volume Index 10/27/2023 33.44  mL/m2 Final    IVS 10/27/2023 1.1  0.6 - 1.1 cm Final    LVOT diameter 10/27/2023 1.9  cm Final    LVOT area 10/27/2023 2.8  cm2 Final    FS 10/27/2023 42  28 - 44 % Final    Left Ventricle Relative Wall Thick* 10/27/2023 0.89  cm Final    Posterior Wall 10/27/2023 1.7 (A)  0.6 - 1.1 cm Final    LV mass 10/27/2023 194.14  g Final    LV Mass Index 10/27/2023 104  g/m2 Final    MV Peak E Fran 10/27/2023 1.31  m/s Final    TDI LATERAL 10/27/2023 0.09  m/s Final    TDI SEPTAL 10/27/2023 0.09  m/s Final    E/E' ratio 10/27/2023 14.56  m/s Final    MV Peak A Fran 10/27/2023 0.87  m/s Final    TR Max Fran 10/27/2023 2.11  m/s Final    E/A ratio 10/27/2023 1.51   Final    E wave deceleration time 10/27/2023 202.26  msec Final    LV SEPTAL E/E' RATIO 10/27/2023 14.56  m/s Final    LV LATERAL E/E' RATIO 10/27/2023 14.56  m/s Final    LVOT peak fran 10/27/2023 1.09  m/s Final    Left Ventricular Outflow Tract Yanna* 10/27/2023 0.76  cm/s Final    Left Ventricular Outflow Tract Yanna* 10/27/2023 2.56  mmHg Final    LA size 10/27/2023 2.89  cm Final    Left Atrium Major Axis 10/27/2023 3.32  cm Final    RVDD 10/27/2023 3.79  cm Final    TAPSE  10/27/2023 1.46  cm Final    RA Major Axis 10/27/2023 3.75  cm Final    AV mean gradient 10/27/2023 4  mmHg Final    AV peak gradient 10/27/2023 9  mmHg Final    Ao peak verna 10/27/2023 1.47  m/s Final    Ao VTI 10/27/2023 36.60  cm Final    LVOT peak VTI 10/27/2023 27.80  cm Final    AV valve area 10/27/2023 2.15  cm² Final    AV Velocity Ratio 10/27/2023 0.74   Final    AV index (prosthetic) 10/27/2023 0.76   Final    CH[place] by Velocity Ratio 10/27/2023 2.10  cm² Final    Triscuspid Valve Regurgitation Pea* 10/27/2023 18  mmHg Final    PV PEAK VELOCITY 10/27/2023 1.06  m/s Final    PV peak gradient 10/27/2023 4  mmHg Final    Ao root annulus 10/27/2023 2.00  cm Final    IVC diameter 10/27/2023 1.23  cm Final    Mean e' 10/27/2023 0.09  m/s Final    ZLVIDS 10/27/2023 -3.00   Final    ZLVIDD 10/27/2023 -3.16   Final    AORTIC VALVE CUSP SEPERATION 10/27/2023 1.41  cm Final    TV resting pulmonary artery pressu* 10/27/2023 21  mmHg Final    RV TB RVSP 10/27/2023 5  mmHg Final    Est. RA pres 10/27/2023 3  mmHg Final    Cody's Biplane MOD Ejection Fra* 10/27/2023 57  % Final    Hemoglobin A1C 10/27/2023 7.8 (H)  4.5 - 6.6 % Final    Estimated Average Glucose 10/27/2023 174  mg/dL Final    POC Glucose 10/27/2023 103  70 - 105 mg/dL Final    POC Glucose 10/27/2023 170 (H)  70 - 105 mg/dL Final    POC Glucose 10/27/2023 109 (H)  70 - 105 mg/dL Final    Troponin I High Sensitivity 10/28/2023 10.9  <=60.4 pg/mL Final    Sodium 10/28/2023 139  136 - 145 mmol/L Final    Potassium 10/28/2023 3.7  3.5 - 5.1 mmol/L Final    Chloride 10/28/2023 107  98 - 107 mmol/L Final    CO2 10/28/2023 24  21 - 32 mmol/L Final    Anion Gap 10/28/2023 12  7 - 16 mmol/L Final    Glucose 10/28/2023 193 (H)  74 - 106 mg/dL Final    BUN 10/28/2023 24 (H)  7 - 18 mg/dL Final    Creatinine 10/28/2023 1.01  0.55 - 1.02 mg/dL Final    BUN/Creatinine Ratio 10/28/2023 24 (H)  6 - 20 Final    Calcium 10/28/2023 8.7  8.5 - 10.1 mg/dL Final    Total Protein  10/28/2023 6.4  6.4 - 8.2 g/dL Final    Albumin 10/28/2023 3.0 (L)  3.5 - 5.0 g/dL Final    Globulin 10/28/2023 3.4  2.0 - 4.0 g/dL Final    A/G Ratio 10/28/2023 0.9   Final    Bilirubin, Total 10/28/2023 0.5  >0.0 - 1.2 mg/dL Final    Alk Phos 10/28/2023 79  50 - 130 U/L Final    ALT 10/28/2023 34  13 - 56 U/L Final    AST 10/28/2023 20  15 - 37 U/L Final    eGFR 10/28/2023 63  >=60 mL/min/1.73m2 Final    Magnesium 10/28/2023 2.2  1.7 - 2.3 mg/dL Final    Phosphorus 10/28/2023 4.1  2.5 - 4.5 mg/dL Final    Troponin I High Sensitivity 10/28/2023 59.0  <=60.4 pg/mL Final    WBC 10/28/2023 8.53  4.50 - 11.00 K/uL Final    RBC 10/28/2023 4.16 (L)  4.20 - 5.40 M/uL Final    Hemoglobin 10/28/2023 11.3 (L)  12.0 - 16.0 g/dL Final    Hematocrit 10/28/2023 34.6 (L)  38.0 - 47.0 % Final    MCV 10/28/2023 83.2  80.0 - 96.0 fL Final    MCH 10/28/2023 27.2  27.0 - 31.0 pg Final    MCHC 10/28/2023 32.7  32.0 - 36.0 g/dL Final    RDW 10/28/2023 13.7  11.5 - 14.5 % Final    Platelet Count 10/28/2023 310  150 - 400 K/uL Final    MPV 10/28/2023 11.3  9.4 - 12.4 fL Final    Neutrophils % 10/28/2023 80.5 (H)  53.0 - 65.0 % Final    Lymphocytes % 10/28/2023 13.7 (L)  27.0 - 41.0 % Final    Monocytes % 10/28/2023 4.6  2.0 - 6.0 % Final    Eosinophils % 10/28/2023 0.4 (L)  1.0 - 4.0 % Final    Basophils % 10/28/2023 0.4  0.0 - 1.0 % Final    Immature Granulocytes % 10/28/2023 0.4  0.0 - 0.4 % Final    nRBC, Auto 10/28/2023 0.0  <=0.0 % Final    Neutrophils, Abs 10/28/2023 6.88  1.80 - 7.70 K/uL Final    Lymphocytes, Absolute 10/28/2023 1.17  1.00 - 4.80 K/uL Final    Monocytes, Absolute 10/28/2023 0.39  0.00 - 0.80 K/uL Final    Eosinophils, Absolute 10/28/2023 0.03  0.00 - 0.50 K/uL Final    Basophils, Absolute 10/28/2023 0.03  0.00 - 0.20 K/uL Final    Immature Granulocytes, Absolute 10/28/2023 0.03  0.00 - 0.04 K/uL Final    nRBC, Absolute 10/28/2023 0.00  <=0.00 x10e3/uL Final    Diff Type 10/28/2023 Auto   Final    POC Glucose  10/28/2023 172 (H)  70 - 105 mg/dL Final    POC Glucose 10/28/2023 188 (H)  70 - 105 mg/dL Final    POC Glucose 10/28/2023 191 (H)  70 - 105 mg/dL Final         No orders of the defined types were placed in this encounter.        Requested Prescriptions     Signed Prescriptions Disp Refills    traMADoL (ULTRAM) 50 mg tablet 60 tablet 1     Sig: Take 1 tablet (50 mg total) by mouth every 12 (twelve) hours as needed for Pain.       Assessment:     1. Lumbosacral radiculopathy    2. Osteoarthrosis multiple sites, not specified as generalized         A's of Opioid Risk Assessment  Activity:Patient can perform ADL.   Analgesia:Patients pain is partially controlled by current medication. Patient has tried OTC medications such as Tylenol and Ibuprofen with out relief.   Adverse Effects: Patient denies constipation or sedation.  Aberrant Behavior:  reviewed with no aberrant drug seeking/taking behavior.  Overdose reversal drug naloxone discussed    Drug screen reviewed        MRI lumbar spine [facility] September 19, 2023, multiple levels stenosis, multiple level degenerative changes stenosis multiple levels central canal and bilateral foraminal narrowing most notable L4/5 L5/S1    X-ray lumbar spine [facility] January 30, 2024  No acute fracture or dislocation. Posterior spinal fusion hardware and intervertebral disc spacer at L2-L3, which is in expected position. Moderate to severe degenerative disc disease L3-L4, L4-5 and L5-S1. Multilevel endplate change and multilevel anterior osteophytes. Multilevel facet change in neural foraminal narrowing       Plan:      Cannabis started January 30, 2024 last refill February 8, 2024    She states she has not taken any of the cannabis    She will bring the cannabis to the clinic for destruction     She is requesting to continue with tramadol 3 times a day      Narcan December 2022    Anticoagulated Plavix    History Colon  Cancer    Follows oncology    Following spine  surgery [facility] spine recovering after recent lumbar surgery  L2/3 posterior lumbar fusion      March 28, 2024, patient requesting resume tramadol     She states she is discontinued cannabis products    Recovering after stroke affected her right side     Recovering after right cataract surgery    Continue home exercise program as directed    Continue current medication    Resume tramadol After pain subsides    Follow-up 2 months    Dr. Fowler, June 2024    Bring original prescription medication bottles/container/box with labels to each visit

## 2024-04-04 ENCOUNTER — PATIENT OUTREACH (OUTPATIENT)
Dept: ADMINISTRATIVE | Facility: HOSPITAL | Age: 64
End: 2024-04-04

## 2024-04-04 NOTE — PROGRESS NOTES
Population Health Chart Review & Patient Outreach Details    Working Attributed Patients Identified as Diabetic/Prediabetic list from [employer]  Needs A1C rechecked, note added to next visit to recheck   Further Action Needed If Patient Returns Outreach:            Updates Requested / Reviewed:     []  Care Everywhere    []     []  External Sources ([facility], [facility], [facility], etc.)    [] [facility]   [] [facility]   [] Other:    []  Care Team Updated   []  Removed  or Duplicate Orders   []  Immunization Reconciliation Completed / Queried    [] [place]   [] [place]   [] [place]   [] [place]      Health Maintenance Topics Addressed and Outreach Outcomes / Actions Taken:             Breast Cancer Screening []  Mammogram Order Placed    []  Mammogram Screening Scheduled    []  External Records Requested & Care Team Updated if Applicable    []  External Records Uploaded & Care Team Updated if Applicable    []  Pt Declined Scheduling Mammogram    []  Pt Will Schedule with External Provider / Order Routed & Care Team Updated if Applicable              Cervical Cancer Screening []  Pap Smear Scheduled in Primary Care or OBGYN    []  External Records Requested & Care Team Updated if Applicable       []  External Records Uploaded, Care Team Updated, & History Updated if Applicable    []  Patient Declined Scheduling Pap Smear    []  Patient Will Schedule with External Provider & Care Team Updated if Applicable                  Colorectal Cancer Screening []  Colonoscopy Case Request / Referral / Home Test Order Placed    []  External Records Requested & Care Team Updated if Applicable    []  External Records Uploaded, Care Team Updated, & History Updated if Applicable    []  Patient Declined Completing Colon Cancer Screening    []  Patient Will Schedule with External Provider & Care Team Updated if Applicable    []  Fit Kit Mailed (add the SmartPhrase under additional notes)    []  Reminded Patient to Complete Home Test                 Diabetic Eye Exam []  Eye Exam Screening Order Placed    []  Eye Camera Scheduled or Optometry/Ophthalmology Referral Placed    []  External Records Requested & Care Team Updated if Applicable    []  External Records Uploaded, Care Team Updated, & History Updated if Applicable    []  Patient Declined Scheduling Eye Exam    []  Patient Will Schedule with External Provider & Care Team Updated if Applicable             Blood Pressure Control []  Primary Care Follow Up Visit Scheduled     []  Remote Blood Pressure Reading Captured    []  Patient Declined Remote Reading or Scheduling Appt - Escalated to PCP    []  Patient Will Call Back or Send Portal Message with Reading                 HbA1c & Other Labs []  Overdue Lab(s) Ordered    []  Overdue Lab(s) Scheduled    []  External Records Uploaded & Care Team Updated if Applicable    []  Primary Care Follow Up Visit Scheduled     []  Reminded Patient to Complete A1c Home Test    []  Patient Declined Scheduling Labs or Will Call Back to Schedule    []  Patient Will Schedule with External Provider / Order Routed, & Care Team Updated if Applicable           Primary Care Appointment []  Primary Care Appt Scheduled    []  Patient Declined Scheduling or Will Call Back to Schedule    []  Pt Established with External Provider, Updated Care Team, & Informed Pt to Notify Payor if Applicable           Medication Adherence /    Statin Use []  Primary Care Appointment Scheduled    []  Patient Reminded to  Prescription    []  Patient Declined, Provider Notified if Needed    []  Sent Provider Message to Review to Evaluate Pt for Statin, Add Exclusion Dx Codes, Document   Exclusion in Problem List, Change Statin Intensity Level to Moderate or High Intensity if Applicable                Osteoporosis Screening []  Dexa Order Placed    []  Dexa Appointment Scheduled    []  External Records Requested & Care Team Updated    []  External Records Uploaded, Care Team  Updated, & History Updated if Applicable    []  Patient Declined Scheduling Dexa or Will Call Back to Schedule    []  Patient Will Schedule with External Provider / Order Routed & Care Team Updated if Applicable       Additional Notes:

## 2024-04-24 ENCOUNTER — OFFICE VISIT (OUTPATIENT)
Dept: NEUROLOGY | Facility: CLINIC | Age: 64
End: 2024-04-24
Payer: COMMERCIAL

## 2024-04-24 VITALS
RESPIRATION RATE: 16 BRPM | WEIGHT: 203 LBS | HEART RATE: 61 BPM | OXYGEN SATURATION: 98 % | SYSTOLIC BLOOD PRESSURE: 130 MMHG | BODY MASS INDEX: 37.36 KG/M2 | DIASTOLIC BLOOD PRESSURE: 70 MMHG | HEIGHT: 62 IN

## 2024-04-24 DIAGNOSIS — E78.5 HYPERLIPIDEMIA LDL GOAL <70: ICD-10-CM

## 2024-04-24 DIAGNOSIS — I65.21 STENOSIS OF RIGHT CAROTID ARTERY: ICD-10-CM

## 2024-04-24 DIAGNOSIS — F32.A DEPRESSION, UNSPECIFIED DEPRESSION TYPE: ICD-10-CM

## 2024-04-24 DIAGNOSIS — I10 HYPERTENSION, UNSPECIFIED TYPE: ICD-10-CM

## 2024-04-24 DIAGNOSIS — I63.9 CEREBROVASCULAR ACCIDENT (CVA), UNSPECIFIED MECHANISM: Primary | ICD-10-CM

## 2024-04-24 PROCEDURE — 3044F HG A1C LEVEL LT 7.0%: CPT | Mod: ,,, | Performed by: NURSE PRACTITIONER

## 2024-04-24 PROCEDURE — 99215 OFFICE O/P EST HI 40 MIN: CPT | Mod: PBBFAC | Performed by: NURSE PRACTITIONER

## 2024-04-24 PROCEDURE — 1160F RVW MEDS BY RX/DR IN RCRD: CPT | Mod: ,,, | Performed by: NURSE PRACTITIONER

## 2024-04-24 PROCEDURE — 3008F BODY MASS INDEX DOCD: CPT | Mod: ,,, | Performed by: NURSE PRACTITIONER

## 2024-04-24 PROCEDURE — 1159F MED LIST DOCD IN RCRD: CPT | Mod: ,,, | Performed by: NURSE PRACTITIONER

## 2024-04-24 PROCEDURE — 3078F DIAST BP <80 MM HG: CPT | Mod: ,,, | Performed by: NURSE PRACTITIONER

## 2024-04-24 PROCEDURE — 99213 OFFICE O/P EST LOW 20 MIN: CPT | Mod: S$PBB,,, | Performed by: NURSE PRACTITIONER

## 2024-04-24 PROCEDURE — 3075F SYST BP GE 130 - 139MM HG: CPT | Mod: ,,, | Performed by: NURSE PRACTITIONER

## 2024-04-24 RX ORDER — CITALOPRAM 10 MG/1
10 TABLET ORAL DAILY
Qty: 30 TABLET | Refills: 11 | Status: SHIPPED | OUTPATIENT
Start: 2024-04-24 | End: 2025-04-24

## 2024-04-24 NOTE — PATIENT INSTRUCTIONS
Keep follow-up with Dr. Olivarez  Continue current plavix and aspirin  Continue current statin treatment  Continue current blood pressure medications  Keep follow-up with cardiology  Start celexa 10mg daily

## 2024-04-24 NOTE — PROGRESS NOTES
Subjective:       Patient ID: Dilma Cr is a 63 y.o. female     Chief Complaint:    Chief Complaint   Patient presents with    Follow-up     Patient is here for 4 month follow up.         Allergies:  Patient has no known allergies.    Current Medications:    Outpatient Encounter Medications as of 4/24/2024   Medication Sig Dispense Refill    ascorbic acid, vitamin C, 250 mg Chew Take 250 mg by mouth once daily.      aspirin (ECOTRIN) 81 MG EC tablet Take 81 mg by mouth once daily.      clopidogreL (PLAVIX) 75 mg tablet Take 1 tablet (75 mg total) by mouth once daily. 90 tablet 1    cyanocobalamin 2000 MCG tablet Take 1 tablet by mouth once daily.      cyclobenzaprine (FLEXERIL) 5 MG tablet Take 5 mg by mouth 3 (three) times daily as needed.      ezetimibe (ZETIA) 10 mg tablet Take 10 mg by mouth.      FREESTYLE BECCA 14 DAY SENSOR Kit USE TO CHECK GLUCOSE 4 TIMES DAILY      FREESTYLE BECCA 3 SENSOR Aleena USE 1 PATCH TOPICALLY EVERY DAY FOR 2 WEEKS      furosemide (LASIX) 20 MG tablet Take 1 tablet (20 mg total) by mouth daily as needed (fluid). 90 tablet 1    iron-vitamin C 100-250 mg, ICAR-C, 100-250 mg Tab Take 1 tablet by mouth 2 (two) times daily with meals.      JARDIANCE 25 mg tablet Take 1 tablet (25 mg total) by mouth once daily. 30 tablet 5    nitroGLYCERIN (NITROSTAT) 0.4 MG SL tablet Place 1 tablet (0.4 mg total) under the tongue every 5 (five) minutes as needed for Chest pain. 30 tablet 0    NOVOLOG FLEXPEN U-100 INSULIN 100 unit/mL (3 mL) InPn pen Inject 20 Units into the skin 2 (two) times a day. 12 each 1    olmesartan-hydrochlorothiazide (BENICAR HCT) 40-25 mg per tablet Take 1 tablet by mouth once daily.      OZEMPIC 1 mg/dose (4 mg/3 mL)       rosuvastatin (CRESTOR) 40 MG Tab Take 1 tablet by mouth once daily 90 tablet 0    sotaloL (BETAPACE) 120 MG Tab Take 120 mg by mouth every 12 (twelve) hours.      traMADoL (ULTRAM) 50 mg tablet Take 1 tablet (50 mg total) by mouth every 12 (twelve)  hours as needed for Pain. 60 tablet 1    TRESIBA FLEXTOUCH U-100 100 unit/mL (3 mL) insulin pen Inject 24 Units into the skin once daily. 3 pen 1    BINAXNOW COVID-19 AG SELF TEST Kit Use as Directed on the Package      citalopram (CELEXA) 10 MG tablet Take 1 tablet (10 mg total) by mouth once daily. 30 tablet 11    naloxone (NARCAN) 4 mg/actuation Spry 1 spray by Nasal route once as needed. (Patient not taking: Reported on 4/24/2024)      pramipexole (MIRAPEX) 0.125 MG tablet Take 1 tablet (0.125 mg total) by mouth every evening. (Patient not taking: Reported on 2/19/2024) 30 tablet 5    prochlorperazine (COMPAZINE) 10 MG tablet Take 1 tablet by mouth daily as needed. (Patient not taking: Reported on 4/24/2024)      [DISCONTINUED] isosorbide mononitrate (IMDUR) 30 MG 24 hr tablet Take 1 tablet (30 mg total) by mouth once daily. 30 tablet 11    [DISCONTINUED] sertraline (ZOLOFT) 25 MG tablet Take 1 tablet (25 mg total) by mouth once daily. (Patient not taking: Reported on 2/19/2024) 90 tablet 3     No facility-administered encounter medications on file as of 4/24/2024.       History of Present Illness  64 y/o female following in neurology for CVA    She was seen in the hospital in October of 2023 with symptoms of numbness in left face and arm.  She had known history of A-fib with prior watchman in 2/2023.  Prior on eliquis, but it was d/c due to anemia.  Currently following with Dr. Reeves, on plavix.  Her workup during the admit included an MRI brain which was not revealing of acute findings, but did show chronic infarcts middle cerebellar peduncles and rajeev.  CTA head negative, but CTA neck showed critical stenosis right carotid siphon, Dr. Olivarez evaluated her but felt at that time was not surgical but plans yearly follow-up for continued monitoring.    Seen at [facility] 1/31/24 for reported slurred speech and facial numbness.  Their notes indicate MRI brain negative for acute findings, CTA head without  occlusion, echo showed 70-75%, negative bubble study.      She is currently on ASA 81mg daily, plavix 75mg daily.  Was prior on eliquis but had to stop due to GI bleeding in early 2023.    Today she is clearly depressed, tearful in clinic and not very interactive or putting forth of much effort on her physical exam.  Reports she tried to return to work and seemed to have a lot of difficulty with staying on task and focusing.  Possibly this is related to the prior strokes, but to some degree strongly feel her mood is contributing as well, but depression after CVA is not uncommon and I did assure her of this.  On exam, as stated, very hard to get accurate assessment as she is clearly not putting forth effort, she would not smile, kept her eyes closed most of the visit, and quite tearful.  I strongly recommend starting celexa.  She is prescribed zoloft but says her primary told her not to take it but very clearly she needs some type of treatment.           Review of Systems  Review of Systems   Constitutional:  Negative for diaphoresis and fever.   HENT:  Negative for congestion, hearing loss and tinnitus.    Eyes:  Negative for blurred vision, double vision, photophobia, discharge and redness.   Respiratory:  Negative for cough and shortness of breath.    Cardiovascular:  Negative for chest pain.   Gastrointestinal:  Negative for abdominal pain, nausea and vomiting.   Musculoskeletal:  Negative for back pain, falls, joint pain, myalgias and neck pain.   Skin:  Negative for itching and rash.   Neurological:  Positive for sensory change and focal weakness. Negative for dizziness, tremors, speech change, seizures, loss of consciousness, weakness and headaches.   Psychiatric/Behavioral:  Positive for depression and memory loss. Negative for hallucinations. The patient does not have insomnia.    All other systems reviewed and are negative.     Objective:     NEUROLOGICAL EXAMINATION:     MENTAL STATUS   Oriented to person,  place, and time.   Attention: normal. Concentration: normal.   Speech: speech is normal   Level of consciousness: alert  Knowledge: good and consistent with education.   Normal comprehension.     CRANIAL NERVES     CN II   Visual fields full to confrontation.   Visual acuity: normal  Right visual field deficit: none  Left visual field deficit: none     CN III, IV, VI   Pupils are equal, round, and reactive to light.  Extraocular motions are normal.   Right pupil: Size: 3 mm. Shape: regular. Reactivity: brisk. Consensual response: intact. Accommodation: intact.   Left pupil: Size: 3 mm. Shape: regular. Reactivity: brisk. Consensual response: intact. Accommodation: intact.   CN III: no CN III palsy  CN VI: no CN VI palsy  Nystagmus: none   Diplopia: none  Upgaze: normal  Downgaze: normal  Conjugate gaze: present  Vestibulo-ocular reflex: present    CN V   Facial sensation intact.   Right facial sensation deficit: none  Left facial sensation deficit: none  Right corneal reflex: normal  Left corneal reflex: normal    CN VII   Facial expression full, symmetric.   Right facial weakness: none  Left facial weakness: none  Right taste: normal  Left taste: normal    CN VIII   CN VIII normal.   Hearing: intact    CN IX, X   CN IX normal.   CN X normal.   Palate: symmetric    CN XI   CN XI normal.   Right sternocleidomastoid strength: normal  Left sternocleidomastoid strength: normal  Right trapezius strength: normal  Left trapezius strength: normal    CN XII   CN XII normal.   Tongue: not atrophic  Fasciculations: absent  Tongue deviation: none    MOTOR EXAM   Muscle bulk: normal  Overall muscle tone: normal  Right arm tone: normal  Left arm tone: normal  Right arm pronator drift: absent  Left arm pronator drift: absent  Right leg tone: normal  Left leg tone: normal    Strength   Right neck flexion: 5/5  Left neck flexion: 5/5  Right neck extension: 5/5  Left neck extension: 5/5  Right deltoid: 5/5  Left deltoid: 5/5  Right  biceps: 4/5  Left biceps: 5/5  Right triceps: 4/5  Left triceps: 5/5  Right wrist flexion: 4/5  Left wrist flexion: 5/5  Right wrist extension: 4/5  Left wrist extension:   Right interossei: 5  Left interossei: 5  Right iliopsoas:   Left iliopsoas:   Right quadriceps:   Left quadriceps:   Right hamstrin/5  Left hamstrin/5  Right anterior tibial:   Left anterior tibial:   Right posterior tibial:   Left posterior tibial:   Right gastroc:   Left gastroc:        Patient very clearly not putting forth much physical effort during her exam so is limited     REFLEXES     Reflexes   Right brachioradialis: 2+  Left brachioradialis: 2+  Right biceps: 2+  Left biceps: 2+  Right triceps: 2+  Left triceps: 2+  Right patellar: 2+  Left patellar: 2+  Right achilles: 2+  Left achilles: 2+  Right plantar: normal  Left plantar: normal  Right Delaney: absent  Left Delaney: absent  Right ankle clonus: absent  Left ankle clonus: absent  Right pendular knee jerk: absent  Left pendular knee jerk: absent    SENSORY EXAM   Light touch normal.   Right arm light touch: normal  Left arm light touch: normal  Right leg light touch: normal  Left leg light touch: normal  Vibration normal.   Right arm vibration: normal  Left arm vibration: normal  Right leg vibration: normal  Left leg vibration: normal  Proprioception normal.   Right arm proprioception: normal  Left arm proprioception: normal  Right leg proprioception: normal  Left leg proprioception: normal  Pinprick normal.   Right arm pinprick: normal  Left arm pinprick: normal  Right leg pinprick: normal  Left leg pinprick: normal  Graphesthesia: normal  Romberg: negative  Stereognosis: normal    GAIT AND COORDINATION     Gait  Gait: wide-based     Coordination   Finger to nose coordination: normal  Heel to shin coordination: normal  Tandem walking coordination: normal    Tremor   Resting tremor: absent  Intention tremor: absent  Action tremor:  absent       Physical Exam  Vitals and nursing note reviewed.   Constitutional:       Appearance: Normal appearance.   HENT:      Head: Normocephalic.   Eyes:      Extraocular Movements: Extraocular movements intact and EOM normal.      Pupils: Pupils are equal, round, and reactive to light.   Cardiovascular:      Rate and Rhythm: Normal rate and regular rhythm.   Pulmonary:      Effort: Pulmonary effort is normal.      Breath sounds: Normal breath sounds.   Musculoskeletal:         General: No swelling or tenderness. Normal range of motion.      Cervical back: Normal range of motion and neck supple.      Right lower leg: No edema.      Left lower leg: No edema.   Skin:     General: Skin is warm and dry.      Coloration: Skin is not jaundiced.      Findings: No rash.   Neurological:      General: No focal deficit present.      Mental Status: She is alert and oriented to person, place, and time.      GCS: GCS eye subscore is 4. GCS verbal subscore is 5. GCS motor subscore is 6.      Cranial Nerves: No cranial nerve deficit.      Sensory: No sensory deficit.      Motor: Motor function is intact. No weakness.      Coordination: Coordination is intact. Coordination normal. Finger-Nose-Finger Test, Heel to Shin Test and Romberg Test normal.      Gait: Gait and tandem walk normal.      Deep Tendon Reflexes: Reflexes normal.      Reflex Scores:       Tricep reflexes are 2+ on the right side and 2+ on the left side.       Bicep reflexes are 2+ on the right side and 2+ on the left side.       Brachioradialis reflexes are 2+ on the right side and 2+ on the left side.       Patellar reflexes are 2+ on the right side and 2+ on the left side.       Achilles reflexes are 2+ on the right side and 2+ on the left side.  Psychiatric:         Attention and Perception: She is inattentive.         Mood and Affect: Mood is depressed. Affect is tearful.         Speech: Speech normal.         Behavior: Behavior is withdrawn. Behavior is  cooperative.         Thought Content: Thought content normal.         Cognition and Memory: Cognition is impaired.          Assessment:     Problem List Items Addressed This Visit          Neuro    Cerebrovascular accident (CVA) - Primary       Psychiatric    Depression    Relevant Medications    citalopram (CELEXA) 10 MG tablet       Cardiac/Vascular    Hypertension (Chronic)    Hyperlipidemia LDL goal <70    Stenosis of right carotid artery            Primary Diagnosis and ICD10  Cerebrovascular accident (CVA), unspecified mechanism [I63.9]    Plan:     Patient Instructions   Keep follow-up with Dr. Olivarez  Continue current plavix and aspirin  Continue current statin treatment  Continue current blood pressure medications  Keep follow-up with cardiology  Start celexa 10mg daily    Medications Discontinued During This Encounter   Medication Reason    sertraline (ZOLOFT) 25 MG tablet        Requested Prescriptions     Signed Prescriptions Disp Refills    citalopram (CELEXA) 10 MG tablet 30 tablet 11     Sig: Take 1 tablet (10 mg total) by mouth once daily.       No orders of the defined types were placed in this encounter.

## 2024-05-03 ENCOUNTER — PATIENT OUTREACH (OUTPATIENT)
Dept: ADMINISTRATIVE | Facility: HOSPITAL | Age: 64
End: 2024-05-03

## 2024-05-03 NOTE — PROGRESS NOTES
Population Health Chart Review & Patient Outreach Details    Per [facility] website, insurance is active and patient is enrolled in CM:    CM! Provider CMH! Benefit Start Date CMH! Benefit End Date Baseline Risk Track(s) Baseline Risk Level Current Risk Tracks(s) Current Risk Level   MARISOL VITAL MD 2023 Hypertension, Glucose Low Hypertension, Glucose Low     Color Me Healthy! Services Guide   Color Me Healthy! Services  CPT Codes     A1C  (0 of 2 Completed) View CPT Codes »    Metabolic Visit  (0 of 2 Completed) View CPT Codes »    Microalbumin  (0 of 1 Completed) View CPT Codes »    Medication Monitoring-Renal  (0 of 2 Completed) View CPT Codes »    Medication Monitoring-Liver  (0 of 2 Completed) View CPT Codes »    Dilated Eye Exam  (0 of 1 Completed) View CPT Codes »    Venipuncture  (0 of 2 Completed) View CPT Codes »        Further Action Needed If Patient Returns Outreach:            Updates Requested / Reviewed:     []  Care Everywhere    []     []  External Sources ([facility], [facility], [facility], etc.)    [] [facility]   [] [facility]   [] Other:    []  Care Team Updated   []  Removed  or Duplicate Orders   []  Immunization Reconciliation Completed / Queried    [] [place]   [] [place]   [] [place]   [] [place]      Health Maintenance Topics Addressed and Outreach Outcomes / Actions Taken:             Breast Cancer Screening []  Mammogram Order Placed    []  Mammogram Screening Scheduled    []  External Records Requested & Care Team Updated if Applicable    []  External Records Uploaded & Care Team Updated if Applicable    []  Pt Declined Scheduling Mammogram    []  Pt Will Schedule with External Provider / Order Routed & Care Team Updated if Applicable              Cervical Cancer Screening []  Pap Smear Scheduled in Primary Care or OBGYN    []  External Records Requested & Care Team Updated if Applicable       []  External Records Uploaded, Care Team Updated, & History Updated if  Applicable    []  Patient Declined Scheduling Pap Smear    []  Patient Will Schedule with External Provider & Care Team Updated if Applicable                  Colorectal Cancer Screening []  Colonoscopy Case Request / Referral / Home Test Order Placed    []  External Records Requested & Care Team Updated if Applicable    []  External Records Uploaded, Care Team Updated, & History Updated if Applicable    []  Patient Declined Completing Colon Cancer Screening    []  Patient Will Schedule with External Provider & Care Team Updated if Applicable    []  Fit Kit Mailed (add the SmartPhrase under additional notes)    []  Reminded Patient to Complete Home Test                Diabetic Eye Exam []  Eye Exam Screening Order Placed    []  Eye Camera Scheduled or Optometry/Ophthalmology Referral Placed    []  External Records Requested & Care Team Updated if Applicable    []  External Records Uploaded, Care Team Updated, & History Updated if Applicable    []  Patient Declined Scheduling Eye Exam    []  Patient Will Schedule with External Provider & Care Team Updated if Applicable             Blood Pressure Control []  Primary Care Follow Up Visit Scheduled     []  Remote Blood Pressure Reading Captured    []  Patient Declined Remote Reading or Scheduling Appt - Escalated to PCP    []  Patient Will Call Back or Send Portal Message with Reading                 HbA1c & Other Labs []  Overdue Lab(s) Ordered    []  Overdue Lab(s) Scheduled    []  External Records Uploaded & Care Team Updated if Applicable    []  Primary Care Follow Up Visit Scheduled     []  Reminded Patient to Complete A1c Home Test    []  Patient Declined Scheduling Labs or Will Call Back to Schedule    []  Patient Will Schedule with External Provider / Order Routed, & Care Team Updated if Applicable           Primary Care Appointment []  Primary Care Appt Scheduled    []  Patient Declined Scheduling or Will Call Back to Schedule    []  Pt Established with  External Provider, Updated Care Team, & Informed Pt to Notify Payor if Applicable           Medication Adherence /    Statin Use []  Primary Care Appointment Scheduled    []  Patient Reminded to  Prescription    []  Patient Declined, Provider Notified if Needed    []  Sent Provider Message to Review to Evaluate Pt for Statin, Add Exclusion Dx Codes, Document   Exclusion in Problem List, Change Statin Intensity Level to Moderate or High Intensity if Applicable                Osteoporosis Screening []  Dexa Order Placed    []  Dexa Appointment Scheduled    []  External Records Requested & Care Team Updated    []  External Records Uploaded, Care Team Updated, & History Updated if Applicable    []  Patient Declined Scheduling Dexa or Will Call Back to Schedule    []  Patient Will Schedule with External Provider / Order Routed & Care Team Updated if Applicable       Additional Notes:

## 2024-05-06 ENCOUNTER — OFFICE VISIT (OUTPATIENT)
Dept: FAMILY MEDICINE | Facility: CLINIC | Age: 64
End: 2024-05-06
Payer: COMMERCIAL

## 2024-05-06 VITALS
SYSTOLIC BLOOD PRESSURE: 144 MMHG | HEIGHT: 62 IN | DIASTOLIC BLOOD PRESSURE: 64 MMHG | RESPIRATION RATE: 18 BRPM | HEART RATE: 57 BPM | BODY MASS INDEX: 35.7 KG/M2 | OXYGEN SATURATION: 100 % | TEMPERATURE: 99 F | WEIGHT: 194 LBS

## 2024-05-06 DIAGNOSIS — E11.8 DIABETES MELLITUS TYPE 2 WITH COMPLICATIONS: ICD-10-CM

## 2024-05-06 DIAGNOSIS — E11.3553 TYPE 2 DIABETES MELLITUS WITH STABLE PROLIFERATIVE RETINOPATHY OF BOTH EYES, WITH LONG-TERM CURRENT USE OF INSULIN: ICD-10-CM

## 2024-05-06 DIAGNOSIS — Z79.4 TYPE 2 DIABETES MELLITUS WITH STABLE PROLIFERATIVE RETINOPATHY OF BOTH EYES, WITH LONG-TERM CURRENT USE OF INSULIN: ICD-10-CM

## 2024-05-06 DIAGNOSIS — I63.9 CEREBROVASCULAR ACCIDENT (CVA), UNSPECIFIED MECHANISM: ICD-10-CM

## 2024-05-06 DIAGNOSIS — Z79.4 TYPE 2 DIABETES MELLITUS WITH RETINOPATHY OF BOTH EYES, WITH LONG-TERM CURRENT USE OF INSULIN, MACULAR EDEMA PRESENCE UNSPECIFIED, UNSPECIFIED RETINOPATHY SEVERITY: ICD-10-CM

## 2024-05-06 DIAGNOSIS — E11.42 TYPE 2 DIABETES MELLITUS WITH DIABETIC POLYNEUROPATHY, WITH LONG-TERM CURRENT USE OF INSULIN: Primary | Chronic | ICD-10-CM

## 2024-05-06 DIAGNOSIS — I10 HYPERTENSION, UNSPECIFIED TYPE: ICD-10-CM

## 2024-05-06 DIAGNOSIS — E11.319 TYPE 2 DIABETES MELLITUS WITH RETINOPATHY OF BOTH EYES, WITH LONG-TERM CURRENT USE OF INSULIN, MACULAR EDEMA PRESENCE UNSPECIFIED, UNSPECIFIED RETINOPATHY SEVERITY: ICD-10-CM

## 2024-05-06 DIAGNOSIS — C18.9 MALIGNANT NEOPLASM OF COLON, UNSPECIFIED PART OF COLON: ICD-10-CM

## 2024-05-06 DIAGNOSIS — N18.32 CHRONIC KIDNEY DISEASE (CKD) STAGE G3B/A3, MODERATELY DECREASED GLOMERULAR FILTRATION RATE (GFR) BETWEEN 30-44 ML/MIN/1.73 SQUARE METER AND ALBUMINURIA CREATININE RATIO GREATER THAN 300 MG/G: ICD-10-CM

## 2024-05-06 DIAGNOSIS — Z79.4 TYPE 2 DIABETES MELLITUS WITH DIABETIC POLYNEUROPATHY, WITH LONG-TERM CURRENT USE OF INSULIN: Primary | Chronic | ICD-10-CM

## 2024-05-06 LAB
ALBUMIN SERPL BCP-MCNC: 3.6 G/DL (ref 3.5–5)
ALBUMIN/GLOB SERPL: 0.9 {RATIO}
ALP SERPL-CCNC: 100 U/L (ref 50–130)
ALT SERPL W P-5'-P-CCNC: 25 U/L (ref 13–56)
ANION GAP SERPL CALCULATED.3IONS-SCNC: 13 MMOL/L (ref 7–16)
AST SERPL W P-5'-P-CCNC: 19 U/L (ref 15–37)
BILIRUB SERPL-MCNC: 0.1 MG/DL (ref ?–1.2)
BUN SERPL-MCNC: 26 MG/DL (ref 7–18)
BUN/CREAT SERPL: 17 (ref 6–20)
CALCIUM SERPL-MCNC: 9 MG/DL (ref 8.5–10.1)
CHLORIDE SERPL-SCNC: 107 MMOL/L (ref 98–107)
CO2 SERPL-SCNC: 25 MMOL/L (ref 21–32)
CREAT SERPL-MCNC: 1.55 MG/DL (ref 0.55–1.02)
CREAT UR-MCNC: 71 MG/DL (ref 28–219)
EGFR (NO RACE VARIABLE) (RUSH/TITUS): 38 ML/MIN/1.73M2
EST. AVERAGE GLUCOSE BLD GHB EST-MCNC: 131 MG/DL
GLOBULIN SER-MCNC: 3.8 G/DL (ref 2–4)
GLUCOSE SERPL-MCNC: 95 MG/DL (ref 74–106)
HBA1C MFR BLD HPLC: 6.2 % (ref 4.5–6.6)
MICROALBUMIN UR-MCNC: 8.2 MG/DL (ref 0–2.8)
MICROALBUMIN/CREAT RATIO PNL UR: 115.5 MG/G (ref 0–30)
POTASSIUM SERPL-SCNC: 4.2 MMOL/L (ref 3.5–5.1)
PROT SERPL-MCNC: 7.4 G/DL (ref 6.4–8.2)
SODIUM SERPL-SCNC: 141 MMOL/L (ref 136–145)

## 2024-05-06 PROCEDURE — 3078F DIAST BP <80 MM HG: CPT | Mod: ,,, | Performed by: FAMILY MEDICINE

## 2024-05-06 PROCEDURE — 3077F SYST BP >= 140 MM HG: CPT | Mod: ,,, | Performed by: FAMILY MEDICINE

## 2024-05-06 PROCEDURE — 3066F NEPHROPATHY DOC TX: CPT | Mod: ,,, | Performed by: FAMILY MEDICINE

## 2024-05-06 PROCEDURE — 80053 COMPREHEN METABOLIC PANEL: CPT | Mod: ,,, | Performed by: CLINICAL MEDICAL LABORATORY

## 2024-05-06 PROCEDURE — 3044F HG A1C LEVEL LT 7.0%: CPT | Mod: ,,, | Performed by: FAMILY MEDICINE

## 2024-05-06 PROCEDURE — 99214 OFFICE O/P EST MOD 30 MIN: CPT | Mod: ,,, | Performed by: FAMILY MEDICINE

## 2024-05-06 PROCEDURE — 82043 UR ALBUMIN QUANTITATIVE: CPT | Mod: ,,, | Performed by: CLINICAL MEDICAL LABORATORY

## 2024-05-06 PROCEDURE — 3060F POS MICROALBUMINURIA REV: CPT | Mod: ,,, | Performed by: FAMILY MEDICINE

## 2024-05-06 PROCEDURE — 83036 HEMOGLOBIN GLYCOSYLATED A1C: CPT | Mod: ,,, | Performed by: CLINICAL MEDICAL LABORATORY

## 2024-05-06 PROCEDURE — 1159F MED LIST DOCD IN RCRD: CPT | Mod: ,,, | Performed by: FAMILY MEDICINE

## 2024-05-06 PROCEDURE — 1160F RVW MEDS BY RX/DR IN RCRD: CPT | Mod: ,,, | Performed by: FAMILY MEDICINE

## 2024-05-06 PROCEDURE — 3008F BODY MASS INDEX DOCD: CPT | Mod: ,,, | Performed by: FAMILY MEDICINE

## 2024-05-06 PROCEDURE — 82570 ASSAY OF URINE CREATININE: CPT | Mod: ,,, | Performed by: CLINICAL MEDICAL LABORATORY

## 2024-05-06 RX ORDER — CYCLOBENZAPRINE HCL 10 MG
10 TABLET ORAL 3 TIMES DAILY PRN
COMMUNITY
Start: 2024-05-01 | End: 2024-05-09

## 2024-05-06 RX ORDER — OLMESARTAN MEDOXOMIL 20 MG/1
20 TABLET ORAL DAILY
Qty: 30 TABLET | Refills: 5 | Status: CANCELLED | OUTPATIENT
Start: 2024-05-06 | End: 2025-05-06

## 2024-05-06 RX ORDER — OLMESARTAN MEDOXOMIL AND HYDROCHLOROTHIAZIDE 40/25 40; 25 MG/1; MG/1
1 TABLET ORAL DAILY
COMMUNITY
End: 2024-05-06 | Stop reason: SDUPTHER

## 2024-05-06 NOTE — PATIENT INSTRUCTIONS
"Patient Education       Type 2 Diabetes   The Basics   Written by the doctors and editors at [facility]   What is type 2 diabetes? -- Type 2 diabetes is a disorder that disrupts the way your body uses sugar. It is sometimes called type 2 diabetes mellitus.  All the cells in your body need sugar to work normally. Sugar gets into the cells with the help of a hormone called insulin. Insulin is made by the pancreas, an organ in the belly. If there is not enough insulin, or if your body stops responding to insulin, sugar builds up in the blood. That is what happens to people with diabetes.  There are two different types of diabetes:   Type 1 diabetes - In type 1 diabetes, the pancreas does not make insulin or makes very little insulin.  Type 2 diabetes - In most people with type 2 diabetes, the body stops responding to insulin normally. Then, over time, the pancreas stops making enough insulin.   Being overweight or obese increases a person's risk of developing type 2 diabetes. But people who are not overweight can get diabetes, too.  What are the symptoms of type 2 diabetes? -- Type 2 diabetes usually causes no symptoms. When symptoms do occur, they include:  Needing to urinate often  Intense thirst  Blurry vision  Can diabetes lead to other health problems? -- Yes. Type 2 diabetes might not make you feel sick. But if it is not managed, it can lead to serious problems over time, such as:  Heart attacks  Strokes  Kidney disease  Vision problems (or even blindness)  Pain or loss of feeling in the hands and feet  Needing to have fingers, toes, or other body parts removed (amputated)  How do I know if I have type 2 diabetes? -- To find out if you have type 2 diabetes, your doctor or nurse can do a blood test. There are 2 tests that can be used for this. Both involve measuring the amount of sugar in your blood, called your "blood sugar" or "blood glucose":   One of the tests measures your blood sugar at the time the blood " "sample is taken. This test is done in the morning. You can't eat or drink anything except water for at least 8 hours before the test.   The other test shows what your average blood sugar has been for the past 2 to 3 months. This blood test is called "hemoglobin A1C" or just "A1C." It can be checked at any time of the day, even if you have recently eaten.  How is type 2 diabetes treated? -- The goals of treatment are to control your blood sugar and lower the risk of future problems that can happen in people with diabetes. An important part of managing diabetes is to eat healthy foods and get plenty of physical activity.  There are a few medicines that help control blood sugar. Some people need to take pills that help the body make more insulin or that help insulin do its job. Others need insulin shots.  Depending on what medicines you take, you might need to check your blood sugar regularly at home. But not everyone with type 2 diabetes needs to do this. Your doctor or nurse will tell you if you should be checking your blood sugar, and when and how to do this.  Sometimes, people with type 2 diabetes also need medicines to reduce the problems caused by the disease. For instance, medicines used to lower blood pressure can reduce the chances of a heart attack or stroke.  It's also important to get certain vaccines, such as vaccines to protect against the flu and coronavirus disease 2019 (COVID-19). Some people also need a vaccine to prevent pneumonia.  Can type 2 diabetes be prevented? -- Yes. To lower your chances of getting type 2 diabetes, the most important thing you can do is eat a healthy diet and get plenty of physical activity. This can help you lose weight if you are overweight. But eating well and being active are also good for your overall health. Even gentle activity, like walking, has benefits.  If you smoke, quitting can also lower your risk of type 2 diabetes. Quitting smoking can be hard to do, but your " "doctor or nurse can help.  All topics are updated as new evidence becomes available and our peer review process is complete.  This topic retrieved from [employer] on: Sep 21, 2021.  Topic 39944 Version 14.0  Release: 29.4.2 - C29.263  © 2021 UpToDate, Inc. and/or its affiliates. All rights reserved.  Consumer Information Use and Disclaimer   This information is not specific medical advice and does not replace information you receive from your health care provider. This is only a brief summary of general information. It does NOT include all information about conditions, illnesses, injuries, tests, procedures, treatments, therapies, discharge instructions or life-style choices that may apply to you. You must talk with your health care provider for complete information about your health and treatment options. This information should not be used to decide whether or not to accept your health care provider's advice, instructions or recommendations. Only your health care provider has the knowledge and training to provide advice that is right for you. The use of this information is governed by the [employer] End User License Agreement, available at [email or web address]://[email or web address]/[email or web address]/[email or web address]/[email or web address].The use of [employer] content is governed by the [employer] Terms of Use. ©2021 UpToDate, Inc. All rights reserved.  Copyright   © 2021 UpToDate, Inc. and/or its affiliates. All rights reserved.    Patient Education       High Blood Pressure in Adults   The Basics   Written by the doctors and editors at [facility]   What is high blood pressure? -- High blood pressure is a condition that puts you at risk for heart attack, stroke, and kidney disease. It does not usually cause symptoms. But it can be serious.  When your doctor or nurse tells you your blood pressure, they will say 2 numbers. For instance, your doctor or nurse might say that your blood pressure is "130 over 80." The top number is the pressure inside your arteries " "when your heart is bipin. The bottom number is the pressure inside your arteries when your heart is relaxed.  "Elevated blood pressure" is a term doctors or nurses use as a warning. People with elevated blood pressure do not yet have high blood pressure. But their blood pressure is not as low as it should be for good health.  Many experts define high, elevated, and normal blood pressure as follows:  High - Top number of 130 or above and/or bottom number of 80 or above  Elevated - Top number between 120 and 129 and bottom number of 79 or below  Normal - Top number of 119 or below and bottom number of 79 or below  This information is also in the table (table 1).   How can I lower my blood pressure? -- If your doctor or nurse has prescribed blood pressure medicine, the most important thing you can do is to take it. If it causes side effects, do not just stop taking it. Instead, talk to your doctor or nurse about the problems it causes. They might be able to lower your dose or switch you to another medicine. If cost is a problem, mention that too. They might be able to put you on a less expensive medicine. Taking your blood pressure medicine can keep you from having a heart attack or stroke, and it can save your life!  Can I do anything on my own? -- You have a lot of control over your blood pressure. To lower it:  Lose weight (if you are overweight)  Choose a diet low in fat and rich in fruits, vegetables, and low-fat dairy products  Reduce the amount of salt you eat  Do something active for at least 30 minutes a day on most days of the week  Cut down on alcohol (if you drink more than 2 alcoholic drinks per day)  It's also a good idea to get a home blood pressure meter. People who check their own blood pressure at home do better at keeping it low and can sometimes even reduce the amount of medicine they take.  All topics are updated as new evidence becomes available and our peer review process is complete.  This " "topic retrieved from [employer] on: Sep 21, 2021.  Topic 79648 Version 15.0  Release: 29.4.2 - C29.263  © 2021 UpToDate, Inc. and/or its affiliates. All rights reserved.  table 1: Definition of normal and high blood pressure  Level  Top number  Bottom number    High 130 or above 80 or above   Elevated 120 to 129 79 or below   Normal 119 or below 79 or below   These definitions are from the American College of Cardiology/American Heart Association. Other expert groups might use slightly different definitions.  "Elevated blood pressure" is a term doctor or nurses use as a warning. It means you do not yet have high blood pressure, but your blood pressure is not as low as it should be for good health.  Graphic 73933 Version 6.0  Consumer Information Use and Disclaimer   This information is not specific medical advice and does not replace information you receive from your health care provider. This is only a brief summary of general information. It does NOT include all information about conditions, illnesses, injuries, tests, procedures, treatments, therapies, discharge instructions or life-style choices that may apply to you. You must talk with your health care provider for complete information about your health and treatment options. This information should not be used to decide whether or not to accept your health care provider's advice, instructions or recommendations. Only your health care provider has the knowledge and training to provide advice that is right for you. The use of this information is governed by the [employer] End User License Agreement, available at https://[email or web address].Zingaya/en/solutions/[employer]/about/hsawna.The use of [employer] content is governed by the [employer] Terms of Use. ©2021 UpToDate, Inc. All rights reserved.  Copyright   © 2021 UpToDate, Inc. and/or its affiliates. All rights reserved.    Patient Education       High Cholesterol   The Basics   Written by the doctors and editors at [employer] " "  What is cholesterol? -- Cholesterol is a substance that is found in the blood. Everyone has some. It is needed for good health. The problem is, people sometimes have too much cholesterol. Compared with people with normal cholesterol, people with high cholesterol have a higher risk of heart attacks, strokes, and other health problems. The higher your cholesterol, the higher your risk of these problems.  Are there different types of cholesterol? -- Yes, there are a few different types. If you get a cholesterol test, you might hear your doctor or nurse talk about:  Total cholesterol  LDL cholesterol - Some people call this the "bad" cholesterol. That's because having high LDL levels raises your risk of heart attacks, strokes, and other health problems.  HDL cholesterol - Some people call this the "good" cholesterol. That's because people with high HDL levels tend to have a lower risk of heart attacks, strokes, and other health problems.   Non-HDL cholesterol - Non-HDL cholesterol is your total cholesterol minus your HDL cholesterol.  Triglycerides - Triglycerides are not cholesterol. They are another type of fat. But they often get measured when cholesterol is measured. (Having high triglycerides also seems to increase the risk of heart attacks and strokes.)  What should my numbers be? -- Ask your doctor or nurse what your numbers should be. Different people need different goals. (If you live outside the [place], see the table (table 1)). In general, people who do not already have heart disease should aim for:  Total cholesterol below 200  LDL cholesterol below 130 - or much lower, if they are at risk of heart attacks or strokes  HDL cholesterol above 60  Non-HDL cholesterol below 160 - or lower, if they are at risk of heart attacks or strokes  Triglycerides below 150  Keep in mind, though, that many people who cannot meet these goals still have a low risk of heart attacks and strokes.  What should I do if my " "doctor tells me I have high cholesterol? -- Ask your doctor what your overall risk of heart attacks and strokes is. High cholesterol, by itself, is not always a reason to worry. Having high cholesterol is just one of many things that can increase your risk of heart attacks and strokes. Other factors that increase your risk include:  Cigarette smoking  High blood pressure  Having a parent, sister, or brother who got heart disease at a young age - Young, in this case, means younger than 55 for men and younger than 65 for women.  A diet that is not heart healthy - A "heart-healthy" diet includes lots of fruits and vegetables, fiber, and healthy fats (like those found in fish and certain oils). It also means limiting sugar and unhealthy fats.  Older age  If you are at high risk of heart attacks and strokes, having high cholesterol is a problem. On the other hand, if you are at low risk, having high cholesterol might not lead to treatment.  Should I take medicine to lower cholesterol? -- Not everyone who has high cholesterol needs medicines. Your doctor or nurse will decide if you need them based on your age, family history, and other health concerns.  You should probably take a cholesterol-lowering medicine called a statin if you:  Already had a heart attack or stroke  Have known heart disease  Have diabetes  Have a condition called peripheral artery disease, which makes it painful to walk, and happens when the arteries in your legs get clogged with fatty deposits  Have an abdominal aortic aneurysm, which is a widening of the main artery in the belly  Most people with any of the conditions listed above should take a statin no matter what their cholesterol level is. If your doctor or nurse puts you on a statin, stay on it. The medicine might not make you feel any different. But it can help prevent heart attacks, strokes, and death.  Can I lower my cholesterol without medicines? -- Yes, you can lower your cholesterol some " by:  Avoiding red meat, butter, fried foods, cheese, and other foods that have a lot of saturated fat  Losing weight (if you are overweight)  Being more active  Even if these steps do little to change your cholesterol, they can improve your health in many ways.  All topics are updated as new evidence becomes available and our peer review process is complete.  This topic retrieved from [employer] on: Sep 21, 2021.  Topic 22487 Version 19.0  Release: 29.4.2 - C29.263  © 2021 UpToDate, Inc. and/or its affiliates. All rights reserved.  table 1: Cholesterol and triglyceride measurements in the [place] and elsewhere     Measurement used within the [place] Milligrams/deciliter (mg/dL)  Measurement used most places outside the [place] Millimoles/liter (mmol/Liter)     Level to aim for  Level to aim for    Total cholesterol  Below 200 Below 5.17   LDL cholesterol  Below 130 - or much lower if at risk of heart attack and stroke Below 3.36 - or much lower if at risk of heart attack and stroke   HDL cholesterol  Above 60 Above 1.55   Triglycerides  Below 150 Below 1.7   Cholesterol is measured differently in the [place] than it is in most other countries. This table shows values used within and outside the [place]. It includes the cholesterol and triglyceride levels that most people who do not have heart disease should aim for.  Graphic 97572 Version 3.0  Consumer Information Use and Disclaimer   This information is not specific medical advice and does not replace information you receive from your health care provider. This is only a brief summary of general information. It does NOT include all information about conditions, illnesses, injuries, tests, procedures, treatments, therapies, discharge instructions or life-style choices that may apply to you. You must talk with your health care provider for complete information about your health and treatment options. This information should not be used to  "decide whether or not to accept your health care provider's advice, instructions or recommendations. Only your health care provider has the knowledge and training to provide advice that is right for you. The use of this information is governed by the [employer] End User License Agreement, available at [email or web address]://[email or web address]/[email or web address]/[email or web address]/[email or web address].The use of [employer] content is governed by the [employer] Terms of Use. ©2021 UpToDate, Inc. All rights reserved.  Copyright   © 2021 UpToDate, Inc. and/or its affiliates. All rights reserved.    Patient Education       Hemoglobin A1C Tests   The Basics   Written by the doctors and editors at [facility]   What is hemoglobin A1C? -- Hemoglobin A1C is a blood test that shows what your average blood sugar level has been for the past 2 to 3 months. Doctors and nurses use this test for 2 reasons:  To see whether a person has diabetes  To see whether diabetes treatment is working the right way  Other names for hemoglobin A1C are "glycated hemoglobin," "HbA1C," or just "A1C."  What should my A1C numbers be? -- That depends on why you have the test.  When checking for diabetes - If you had an A1C test to see if you have diabetes, your A1C should be 6 or less.  If your A1C is 6.5 or higher, it probably means you have diabetes, but you should have the test done again to be sure.  If your A1C is between 5.7 and 6.4, you are at risk for getting diabetes. You should probably start doing things that can help prevent diabetes. For example, you should become more active and lose weight (if you are overweight).  When checking how treatment is working - If you already know you have diabetes, and you had an A1C test to see how well controlled your blood sugar is, your A1C should probably be 7 or less. But you need to check with your doctor on what your level should be. Not everyone with diabetes is the same. Some people need to aim for different A1C levels than others.  Can I do " this test at home? -- It is now possible to buy kits to test your A1C at home. But home testing of A1C is not usually necessary.  How often should I have an A1C test? -- That depends on whether you have diabetes and on what your last A1C test showed.  If you had an A1C test to check for diabetes and your A1C was less than 5.7 (meaning you do not have diabetes), you should have A1C tests done every 3 years.  If you had an A1C test to check for diabetes and your A1C was between 5.7 and 6.4 (meaning you do not have diabetes but are at risk for it), you should have A1C tests done every 1 to 2 years.  If you do have diabetes and your blood sugar is well controlled, you should have A1C tests every 6 months.  If you have diabetes and you recently changed treatment plans or you are having trouble controlling your blood sugar, you should have A1C tests every 3 months.  Why do my A1C numbers matter? -- Studies show that keeping A1C numbers close to normal helps keep people from getting:  Diabetic retinopathy, an eye disease that can cause blindness  Nerve damage caused by diabetes (also called neuropathy)  Kidney disease  For people with newly diagnosed diabetes, keeping the A1C close to normal might also prevent heart attacks and strokes in the future.  Do I still need to measure my blood sugar at home? -- If your doctor wants you to check your blood sugar at home, you should keep doing so even if you have routine A1C tests. Blood sugar tests tell you what your blood sugar is from moment to moment. That's important information to have, because it lets you know if your medications and lifestyle changes are keeping your blood sugar in a safe range.  All topics are updated as new evidence becomes available and our peer review process is complete.  This topic retrieved from [employer] on: Sep 21, 2021.  Topic 13293 Version 8.0  Release: 29.4.2 - C29.263  © 2021 UpToDate, Inc. and/or its affiliates. All rights reserved.  table 1:  A1C level and average blood sugar  If your A1C level is (percent):  That means your average blood sugar level during the past two to three months was:     If you live within the [place], use these values. Your blood sugar is measured in milligrams/deciliter (mg/dL).  If you live outside the [place], use these values. Your blood sugar is measured in millimoles/liter (mmol/L).    5 97 5.4   6 126 7   7 154 8.6   8 183 10.2   9 212 11.8   10 240 13.3   11 269 15   12 298 16.5   13 326 18.1   14 355 19.7   The A1C blood test tells you what your average blood sugar level has been for the past two to three months. This table lists which A1C levels go with which average blood sugar levels. Blood sugar is measured differently within the [place] than it is in most other countries. The column in the middle is for people in the [place]. The column on the right is for people who live outside the [place].  Graphic 86438 Version 3.0  Consumer Information Use and Disclaimer   This information is not specific medical advice and does not replace information you receive from your health care provider. This is only a brief summary of general information. It does NOT include all information about conditions, illnesses, injuries, tests, procedures, treatments, therapies, discharge instructions or life-style choices that may apply to you. You must talk with your health care provider for complete information about your health and treatment options. This information should not be used to decide whether or not to accept your health care provider's advice, instructions or recommendations. Only your health care provider has the knowledge and training to provide advice that is right for you. The use of this information is governed by the [employer] End User License Agreement, available at [email or web address]/en/solutions/[facility]/about/shawna.The use of UpToDate content is governed by the [employer] Terms of  "Use. ©2021 UpToDate, Inc. All rights reserved.  Copyright   © 2021 UpToDate, Inc. and/or its affiliates. All rights reserved.    Patient Education       Diabetes and Diet   The Basics   Written [facility] and [facility]   Why is diet important in diabetes? -- Diet is important because it is part of diabetes treatment. Many people need to change what they eat and how much they eat to help treat their diabetes. It is important for people to treat their diabetes so that they:  Keep their blood sugar at or near a normal level  Prevent long-term problems, such as heart or kidney problems, that can happen in people with diabetes  Changing your diet can also help treat obesity, high blood pressure, and high cholesterol. These conditions can affect people with diabetes and can lead to future problems, such as heart attacks or strokes.  Who will work with me to change my diet? -- Your doctor or nurse will work with you to make a food plan to change your diet. They might also recommend that you work with a "dietitian." A dietitian is an expert on food and eating.  Do I need to eat at the same times every day? -- When and how often you should eat depends, in part, on the diabetes medicines you take. For example:  People who take about the same amount of insulin at the same time each day (called a "fixed regimen") should eat meals at the same times. This is also true for people who take pills that increase insulin levels, such as sulfonylureas. Eating meals at the same time every day helps prevent low blood sugar.  People who adjust the dose and timing of their insulin each day (called a "flexible regimen") do not always have to eat meals at the same time. That's because they can time their insulin dose for before they plan to eat, and also adjust the dose for how much they plan to eat.  People who take medicines that don't usually cause low blood sugar, such as metformin, don't have to eat meals at the same time " "every day.  What do I need to think about when planning what to eat? -- Our bodies break down the food we eat into small pieces called carbohydrates, proteins, and fats.  When planning what to eat, people with diabetes need to think about:  Carbohydrates (or "carbs") - Carbohydrates, which are sugars that our bodies use for energy, can raise a person's blood sugar level. Your doctor, nurse, or dietitian will tell you how many carbohydrates you should eat at each meal or snack. Foods that have carbohydrates include:  Bread, pasta, and rice  Vegetables and fruits  Dairy foods  Foods and drinks with added sugar  It is best to get your carbohydrates from fruits, vegetables, whole grains, and low-fat milk. It is best to avoid drinks with added sugar, like soda, juices, and sports drinks.   Protein - Your doctor, nurse, or dietitian will tell you how much protein you should eat each day. It is best to eat lean meats, fish, eggs, beans, peas, soy products, nuts, and seeds.  Fats - The type of fat you eat is more important than the amount of fat. "Saturated" and "trans" fats can increase your risk for heart problems, like a heart attack.  Foods that have saturated fats include meat, butter, cheese, and ice cream.  Foods that have trans fats include processed food with "partially hydrogenated oils" on the ingredient list. This may include fried foods, store bought cookies, muffins, pies, and cakes.  "Monounsaturated" and "polyunsaturated" fats are better for you. Foods with these types of fat include fish, avocado, olive oil, and nuts.  Calories - People need to eat a certain amount of calories each day to keep their weight the same. People who are overweight and want to lose weight need to eat fewer calories each day.  Fiber - Eating foods with a lot of fiber can help control a person's blood sugar level. Foods that have a lot of fiber include apples, green beans, peas, beans, lentils, nuts, oatmeal, and whole grains.  Salt " - People who have high blood pressure should not eat foods that contain a lot of salt (also called sodium). People with high blood pressure should also eat healthy foods, such as fruits, vegetables, and low-fat dairy foods.  Alcohol - Having more than 1 drink (for women) or 2 drinks (for men) a day can raise blood sugar levels. Also, drinks that have fruit juice or soda in them can raise blood sugar levels.  What can I do if I need to lose weight? -- If you need to lose weight, you can:  Exercise - Try to get at least 30 minutes of physical activity a day, most days of the week. Even gentle exercise, like walking, is good for your health. Some people with diabetes need to change their medicine dose before they exercise. They might also need to check their blood sugar levels before and after exercising.  Eat fewer calories - Your doctor, nurse, or dietitian can tell you how many calories you should eat each day in order to lose weight.  If you are worried about your weight, size, or shape, talk with your doctor, nurse, or dietitian. They can help you make changes to improve your health.  Can I eat the same foods as my family? -- Yes. You do not need to eat special foods if you have diabetes. You and your family can eat the same foods. Changing your diet is mostly about eating healthy foods and not eating too much.  What are the other parts of diabetes treatment? -- Besides changing your diet, the other parts of diabetes treatment are:  Exercise  Medicines  Some people with diabetes need to learn how to match their diet and exercise with their medicine dose. For example, people who use insulin might need to choose the dose of insulin they give themselves. To choose their dose, they need to think about:  What they plan to eat at the next meal  How much exercise they plan to do  What their blood sugar level is  If the diet and exercise do not match the medicine dose, a person's blood sugar level can get too low or too  high. Blood sugar levels that are too low or too high can cause problems.  All topics are updated as new evidence becomes available and our peer review process is complete.  This topic retrieved from [employer] on: Sep 21, 2021.  Topic 94876 Version 7.0  Release: 29.4.2 - C29.263  © 2021 UpToDate, Inc. and/or its affiliates. All rights reserved.  Consumer Information Use and Disclaimer   This information is not specific medical advice and does not replace information you receive from your health care provider. This is only a brief summary of general information. It does NOT include all information about conditions, illnesses, injuries, tests, procedures, treatments, therapies, discharge instructions or life-style choices that may apply to you. You must talk with your health care provider for complete information about your health and treatment options. This information should not be used to decide whether or not to accept your health care provider's advice, instructions or recommendations. Only your health care provider has the knowledge and training to provide advice that is right for you. The use of this information is governed by the [employer] End User License Agreement, available at https://www.ev-social.iBuyitBetter/en/solutions/[employer]/about/shawna.The use of [employer] content is governed by the [employer] Terms of Use. ©2021 UpToDate, Inc. All rights reserved.  Copyright   © 2021 UpToDate, Inc. and/or its affiliates. All rights reserved.

## 2024-05-06 NOTE — PROGRESS NOTES
Subjective     Patient ID: Dilma Cr is a 63 y.o. female.    Chief Complaint: Color Me Healthy (Patient stated she is here for a Color Me Healthy Visit 1 of 2 for DM and HTN. She has worked for [employer] for past 30 years. ), Diabetes, and Hypertension    HPI  Review of Systems    Tobacco Use: Low Risk  (5/6/2024)    Patient History     Smoking Tobacco Use: Never     Smokeless Tobacco Use: Never     Passive Exposure: Never     Review of patient's allergies indicates:  No Known Allergies  Current Outpatient Medications   Medication Instructions    ascorbic acid (vitamin C) 250 mg, Oral, Daily    aspirin (ECOTRIN) 81 mg, Oral, Daily    citalopram (CELEXA) 10 mg, Oral, Daily    clopidogreL (PLAVIX) 75 mg, Oral, Daily    cyanocobalamin 2000 MCG tablet 1 tablet, Daily    cyclobenzaprine (FLEXERIL) 10 mg, Oral, 3 times daily PRN    ezetimibe (ZETIA) 10 mg    FREESTYLE BECCA 14 DAY SENSOR Kit USE TO CHECK GLUCOSE 4 TIMES DAILY    FREESTYLE BECCA 3 SENSOR Aleena USE 1 PATCH TOPICALLY EVERY DAY FOR 2 WEEKS    furosemide (LASIX) 20 mg, Oral, Daily PRN    iron-vitamin C 100-250 mg, ICAR-C, 100-250 mg Tab 1 tablet, Oral, 2 times daily with meals    JARDIANCE 25 mg, Oral, Daily    naloxone (NARCAN) 4 mg/actuation Spry 1 spray, Once as needed    nitroGLYCERIN (NITROSTAT) 0.4 mg, Sublingual, Every 5 min PRN    NovoLOG Flexpen U-100 Insulin 20 Units, Subcutaneous, 2 times daily    olmesartan-hydrochlorothiazide (BENICAR HCT) 40-25 mg per tablet 1 tablet, Daily    pramipexole (MIRAPEX) 0.125 mg, Oral, Nightly    prochlorperazine (COMPAZINE) 10 MG tablet 1 tablet, Daily PRN    rosuvastatin (CRESTOR) 40 MG Tab Take 1 tablet by mouth once daily    sotaloL (BETAPACE) 120 mg, Oral, Every 12 hours    traMADoL (ULTRAM) 50 mg, Oral, Every 12 hours PRN    TRESIBA FLEXTOUCH U-100 24 Units, Subcutaneous, Daily     Medications Discontinued During This Encounter   Medication Reason    cyclobenzaprine  "(FLEXERIL) 5 MG tablet     BINAXNOW COVID-19 AG SELF TEST Kit Patient no longer taking    OZEMPIC 1 mg/dose (4 mg/3 mL) Patient no longer taking       Past Medical History:   Diagnosis Date    Acute superficial gastritis without hemorrhage 06/21/2022    Arthritis     Cancer     colon cancer    Chronic kidney disease, stage 3b     Colon cancer     Coronary artery disease     Depression     Diabetes mellitus, type 2     Diverticula, colon 06/22/2022    GERD (gastroesophageal reflux disease)     H/O right hemicolectomy 06/22/2022    HH (hiatus hernia) 06/21/2022    Hyperlipidemia     Myocardial infarction     2 stents in 05/14/2021 Dr Lora    Renal disorder      Health Maintenance Topics with due status: Not Due       Topic Last Completion Date    TETANUS VACCINE 06/14/2019    Colorectal Cancer Screening 06/22/2022    Mammogram 12/19/2023    Lipid Panel 01/31/2024    Hemoglobin A1c 01/31/2024    High Dose Statin 04/24/2024     Immunization History   Administered Date(s) Administered    COVID-19, MRNA, LN-S, PF (Pfizer) (Purple Cap) 02/24/2021, 03/18/2021    Influenza 11/01/2021    Influenza - Quadrivalent - PF *Preferred* (6 months and older) 09/07/2022       Objective     Body mass index is 35.48 kg/m².  Wt Readings from Last 3 Encounters:   05/06/24 88 kg (194 lb)   04/24/24 92.1 kg (203 lb)   03/28/24 91.2 kg (201 lb)     Ht Readings from Last 3 Encounters:   05/06/24 5' 2" (1.575 m)   04/24/24 5' 2" (1.575 m)   03/28/24 5' 2" (1.575 m)     BP Readings from Last 3 Encounters:   05/06/24 (!) 146/62   04/24/24 130/70   03/28/24 (!) 131/58     Temp Readings from Last 3 Encounters:   05/06/24 98.8 °F (37.1 °C) (Oral)   12/13/23 97.8 °F (36.6 °C) (Oral)   11/15/23 98 °F (36.7 °C) (Oral)     Pulse Readings from Last 3 Encounters:   05/06/24 (!) 57   04/24/24 61   03/28/24 70     Resp Readings from Last 3 Encounters:   05/06/24 18   04/24/24 16   03/28/24 18     PF Readings from Last 3 Encounters: "   07/12/22 362 L/min       Physical Exam    Assessment and Plan     Problem List Items Addressed This Visit    None      Plan: ***      I {HAVE/HAVE NOT:06013} reviewed the medications, allergies, and problem list.     Goal Actions:    What type of visit is the patient here for today?: Color Me Healthy  Which Color Me Healthy program is the patient enrolling in?: Blood Sugar (Diabetes)  Is this a Wellness Follow Up?: Yes  What is your overall wellness goal? (select at least one): Lifestyle modifications, Lose weight, Understand disease process, Improve overall health  Choose 3: Biometric, Lifestyle, Nutrition  Biometric Actions: Attend regularly scheduled office visits  Lifestyle Actions : Take medications as prescribed  Nurtrition Actions: Eat a well-balanced diet

## 2024-05-06 NOTE — PROGRESS NOTES
Subjective     Patient ID: Dilma Cr is a 63 y.o. female.    Chief Complaint: Color Me Healthy (Patient stated she is here for a [facility] Healthy Visit 1 of 2 for DM and HTN. She has worked for [employer] for past 30 years. ), Diabetes, and Hypertension    Patient is here for [place] Me Healthy for Hypertension.      Review of Systems   Constitutional:  Negative for fatigue and fever.   HENT:  Negative for ear pain, postnasal drip, rhinorrhea and sinus pressure/congestion.    Respiratory:  Negative for cough and shortness of breath.    Cardiovascular:  Negative for chest pain.   Gastrointestinal:  Negative for abdominal pain, diarrhea, nausea and vomiting.   Genitourinary:  Negative for dysuria.   Neurological:  Negative for headaches.       Tobacco Use: Low Risk  (5/8/2024)    Patient History     Smoking Tobacco Use: Never     Smokeless Tobacco Use: Never     Passive Exposure: Never     Review of patient's allergies indicates:  No Known Allergies  Current Outpatient Medications   Medication Instructions    ascorbic acid (vitamin C) 250 mg, Oral, Daily    aspirin (ECOTRIN) 81 mg, Oral, Daily    citalopram (CELEXA) 10 mg, Oral, Daily    clopidogreL (PLAVIX) 75 mg, Oral, Daily    cyanocobalamin 2000 MCG tablet 1 tablet, Daily    cyclobenzaprine (FLEXERIL) 10 mg, Oral, 3 times daily PRN    ezetimibe (ZETIA) 10 mg    FREESTYLE BECCA 14 DAY SENSOR Kit USE TO CHECK GLUCOSE 4 TIMES DAILY    FREESTYLE BECCA 3 SENSOR Aleena USE 1 PATCH TOPICALLY EVERY DAY FOR 2 WEEKS    furosemide (LASIX) 20 mg, Oral, Daily PRN    iron-vitamin C 100-250 mg, ICAR-C, 100-250 mg Tab 1 tablet, Oral, 2 times daily with meals    JARDIANCE 25 mg, Oral, Daily    naloxone (NARCAN) 4 mg/actuation Spry 1 spray, Once as needed    nitroGLYCERIN (NITROSTAT) 0.4 mg, Sublingual, Every 5 min PRN    NovoLOG Flexpen U-100 Insulin 20 Units, Subcutaneous, 2 times daily    olmesartan-hydrochlorothiazide (BENICAR HCT) 40-25 mg per tablet 1 tablet,  Oral, Daily    rosuvastatin (CRESTOR) 40 mg, Oral, Daily    sotaloL (BETAPACE) 120 mg, Oral, Every 12 hours    traMADoL (ULTRAM) 50 mg, Oral, Every 12 hours PRN    TRESIBA FLEXTOUCH U-100 24 Units, Subcutaneous, Daily     Medications Discontinued During This Encounter   Medication Reason    cyclobenzaprine (FLEXERIL) 5 MG tablet     BINAXNOW COVID-19 AG SELF TEST Kit Patient no longer taking    OZEMPIC 1 mg/dose (4 mg/3 mL) Patient no longer taking    prochlorperazine (COMPAZINE) 10 MG tablet Patient no longer taking    pramipexole (MIRAPEX) 0.125 MG tablet Patient no longer taking    olmesartan-hydrochlorothiazide (BENICAR HCT) 40-25 mg per tablet Patient no longer taking    rosuvastatin (CRESTOR) 40 MG Tab Reorder    TRESIBA FLEXTOUCH U-100 100 unit/mL (3 mL) insulin pen Reorder    sotaloL (BETAPACE) 120 MG Tab Reorder    olmesartan-hydrochlorothiazide (BENICAR HCT) 40-25 mg per tablet Reorder       Past Medical History:   Diagnosis Date    Acute superficial gastritis without hemorrhage 06/21/2022    Arthritis     Cancer     colon cancer    Chronic kidney disease, stage 3b     Colon cancer     Coronary artery disease     Depression     Diabetes mellitus, type 2     Diverticula, colon 06/22/2022    GERD (gastroesophageal reflux disease)     H/O right hemicolectomy 06/22/2022    HH (hiatus hernia) 06/21/2022    Hyperlipidemia     Myocardial infarction     2 stents in 05/14/2021 Dr Lora    Renal disorder        Health Maintenance Topics with due status: Not Due       Topic Last Completion Date    TETANUS VACCINE 06/14/2019    Colorectal Cancer Screening 06/22/2022    Mammogram 12/19/2023    Lipid Panel 01/31/2024    High Dose Statin 05/06/2024    Diabetes Urine Screening 05/06/2024    Hemoglobin A1c 05/06/2024     Immunization History   Administered Date(s) Administered    COVID-19, MRNA, LN-S, PF (Pfizer) (Purple Cap) 02/24/2021, 03/18/2021    Influenza 11/01/2021    Influenza - Quadrivalent - PF *Preferred* (6  "months and older) 09/07/2022       Objective     Body mass index is 35.48 kg/m².  Wt Readings from Last 3 Encounters:   05/06/24 88 kg (194 lb)   04/24/24 92.1 kg (203 lb)   03/28/24 91.2 kg (201 lb)     Ht Readings from Last 3 Encounters:   05/06/24 5' 2" (1.575 m)   04/24/24 5' 2" (1.575 m)   03/28/24 5' 2" (1.575 m)     BP Readings from Last 3 Encounters:   05/06/24 (!) 144/64   04/24/24 130/70   03/28/24 (!) 131/58     Temp Readings from Last 3 Encounters:   05/06/24 98.8 °F (37.1 °C) (Oral)   12/13/23 97.8 °F (36.6 °C) (Oral)   11/15/23 98 °F (36.7 °C) (Oral)     Pulse Readings from Last 3 Encounters:   05/06/24 (!) 57   04/24/24 61   03/28/24 70     Resp Readings from Last 3 Encounters:   05/06/24 18   04/24/24 16   03/28/24 18     PF Readings from Last 3 Encounters:   07/12/22 362 L/min       Physical Exam  HENT:      Head: Normocephalic and atraumatic.   Cardiovascular:      Rate and Rhythm: Normal rate and regular rhythm.   Pulmonary:      Effort: Pulmonary effort is normal.      Breath sounds: Normal breath sounds.   Neurological:      Mental Status: She is alert and oriented to person, place, and time.   Psychiatric:         Mood and Affect: Mood normal.         Behavior: Behavior normal.         Assessment and Plan     Problem List Items Addressed This Visit          Neuro    Cerebrovascular accident (CVA)    Relevant Medications    rosuvastatin (CRESTOR) 40 MG Tab       Cardiac/Vascular    Hypertension (Chronic)    Relevant Medications    sotaloL (BETAPACE) 120 MG Tab    olmesartan-hydrochlorothiazide (BENICAR HCT) 40-25 mg per tablet    Other Relevant Orders    Microalbumin/Creatinine Ratio, Urine (Completed)    Comprehensive Metabolic Panel (Completed)       Renal/    Chronic kidney disease (CKD) stage G3b/A3, moderately decreased glomerular filtration rate (GFR) between 30-44 mL/min/1.73 square meter and albuminuria creatinine ratio greater than 300 mg/g (Chronic)       Oncology    Colon cancer " (Chronic)       Endocrine    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)    Relevant Medications    TRESIBA FLEXTOUCH U-100 100 unit/mL (3 mL) insulin pen    Other Relevant Orders    Hemoglobin A1C (Completed)    Type 2 diabetes mellitus with diabetic polyneuropathy, with long-term current use of insulin - Primary (Chronic)    Relevant Medications    TRESIBA FLEXTOUCH U-100 100 unit/mL (3 mL) insulin pen    Type 2 diabetes mellitus with stable proliferative retinopathy of both eyes, with long-term current use of insulin    Relevant Medications    TRESIBA FLEXTOUCH U-100 100 unit/mL (3 mL) insulin pen    Other Relevant Orders    Hemoglobin A1C (Completed)     Other Visit Diagnoses       Diabetes mellitus type 2 with complications        Relevant Medications    TRESIBA FLEXTOUCH U-100 100 unit/mL (3 mL) insulin pen            Plan: Refill medications. Labs done. Educational material given.   Blood pressure is elevated today. Usually normal at home she reports. Will have nurse call in 2 weeks for a recheck.    I have reviewed the medications, allergies, and problem list.     Goal Actions:    What type of visit is the patient here for today?: Color Me Healthy  Which Color Me Healthy program is the patient enrolling in?: Blood Sugar (Diabetes)  Is this a Wellness Follow Up?: Yes  What is your overall wellness goal? (select at least one): Lifestyle modifications, Lose weight, Understand disease process, Improve overall health  Choose 3: Biometric, Lifestyle, Nutrition  Biometric Actions: Attend regularly scheduled office visits  Lifestyle Actions : Take medications as prescribed  Nurtrition Actions: Eat a well-balanced diet    This information was created by a scribe under my supervision and in my presence. I have reviewed and agree with the scribe's documentation.

## 2024-05-07 ENCOUNTER — PATIENT OUTREACH (OUTPATIENT)
Dept: ADMINISTRATIVE | Facility: HOSPITAL | Age: 64
End: 2024-05-07

## 2024-05-07 NOTE — PROGRESS NOTES
Population Health Chart Review & Patient Outreach Details      Further Action Needed If Patient Returns Outreach:            Updates Requested / Reviewed:     []  Care Everywhere    []     []  External Sources ([facility], [facility], [facility], etc.)    [] [facility]   [] [facility]   [] Other:    []  Care Team Updated   []  Removed  or Duplicate Orders   []  Immunization Reconciliation Completed / Queried    [] [place]   [] [place]   [] [place]   [] [place]      Health Maintenance Topics Addressed and Outreach Outcomes / Actions Taken:             Breast Cancer Screening []  Mammogram Order Placed    []  Mammogram Screening Scheduled    []  External Records Requested & Care Team Updated if Applicable    []  External Records Uploaded & Care Team Updated if Applicable    []  Pt Declined Scheduling Mammogram    []  Pt Will Schedule with External Provider / Order Routed & Care Team Updated if Applicable              Cervical Cancer Screening []  Pap Smear Scheduled in Primary Care or OBGYN    []  External Records Requested & Care Team Updated if Applicable       []  External Records Uploaded, Care Team Updated, & History Updated if Applicable    []  Patient Declined Scheduling Pap Smear    []  Patient Will Schedule with External Provider & Care Team Updated if Applicable                  Colorectal Cancer Screening []  Colonoscopy Case Request / Referral / Home Test Order Placed    []  External Records Requested & Care Team Updated if Applicable    []  External Records Uploaded, Care Team Updated, & History Updated if Applicable    []  Patient Declined Completing Colon Cancer Screening    []  Patient Will Schedule with External Provider & Care Team Updated if Applicable    []  Fit Kit Mailed (add the SmartPhrase under additional notes)    []  Reminded Patient to Complete Home Test                Diabetic Eye Exam []  Eye Exam Screening Order Placed    []  Eye Camera Scheduled or Optometry/Ophthalmology Referral  Placed    []  External Records Requested & Care Team Updated if Applicable    []  External Records Uploaded, Care Team Updated, & History Updated if Applicable    []  Patient Declined Scheduling Eye Exam    []  Patient Will Schedule with External Provider & Care Team Updated if Applicable             Blood Pressure Control []  Primary Care Follow Up Visit Scheduled     []  Remote Blood Pressure Reading Captured    []  Patient Declined Remote Reading or Scheduling Appt - Escalated to PCP    []  Patient Will Call Back or Send Portal Message with Reading                 HbA1c & Other Labs []  Overdue Lab(s) Ordered    []  Overdue Lab(s) Scheduled    []  External Records Uploaded & Care Team Updated if Applicable    []  Primary Care Follow Up Visit Scheduled     []  Reminded Patient to Complete A1c Home Test    []  Patient Declined Scheduling Labs or Will Call Back to Schedule    []  Patient Will Schedule with External Provider / Order Routed, & Care Team Updated if Applicable           Primary Care Appointment []  Primary Care Appt Scheduled    []  Patient Declined Scheduling or Will Call Back to Schedule    []  Pt Established with External Provider, Updated Care Team, & Informed Pt to Notify Payor if Applicable           Medication Adherence /    Statin Use []  Primary Care Appointment Scheduled    []  Patient Reminded to  Prescription    []  Patient Declined, Provider Notified if Needed    []  Sent Provider Message to Review to Evaluate Pt for Statin, Add Exclusion Dx Codes, Document   Exclusion in Problem List, Change Statin Intensity Level to Moderate or High Intensity if Applicable                Osteoporosis Screening []  Dexa Order Placed    []  Dexa Appointment Scheduled    []  External Records Requested & Care Team Updated    []  External Records Uploaded, Care Team Updated, & History Updated if Applicable    []  Patient Declined Scheduling Dexa or Will Call Back to Schedule    []  Patient Will Schedule  with External Provider / Order Routed & Care Team Updated if Applicable       Additional Notes:.  Post visit Population Health review of encounter with date of service  5/6/24 with Katlyn.  All required CMH components in encounter.    Followup appt for: 5/29/24 HY #2

## 2024-05-08 ENCOUNTER — PATIENT OUTREACH (OUTPATIENT)
Dept: ADMINISTRATIVE | Facility: HOSPITAL | Age: 64
End: 2024-05-08

## 2024-05-08 PROBLEM — E11.3553 TYPE 2 DIABETES MELLITUS WITH STABLE PROLIFERATIVE RETINOPATHY OF BOTH EYES, WITH LONG-TERM CURRENT USE OF INSULIN: Status: ACTIVE | Noted: 2024-05-08

## 2024-05-08 PROBLEM — Z79.4 TYPE 2 DIABETES MELLITUS WITH STABLE PROLIFERATIVE RETINOPATHY OF BOTH EYES, WITH LONG-TERM CURRENT USE OF INSULIN: Status: ACTIVE | Noted: 2024-05-08

## 2024-05-08 RX ORDER — INSULIN DEGLUDEC 100 U/ML
24 INJECTION, SOLUTION SUBCUTANEOUS DAILY
Qty: 3 PEN | Refills: 1 | Status: SHIPPED | OUTPATIENT
Start: 2024-05-08

## 2024-05-08 RX ORDER — SOTALOL HYDROCHLORIDE 120 MG/1
120 TABLET ORAL EVERY 12 HOURS
Qty: 90 TABLET | Refills: 1 | Status: SHIPPED | OUTPATIENT
Start: 2024-05-08

## 2024-05-08 RX ORDER — ROSUVASTATIN CALCIUM 40 MG/1
40 TABLET, COATED ORAL DAILY
Qty: 90 TABLET | Refills: 1 | Status: SHIPPED | OUTPATIENT
Start: 2024-05-08

## 2024-05-08 RX ORDER — OLMESARTAN MEDOXOMIL AND HYDROCHLOROTHIAZIDE 40/25 40; 25 MG/1; MG/1
1 TABLET ORAL DAILY
Qty: 90 TABLET | Refills: 1 | Status: SHIPPED | OUTPATIENT
Start: 2024-05-08

## 2024-05-08 NOTE — PROGRESS NOTES
Population Health Chart Review & Patient Outreach Details  Per [facility] website, insurance is active and pt is listed on the attributed list needing a healthy you performed in   HY scheduled for 2024    [facility] #[id number] completed.    Further Action Needed If Patient Returns Outreach:            Updates Requested / Reviewed:     []  Care Everywhere    []     []  External Sources ([facility], [facility], DIS, etc.)    [] [facility]   [] [facility]   [] Other:    []  Care Team Updated   []  Removed  or Duplicate Orders   []  Immunization Reconciliation Completed / Queried    [] [place]   [] [place]   [] [place]   [] [place]      Health Maintenance Topics Addressed and Outreach Outcomes / Actions Taken:             Breast Cancer Screening []  Mammogram Order Placed    []  Mammogram Screening Scheduled    []  External Records Requested & Care Team Updated if Applicable    []  External Records Uploaded & Care Team Updated if Applicable    []  Pt Declined Scheduling Mammogram    []  Pt Will Schedule with External Provider / Order Routed & Care Team Updated if Applicable              Cervical Cancer Screening []  Pap Smear Scheduled in Primary Care or OBGYN    []  External Records Requested & Care Team Updated if Applicable       []  External Records Uploaded, Care Team Updated, & History Updated if Applicable    []  Patient Declined Scheduling Pap Smear    []  Patient Will Schedule with External Provider & Care Team Updated if Applicable                  Colorectal Cancer Screening []  Colonoscopy Case Request / Referral / Home Test Order Placed    []  External Records Requested & Care Team Updated if Applicable    []  External Records Uploaded, Care Team Updated, & History Updated if Applicable    []  Patient Declined Completing Colon Cancer Screening    []  Patient Will Schedule with External Provider & Care Team Updated if Applicable    []  Fit Kit Mailed (add the SmartPhrase under additional notes)    []   Reminded Patient to Complete Home Test                Diabetic Eye Exam []  Eye Exam Screening Order Placed    []  Eye Camera Scheduled or Optometry/Ophthalmology Referral Placed    []  External Records Requested & Care Team Updated if Applicable    []  External Records Uploaded, Care Team Updated, & History Updated if Applicable    []  Patient Declined Scheduling Eye Exam    []  Patient Will Schedule with External Provider & Care Team Updated if Applicable             Blood Pressure Control []  Primary Care Follow Up Visit Scheduled     []  Remote Blood Pressure Reading Captured    []  Patient Declined Remote Reading or Scheduling Appt - Escalated to PCP    []  Patient Will Call Back or Send Portal Message with Reading                 HbA1c & Other Labs []  Overdue Lab(s) Ordered    []  Overdue Lab(s) Scheduled    []  External Records Uploaded & Care Team Updated if Applicable    []  Primary Care Follow Up Visit Scheduled     []  Reminded Patient to Complete A1c Home Test    []  Patient Declined Scheduling Labs or Will Call Back to Schedule    []  Patient Will Schedule with External Provider / Order Routed, & Care Team Updated if Applicable           Primary Care Appointment []  Primary Care Appt Scheduled    []  Patient Declined Scheduling or Will Call Back to Schedule    []  Pt Established with External Provider, Updated Care Team, & Informed Pt to Notify Payor if Applicable           Medication Adherence /    Statin Use []  Primary Care Appointment Scheduled    []  Patient Reminded to  Prescription    []  Patient Declined, Provider Notified if Needed    []  Sent Provider Message to Review to Evaluate Pt for Statin, Add Exclusion Dx Codes, Document   Exclusion in Problem List, Change Statin Intensity Level to Moderate or High Intensity if Applicable                Osteoporosis Screening []  Dexa Order Placed    []  Dexa Appointment Scheduled    []  External Records Requested & Care Team Updated    []   External Records Uploaded, Care Team Updated, & History Updated if Applicable    []  Patient Declined Scheduling Dexa or Will Call Back to Schedule    []  Patient Will Schedule with External Provider / Order Routed & Care Team Updated if Applicable       Additional Notes:

## 2024-05-09 ENCOUNTER — OFFICE VISIT (OUTPATIENT)
Dept: CARDIOLOGY | Facility: CLINIC | Age: 64
End: 2024-05-09
Payer: COMMERCIAL

## 2024-05-09 ENCOUNTER — TELEPHONE (OUTPATIENT)
Dept: FAMILY MEDICINE | Facility: CLINIC | Age: 64
End: 2024-05-09
Payer: COMMERCIAL

## 2024-05-09 VITALS
RESPIRATION RATE: 18 BRPM | DIASTOLIC BLOOD PRESSURE: 70 MMHG | HEART RATE: 56 BPM | BODY MASS INDEX: 36.62 KG/M2 | HEIGHT: 62 IN | WEIGHT: 199 LBS | SYSTOLIC BLOOD PRESSURE: 138 MMHG

## 2024-05-09 DIAGNOSIS — I48.0 PAROXYSMAL ATRIAL FIBRILLATION: Primary | ICD-10-CM

## 2024-05-09 DIAGNOSIS — R26.81 UNSTEADY GAIT: ICD-10-CM

## 2024-05-09 DIAGNOSIS — I25.10 ATHEROSCLEROSIS OF NATIVE CORONARY ARTERY OF NATIVE HEART WITHOUT ANGINA PECTORIS: Chronic | ICD-10-CM

## 2024-05-09 DIAGNOSIS — R29.6 FREQUENT FALLS: Primary | ICD-10-CM

## 2024-05-09 DIAGNOSIS — G45.9 TIA (TRANSIENT ISCHEMIC ATTACK): ICD-10-CM

## 2024-05-09 PROCEDURE — 3044F HG A1C LEVEL LT 7.0%: CPT | Mod: ,,, | Performed by: STUDENT IN AN ORGANIZED HEALTH CARE EDUCATION/TRAINING PROGRAM

## 2024-05-09 PROCEDURE — 3060F POS MICROALBUMINURIA REV: CPT | Mod: ,,, | Performed by: STUDENT IN AN ORGANIZED HEALTH CARE EDUCATION/TRAINING PROGRAM

## 2024-05-09 PROCEDURE — 1159F MED LIST DOCD IN RCRD: CPT | Mod: ,,, | Performed by: STUDENT IN AN ORGANIZED HEALTH CARE EDUCATION/TRAINING PROGRAM

## 2024-05-09 PROCEDURE — 3008F BODY MASS INDEX DOCD: CPT | Mod: ,,, | Performed by: STUDENT IN AN ORGANIZED HEALTH CARE EDUCATION/TRAINING PROGRAM

## 2024-05-09 PROCEDURE — 3066F NEPHROPATHY DOC TX: CPT | Mod: ,,, | Performed by: STUDENT IN AN ORGANIZED HEALTH CARE EDUCATION/TRAINING PROGRAM

## 2024-05-09 PROCEDURE — 3075F SYST BP GE 130 - 139MM HG: CPT | Mod: ,,, | Performed by: STUDENT IN AN ORGANIZED HEALTH CARE EDUCATION/TRAINING PROGRAM

## 2024-05-09 PROCEDURE — 99214 OFFICE O/P EST MOD 30 MIN: CPT | Mod: ,,, | Performed by: STUDENT IN AN ORGANIZED HEALTH CARE EDUCATION/TRAINING PROGRAM

## 2024-05-09 PROCEDURE — 3078F DIAST BP <80 MM HG: CPT | Mod: ,,, | Performed by: STUDENT IN AN ORGANIZED HEALTH CARE EDUCATION/TRAINING PROGRAM

## 2024-05-09 NOTE — TELEPHONE ENCOUNTER
Dunia from DR. Bautista's office called and left a message. Patient was seen today by them at the [facility].  She had fallen and was going to be seen at [facility] in the ER. DR. Bautista was anting to see if Dr. Potts would order patient a walker to help with her falls.

## 2024-05-09 NOTE — PROGRESS NOTES
PCP: Olga Potts MD    Referring Provider:     Subjective:   Dilma Cr is a 63 y.o. female with hx of afib s/p watchman, Chornic back pain, NSTEMI, diabetes, hypertensio, h/p colon ca s/p hemicolectomy who presents for follow up.     5/9/24 - patient is a poor historian. She was at [facility] in 1/2024 for TIA. She reports having 3 falls in the last 6 months. Reports right leg is weak. Last fall was yesterday and reports right ankle swelling and pain. Patient is going to the ER for Xrays. Would benefit from walking assistance (Cane or walker).     11/2/23 - Patient was recently in the ER with CP, afib with RVR and left face numbness. She was underwent MRI brain that was negative for acute stroke. She converted back to sinus after her sotaolol was restarted. Of note, patient lexapro was stopped by EP for Qtc prolongation and started on zoloft; however it appears the patient has not started her zoloft    7/26/23 - Patient is s/p Watchman on 2/2023. Reports having some night time palpitations that she describes as skipped beats. She reports low energy and low mood.     8/30/22 -  Patient states she has not felt well since hospitalization.  She reports stabbing chest pain lasting 1-2 seconds, occurs twice weekly.  She states she has some difficulty breathing at night.  She denies snoring.    She states is having aching pain  to posterior legs, from calves to thighs, with difficulty walking for the past 3 weeks, worse in the morning.         She also has a history of CAF and was on eliquis but was taken off recently due anemia of chronic blood loss.            Fhx:  Shx:      EKG 8/31/22 - Normal sinus rhythm. EKG          6/18/22 -  Atrial fibrillation  with rapid ventricular response   ECHO 6/19/22-  LVEF 60-65%.                              Normal left ventricular diastolic function.  Normal right ventricular size with normal right ventricular systolic function.  LHC 6/20/22 -    Single vessel CAD (90%  "mid-distal LAD in-stent restenosis)  2.  Normal left sided filling pressure (LVEDP - 10mmHg)  3.  S/p successful angioplasty with scoring balloon (angiosculpt) of mid-distal LAD in-stent restenosis  4.  Deferred stent placement in the setting of GI bleed s/p blood transfusion.   [facility] 5/14/21 - There was two vessel coronary artery disease.                           The Mid LAD-2 lesion was 90% stenosed.  The Dist RCA-2 lesion was 50% stenosed.  The Dist RCA-1 lesion was 90% stenosed                          PCI to LAD and RCA     Lab Results   Component Value Date     05/06/2024    K 4.2 05/06/2024     05/06/2024    CO2 25 05/06/2024    BUN 26 (H) 05/06/2024    CREATININE 1.55 (H) 05/06/2024    CALCIUM 9.0 05/06/2024    ANIONGAP 13 05/06/2024    ESTGFRAFRICA 59 (L) 12/20/2021    EGFRNONAA 58 (L) 06/30/2022       Lab Results   Component Value Date    CHOL 145 01/31/2024    CHOL 150 11/15/2023    CHOL 153 10/27/2023     Lab Results   Component Value Date    HDL 52 01/31/2024    HDL 73 (H) 11/15/2023    HDL 78 (H) 10/27/2023     Lab Results   Component Value Date    LDLCALC 78 01/31/2024    LDLCALC 67 11/15/2023    LDLCALC 64 10/27/2023     Lab Results   Component Value Date    TRIG 75 01/31/2024    TRIG 48 11/15/2023    TRIG 55 10/27/2023     Lab Results   Component Value Date    CHOLHDL 2.1 11/15/2023    CHOLHDL 2.0 10/27/2023    CHOLHDL 2.5 09/07/2022       Lab Results   Component Value Date    WBC 8.53 10/28/2023    HGB 11.3 (L) 10/28/2023    HCT 34.6 (L) 10/28/2023    MCV 83.2 10/28/2023     10/28/2023           Review of Systems   Constitutional:  Positive for malaise/fatigue.   Respiratory:  Negative for cough and shortness of breath.    Cardiovascular:  Positive for palpitations. Negative for chest pain, orthopnea, claudication, leg swelling and PND.   Psychiatric/Behavioral:  Positive for depression.          Objective:   /70   Pulse (!) 56   Resp 18   Ht 5' 2" (1.575 m)   Wt 90.3 kg " (199 lb)   LMP  (LMP Unknown)   BMI 36.40 kg/m²     Physical Exam  Constitutional:       General: She is not in acute distress.     Appearance: Normal appearance.   Eyes:      Extraocular Movements: Extraocular movements intact.   Cardiovascular:      Rate and Rhythm: Normal rate and regular rhythm.      Pulses: Normal pulses.      Heart sounds: Normal heart sounds. No murmur heard.  Pulmonary:      Effort: Pulmonary effort is normal.      Breath sounds: Normal breath sounds.   Abdominal:      Palpations: Abdomen is soft.   Musculoskeletal:         General: No swelling.      Cervical back: Neck supple.      Right lower leg: Edema present.   Skin:     Findings: No rash.   Neurological:      Mental Status: She is alert and oriented to person, place, and time.           Assessment:     1. Paroxysmal atrial fibrillation  Holter monitor - 24 hour      2. Atherosclerosis of native coronary artery of native heart without angina pectoris        3. TIA (transient ischemic attack)                Plan:   Atherosclerosis of native coronary artery of native heart without angina pectoris  NSTEMI, status post PCI of LAD and RCA 5/14/21 followed by ISR with POBA in 5/2022  PCI to distal RCA, however stent at had diffuse disease with small PDA which is diffusely disease.  - has atypical CP   - continue metop xl 25mg qd  - Continue plavix    TIA (transient ischemic attack)  Hx of TIA on aspirin and Plavix  S/P watchman   - now with recurrent falls and right leg weakness  - Patient to follow up with PCP for walking aides    A-fib  Hx of paroxysmal afib - currently in sinus on sotalol  Hx of GIB (hx of colon Ca s/p hemicolectomy) on AC  Off AC  S/p watchman in 2/2023  On aspirin and Plavix (given TIA)  - Holter to evaluate for sick sinus syndrome.

## 2024-05-09 NOTE — ASSESSMENT & PLAN NOTE
Hx of paroxysmal afib - currently in sinus on sotalol  Hx of GIB (hx of colon Ca s/p hemicolectomy) on AC  Off AC  S/p watchman in 2/2023  On aspirin and Plavix (given TIA)  - Holter to evaluate for sick sinus syndrome.

## 2024-05-09 NOTE — ASSESSMENT & PLAN NOTE
Hx of TIA on aspirin and Plavix  S/P watchman   - now with recurrent falls and right leg weakness  - Patient to follow up with PCP for walking aides

## 2024-05-15 NOTE — TELEPHONE ENCOUNTER
Order was sent to [facility]. I called and informed patient of this. She said she still works and does not think she can use a walker at work. She said a quad cane would be better. Quad cane will be sent in.

## 2024-05-20 PROBLEM — I63.9 CEREBROVASCULAR ACCIDENT (CVA): Status: RESOLVED | Noted: 2024-02-19 | Resolved: 2024-05-20

## 2024-05-20 NOTE — PROGRESS NOTES
Subjective:         Patient ID: Dilma Cr is a 63 y.o. female.    Chief Complaint: Low-back Pain        Pain  This is a chronic problem. The current episode started more than 1 year ago. The problem occurs daily. The problem has been unchanged. Associated symptoms include arthralgias and neck pain. Pertinent negatives include no anorexia, chest pain, chills, coughing, diaphoresis, fever, sore throat, vertigo or vomiting.     Review of Systems   Constitutional:  Negative for activity change, appetite change, chills, diaphoresis, fever and unexpected weight change.   HENT:  Negative for drooling, ear pain, facial swelling, nosebleeds, sore throat, trouble swallowing, voice change and goiter.    Eyes:  Negative for photophobia, pain, discharge, redness and visual disturbance.   Respiratory:  Negative for apnea, cough, choking, chest tightness, shortness of breath, wheezing and stridor.    Cardiovascular:  Negative for chest pain, palpitations and leg swelling.   Gastrointestinal:  Negative for abdominal distention, anorexia, diarrhea, rectal pain, vomiting and fecal incontinence.   Endocrine: Negative for cold intolerance, heat intolerance, polydipsia, polyphagia and polyuria.   Genitourinary:  Negative for flank pain, frequency and hot flashes.   Musculoskeletal:  Positive for arthralgias, back pain and neck pain. Negative for neck stiffness.   Integumentary:  Negative for color change and pallor.   Allergic/Immunologic: Negative for immunocompromised state.   Neurological:  Negative for dizziness, vertigo, seizures, syncope, facial asymmetry, speech difficulty, light-headedness, memory loss and coordination difficulties.   Hematological:  Negative for adenopathy. Does not bruise/bleed easily.   Psychiatric/Behavioral:  Negative for agitation, behavioral problems, confusion, decreased concentration, dysphoric mood, hallucinations, self-injury and suicidal ideas. The patient is not nervous/anxious and is not  hyperactive.            Past Medical History:   Diagnosis Date    Acute superficial gastritis without hemorrhage 06/21/2022    Arthritis     Cancer     colon cancer    Chronic kidney disease, stage 3b     Colon cancer     Coronary artery disease     Depression     Diabetes mellitus, type 2     Diverticula, colon 06/22/2022    GERD (gastroesophageal reflux disease)     H/O right hemicolectomy 06/22/2022    HH (hiatus hernia) 06/21/2022    Hyperlipidemia     Myocardial infarction     2 stents in 05/14/2021 Dr Porter    Renal disorder      Past Surgical History:   Procedure Laterality Date    BREAST SURGERY      C5-C6 RADHA  8-, 7-, 6-1-2016    Dr Fowler    CARDIAC SURGERY  02/07/2023    CERVICAL SPINE SURGERY      COLON SURGERY      EPIDURAL STEROID INJECTION INTO LUMBAR SPINE N/A 03/29/2022    Procedure: L4-5 RADHA;  Surgeon: Gela Fowler MD;  Location: [facility];  Service: Pain Management;  Laterality: N/A;  PT AWARE ON OV TO BE TESTED\  PLAVIX TO BE HELD FOR 7 DAYS PRIOR TO PROCEDURE PER DR FOWLER AND DR PORTER  3-28  message left on vm re: covid    HYSTERECTOMY      LEFT HEART CATHETERIZATION Left 05/14/2021    Procedure: Left heart cath;  Surgeon: Mateus Guan MD;  Location: [facility];  Service: Cardiology;  Laterality: Left;    LEFT HEART CATHETERIZATION Left 06/20/2022    Procedure: Left heart cath;  Surgeon: Oliverio Bautista MD;  Location: [facility] CATH LAB;  Service: Cardiology;  Laterality: Left;    LUMBAR SPINE SURGERY  12/28/2023    MEDIPORT REMOVAL  10/14/2021    Dr Kraft    OOPHORECTOMY      right rotator cuff repair      in [place]    TOTAL REDUCTION MAMMOPLASTY       Social History     Socioeconomic History    Marital status:      Spouse name: Jorge Alberto    Number of children: 2   Occupational History    Occupation:  at [employer] for past 27 years   Tobacco Use    Smoking status: Never     Passive exposure: Never    Smokeless tobacco: Never    Substance and Sexual Activity    Alcohol use: Never    Drug use: Never    Sexual activity: Yes   Social History Narrative    Worked at [employer] as [employer] for past 29 years. H/O colon cancer. Watchman procedure back in Feb (heart).  Sees Dr Stover Oncologist. Cardiologist Dr Alina Garcia ([place]). Sees Endocrinologist Koko Rivera with [facility].      Social Determinants of Health     Financial Resource Strain: Patient Declined (1/31/2024)    [facility], [facility] Its Subsidiaries and Affiliates    Overall Financial Resource Strain (CARDIA)     Difficulty of Paying Living Expenses: Patient declined   Food Insecurity: Patient Declined (1/31/2024)    [facility] from [facility], [facility] Subsidiaries and Affiliates    Hunger Vital Sign     Worried About Running Out of Food in the Last Year: Patient declined     Ran Out of Food in the Last Year: Patient declined   Transportation Needs: No Transportation Needs (2/4/2024)    [facility], [facility] and [facility]ates    PRAPARE - Transportation     Lack of Transportation (Medical): No     Lack of Transportation (Non-Medical): No   Stress: Patient Declined (1/31/2024)    [facility] from [facility], [facility]    [employer] [place] [employer] - Occupational Stress Questionnaire     Feeling of Stress : Patient declined   Housing Stability: Unknown (2/4/2024)    [facility] from [facility] and [facility] Subsidiaries and Affiliates, [facility]aries and Affiliates    Housing Stability Vital Sign     Unable to Pay for Housing in the Last Year: No     In the last 12 months, was there a time when you did not have a steady place to sleep or slept in a shelter (including now)?: No     Family History   Problem Relation Name Age of Onset    Hypertension Mother      Diabetes Mother      Hypertension Father      Diabetes Father      Breast cancer Sister      Hypertension Sister      Hypertension Brother       Review of patient's allergies indicates:  No Known Allergies     Objective:  Vitals:    05/28/24 0930   BP: (!) 112/45   Pulse: (!) 56   Resp: 18   Weight: 90.7 kg (200 lb)   Height: 5' 2" (1.575 m)   PainSc: 10-Worst pain ever             Physical Exam  Vitals and nursing note reviewed. Exam conducted with a chaperone present.   Constitutional:       General: She is awake. She is not in acute distress.     Appearance: Normal appearance. She is not ill-appearing, toxic-appearing or diaphoretic.   HENT:      Head: Normocephalic and atraumatic.      Nose: Nose normal.      Mouth/Throat:      Mouth: Mucous membranes are moist.      Pharynx: Oropharynx is clear.   Eyes:      Conjunctiva/sclera: Conjunctivae normal.      Pupils: Pupils are equal, round, and reactive to light.   Cardiovascular:      Rate and Rhythm: Normal rate.   Pulmonary:      Effort: Pulmonary effort is normal. No respiratory distress.   Abdominal:      Palpations: Abdomen is soft.      Tenderness: There is no guarding.   Musculoskeletal:         General: Normal range of motion.      Cervical back: Normal range of motion and neck supple. No rigidity.      Thoracic back: Tenderness present.      Lumbar back: Tenderness present.   Skin:     General: Skin is warm and dry.      Coloration: Skin is not jaundiced or pale.   Neurological:      General: No focal deficit present.      " Mental Status: She is alert and oriented to person, place, and time. Mental status is at baseline.      Cranial Nerves: No cranial nerve deficit (II-XII).   Psychiatric:         Mood and Affect: Mood normal.         Behavior: Behavior normal. Behavior is cooperative.         Thought Content: Thought content normal.           X-Ray Lumbar Spine 2 Or 3 Views  Narrative: EXAMINATION:  XR LUMBAR SPINE 2 OR 3 VIEWS    CLINICAL HISTORY:  Low back pain, symptoms persist with > 6wks conservative treatment;.  Dorsalgia, unspecified    TECHNIQUE:  XR LUMBAR SPINE 2 OR 3 VIEWS    COMPARISON:  10/27/2023    FINDINGS:  No acute fracture or dislocation.  Posterior spinal fusion hardware and intervertebral disc spacer at L2-L3, which is in expected position.  Moderate to severe degenerative disc disease L3-L4, L4-5 and L5-S1.  Multilevel endplate change and multilevel anterior osteophytes.  Multilevel facet change in neural foraminal narrowing, similar relative to 10/27/2023.  Impression: No acute fracture or dislocation. Posterior spinal fusion hardware and intervertebral disc spacer at L2-L3, which is in expected position. Moderate to severe degenerative disc disease L3-L4, L4-5 and L5-S1.  Multilevel endplate change and multilevel anterior osteophytes. Multilevel facet change in neural foraminal narrowing, similar relative to 10/27/2023.    Electronically signed by: Alexandre May  Date:    01/30/2024  Time:    14:09         Office Visit on 05/06/2024   Component Date Value Ref Range Status    Creatinine, Urine 05/06/2024 71  28 - 219 mg/dL Final    Microalbumin 05/06/2024 8.2 (H)  0.0 - 2.8 mg/dL Final    Microalbumin/Creatinine Ratio 05/06/2024 115.5 (H)  0.0 - 30.0 mg/g Final    Sodium 05/06/2024 141  136 - 145 mmol/L Final    Potassium 05/06/2024 4.2  3.5 - 5.1 mmol/L Final    Chloride 05/06/2024 107  98 - 107 mmol/L Final    CO2 05/06/2024 25  21 - 32 mmol/L Final    Anion Gap 05/06/2024 13  7 - 16 mmol/L Final     Glucose 05/06/2024 95  74 - 106 mg/dL Final    BUN 05/06/2024 26 (H)  7 - 18 mg/dL Final    Creatinine 05/06/2024 1.55 (H)  0.55 - 1.02 mg/dL Final    BUN/Creatinine Ratio 05/06/2024 17  6 - 20 Final    Calcium 05/06/2024 9.0  8.5 - 10.1 mg/dL Final    Total Protein 05/06/2024 7.4  6.4 - 8.2 g/dL Final    Albumin 05/06/2024 3.6  3.5 - 5.0 g/dL Final    Globulin 05/06/2024 3.8  2.0 - 4.0 g/dL Final    A/G Ratio 05/06/2024 0.9   Final    Bilirubin, Total 05/06/2024 0.1  >0.0 - 1.2 mg/dL Final    Alk Phos 05/06/2024 100  50 - 130 U/L Final    ALT 05/06/2024 25  13 - 56 U/L Final    AST 05/06/2024 19  15 - 37 U/L Final    eGFR 05/06/2024 38 (L)  >=60 mL/min/1.73m2 Final    Hemoglobin A1C 05/06/2024 6.2  4.5 - 6.6 % Final    Estimated Average Glucose 05/06/2024 131  mg/dL Final   Office Visit on 01/30/2024   Component Date Value Ref Range Status    POC Amphetamines 01/30/2024 Negative  Negative, Inconclusive Final    POC Barbiturates 01/30/2024 Negative  Negative, Inconclusive Final    POC Benzodiazepines 01/30/2024 Negative  Negative, Inconclusive Final    POC Cocaine 01/30/2024 Negative  Negative, Inconclusive Final    POC THC 01/30/2024 Negative  Negative, Inconclusive Final    POC Methadone 01/30/2024 Negative  Negative, Inconclusive Final    POC Methamphetamine 01/30/2024 Negative  Negative, Inconclusive Final    POC Opiates 01/30/2024 Presumptive Positive (A)  Negative, Inconclusive Final    POC Oxycodone 01/30/2024 Negative  Negative, Inconclusive Final    POC Phencyclidine 01/30/2024 Negative  Negative, Inconclusive Final    POC Methylenedioxymethamphetamine * 01/30/2024 Negative  Negative, Inconclusive Final    POC Tricyclic Antidepressants 01/30/2024 Negative  Negative, Inconclusive Final    POC Buprenorphine 01/30/2024 Negative   Final     Acceptable 01/30/2024 Yes   Final    POC Temperature (Urine) 01/30/2024 92   Final   Office Visit on 12/13/2023   Component Date Value Ref Range Status     Uric Acid 12/13/2023 6.3 (H)  2.6 - 6.0 mg/dL Final    RA 12/13/2023 Negative  Negative Final    CRP 12/13/2023 0.41  0.00 - 0.80 mg/dL Final    DANIEL Screen 12/13/2023 Negative  Negative Final    ESR Westergren 12/13/2023 11  0 - 30 mm/Hr Final         Orders Placed This Encounter   Procedures    POCT Urine Drug Screen Presump     Interpretive Information:     Negative:  No drug detected at the cut off level.   Positive:  This result represents presumptive positive for the   tested drug, other substances may yield a positive response other   than the analyte of interest. This result should be utilized for   diagnostic purpose only. Confirmation testing will be performed upon physician request only.            Requested Prescriptions     Signed Prescriptions Disp Refills    traMADoL (ULTRAM) 50 mg tablet 60 tablet 1     Sig: Take 1 tablet (50 mg total) by mouth every 12 (twelve) hours as needed for Pain.       Assessment:     1. Lumbosacral radiculopathy    2. Osteoarthrosis multiple sites, not specified as generalized    3. Encounter for long-term (current) use of other medications           A's of Opioid Risk Assessment  Activity:Patient can perform ADL.   Analgesia:Patients pain is partially controlled by current medication. Patient has tried OTC medications such as Tylenol and Ibuprofen with out relief.   Adverse Effects: Patient denies constipation or sedation.  Aberrant Behavior:  reviewed with no aberrant drug seeking/taking behavior.  Overdose reversal drug naloxone discussed    Drug screen reviewed        MRI lumbar spine [facility] September 19, 2023, multiple levels stenosis, multiple level degenerative changes stenosis multiple levels central canal and bilateral foraminal narrowing most notable L4/5 L5/S1    X-ray lumbar spine [facility] January 30, 2024  No acute fracture or dislocation. Posterior spinal fusion hardware and intervertebral disc spacer at L2-L3, which is in expected position.  Moderate to severe degenerative disc disease L3-L4, L4-5 and L5-S1. Multilevel endplate change and multilevel anterior osteophytes. Multilevel facet change in neural foraminal narrowing       Plan:    Presumptive drug screen negative for tramadol, this is expected result, patient takes tramadol, cup does not test for tramadol    March 28, 2024 discontinue cannabis destroyed cannabis    Cannabis started January 30, 2024 last refill February 8, 2024  She states she has not taken any of the cannabis            Narcan December 2022    Anticoagulated Plavix    History Colon  Cancer    Follows oncology    Following spine [facility] lumbar surgery  L2/3 posterior lumbar fusion    Uses walking cane assistance ambulation    History of stroke affected her right side       Complaining of increasing back pain buttock and leg pain numbness and tingling radicular in nature     Had MRI lumbar spine [facility] last week will obtain these results     She is requesting Toradol injection     Toradol 60 mg IM, tolerated well    Continue home exercise program as directed    Continue current medication    She will contact her spine surgeon for increasing back pain leg pain radicular in nature    Follow-up 2 months    Dr. Fowler, June 2024    Bring original prescription medication bottles/container/box with labels to each visit

## 2024-05-21 ENCOUNTER — OUTSIDE PLACE OF SERVICE (OUTPATIENT)
Dept: CARDIOLOGY | Facility: HOSPITAL | Age: 64
End: 2024-05-21
Payer: COMMERCIAL

## 2024-05-21 PROCEDURE — 93227 XTRNL ECG REC<48 HR R&I: CPT | Mod: ,,, | Performed by: STUDENT IN AN ORGANIZED HEALTH CARE EDUCATION/TRAINING PROGRAM

## 2024-05-24 ENCOUNTER — TELEPHONE (OUTPATIENT)
Dept: CARDIOLOGY | Facility: CLINIC | Age: 64
End: 2024-05-24
Payer: COMMERCIAL

## 2024-05-24 NOTE — TELEPHONE ENCOUNTER
Pt. Notified holter monitor Day1 and Day2 results NSR no arrhythmia or heart block noted per Dr. Bautista. Pt. Voiced understanding.

## 2024-05-28 ENCOUNTER — PATIENT OUTREACH (OUTPATIENT)
Facility: HOSPITAL | Age: 64
End: 2024-05-28
Payer: COMMERCIAL

## 2024-05-28 ENCOUNTER — OFFICE VISIT (OUTPATIENT)
Dept: PAIN MEDICINE | Facility: CLINIC | Age: 64
End: 2024-05-28
Payer: COMMERCIAL

## 2024-05-28 VITALS
HEIGHT: 62 IN | HEART RATE: 56 BPM | SYSTOLIC BLOOD PRESSURE: 112 MMHG | RESPIRATION RATE: 18 BRPM | WEIGHT: 200 LBS | BODY MASS INDEX: 36.8 KG/M2 | DIASTOLIC BLOOD PRESSURE: 45 MMHG

## 2024-05-28 DIAGNOSIS — M54.17 LUMBOSACRAL RADICULOPATHY: Primary | Chronic | ICD-10-CM

## 2024-05-28 DIAGNOSIS — M89.49 OSTEOARTHROSIS MULTIPLE SITES, NOT SPECIFIED AS GENERALIZED: Chronic | ICD-10-CM

## 2024-05-28 DIAGNOSIS — Z79.899 ENCOUNTER FOR LONG-TERM (CURRENT) USE OF OTHER MEDICATIONS: ICD-10-CM

## 2024-05-28 LAB

## 2024-05-28 PROCEDURE — 3044F HG A1C LEVEL LT 7.0%: CPT | Mod: ,,, | Performed by: PHYSICIAN ASSISTANT

## 2024-05-28 PROCEDURE — 99999PBSHW PR PBB SHADOW TECHNICAL ONLY FILED TO HB: Mod: PBBFAC,,,

## 2024-05-28 PROCEDURE — 3074F SYST BP LT 130 MM HG: CPT | Mod: ,,, | Performed by: PHYSICIAN ASSISTANT

## 2024-05-28 PROCEDURE — 80305 DRUG TEST PRSMV DIR OPT OBS: CPT | Mod: PBBFAC | Performed by: PHYSICIAN ASSISTANT

## 2024-05-28 PROCEDURE — 3060F POS MICROALBUMINURIA REV: CPT | Mod: ,,, | Performed by: PHYSICIAN ASSISTANT

## 2024-05-28 PROCEDURE — 3008F BODY MASS INDEX DOCD: CPT | Mod: ,,, | Performed by: PHYSICIAN ASSISTANT

## 2024-05-28 PROCEDURE — 99999PBSHW POCT URINE DRUG SCREEN PRESUMP: Mod: PBBFAC,,,

## 2024-05-28 PROCEDURE — 1159F MED LIST DOCD IN RCRD: CPT | Mod: ,,, | Performed by: PHYSICIAN ASSISTANT

## 2024-05-28 PROCEDURE — 99214 OFFICE O/P EST MOD 30 MIN: CPT | Mod: S$PBB,25,, | Performed by: PHYSICIAN ASSISTANT

## 2024-05-28 PROCEDURE — 99215 OFFICE O/P EST HI 40 MIN: CPT | Mod: PBBFAC,25 | Performed by: PHYSICIAN ASSISTANT

## 2024-05-28 PROCEDURE — 3078F DIAST BP <80 MM HG: CPT | Mod: ,,, | Performed by: PHYSICIAN ASSISTANT

## 2024-05-28 PROCEDURE — 3066F NEPHROPATHY DOC TX: CPT | Mod: ,,, | Performed by: PHYSICIAN ASSISTANT

## 2024-05-28 PROCEDURE — 96372 THER/PROPH/DIAG INJ SC/IM: CPT | Mod: PBBFAC | Performed by: PHYSICIAN ASSISTANT

## 2024-05-28 RX ORDER — KETOROLAC TROMETHAMINE 30 MG/ML
60 INJECTION, SOLUTION INTRAMUSCULAR; INTRAVENOUS
Status: COMPLETED | OUTPATIENT
Start: 2024-05-28 | End: 2024-05-28

## 2024-05-28 RX ORDER — TRAMADOL HYDROCHLORIDE 50 MG/1
50 TABLET ORAL EVERY 12 HOURS PRN
Qty: 60 TABLET | Refills: 1 | Status: SHIPPED | OUTPATIENT
Start: 2024-05-29

## 2024-05-28 RX ADMIN — KETOROLAC TROMETHAMINE 60 MG: 30 INJECTION, SOLUTION INTRAMUSCULAR at 10:05

## 2024-05-28 NOTE — PROGRESS NOTES
Population Health Chart Review & Patient Outreach Details    Per [facility] website, insurance is active and pt is listed on the attributed list needing a healthy you performed in   Hy scheduled for 2024  Further Action Needed If Patient Returns Outreach:            Updates Requested / Reviewed:     []  Care Everywhere    []     []  External Sources ([facility], [facility], DIS, etc.)    [] [facility]   [] [facility]   [] Other:    []  Care Team Updated   []  Removed  or Duplicate Orders   []  Immunization Reconciliation Completed / Queried    [] [place]   [] [place]   [] [place]   [] [place]      Health Maintenance Topics Addressed and Outreach Outcomes / Actions Taken:             Breast Cancer Screening []  Mammogram Order Placed    []  Mammogram Screening Scheduled    []  External Records Requested & Care Team Updated if Applicable    []  External Records Uploaded & Care Team Updated if Applicable    []  Pt Declined Scheduling Mammogram    []  Pt Will Schedule with External Provider / Order Routed & Care Team Updated if Applicable              Cervical Cancer Screening []  Pap Smear Scheduled in Primary Care or OBGYN    []  External Records Requested & Care Team Updated if Applicable       []  External Records Uploaded, Care Team Updated, & History Updated if Applicable    []  Patient Declined Scheduling Pap Smear    []  Patient Will Schedule with External Provider & Care Team Updated if Applicable                  Colorectal Cancer Screening []  Colonoscopy Case Request / Referral / Home Test Order Placed    []  External Records Requested & Care Team Updated if Applicable    []  External Records Uploaded, Care Team Updated, & History Updated if Applicable    []  Patient Declined Completing Colon Cancer Screening    []  Patient Will Schedule with External Provider & Care Team Updated if Applicable    []  Fit Kit Mailed (add the SmartPhrase under additional notes)    []  Reminded Patient to Complete  Home Test                Diabetic Eye Exam []  Eye Exam Screening Order Placed    []  Eye Camera Scheduled or Optometry/Ophthalmology Referral Placed    []  External Records Requested & Care Team Updated if Applicable    []  External Records Uploaded, Care Team Updated, & History Updated if Applicable    []  Patient Declined Scheduling Eye Exam    []  Patient Will Schedule with External Provider & Care Team Updated if Applicable             Blood Pressure Control []  Primary Care Follow Up Visit Scheduled     []  Remote Blood Pressure Reading Captured    []  Patient Declined Remote Reading or Scheduling Appt - Escalated to PCP    []  Patient Will Call Back or Send Portal Message with Reading                 HbA1c & Other Labs []  Overdue Lab(s) Ordered    []  Overdue Lab(s) Scheduled    []  External Records Uploaded & Care Team Updated if Applicable    []  Primary Care Follow Up Visit Scheduled     []  Reminded Patient to Complete A1c Home Test    []  Patient Declined Scheduling Labs or Will Call Back to Schedule    []  Patient Will Schedule with External Provider / Order Routed, & Care Team Updated if Applicable           Primary Care Appointment []  Primary Care Appt Scheduled    []  Patient Declined Scheduling or Will Call Back to Schedule    []  Pt Established with External Provider, Updated Care Team, & Informed Pt to Notify Payor if Applicable           Medication Adherence /    Statin Use []  Primary Care Appointment Scheduled    []  Patient Reminded to  Prescription    []  Patient Declined, Provider Notified if Needed    []  Sent Provider Message to Review to Evaluate Pt for Statin, Add Exclusion Dx Codes, Document   Exclusion in Problem List, Change Statin Intensity Level to Moderate or High Intensity if Applicable                Osteoporosis Screening []  Dexa Order Placed    []  Dexa Appointment Scheduled    []  External Records Requested & Care Team Updated    []  External Records Uploaded, Care  Team Updated, & History Updated if Applicable    []  Patient Declined Scheduling Dexa or Will Call Back to Schedule    []  Patient Will Schedule with External Provider / Order Routed & Care Team Updated if Applicable       Additional Notes:

## 2024-05-29 ENCOUNTER — PATIENT OUTREACH (OUTPATIENT)
Facility: HOSPITAL | Age: 64
End: 2024-05-29
Payer: COMMERCIAL

## 2024-05-29 NOTE — PROGRESS NOTES
Population Health Chart Review & Patient Outreach Details    Pt has four visits at [facility] sent message to Veena to schedule [facility] for august      [facility]! Provider [facility]! Benefit Start Date [facility]! Benefit End Date Baseline Risk Track(s) Baseline Risk Level Current Risk Tracks(s) Current Risk Level   MARISOL VITAL MD 2023 Hypertension, Glucose Low Hypertension, Glucose Moderate               The Color Me Healthy! biometrics entry should be used only for biometrics associated with Color Me Healthy! services.     Color Me Healthy! biometrics must be submitted by a [employer] Provider. Please enter the [employer] Provider's NPI to submit biometrics.  Provider Search   Performing Provider NPI:               The services below are available to the member under Color Me Healthy! when performed by a [employer] Provider.  Benefit accumulators are based on Color Me Healthy! benefit periods.  [employer] Healthy! Services Guide   Color Me Healthy! Services  CPT Codes     A1C  (0 of 2 Completed) View CPT Codes »    Metabolic Visit  (1 of 4 Completed) View CPT Codes »    Microalbumin  (0 of 1 Completed) View CPT Codes »    Medication Monitoring-Renal  (0 of 2 Completed) View CPT Codes »    Medication Monitoring-Liver  (0 of 2 Completed) View CPT Codes »    Dilated Eye Exam  (0 of 1 Completed) View CPT Codes »    Venipuncture  (0 of 4 Completed) View CPT Codes »         Further Action Needed If Patient Returns Outreach:            Updates Requested / Reviewed:     []  Care Everywhere    []     []  External Sources ([facility], [facility], [facility], etc.)    [] [facility]   [] [facility]   [] Other:    []  Care Team Updated   []  Removed  or Duplicate Orders   []  Immunization Reconciliation Completed / Queried    [] [place]   [] [place]   [] [place]   [] [place]      Health Maintenance Topics Addressed and Outreach Outcomes / Actions Taken:             Breast Cancer  Screening []  Mammogram Order Placed    []  Mammogram Screening Scheduled    []  External Records Requested & Care Team Updated if Applicable    []  External Records Uploaded & Care Team Updated if Applicable    []  Pt Declined Scheduling Mammogram    []  Pt Will Schedule with External Provider / Order Routed & Care Team Updated if Applicable              Cervical Cancer Screening []  Pap Smear Scheduled in Primary Care or OBGYN    []  External Records Requested & Care Team Updated if Applicable       []  External Records Uploaded, Care Team Updated, & History Updated if Applicable    []  Patient Declined Scheduling Pap Smear    []  Patient Will Schedule with External Provider & Care Team Updated if Applicable                  Colorectal Cancer Screening []  Colonoscopy Case Request / Referral / Home Test Order Placed    []  External Records Requested & Care Team Updated if Applicable    []  External Records Uploaded, Care Team Updated, & History Updated if Applicable    []  Patient Declined Completing Colon Cancer Screening    []  Patient Will Schedule with External Provider & Care Team Updated if Applicable    []  Fit Kit Mailed (add the SmartPhrase under additional notes)    []  Reminded Patient to Complete Home Test                Diabetic Eye Exam []  Eye Exam Screening Order Placed    []  Eye Camera Scheduled or Optometry/Ophthalmology Referral Placed    []  External Records Requested & Care Team Updated if Applicable    []  External Records Uploaded, Care Team Updated, & History Updated if Applicable    []  Patient Declined Scheduling Eye Exam    []  Patient Will Schedule with External Provider & Care Team Updated if Applicable             Blood Pressure Control []  Primary Care Follow Up Visit Scheduled     []  Remote Blood Pressure Reading Captured    []  Patient Declined Remote Reading or Scheduling Appt - Escalated to PCP    []  Patient Will Call Back or Send Portal Message with Reading                  HbA1c & Other Labs []  Overdue Lab(s) Ordered    []  Overdue Lab(s) Scheduled    []  External Records Uploaded & Care Team Updated if Applicable    []  Primary Care Follow Up Visit Scheduled     []  Reminded Patient to Complete A1c Home Test    []  Patient Declined Scheduling Labs or Will Call Back to Schedule    []  Patient Will Schedule with External Provider / Order Routed, & Care Team Updated if Applicable           Primary Care Appointment []  Primary Care Appt Scheduled    []  Patient Declined Scheduling or Will Call Back to Schedule    []  Pt Established with External Provider, Updated Care Team, & Informed Pt to Notify Payor if Applicable           Medication Adherence /    Statin Use []  Primary Care Appointment Scheduled    []  Patient Reminded to  Prescription    []  Patient Declined, Provider Notified if Needed    []  Sent Provider Message to Review to Evaluate Pt for Statin, Add Exclusion Dx Codes, Document   Exclusion in Problem List, Change Statin Intensity Level to Moderate or High Intensity if Applicable                Osteoporosis Screening []  Dexa Order Placed    []  Dexa Appointment Scheduled    []  External Records Requested & Care Team Updated    []  External Records Uploaded, Care Team Updated, & History Updated if Applicable    []  Patient Declined Scheduling Dexa or Will Call Back to Schedule    []  Patient Will Schedule with External Provider / Order Routed & Care Team Updated if Applicable       Additional Notes:

## 2024-06-25 RX ORDER — FUROSEMIDE 20 MG/1
20 TABLET ORAL DAILY PRN
COMMUNITY
Start: 2024-05-17

## 2024-06-25 RX ORDER — SEMAGLUTIDE 1.34 MG/ML
1 INJECTION, SOLUTION SUBCUTANEOUS
COMMUNITY
Start: 2024-05-14 | End: 2024-06-26 | Stop reason: SDUPTHER

## 2024-06-26 ENCOUNTER — OFFICE VISIT (OUTPATIENT)
Dept: FAMILY MEDICINE | Facility: CLINIC | Age: 64
End: 2024-06-26
Payer: COMMERCIAL

## 2024-06-26 VITALS
BODY MASS INDEX: 36.8 KG/M2 | WEIGHT: 200 LBS | SYSTOLIC BLOOD PRESSURE: 130 MMHG | HEART RATE: 55 BPM | TEMPERATURE: 99 F | DIASTOLIC BLOOD PRESSURE: 55 MMHG | OXYGEN SATURATION: 98 % | HEIGHT: 62 IN | RESPIRATION RATE: 18 BRPM

## 2024-06-26 DIAGNOSIS — I10 PRIMARY HYPERTENSION: ICD-10-CM

## 2024-06-26 DIAGNOSIS — E11.8 DIABETES MELLITUS TYPE 2 WITH COMPLICATIONS: Primary | ICD-10-CM

## 2024-06-26 LAB
ALBUMIN SERPL BCP-MCNC: 3.6 G/DL (ref 3.5–5)
ALBUMIN/GLOB SERPL: 0.9 {RATIO}
ALP SERPL-CCNC: 102 U/L (ref 50–130)
ALT SERPL W P-5'-P-CCNC: 37 U/L (ref 13–56)
ANION GAP SERPL CALCULATED.3IONS-SCNC: 14 MMOL/L (ref 7–16)
AST SERPL W P-5'-P-CCNC: 27 U/L (ref 15–37)
BILIRUB SERPL-MCNC: 0.2 MG/DL (ref ?–1.2)
BUN SERPL-MCNC: 31 MG/DL (ref 7–18)
BUN/CREAT SERPL: 26 (ref 6–20)
CALCIUM SERPL-MCNC: 8.5 MG/DL (ref 8.5–10.1)
CHLORIDE SERPL-SCNC: 105 MMOL/L (ref 98–107)
CO2 SERPL-SCNC: 26 MMOL/L (ref 21–32)
CREAT SERPL-MCNC: 1.19 MG/DL (ref 0.55–1.02)
CREAT UR-MCNC: 42 MG/DL (ref 28–219)
EGFR (NO RACE VARIABLE) (RUSH/TITUS): 52 ML/MIN/1.73M2
EST. AVERAGE GLUCOSE BLD GHB EST-MCNC: 123 MG/DL
GLOBULIN SER-MCNC: 3.8 G/DL (ref 2–4)
GLUCOSE SERPL-MCNC: 142 MG/DL (ref 74–106)
HBA1C MFR BLD HPLC: 5.9 % (ref 4.5–6.6)
MICROALBUMIN UR-MCNC: 4.6 MG/DL (ref 0–2.8)
MICROALBUMIN/CREAT RATIO PNL UR: 109.5 MG/G (ref 0–30)
POTASSIUM SERPL-SCNC: 5.5 MMOL/L (ref 3.5–5.1)
PROT SERPL-MCNC: 7.4 G/DL (ref 6.4–8.2)
SODIUM SERPL-SCNC: 139 MMOL/L (ref 136–145)

## 2024-06-26 PROCEDURE — 1159F MED LIST DOCD IN RCRD: CPT | Mod: ,,, | Performed by: FAMILY MEDICINE

## 2024-06-26 PROCEDURE — 82043 UR ALBUMIN QUANTITATIVE: CPT | Mod: ,,, | Performed by: CLINICAL MEDICAL LABORATORY

## 2024-06-26 PROCEDURE — 83036 HEMOGLOBIN GLYCOSYLATED A1C: CPT | Mod: ,,, | Performed by: CLINICAL MEDICAL LABORATORY

## 2024-06-26 PROCEDURE — 3044F HG A1C LEVEL LT 7.0%: CPT | Mod: ,,, | Performed by: FAMILY MEDICINE

## 2024-06-26 PROCEDURE — 3008F BODY MASS INDEX DOCD: CPT | Mod: ,,, | Performed by: FAMILY MEDICINE

## 2024-06-26 PROCEDURE — 82570 ASSAY OF URINE CREATININE: CPT | Mod: ,,, | Performed by: CLINICAL MEDICAL LABORATORY

## 2024-06-26 PROCEDURE — 80053 COMPREHEN METABOLIC PANEL: CPT | Mod: ,,, | Performed by: CLINICAL MEDICAL LABORATORY

## 2024-06-26 PROCEDURE — 99214 OFFICE O/P EST MOD 30 MIN: CPT | Mod: ,,, | Performed by: FAMILY MEDICINE

## 2024-06-26 PROCEDURE — 3075F SYST BP GE 130 - 139MM HG: CPT | Mod: ,,, | Performed by: FAMILY MEDICINE

## 2024-06-26 PROCEDURE — 3078F DIAST BP <80 MM HG: CPT | Mod: ,,, | Performed by: FAMILY MEDICINE

## 2024-06-26 PROCEDURE — 1160F RVW MEDS BY RX/DR IN RCRD: CPT | Mod: ,,, | Performed by: FAMILY MEDICINE

## 2024-06-26 PROCEDURE — 3060F POS MICROALBUMINURIA REV: CPT | Mod: ,,, | Performed by: FAMILY MEDICINE

## 2024-06-26 PROCEDURE — 3066F NEPHROPATHY DOC TX: CPT | Mod: ,,, | Performed by: FAMILY MEDICINE

## 2024-06-26 RX ORDER — OLMESARTAN MEDOXOMIL AND HYDROCHLOROTHIAZIDE 40/25 40; 25 MG/1; MG/1
1 TABLET ORAL DAILY
Qty: 90 TABLET | Refills: 1 | Status: SHIPPED | OUTPATIENT
Start: 2024-06-26

## 2024-06-26 RX ORDER — BLOOD-GLUCOSE SENSOR
1 EACH MISCELLANEOUS
Qty: 3 EACH | Refills: 5 | Status: SHIPPED | OUTPATIENT
Start: 2024-06-26

## 2024-06-26 RX ORDER — DAPAGLIFLOZIN 10 MG/1
10 TABLET, FILM COATED ORAL EVERY MORNING
COMMUNITY
Start: 2024-06-13

## 2024-06-26 RX ORDER — NAPROXEN 500 MG/1
500 TABLET ORAL 2 TIMES DAILY PRN
COMMUNITY
Start: 2024-05-30

## 2024-06-26 RX ORDER — SEMAGLUTIDE 1.34 MG/ML
1 INJECTION, SOLUTION SUBCUTANEOUS
Qty: 3 ML | Refills: 5 | Status: SHIPPED | OUTPATIENT
Start: 2024-06-26

## 2024-06-26 NOTE — PROGRESS NOTES
"Subjective     Patient ID: Dilma Cr is a 63 y.o. female.    Chief Complaint: Color Me Healthy (Patient stated she is here for [facility] visit 2 of 4 for Glucose/hyperlipidemia/HTN. Uses [facility] and needs refills and stated she is "fasting" for labs. ), Diabetes, and Hyperlipidemia    Patient is here for [facility] for diabetes and hypertension. She needs refills.       Review of Systems   Constitutional:  Negative for fatigue and fever.   HENT:  Negative for ear pain, postnasal drip, rhinorrhea and sinus pressure/congestion.    Respiratory:  Negative for cough and shortness of breath.    Cardiovascular:  Negative for chest pain.   Gastrointestinal:  Negative for abdominal pain, diarrhea, nausea and vomiting.   Genitourinary:  Negative for dysuria.   Neurological:  Negative for headaches.       Tobacco Use: Low Risk  (6/26/2024)    Patient History     Smoking Tobacco Use: Never     Smokeless Tobacco Use: Never     Passive Exposure: Never     Review of patient's allergies indicates:  No Known Allergies  Current Outpatient Medications   Medication Instructions    ascorbic acid (vitamin C) 250 mg, Oral, Daily    aspirin (ECOTRIN) 81 mg, Oral, Daily    citalopram (CELEXA) 10 mg, Oral, Daily    clopidogreL (PLAVIX) 75 mg, Oral, Daily    cyanocobalamin 2000 MCG tablet 1 tablet, Oral, Daily    FARXIGA 10 mg, Oral, Every morning    FREESTYLE BECCA 14 DAY SENSOR Kit USE TO CHECK GLUCOSE 4 TIMES DAILY    FREESTYLE BECCA 3 SENSOR Aleena 1 Device, Apply Externally, Every 14 days    furosemide (LASIX) 20 mg, Oral, Daily PRN    iron-vitamin C 100-250 mg, ICAR-C, 100-250 mg Tab 1 tablet, Oral, 2 times daily with meals    naloxone (NARCAN) 4 mg/actuation Spry 1 spray, Once as needed    naproxen (NAPROSYN) 500 mg, Oral, 2 times daily PRN    nitroGLYCERIN (NITROSTAT) 0.4 mg, Sublingual, Every 5 min PRN    NovoLOG Flexpen U-100 Insulin 20 Units, Subcutaneous, 2 times daily    olmesartan-hydrochlorothiazide " "(BENICAR HCT) 40-25 mg per tablet 1 tablet, Oral, Daily    OZEMPIC 1 mg, Subcutaneous, Every 7 days    rosuvastatin (CRESTOR) 40 mg, Oral, Daily    sotaloL (BETAPACE) 120 mg, Oral, Every 12 hours    traMADoL (ULTRAM) 50 mg, Oral, Every 12 hours PRN    TRESIBA FLEXTOUCH U-100 24 Units, Subcutaneous, Daily     Medications Discontinued During This Encounter   Medication Reason    FREESTYLE BECCA 3 SENSOR Aleena Reorder    olmesartan-hydrochlorothiazide (BENICAR HCT) 40-25 mg per tablet Reorder    OZEMPIC 1 mg/dose (4 mg/3 mL) Reorder    JARDIANCE 25 mg tablet Alternate therapy       Past Medical History:   Diagnosis Date    Acute superficial gastritis without hemorrhage 06/21/2022    Arthritis     Cancer     colon cancer    Chronic kidney disease, stage 3b     Colon cancer     Coronary artery disease     Depression     Diabetes mellitus, type 2     Diverticula, colon 06/22/2022    GERD (gastroesophageal reflux disease)     H/O right hemicolectomy 06/22/2022    HH (hiatus hernia) 06/21/2022    Hyperlipidemia     Myocardial infarction     2 stents in 05/14/2021 Dr Lora    Renal disorder      Health Maintenance Topics with due status: Not Due       Topic Last Completion Date    TETANUS VACCINE 06/14/2019    Colorectal Cancer Screening 06/22/2022    Mammogram 12/19/2023    Lipid Panel 01/31/2024    Diabetes Urine Screening 05/06/2024    High Dose Statin 06/26/2024    Hemoglobin A1c 06/26/2024     Immunization History   Administered Date(s) Administered    COVID-19, MRNA, LN-S, PF (Pfizer) (Purple Cap) 02/24/2021, 03/18/2021    Influenza 11/01/2021    Influenza - Quadrivalent - PF *Preferred* (6 months and older) 09/07/2022       Objective     Body mass index is 36.58 kg/m².  Wt Readings from Last 3 Encounters:   06/26/24 90.7 kg (200 lb)   05/28/24 90.7 kg (200 lb)   05/09/24 90.3 kg (199 lb)     Ht Readings from Last 3 Encounters:   06/26/24 5' 2" (1.575 m)   05/28/24 5' 2" (1.575 m)   05/09/24 5' 2" (1.575 m)     BP " Readings from Last 3 Encounters:   06/26/24 (!) 130/55   05/28/24 (!) 112/45   05/09/24 138/70     Temp Readings from Last 3 Encounters:   06/26/24 98.5 °F (36.9 °C) (Oral)   05/06/24 98.8 °F (37.1 °C) (Oral)   12/13/23 97.8 °F (36.6 °C) (Oral)     Pulse Readings from Last 3 Encounters:   06/26/24 (!) 55   05/28/24 (!) 56   05/09/24 (!) 56     Resp Readings from Last 3 Encounters:   06/26/24 18   05/28/24 18   05/09/24 18     PF Readings from Last 3 Encounters:   07/12/22 362 L/min       Physical Exam  HENT:      Head: Normocephalic and atraumatic.   Cardiovascular:      Rate and Rhythm: Normal rate and regular rhythm.   Pulmonary:      Effort: Pulmonary effort is normal.      Breath sounds: Normal breath sounds.   Neurological:      Mental Status: She is alert and oriented to person, place, and time.      Gait: Gait abnormal.      Comments: Walks with a cane   Psychiatric:         Mood and Affect: Mood normal.         Behavior: Behavior normal.         Assessment and Plan     Problem List Items Addressed This Visit       Hypertension (Chronic)    Relevant Medications    olmesartan-hydrochlorothiazide (BENICAR HCT) 40-25 mg per tablet    Other Relevant Orders    Comprehensive Metabolic Panel (Completed)    Hemoglobin A1C (Completed)    Microalbumin/Creatinine Ratio, Urine     Other Visit Diagnoses       Diabetes mellitus type 2 with complications    -  Primary    Relevant Medications    OZEMPIC 1 mg/dose (4 mg/3 mL)    FREESTYLE BECCA 3 SENSOR Aleena    Other Relevant Orders    Comprehensive Metabolic Panel (Completed)    Hemoglobin A1C (Completed)    Microalbumin/Creatinine Ratio, Urine            Plan: Refill medications. Labs done. Educational material given.      I have reviewed the medications, allergies, and problem list.     Goal Actions:    What type of visit is the patient here for today?: Color Me Healthy  Which Color Me Healthy program is the patient enrolling in?: Blood Sugar (Diabetes)  Is this a Wellness  Follow Up?: Yes  What is your overall wellness goal? (select at least one): Lifestyle modifications, Lose weight, Understand disease process, Improve overall health  Choose 3: Biometric, Lifestyle, Nutrition  Biometric Actions: Monitor, record and report glucose levels  Lifestyle Actions : Take medications as prescribed  Nurtrition Actions: Eat a well-balanced diet

## 2024-07-08 ENCOUNTER — TELEPHONE (OUTPATIENT)
Dept: PAIN MEDICINE | Facility: CLINIC | Age: 64
End: 2024-07-08
Payer: COMMERCIAL

## 2024-07-08 NOTE — TELEPHONE ENCOUNTER
----- Message from Amna Campa sent at 7/8/2024  2:45 PM CDT -----  Regarding: rx  Pt needs to speak to nurse regarding meds states the tramadol is not helping would like to be put back on norco please sagar pt back at [phone]    MONICA WILLIAM   07/08/2024   4:23 PM   Ultram discontinued.  Dariel Bobby PA sent in one month supply of norco

## 2024-07-15 ENCOUNTER — TELEPHONE (OUTPATIENT)
Dept: PAIN MEDICINE | Facility: CLINIC | Age: 64
End: 2024-07-15

## 2024-07-24 ENCOUNTER — OFFICE VISIT (OUTPATIENT)
Dept: PAIN MEDICINE | Facility: CLINIC | Age: 64
End: 2024-07-24
Payer: COMMERCIAL

## 2024-07-24 ENCOUNTER — OFFICE VISIT (OUTPATIENT)
Dept: NEUROLOGY | Facility: CLINIC | Age: 64
End: 2024-07-24
Payer: COMMERCIAL

## 2024-07-24 VITALS
OXYGEN SATURATION: 99 % | DIASTOLIC BLOOD PRESSURE: 50 MMHG | HEIGHT: 62 IN | SYSTOLIC BLOOD PRESSURE: 140 MMHG | BODY MASS INDEX: 37.39 KG/M2 | WEIGHT: 203.19 LBS | HEART RATE: 58 BPM | RESPIRATION RATE: 18 BRPM

## 2024-07-24 VITALS
DIASTOLIC BLOOD PRESSURE: 40 MMHG | HEART RATE: 61 BPM | WEIGHT: 203 LBS | SYSTOLIC BLOOD PRESSURE: 134 MMHG | RESPIRATION RATE: 18 BRPM | BODY MASS INDEX: 37.36 KG/M2 | HEIGHT: 62 IN

## 2024-07-24 DIAGNOSIS — I65.21 STENOSIS OF RIGHT CAROTID ARTERY: ICD-10-CM

## 2024-07-24 DIAGNOSIS — G89.29 CHRONIC BILATERAL LOW BACK PAIN WITH BILATERAL SCIATICA: Chronic | ICD-10-CM

## 2024-07-24 DIAGNOSIS — I10 HYPERTENSION, UNSPECIFIED TYPE: ICD-10-CM

## 2024-07-24 DIAGNOSIS — F32.A DEPRESSION, UNSPECIFIED DEPRESSION TYPE: ICD-10-CM

## 2024-07-24 DIAGNOSIS — I63.9 CEREBROVASCULAR ACCIDENT (CVA), UNSPECIFIED MECHANISM: Primary | ICD-10-CM

## 2024-07-24 DIAGNOSIS — M54.41 CHRONIC BILATERAL LOW BACK PAIN WITH BILATERAL SCIATICA: Chronic | ICD-10-CM

## 2024-07-24 DIAGNOSIS — E78.5 HYPERLIPIDEMIA LDL GOAL <70: ICD-10-CM

## 2024-07-24 DIAGNOSIS — M54.16 LUMBAR RADICULOPATHY: Primary | Chronic | ICD-10-CM

## 2024-07-24 DIAGNOSIS — I48.91 ATRIAL FIBRILLATION, UNSPECIFIED TYPE: ICD-10-CM

## 2024-07-24 DIAGNOSIS — M54.42 CHRONIC BILATERAL LOW BACK PAIN WITH BILATERAL SCIATICA: Chronic | ICD-10-CM

## 2024-07-24 PROCEDURE — 99215 OFFICE O/P EST HI 40 MIN: CPT | Mod: PBBFAC | Performed by: NURSE PRACTITIONER

## 2024-07-24 PROCEDURE — 99999 PR PBB SHADOW E&M-EST. PATIENT-LVL V: CPT | Mod: PBBFAC,,, | Performed by: PAIN MEDICINE

## 2024-07-24 PROCEDURE — 1160F RVW MEDS BY RX/DR IN RCRD: CPT | Mod: CPTII,,, | Performed by: NURSE PRACTITIONER

## 2024-07-24 PROCEDURE — 3066F NEPHROPATHY DOC TX: CPT | Mod: CPTII,,, | Performed by: NURSE PRACTITIONER

## 2024-07-24 PROCEDURE — 1159F MED LIST DOCD IN RCRD: CPT | Mod: CPTII,,, | Performed by: NURSE PRACTITIONER

## 2024-07-24 PROCEDURE — 99215 OFFICE O/P EST HI 40 MIN: CPT | Mod: PBBFAC | Performed by: PAIN MEDICINE

## 2024-07-24 PROCEDURE — 3075F SYST BP GE 130 - 139MM HG: CPT | Mod: CPTII,,, | Performed by: PAIN MEDICINE

## 2024-07-24 PROCEDURE — 1159F MED LIST DOCD IN RCRD: CPT | Mod: CPTII,,, | Performed by: PAIN MEDICINE

## 2024-07-24 PROCEDURE — 3078F DIAST BP <80 MM HG: CPT | Mod: CPTII,,, | Performed by: PAIN MEDICINE

## 2024-07-24 PROCEDURE — 3044F HG A1C LEVEL LT 7.0%: CPT | Mod: CPTII,,, | Performed by: PAIN MEDICINE

## 2024-07-24 PROCEDURE — 3078F DIAST BP <80 MM HG: CPT | Mod: CPTII,,, | Performed by: NURSE PRACTITIONER

## 2024-07-24 PROCEDURE — 99999 PR PBB SHADOW E&M-EST. PATIENT-LVL V: CPT | Mod: PBBFAC,,, | Performed by: NURSE PRACTITIONER

## 2024-07-24 PROCEDURE — 3066F NEPHROPATHY DOC TX: CPT | Mod: CPTII,,, | Performed by: PAIN MEDICINE

## 2024-07-24 PROCEDURE — 3044F HG A1C LEVEL LT 7.0%: CPT | Mod: CPTII,,, | Performed by: NURSE PRACTITIONER

## 2024-07-24 PROCEDURE — 3008F BODY MASS INDEX DOCD: CPT | Mod: CPTII,,, | Performed by: NURSE PRACTITIONER

## 2024-07-24 PROCEDURE — 99214 OFFICE O/P EST MOD 30 MIN: CPT | Mod: S$PBB,,, | Performed by: PAIN MEDICINE

## 2024-07-24 PROCEDURE — 3008F BODY MASS INDEX DOCD: CPT | Mod: CPTII,,, | Performed by: PAIN MEDICINE

## 2024-07-24 PROCEDURE — 3077F SYST BP >= 140 MM HG: CPT | Mod: CPTII,,, | Performed by: NURSE PRACTITIONER

## 2024-07-24 PROCEDURE — 3060F POS MICROALBUMINURIA REV: CPT | Mod: CPTII,,, | Performed by: NURSE PRACTITIONER

## 2024-07-24 PROCEDURE — 99212 OFFICE O/P EST SF 10 MIN: CPT | Mod: S$PBB,,, | Performed by: NURSE PRACTITIONER

## 2024-07-24 PROCEDURE — 3060F POS MICROALBUMINURIA REV: CPT | Mod: CPTII,,, | Performed by: PAIN MEDICINE

## 2024-07-24 RX ORDER — HYDROCODONE BITARTRATE AND ACETAMINOPHEN 5; 325 MG/1; MG/1
1 TABLET ORAL EVERY 12 HOURS PRN
Qty: 60 TABLET | Refills: 0 | Status: SHIPPED | OUTPATIENT
Start: 2024-08-14 | End: 2024-09-13

## 2024-07-24 NOTE — PROGRESS NOTES
She Disclaimer: This note has been generated using voice-recognition software. There may be typographical errors that have been missed during proof-reading        Patient ID: Dilma Cr is a 63 y.o. female.      Chief Complaint: Low-back Pain and Leg Pain (bilateral)      63-year-old female returns for re-evaluation worsening lower back pain and bilateral lumbar radiculopathy. She has a long history of chronic pain and required surgical intervention by Dr. Ac at [facility] December 28, 2023.  She had significant improvement following the procedure and  with physical therapy. She returned to work and her pain was aggravated after lifting a heavy bag.  She is  currently involved in a worker's comp claim.  She was eventually terminated from her job June 5, 2024 after 29 years of service.  Her lower back pain has progressively worsened and radiates to the bilateral lower extremities and feet.  She notes paresthesia, weakness and uses a cane to prevent falls.  She was re-evaluated by Dr. Ac and an MRI of the lumbar spine was obtained May 22, 2024 and revealed multilevel spondylosis, L4-5 bilateral foraminal narrowing and L3-4 and L2-3 facet hypertrophy.  Based on findings I recommend an L4-5 epidural steroid injection for recurrent lumbar radiculopathy. I will obtain clearance to hold Plavix from [facility] Garcia in [place], [place]                      Pain Assessment  Pain Assessment: 0-10  Pain Score:   9  Pain Location: Back  Pain Orientation: Left, Right  Pain Radiating Towards: legs  Pain Descriptors: Aching, Burning  Pain Frequency: Constant/continuous  Pain Onset: Awakened from sleep  Clinical Progression: Not changed  Aggravating Factors: Walking  Pain Intervention(s): Medication (See eMAR), Home medication, Cold applied      A's of Opioid Risk Assessment  Activity:Patient can partially perform ADL.   Analgesia:Patients pain is not controlled by current medication.   Adverse  Effects: Patient denies constipation or sedation.  Aberrant Behavior:  reviewed with no aberrant drug seeking/taking behavior.      Patient denies any suicidal or homicidal ideations    Physical Therapy/Home Exercise: yes, no relief          Review of Systems   Constitutional: Negative.    HENT: Negative.     Eyes: Negative.    Respiratory: Negative.     Cardiovascular: Negative.    Gastrointestinal: Negative.    Endocrine: Negative.    Genitourinary: Negative.    Musculoskeletal:  Positive for arthralgias and leg pain.   Integumentary:  Negative.   Neurological:  Positive for weakness (BLE) and numbness (BLE).   Hematological: Negative.    Psychiatric/Behavioral: Negative.               Past Medical History:   Diagnosis Date    Acute superficial gastritis without hemorrhage 06/21/2022    Arthritis     Cancer     colon cancer    Chronic kidney disease, stage 3b     Colon cancer     Coronary artery disease     Depression     Diabetes mellitus, type 2     Diverticula, colon 06/22/2022    GERD (gastroesophageal reflux disease)     H/O right hemicolectomy 06/22/2022    HH (hiatus hernia) 06/21/2022    Hyperlipidemia     Myocardial infarction     2 stents in 05/14/2021 Dr Porter    Renal disorder      Past Surgical History:   Procedure Laterality Date    BREAST SURGERY      C5-C6 RADHA  8-, 7-, 6-1-2016    Dr Fowler    CARDIAC SURGERY  02/07/2023    CERVICAL SPINE SURGERY      COLON SURGERY      EPIDURAL STEROID INJECTION INTO LUMBAR SPINE N/A 03/29/2022    Procedure: L4-5 RADHA;  Surgeon: Gela Fowelr MD;  [facility]: [facility];  Service: Pain Management;  Laterality: N/A;  PT AWARE ON OV TO BE TESTED\  PLAVIX TO BE HELD FOR 7 DAYS PRIOR TO PROCEDURE PER DR FOWLER AND DR PORTER  3-28  message left on  re: covid    HYSTERECTOMY      LEFT HEART CATHETERIZATION Left 05/14/2021    Procedure: Left heart cath;  Surgeon: Mateus Guan MD;  Location: [facility] CATH LAB;  Service: Cardiology;   Laterality: Left;    LEFT HEART CATHETERIZATION Left 06/20/2022    Procedure: Left heart cath;  Surgeon: Oliverio Bautista MD;  Location: [facility] CATH LAB;  Service: Cardiology;  Laterality: Left;    LUMBAR SPINE SURGERY  12/28/2023    MEDIPORT REMOVAL  10/14/2021    Dr Kraft    OOPHORECTOMY      right rotator cuff repair      in [place]    TOTAL REDUCTION MAMMOPLASTY       Social History     Socioeconomic History    Marital status:      Spouse name: Jorge Alberto    Number of children: 2   Occupational History    Occupation:  at [employer] for past 27 years   Tobacco Use    Smoking status: Never     Passive exposure: Never    Smokeless tobacco: Never   Substance and Sexual Activity    Alcohol use: Never    Drug use: Never    Sexual activity: Yes   Social History Narrative    Worked at [employer] as  for past 30 years. H/O colon cancer. Watchman procedure back in Feb (heart).  Sees Dr Stover Oncologist. Cardiologist Dr Alina Garcia ([place]). Sees Endocrinologist Koko Rivera with [facility]. Fired from [employer] due to missing so many days. Insurance will end July 5,2024.      Social Determinants of Health     Financial Resource Strain: Patient Declined (1/31/2024)    Received from [employer] [facility], [facility]    Overall Financial Resource Strain (CARDIA)     Difficulty of Paying Living Expenses: Patient declined   Food Insecurity: Patient Declined (1/31/2024)    Received from [employer] [facility], [facility]    Hunger Vital Sign     Worried About Running Out of Food in the Last Year: Patient declined     Ran Out of Food in the Last Year: Patient declined   Transportation Needs: No Transportation  Needs (2/4/2024)    [facility], [facility]    PRAPARE - Transportation     Lack of Transportation (Medical): No     Lack of Transportation (Non-Medical): No   Stress: Patient Declined (1/31/2024)    Received from [facility], [facility]    [facility] - Occupational Stress Questionnaire     Feeling of Stress : Patient declined   Housing Stability: Unknown (2/4/2024)    [facility], [facility] and [facility]    Housing Stability Vital Sign     Unable to Pay for Housing in the Last Year: No     In the last 12 months, was there a time when you did not have a steady place to sleep or slept in a shelter (including now)?: No     Family History   Problem Relation Name Age of Onset    Hypertension Mother      Diabetes Mother      Hypertension Father      Diabetes Father      Breast cancer Sister      Hypertension Sister      Hypertension Brother       Review of patient's allergies indicates:  No Known Allergies  has a current medication list which includes the following prescription(s): ascorbic acid (vitamin c), aspirin, citalopram, clopidogrel, cyanocobalamin, farxiga, freestyle padma 14 day sensor, freestyle padma 3 sensor, furosemide, hydrocodone-acetaminophen, iron-vitamin c 100-250 mg (icar-c), naproxen, novolog flexpen u-100 insulin, olmesartan-hydrochlorothiazide, ozempic, rosuvastatin, sotalol, tresiba flextouch u-100, [START ON 8/14/2024] hydrocodone-acetaminophen, naloxone, nitroglycerin, and [DISCONTINUED] isosorbide  mononitrate.      Objective:  Vitals:    07/24/24 1336   BP: (!) 134/40   Pulse: 61   Resp: 18        Physical Exam  Vitals (Deconditioned) and nursing note reviewed.   Constitutional:       General: She is not in acute distress.     Appearance: Normal appearance. She is obese. She is not ill-appearing, toxic-appearing or diaphoretic.   HENT:      Head: Normocephalic and atraumatic.      Nose: Nose normal.      Mouth/Throat:      Mouth: Mucous membranes are moist.   Eyes:      Extraocular Movements: Extraocular movements intact.      Pupils: Pupils are equal, round, and reactive to light.   Cardiovascular:      Rate and Rhythm: Normal rate and regular rhythm.      Heart sounds: Normal heart sounds.   Pulmonary:      Effort: Pulmonary effort is normal. No respiratory distress.      Breath sounds: Normal breath sounds. No stridor. No wheezing or rhonchi.   Abdominal:      General: Bowel sounds are normal.      Palpations: Abdomen is soft.   Musculoskeletal:         General: No swelling or deformity.      Cervical back: Normal and normal range of motion. No spasms or tenderness. No pain with movement. Normal range of motion.      Thoracic back: Normal.      Lumbar back: Tenderness and bony tenderness present. No spasms. Decreased range of motion. Negative right straight leg raise test and negative left straight leg raise test. No scoliosis.      Right lower leg: No edema.      Left lower leg: No edema.      Comments: Lumbar pain with flexion, extension and lateral rotation.  Lumbar spinous and paraspinous process tenderness to palpation.  Bilateral facet tenderness to palpation from L4-S1.   Skin:     General: Skin is warm.   Neurological:      General: No focal deficit present.      Mental Status: She is alert and oriented to person, place, and time. Mental status is at baseline.      Cranial Nerves: No cranial nerve deficit.      Sensory: Sensation is intact. No sensory deficit.      Motor: Weakness (Bilateral lower  extremities) present.      Coordination: Coordination normal.      Gait: Gait abnormal (Uses a cane).      Deep Tendon Reflexes:      Reflex Scores:       Patellar reflexes are 1+ on the right side and 1+ on the left side.       Achilles reflexes are 1+ on the right side and 1+ on the left side.  Psychiatric:         Mood and Affect: Mood normal.         Behavior: Behavior normal.           Assessment:      1. Lumbar radiculopathy    2. Chronic bilateral low back pain with bilateral sciatica            Plan:  1. reviewed  2.Addiction, Dependency, Tolerance, Opioid abuse-misuse, Death, Diversion Discussed. Overdose reversal drug Naloxone discussed. Patient is prescribed opiates for chronic nonmalignant pain pathology.  Patient is receiving opiates which require greater than a 72 hour supply of therapy.  Patient was educated on potential dependency associated with long-term opioid use as well as decreasing efficacy with prolonged use.  Patient was advised of risks, benefits and side effects and how to utilize each medication.  Patient was also informed that any deviation from therapy protocol will  lead to discontinuation of opiates.  It is reasonable to prescribe opioid analgesics for patient based on positive response to opioid medications, lack of side effects and  limited aberrant behavior.    3.Refill/Continue medications for pain control and function       Requested Prescriptions     Signed Prescriptions Disp Refills    HYDROcodone-acetaminophen (NORCO) 5-325 mg per tablet 60 tablet 0     Sig: Take 1 tablet by mouth every 12 (twelve) hours as needed for Pain.     4. Schedule L4-5 epidural steroid injection for lumbar radiculopathy    Orders Placed This Encounter   Procedures    Case Request Operating Room: L4-5 RADHA     Order Specific Question:   Medical Necessity:     Answer:   Medically Non-Urgent [100]     Order Specific Question:   CPT Code:     Answer:   AK INJ LUMBAR/SACRAL, W/IMAGING GUIDANCE [96078]      Order Specific Question:   Case classification     Answer:   E - Elective [90]     Order Specific Question:   Positioning:     Answer:   Prone [1003]     Order Specific Question:   Post-Procedure Disposition:     Answer:   PACU [1]     Order Specific Question:   Estimated Length of Stay:     Answer:   0 midnight     Order Specific Question:   Implant Required:     Answer:   No [1001]     Order Specific Question:   Is an on-site pathologist required for this procedure?     Answer:   N/A      5. Indications for this procedure for this specific patient include the following   -History, physical examination and concordant radiological image based diagnostic testing supports medical necessity for an epidural steroid injection  - neurogenic claudication due to central disc pathology, osteophyte complexes, severe degenerative disc disease producing foraminal or central stenosis  - post-laminectomy pain syndrome  - Pt has been in pain for at least 6 weeks and has failed conservative care (e.g. Exercise, physical methods, NSAID/ and or muscle relaxants)   - Pt has no major risk factors for spinal cancer or contraindicated condition   - Neurogenic claudication and Radicular pain are interfering with functional activity  - Pain is associated with symptoms of nerve root irritation   - Any numbness documented is accompanied with paresthesia   - No evidence of systemic or local infection, bleeding tendency or unstable medical condition   - Epidural provided as part of a comprehensive pain management program  - All repeat injections have at least 80% pain relief and increase functional gain and physical activity, and reduction in reliance on the use of medication and or physical therapy  - Pt has significant functional limitation resulting in diminished quality of life and impaired age appropriate ADL's.   - Diagnostic evaluation has ruled out other causes of pain  - Pt participating in an active rehabilitation program or home  exercise program which has been discussed with the patient including heat ice and rest  - No more than 3 epidurals will be done in a 6 month period at the same level with at least 7 days between injections  - MAC is only offered in cases of needle phobia   - Injection done at L4-5 level which is consistent with patient's dermatomal pain complaint    6.Monitored Anesthesia Care medical necessity authorization request:    Monitor anesthesia request is medically indicated for the scheduled nerve block procedure due to:  - needle phobia and anxiety, placing  the patient at risk during the provided service.  -patient has an ASA class greater than 3 and requires constant presence of an anesthesiologist during the procedure:  -patient has severe problems with muscles and muscle spasticity that makes it hard to lie still  -patient suffers from chronic pain and is unable to function due to  diminished ADLs    7.The planned medically necessary  surgical procedure is performed in a hospital outpatient department and not in an ambulatory surgical center due to:     -there is no geographically assessable ambulatory surgery center that has the  necessary equipment and fluoroscopy needed for the procedure     -there is no geographically assessable ambulatory surgical center available at which the physician has privileges     -an ASC's  specific  guideline regarding the individuals weight or health conditions that prevent the use of an ASC       -injections must be performed under fluoroscopy image guidance with contrast unless the patient has a documented contrast allergy or pregnancy        report:  Reviewed and consistent with medication use as prescribed.      The total time spent for evaluation and management on 07/28/2024 including reviewing separately obtained history, performing a medically appropriate exam and evaluation, documenting clinical information in the health record, independently interpreting results and  communicating them to the patient/family/caregiver, and ordering medications/tests/procedures was between 15-29 minutes.    The above plan and management options were discussed at length with patient. Patient is in agreement with the above and verbalized understanding. It will be communicated with the referring physician via electronic record, fax, or mail.

## 2024-07-24 NOTE — PROGRESS NOTES
Subjective:       Patient ID: Dilma Cr is a 63 y.o. female     Chief Complaint:    No chief complaint on file.       Allergies:  Patient has no known allergies.    Current Medications:    Outpatient Encounter Medications as of 7/24/2024   Medication Sig Dispense Refill    ascorbic acid, vitamin C, 250 mg Chew Take 250 mg by mouth once daily.      aspirin (ECOTRIN) 81 MG EC tablet Take 81 mg by mouth once daily.      citalopram (CELEXA) 10 MG tablet Take 1 tablet (10 mg total) by mouth once daily. 30 tablet 11    clopidogreL (PLAVIX) 75 mg tablet Take 1 tablet (75 mg total) by mouth once daily. 90 tablet 1    cyanocobalamin 2000 MCG tablet Take 1 tablet by mouth once daily.      FARXIGA 10 mg tablet Take 10 mg by mouth every morning.      FREESTYLE BECCA 14 DAY SENSOR Kit USE TO CHECK GLUCOSE 4 TIMES DAILY      FREESTYLE BECCA 3 SENSOR Aleena 1 Device by Apply Externally route every 14 (fourteen) days. 3 each 5    furosemide (LASIX) 20 MG tablet Take 20 mg by mouth daily as needed (for fluid).      HYDROcodone-acetaminophen (NORCO) 5-325 mg per tablet Take 1 tablet by mouth every 12 (twelve) hours as needed for Pain. (Patient taking differently: Take 1 tablet by mouth every 12 (twelve) hours as needed for Pain. She's taking 7.5 now) 60 tablet 0    iron-vitamin C 100-250 mg, ICAR-C, 100-250 mg Tab Take 1 tablet by mouth 2 (two) times daily with meals.      naproxen (NAPROSYN) 500 MG tablet Take 500 mg by mouth 2 (two) times daily as needed.      NOVOLOG FLEXPEN U-100 INSULIN 100 unit/mL (3 mL) InPn pen Inject 20 Units into the skin 2 (two) times a day. 12 each 1    olmesartan-hydrochlorothiazide (BENICAR HCT) 40-25 mg per tablet Take 1 tablet by mouth once daily. 90 tablet 1    OZEMPIC 1 mg/dose (4 mg/3 mL) Inject 1 mg into the skin every 7 days. 3 mL 5    rosuvastatin (CRESTOR) 40 MG Tab Take 1 tablet (40 mg total) by mouth once daily. 90 tablet 1    sotaloL (BETAPACE) 120 MG Tab Take 1 tablet (120 mg  total) by mouth every 12 (twelve) hours. 90 tablet 1    TRESIBA FLEXTOUCH U-100 100 unit/mL (3 mL) insulin pen Inject 24 Units into the skin once daily. 3 Pen 1    naloxone (NARCAN) 4 mg/actuation Spry 1 spray by Nasal route once as needed. (Patient not taking: Reported on 4/24/2024)      nitroGLYCERIN (NITROSTAT) 0.4 MG SL tablet Place 1 tablet (0.4 mg total) under the tongue every 5 (five) minutes as needed for Chest pain. 30 tablet 0    [DISCONTINUED] FREESTYLE BECCA 3 SENSOR Aleena       [DISCONTINUED] isosorbide mononitrate (IMDUR) 30 MG 24 hr tablet Take 1 tablet (30 mg total) by mouth once daily. 30 tablet 11    [DISCONTINUED] JARDIANCE 25 mg tablet Take 1 tablet (25 mg total) by mouth once daily. (Patient not taking: Reported on 6/26/2024) 30 tablet 5    [DISCONTINUED] olmesartan-hydrochlorothiazide (BENICAR HCT) 40-25 mg per tablet Take 1 tablet by mouth once daily. 90 tablet 1    [DISCONTINUED] OZEMPIC 1 mg/dose (4 mg/3 mL) Inject 1 mg into the skin every 7 days.      [DISCONTINUED] traMADoL (ULTRAM) 50 mg tablet Take 1 tablet (50 mg total) by mouth every 12 (twelve) hours as needed for Pain. 60 tablet 1     No facility-administered encounter medications on file as of 7/24/2024.       History of Present Illness  64 y/o female following in neurology for CVA    She was seen in the hospital in October of 2023 with symptoms of numbness in left face and arm.  She had known history of A-fib with prior watchman in 2/2023.  Prior on eliquis, but it was d/c due to anemia.  Currently following with Dr. Reeves, on plavix.  Her workup during the admit included an MRI brain which was not revealing of acute findings, but did show chronic infarcts middle cerebellar peduncles and rajeev.  CTA head negative, but CTA neck showed critical stenosis right carotid siphon, Dr. Olivarez evaluated her but felt at that time was not surgical but plans yearly follow-up for continued monitoring.    Seen at [facility] 1/31/24 for  reported slurred speech and facial numbness.  Their notes indicate MRI brain negative for acute findings, CTA head without occlusion, echo showed 70-75%, negative bubble study.      She is currently on ASA 81mg daily, plavix 75mg daily.  Was prior on eliquis but had to stop due to GI bleeding in early 2023.    In past it has been difficult to accurately assess patient, this due to obvious depression.  She was not putting forth much physical effort during exam, was tearful, reporting difficulty staying on task at work.  During exam when assessing facial features she would not smile, but stated she was trying.  I did explain that strokes do not prevent smiling bilateral, she would hardly open her eyes and avoid eye contact.  It was enough that I started her with celexa.  She had zoloft on her med list but reported she was not taking it.  Today she is more interactive in clinic, not tearful as she was last visit.           Review of Systems  Review of Systems   Constitutional:  Negative for diaphoresis and fever.   HENT:  Negative for congestion, hearing loss and tinnitus.    Eyes:  Negative for blurred vision, double vision, photophobia, discharge and redness.   Respiratory:  Negative for cough and shortness of breath.    Cardiovascular:  Negative for chest pain.   Gastrointestinal:  Negative for abdominal pain, nausea and vomiting.   Musculoskeletal:  Negative for back pain, falls, joint pain, myalgias and neck pain.   Skin:  Negative for itching and rash.   Neurological:  Positive for sensory change and focal weakness. Negative for dizziness, tremors, speech change, seizures, loss of consciousness, weakness and headaches.   Psychiatric/Behavioral:  Positive for depression and memory loss. Negative for hallucinations. The patient does not have insomnia.    All other systems reviewed and are negative.     Objective:     NEUROLOGICAL EXAMINATION:     MENTAL STATUS   Oriented to person, place, and time.   Attention:  normal. Concentration: normal.   Speech: speech is normal   Level of consciousness: alert  Knowledge: good and consistent with education.   Normal comprehension.     CRANIAL NERVES     CN II   Visual fields full to confrontation.   Visual acuity: normal  Right visual field deficit: none  Left visual field deficit: none     CN III, IV, VI   Pupils are equal, round, and reactive to light.  Extraocular motions are normal.   Right pupil: Size: 3 mm. Shape: regular. Reactivity: brisk. Consensual response: intact. Accommodation: intact.   Left pupil: Size: 3 mm. Shape: regular. Reactivity: brisk. Consensual response: intact. Accommodation: intact.   CN III: no CN III palsy  CN VI: no CN VI palsy  Nystagmus: none   Diplopia: none  Upgaze: normal  Downgaze: normal  Conjugate gaze: present  Vestibulo-ocular reflex: present    CN V   Facial sensation intact.   Right facial sensation deficit: none  Left facial sensation deficit: none  Right corneal reflex: normal  Left corneal reflex: normal    CN VII   Facial expression full, symmetric.   Right facial weakness: none  Left facial weakness: none  Right taste: normal  Left taste: normal    CN VIII   CN VIII normal.   Hearing: intact    CN IX, X   CN IX normal.   CN X normal.   Palate: symmetric    CN XI   CN XI normal.   Right sternocleidomastoid strength: normal  Left sternocleidomastoid strength: normal  Right trapezius strength: normal  Left trapezius strength: normal    CN XII   CN XII normal.   Tongue: not atrophic  Fasciculations: absent  Tongue deviation: none    MOTOR EXAM   Muscle bulk: normal  Overall muscle tone: normal  Right arm tone: normal  Left arm tone: normal  Right arm pronator drift: absent  Left arm pronator drift: absent  Right leg tone: normal  Left leg tone: normal    Strength   Right neck flexion: 5/5  Left neck flexion: 5/5  Right neck extension: 5/5  Left neck extension: 5/5  Right deltoid: 5/5  Left deltoid: 5/5  Right biceps: 4/5  Left biceps:  5/  Right triceps: 4/5  Left triceps: 5/5  Right wrist flexion: 4/5  Left wrist flexion: 5/5  Right wrist extension: /5  Left wrist extension:   Right interossei:   Left interossei:   Right iliopsoas:   Left iliopsoas:   Right quadriceps:   Left quadriceps:   Right hamstrin/5  Left hamstrin/5  Right anterior tibial:   Left anterior tibial:   Right posterior tibial:   Left posterior tibial:   Right gastroc:   Left gastroc:        Patient very clearly not putting forth much physical effort during her exam so is limited     REFLEXES     Reflexes   Right brachioradialis: 2+  Left brachioradialis: 2+  Right biceps: 2+  Left biceps: 2+  Right triceps: 2+  Left triceps: 2+  Right patellar: 2+  Left patellar: 2+  Right achilles: 2+  Left achilles: 2+  Right plantar: normal  Left plantar: normal  Right Delaney: absent  Left Delaney: absent  Right ankle clonus: absent  Left ankle clonus: absent  Right pendular knee jerk: absent  Left pendular knee jerk: absent    SENSORY EXAM   Light touch normal.   Right arm light touch: normal  Left arm light touch: normal  Right leg light touch: normal  Left leg light touch: normal  Vibration normal.   Right arm vibration: normal  Left arm vibration: normal  Right leg vibration: normal  Left leg vibration: normal  Proprioception normal.   Right arm proprioception: normal  Left arm proprioception: normal  Right leg proprioception: normal  Left leg proprioception: normal  Pinprick normal.   Right arm pinprick: normal  Left arm pinprick: normal  Right leg pinprick: normal  Left leg pinprick: normal  Graphesthesia: normal  Romberg: negative  Stereognosis: normal    GAIT AND COORDINATION     Gait  Gait: wide-based     Coordination   Finger to nose coordination: normal  Heel to shin coordination: normal  Tandem walking coordination: normal    Tremor   Resting tremor: absent  Intention tremor: absent  Action tremor: absent       Physical  Exam  Vitals and nursing note reviewed.   Constitutional:       Appearance: Normal appearance.   HENT:      Head: Normocephalic.   Eyes:      Extraocular Movements: Extraocular movements intact and EOM normal.      Pupils: Pupils are equal, round, and reactive to light.   Cardiovascular:      Rate and Rhythm: Normal rate and regular rhythm.   Pulmonary:      Effort: Pulmonary effort is normal.      Breath sounds: Normal breath sounds.   Musculoskeletal:         General: No swelling or tenderness. Normal range of motion.      Cervical back: Normal range of motion and neck supple.      Right lower leg: No edema.      Left lower leg: No edema.   Skin:     General: Skin is warm and dry.      Coloration: Skin is not jaundiced.      Findings: No rash.   Neurological:      General: No focal deficit present.      Mental Status: She is alert and oriented to person, place, and time.      GCS: GCS eye subscore is 4. GCS verbal subscore is 5. GCS motor subscore is 6.      Cranial Nerves: No cranial nerve deficit.      Sensory: No sensory deficit.      Motor: Motor function is intact. No weakness.      Coordination: Coordination is intact. Coordination normal. Finger-Nose-Finger Test, Heel to Shin Test and Romberg Test normal.      Gait: Gait and tandem walk normal.      Deep Tendon Reflexes: Reflexes normal.      Reflex Scores:       Tricep reflexes are 2+ on the right side and 2+ on the left side.       Bicep reflexes are 2+ on the right side and 2+ on the left side.       Brachioradialis reflexes are 2+ on the right side and 2+ on the left side.       Patellar reflexes are 2+ on the right side and 2+ on the left side.       Achilles reflexes are 2+ on the right side and 2+ on the left side.  Psychiatric:         Attention and Perception: She is inattentive.         Mood and Affect: Mood is depressed. Affect is tearful.         Speech: Speech normal.         Behavior: Behavior is withdrawn. Behavior is cooperative.          Thought Content: Thought content normal.         Cognition and Memory: Cognition is impaired.          Assessment:     Problem List Items Addressed This Visit          Neuro    Cerebrovascular accident (CVA) - Primary       Psychiatric    Depression       Cardiac/Vascular    Hypertension (Chronic)    A-fib    Hyperlipidemia LDL goal <70    Stenosis of right carotid artery              Primary Diagnosis and ICD10  Cerebrovascular accident (CVA), unspecified mechanism [I63.9]    Plan:     Patient Instructions   Keep follow-up with Dr. Olivarez  Continue current plavix and aspirin  Continue current statin treatment  Continue current blood pressure medications  Keep follow-up with cardiology  Continue celexa 10mg daily    There are no discontinued medications.      Requested Prescriptions      No prescriptions requested or ordered in this encounter       No orders of the defined types were placed in this encounter.

## 2024-07-24 NOTE — PATIENT INSTRUCTIONS
Keep follow-up with Dr. Olivarez  Continue current plavix and aspirin  Continue current statin treatment  Continue current blood pressure medications  Keep follow-up with cardiology  Continue celexa 10mg daily

## 2024-07-24 NOTE — PATIENT INSTRUCTIONS
OUR OFFICE WILL SCHEDULE YOUR PROCEDURE- L4-5 RADHA AFTER RECEIVING APPROVAL TO HOLD PLAVIX FROM [employer].      HOLD PLAVIX 10 DAYS BEFORE PROCEDURE ONCE APPROVAL TO HOLD IS RECEIVED.     HOLD OZEMPIC 7 DAYS BEFORE PROCEDURE.    HOLD ASPIRIN FOR 7 DAYS BEFORE PROCEDURE.        Procedure Instructions:    Nothing to eat or drink for 8 hours or after midnight including gum, candy, mints, or tobacco products.  If you are scheduled for 1:30 or later nothing to eat or drink after 5 a.m. the morning of the procedure, including gum, candy, mints, or tobacco products.  Must have a  at least 18 yrs of age to stay present at all times  No Diabetic medications the morning of procedure, check blood sugar the morning of procedure, if it is greater than 200 call the office at [phone]  If you are started on antibiotics or have been prescribed antibiotics, have a fever, or have any other type of infection call to reschedule procedure.  If you take blood pressure medications you can take it at your regular scheduled time with a small sip of WATER!  Dentures are to be removed prior to procedure or we can provide you with a denture cup to remove them prior to being taken back for procedure.   False eyelashes are to be removed before the morning of procedure.    Contacts will have to be removed prior to procedure.  No jewelry is to be worn to the procedure.     HOLD ASPIRIN AND ASPIRIN PRODUCTS  (ASPIRIN, BC POWDER ETC. ) FOR 7 DAYS  PRIOR TO PROCEDURE  HOLD NSAIDS( ibuprofen, mobic, meloxicam, advil, diclofenac, naproxen, relafen, celebrex,  methotrexate, aleve etc....)  FOR 3 DAYS   PRIOR TO PROCEDURE        SIGNATURE:________________________________________________________________________________________

## 2024-08-12 PROBLEM — G45.9 TIA (TRANSIENT ISCHEMIC ATTACK): Status: RESOLVED | Noted: 2023-10-27 | Resolved: 2024-08-12

## 2024-09-25 NOTE — PROGRESS NOTES
Subjective:         Patient ID: Dilma Cr is a 64 y.o. female.    Chief Complaint: Leg Pain (Bilateral ) and Back Pain        Pain  This is a chronic problem. The current episode started more than 1 year ago. The problem occurs daily. The problem has been waxing and waning. Associated symptoms include arthralgias and neck pain. Pertinent negatives include no anorexia, chest pain, chills, coughing, diaphoresis, fever, sore throat, vertigo or vomiting.     Review of Systems   Constitutional:  Negative for activity change, appetite change, chills, diaphoresis, fever and unexpected weight change.   HENT:  Negative for drooling, ear pain, facial swelling, nosebleeds, sore throat, trouble swallowing, voice change and goiter.    Eyes:  Negative for photophobia, pain, discharge, redness and visual disturbance.   Respiratory:  Negative for apnea, cough, choking, chest tightness, shortness of breath, wheezing and stridor.    Cardiovascular:  Negative for chest pain, palpitations and leg swelling.   Gastrointestinal:  Negative for abdominal distention, anorexia, diarrhea, rectal pain, vomiting and fecal incontinence.   Endocrine: Negative for cold intolerance, heat intolerance, polydipsia, polyphagia and polyuria.   Genitourinary:  Negative for flank pain, frequency and hot flashes.   Musculoskeletal:  Positive for arthralgias, back pain and neck pain. Negative for neck stiffness.   Integumentary:  Negative for color change and pallor.   Allergic/Immunologic: Negative for immunocompromised state.   Neurological:  Negative for dizziness, vertigo, seizures, syncope, facial asymmetry, speech difficulty, light-headedness, memory loss and coordination difficulties.   Hematological:  Negative for adenopathy. Does not bruise/bleed easily.   Psychiatric/Behavioral:  Negative for agitation, behavioral problems, confusion, decreased concentration, dysphoric mood, hallucinations, self-injury and suicidal ideas. The patient is  not nervous/anxious and is not hyperactive.            Past Medical History:   Diagnosis Date    Acute superficial gastritis without hemorrhage 06/21/2022    Arthritis     Cancer     colon cancer    Chronic kidney disease, stage 3b     Colon cancer     Coronary artery disease     Depression     Diabetes mellitus, type 2     Diverticula, colon 06/22/2022    GERD (gastroesophageal reflux disease)     H/O right hemicolectomy 06/22/2022    HH (hiatus hernia) 06/21/2022    Hyperlipidemia     Myocardial infarction     2 stents in 05/14/2021 Dr Porter    Renal disorder      Past Surgical History:   Procedure Laterality Date    BREAST SURGERY      C5-C6 RADHA  8-, 7-, 6-1-2016    Dr Fowler    CARDIAC SURGERY  02/07/2023    CERVICAL SPINE SURGERY      COLON SURGERY      EPIDURAL STEROID INJECTION INTO LUMBAR SPINE N/A 03/29/2022    Procedure: L4-5 RADHA;  Surgeon: Gela Fowler MD;  Location: [facility];  Service: Pain Management;  Laterality: N/A;  PT AWARE ON OV TO BE TESTED\  PLAVIX TO BE HELD FOR 7 DAYS PRIOR TO PROCEDURE PER DR FOWLER AND DR PORTER  3-28  message left on vm re: covid    HYSTERECTOMY      LEFT HEART CATHETERIZATION Left 05/14/2021    Procedure: Left heart cath;  Surgeon: Mateus Guan MD;  Location: [facility] CATH LAB;  Service: Cardiology;  Laterality: Left;    LEFT HEART CATHETERIZATION Left 06/20/2022    Procedure: Left heart cath;  Surgeon: Oliverio Bautista MD;  Location: [facility] CATH LAB;  Service: Cardiology;  Laterality: Left;    LUMBAR SPINE SURGERY  12/28/2023    MEDIPORT REMOVAL  10/14/2021    Dr Kraft    OOPHORECTOMY      right rotator cuff repair      in [place]    TOTAL REDUCTION MAMMOPLASTY       Social History     Socioeconomic History    Marital status:      Spouse name: Jorge Alberto    Number of children: 2   Occupational History    Occupation:  at [employer] for past 27 years   Tobacco Use    Smoking status: Never     Passive exposure: Never     Smokeless tobacco: Never   Substance and Sexual Activity    Alcohol use: Never    Drug use: Never    Sexual activity: Yes   Social History Narrative    Worked at [employer] as  for past 30 years. H/O colon cancer. Watchman procedure back in Feb (heart).  Sees Dr Stover Oncologist. Cardiologist Dr Alina Garcia ([place]). Sees Endocrinologist Koko Rivera with [facility]. Fired from [employer] due to missing so many days. Insurance will end July 5,2024.      Social Drivers of Health     Financial Resource Strain: Patient Declined (1/31/2024)    Received from [facility], [facility]    Overall Financial Resource Strain (CARDIA)     Difficulty of Paying Living Expenses: Patient declined   Food Insecurity: Patient Declined (1/31/2024)    [facility] and [facility], [facility]    Hunger Vital Sign     Worried About Running Out of Food in the Last Year: Patient declined     Ran Out of Food in the Last Year: Patient declined   Transportation Needs: No Transportation Needs (2/4/2024)    Received from [facility], [facility] Its Subsidiaries and Affiliates    PRAPARE - Transportation     Lack of Transportation (Medical): No     Lack of Transportation (Non-Medical): No   Stress: Patient Declined (1/31/2024)    Received from [facility] Its Subsidiaries and Affiliates, [facility] and Affiliates    [employer] - Occupational Stress Questionnaire     Feeling of Stress :  "Patient declined   Housing Stability: Unknown (2/4/2024)    Received from [facility] and Its Subsidiaries and Affiliates, [facility] Its Subsidiaries and Affiliates    Housing Stability Vital Sign     Unable to Pay for Housing in the Last Year: No     Unstable Housing in the Last Year: No     Family History   Problem Relation Name Age of Onset    Hypertension Mother      Diabetes Mother      Hypertension Father      Diabetes Father      Breast cancer Sister      Hypertension Sister      Hypertension Brother       Review of patient's allergies indicates:  No Known Allergies     Objective:  Vitals:    10/02/24 0904 10/02/24 0905   BP: (!) 166/70    Pulse: (!) 54    Resp: 16    Weight: 92.1 kg (203 lb 0.7 oz)    Height: 5' 2" (1.575 m)    PainSc:   7   6               Physical Exam  Vitals and nursing note reviewed. Exam conducted with a chaperone present.   Constitutional:       General: She is awake. She is not in acute distress.     Appearance: Normal appearance. She is not ill-appearing, toxic-appearing or diaphoretic.   HENT:      Head: Normocephalic and atraumatic.      Nose: Nose normal.      Mouth/Throat:      Mouth: Mucous membranes are moist.      Pharynx: Oropharynx is clear.   Eyes:      Conjunctiva/sclera: Conjunctivae normal.      Pupils: Pupils are equal, round, and reactive to light.   Cardiovascular:      Rate and Rhythm: Normal rate.   Pulmonary:      Effort: Pulmonary effort is normal. No respiratory distress.   Abdominal:      Palpations: Abdomen is soft.      Tenderness: There is no guarding.   Musculoskeletal:         General: Normal range of motion.      Cervical back: Normal range of motion and neck supple. No rigidity.      Thoracic back: Tenderness present.      Lumbar back: Tenderness present.   Skin:     General: Skin is warm and dry.      Coloration: Skin is not jaundiced or pale.   Neurological:      General: " No focal deficit present.      Mental Status: She is alert and oriented to person, place, and time. Mental status is at baseline.      Cranial Nerves: No cranial nerve deficit (II-XII).   Psychiatric:         Mood and Affect: Mood normal.         Behavior: Behavior normal. Behavior is cooperative.         Thought Content: Thought content normal.           X-Ray Lumbar Spine 2 Or 3 Views  Narrative: EXAMINATION:  XR LUMBAR SPINE 2 OR 3 VIEWS    CLINICAL HISTORY:  Low back pain, symptoms persist with > 6wks conservative treatment;.  Dorsalgia, unspecified    TECHNIQUE:  XR LUMBAR SPINE 2 OR 3 VIEWS    COMPARISON:  10/27/2023    FINDINGS:  No acute fracture or dislocation.  Posterior spinal fusion hardware and intervertebral disc spacer at L2-L3, which is in expected position.  Moderate to severe degenerative disc disease L3-L4, L4-5 and L5-S1.  Multilevel endplate change and multilevel anterior osteophytes.  Multilevel facet change in neural foraminal narrowing, similar relative to 10/27/2023.  Impression: No acute fracture or dislocation. Posterior spinal fusion hardware and intervertebral disc spacer at L2-L3, which is in expected position. Moderate to severe degenerative disc disease L3-L4, L4-5 and L5-S1.  Multilevel endplate change and multilevel anterior osteophytes. Multilevel facet change in neural foraminal narrowing, similar relative to 10/27/2023.    Electronically signed by: Alexandre May  Date:    01/30/2024  Time:    14:09         Office Visit on 06/26/2024   Component Date Value Ref Range Status    Creatinine, Urine 06/26/2024 42  28 - 219 mg/dL Final    Microalbumin 06/26/2024 4.6 (H)  0.0 - 2.8 mg/dL Final    Microalbumin/Creatinine Ratio 06/26/2024 109.5 (H)  0.0 - 30.0 mg/g Final    Sodium 06/26/2024 139  136 - 145 mmol/L Final    Potassium 06/26/2024 5.5 (H)  3.5 - 5.1 mmol/L Final    Chloride 06/26/2024 105  98 - 107 mmol/L Final    CO2 06/26/2024 26  21 - 32 mmol/L Final    Anion Gap  06/26/2024 14  7 - 16 mmol/L Final    Glucose 06/26/2024 142 (H)  74 - 106 mg/dL Final    BUN 06/26/2024 31 (H)  7 - 18 mg/dL Final    Creatinine 06/26/2024 1.19 (H)  0.55 - 1.02 mg/dL Final    BUN/Creatinine Ratio 06/26/2024 26 (H)  6 - 20 Final    Calcium 06/26/2024 8.5  8.5 - 10.1 mg/dL Final    Total Protein 06/26/2024 7.4  6.4 - 8.2 g/dL Final    Albumin 06/26/2024 3.6  3.5 - 5.0 g/dL Final    Globulin 06/26/2024 3.8  2.0 - 4.0 g/dL Final    A/G Ratio 06/26/2024 0.9   Final    Bilirubin, Total 06/26/2024 0.2  >0.0 - 1.2 mg/dL Final    Alk Phos 06/26/2024 102  50 - 130 U/L Final    ALT 06/26/2024 37  13 - 56 U/L Final    AST 06/26/2024 27  15 - 37 U/L Final    eGFR 06/26/2024 52 (L)  >=60 mL/min/1.73m2 Final    Hemoglobin A1C 06/26/2024 5.9  4.5 - 6.6 % Final    Estimated Average Glucose 06/26/2024 123  mg/dL Final   Office Visit on 05/28/2024   Component Date Value Ref Range Status    POC Amphetamines 05/28/2024 Negative  Negative, Inconclusive Final    POC Barbiturates 05/28/2024 Negative  Negative, Inconclusive Final    POC Benzodiazepines 05/28/2024 Negative  Negative, Inconclusive Final    POC Cocaine 05/28/2024 Negative  Negative, Inconclusive Final    POC THC 05/28/2024 Negative  Negative, Inconclusive Final    POC Methadone 05/28/2024 Negative  Negative, Inconclusive Final    POC Methamphetamine 05/28/2024 Negative  Negative, Inconclusive Final    POC Opiates 05/28/2024 Negative  Negative, Inconclusive Final    POC Oxycodone 05/28/2024 Negative  Negative, Inconclusive Final    POC Phencyclidine 05/28/2024 Negative  Negative, Inconclusive Final    POC Methylenedioxymethamphetamine * 05/28/2024 Negative  Negative, Inconclusive Final    POC Tricyclic Antidepressants 05/28/2024 Negative  Negative, Inconclusive Final    POC Buprenorphine 05/28/2024 Negative   Final     Acceptable 05/28/2024 Yes   Final    POC Temperature (Urine) 05/28/2024 92   Final   Office Visit on 05/06/2024   Component  Date Value Ref Range Status    Creatinine, Urine 05/06/2024 71  28 - 219 mg/dL Final    Microalbumin 05/06/2024 8.2 (H)  0.0 - 2.8 mg/dL Final    Microalbumin/Creatinine Ratio 05/06/2024 115.5 (H)  0.0 - 30.0 mg/g Final    Sodium 05/06/2024 141  136 - 145 mmol/L Final    Potassium 05/06/2024 4.2  3.5 - 5.1 mmol/L Final    Chloride 05/06/2024 107  98 - 107 mmol/L Final    CO2 05/06/2024 25  21 - 32 mmol/L Final    Anion Gap 05/06/2024 13  7 - 16 mmol/L Final    Glucose 05/06/2024 95  74 - 106 mg/dL Final    BUN 05/06/2024 26 (H)  7 - 18 mg/dL Final    Creatinine 05/06/2024 1.55 (H)  0.55 - 1.02 mg/dL Final    BUN/Creatinine Ratio 05/06/2024 17  6 - 20 Final    Calcium 05/06/2024 9.0  8.5 - 10.1 mg/dL Final    Total Protein 05/06/2024 7.4  6.4 - 8.2 g/dL Final    Albumin 05/06/2024 3.6  3.5 - 5.0 g/dL Final    Globulin 05/06/2024 3.8  2.0 - 4.0 g/dL Final    A/G Ratio 05/06/2024 0.9   Final    Bilirubin, Total 05/06/2024 0.1  >0.0 - 1.2 mg/dL Final    Alk Phos 05/06/2024 100  50 - 130 U/L Final    ALT 05/06/2024 25  13 - 56 U/L Final    AST 05/06/2024 19  15 - 37 U/L Final    eGFR 05/06/2024 38 (L)  >=60 mL/min/1.73m2 Final    Hemoglobin A1C 05/06/2024 6.2  4.5 - 6.6 % Final    Estimated Average Glucose 05/06/2024 131  mg/dL Final         Orders Placed This Encounter   Procedures    POCT Urine Drug Screen Presump     Interpretive Information:     Negative:  No drug detected at the cut off level.   Positive:  This result represents presumptive positive for the   tested drug, other substances may yield a positive response other   than the analyte of interest. This result should be utilized for   diagnostic purpose only. Confirmation testing will be performed upon physician request only.            Requested Prescriptions      No prescriptions requested or ordered in this encounter       Assessment:     1. Lumbosacral radiculopathy    2. Encounter for long-term (current) use of medications    3. Osteoarthrosis multiple  sites, not specified as generalized             A's of Opioid Risk Assessment  Activity:Patient can perform ADL.   Analgesia:Patients pain is partially controlled by current medication. Patient has tried OTC medications such as Tylenol and Ibuprofen with out relief.   Adverse Effects: Patient denies constipation or sedation.  Aberrant Behavior:  reviewed with no aberrant drug seeking/taking behavior.  Overdose reversal drug naloxone discussed    Drug screen reviewed        MRI lumbar spine [facility] May 22, 2024  multiple levels stenosis, multiple level degenerative changes    L4/5 bilateral foraminal narrowing    X-ray lumbar spine [facility] January 30, 2024  No acute fracture or dislocation. Posterior spinal fusion hardware and intervertebral disc spacer at L2-L3, which is in expected position. Moderate to severe degenerative disc disease L3-L4, L4-5 and L5-S1. Multilevel endplate change and multilevel anterior osteophytes. Multilevel facet change in neural foraminal narrowing       Plan:    lumbar L4/5 RADHA # 1 not scheduled July 24, 2024 waiting on permission to hold Plavix      Refilled cannabis x3 August 12 2024,  please see     Dr. Fowler Refill hydrocodone August 20, 2024 March 28, 2024 discontinue cannabis destroyed cannabis    Cannabis started January 30, 2024 last refill February 8, 2024  She states she has not taken any of the cannabis    July 8, 2024  Has not refill tramadol since May 28, 2024  Requesting hydrocodone  Discontinue tramadol  Hydrocodone 5 1 p.o. Q 12 hours 60 tablets      No narcotics until patient discontinues THC she verbalized understanding October 2, 2024        Narcan December 2022    Anticoagulated Plavix    History Colon  Cancer    Follows oncology    Follows Neurology [facility]    Following spine surgery [facility] spine lumbar surgery  L2/3 posterior lumbar fusion    Uses walking cane assistance ambulation    History of stroke affected her right side  weakness      Complaining of back pain buttock and leg pain numbness and tingling radicular in nature     She is requesting Toradol injection     Limit NSAIDs anticoagulant/stroke    Kenalog 40 mg IM, tolerated well    Continue home exercise program as directed    Continue current medication    Will schedule procedure after obtaining clearance to hold anticoagulant    Follow-up 2 months    Dr. Fowler, July 2025    Bring original prescription medication bottles/container/box with labels to each visit

## 2024-10-02 ENCOUNTER — OFFICE VISIT (OUTPATIENT)
Dept: PAIN MEDICINE | Facility: CLINIC | Age: 64
End: 2024-10-02
Payer: COMMERCIAL

## 2024-10-02 VITALS
WEIGHT: 203.06 LBS | HEIGHT: 62 IN | HEART RATE: 54 BPM | SYSTOLIC BLOOD PRESSURE: 166 MMHG | BODY MASS INDEX: 37.37 KG/M2 | DIASTOLIC BLOOD PRESSURE: 70 MMHG | RESPIRATION RATE: 16 BRPM

## 2024-10-02 DIAGNOSIS — Z79.899 ENCOUNTER FOR LONG-TERM (CURRENT) USE OF MEDICATIONS: ICD-10-CM

## 2024-10-02 DIAGNOSIS — M54.17 LUMBOSACRAL RADICULOPATHY: Primary | Chronic | ICD-10-CM

## 2024-10-02 DIAGNOSIS — M89.49 OSTEOARTHROSIS MULTIPLE SITES, NOT SPECIFIED AS GENERALIZED: Chronic | ICD-10-CM

## 2024-10-02 LAB

## 2024-10-02 PROCEDURE — 80305 DRUG TEST PRSMV DIR OPT OBS: CPT | Mod: PBBFAC | Performed by: PHYSICIAN ASSISTANT

## 2024-10-02 PROCEDURE — 96372 THER/PROPH/DIAG INJ SC/IM: CPT | Mod: PBBFAC | Performed by: PHYSICIAN ASSISTANT

## 2024-10-02 PROCEDURE — 99999 PR PBB SHADOW E&M-EST. PATIENT-LVL V: CPT | Mod: PBBFAC,,, | Performed by: PHYSICIAN ASSISTANT

## 2024-10-02 PROCEDURE — 99999PBSHW PR PBB SHADOW TECHNICAL ONLY FILED TO HB: Mod: PBBFAC,,,

## 2024-10-02 PROCEDURE — 1159F MED LIST DOCD IN RCRD: CPT | Mod: CPTII,,, | Performed by: PHYSICIAN ASSISTANT

## 2024-10-02 PROCEDURE — 3044F HG A1C LEVEL LT 7.0%: CPT | Mod: CPTII,,, | Performed by: PHYSICIAN ASSISTANT

## 2024-10-02 PROCEDURE — 3060F POS MICROALBUMINURIA REV: CPT | Mod: CPTII,,, | Performed by: PHYSICIAN ASSISTANT

## 2024-10-02 PROCEDURE — 99214 OFFICE O/P EST MOD 30 MIN: CPT | Mod: S$PBB,25,, | Performed by: PHYSICIAN ASSISTANT

## 2024-10-02 PROCEDURE — 99215 OFFICE O/P EST HI 40 MIN: CPT | Mod: PBBFAC | Performed by: PHYSICIAN ASSISTANT

## 2024-10-02 PROCEDURE — 3008F BODY MASS INDEX DOCD: CPT | Mod: CPTII,,, | Performed by: PHYSICIAN ASSISTANT

## 2024-10-02 PROCEDURE — 3077F SYST BP >= 140 MM HG: CPT | Mod: CPTII,,, | Performed by: PHYSICIAN ASSISTANT

## 2024-10-02 PROCEDURE — 99999PBSHW POCT URINE DRUG SCREEN PRESUMP: Mod: PBBFAC,,,

## 2024-10-02 PROCEDURE — 3066F NEPHROPATHY DOC TX: CPT | Mod: CPTII,,, | Performed by: PHYSICIAN ASSISTANT

## 2024-10-02 PROCEDURE — 3078F DIAST BP <80 MM HG: CPT | Mod: CPTII,,, | Performed by: PHYSICIAN ASSISTANT

## 2024-10-02 RX ORDER — KETOROLAC TROMETHAMINE 30 MG/ML
30 INJECTION, SOLUTION INTRAMUSCULAR; INTRAVENOUS
Status: DISCONTINUED | OUTPATIENT
Start: 2024-10-02 | End: 2024-10-02

## 2024-10-02 RX ORDER — TRIAMCINOLONE ACETONIDE 40 MG/ML
40 INJECTION, SUSPENSION INTRA-ARTICULAR; INTRAMUSCULAR
Status: COMPLETED | OUTPATIENT
Start: 2024-10-02 | End: 2024-10-02

## 2024-10-02 RX ADMIN — TRIAMCINOLONE ACETONIDE 40 MG: 40 INJECTION, SUSPENSION INTRA-ARTICULAR; INTRAMUSCULAR at 09:10

## 2024-10-12 ENCOUNTER — HOSPITAL ENCOUNTER (EMERGENCY)
Facility: HOSPITAL | Age: 64
Discharge: HOME | End: 2024-10-12
Payer: COMMERCIAL

## 2024-10-12 VITALS
OXYGEN SATURATION: 100 % | DIASTOLIC BLOOD PRESSURE: 64 MMHG | HEART RATE: 60 BPM | WEIGHT: 196 LBS | HEIGHT: 61 IN | RESPIRATION RATE: 18 BRPM | SYSTOLIC BLOOD PRESSURE: 154 MMHG | TEMPERATURE: 98 F | BODY MASS INDEX: 37 KG/M2

## 2024-10-12 DIAGNOSIS — R51.9 ACUTE NONINTRACTABLE HEADACHE, UNSPECIFIED HEADACHE TYPE: ICD-10-CM

## 2024-10-12 DIAGNOSIS — I10 HYPERTENSION, UNSPECIFIED TYPE: Primary | ICD-10-CM

## 2024-10-12 LAB
ALBUMIN SERPL BCP-MCNC: 3.7 G/DL (ref 3.5–5)
ALBUMIN/GLOB SERPL: 1 {RATIO}
ALP SERPL-CCNC: 108 U/L (ref 50–130)
ALT SERPL W P-5'-P-CCNC: 20 U/L (ref 13–56)
ANION GAP SERPL CALCULATED.3IONS-SCNC: 10 MMOL/L (ref 7–16)
ANISOCYTOSIS BLD QL SMEAR: ABNORMAL
AST SERPL W P-5'-P-CCNC: 19 U/L (ref 15–37)
BASOPHILS # BLD AUTO: 0.02 K/UL (ref 0–0.2)
BASOPHILS NFR BLD AUTO: 0.3 % (ref 0–1)
BILIRUB SERPL-MCNC: 0.4 MG/DL (ref ?–1.2)
BUN SERPL-MCNC: 26 MG/DL (ref 7–18)
BUN/CREAT SERPL: 27 (ref 6–20)
CALCIUM SERPL-MCNC: 9.1 MG/DL (ref 8.5–10.1)
CHLORIDE SERPL-SCNC: 107 MMOL/L (ref 98–107)
CO2 SERPL-SCNC: 25 MMOL/L (ref 21–32)
CREAT SERPL-MCNC: 0.97 MG/DL (ref 0.55–1.02)
DIFFERENTIAL METHOD BLD: ABNORMAL
EGFR (NO RACE VARIABLE) (RUSH/TITUS): 66 ML/MIN/1.73M2
EOSINOPHIL # BLD AUTO: 0.09 K/UL (ref 0–0.5)
EOSINOPHIL NFR BLD AUTO: 1.3 % (ref 1–4)
ERYTHROCYTE [DISTWIDTH] IN BLOOD BY AUTOMATED COUNT: 21.4 % (ref 11.5–14.5)
GLOBULIN SER-MCNC: 3.8 G/DL (ref 2–4)
GLUCOSE SERPL-MCNC: 104 MG/DL (ref 74–106)
GLUCOSE SERPL-MCNC: 108 MG/DL (ref 70–105)
HCT VFR BLD AUTO: 40.1 % (ref 38–47)
HGB BLD-MCNC: 12.1 G/DL (ref 12–16)
IMM GRANULOCYTES # BLD AUTO: 0.02 K/UL (ref 0–0.04)
IMM GRANULOCYTES NFR BLD: 0.3 % (ref 0–0.4)
LYMPHOCYTES # BLD AUTO: 1.73 K/UL (ref 1–4.8)
LYMPHOCYTES NFR BLD AUTO: 24.3 % (ref 27–41)
MCH RBC QN AUTO: 24.2 PG (ref 27–31)
MCHC RBC AUTO-ENTMCNC: 30.2 G/DL (ref 32–36)
MCV RBC AUTO: 80.2 FL (ref 80–96)
MONOCYTES # BLD AUTO: 0.54 K/UL (ref 0–0.8)
MONOCYTES NFR BLD AUTO: 7.6 % (ref 2–6)
MPC BLD CALC-MCNC: 11.2 FL (ref 9.4–12.4)
NEUTROPHILS # BLD AUTO: 4.71 K/UL (ref 1.8–7.7)
NEUTROPHILS NFR BLD AUTO: 66.2 % (ref 53–65)
NRBC # BLD AUTO: 0 X10E3/UL
NRBC, AUTO (.00): 0 %
OVALOCYTES BLD QL SMEAR: ABNORMAL
PLATELET # BLD AUTO: 307 K/UL (ref 150–400)
PLATELET MORPHOLOGY: ABNORMAL
POTASSIUM SERPL-SCNC: 4 MMOL/L (ref 3.5–5.1)
PROT SERPL-MCNC: 7.5 G/DL (ref 6.4–8.2)
RBC # BLD AUTO: 5 M/UL (ref 4.2–5.4)
SODIUM SERPL-SCNC: 138 MMOL/L (ref 136–145)
WBC # BLD AUTO: 7.11 K/UL (ref 4.5–11)

## 2024-10-12 PROCEDURE — 63600175 PHARM REV CODE 636 W HCPCS: Performed by: NURSE PRACTITIONER

## 2024-10-12 PROCEDURE — 82962 GLUCOSE BLOOD TEST: CPT

## 2024-10-12 PROCEDURE — 85025 COMPLETE CBC W/AUTO DIFF WBC: CPT | Performed by: NURSE PRACTITIONER

## 2024-10-12 PROCEDURE — 96365 THER/PROPH/DIAG IV INF INIT: CPT

## 2024-10-12 PROCEDURE — 96375 TX/PRO/DX INJ NEW DRUG ADDON: CPT

## 2024-10-12 PROCEDURE — 36415 COLL VENOUS BLD VENIPUNCTURE: CPT | Performed by: NURSE PRACTITIONER

## 2024-10-12 PROCEDURE — 80053 COMPREHEN METABOLIC PANEL: CPT | Performed by: NURSE PRACTITIONER

## 2024-10-12 PROCEDURE — 99284 EMERGENCY DEPT VISIT MOD MDM: CPT | Mod: 25

## 2024-10-12 PROCEDURE — 25000003 PHARM REV CODE 250: Performed by: NURSE PRACTITIONER

## 2024-10-12 RX ORDER — KETOROLAC TROMETHAMINE 30 MG/ML
30 INJECTION, SOLUTION INTRAMUSCULAR; INTRAVENOUS
Status: COMPLETED | OUTPATIENT
Start: 2024-10-12 | End: 2024-10-12

## 2024-10-12 RX ORDER — DIPHENHYDRAMINE HYDROCHLORIDE 50 MG/ML
25 INJECTION INTRAMUSCULAR; INTRAVENOUS
Status: COMPLETED | OUTPATIENT
Start: 2024-10-12 | End: 2024-10-12

## 2024-10-12 RX ORDER — HYDRALAZINE HYDROCHLORIDE 20 MG/ML
10 INJECTION INTRAMUSCULAR; INTRAVENOUS
Status: DISCONTINUED | OUTPATIENT
Start: 2024-10-12 | End: 2024-10-12

## 2024-10-12 RX ORDER — ONDANSETRON HYDROCHLORIDE 2 MG/ML
4 INJECTION, SOLUTION INTRAVENOUS
Status: COMPLETED | OUTPATIENT
Start: 2024-10-12 | End: 2024-10-12

## 2024-10-12 RX ORDER — MORPHINE SULFATE 4 MG/ML
4 INJECTION, SOLUTION INTRAMUSCULAR; INTRAVENOUS
Status: COMPLETED | OUTPATIENT
Start: 2024-10-12 | End: 2024-10-12

## 2024-10-12 RX ADMIN — ONDANSETRON 4 MG: 2 INJECTION INTRAMUSCULAR; INTRAVENOUS at 04:10

## 2024-10-12 RX ADMIN — KETOROLAC TROMETHAMINE 30 MG: 30 INJECTION, SOLUTION INTRAMUSCULAR at 06:10

## 2024-10-12 RX ADMIN — DIPHENHYDRAMINE HYDROCHLORIDE 25 MG: 50 INJECTION INTRAMUSCULAR; INTRAVENOUS at 06:10

## 2024-10-12 RX ADMIN — PROMETHAZINE HYDROCHLORIDE 25 MG: 25 INJECTION INTRAMUSCULAR; INTRAVENOUS at 06:10

## 2024-10-12 RX ADMIN — MORPHINE SULFATE 4 MG: 4 INJECTION INTRAVENOUS at 04:10

## 2024-10-12 NOTE — ED TRIAGE NOTES
Presents to ED for complaints of headache, vomiting that started 45 minutes ago.  Patients blood pressure is currently elevated and has taken her medications this morning.  Patient also has a history of stroke in the past.

## 2024-10-12 NOTE — ED PROVIDER NOTES
Encounter Date: 10/12/2024       History     Chief Complaint   Patient presents with    Headache    Vomiting    Hypertension     PMH of CVA, Afib (s/p watchman 2 years ago), HTN, DM, colon cancer. Sudden onset right sided headache that began around 2pm today following an episode of vomiting. Pt states she was in her normal state of health today when she became nauseous suddenly and vomited, then began having a severe headache that radiate from her right eye up her forehead. Endorses blurred vision, no double vision.     The history is provided by the patient.     Review of patient's allergies indicates:  No Known Allergies  Past Medical History:   Diagnosis Date    Acute superficial gastritis without hemorrhage 06/21/2022    Arthritis     Cancer     colon cancer    Chronic kidney disease, stage 3b     Colon cancer     Coronary artery disease     Depression     Diabetes mellitus, type 2     Diverticula, colon 06/22/2022    GERD (gastroesophageal reflux disease)     H/O right hemicolectomy 06/22/2022    HH (hiatus hernia) 06/21/2022    Hyperlipidemia     Myocardial infarction     2 stents in 05/14/2021 Dr Porter    Renal disorder      Past Surgical History:   Procedure Laterality Date    BREAST SURGERY      C5-C6 RADHA  8-, 7-, 6-1-2016    Dr Fowler    CARDIAC SURGERY  02/07/2023    CERVICAL SPINE SURGERY      COLON SURGERY      EPIDURAL STEROID INJECTION INTO LUMBAR SPINE N/A 03/29/2022    Procedure: L4-5 RADHA;  Surgeon: Gela Fowler MD;  Location: [facility] PAIN MGMT;  Service: Pain Management;  Laterality: N/A;  PT AWARE ON OV TO BE TESTED\  PLAVIX TO BE HELD FOR 7 DAYS PRIOR TO PROCEDURE PER DR FOWLER AND DR PORTER  3-28  message left on  re: covid    HYSTERECTOMY      LEFT HEART CATHETERIZATION Left 05/14/2021    Procedure: Left heart cath;  Surgeon: Mateus Guan MD;  Location: [facility] CATH LAB;  Service: Cardiology;  Laterality: Left;    LEFT HEART CATHETERIZATION Left 06/20/2022     Procedure: Left heart cath;  Surgeon: Oliverio Bautista MD;  Location: [facility] CATH LAB;  Service: Cardiology;  Laterality: Left;    LUMBAR SPINE SURGERY  12/28/2023    MEDIPORT REMOVAL  10/14/2021    Dr Kraft    OOPHORECTOMY      right rotator cuff repair      in [place]    TOTAL REDUCTION MAMMOPLASTY       Family History   Problem Relation Name Age of Onset    Hypertension Mother      Diabetes Mother      Hypertension Father      Diabetes Father      Breast cancer Sister      Hypertension Sister      Hypertension Brother       Social History     Tobacco Use    Smoking status: Never     Passive exposure: Never    Smokeless tobacco: Never   Substance Use Topics    Alcohol use: Never    Drug use: Never     Review of Systems   Constitutional:  Negative for chills and fever.   HENT: Negative.     Eyes:  Positive for visual disturbance.   Respiratory: Negative.     Cardiovascular: Negative.    Gastrointestinal: Negative.    Genitourinary: Negative.    Neurological:  Positive for headaches. Negative for dizziness, syncope and facial asymmetry.       Physical Exam     Initial Vitals [10/12/24 1450]   BP Pulse Resp Temp SpO2   (!) 193/78 (!) 51 20 97.7 °F (36.5 °C) 99 %      MAP       --         Physical Exam    Constitutional: Vital signs are normal. She is not diaphoretic. No distress.   Eyes: Conjunctivae are normal. Pupils are equal, round, and reactive to light.   Neck: Neck supple.   Normal range of motion.  Cardiovascular:  Normal rate and regular rhythm.           Pulmonary/Chest: Effort normal and breath sounds normal.   Abdominal: Abdomen is soft. Bowel sounds are normal.   Musculoskeletal:      Cervical back: Normal range of motion and neck supple. No rigidity.     Neurological: She is alert and oriented to person, place, and time. GCS eye subscore is 4. GCS verbal subscore is 5. GCS motor subscore is 6.   Skin: Skin is warm and dry. Capillary refill takes less than 2 seconds.         Medical Screening Exam    See Full Note    ED Course   Procedures  Labs Reviewed   COMPREHENSIVE METABOLIC PANEL - Abnormal       Result Value    Sodium 138      Potassium 4.0      Chloride 107      CO2 25      Anion Gap 10      Glucose 104      BUN 26 (*)     Creatinine 0.97      BUN/Creatinine Ratio 27 (*)     Calcium 9.1      Total Protein 7.5      Albumin 3.7      Globulin 3.8      A/G Ratio 1.0      Bilirubin, Total 0.4      Alk Phos 108      ALT 20      AST 19      eGFR 66     CBC WITH DIFFERENTIAL - Abnormal    WBC 7.11      RBC 5.00      Hemoglobin 12.1      Hematocrit 40.1      MCV 80.2      MCH 24.2 (*)     MCHC 30.2 (*)     RDW 21.4 (*)     Platelet Count 307      MPV 11.2      Neutrophils % 66.2 (*)     Lymphocytes % 24.3 (*)     Monocytes % 7.6 (*)     Eosinophils % 1.3      Basophils % 0.3      Immature Granulocytes % 0.3      nRBC, Auto 0.0      Neutrophils, Abs 4.71      Lymphocytes, Absolute 1.73      Monocytes, Absolute 0.54      Eosinophils, Absolute 0.09      Basophils, Absolute 0.02      Immature Granulocytes, Absolute 0.02      nRBC, Absolute 0.00      Diff Type Auto     CBC MORPHOLOGY - Abnormal    Platelet Morphology Few Large Platelets (*)     Anisocytosis 1+      Ovalocytes Few     POCT GLUCOSE MONITORING CONTINUOUS - Abnormal    POC Glucose 108 (*)    CBC W/ AUTO DIFFERENTIAL    Narrative:     The following orders were created for panel order CBC auto differential.  Procedure                               Abnormality         Status                     ---------                               -----------         ------                     CBC with Differential[[id number]]       Abnormal            Final result                 Please view results for these tests on the individual orders.          Imaging Results              CT Head Without Contrast (Final result)  Result time 10/12/24 16:22:22      Final result by Messi Tello MD (10/12/24 16:22:22)                   Impression:      No acute intracranial process.   Additional evaluation, as clinically warranted.      Electronically signed by: Messi Tello MD  Date:    10/12/2024  Time:    16:22               Narrative:    EXAMINATION:  CT HEAD WITHOUT CONTRAST    CLINICAL HISTORY:  Headache, new or worsening (Age >= 50y);    TECHNIQUE:  Low dose axial images were obtained through the head.  Coronal and sagittal reformations were also performed. Contrast was not administered.    COMPARISON:  MRI dated 10/27/2023.    CT scan of the head dated 10/27/2023.    FINDINGS:  The subcutaneous tissues are unremarkable.  The bony calvarium is intact.  The paranasal sinuses are unremarkable.  The mastoid air cells are clear.  There are postoperative changes in the globes.    The craniocervical junction is intact.  There is empty sella configuration.  There is no evidence of intracranial hemorrhage.  There are no extra-axial fluid collections.    The ventricles and sulci are within normal limits.  The cisterns are unremarkable.  The gray-white differentiation is maintained.  There is no dense vessel sign.  There is no evidence of mass effect.                                       Medications   ondansetron injection 4 mg (4 mg Intravenous Given 10/12/24 1645)   morphine injection 4 mg (4 mg Intravenous Given 10/12/24 1644)   promethazine (PHENERGAN) 25 mg in 0.9% NaCl 50 mL IVPB (0 mg Intravenous Stopped 10/12/24 1916)   ketorolac injection 30 mg (30 mg Intravenous Given 10/12/24 1850)   diphenhydrAMINE injection 25 mg (25 mg Intravenous Given 10/12/24 1850)     Medical Decision Making  1800- received patient from Sri Avilez NP    1910- patient's headache has improved, she is resting comfortably. Her BP has decreased to 150s/80s. She will f/u with her PCP Monday for BP recheck and management.    Amount and/or Complexity of Data Reviewed  Labs: ordered.  Radiology: ordered.    Risk  Prescription drug management.               ED Course as of 10/12/24 1932   Sat Oct 12, 2024   1635 CT head  with no acute findings [AM]   1638 Patient about to get pain medication, RN had difficulty getting IV line [AM]      ED Course User Index  [AM] Sri Avilez FNP                           Clinical Impression:   Final diagnoses:  [I10] Hypertension, unspecified type (Primary)  [R51.9] Acute nonintractable headache, unspecified headache type        ED Disposition Condition    Discharge Stable          ED Prescriptions    None       Follow-up Information       Follow up With Specialties Details Why Contact Info    Olga Potts MD Family Medicine Call in 2 days  [address] [facility] [place] [address] [phone]      [facility] - Emergency Department Emergency Medicine  As needed, If symptoms worsen [address] [place] [id number] [phone]             Rae Tellez FNP  10/12/24 1932

## 2024-10-13 NOTE — DISCHARGE INSTRUCTIONS
Follow-up with your primary care provider Monday morning for blood pressure medication management.  Continue your current blood pressure medication as prescribed over the weekend.  Tylenol and Motrin as needed for headache.  Return to the ER for new or worsening symptoms.

## 2024-10-14 LAB
OHS QRS DURATION: 86 MS
OHS QTC CALCULATION: 434 MS

## 2024-10-28 PROBLEM — I63.9 CEREBROVASCULAR ACCIDENT (CVA): Status: RESOLVED | Noted: 2024-02-19 | Resolved: 2024-10-28

## 2024-11-06 NOTE — PROGRESS NOTES
Addended by: LOBITO WAGNER on: 11/6/2024 03:13 PM     Modules accepted: Orders     Ms Perera and  came by clinic. She had a small bag of medications and asked which one Dr. Lora wanted her to stop taking. Reviewed her medications and asked her to not take the Plavix. Marked the bottle with an X for her so she would remember. She said she had been on Eliquis before and was taken off of it because they thought it was making her stomach bleed. I asked her if she talked to Dr. Lora about that and she said no.   She brought some paperwork with her for provider to fill out. She said it was for last year's and this year hospital stay. I told her it would take some time for it to be completed and asked her to check back with us next week.      Sent Dr. Lora a message concerning medications mainly Eliquis.

## 2024-11-18 NOTE — PROGRESS NOTES
Subjective:         Patient ID: Dilma Cr is a 64 y.o. female.    Chief Complaint: Low-back Pain and Leg Pain (bilateral)        Pain  This is a chronic problem. The current episode started more than 1 year ago. The problem occurs daily. The problem has been unchanged. Associated symptoms include arthralgias and neck pain. Pertinent negatives include no anorexia, chest pain, chills, coughing, diaphoresis, fever, sore throat, vertigo or vomiting.     Review of Systems   Constitutional:  Negative for activity change, appetite change, chills, diaphoresis, fever and unexpected weight change.   HENT:  Negative for drooling, ear pain, facial swelling, nosebleeds, sore throat, trouble swallowing, voice change and goiter.    Eyes:  Negative for photophobia, pain, discharge, redness and visual disturbance.   Respiratory:  Negative for apnea, cough, choking, chest tightness, shortness of breath, wheezing and stridor.    Cardiovascular:  Negative for chest pain, palpitations and leg swelling.   Gastrointestinal:  Negative for abdominal distention, anorexia, diarrhea, rectal pain, vomiting and fecal incontinence.   Endocrine: Negative for cold intolerance, heat intolerance, polydipsia, polyphagia and polyuria.   Genitourinary:  Negative for flank pain, frequency and hot flashes.   Musculoskeletal:  Positive for arthralgias, back pain and neck pain. Negative for neck stiffness.   Integumentary:  Negative for color change and pallor.   Allergic/Immunologic: Negative for immunocompromised state.   Neurological:  Negative for dizziness, vertigo, seizures, syncope, facial asymmetry, speech difficulty, light-headedness, memory loss and coordination difficulties.   Hematological:  Negative for adenopathy. Does not bruise/bleed easily.   Psychiatric/Behavioral:  Negative for agitation, behavioral problems, confusion, decreased concentration, dysphoric mood, hallucinations, self-injury and suicidal ideas. The patient is not  nervous/anxious and is not hyperactive.            Past Medical History:   Diagnosis Date    Acute superficial gastritis without hemorrhage 06/21/2022    Arthritis     Cancer     colon cancer    Chronic kidney disease, stage 3b     Colon cancer     Coronary artery disease     Depression     Diabetes mellitus, type 2     Diverticula, colon 06/22/2022    GERD (gastroesophageal reflux disease)     H/O right hemicolectomy 06/22/2022    HH (hiatus hernia) 06/21/2022    Hyperlipidemia     Myocardial infarction     2 stents in 05/14/2021 Dr Porter    Renal disorder      Past Surgical History:   Procedure Laterality Date    BREAST SURGERY      C5-C6 RADHA  8-, 7-, 6-1-2016    Dr Fowler    CARDIAC SURGERY  02/07/2023    CERVICAL SPINE SURGERY      COLON SURGERY      EPIDURAL STEROID INJECTION INTO LUMBAR SPINE N/A 03/29/2022    Procedure: L4-5 RADHA;  Surgeon: Gela Fowler MD;  Location: [facility];  Service: Pain Management;  Laterality: N/A;  PT AWARE ON OV TO BE TESTED\  PLAVIX TO BE HELD FOR 7 DAYS PRIOR TO PROCEDURE PER DR FOWLER AND DR PORTER  3-28  message left on vm re: covid    HYSTERECTOMY      LEFT HEART CATHETERIZATION Left 05/14/2021    Procedure: Left heart cath;  Surgeon: Mateus Guan MD;  Location: [facility] CATH LAB;  Service: Cardiology;  Laterality: Left;    LEFT HEART CATHETERIZATION Left 06/20/2022    Procedure: Left heart cath;  Surgeon: Oliverio Bautista MD;  Location: [facility] CATH LAB;  Service: Cardiology;  Laterality: Left;    LUMBAR SPINE SURGERY  12/28/2023    MEDIPORT REMOVAL  10/14/2021    Dr Kraft    OOPHORECTOMY      right rotator cuff repair      in [place]    TOTAL REDUCTION MAMMOPLASTY       Social History     Socioeconomic History    Marital status:      Spouse name: Jorge Alberto    Number of children: 2   Occupational History    Occupation:  at [employer] for past 27 years   Tobacco Use    Smoking status: Never     Passive exposure: Never     Smokeless tobacco: Never   Substance and Sexual Activity    Alcohol use: Never    Drug use: Never    Sexual activity: Yes   Social History Narrative    Worked at [employer] as  for past 30 years. H/O colon cancer. Watchman procedure back in Feb (heart).  Sees Dr Stover Oncologist. Cardiologist Dr Alina Garcia ([place]). Sees Endocrinologist Koko Rivera with [facility]. Fired from [employer] due to missing so many days. Insurance will end July 5,2024.      Social Drivers of Health     Financial Resource Strain: Patient Declined (1/31/2024)    Received from [facility], [facility]    Overall Financial Resource Strain (CARDIA)     Difficulty of Paying Living Expenses: Patient declined   Food Insecurity: Patient Declined (1/31/2024)    [facility] from [facility] and Affiliates, [facility] Subsidiaries and Affiliates    Hunger Vital Sign     Worried About Running Out of Food in the Last Year: Patient declined     Ran Out of Food in the Last Year: Patient declined   Transportation Needs: No Transportation Needs (2/4/2024)    Received from [facility] Subsidiaries and Affiliates, [facility] and Its Subsidiaries and Affiliates    PRAPARE - Transportation     Lack of Transportation (Medical): No     Lack of Transportation (Non-Medical): No   Stress: Patient Declined (1/31/2024)    Received from [facility] Its Subsidiaries and Affiliates, [facility] Its Subsidiaries and Affiliates    [employer] - Occupational Stress Questionnaire     Feeling of Stress :  "Patient declined   Housing Stability: Unknown (2/4/2024)    Received from [facility] and Its Subsidiaries and Affiliates, [facility] and Its Subsidiaries and Affiliates    Housing Stability Vital Sign     Unable to Pay for Housing in the Last Year: No     Unstable Housing in the Last Year: No     Family History   Problem Relation Name Age of Onset    Hypertension Mother      Diabetes Mother      Hypertension Father      Diabetes Father      Breast cancer Sister      Hypertension Sister      Hypertension Brother       Review of patient's allergies indicates:  No Known Allergies     Objective:  Vitals:    12/03/24 1047   BP: (!) 142/50   Pulse: (!) 54   Resp: 18   Weight: 95.7 kg (211 lb)   Height: 5' 1" (1.549 m)   PainSc:   8   PainLoc: Back                 Physical Exam  Vitals and nursing note reviewed. Exam conducted with a chaperone present.   Constitutional:       General: She is awake. She is not in acute distress.     Appearance: Normal appearance. She is not ill-appearing, toxic-appearing or diaphoretic.   HENT:      Head: Normocephalic and atraumatic.      Nose: Nose normal.      Mouth/Throat:      Mouth: Mucous membranes are moist.      Pharynx: Oropharynx is clear.   Eyes:      Conjunctiva/sclera: Conjunctivae normal.      Pupils: Pupils are equal, round, and reactive to light.   Cardiovascular:      Rate and Rhythm: Normal rate.   Pulmonary:      Effort: Pulmonary effort is normal. No respiratory distress.   Abdominal:      Palpations: Abdomen is soft.      Tenderness: There is no guarding.   Musculoskeletal:         General: Normal range of motion.      Cervical back: Normal range of motion and neck supple. No rigidity.      Thoracic back: Tenderness present.      Lumbar back: Tenderness present.   Skin:     General: Skin is warm and dry.      Coloration: Skin is not jaundiced or pale.   Neurological:      General: No focal " deficit present.      Mental Status: She is alert and oriented to person, place, and time. Mental status is at baseline.      Cranial Nerves: No cranial nerve deficit (II-XII).   Psychiatric:         Mood and Affect: Mood normal.         Behavior: Behavior normal. Behavior is cooperative.         Thought Content: Thought content normal.           CT Head Without Contrast  Narrative: EXAMINATION:  CT HEAD WITHOUT CONTRAST    CLINICAL HISTORY:  Headache, new or worsening (Age >= 50y);    TECHNIQUE:  Low dose axial images were obtained through the head.  Coronal and sagittal reformations were also performed. Contrast was not administered.    COMPARISON:  MRI dated 10/27/2023.    CT scan of the head dated 10/27/2023.    FINDINGS:  The subcutaneous tissues are unremarkable.  The bony calvarium is intact.  The paranasal sinuses are unremarkable.  The mastoid air cells are clear.  There are postoperative changes in the globes.    The craniocervical junction is intact.  There is empty sella configuration.  There is no evidence of intracranial hemorrhage.  There are no extra-axial fluid collections.    The ventricles and sulci are within normal limits.  The cisterns are unremarkable.  The gray-white differentiation is maintained.  There is no dense vessel sign.  There is no evidence of mass effect.  Impression: No acute intracranial process.  Additional evaluation, as clinically warranted.    Electronically signed by: Messi Tello MD  Date:    10/12/2024  Time:    16:22         Office Visit on 11/27/2024   Component Date Value Ref Range Status    Glucose, Fasting 11/27/2024 179 (H)  70 - 100 mg/dL Final    Triglycerides 11/27/2024 88  37 - 140 mg/dL Final    Cholesterol 11/27/2024 170  <=200 mg/dL Final    HDL Cholesterol 11/27/2024 64 (H)  35 - 60 mg/dL Final    Cholesterol/HDL Ratio (Risk Factor) 11/27/2024 2.7   Final    Non-HDL 11/27/2024 106  mg/dL Final    LDL Calculated 11/27/2024 88  mg/dL Final    LDL/HDL 11/27/2024  1.4   Final    VLDL 11/27/2024 18  mg/dL Final    Hemoglobin A1C 11/29/2024 8.2 (H)  <=7.0 % Final    Estimated Average Glucose 11/29/2024 189  mg/dL Final   Orders Only on 10/12/2024   Component Date Value Ref Range Status    QRS Duration 10/12/2024 86  ms Final    OHS QTC Calculation 10/12/2024 434  ms Final   Admission on 10/12/2024, Discharged on 10/12/2024   Component Date Value Ref Range Status    POC Glucose 10/12/2024 108 (H)  70 - 105 mg/dL Final    Sodium 10/12/2024 138  136 - 145 mmol/L Final    Potassium 10/12/2024 4.0  3.5 - 5.1 mmol/L Final    Chloride 10/12/2024 107  98 - 107 mmol/L Final    CO2 10/12/2024 25  21 - 32 mmol/L Final    Anion Gap 10/12/2024 10  7 - 16 mmol/L Final    Glucose 10/12/2024 104  74 - 106 mg/dL Final    BUN 10/12/2024 26 (H)  7 - 18 mg/dL Final    Creatinine 10/12/2024 0.97  0.55 - 1.02 mg/dL Final    BUN/Creatinine Ratio 10/12/2024 27 (H)  6 - 20 Final    Calcium 10/12/2024 9.1  8.5 - 10.1 mg/dL Final    Total Protein 10/12/2024 7.5  6.4 - 8.2 g/dL Final    Albumin 10/12/2024 3.7  3.5 - 5.0 g/dL Final    Globulin 10/12/2024 3.8  2.0 - 4.0 g/dL Final    A/G Ratio 10/12/2024 1.0   Final    Bilirubin, Total 10/12/2024 0.4  >0.0 - 1.2 mg/dL Final    Alk Phos 10/12/2024 108  50 - 130 U/L Final    ALT 10/12/2024 20  13 - 56 U/L Final    AST 10/12/2024 19  15 - 37 U/L Final    eGFR 10/12/2024 66  >=60 mL/min/1.73m2 Final    WBC 10/12/2024 7.11  4.50 - 11.00 K/uL Final    RBC 10/12/2024 5.00  4.20 - 5.40 M/uL Final    Hemoglobin 10/12/2024 12.1  12.0 - 16.0 g/dL Final    Hematocrit 10/12/2024 40.1  38.0 - 47.0 % Final    MCV 10/12/2024 80.2  80.0 - 96.0 fL Final    MCH 10/12/2024 24.2 (L)  27.0 - 31.0 pg Final    MCHC 10/12/2024 30.2 (L)  32.0 - 36.0 g/dL Final    RDW 10/12/2024 21.4 (H)  11.5 - 14.5 % Final    Platelet Count 10/12/2024 307  150 - 400 K/uL Final    MPV 10/12/2024 11.2  9.4 - 12.4 fL Final    Neutrophils % 10/12/2024 66.2 (H)  53.0 - 65.0 % Final    Lymphocytes %  10/12/2024 24.3 (L)  27.0 - 41.0 % Final    Monocytes % 10/12/2024 7.6 (H)  2.0 - 6.0 % Final    Eosinophils % 10/12/2024 1.3  1.0 - 4.0 % Final    Basophils % 10/12/2024 0.3  0.0 - 1.0 % Final    Immature Granulocytes % 10/12/2024 0.3  0.0 - 0.4 % Final    nRBC, Auto 10/12/2024 0.0  <=0.0 % Final    Neutrophils, Abs 10/12/2024 4.71  1.80 - 7.70 K/uL Final    Lymphocytes, Absolute 10/12/2024 1.73  1.00 - 4.80 K/uL Final    Monocytes, Absolute 10/12/2024 0.54  0.00 - 0.80 K/uL Final    Eosinophils, Absolute 10/12/2024 0.09  0.00 - 0.50 K/uL Final    Basophils, Absolute 10/12/2024 0.02  0.00 - 0.20 K/uL Final    Immature Granulocytes, Absolute 10/12/2024 0.02  0.00 - 0.04 K/uL Final    nRBC, Absolute 10/12/2024 0.00  <=0.00 x10e3/uL Final    Diff Type 10/12/2024 Auto   Final    Platelet Morphology 10/12/2024 Few Large Platelets (A)  Normal Final    Anisocytosis 10/12/2024 1+   Final    Ovalocytes 10/12/2024 Few   Final   Office Visit on 10/02/2024   Component Date Value Ref Range Status    POC Amphetamines 10/02/2024 Negative  Negative, Inconclusive Corrected    POC Barbiturates 10/02/2024 Negative  Negative, Inconclusive Final    POC Benzodiazepines 10/02/2024 Negative  Negative, Inconclusive Final    POC Cocaine 10/02/2024 Negative  Negative, Inconclusive Final    POC THC 10/02/2024 Negative  Negative, Inconclusive Final    POC Methadone 10/02/2024 Negative  Negative, Inconclusive Final    POC Methamphetamine 10/02/2024 Negative  Negative, Inconclusive Final    POC Opiates 10/02/2024 Negative  Negative, Inconclusive Final    POC Oxycodone 10/02/2024 Negative  Negative, Inconclusive Final    POC Phencyclidine 10/02/2024 Negative  Negative, Inconclusive Final    POC Methylenedioxymethamphetamine * 10/02/2024 Negative  Negative, Inconclusive Final    POC Tricyclic Antidepressants 10/02/2024 Negative  Negative, Inconclusive Final    POC Buprenorphine 10/02/2024 Negative   Final     Acceptable 10/02/2024  Yes   Final    POC Temperature (Urine) 10/02/2024 96   Final   Office Visit on 06/26/2024   Component Date Value Ref Range Status    Creatinine, Urine 06/26/2024 42  28 - 219 mg/dL Final    Microalbumin 06/26/2024 4.6 (H)  0.0 - 2.8 mg/dL Final    Microalbumin/Creatinine Ratio 06/26/2024 109.5 (H)  0.0 - 30.0 mg/g Final    Sodium 06/26/2024 139  136 - 145 mmol/L Final    Potassium 06/26/2024 5.5 (H)  3.5 - 5.1 mmol/L Final    Chloride 06/26/2024 105  98 - 107 mmol/L Final    CO2 06/26/2024 26  21 - 32 mmol/L Final    Anion Gap 06/26/2024 14  7 - 16 mmol/L Final    Glucose 06/26/2024 142 (H)  74 - 106 mg/dL Final    BUN 06/26/2024 31 (H)  7 - 18 mg/dL Final    Creatinine 06/26/2024 1.19 (H)  0.55 - 1.02 mg/dL Final    BUN/Creatinine Ratio 06/26/2024 26 (H)  6 - 20 Final    Calcium 06/26/2024 8.5  8.5 - 10.1 mg/dL Final    Total Protein 06/26/2024 7.4  6.4 - 8.2 g/dL Final    Albumin 06/26/2024 3.6  3.5 - 5.0 g/dL Final    Globulin 06/26/2024 3.8  2.0 - 4.0 g/dL Final    A/G Ratio 06/26/2024 0.9   Final    Bilirubin, Total 06/26/2024 0.2  >0.0 - 1.2 mg/dL Final    Alk Phos 06/26/2024 102  50 - 130 U/L Final    ALT 06/26/2024 37  13 - 56 U/L Final    AST 06/26/2024 27  15 - 37 U/L Final    eGFR 06/26/2024 52 (L)  >=60 mL/min/1.73m2 Final    Hemoglobin A1C 06/26/2024 5.9  4.5 - 6.6 % Final    Estimated Average Glucose 06/26/2024 123  mg/dL Final         No orders of the defined types were placed in this encounter.        Requested Prescriptions     Signed Prescriptions Disp Refills    traMADoL (ULTRAM) 50 mg tablet 90 tablet 0     Sig: Take 1 tablet (50 mg total) by mouth every 8 (eight) hours as needed for Pain.    traMADoL (ULTRAM) 50 mg tablet 90 tablet 0     Sig: Take 1 tablet (50 mg total) by mouth every 8 (eight) hours as needed for Pain.    traMADoL (ULTRAM) 50 mg tablet 90 tablet 0     Sig: Take 1 tablet (50 mg total) by mouth every 8 (eight) hours as needed for Pain.       Assessment:     1. Lumbosacral  radiculopathy    2. Osteoarthrosis multiple sites, not specified as generalized               A's of Opioid Risk Assessment  Activity:Patient can perform ADL.   Analgesia:Patients pain is partially controlled by current medication. Patient has tried OTC medications such as Tylenol and Ibuprofen with out relief.   Adverse Effects: Patient denies constipation or sedation.  Aberrant Behavior:  reviewed with no aberrant drug seeking/taking behavior.  Overdose reversal drug naloxone discussed    Drug screen reviewed        MRI lumbar spine [facility] May 22, 2024  multiple levels stenosis, multiple level degenerative changes    L4/5 bilateral foraminal narrowing    X-ray lumbar spine [facility] January 30, 2024  No acute fracture or dislocation. Posterior spinal fusion hardware and intervertebral disc spacer at L2-L3, which is in expected position. Moderate to severe degenerative disc disease L3-L4, L4-5 and L5-S1. Multilevel endplate change and multilevel anterior osteophytes. Multilevel facet change in neural foraminal narrowing       Plan:    lumbar L4/5 RADHA # 1 not scheduled July 24, 2024 waiting on permission to hold Plavix      Addendum October 9, 2024 destroyed cannabis card  Patient requesting refill tramadol request a discontinue hydrocodone due to palpitations        Refilled cannabis x3 August 12 2024,  please see     Dr. Fowler Refill hydrocodone August 20, 2024 March 28, 2024 discontinue cannabis destroyed cannabis    Cannabis started January 30, 2024 last refill February 8, 2024  She states she has not taken any of the cannabis    July 8, 2024  Has not refill tramadol since May 28, 2024  Requesting hydrocodone  Discontinue tramadol  Hydrocodone 5 1 p.o. Q 12 hours 60 tablets          Narcan December 2022    Anticoagulated Plavix    History Colon  Cancer    Follows oncology    Follows Neurology [facility]    Following spine surgery [facility] spine lumbar surgery  L2/3 posterior lumbar  fusion    History of stroke affected her right side weakness      Complaining of ongoing back pain buttock and leg pain numbness and tingling radicular in nature greater than 1 year    Limit NSAIDs anticoagulant/stroke    She states she saw spine surgeon August 2024 in [place] no further options were offered    She is very frustrated today she states her back and leg is not improving     She had a new MRI lumbar spine August 2024 [place]     We will request records for the MRI lumbar spine     Will request records from surgery [facility] spine    Requesting increase tramadol 3 times a day     Tramadol 50 mg 1 p.o. q.8 hours    Continue home exercise program as directed    Continue current medication    Will schedule procedure after obtaining clearance to hold anticoagulant    Follow-up 3 months    Dr. Fowler, July 2025    Bring original prescription medication bottles/container/box with labels to each visit

## 2024-11-27 ENCOUNTER — OFFICE VISIT (OUTPATIENT)
Dept: FAMILY MEDICINE | Facility: CLINIC | Age: 64
End: 2024-11-27
Payer: COMMERCIAL

## 2024-11-27 VITALS
HEIGHT: 61 IN | BODY MASS INDEX: 39.46 KG/M2 | HEART RATE: 51 BPM | RESPIRATION RATE: 17 BRPM | SYSTOLIC BLOOD PRESSURE: 182 MMHG | DIASTOLIC BLOOD PRESSURE: 80 MMHG | WEIGHT: 209 LBS | OXYGEN SATURATION: 97 % | TEMPERATURE: 98 F

## 2024-11-27 DIAGNOSIS — Z13.1 SCREENING FOR DIABETES MELLITUS (DM): ICD-10-CM

## 2024-11-27 DIAGNOSIS — F32.A DEPRESSION, UNSPECIFIED DEPRESSION TYPE: ICD-10-CM

## 2024-11-27 DIAGNOSIS — Z00.01 ANNUAL VISIT FOR GENERAL ADULT MEDICAL EXAMINATION WITH ABNORMAL FINDINGS: Primary | ICD-10-CM

## 2024-11-27 DIAGNOSIS — Z79.4 TYPE 2 DIABETES MELLITUS WITH DIABETIC POLYNEUROPATHY, WITH LONG-TERM CURRENT USE OF INSULIN: Chronic | ICD-10-CM

## 2024-11-27 DIAGNOSIS — I63.9 CEREBROVASCULAR ACCIDENT (CVA), UNSPECIFIED MECHANISM: ICD-10-CM

## 2024-11-27 DIAGNOSIS — I10 PRIMARY HYPERTENSION: ICD-10-CM

## 2024-11-27 DIAGNOSIS — I10 HYPERTENSION, UNSPECIFIED TYPE: ICD-10-CM

## 2024-11-27 DIAGNOSIS — E11.42 TYPE 2 DIABETES MELLITUS WITH DIABETIC POLYNEUROPATHY, WITH LONG-TERM CURRENT USE OF INSULIN: Chronic | ICD-10-CM

## 2024-11-27 DIAGNOSIS — Z13.220 SCREENING FOR LIPID DISORDERS: ICD-10-CM

## 2024-11-27 LAB
CHOLEST SERPL-MCNC: 170 MG/DL
CHOLEST/HDLC SERPL: 2.7 {RATIO}
GLUCOSE SERPL-MCNC: 179 MG/DL (ref 70–100)
HDLC SERPL-MCNC: 64 MG/DL (ref 35–60)
LDLC SERPL CALC-MCNC: 88 MG/DL
LDLC/HDLC SERPL: 1.4 {RATIO}
NONHDLC SERPL-MCNC: 106 MG/DL
TRIGL SERPL-MCNC: 88 MG/DL (ref 37–140)
VLDLC SERPL-MCNC: 18 MG/DL

## 2024-11-27 PROCEDURE — 82947 ASSAY GLUCOSE BLOOD QUANT: CPT | Mod: ,,, | Performed by: CLINICAL MEDICAL LABORATORY

## 2024-11-27 RX ORDER — GABAPENTIN 300 MG/1
300 CAPSULE ORAL 3 TIMES DAILY
Qty: 90 CAPSULE | Refills: 5 | Status: SHIPPED | OUTPATIENT
Start: 2024-11-27 | End: 2025-11-27

## 2024-11-27 RX ORDER — DAPAGLIFLOZIN 10 MG/1
10 TABLET, FILM COATED ORAL EVERY MORNING
Qty: 90 TABLET | Refills: 1 | Status: SHIPPED | OUTPATIENT
Start: 2024-11-27

## 2024-11-27 RX ORDER — INSULIN ASPART 100 [IU]/ML
20 INJECTION, SOLUTION INTRAVENOUS; SUBCUTANEOUS 2 TIMES DAILY
Qty: 12 EACH | Refills: 1 | Status: SHIPPED | OUTPATIENT
Start: 2024-11-27

## 2024-11-27 RX ORDER — OLMESARTAN MEDOXOMIL AND HYDROCHLOROTHIAZIDE 40/25 40; 25 MG/1; MG/1
1 TABLET ORAL DAILY
Qty: 90 TABLET | Refills: 1 | Status: SHIPPED | OUTPATIENT
Start: 2024-11-27

## 2024-11-27 RX ORDER — CITALOPRAM 10 MG/1
10 TABLET ORAL DAILY
Qty: 90 TABLET | Refills: 1 | Status: SHIPPED | OUTPATIENT
Start: 2024-11-27 | End: 2025-05-26

## 2024-11-27 RX ORDER — INSULIN DEGLUDEC 100 U/ML
24 INJECTION, SOLUTION SUBCUTANEOUS DAILY
Qty: 3 PEN | Refills: 1 | Status: SHIPPED | OUTPATIENT
Start: 2024-11-27

## 2024-11-27 RX ORDER — SOTALOL HYDROCHLORIDE 120 MG/1
120 TABLET ORAL EVERY 12 HOURS
Qty: 90 TABLET | Refills: 1 | Status: SHIPPED | OUTPATIENT
Start: 2024-11-27

## 2024-11-27 RX ORDER — ROSUVASTATIN CALCIUM 40 MG/1
40 TABLET, COATED ORAL DAILY
Qty: 90 TABLET | Refills: 1 | Status: SHIPPED | OUTPATIENT
Start: 2024-11-27

## 2024-11-27 RX ORDER — AMLODIPINE BESYLATE 10 MG/1
10 TABLET ORAL DAILY
Qty: 90 TABLET | Refills: 1 | Status: SHIPPED | OUTPATIENT
Start: 2024-11-27 | End: 2025-11-27

## 2024-11-27 NOTE — PROGRESS NOTES
New Clinic Note    Dilma Cr is a 64 y.o. female     CC:   Chief Complaint   Patient presents with    Annual Exam     [facility]; lipid and glucose.     Back Pain     Patient states she has been dealing with lower back pain. She has been taking tramadol for the pain. Pain currently 8/10 pain level. She was taking Norco but asked them to stop due to medication being so strong.     Health Maintenance     Eye exam done at [facility].     Hypertension        Subjective    History of Present Illness HPI   Patient is here for an [facility] exam. She needs refills. She says her blood pressure has been running high at home. She took her medicines about 30 minutes ago. She complains of continued back pain. She sees Dr. Fowler for her pain.     Current Outpatient Medications:     ascorbic acid, vitamin C, 250 mg Chew, Take 250 mg by mouth once daily., Disp: , Rfl:     clopidogreL (PLAVIX) 75 mg tablet, Take 1 tablet (75 mg total) by mouth once daily., Disp: 90 tablet, Rfl: 1    cyanocobalamin 2000 MCG tablet, Take 1 tablet by mouth once daily., Disp: , Rfl:     FREESTYLE BECCA 14 DAY SENSOR Kit, USE TO CHECK GLUCOSE 4 TIMES DAILY, Disp: , Rfl:     FREESTYLE BECCA 3 SENSOR Aleena, 1 Device by Apply Externally route every 14 (fourteen) days., Disp: 3 each, Rfl: 5    furosemide (LASIX) 20 MG tablet, Take 20 mg by mouth daily as needed (for fluid)., Disp: , Rfl:     iron-vitamin C 100-250 mg, ICAR-C, 100-250 mg Tab, Take 1 tablet by mouth 2 (two) times daily with meals., Disp: , Rfl:     naloxone (NARCAN) 4 mg/actuation Spry, 1 spray by Nasal route once as needed., Disp: , Rfl:     nitroGLYCERIN (NITROSTAT) 0.4 MG SL tablet, Place 1 tablet (0.4 mg total) under the tongue every 5 (five) minutes as needed for Chest pain., Disp: 30 tablet, Rfl: 0    traMADoL (ULTRAM) 50 mg tablet, Take 1 tablet (50 mg total) by mouth every 12 (twelve) hours as needed for Pain., Disp: 60 tablet, Rfl: 1    amLODIPine  (NORVASC) 10 MG tablet, Take 1 tablet (10 mg total) by mouth once daily., Disp: 90 tablet, Rfl: 1    citalopram (CELEXA) 10 MG tablet, Take 1 tablet (10 mg total) by mouth once daily., Disp: 90 tablet, Rfl: 1    FARXIGA 10 mg tablet, Take 1 tablet (10 mg total) by mouth every morning., Disp: 90 tablet, Rfl: 1    gabapentin (NEURONTIN) 300 MG capsule, Take 1 capsule (300 mg total) by mouth 3 (three) times daily., Disp: 90 capsule, Rfl: 5    NOVOLOG FLEXPEN U-100 INSULIN 100 unit/mL (3 mL) InPn pen, Inject 20 Units into the skin 2 (two) times a day. Patient states, She takes 14 units in morning, 14 units later in day, and 10 units at night., Disp: 12 each, Rfl: 1    olmesartan-hydrochlorothiazide (BENICAR HCT) 40-25 mg per tablet, Take 1 tablet by mouth once daily., Disp: 90 tablet, Rfl: 1    rosuvastatin (CRESTOR) 40 MG Tab, Take 1 tablet (40 mg total) by mouth once daily., Disp: 90 tablet, Rfl: 1    sotaloL (BETAPACE) 120 MG Tab, Take 1 tablet (120 mg total) by mouth every 12 (twelve) hours., Disp: 90 tablet, Rfl: 1    TRESIBA FLEXTOUCH U-100 100 unit/mL (3 mL) insulin pen, Inject 24 Units into the skin once daily. Take 10 units BID, Disp: 3 Pen, Rfl: 1     Past Medical History:   Diagnosis Date    Acute superficial gastritis without hemorrhage 06/21/2022    Arthritis     Cancer     colon cancer    Chronic kidney disease, stage 3b     Colon cancer     Coronary artery disease     Depression     Diabetes mellitus, type 2     Diverticula, colon 06/22/2022    GERD (gastroesophageal reflux disease)     H/O right hemicolectomy 06/22/2022    HH (hiatus hernia) 06/21/2022    Hyperlipidemia     Myocardial infarction     2 stents in 05/14/2021 Dr Lora    Renal disorder         Family History   Problem Relation Name Age of Onset    Hypertension Mother      Diabetes Mother      Hypertension Father      Diabetes Father      Breast cancer Sister      Hypertension Sister      Hypertension Brother          Past Surgical History:  "  Procedure Laterality Date    BREAST SURGERY      C5-C6 RADHA  8-, 7-, 6-1-2016    Dr Fowler    CARDIAC SURGERY  02/07/2023    CERVICAL SPINE SURGERY      COLON SURGERY      EPIDURAL STEROID INJECTION INTO LUMBAR SPINE N/A 03/29/2022    Procedure: L4-5 RADHA;  Surgeon: Gela Fowler MD;  Location: [facility];  Service: Pain Management;  Laterality: N/A;  PT AWARE ON OV TO BE TESTED\  PLAVIX TO BE HELD FOR 7 DAYS PRIOR TO PROCEDURE PER DR FOWLER AND DR PORTER  3-28  message left on vm re: covid    HYSTERECTOMY      LEFT HEART CATHETERIZATION Left 05/14/2021    Procedure: Left heart cath;  Surgeon: Mateus Guan MD;  Location: [facility] CATH LAB;  Service: Cardiology;  Laterality: Left;    LEFT HEART CATHETERIZATION Left 06/20/2022    Procedure: Left heart cath;  Surgeon: Oliverio Bautista MD;  Location: [facility] CATH LAB;  Service: Cardiology;  Laterality: Left;    LUMBAR SPINE SURGERY  12/28/2023    MEDIPORT REMOVAL  10/14/2021    Dr Kraft    OOPHORECTOMY      right rotator cuff repair      in [place]    TOTAL REDUCTION MAMMOPLASTY          Review of Systems     BP (!) 182/80 (BP Location: Left arm, Patient Position: Sitting)   Pulse (!) 51   Temp 97.7 °F (36.5 °C) (Oral)   Resp 17   Ht 5' 1" (1.549 m)   Wt 94.8 kg (209 lb)   LMP  (LMP Unknown)   SpO2 97%   BMI 39.49 kg/m²      Physical Exam     Assessment and Plan      ICD-10-CM ICD-9-CM   1. Annual visit for general adult medical examination with abnormal findings  Z00.01 V70.0   2. Depression, unspecified depression type  F32.A 311   3. Primary hypertension  I10 401.9   4. Cerebrovascular accident (CVA), unspecified mechanism  I63.9 434.91   5. Hypertension, unspecified type  I10 401.9   6. Type 2 diabetes mellitus with diabetic polyneuropathy, with long-term current use of insulin  E11.42 250.60    Z79.4 357.2     V58.67   7. Screening for lipid disorders  Z13.220 V77.91   8. Screening for diabetes mellitus (DM)  Z13.1 V77.1    "     1. Annual visit for general adult medical examination with abnormal findings  Refill medications. Labs done. Educational material given.    2. Depression, unspecified depression type  The current medical regimen is effective;  continue present plan and medications.  -     citalopram (CELEXA) 10 MG tablet; Take 1 tablet (10 mg total) by mouth once daily.  Dispense: 90 tablet; Refill: 1    3. Primary hypertension  Not controlled. Add amlodipine 10mg. Recheck in 1 week.   -     olmesartan-hydrochlorothiazide (BENICAR HCT) 40-25 mg per tablet; Take 1 tablet by mouth once daily.  Dispense: 90 tablet; Refill: 1  -     sotaloL (BETAPACE) 120 MG Tab; Take 1 tablet (120 mg total) by mouth every 12 (twelve) hours.  Dispense: 90 tablet; Refill: 1  -     amLODIPine (NORVASC) 10 MG tablet; Take 1 tablet (10 mg total) by mouth once daily.  Dispense: 90 tablet; Refill: 1    4. Cerebrovascular accident (CVA), unspecified mechanism  The current medical regimen is effective;  continue present plan and medications.  -     rosuvastatin (CRESTOR) 40 MG Tab; Take 1 tablet (40 mg total) by mouth once daily.  Dispense: 90 tablet; Refill: 1      5. Type 2 diabetes mellitus with diabetic polyneuropathy, with long-term current use of insulin  The current medical regimen is effective;  continue present plan and medications.  -     NOVOLOG FLEXPEN U-100 INSULIN 100 unit/mL (3 mL) InPn pen; Inject 20 Units into the skin 2 (two) times a day. Patient states, She takes 14 units in morning, 14 units later in day, and 10 units at night.  Dispense: 12 each; Refill: 1  -     TRESIBA FLEXTOUCH U-100 100 unit/mL (3 mL) insulin pen; Inject 24 Units into the skin once daily. Take 10 units BID  Dispense: 3 Pen; Refill: 1  -     FARXIGA 10 mg tablet; Take 1 tablet (10 mg total) by mouth every morning.  Dispense: 90 tablet; Refill: 1  -     gabapentin (NEURONTIN) 300 MG capsule; Take 1 capsule (300 mg total) by mouth 3 (three) times daily.  Dispense: 90  capsule; Refill: 5    7. Screening for lipid disorders  -     Lipid Panel; Future; Expected date: 11/27/2024    8. Screening for diabetes mellitus (DM)  -     Glucose, Fasting; Future; Expected date: 11/27/2024        Follow up in about 1 week (around 12/4/2024).

## 2024-11-29 ENCOUNTER — TELEPHONE (OUTPATIENT)
Dept: FAMILY MEDICINE | Facility: CLINIC | Age: 64
End: 2024-11-29
Payer: COMMERCIAL

## 2024-11-29 DIAGNOSIS — Z79.4 TYPE 2 DIABETES MELLITUS WITH DIABETIC POLYNEUROPATHY, WITH LONG-TERM CURRENT USE OF INSULIN: Primary | ICD-10-CM

## 2024-11-29 DIAGNOSIS — E11.42 TYPE 2 DIABETES MELLITUS WITH DIABETIC POLYNEUROPATHY, WITH LONG-TERM CURRENT USE OF INSULIN: Primary | ICD-10-CM

## 2024-11-29 LAB
EST. AVERAGE GLUCOSE BLD GHB EST-MCNC: 189 MG/DL
HBA1C MFR BLD HPLC: 8.2 %

## 2024-11-29 NOTE — TELEPHONE ENCOUNTER
----- Message from Olga Potts MD sent at 11/27/2024  7:51 PM CST -----  Sugar is too high. Check A1C.

## 2024-12-02 ENCOUNTER — PATIENT OUTREACH (OUTPATIENT)
Facility: HOSPITAL | Age: 64
End: 2024-12-02
Payer: COMMERCIAL

## 2024-12-02 NOTE — LETTER
AUTHORIZATION FOR RELEASE OF   CONFIDENTIAL INFORMATION    Dear Dr. Hoskins,    We are seeing Dilma Cr, date of birth 1960, in the clinic at [facility]. Olga Potts MD is the patient's PCP. Dilma Cr has an outstanding lab/procedure at the time we reviewed her chart. In order to help keep her health information updated, she has authorized us to request the following medical record(s):        (  )  MAMMOGRAM                                      (  )  COLONOSCOPY      (  )  PAP SMEAR                                          (  )  OUTSIDE LAB RESULTS     (  )  DEXA SCAN                                          ( x )  EYE EXAM            (  )  FOOT EXAM                                          (  )  ENTIRE RECORD     (  )  OUTSIDE IMMUNIZATIONS                 (  )  _______________         Please fax records to Meron Tellez LPN Care Coordinator at [phone].      If you have any questions, please contact Meron at [phone].           Patient Name: Dilma Cr  : 1960  Patient Phone #: [phone]                Dilma Cr  MRN: [id number]  : 1960  Age: 63 y.o.  Sex: female         Patient/Legal Guardian Signature  This signature was collected at 2024    self       _______________________________   Printed Name/Relationship to Patient      Consent for Examination and Treatment: I hereby authorize the providers and employees of [facility] ([facility]) to provide medical treatment/services which includes, but is not limited to, performing and administering tests and diagnostic procedures that are deemed necessary, including, but not limited to, imaging examinations, blood tests and other laboratory procedures as may be required by the hospital, clinic, or may be ordered by my physician(s) or persons working under the general and/or special instructions of my physician(s).      I understand and agree that this consent  covers all authorized persons, including but not limited to physicians, residents, nurse practitioners, physicians' assistants, specialists, consultants, student nurses, and independently contracted physicians, who are called upon by the physician in charge, to carry out the diagnostic procedures and medical or surgical treatment.     I hereby authorize [facility] to retain or dispose of any specimens or tissue, should there be such remaining from any test or procedure.     I hereby authorize and give consent for [facility] providers and employees to take photographs, images or videotapes of such diagnostic, surgical or treatment procedures of Patient as may be required by [facility] or as may be ordered by a physician. I further acknowledge and agree that [facility] may use cameras or other devices for patient monitoring.     I am aware that the practice of medicine is not an exact science, and I acknowledge that no guarantees have been made to me as to the outcome of any tests, procedures or treatment.     Authorization for Release of Information: I understand that my insurance company and/or their agents may need information necessary to make determinations about payment/reimbursement. I hereby provide authorization to release to all insurance companies, their successors, assignees, other parties with whom they may have contracted, or others acting on their behalf, that are involved with payment for any hospital and/or clinic charges incurred by the patient, any information that they request and deem necessary for payment/reimbursement, and/or quality review.  I further authorize the release of my health information to physicians or other health care practitioners on staff who are involved in my health care now and in the future, and to other health care providers, entities, or institutions for the purpose of my continued care and treatment, including referrals.     REGISTRATION AUTHORIZATION  Form No. 58260 (Rev. 3/25/2024)     Page 1 of 3                       Medicare Patient's Certification and Authorization to Release Information and Payment Request:  I certify that the information given by me in applying for payment under Title XVIII of the Social Security Act is correct. I authorize any gonzales of medical or other information about me to release to the Social SecurityAdministration, or its intermediaries or carriers, any information needed for this or a related Medicare claim. I request that payment of authorized benefits be made on my behalf.     Assignment of Insurance Benefits:   I hereby authorize any and all insurance companies, health plans, defined   benefit plans, health insurers or any entity that is or may be responsible for payment of my medical expenses to pay all hospital and medical benefits now due, and to become due and payable to me under any hospital benefits, sick benefits, injury benefits or any other benefit for services rendered to me, including Major Medical Benefits, direct to [facility] and all independently contracted physicians. I assign any and all rights that I may have against any and all insurance companies, health plans, defined benefit plans, health insurers or any entity that is or may be responsible for payment of my medical expenses, including, but not limited to any right to appeal a denial of a claim, any right to bring any action, lawsuit, administrative proceeding, or other cause of action on my behalf. I specifically assign my right to pursue litigation against any and all insurance companies, health plans, defined benefit plans, health insurers or any entity that is or may be responsible for payment of my medical expenses based upon a refusal to pay charges.            E. Valuables: It is understood and agreed that [facility] is not liable for the damage to or loss of any money, jewelry,   documents, dentures, eye glasses, hearing aids, prosthetics, or other property of value.     F. Computer  Equipment: I understand and agree that should I choose to use computer equipment owned by [facility] or if I choose to access the Internet via [facility]s network, I do so at my own risk. [facility] is not responsible for any damage to my computer equipment or to any damages of any type that might arise from my loss of equipment or data.     G. Acceptance of Financial Responsibility:  I agree that in consideration of the services and   supplies that have been   or will be furnished to the patient, I am hereby obligated to pay all charges made for or on the account of the patient according to the standard rates (in effect at the time the services and supplies are delivered) established by [facility], including its Patient Financial Assistance Policy to the extent it is applicable. I understand that I am responsible for all charges, or portions thereof, not covered by insurance or other sources. Patient refunds will be distributed only after balances at all [facility] facilities are paid.     H. Communication Authorization:  I hereby authorize [facility] and its representatives, along with any billing service   or  who may work on their behalf, to contact me on   my cell phone and/or home phone using pre- recorded messages, artificial voice messages, automatic telephone dialing devices or other computer assisted technology, or by electronic      mail, text messaging, or by any other form of electronic communication. This includes, but is not limited to, appointment reminders, yearly physical exam reminders, preventive care reminders, patient campaigns, welcome calls, and calls about account balances on my account or any account on which I am listed as a guarantor. I understand I have the right to opt out of these communications at any time.      Relationship  Between  Facility and  Provider:      I understand that some, but not all, providers furnishing services to the patient are not employees or agents of [facility].  The patient is under the care and supervision of his/her attending physician, and it is the responsibility of the facility and its nursing staff to carry out the instructions of such physicians. It is the responsibility of the patient's physician/designee to obtain the patient's informed consent, when required, for medical or surgical treatment, special diagnostic or therapeutic procedures, or hospital services rendered for the patient under the special instructions of the physician/designee.           REGISTRATION AUTHORIZATION  Form No. 66784 (Rev. 3/25/2024)    Page 2 of 3                       Immunizations: [facility] shares immunization information with state sponsored health departments to help you and your doctor keep track of your immunization records. By signing, you consent to have this information shared with the health department in your state:                                [place] - LINKS ([place] Immunization Network for Kids Statewide)                                [place] - MIIX ([place] Immunization Information eXchange)                                [place] - ImmPRINT (Immunization Patient Registry with Integrated Technology)     TERM: This authorization is valid for this and subsequent care/treatment I receive at [facility] and will remain valid unless/until revoked in writing by me.     [facility]: As used in this document, [facility] means all [facility] owned and managed facilities, including, but not limited to, all health centers, surgery centers, clinics, urgent care centers, and hospitals.         [facility] complies with applicable Federal civil rights laws and does not discriminate on the basis of race, color, national origin, age, disability, or sex.  ATENCIÓN: si habla español, tiene a humphrey disposición servicios gratuitos de asistencia lingüística. João fagan [phone].  JOSE Ý: N?u b?n nói Ti?ng Vi?t, có các d?ch v? h? tr? ngôn ng? mi?n phí dành cho  b?n. G?i s? [phone].        REGISTRATION AUTHORIZATION  Form No. 14933 (Rev. 3/25/2024)   Page 3 of 3     Patient

## 2024-12-02 NOTE — PROGRESS NOTES
Population Health Chart Review & Patient Outreach Details    Updates Requested / Reviewed:  [x]  Care Team Updated  [x]   Reviewed    Health Maintenance Topics Addressed and Outreach Outcomes / Actions Taken:  Diabetic Eye Exam [x] CHAVA sent to [facility].

## 2024-12-03 ENCOUNTER — OFFICE VISIT (OUTPATIENT)
Dept: PAIN MEDICINE | Facility: CLINIC | Age: 64
End: 2024-12-03
Payer: COMMERCIAL

## 2024-12-03 VITALS
HEART RATE: 54 BPM | WEIGHT: 211 LBS | RESPIRATION RATE: 18 BRPM | SYSTOLIC BLOOD PRESSURE: 142 MMHG | HEIGHT: 61 IN | BODY MASS INDEX: 39.84 KG/M2 | DIASTOLIC BLOOD PRESSURE: 50 MMHG

## 2024-12-03 DIAGNOSIS — M89.49 OSTEOARTHROSIS MULTIPLE SITES, NOT SPECIFIED AS GENERALIZED: Chronic | ICD-10-CM

## 2024-12-03 DIAGNOSIS — M54.17 LUMBOSACRAL RADICULOPATHY: Primary | Chronic | ICD-10-CM

## 2024-12-03 PROCEDURE — 3052F HG A1C>EQUAL 8.0%<EQUAL 9.0%: CPT | Mod: CPTII,,, | Performed by: PHYSICIAN ASSISTANT

## 2024-12-03 PROCEDURE — 3060F POS MICROALBUMINURIA REV: CPT | Mod: CPTII,,, | Performed by: PHYSICIAN ASSISTANT

## 2024-12-03 PROCEDURE — 3008F BODY MASS INDEX DOCD: CPT | Mod: CPTII,,, | Performed by: PHYSICIAN ASSISTANT

## 2024-12-03 PROCEDURE — 99215 OFFICE O/P EST HI 40 MIN: CPT | Mod: PBBFAC | Performed by: PHYSICIAN ASSISTANT

## 2024-12-03 PROCEDURE — 3077F SYST BP >= 140 MM HG: CPT | Mod: CPTII,,, | Performed by: PHYSICIAN ASSISTANT

## 2024-12-03 PROCEDURE — 1159F MED LIST DOCD IN RCRD: CPT | Mod: CPTII,,, | Performed by: PHYSICIAN ASSISTANT

## 2024-12-03 PROCEDURE — 99999 PR PBB SHADOW E&M-EST. PATIENT-LVL V: CPT | Mod: PBBFAC,,, | Performed by: PHYSICIAN ASSISTANT

## 2024-12-03 PROCEDURE — 3066F NEPHROPATHY DOC TX: CPT | Mod: CPTII,,, | Performed by: PHYSICIAN ASSISTANT

## 2024-12-03 PROCEDURE — 99214 OFFICE O/P EST MOD 30 MIN: CPT | Mod: S$PBB,,, | Performed by: PHYSICIAN ASSISTANT

## 2024-12-03 PROCEDURE — 3078F DIAST BP <80 MM HG: CPT | Mod: CPTII,,, | Performed by: PHYSICIAN ASSISTANT

## 2024-12-03 RX ORDER — TRAMADOL HYDROCHLORIDE 50 MG/1
50 TABLET ORAL EVERY 8 HOURS PRN
Qty: 90 TABLET | Refills: 0 | Status: SHIPPED | OUTPATIENT
Start: 2025-01-02

## 2024-12-03 RX ORDER — TRAMADOL HYDROCHLORIDE 50 MG/1
50 TABLET ORAL EVERY 8 HOURS PRN
Qty: 90 TABLET | Refills: 0 | Status: SHIPPED | OUTPATIENT
Start: 2024-12-03

## 2024-12-03 RX ORDER — TRAMADOL HYDROCHLORIDE 50 MG/1
50 TABLET ORAL EVERY 8 HOURS PRN
Qty: 90 TABLET | Refills: 0 | Status: SHIPPED | OUTPATIENT
Start: 2025-02-01

## 2024-12-04 ENCOUNTER — OFFICE VISIT (OUTPATIENT)
Dept: FAMILY MEDICINE | Facility: CLINIC | Age: 64
End: 2024-12-04
Payer: COMMERCIAL

## 2024-12-04 VITALS
HEART RATE: 78 BPM | WEIGHT: 215 LBS | RESPIRATION RATE: 18 BRPM | TEMPERATURE: 98 F | HEIGHT: 61 IN | OXYGEN SATURATION: 98 % | SYSTOLIC BLOOD PRESSURE: 138 MMHG | DIASTOLIC BLOOD PRESSURE: 61 MMHG | BODY MASS INDEX: 40.59 KG/M2

## 2024-12-04 DIAGNOSIS — Z79.4 TYPE 2 DIABETES MELLITUS WITH DIABETIC POLYNEUROPATHY, WITH LONG-TERM CURRENT USE OF INSULIN: Chronic | ICD-10-CM

## 2024-12-04 DIAGNOSIS — I10 PRIMARY HYPERTENSION: Primary | Chronic | ICD-10-CM

## 2024-12-04 DIAGNOSIS — E11.42 TYPE 2 DIABETES MELLITUS WITH DIABETIC POLYNEUROPATHY, WITH LONG-TERM CURRENT USE OF INSULIN: Chronic | ICD-10-CM

## 2024-12-04 PROCEDURE — 1160F RVW MEDS BY RX/DR IN RCRD: CPT | Mod: CPTII,,, | Performed by: FAMILY MEDICINE

## 2024-12-04 PROCEDURE — 3066F NEPHROPATHY DOC TX: CPT | Mod: CPTII,,, | Performed by: FAMILY MEDICINE

## 2024-12-04 PROCEDURE — 1159F MED LIST DOCD IN RCRD: CPT | Mod: CPTII,,, | Performed by: FAMILY MEDICINE

## 2024-12-04 PROCEDURE — 3075F SYST BP GE 130 - 139MM HG: CPT | Mod: CPTII,,, | Performed by: FAMILY MEDICINE

## 2024-12-04 PROCEDURE — 3078F DIAST BP <80 MM HG: CPT | Mod: CPTII,,, | Performed by: FAMILY MEDICINE

## 2024-12-04 PROCEDURE — 3060F POS MICROALBUMINURIA REV: CPT | Mod: CPTII,,, | Performed by: FAMILY MEDICINE

## 2024-12-04 PROCEDURE — 3052F HG A1C>EQUAL 8.0%<EQUAL 9.0%: CPT | Mod: CPTII,,, | Performed by: FAMILY MEDICINE

## 2024-12-04 PROCEDURE — 99214 OFFICE O/P EST MOD 30 MIN: CPT | Mod: ,,, | Performed by: FAMILY MEDICINE

## 2024-12-04 PROCEDURE — 3008F BODY MASS INDEX DOCD: CPT | Mod: CPTII,,, | Performed by: FAMILY MEDICINE

## 2024-12-04 NOTE — PROGRESS NOTES
New Clinic Note    Dilma Cr is a 64 y.o. female     CC:   Chief Complaint   Patient presents with    Hypertension     Patient is here for one week follow up for elevated blood pressure. She is aware that her blood sugar was up because she could not get her Free Style Curtis. Went and bought her a FSG monitor but stated she guess she was not checking it like she should have. Blood pressure is good today. Last blood glucose was 132 this morning. Uses [facility].    Follow-up    Diabetes    Medication Refill     Want to use [facility].        Subjective    History of Present Illness HPI   Patient is here for a follow up on her blood pressure. She is tolerating her blood pressure medication. Last A1C was elevated. She says she has been taking her medications. She had been out of her Freestyle Curtis. She says this is why her sugars were up.     Current Outpatient Medications:     amLODIPine (NORVASC) 10 MG tablet, Take 1 tablet (10 mg total) by mouth once daily., Disp: 90 tablet, Rfl: 1    ascorbic acid, vitamin C, 250 mg Chew, Take 250 mg by mouth once daily., Disp: , Rfl:     citalopram (CELEXA) 10 MG tablet, Take 1 tablet (10 mg total) by mouth once daily., Disp: 90 tablet, Rfl: 1    clopidogreL (PLAVIX) 75 mg tablet, Take 1 tablet (75 mg total) by mouth once daily., Disp: 90 tablet, Rfl: 1    cyanocobalamin 2000 MCG tablet, Take 1 tablet by mouth once daily., Disp: , Rfl:     FARXIGA 10 mg tablet, Take 1 tablet (10 mg total) by mouth every morning., Disp: 90 tablet, Rfl: 1    FREESTYLE CURTIS 14 DAY SENSOR Kit, USE TO CHECK GLUCOSE 4 TIMES DAILY, Disp: , Rfl:     FREESTYLE CURTIS 3 SENSOR Aleena, 1 Device by Apply Externally route every 14 (fourteen) days., Disp: 3 each, Rfl: 5    furosemide (LASIX) 20 MG tablet, Take 20 mg by mouth daily as needed (for fluid)., Disp: , Rfl:     gabapentin (NEURONTIN) 300 MG capsule, Take 1 capsule (300 mg total) by mouth 3 (three) times daily., Disp: 90 capsule, Rfl:  5    iron-vitamin C 100-250 mg, ICAR-C, 100-250 mg Tab, Take 1 tablet by mouth 2 (two) times daily with meals., Disp: , Rfl:     naloxone (NARCAN) 4 mg/actuation Spry, 1 spray by Nasal route once as needed., Disp: , Rfl:     NOVOLOG FLEXPEN U-100 INSULIN 100 unit/mL (3 mL) InPn pen, Inject 20 Units into the skin 2 (two) times a day. Patient states, She takes 14 units in morning, 14 units later in day, and 10 units at night., Disp: 12 each, Rfl: 1    olmesartan-hydrochlorothiazide (BENICAR HCT) 40-25 mg per tablet, Take 1 tablet by mouth once daily., Disp: 90 tablet, Rfl: 1    rosuvastatin (CRESTOR) 40 MG Tab, Take 1 tablet (40 mg total) by mouth once daily., Disp: 90 tablet, Rfl: 1    sotaloL (BETAPACE) 120 MG Tab, Take 1 tablet (120 mg total) by mouth every 12 (twelve) hours., Disp: 90 tablet, Rfl: 1    traMADoL (ULTRAM) 50 mg tablet, Take 1 tablet (50 mg total) by mouth every 8 (eight) hours as needed for Pain., Disp: 90 tablet, Rfl: 0    [START ON 1/2/2025] traMADoL (ULTRAM) 50 mg tablet, Take 1 tablet (50 mg total) by mouth every 8 (eight) hours as needed for Pain., Disp: 90 tablet, Rfl: 0    [START ON 2/1/2025] traMADoL (ULTRAM) 50 mg tablet, Take 1 tablet (50 mg total) by mouth every 8 (eight) hours as needed for Pain., Disp: 90 tablet, Rfl: 0    TRESIBA FLEXTOUCH U-100 100 unit/mL (3 mL) insulin pen, Inject 24 Units into the skin once daily. Take 10 units BID, Disp: 3 Pen, Rfl: 1    nitroGLYCERIN (NITROSTAT) 0.4 MG SL tablet, Place 1 tablet (0.4 mg total) under the tongue every 5 (five) minutes as needed for Chest pain., Disp: 30 tablet, Rfl: 0     Past Medical History:   Diagnosis Date    Acute superficial gastritis without hemorrhage 06/21/2022    Arthritis     Cancer     colon cancer    Chronic kidney disease, stage 3b     Colon cancer     Coronary artery disease     Depression     Diabetes mellitus, type 2     Diverticula, colon 06/22/2022    GERD (gastroesophageal reflux disease)     H/O right  hemicolectomy 06/22/2022    HH (hiatus hernia) 06/21/2022    Hyperlipidemia     Myocardial infarction     2 stents in 05/14/2021 Dr Porter    Renal disorder         Family History   Problem Relation Name Age of Onset    Hypertension Mother      Diabetes Mother      Hypertension Father      Diabetes Father      Breast cancer Sister      Hypertension Sister      Hypertension Brother          Past Surgical History:   Procedure Laterality Date    BREAST SURGERY      C5-C6 RADHA  8-, 7-, 6-1-2016    Dr Fowler    CARDIAC SURGERY  02/07/2023    CERVICAL SPINE SURGERY      COLON SURGERY      EPIDURAL STEROID INJECTION INTO LUMBAR SPINE N/A 03/29/2022    Procedure: L4-5 RADHA;  Surgeon: Gela Fowler MD;  Location: [facility]IN [facility];  Service: Pain Management;  Laterality: N/A;  PT AWARE ON OV TO BE TESTED\  PLAVIX TO BE HELD FOR 7 DAYS PRIOR TO PROCEDURE PER DR FOWLER AND DR PORTER  3-28  message left on vm re: covid    HYSTERECTOMY      LEFT HEART CATHETERIZATION Left 05/14/2021    Procedure: Left heart cath;  Surgeon: Mateus Guan MD;  Location: [facility] CATH LAB;  Service: Cardiology;  Laterality: Left;    LEFT HEART CATHETERIZATION Left 06/20/2022    Procedure: Left heart cath;  Surgeon: Oliverio Bautista MD;  Location: [facility] CATH LAB;  Service: Cardiology;  Laterality: Left;    LUMBAR SPINE SURGERY  12/28/2023    MEDIPORT REMOVAL  10/14/2021    Dr Kraft    OOPHORECTOMY      right rotator cuff repair      in [place]    TOTAL REDUCTION MAMMOPLASTY          Review of Systems   Constitutional:  Negative for fatigue and fever.   HENT:  Negative for ear pain, postnasal drip, rhinorrhea and sinus pressure/congestion.    Respiratory:  Negative for cough and shortness of breath.    Cardiovascular:  Negative for chest pain.   Gastrointestinal:  Negative for abdominal pain, diarrhea, nausea and vomiting.   Genitourinary:  Negative for dysuria.   Neurological:  Negative for headaches.        /61 (BP  "Location: Left arm, Patient Position: Sitting)   Pulse 78   Temp 98.4 °F (36.9 °C) (Oral)   Resp 18   Ht 5' 1" (1.549 m)   Wt 97.5 kg (215 lb)   LMP  (LMP Unknown)   SpO2 98%   BMI 40.62 kg/m²      Physical Exam  HENT:      Head: Normocephalic and atraumatic.   Cardiovascular:      Rate and Rhythm: Normal rate and regular rhythm.   Pulmonary:      Effort: Pulmonary effort is normal.      Breath sounds: Normal breath sounds.   Neurological:      Mental Status: She is alert and oriented to person, place, and time.   Psychiatric:         Mood and Affect: Mood normal.         Behavior: Behavior normal.          Assessment and Plan      ICD-10-CM ICD-9-CM   1. Primary hypertension  I10 401.9   2. Type 2 diabetes mellitus with diabetic polyneuropathy, with long-term current use of insulin  E11.42 250.60    Z79.4 357.2     V58.67        1. Primary hypertension  The current medical regimen is effective;  continue present plan and medications.    2. Type 2 diabetes mellitus with diabetic polyneuropathy, with long-term current use of insulin  Not controlled. Continue current meds. Recheck in 3 months.          Follow up in about 3 months (around 3/4/2025).     Patient will schedule her eye exam.   "

## 2024-12-10 ENCOUNTER — TELEPHONE (OUTPATIENT)
Dept: FAMILY MEDICINE | Facility: CLINIC | Age: 64
End: 2024-12-10
Payer: COMMERCIAL

## 2024-12-10 DIAGNOSIS — Z79.4 TYPE 2 DIABETES MELLITUS WITH RETINOPATHY OF BOTH EYES, WITH LONG-TERM CURRENT USE OF INSULIN, MACULAR EDEMA PRESENCE UNSPECIFIED, UNSPECIFIED RETINOPATHY SEVERITY: Primary | Chronic | ICD-10-CM

## 2024-12-10 DIAGNOSIS — E11.319 TYPE 2 DIABETES MELLITUS WITH RETINOPATHY OF BOTH EYES, WITH LONG-TERM CURRENT USE OF INSULIN, MACULAR EDEMA PRESENCE UNSPECIFIED, UNSPECIFIED RETINOPATHY SEVERITY: Primary | Chronic | ICD-10-CM

## 2024-12-10 NOTE — TELEPHONE ENCOUNTER
Bridgette from [facility] had called to confirm the dosage on Tresiba. I called patient to check and see exactly how she is taking it. She said she has not been taking it because it is too expensive. She did say that Dr. Potts had put her on Gabapentin . She said  since she has been taking this her legs have been swelling a lot.

## 2024-12-11 ENCOUNTER — TELEPHONE (OUTPATIENT)
Dept: PHARMACY | Facility: CLINIC | Age: 64
End: 2024-12-11
Payer: COMMERCIAL

## 2024-12-11 NOTE — LETTER
December 11, 2024    Dilma Cr  [address] [place] [id number]               Dear Ms. Cr,      My name is Carla Ogden  I am reaching out on behalf of [facility] Patient Assistance Team in regards to your request for medication assistance. Our goal is to assist qualified [facility] patients with obtaining financial assistance for prescribed medications.     Please note that enrollment into available support may require the following documents:      Proof of household Income (such as social security statement, pension statement,most recently filed taxes or 3 consecutive pay stubs)  Copy of all insurance cards (front and back)  Print out from your insurance or pharmacy showing how much you have spent on prescriptions for the current year  Completed [facility]ication [facility] Authorization Forms       Please reach out to my phone number below if you are still in need of assistance with your medications. Follow-up call will be made in 5 business days. We look forward to hearing from you soon!    Thank you for choosing [facility] for your healthcare needs      Sincerely  Carla JONES @[phone]  Pharmacy Patient Assistance Team  [address] [place] [address]  [id number] [place] [id number]  Fax: [phone]  Email: [email or web address]

## 2024-12-11 NOTE — TELEPHONE ENCOUNTER
We have reached out to MsCharis Shaye via letter to inform her of the [employer] application process for Tresiba Pen. A follow-up phone call will be made in 5 business days.    Carla Ogden  Pharmacy Patient Assistance Team

## 2024-12-31 ENCOUNTER — HOSPITAL ENCOUNTER (OUTPATIENT)
Dept: RADIOLOGY | Facility: HOSPITAL | Age: 64
Discharge: HOME | End: 2024-12-31
Attending: FAMILY MEDICINE
Payer: COMMERCIAL

## 2024-12-31 VITALS — BODY MASS INDEX: 36.99 KG/M2 | HEIGHT: 62 IN | WEIGHT: 201 LBS

## 2024-12-31 DIAGNOSIS — Z12.31 BREAST CANCER SCREENING BY MAMMOGRAM: ICD-10-CM

## 2024-12-31 PROCEDURE — 77067 SCR MAMMO BI INCL CAD: CPT | Mod: TC

## 2024-12-31 PROCEDURE — 77067 SCR MAMMO BI INCL CAD: CPT | Mod: 26,,, | Performed by: RADIOLOGY

## 2024-12-31 PROCEDURE — 77063 BREAST TOMOSYNTHESIS BI: CPT | Mod: 26,,, | Performed by: RADIOLOGY

## 2025-01-09 ENCOUNTER — OFFICE VISIT (OUTPATIENT)
Dept: CARDIOLOGY | Facility: CLINIC | Age: 65
End: 2025-01-09
Payer: COMMERCIAL

## 2025-01-09 VITALS
RESPIRATION RATE: 18 BRPM | BODY MASS INDEX: 37.17 KG/M2 | HEIGHT: 62 IN | HEART RATE: 64 BPM | DIASTOLIC BLOOD PRESSURE: 70 MMHG | WEIGHT: 202 LBS | SYSTOLIC BLOOD PRESSURE: 140 MMHG

## 2025-01-09 DIAGNOSIS — I25.10 ATHEROSCLEROSIS OF NATIVE CORONARY ARTERY OF NATIVE HEART WITHOUT ANGINA PECTORIS: Primary | Chronic | ICD-10-CM

## 2025-01-09 DIAGNOSIS — E78.5 HYPERLIPIDEMIA LDL GOAL <70: ICD-10-CM

## 2025-01-09 DIAGNOSIS — I48.0 PAROXYSMAL ATRIAL FIBRILLATION: ICD-10-CM

## 2025-01-09 PROCEDURE — 99213 OFFICE O/P EST LOW 20 MIN: CPT | Mod: ,,, | Performed by: STUDENT IN AN ORGANIZED HEALTH CARE EDUCATION/TRAINING PROGRAM

## 2025-01-09 PROCEDURE — 3078F DIAST BP <80 MM HG: CPT | Mod: CPTII,,, | Performed by: STUDENT IN AN ORGANIZED HEALTH CARE EDUCATION/TRAINING PROGRAM

## 2025-01-09 PROCEDURE — 3008F BODY MASS INDEX DOCD: CPT | Mod: CPTII,,, | Performed by: STUDENT IN AN ORGANIZED HEALTH CARE EDUCATION/TRAINING PROGRAM

## 2025-01-09 PROCEDURE — 1159F MED LIST DOCD IN RCRD: CPT | Mod: CPTII,,, | Performed by: STUDENT IN AN ORGANIZED HEALTH CARE EDUCATION/TRAINING PROGRAM

## 2025-01-09 PROCEDURE — 3077F SYST BP >= 140 MM HG: CPT | Mod: CPTII,,, | Performed by: STUDENT IN AN ORGANIZED HEALTH CARE EDUCATION/TRAINING PROGRAM

## 2025-01-09 NOTE — ASSESSMENT & PLAN NOTE
Hx of paroxysmal afib - currently in sinus on sotalol  Hx of GIB (hx of colon Ca s/p hemicolectomy) when on  AC  Currently Off AC  S/p watchman in 2/2023  On aspirin and Plavix (given TIA)  - Holter to evaluate for sick sinus syndrome.

## 2025-01-09 NOTE — PROGRESS NOTES
PCP: Olga Potts MD    Referring Provider:     Subjective:   Dilma Cr is a 64 y.o. female with hx of afib s/p watchman, Chornic back pain, NSTEMI, diabetes, hypertensio, h/p colon ca s/p hemicolectomy who presents for follow up.     1/9/24 - Doing well from cardiac standpoint. Has residual right sided weakness that she attributes to falls. Discussed importance of using a 4 point rolater for ambulation.     5/9/24 - patient is a poor historian. She was at [facility] in 1/2024 for TIA. She reports having 3 falls in the last 6 months. Reports right leg is weak. Last fall was yesterday and reports right ankle swelling and pain. Patient is going to the ER for Xrays. Would benefit from walking assistance (Cane or walker).     11/2/23 - Patient was recently in the ER with CP, afib with RVR and left face numbness. She was underwent MRI brain that was negative for acute stroke. She converted back to sinus after her sotaolol was restarted. Of note, patient lexapro was stopped by EP for Qtc prolongation and started on zoloft; however it appears the patient has not started her zoloft    7/26/23 - Patient is s/p Watchman on 2/2023. Reports having some night time palpitations that she describes as skipped beats. She reports low energy and low mood.     8/30/22 -  Patient states she has not felt well since hospitalization.  She reports stabbing chest pain lasting 1-2 seconds, occurs twice weekly.  She states she has some difficulty breathing at night.  She denies snoring.    She states is having aching pain  to posterior legs, from calves to thighs, with difficulty walking for the past 3 weeks, worse in the morning.         She also has a history of CAF and was on eliquis but was taken off recently due anemia of chronic blood loss.            Fhx:  Shx:      EKG 8/31/22 - Normal sinus rhythm. EKG          6/18/22 -  Atrial fibrillation  with rapid ventricular response   ECHO 6/19/22-  LVEF 60-65%.                               Normal left ventricular diastolic function.  Normal right ventricular size with normal right ventricular systolic function.  [facility] 6/20/22 -    Single vessel CAD (90% mid-distal LAD in-stent restenosis)  2.  Normal left sided filling pressure (LVEDP - 10mmHg)  3.  S/p successful angioplasty with scoring balloon (angiosculpt) of mid-distal LAD in-stent restenosis  4.  Deferred stent placement in the setting of GI bleed s/p blood transfusion.   [facility] 5/14/21 - There was two vessel coronary artery disease.                           The Mid LAD-2 lesion was 90% stenosed.  The Dist RCA-2 lesion was 50% stenosed.  The Dist RCA-1 lesion was 90% stenosed                          PCI to LAD and RCA     Lab Results   Component Value Date     10/12/2024    K 4.0 10/12/2024     10/12/2024    CO2 25 10/12/2024    BUN 26 (H) 10/12/2024    CREATININE 0.97 10/12/2024    CALCIUM 9.1 10/12/2024    ANIONGAP 10 10/12/2024    ESTGFRAFRICA 59 (L) 12/20/2021    EGFRNONAA 58 (L) 06/30/2022       Lab Results   Component Value Date    CHOL 170 11/27/2024    CHOL 145 01/31/2024    CHOL 150 11/15/2023     Lab Results   Component Value Date    HDL 64 (H) 11/27/2024    HDL 52 01/31/2024    HDL 73 (H) 11/15/2023     Lab Results   Component Value Date    LDLCALC 88 11/27/2024    LDLCALC 78 01/31/2024    LDLCALC 67 11/15/2023     Lab Results   Component Value Date    TRIG 88 11/27/2024    TRIG 75 01/31/2024    TRIG 48 11/15/2023     Lab Results   Component Value Date    CHOLHDL 2.7 11/27/2024    CHOLHDL 2.1 11/15/2023    CHOLHDL 2.0 10/27/2023       Lab Results   Component Value Date    WBC 7.11 10/12/2024    HGB 12.1 10/12/2024    HCT 40.1 10/12/2024    MCV 80.2 10/12/2024     10/12/2024           Review of Systems   Constitutional:  Positive for malaise/fatigue.   Respiratory:  Negative for cough and shortness of breath.    Cardiovascular:  Positive for palpitations. Negative for chest pain, orthopnea, claudication, leg  "swelling and PND.   Psychiatric/Behavioral:  Positive for depression.          Objective:   BP (!) 140/70   Pulse 64   Resp 18   Ht 5' 2" (1.575 m)   Wt 91.6 kg (202 lb)   LMP  (LMP Unknown)   BMI 36.95 kg/m²     Physical Exam  Constitutional:       General: She is not in acute distress.     Appearance: Normal appearance.   Eyes:      Extraocular Movements: Extraocular movements intact.   Cardiovascular:      Rate and Rhythm: Normal rate and regular rhythm.      Pulses: Normal pulses.      Heart sounds: Normal heart sounds. No murmur heard.  Pulmonary:      Effort: Pulmonary effort is normal.      Breath sounds: Normal breath sounds.   Abdominal:      Palpations: Abdomen is soft.   Musculoskeletal:         General: No swelling.      Cervical back: Neck supple.      Right lower leg: Edema present.   Skin:     Findings: No rash.   Neurological:      Mental Status: She is alert and oriented to person, place, and time.           Assessment:     1. Atherosclerosis of native coronary artery of native heart without angina pectoris        2. Paroxysmal atrial fibrillation        3. Hyperlipidemia LDL goal <70                  Plan:   Atherosclerosis of native coronary artery of native heart without angina pectoris  NSTEMI, status post PCI of LAD and RCA 5/14/21 followed by ISR with POBA in 5/2022  PCI to distal RCA, however stent at had diffuse disease with small PDA which is diffusely disease.  - has atypical CP   - continue metop xl 25mg qd  - Continue plavix    A-fib  Hx of paroxysmal afib - currently in sinus on sotalol  Hx of GIB (hx of colon Ca s/p hemicolectomy) when on  AC  Currently Off AC  S/p watchman in 2/2023  On aspirin and Plavix (given TIA)  - Holter to evaluate for sick sinus syndrome.       Hyperlipidemia LDL goal <70  Cont statin                   "

## 2025-01-27 ENCOUNTER — OFFICE VISIT (OUTPATIENT)
Dept: NEUROLOGY | Facility: CLINIC | Age: 65
End: 2025-01-27
Payer: COMMERCIAL

## 2025-01-27 VITALS
HEART RATE: 57 BPM | WEIGHT: 207 LBS | BODY MASS INDEX: 38.09 KG/M2 | HEIGHT: 62 IN | OXYGEN SATURATION: 99 % | SYSTOLIC BLOOD PRESSURE: 108 MMHG | DIASTOLIC BLOOD PRESSURE: 62 MMHG

## 2025-01-27 DIAGNOSIS — I10 HYPERTENSION, UNSPECIFIED TYPE: ICD-10-CM

## 2025-01-27 DIAGNOSIS — F32.A DEPRESSION, UNSPECIFIED DEPRESSION TYPE: ICD-10-CM

## 2025-01-27 DIAGNOSIS — G45.9 TIA (TRANSIENT ISCHEMIC ATTACK): Primary | ICD-10-CM

## 2025-01-27 DIAGNOSIS — I65.21 STENOSIS OF RIGHT CAROTID ARTERY: ICD-10-CM

## 2025-01-27 DIAGNOSIS — I48.91 ATRIAL FIBRILLATION, UNSPECIFIED TYPE: ICD-10-CM

## 2025-01-27 DIAGNOSIS — E78.5 HYPERLIPIDEMIA LDL GOAL <70: ICD-10-CM

## 2025-01-27 PROCEDURE — 99999 PR PBB SHADOW E&M-EST. PATIENT-LVL V: CPT | Mod: PBBFAC,,, | Performed by: NURSE PRACTITIONER

## 2025-01-27 PROCEDURE — 1159F MED LIST DOCD IN RCRD: CPT | Mod: CPTII,,, | Performed by: NURSE PRACTITIONER

## 2025-01-27 PROCEDURE — 99215 OFFICE O/P EST HI 40 MIN: CPT | Mod: PBBFAC | Performed by: NURSE PRACTITIONER

## 2025-01-27 PROCEDURE — 3078F DIAST BP <80 MM HG: CPT | Mod: CPTII,,, | Performed by: NURSE PRACTITIONER

## 2025-01-27 PROCEDURE — 1160F RVW MEDS BY RX/DR IN RCRD: CPT | Mod: CPTII,,, | Performed by: NURSE PRACTITIONER

## 2025-01-27 PROCEDURE — 99213 OFFICE O/P EST LOW 20 MIN: CPT | Mod: S$PBB,,, | Performed by: NURSE PRACTITIONER

## 2025-01-27 PROCEDURE — 3008F BODY MASS INDEX DOCD: CPT | Mod: CPTII,,, | Performed by: NURSE PRACTITIONER

## 2025-01-27 PROCEDURE — 3074F SYST BP LT 130 MM HG: CPT | Mod: CPTII,,, | Performed by: NURSE PRACTITIONER

## 2025-01-27 NOTE — PATIENT INSTRUCTIONS
Keep follow-up with Dr. Olivarez  Continue current plavix and aspirin  Continue current statin treatment  Continue current blood pressure medications  Keep follow-up with cardiology  Continue celexa 10mg daily  Release for Dr. Bauer notes

## 2025-01-27 NOTE — PROGRESS NOTES
Subjective:       Patient ID: Dilma Cr is a 64 y.o. female     Chief Complaint:    Chief Complaint   Patient presents with    Cerebrovascular Accident     Here for 6 month follow up- admitted to ER in November for nausea/vomiting, also has been experiencing sharp pains to the right side of her face; having vision trouble in right eye         Allergies:  Patient has no known allergies.    Current Medications:    Outpatient Encounter Medications as of 1/27/2025   Medication Sig Dispense Refill    amLODIPine (NORVASC) 10 MG tablet Take 1 tablet (10 mg total) by mouth once daily. 90 tablet 1    ascorbic acid, vitamin C, 250 mg Chew Take 250 mg by mouth once daily.      citalopram (CELEXA) 10 MG tablet Take 1 tablet (10 mg total) by mouth once daily. 90 tablet 1    clopidogreL (PLAVIX) 75 mg tablet Take 1 tablet (75 mg total) by mouth once daily. 90 tablet 1    cyanocobalamin 2000 MCG tablet Take 1 tablet by mouth once daily.      FARXIGA 10 mg tablet Take 1 tablet (10 mg total) by mouth every morning. 90 tablet 1    FREESTYLE BECCA 14 DAY SENSOR Kit USE TO CHECK GLUCOSE 4 TIMES DAILY      FREESTYLE BECCA 3 SENSOR Aleena 1 Device by Apply Externally route every 14 (fourteen) days. 3 each 5    furosemide (LASIX) 20 MG tablet Take 20 mg by mouth daily as needed (for fluid).      gabapentin (NEURONTIN) 300 MG capsule Take 1 capsule (300 mg total) by mouth 3 (three) times daily. 90 capsule 5    iron-vitamin C 100-250 mg, ICAR-C, 100-250 mg Tab Take 1 tablet by mouth 2 (two) times daily with meals.      naloxone (NARCAN) 4 mg/actuation Spry 1 spray by Nasal route once as needed.      nitroGLYCERIN (NITROSTAT) 0.4 MG SL tablet Place 1 tablet (0.4 mg total) under the tongue every 5 (five) minutes as needed for Chest pain. 30 tablet 0    NOVOLOG FLEXPEN U-100 INSULIN 100 unit/mL (3 mL) InPn pen Inject 20 Units into the skin 2 (two) times a day. Patient states, She takes 14 units in morning, 14 units later in day, and  10 units at night. 12 each 1    olmesartan-hydrochlorothiazide (BENICAR HCT) 40-25 mg per tablet Take 1 tablet by mouth once daily. 90 tablet 1    rosuvastatin (CRESTOR) 40 MG Tab Take 1 tablet (40 mg total) by mouth once daily. 90 tablet 1    sotaloL (BETAPACE) 120 MG Tab Take 1 tablet (120 mg total) by mouth every 12 (twelve) hours. 90 tablet 1    traMADoL (ULTRAM) 50 mg tablet Take 1 tablet (50 mg total) by mouth every 8 (eight) hours as needed for Pain. 90 tablet 0    traMADoL (ULTRAM) 50 mg tablet Take 1 tablet (50 mg total) by mouth every 8 (eight) hours as needed for Pain. 90 tablet 0    [START ON 2/1/2025] traMADoL (ULTRAM) 50 mg tablet Take 1 tablet (50 mg total) by mouth every 8 (eight) hours as needed for Pain. 90 tablet 0    TRESIBA FLEXTOUCH U-100 100 unit/mL (3 mL) insulin pen Inject 24 Units into the skin once daily. Take 10 units BID 3 Pen 1    [DISCONTINUED] isosorbide mononitrate (IMDUR) 30 MG 24 hr tablet Take 1 tablet (30 mg total) by mouth once daily. 30 tablet 11     No facility-administered encounter medications on file as of 1/27/2025.       History of Present Illness  63 y/o female following in neurology for CVA    She was seen in the hospital in October of 2023 with symptoms of numbness in left face and arm.  She had known history of A-fib with prior watchman in 2/2023.  Prior on eliquis, but it was d/c due to anemia.  Currently following with Dr. Reeves, on plavix.  Her workup during the admit included an MRI brain which was not revealing of acute findings, but did show chronic infarcts middle cerebellar peduncles and rajeev.  CTA head negative, but CTA neck showed critical stenosis right carotid siphon, Dr. Olivarez evaluated her but felt at that time was not surgical but plans yearly follow-up for continued monitoring.    Seen at [facility] 1/31/24 for reported slurred speech and facial numbness.  Their notes indicate MRI brain negative for acute findings, CTA head without occlusion,  "echo showed 70-75%, negative bubble study.      She is currently on ASA 81mg daily, plavix 75mg daily.  Was prior on eliquis but had to stop due to GI bleeding in early 2023.  She is followed in cardiology here for A-fib with watchman implant.  Followed in pain treatment for chronic lower back pain, likely the source of her reported RLE weakness as her imaging has not shown clear CVA as cause.    In past it has been difficult to accurately assess patient, this due to obvious depression.  She was not putting forth much physical effort during exams, would become very tearful, reporting difficulty staying on task at work.  During exam when assessing facial features she would not smile, but stated she was trying but clearly there was lack of physical input on the exam.  I did explain that strokes do not prevent smiling bilateral, she would hardly open her eyes and avoided eye contact.  It was enough that I started her with celexa.  She had zoloft on her med list but reported she was not taking it.  Last clinic visit 6 months ago she appeared to be doing much better with the celexa.    However, here today, she does again seem with a more down mood, she is also reporting a right sided headache, since October when seen in the emergency department for sudden onset of headache and vomiting.  CT head then was negative, no acute findings.  Supposedly since then with reported right vision decreased.  She did have near 200 systolic BP then when hers is usually well managed so certainly could of been the cause.  Again today, she does not seem to want to smile but saying "that she can't" but again, no unilateral.  I am going to get copy of Dr. Vipul jarvis note in retinal specialist.           Review of Systems  Review of Systems   Constitutional:  Negative for diaphoresis and fever.   HENT:  Negative for congestion, hearing loss and tinnitus.    Eyes:  Negative for blurred vision, double vision, photophobia, discharge and redness. "   Respiratory:  Negative for cough and shortness of breath.    Cardiovascular:  Negative for chest pain.   Gastrointestinal:  Negative for abdominal pain, nausea and vomiting.   Musculoskeletal:  Negative for back pain, falls, joint pain, myalgias and neck pain.   Skin:  Negative for itching and rash.   Neurological:  Positive for sensory change and focal weakness. Negative for dizziness, tremors, speech change, seizures, loss of consciousness, weakness and headaches.   Psychiatric/Behavioral:  Positive for depression and memory loss. Negative for hallucinations. The patient does not have insomnia.    All other systems reviewed and are negative.     Objective:     NEUROLOGICAL EXAMINATION:     MENTAL STATUS   Oriented to person, place, and time.   Attention: normal. Concentration: normal.   Speech: speech is normal   Level of consciousness: alert  Knowledge: good and consistent with education.   Normal comprehension.     CRANIAL NERVES     CN II   Visual fields full to confrontation.   Visual acuity: normal  Right visual field deficit: none  Left visual field deficit: none     CN III, IV, VI   Pupils are equal, round, and reactive to light.  Extraocular motions are normal.   Right pupil: Size: 3 mm. Shape: regular. Reactivity: brisk. Consensual response: intact. Accommodation: intact.   Left pupil: Size: 3 mm. Shape: regular. Reactivity: brisk. Consensual response: intact. Accommodation: intact.   CN III: no CN III palsy  CN VI: no CN VI palsy  Nystagmus: none   Diplopia: none  Upgaze: normal  Downgaze: normal  Conjugate gaze: present  Vestibulo-ocular reflex: present    CN V   Facial sensation intact.   Right facial sensation deficit: none  Left facial sensation deficit: none  Right corneal reflex: normal  Left corneal reflex: normal    CN VII   Facial expression full, symmetric.   Right facial weakness: none  Left facial weakness: none  Right taste: normal  Left taste: normal    CN VIII   CN VIII normal.    Hearing: intact    CN IX, X   CN IX normal.   CN X normal.   Palate: symmetric    CN XI   CN XI normal.   Right sternocleidomastoid strength: normal  Left sternocleidomastoid strength: normal  Right trapezius strength: normal  Left trapezius strength: normal    CN XII   CN XII normal.   Tongue: not atrophic  Fasciculations: absent  Tongue deviation: none    MOTOR EXAM   Muscle bulk: normal  Overall muscle tone: normal  Right arm tone: normal  Left arm tone: normal  Right arm pronator drift: absent  Left arm pronator drift: absent  Right leg tone: normal  Left leg tone: normal    Strength   Right neck flexion: 5/5  Left neck flexion: 5/5  Right neck extension: 5/5  Left neck extension: 5/5  Right deltoid: 5/5  Left deltoid: 5/5  Right biceps: 4/5  Left biceps: 5/5  Right triceps: 4/5  Left triceps: 5/5  Right wrist flexion: 4/5  Left wrist flexion: 5/5  Right wrist extension: 4/5  Left wrist extension: 5/5  Right interossei: 5/5  Left interossei: 5/5  Right iliopsoas: 5/5  Left iliopsoas: 5/5  Right quadriceps: 5/5  Left quadriceps: 5/5  Right hamstrin/5  Left hamstrin/5  Right anterior tibial: 5/5  Left anterior tibial: 5/5  Right posterior tibial: 5/5  Left posterior tibial: 5/5  Right gastroc: 5/5  Left gastroc: 5/5       Patient very clearly not putting forth much physical effort during her exam so is limited     REFLEXES     Reflexes   Right brachioradialis: 2+  Left brachioradialis: 2+  Right biceps: 2+  Left biceps: 2+  Right triceps: 2+  Left triceps: 2+  Right patellar: 2+  Left patellar: 2+  Right achilles: 2+  Left achilles: 2+  Right plantar: normal  Left plantar: normal  Right Delaney: absent  Left Delaney: absent  Right ankle clonus: absent  Left ankle clonus: absent  Right pendular knee jerk: absent  Left pendular knee jerk: absent    SENSORY EXAM   Light touch normal.   Right arm light touch: normal  Left arm light touch: normal  Right leg light touch: normal  Left leg light touch:  normal  Vibration normal.   Right arm vibration: normal  Left arm vibration: normal  Right leg vibration: normal  Left leg vibration: normal  Proprioception normal.   Right arm proprioception: normal  Left arm proprioception: normal  Right leg proprioception: normal  Left leg proprioception: normal  Pinprick normal.   Right arm pinprick: normal  Left arm pinprick: normal  Right leg pinprick: normal  Left leg pinprick: normal  Graphesthesia: normal  Romberg: negative  Stereognosis: normal    GAIT AND COORDINATION     Gait  Gait: wide-based     Coordination   Finger to nose coordination: normal  Heel to shin coordination: normal  Tandem walking coordination: normal    Tremor   Resting tremor: absent  Intention tremor: absent  Action tremor: absent       Physical Exam  Vitals and nursing note reviewed.   Constitutional:       Appearance: Normal appearance.   HENT:      Head: Normocephalic.   Eyes:      Extraocular Movements: Extraocular movements intact and EOM normal.      Pupils: Pupils are equal, round, and reactive to light.   Cardiovascular:      Rate and Rhythm: Normal rate and regular rhythm.   Pulmonary:      Effort: Pulmonary effort is normal.      Breath sounds: Normal breath sounds.   Musculoskeletal:         General: No swelling or tenderness. Normal range of motion.      Cervical back: Normal range of motion and neck supple.      Right lower leg: No edema.      Left lower leg: No edema.   Skin:     General: Skin is warm and dry.      Coloration: Skin is not jaundiced.      Findings: No rash.   Neurological:      General: No focal deficit present.      Mental Status: She is alert and oriented to person, place, and time.      GCS: GCS eye subscore is 4. GCS verbal subscore is 5. GCS motor subscore is 6.      Cranial Nerves: No cranial nerve deficit.      Sensory: No sensory deficit.      Motor: Motor function is intact. No weakness.      Coordination: Coordination is intact. Coordination normal.  Finger-Nose-Finger Test, Heel to Shin Test and Romberg Test normal.      Gait: Gait and tandem walk normal.      Deep Tendon Reflexes: Reflexes normal.      Reflex Scores:       Tricep reflexes are 2+ on the right side and 2+ on the left side.       Bicep reflexes are 2+ on the right side and 2+ on the left side.       Brachioradialis reflexes are 2+ on the right side and 2+ on the left side.       Patellar reflexes are 2+ on the right side and 2+ on the left side.       Achilles reflexes are 2+ on the right side and 2+ on the left side.  Psychiatric:         Attention and Perception: She is inattentive.         Mood and Affect: Mood is depressed. Affect is tearful.         Speech: Speech normal.         Behavior: Behavior is withdrawn. Behavior is cooperative.         Thought Content: Thought content normal.         Cognition and Memory: Cognition is impaired.          Assessment:     Problem List Items Addressed This Visit          Neuro    TIA (transient ischemic attack) - Primary       Psychiatric    Depression       Cardiac/Vascular    Hypertension (Chronic)    A-fib    Hyperlipidemia LDL goal <70    Stenosis of right carotid artery                Primary Diagnosis and ICD10  TIA (transient ischemic attack) [G45.9]    Plan:     There are no Patient Instructions on file for this visit.    There are no discontinued medications.      Requested Prescriptions      No prescriptions requested or ordered in this encounter       No orders of the defined types were placed in this encounter.

## 2025-02-19 NOTE — PROGRESS NOTES
Subjective:         Patient ID: Dilma Cr is a 64 y.o. female.    Chief Complaint: Back Pain (Patient is having low back pain that radiates into right leg.)        Pain  This is a chronic problem. The current episode started more than 1 year ago. The problem occurs daily. The problem has been waxing and waning. Associated symptoms include arthralgias and neck pain. Pertinent negatives include no anorexia, chest pain, chills, coughing, diaphoresis, fever, sore throat, vertigo or vomiting.     Review of Systems   Constitutional:  Negative for activity change, appetite change, chills, diaphoresis, fever and unexpected weight change.   HENT:  Negative for drooling, ear pain, facial swelling, nosebleeds, sore throat, trouble swallowing, voice change and goiter.    Eyes:  Negative for photophobia, pain, discharge, redness and visual disturbance.   Respiratory:  Negative for apnea, cough, choking, chest tightness, shortness of breath, wheezing and stridor.    Cardiovascular:  Negative for chest pain, palpitations and leg swelling.   Gastrointestinal:  Negative for abdominal distention, anorexia, diarrhea, rectal pain, vomiting and fecal incontinence.   Endocrine: Negative for cold intolerance, heat intolerance, polydipsia, polyphagia and polyuria.   Genitourinary:  Negative for flank pain, frequency and hot flashes.   Musculoskeletal:  Positive for arthralgias, back pain and neck pain. Negative for neck stiffness.   Integumentary:  Negative for color change and pallor.   Allergic/Immunologic: Negative for immunocompromised state.   Neurological:  Negative for dizziness, vertigo, seizures, syncope, facial asymmetry, speech difficulty, light-headedness, memory loss and coordination difficulties.   Hematological:  Negative for adenopathy. Does not bruise/bleed easily.   Psychiatric/Behavioral:  Negative for agitation, behavioral problems, confusion, decreased concentration, dysphoric mood, hallucinations,  self-injury and suicidal ideas. The patient is not nervous/anxious and is not hyperactive.            Past Medical History:   Diagnosis Date    Acute superficial gastritis without hemorrhage 06/21/2022    Arthritis     Cancer     colon cancer    Chronic kidney disease, stage 3b     Colon cancer     Coronary artery disease     Depression     Diabetes mellitus, type 2     Diverticula, colon 06/22/2022    GERD (gastroesophageal reflux disease)     H/O right hemicolectomy 06/22/2022    HH (hiatus hernia) 06/21/2022    Hyperlipidemia     Myocardial infarction     2 stents in 05/14/2021 Dr Porter    Renal disorder      Past Surgical History:   Procedure Laterality Date    BREAST SURGERY      C5-C6 RADHA  8-, 7-, 6-1-2016    Dr Fowler    CARDIAC SURGERY  02/07/2023    CERVICAL SPINE SURGERY      COLON SURGERY      EPIDURAL STEROID INJECTION INTO LUMBAR SPINE N/A 03/29/2022    Procedure: L4-5 RADHA;  Surgeon: Gela Fowler MD;  Location: [facility];  Service: Pain Management;  Laterality: N/A;  PT AWARE ON OV TO BE TESTED\  PLAVIX TO BE HELD FOR 7 DAYS PRIOR TO PROCEDURE PER DR FOWLER AND DR PORTER  3-28  message left on vm re: covid    HYSTERECTOMY      LEFT HEART CATHETERIZATION Left 05/14/2021    Procedure: Left heart cath;  Surgeon: Mateus Guan MD;  Location: [facility] CATH LAB;  Service: Cardiology;  Laterality: Left;    LEFT HEART CATHETERIZATION Left 06/20/2022    Procedure: Left heart cath;  Surgeon: Oliverio Bautista MD;  Location: [facility] CATH LAB;  Service: Cardiology;  Laterality: Left;    LUMBAR SPINE SURGERY  12/28/2023    MEDIPORT REMOVAL  10/14/2021    Dr Kraft    right rotator cuff repair      in [place]    TOTAL REDUCTION MAMMOPLASTY       Social History     Socioeconomic History    Marital status:      Spouse name: Jorge Alberto    Number of children: 2   Occupational History    Occupation:  at [employer] for past 27 years   Tobacco Use    Smoking status: Never      Passive exposure: Never    Smokeless tobacco: Never   Substance and Sexual Activity    Alcohol use: Never    Drug use: Never    Sexual activity: Yes   Social History Narrative    Worked at [employer] as  for past 30 years. H/O colon cancer. Watchman procedure back in Feb (heart).  Sees Dr Stover Oncologist. Cardiologist Dr Alina Garcia ([place]). Sees Endocrinologist Koko Rivera with [facility]. Fired from [employer] due to missing so many days. Insurance will end July 5,2024.      Social Drivers of Health     Financial Resource Strain: Patient Declined (1/31/2024)    Received from [facility]    Overall Financial Resource Strain (CARDIA)     Difficulty of Paying Living Expenses: Patient declined   Food Insecurity: Patient Declined (1/31/2024)    Received from [facility]    Hunger Vital Sign     Worried About Running Out of Food in the Last Year: Patient declined     Ran Out of Food in the Last Year: Patient declined   Transportation Needs: No Transportation Needs (2/4/2024)    Received from [facility]    PRAPARE - Transportation     Lack of Transportation (Medical): No     Lack of Transportation (Non-Medical): No   Stress: Patient Declined (1/31/2024)    Received from [facility]    [place] [employer] - Occupational Stress Questionnaire     Feeling of Stress : Patient declined   Housing Stability: Unknown (2/4/2024)    Received from [facility]    Housing Stability Vital Sign     Unable to Pay for Housing in the Last Year: No     Unstable Housing in the Last Year: No     Family History   Problem  "Relation Name Age of Onset    Hypertension Mother      Diabetes Mother      Hypertension Father      Diabetes Father      Breast cancer Sister      Hypertension Sister      Breast cancer Sister      Hypertension Brother       Review of patient's allergies indicates:  No Known Allergies     Objective:  Vitals:    03/03/25 0852 03/03/25 0853   BP: (!) 143/51    Pulse: (!) 56    Resp: 18    Weight: 95.7 kg (211 lb)    Height: 5' 1" (1.549 m)    PainSc:   8   8   PainLoc: Back                    Physical Exam  Vitals and nursing note reviewed. Exam conducted with a chaperone present.   Constitutional:       General: She is awake. She is not in acute distress.     Appearance: Normal appearance. She is not ill-appearing, toxic-appearing or diaphoretic.   HENT:      Head: Normocephalic and atraumatic.      Nose: Nose normal.      Mouth/Throat:      Mouth: Mucous membranes are moist.      Pharynx: Oropharynx is clear.   Eyes:      Conjunctiva/sclera: Conjunctivae normal.      Pupils: Pupils are equal, round, and reactive to light.   Cardiovascular:      Rate and Rhythm: Normal rate.   Pulmonary:      Effort: Pulmonary effort is normal. No respiratory distress.   Abdominal:      Palpations: Abdomen is soft.      Tenderness: There is no guarding.   Musculoskeletal:         General: Normal range of motion.      Cervical back: Normal range of motion and neck supple. No rigidity.      Thoracic back: Tenderness present.      Lumbar back: Tenderness present.   Skin:     General: Skin is warm and dry.      Coloration: Skin is not jaundiced or pale.   Neurological:      General: No focal deficit present.      Mental Status: She is alert and oriented to person, place, and time. Mental status is at baseline.      Cranial Nerves: No cranial nerve deficit (II-XII).   Psychiatric:         Mood and Affect: Mood normal.         Behavior: Behavior normal. Behavior is cooperative.         Thought Content: Thought content normal.       "     Mammo Digital Screening Bilat w/ Michael  Narrative: Facility:  [facility] [address] [place] [address] [phone]    Name: Dilma Cr    MRN: [id number]    Result:  Mammo Digital Screening Bilat w/ Michael    History:  Patient is 64 y.o. and is seen for a screening mammogram.    Films Compared:  Compared to: 12/19/2023 Mammo Digital Screening Bilat w/ Michael, 12/13/2022   Mammo Digital Screening Bilat, and 12/07/2021 Mammo Digital Screening   Bilat     Findings:  This procedure was performed using tomosynthesis.   Computer-aided detection was utilized in the interpretation of this   examination.    There are scattered areas of fibroglandular density. There is no evidence   of suspicious masses, microcalcifications or architectural distortion.  Impression:    No mammographic evidence of malignancy.    BI-RADS Category 1: Negative    Recommendation:  Routine screening mammogram in 1 year is recommended.    Your estimated lifetime risk of breast cancer (to age 85) based on   Tyrer-Cuzick risk assessment model is 11.89%.  According to the American   Cancer Society, patients with a lifetime breast cancer risk of 20% or   higher might benefit from supplemental screening tests, such as screening   breast MRI.    Ghada Jamison MD         Office Visit on 11/27/2024   Component Date Value Ref Range Status    Glucose, Fasting 11/27/2024 179 (H)  70 - 100 mg/dL Final    Triglycerides 11/27/2024 88  37 - 140 mg/dL Final    Cholesterol 11/27/2024 170  <=200 mg/dL Final    HDL Cholesterol 11/27/2024 64 (H)  35 - 60 mg/dL Final    Cholesterol/HDL Ratio (Risk Factor) 11/27/2024 2.7   Final    Non-HDL 11/27/2024 106  mg/dL Final    LDL Calculated 11/27/2024 88  mg/dL Final    LDL/HDL 11/27/2024 1.4   Final    VLDL 11/27/2024 18  mg/dL Final    Hemoglobin A1C 11/29/2024 8.2 (H)  <=7.0 % Final    Estimated Average Glucose 11/29/2024 189  mg/dL Final   Orders Only on 10/12/2024   Component Date  Value Ref Range Status    QRS Duration 10/12/2024 86  ms Final    OHS QTC Calculation 10/12/2024 434  ms Final   Admission on 10/12/2024, Discharged on 10/12/2024   Component Date Value Ref Range Status    POC Glucose 10/12/2024 108 (H)  70 - 105 mg/dL Final    Sodium 10/12/2024 138  136 - 145 mmol/L Final    Potassium 10/12/2024 4.0  3.5 - 5.1 mmol/L Final    Chloride 10/12/2024 107  98 - 107 mmol/L Final    CO2 10/12/2024 25  21 - 32 mmol/L Final    Anion Gap 10/12/2024 10  7 - 16 mmol/L Final    Glucose 10/12/2024 104  74 - 106 mg/dL Final    BUN 10/12/2024 26 (H)  7 - 18 mg/dL Final    Creatinine 10/12/2024 0.97  0.55 - 1.02 mg/dL Final    BUN/Creatinine Ratio 10/12/2024 27 (H)  6 - 20 Final    Calcium 10/12/2024 9.1  8.5 - 10.1 mg/dL Final    Total Protein 10/12/2024 7.5  6.4 - 8.2 g/dL Final    Albumin 10/12/2024 3.7  3.5 - 5.0 g/dL Final    Globulin 10/12/2024 3.8  2.0 - 4.0 g/dL Final    A/G Ratio 10/12/2024 1.0   Final    Bilirubin, Total 10/12/2024 0.4  >0.0 - 1.2 mg/dL Final    Alk Phos 10/12/2024 108  50 - 130 U/L Final    ALT 10/12/2024 20  13 - 56 U/L Final    AST 10/12/2024 19  15 - 37 U/L Final    eGFR 10/12/2024 66  >=60 mL/min/1.73m2 Final    WBC 10/12/2024 7.11  4.50 - 11.00 K/uL Final    RBC 10/12/2024 5.00  4.20 - 5.40 M/uL Final    Hemoglobin 10/12/2024 12.1  12.0 - 16.0 g/dL Final    Hematocrit 10/12/2024 40.1  38.0 - 47.0 % Final    MCV 10/12/2024 80.2  80.0 - 96.0 fL Final    MCH 10/12/2024 24.2 (L)  27.0 - 31.0 pg Final    MCHC 10/12/2024 30.2 (L)  32.0 - 36.0 g/dL Final    RDW 10/12/2024 21.4 (H)  11.5 - 14.5 % Final    Platelet Count 10/12/2024 307  150 - 400 K/uL Final    MPV 10/12/2024 11.2  9.4 - 12.4 fL Final    Neutrophils % 10/12/2024 66.2 (H)  53.0 - 65.0 % Final    Lymphocytes % 10/12/2024 24.3 (L)  27.0 - 41.0 % Final    Monocytes % 10/12/2024 7.6 (H)  2.0 - 6.0 % Final    Eosinophils % 10/12/2024 1.3  1.0 - 4.0 % Final    Basophils % 10/12/2024 0.3  0.0 - 1.0 % Final    Immature  Granulocytes % 10/12/2024 0.3  0.0 - 0.4 % Final    nRBC, Auto 10/12/2024 0.0  <=0.0 % Final    Neutrophils, Abs 10/12/2024 4.71  1.80 - 7.70 K/uL Final    Lymphocytes, Absolute 10/12/2024 1.73  1.00 - 4.80 K/uL Final    Monocytes, Absolute 10/12/2024 0.54  0.00 - 0.80 K/uL Final    Eosinophils, Absolute 10/12/2024 0.09  0.00 - 0.50 K/uL Final    Basophils, Absolute 10/12/2024 0.02  0.00 - 0.20 K/uL Final    Immature Granulocytes, Absolute 10/12/2024 0.02  0.00 - 0.04 K/uL Final    nRBC, Absolute 10/12/2024 0.00  <=0.00 x10e3/uL Final    Diff Type 10/12/2024 Auto   Final    Platelet Morphology 10/12/2024 Few Large Platelets (A)  Normal Final    Anisocytosis 10/12/2024 1+   Final    Ovalocytes 10/12/2024 Few   Final   Office Visit on 10/02/2024   Component Date Value Ref Range Status    POC Amphetamines 10/02/2024 Negative  Negative, Inconclusive Corrected    POC Barbiturates 10/02/2024 Negative  Negative, Inconclusive Final    POC Benzodiazepines 10/02/2024 Negative  Negative, Inconclusive Final    POC Cocaine 10/02/2024 Negative  Negative, Inconclusive Final    POC THC 10/02/2024 Negative  Negative, Inconclusive Final    POC Methadone 10/02/2024 Negative  Negative, Inconclusive Final    POC Methamphetamine 10/02/2024 Negative  Negative, Inconclusive Final    POC Opiates 10/02/2024 Negative  Negative, Inconclusive Final    POC Oxycodone 10/02/2024 Negative  Negative, Inconclusive Final    POC Phencyclidine 10/02/2024 Negative  Negative, Inconclusive Final    POC Methylenedioxymethamphetamine * 10/02/2024 Negative  Negative, Inconclusive Final    POC Tricyclic Antidepressants 10/02/2024 Negative  Negative, Inconclusive Final    POC Buprenorphine 10/02/2024 Negative   Final     Acceptable 10/02/2024 Yes   Final    POC Temperature (Urine) 10/02/2024 96   Final         Orders Placed This Encounter   Procedures    Controlled Substance Monitoring Panel, Random, Urine     Standing Status:   Future     Number  of Occurrences:   1     Expected Date:   3/3/2025     Expiration Date:   5/2/2026    POCT Urine Drug Screen Presump     Interpretive Information:     Negative:  No drug detected at the cut off level.   Positive:  This result represents presumptive positive for the   tested drug, other substances may yield a positive response other   than the analyte of interest. This result should be utilized for   diagnostic purpose only. Confirmation testing will be performed upon physician request only.            Requested Prescriptions     Signed Prescriptions Disp Refills    traMADoL (ULTRAM) 50 mg tablet 90 tablet 0     Sig: Take 1 tablet (50 mg total) by mouth every 8 (eight) hours as needed for Pain.    traMADoL (ULTRAM) 50 mg tablet 90 tablet 0     Sig: Take 1 tablet (50 mg total) by mouth every 8 (eight) hours as needed for Pain.    traMADoL (ULTRAM) 50 mg tablet 90 tablet 0     Sig: Take 1 tablet (50 mg total) by mouth every 8 (eight) hours as needed for Pain.       Assessment:     1. Lumbosacral radiculopathy    2. Osteoarthrosis multiple sites, not specified as generalized    3. Encounter for long-term (current) use of medications                 A's of Opioid Risk Assessment  Activity:Patient can perform ADL.   Analgesia:Patients pain is partially controlled by current medication. Patient has tried OTC medications such as Tylenol and Ibuprofen with out relief.   Adverse Effects: Patient denies constipation or sedation.  Aberrant Behavior:  reviewed with no aberrant drug seeking/taking behavior.  Overdose reversal drug naloxone discussed    Drug screen reviewed        MRI lumbar spine [facility] May 22, 2024  multiple levels stenosis, multiple level degenerative changes    L4/5 bilateral foraminal narrowing    X-ray lumbar spine [facility] January 30, 2024  No acute fracture or dislocation. Posterior spinal fusion hardware and intervertebral disc spacer at L2-L3, which is in expected position. Moderate to  severe degenerative disc disease L3-L4, L4-5 and L5-S1. Multilevel endplate change and multilevel anterior osteophytes. Multilevel facet change in neural foraminal narrowing       Plan:    Addendum October 9, 2024 destroyed cannabis card  Patient requesting refill tramadol request a discontinue hydrocodone due to palpitations    March 28, 2024 discontinue cannabis destroyed cannabis        Narcan December 2022    Anticoagulated Plavix    History Colon  Cancer    Follows oncology    Follows Neurology [facility]    Following spine surgery [facility] spine lumbar surgery  L2/3 posterior lumbar fusion    History of stroke affected her right side weakness  Limit NSAIDs anticoagulant/stroke      She states she saw spine surgeon August 2024 in [place] no further options were offered    Again today complaining of ongoing back pain buttock and leg pain numbness and tingling radicular in nature greater than 1 year    She is very frustrated today she states her back and leg is not improving after her last lumbar surgery    She would like to continue with tramadol    Continue home exercise program as directed    Continue current medication    Follow-up 3 months    Dr. Fowler, July 2025    Bring original prescription medication bottles/container/box with labels to each visit

## 2025-03-03 ENCOUNTER — OFFICE VISIT (OUTPATIENT)
Dept: VASCULAR SURGERY | Facility: CLINIC | Age: 65
End: 2025-03-03
Payer: COMMERCIAL

## 2025-03-03 ENCOUNTER — HOSPITAL ENCOUNTER (OUTPATIENT)
Dept: RADIOLOGY | Facility: HOSPITAL | Age: 65
Discharge: HOME | End: 2025-03-03
Attending: NURSE PRACTITIONER
Payer: COMMERCIAL

## 2025-03-03 ENCOUNTER — OFFICE VISIT (OUTPATIENT)
Dept: PAIN MEDICINE | Facility: CLINIC | Age: 65
End: 2025-03-03
Payer: COMMERCIAL

## 2025-03-03 VITALS
HEIGHT: 61 IN | SYSTOLIC BLOOD PRESSURE: 146 MMHG | DIASTOLIC BLOOD PRESSURE: 61 MMHG | WEIGHT: 211 LBS | BODY MASS INDEX: 39.84 KG/M2

## 2025-03-03 VITALS
HEART RATE: 56 BPM | SYSTOLIC BLOOD PRESSURE: 143 MMHG | WEIGHT: 211 LBS | BODY MASS INDEX: 39.84 KG/M2 | RESPIRATION RATE: 18 BRPM | HEIGHT: 61 IN | DIASTOLIC BLOOD PRESSURE: 51 MMHG

## 2025-03-03 DIAGNOSIS — R09.89 BRUIT: ICD-10-CM

## 2025-03-03 DIAGNOSIS — M89.49 OSTEOARTHROSIS MULTIPLE SITES, NOT SPECIFIED AS GENERALIZED: Chronic | ICD-10-CM

## 2025-03-03 DIAGNOSIS — M54.17 LUMBOSACRAL RADICULOPATHY: Primary | Chronic | ICD-10-CM

## 2025-03-03 DIAGNOSIS — I65.21 STENOSIS OF RIGHT CAROTID ARTERY: Primary | ICD-10-CM

## 2025-03-03 DIAGNOSIS — Z79.899 ENCOUNTER FOR LONG-TERM (CURRENT) USE OF MEDICATIONS: ICD-10-CM

## 2025-03-03 LAB

## 2025-03-03 PROCEDURE — 80305 DRUG TEST PRSMV DIR OPT OBS: CPT | Mod: PBBFAC | Performed by: PHYSICIAN ASSISTANT

## 2025-03-03 PROCEDURE — 3077F SYST BP >= 140 MM HG: CPT | Mod: CPTII,,, | Performed by: PHYSICIAN ASSISTANT

## 2025-03-03 PROCEDURE — 3008F BODY MASS INDEX DOCD: CPT | Mod: CPTII,,, | Performed by: PHYSICIAN ASSISTANT

## 2025-03-03 PROCEDURE — 99999 PR PBB SHADOW E&M-EST. PATIENT-LVL IV: CPT | Mod: PBBFAC,,, | Performed by: NURSE PRACTITIONER

## 2025-03-03 PROCEDURE — 1160F RVW MEDS BY RX/DR IN RCRD: CPT | Mod: CPTII,,, | Performed by: NURSE PRACTITIONER

## 2025-03-03 PROCEDURE — 99999PBSHW POCT URINE DRUG SCREEN PRESUMP: Mod: PBBFAC,,,

## 2025-03-03 PROCEDURE — 99999 PR PBB SHADOW E&M-EST. PATIENT-LVL V: CPT | Mod: PBBFAC,,, | Performed by: PHYSICIAN ASSISTANT

## 2025-03-03 PROCEDURE — 3078F DIAST BP <80 MM HG: CPT | Mod: CPTII,,, | Performed by: PHYSICIAN ASSISTANT

## 2025-03-03 PROCEDURE — 3008F BODY MASS INDEX DOCD: CPT | Mod: CPTII,,, | Performed by: NURSE PRACTITIONER

## 2025-03-03 PROCEDURE — 99215 OFFICE O/P EST HI 40 MIN: CPT | Mod: PBBFAC | Performed by: PHYSICIAN ASSISTANT

## 2025-03-03 PROCEDURE — 99214 OFFICE O/P EST MOD 30 MIN: CPT | Mod: S$PBB,,, | Performed by: NURSE PRACTITIONER

## 2025-03-03 PROCEDURE — 93880 EXTRACRANIAL BILAT STUDY: CPT | Mod: TC

## 2025-03-03 PROCEDURE — 3077F SYST BP >= 140 MM HG: CPT | Mod: CPTII,,, | Performed by: NURSE PRACTITIONER

## 2025-03-03 PROCEDURE — 1159F MED LIST DOCD IN RCRD: CPT | Mod: CPTII,,, | Performed by: NURSE PRACTITIONER

## 2025-03-03 PROCEDURE — 1159F MED LIST DOCD IN RCRD: CPT | Mod: CPTII,,, | Performed by: PHYSICIAN ASSISTANT

## 2025-03-03 PROCEDURE — 99214 OFFICE O/P EST MOD 30 MIN: CPT | Mod: S$PBB,,, | Performed by: PHYSICIAN ASSISTANT

## 2025-03-03 PROCEDURE — 3078F DIAST BP <80 MM HG: CPT | Mod: CPTII,,, | Performed by: NURSE PRACTITIONER

## 2025-03-03 PROCEDURE — 99214 OFFICE O/P EST MOD 30 MIN: CPT | Mod: PBBFAC,25 | Performed by: NURSE PRACTITIONER

## 2025-03-03 RX ORDER — ASPIRIN 81 MG/1
81 TABLET ORAL DAILY
COMMUNITY

## 2025-03-03 RX ORDER — TRAMADOL HYDROCHLORIDE 50 MG/1
50 TABLET ORAL EVERY 8 HOURS PRN
Qty: 90 TABLET | Refills: 0 | Status: SHIPPED | OUTPATIENT
Start: 2025-03-03

## 2025-03-03 RX ORDER — TRAMADOL HYDROCHLORIDE 50 MG/1
50 TABLET ORAL EVERY 8 HOURS PRN
Qty: 90 TABLET | Refills: 0 | Status: SHIPPED | OUTPATIENT
Start: 2025-05-02

## 2025-03-03 RX ORDER — TRAMADOL HYDROCHLORIDE 50 MG/1
50 TABLET ORAL EVERY 8 HOURS PRN
Qty: 90 TABLET | Refills: 0 | Status: SHIPPED | OUTPATIENT
Start: 2025-04-02

## 2025-03-03 NOTE — PROGRESS NOTES
Subjective:       Patient ID: Dilma Cr is a 64 y.o. female.    Chief Complaint: Carotid Artery Disease  Establish patient 1 year follow-up carotid duplex without significant stenosis Patient with chronic left pontine infarct CTA neck significant stenosis CTA head suggest carotid siphon  calciphication 50% right supraclinoid ICA narrowing without large vessel acute occlusive disease,  Asymptomatic carotid stenosis with 50% right supralinoid ICA stenosis  not accessible  to surgical intervention   as had Watchman procedure in [place] for AFib past medical history to include diabetes coronary artery disease with PCI she is on aspirin and Plavix remote history of colon cancer  family history includes Breast cancer in her sister and sister; Diabetes in her father and mother; Hypertension in her brother, father, mother, and sister.  Past Medical History:   Diagnosis Date    Acute superficial gastritis without hemorrhage 06/21/2022    Arthritis     Cancer     colon cancer    Chronic kidney disease, stage 3b     Colon cancer     Coronary artery disease     Depression     Diabetes mellitus, type 2     Diverticula, colon 06/22/2022    GERD (gastroesophageal reflux disease)     H/O right hemicolectomy 06/22/2022    HH (hiatus hernia) 06/21/2022    Hyperlipidemia     Myocardial infarction     2 stents in 05/14/2021 Dr Porter    Renal disorder       Past Surgical History:   Procedure Laterality Date    BREAST SURGERY      C5-C6 RADHA  8-, 7-, 6-1-2016    Dr Fowler    CARDIAC SURGERY  02/07/2023    CERVICAL SPINE SURGERY      COLON SURGERY      EPIDURAL STEROID INJECTION INTO LUMBAR SPINE N/A 03/29/2022    Procedure: L4-5 RADHA;  Surgeon: Gela Fowler MD;  Location: [facility];  Service: Pain Management;  Laterality: N/A;  PT AWARE ON OV TO BE TESTED\  PLAVIX TO BE HELD FOR 7 DAYS PRIOR TO PROCEDURE PER DR FOWLER AND DR PORTER  3-28  message left on  re: covid    HYSTERECTOMY      LEFT HEART  "CATHETERIZATION Left 05/14/2021    Procedure: Left heart cath;  Surgeon: Mateus Guan MD;  Location: [facility] CATH LAB;  Service: Cardiology;  Laterality: Left;    LEFT HEART CATHETERIZATION Left 06/20/2022    Procedure: Left heart cath;  Surgeon: Oliverio Bautista MD;  Location: [facility] CATH LAB;  Service: Cardiology;  Laterality: Left;    LUMBAR SPINE SURGERY  12/28/2023    MEDIPORT REMOVAL  10/14/2021    Dr Kraft    right rotator cuff repair      in [place]    TOTAL REDUCTION MAMMOPLASTY         reports that she has never smoked. She has never been exposed to tobacco smoke. She has never used smokeless tobacco. She reports that she does not drink alcohol and does not use drugs.   HPI  Review of Systems   Neurological:         Mild right-sided weakness         Objective:      BP (!) 146/61 (BP Location: Right arm, Patient Position: Sitting)   Ht 5' 1" (1.549 m)   Wt 95.7 kg (210 lb 15.7 oz)   LMP  (LMP Unknown)   BMI 39.86 kg/m²    Physical Exam  Vitals and nursing note reviewed.   Constitutional:       Appearance: Normal appearance.   HENT:      Head: Normocephalic.      Mouth/Throat:      Mouth: Mucous membranes are moist.   Eyes:      Conjunctiva/sclera: Conjunctivae normal.   Cardiovascular:      Rate and Rhythm: Normal rate and regular rhythm.   Pulmonary:      Effort: Pulmonary effort is normal.   Abdominal:      Palpations: Abdomen is soft.   Skin:     General: Skin is warm and dry.   Neurological:      General: No focal deficit present.      Mental Status: She is alert and oriented to person, place, and time.      Comments: Slight right upper extremity noted on exam   Psychiatric:         Mood and Affect: Mood normal.         Behavior: Behavior normal.           Assessment:       1. Stenosis of right carotid artery        Plan:     Educated on carotid duplex results  Continue current medication  Follow-up in 2 years with carotid duplex      "

## 2025-03-05 ENCOUNTER — OFFICE VISIT (OUTPATIENT)
Dept: FAMILY MEDICINE | Facility: CLINIC | Age: 65
End: 2025-03-05
Payer: COMMERCIAL

## 2025-03-05 VITALS
BODY MASS INDEX: 40.02 KG/M2 | SYSTOLIC BLOOD PRESSURE: 134 MMHG | WEIGHT: 212 LBS | DIASTOLIC BLOOD PRESSURE: 65 MMHG | RESPIRATION RATE: 18 BRPM | HEART RATE: 53 BPM | OXYGEN SATURATION: 99 % | TEMPERATURE: 98 F | HEIGHT: 61 IN

## 2025-03-05 DIAGNOSIS — E11.42 TYPE 2 DIABETES MELLITUS WITH DIABETIC POLYNEUROPATHY, WITH LONG-TERM CURRENT USE OF INSULIN: Primary | Chronic | ICD-10-CM

## 2025-03-05 DIAGNOSIS — I10 PRIMARY HYPERTENSION: ICD-10-CM

## 2025-03-05 DIAGNOSIS — Z79.4 TYPE 2 DIABETES MELLITUS WITH DIABETIC POLYNEUROPATHY, WITH LONG-TERM CURRENT USE OF INSULIN: Primary | Chronic | ICD-10-CM

## 2025-03-05 DIAGNOSIS — F32.A DEPRESSION, UNSPECIFIED DEPRESSION TYPE: ICD-10-CM

## 2025-03-05 DIAGNOSIS — I63.9 CEREBROVASCULAR ACCIDENT (CVA), UNSPECIFIED MECHANISM: ICD-10-CM

## 2025-03-05 LAB
EST. AVERAGE GLUCOSE BLD GHB EST-MCNC: 189 MG/DL
HBA1C MFR BLD HPLC: 8.2 %

## 2025-03-05 PROCEDURE — 83036 HEMOGLOBIN GLYCOSYLATED A1C: CPT | Mod: ,,, | Performed by: CLINICAL MEDICAL LABORATORY

## 2025-03-05 RX ORDER — NORTRIPTYLINE HYDROCHLORIDE 25 MG/1
25 CAPSULE ORAL NIGHTLY
COMMUNITY
Start: 2025-02-22 | End: 2025-03-05 | Stop reason: SDUPTHER

## 2025-03-05 RX ORDER — AMLODIPINE BESYLATE 10 MG/1
10 TABLET ORAL DAILY
Qty: 90 TABLET | Refills: 1 | Status: SHIPPED | OUTPATIENT
Start: 2025-03-05 | End: 2026-03-05

## 2025-03-05 RX ORDER — SOTALOL HYDROCHLORIDE 120 MG/1
120 TABLET ORAL EVERY 12 HOURS
Qty: 90 TABLET | Refills: 1 | Status: SHIPPED | OUTPATIENT
Start: 2025-03-05

## 2025-03-05 RX ORDER — INSULIN ASPART 100 [IU]/ML
20 INJECTION, SOLUTION INTRAVENOUS; SUBCUTANEOUS 2 TIMES DAILY
Qty: 12 EACH | Refills: 1 | Status: CANCELLED | OUTPATIENT
Start: 2025-03-05

## 2025-03-05 RX ORDER — CLOPIDOGREL BISULFATE 75 MG/1
75 TABLET ORAL DAILY
Qty: 90 TABLET | Refills: 1 | Status: SHIPPED | OUTPATIENT
Start: 2025-03-05

## 2025-03-05 RX ORDER — CHOLECALCIFEROL (VITAMIN D3) 25 MCG
1000 TABLET ORAL DAILY
COMMUNITY

## 2025-03-05 RX ORDER — OLMESARTAN MEDOXOMIL AND HYDROCHLOROTHIAZIDE 40/25 40; 25 MG/1; MG/1
0.5 TABLET ORAL DAILY
Qty: 45 TABLET | Refills: 1 | Status: SHIPPED | OUTPATIENT
Start: 2025-03-05

## 2025-03-05 RX ORDER — BLOOD-GLUCOSE SENSOR
1 EACH MISCELLANEOUS
Qty: 3 EACH | Refills: 5 | Status: SHIPPED | OUTPATIENT
Start: 2025-03-05

## 2025-03-05 RX ORDER — CITALOPRAM 10 MG/1
10 TABLET ORAL DAILY
Qty: 90 TABLET | Refills: 1 | Status: SHIPPED | OUTPATIENT
Start: 2025-03-05 | End: 2025-09-01

## 2025-03-05 RX ORDER — GABAPENTIN 300 MG/1
300 CAPSULE ORAL 3 TIMES DAILY
Qty: 90 CAPSULE | Refills: 5 | Status: SHIPPED | OUTPATIENT
Start: 2025-03-05 | End: 2026-03-05

## 2025-03-05 RX ORDER — DAPAGLIFLOZIN 10 MG/1
10 TABLET, FILM COATED ORAL EVERY MORNING
Qty: 90 TABLET | Refills: 1 | Status: SHIPPED | OUTPATIENT
Start: 2025-03-05

## 2025-03-05 RX ORDER — OLMESARTAN MEDOXOMIL AND HYDROCHLOROTHIAZIDE 40/25 40; 25 MG/1; MG/1
0.5 TABLET ORAL DAILY
COMMUNITY
End: 2025-03-05 | Stop reason: SDUPTHER

## 2025-03-05 RX ORDER — FUROSEMIDE 20 MG/1
20 TABLET ORAL DAILY PRN
Qty: 90 TABLET | Refills: 1 | Status: SHIPPED | OUTPATIENT
Start: 2025-03-05

## 2025-03-05 RX ORDER — SEMAGLUTIDE 2.68 MG/ML
2 INJECTION, SOLUTION SUBCUTANEOUS WEEKLY
COMMUNITY
Start: 2024-07-03

## 2025-03-05 RX ORDER — INSULIN DEGLUDEC 100 U/ML
24 INJECTION, SOLUTION SUBCUTANEOUS DAILY
Qty: 3 PEN | Refills: 1 | Status: SHIPPED | OUTPATIENT
Start: 2025-03-05

## 2025-03-05 RX ORDER — ROSUVASTATIN CALCIUM 40 MG/1
40 TABLET, COATED ORAL DAILY
Qty: 90 TABLET | Refills: 1 | Status: SHIPPED | OUTPATIENT
Start: 2025-03-05

## 2025-03-05 RX ORDER — INSULIN ASPART 100 [IU]/ML
20 INJECTION, SOLUTION INTRAVENOUS; SUBCUTANEOUS 2 TIMES DAILY
Qty: 12 ML | Refills: 5 | Status: SHIPPED | OUTPATIENT
Start: 2025-03-05

## 2025-03-05 RX ORDER — NORTRIPTYLINE HYDROCHLORIDE 25 MG/1
25 CAPSULE ORAL NIGHTLY
Qty: 90 CAPSULE | Refills: 1 | Status: SHIPPED | OUTPATIENT
Start: 2025-03-05

## 2025-03-05 NOTE — PROGRESS NOTES
New Clinic Note    Dilma Cr is a 64 y.o. female     CC:   Chief Complaint   Patient presents with    Diabetes     Patient is here for her 3 month follow up for her diabetes. Last A1C was 8.2. Uses [facility]. Her Colonoscopy follow up with Dr Narayan is due June 2025. Has history of colon cancer. Changed from B/C B/S and now has Ambetter and [facility].     Follow-up    Health Maintenance     Hepatitis C Screening refused  Shingles Vaccine(1 of 2) refused  Pneumococcal Vaccines (Age 50+)(1 of 2 - PCV) refused  COVID-19 Vaccine(3 - Pfizer risk series) due on 04/15/2021  RSV Vaccine (Age 60+ and Pregnant patients)(1 - Risk 60-74 years 1-dose series) Never done  Foot Exam Needs  Diabetic Eye Exam 03/12/2025 [facility]# [phone]  Hemoglobin A1c due on 02/28/2025         Subjective    History of Present Illness HPI   Patient is for evaluation of chronic medical problems. Patient needs refills. Dilma  is tolerating medications well without side effects.     Current Medications[1]     Past Medical History:   Diagnosis Date    Acute superficial gastritis without hemorrhage 06/21/2022    Arthritis     Cancer     colon cancer    Chronic kidney disease, stage 3b     Colon cancer     Coronary artery disease     Depression     Diabetes mellitus, type 2     Diverticula, colon 06/22/2022    GERD (gastroesophageal reflux disease)     H/O right hemicolectomy 06/22/2022    HH (hiatus hernia) 06/21/2022    Hyperlipidemia     Myocardial infarction     2 stents in 05/14/2021 Dr Lora    Renal disorder         Family History   Problem Relation Name Age of Onset    Hypertension Mother      Diabetes Mother      Hypertension Father      Diabetes Father      Breast cancer Sister      Hypertension Sister      Breast cancer Sister      Hypertension Brother          Past Surgical History:   Procedure Laterality Date    BREAST SURGERY      C5-C6 RADHA  8-,  "7-, 6-1-2016    Dr Fowler    CARDIAC SURGERY  02/07/2023    CERVICAL SPINE SURGERY      COLON SURGERY      EPIDURAL STEROID INJECTION INTO LUMBAR SPINE N/A 03/29/2022    Procedure: L4-5 RDAHA;  Surgeon: Gela Fowler MD;  Location: [facility] PAIN [facility];  Service: Pain Management;  Laterality: N/A;  PT AWARE ON OV TO BE TESTED\  PLAVIX TO BE HELD FOR 7 DAYS PRIOR TO PROCEDURE PER DR FOWLER AND DR PORTER  3-28  message left on vm re: covid    HYSTERECTOMY      LEFT HEART CATHETERIZATION Left 05/14/2021    Procedure: Left heart cath;  Surgeon: Mateus Guan MD;  Location: [facility] CATH LAB;  Service: Cardiology;  Laterality: Left;    LEFT HEART CATHETERIZATION Left 06/20/2022    Procedure: Left heart cath;  Surgeon: Oliverio Bautista MD;  Location: [facility] CATH LAB;  Service: Cardiology;  Laterality: Left;    LUMBAR SPINE SURGERY  12/28/2023    MEDIPORT REMOVAL  10/14/2021    Dr Kraft    right rotator cuff repair      in [place]    TOTAL REDUCTION MAMMOPLASTY          Review of Systems   Constitutional:  Negative for fatigue and fever.   HENT:  Negative for ear pain, postnasal drip, rhinorrhea and sinus pressure/congestion.    Respiratory:  Negative for cough and shortness of breath.    Cardiovascular:  Negative for chest pain.   Gastrointestinal:  Negative for abdominal pain, diarrhea, nausea and vomiting.   Genitourinary:  Negative for dysuria.   Neurological:  Negative for headaches.        /65 (BP Location: Left arm, Patient Position: Sitting)   Pulse (!) 53   Temp 98.1 °F (36.7 °C) (Oral)   Resp 18   Ht 5' 1" (1.549 m)   Wt 96.2 kg (212 lb)   LMP  (LMP Unknown)   SpO2 99%   BMI 40.06 kg/m²      Physical Exam  HENT:      Head: Normocephalic and atraumatic.   Cardiovascular:      Rate and Rhythm: Normal rate and regular rhythm.   Pulmonary:      Effort: Pulmonary effort is normal.      Breath sounds: Normal breath sounds.   Neurological:      Mental Status: She is alert and oriented to " person, place, and time.   Psychiatric:         Mood and Affect: Mood normal.         Behavior: Behavior normal.          Assessment and Plan      ICD-10-CM ICD-9-CM   1. Type 2 diabetes mellitus with diabetic polyneuropathy, with long-term current use of insulin  E11.42 250.60    Z79.4 357.2     V58.67   2. Primary hypertension  I10 401.9   3. Depression, unspecified depression type  F32.A 311   4. Cerebrovascular accident (CVA), unspecified mechanism  I63.9 434.91        1. Type 2 diabetes mellitus with diabetic polyneuropathy, with long-term current use of insulin  Not controlled at last visit. Recheck labs. Continue current meds for now.   -     FARXIGA 10 mg tablet; Take 1 tablet (10 mg total) by mouth every morning.  Dispense: 90 tablet; Refill: 1  -     FREESTYLE BECCA 3 SENSOR Aleena; 1 Device by Apply Externally route every 14 (fourteen) days.  Dispense: 3 each; Refill: 5  -     gabapentin (NEURONTIN) 300 MG capsule; Take 1 capsule (300 mg total) by mouth 3 (three) times daily.  Dispense: 90 capsule; Refill: 5  -     TRESIBA FLEXTOUCH U-100 100 unit/mL (3 mL) insulin pen; Inject 24 Units into the skin once daily. Take 10 units BID  Dispense: 3 Pen; Refill: 1  -     insulin aspart U-100 (NOVOLOG) 100 unit/mL (3 mL) InPn pen; Inject 20 Units into the skin 2 (two) times a day.  Dispense: 12 mL; Refill: 5  -     Hemoglobin A1C; Future; Expected date: 03/05/2025    2. Primary hypertension  The current medical regimen is effective;  continue present plan and medications.  -     amLODIPine (NORVASC) 10 MG tablet; Take 1 tablet (10 mg total) by mouth once daily.  Dispense: 90 tablet; Refill: 1  -     olmesartan-hydrochlorothiazide (BENICAR HCT) 40-25 mg per tablet; Take 0.5 tablets by mouth once daily.  Dispense: 45 tablet; Refill: 1  -     sotaloL (BETAPACE) 120 MG Tab; Take 1 tablet (120 mg total) by mouth every 12 (twelve) hours.  Dispense: 90 tablet; Refill: 1    3. Depression, unspecified depression type  The  current medical regimen is effective;  continue present plan and medications.  -     citalopram (CELEXA) 10 MG tablet; Take 1 tablet (10 mg total) by mouth once daily.  Dispense: 90 tablet; Refill: 1  -     nortriptyline (PAMELOR) 25 MG capsule; Take 1 capsule (25 mg total) by mouth every evening. take at bedtime  Dispense: 90 capsule; Refill: 1    4. Cerebrovascular accident (CVA), unspecified mechanism  The current medical regimen is effective;  continue present plan and medications.  -     clopidogreL (PLAVIX) 75 mg tablet; Take 1 tablet (75 mg total) by mouth once daily.  Dispense: 90 tablet; Refill: 1  -     rosuvastatin (CRESTOR) 40 MG Tab; Take 1 tablet (40 mg total) by mouth once daily.  Dispense: 90 tablet; Refill: 1    Other orders  -     furosemide (LASIX) 20 MG tablet; Take 1 tablet (20 mg total) by mouth daily as needed (for fluid).  Dispense: 90 tablet; Refill: 1        Follow up in about 3 months (around 6/5/2025).            [1]   Current Outpatient Medications:     ascorbic acid, vitamin C, 250 mg Chew, Take 250 mg by mouth once daily., Disp: , Rfl:     aspirin (ECOTRIN) 81 MG EC tablet, Take 81 mg by mouth once daily., Disp: , Rfl:     cyanocobalamin 2000 MCG tablet, Take 1 tablet by mouth once daily., Disp: , Rfl:     FREESTYLE BECCA 14 DAY SENSOR Kit, USE TO CHECK GLUCOSE 4 TIMES DAILY, Disp: , Rfl:     iron-vitamin C 100-250 mg, ICAR-C, 100-250 mg Tab, Take 1 tablet by mouth 2 (two) times daily with meals., Disp: , Rfl:     naloxone (NARCAN) 4 mg/actuation Spry, 1 spray by Nasal route once as needed., Disp: , Rfl:     nitroGLYCERIN (NITROSTAT) 0.4 MG SL tablet, Place 1 tablet (0.4 mg total) under the tongue every 5 (five) minutes as needed for Chest pain., Disp: 30 tablet, Rfl: 0    semaglutide (OZEMPIC) 2 mg/dose (8 mg/3 mL) PnIj, Inject 2 mg into the skin once a week., Disp: , Rfl:     [START ON 5/2/2025] traMADoL (ULTRAM) 50 mg tablet, Take 1 tablet (50 mg total) by mouth every 8 (eight)  hours as needed for Pain., Disp: 90 tablet, Rfl: 0    [START ON 4/2/2025] traMADoL (ULTRAM) 50 mg tablet, Take 1 tablet (50 mg total) by mouth every 8 (eight) hours as needed for Pain., Disp: 90 tablet, Rfl: 0    traMADoL (ULTRAM) 50 mg tablet, Take 1 tablet (50 mg total) by mouth every 8 (eight) hours as needed for Pain., Disp: 90 tablet, Rfl: 0    vitamin D (VITAMIN D3) 1000 units Tab, Take 1,000 Units by mouth once daily., Disp: , Rfl:     amLODIPine (NORVASC) 10 MG tablet, Take 1 tablet (10 mg total) by mouth once daily., Disp: 90 tablet, Rfl: 1    citalopram (CELEXA) 10 MG tablet, Take 1 tablet (10 mg total) by mouth once daily., Disp: 90 tablet, Rfl: 1    clopidogreL (PLAVIX) 75 mg tablet, Take 1 tablet (75 mg total) by mouth once daily., Disp: 90 tablet, Rfl: 1    FARXIGA 10 mg tablet, Take 1 tablet (10 mg total) by mouth every morning., Disp: 90 tablet, Rfl: 1    FREESTYLE BECCA 3 SENSOR Aleena, 1 Device by Apply Externally route every 14 (fourteen) days., Disp: 3 each, Rfl: 5    furosemide (LASIX) 20 MG tablet, Take 1 tablet (20 mg total) by mouth daily as needed (for fluid)., Disp: 90 tablet, Rfl: 1    gabapentin (NEURONTIN) 300 MG capsule, Take 1 capsule (300 mg total) by mouth 3 (three) times daily., Disp: 90 capsule, Rfl: 5    insulin aspart U-100 (NOVOLOG) 100 unit/mL (3 mL) InPn pen, Inject 20 Units into the skin 2 (two) times a day., Disp: 12 mL, Rfl: 5    nortriptyline (PAMELOR) 25 MG capsule, Take 1 capsule (25 mg total) by mouth every evening. take at bedtime, Disp: 90 capsule, Rfl: 1    olmesartan-hydrochlorothiazide (BENICAR HCT) 40-25 mg per tablet, Take 0.5 tablets by mouth once daily., Disp: 45 tablet, Rfl: 1    rosuvastatin (CRESTOR) 40 MG Tab, Take 1 tablet (40 mg total) by mouth once daily., Disp: 90 tablet, Rfl: 1    sotaloL (BETAPACE) 120 MG Tab, Take 1 tablet (120 mg total) by mouth every 12 (twelve) hours., Disp: 90 tablet, Rfl: 1    TRESIBA FLEXTOUCH U-100 100 unit/mL (3 mL) insulin  pen, Inject 24 Units into the skin once daily. Take 10 units BID, Disp: 3 Pen, Rfl: 1

## 2025-03-07 ENCOUNTER — RESULTS FOLLOW-UP (OUTPATIENT)
Dept: FAMILY MEDICINE | Facility: CLINIC | Age: 65
End: 2025-03-07

## 2025-03-07 ENCOUNTER — TELEPHONE (OUTPATIENT)
Dept: FAMILY MEDICINE | Facility: CLINIC | Age: 65
End: 2025-03-07
Payer: COMMERCIAL

## 2025-03-07 NOTE — PROGRESS NOTES
A1C has not improved. Increase Tresiba to 26 units. In 2 days if fasting sugars remain above 200 increase by 2 more units until fasting sugar is less than 200. Repeat A1C in 3 months.

## 2025-03-19 ENCOUNTER — PATIENT OUTREACH (OUTPATIENT)
Facility: HOSPITAL | Age: 65
End: 2025-03-19
Payer: COMMERCIAL

## 2025-03-19 NOTE — LETTER
AUTHORIZATION FOR RELEASE OF   CONFIDENTIAL INFORMATION    Dear Dr. Bauer,    We are seeing Dilma Cr, date of birth 1960, in the clinic at [facility]. Olga Potts MD is the patient's PCP. Dilma Cr has an outstanding lab/procedure at the time we reviewed her chart. In order to help keep her health information updated, she has authorized us to request the following medical record(s):        (  )  MAMMOGRAM                                      (  )  COLONOSCOPY      (  )  PAP SMEAR                                          (  )  OUTSIDE LAB RESULTS     (  )  DEXA SCAN                                          ( x )  EYE EXAM            (  )  FOOT EXAM                                          (  )  ENTIRE RECORD     (  )  OUTSIDE IMMUNIZATIONS                 (  )  _______________         Please fax records to Meron Tellez LPN Care Coordinator at [phone].      If you have any questions, please contact Meron at [phone].           Patient Name: Dilma Cr  : 1960  Patient Phone #: [phone]                Dilma Cr  MRN: [id number]  : 1960  Age: 63 y.o.  Sex: female         Patient/Legal Guardian Signature  This signature was collected at 2024    self       _______________________________   Printed Name/Relationship to Patient      Consent for Examination and Treatment: I hereby authorize the providers and employees of [facility] ([facility]) to provide medical treatment/services which includes, but is not limited to, performing and administering tests and diagnostic procedures that are deemed necessary, including, but not limited to, imaging examinations, blood tests and other laboratory procedures as may be required by the hospital, clinic, or may be ordered by my physician(s) or persons working under the general and/or special instructions of my physician(s).      I understand and agree that this consent  covers all authorized persons, including but not limited to physicians, residents, nurse practitioners, physicians' assistants, specialists, consultants, student nurses, and independently contracted physicians, who are called upon by the physician in charge, to carry out the diagnostic procedures and medical or surgical treatment.     I hereby authorize [facility] to retain or dispose of any specimens or tissue, should there be such remaining from any test or procedure.     I hereby authorize and give consent for [facility] providers and employees to take photographs, images or videotapes of such diagnostic, surgical or treatment procedures of Patient as may be required by [facility] or as may be ordered by a physician. I further acknowledge and agree that [facility] may use cameras or other devices for patient monitoring.     I am aware that the practice of medicine is not an exact science, and I acknowledge that no guarantees have been made to me as to the outcome of any tests, procedures or treatment.     Authorization for Release of Information: I understand that my insurance company and/or their agents may need information necessary to make determinations about payment/reimbursement. I hereby provide authorization to release to all insurance companies, their successors, assignees, other parties with whom they may have contracted, or others acting on their behalf, that are involved with payment for any hospital and/or clinic charges incurred by the patient, any information that they request and deem necessary for payment/reimbursement, and/or quality review.  I further authorize the release of my health information to physicians or other health care practitioners on staff who are involved in my health care now and in the future, and to other health care providers, entities, or institutions for the purpose of my continued care and treatment, including referrals.     REGISTRATION AUTHORIZATION  Form No. 99409 (Rev. 3/25/2024)     Page 1 of 3                       Medicare Patient's Certification and Authorization to Release Information and Payment Request:  I certify that the information given by me in applying for payment under Title XVIII of the Social Security Act is correct. I authorize any gonzales of medical or other information about me to release to the Social SecurityAdministration, or its intermediaries or carriers, any information needed for this or a related Medicare claim. I request that payment of authorized benefits be made on my behalf.     Assignment of Insurance Benefits:   I hereby authorize any and all insurance companies, health plans, defined   benefit plans, health insurers or any entity that is or may be responsible for payment of my medical expenses to pay all hospital and medical benefits now due, and to become due and payable to me under any hospital benefits, sick benefits, injury benefits or any other benefit for services rendered to me, including Major Medical Benefits, direct to [facility] and all independently contracted physicians. I assign any and all rights that I may have against any and all insurance companies, health plans, defined benefit plans, health insurers or any entity that is or may be responsible for payment of my medical expenses, including, but not limited to any right to appeal a denial of a claim, any right to bring any action, lawsuit, administrative proceeding, or other cause of action on my behalf. I specifically assign my right to pursue litigation against any and all insurance companies, health plans, defined benefit plans, health insurers or any entity that is or may be responsible for payment of my medical expenses based upon a refusal to pay charges.            E. Valuables: It is understood and agreed that [facility] is not liable for the damage to or loss of any money, jewelry,   documents, dentures, eye glasses, hearing aids, prosthetics, or other property of value.     F. Computer  Equipment: I understand and agree that should I choose to use computer equipment owned by [facility] or if I choose to access the Internet via [facility]s network, I do so at my own risk. [facility] is not responsible for any damage to my computer equipment or to any damages of any type that might arise from my loss of equipment or data.     G. Acceptance of Financial Responsibility:  I agree that in consideration of the services and   supplies that have been   or will be furnished to the patient, I am hereby obligated to pay all charges made for or on the account of the patient according to the standard rates (in effect at the time the services and supplies are delivered) established by [facility], including its Patient Financial Assistance Policy to the extent it is applicable. I understand that I am responsible for all charges, or portions thereof, not covered by insurance or other sources. Patient refunds will be distributed only after balances at all [facility] facilities are paid.     H. Communication Authorization:  I hereby authorize [facility] and its representatives, along with any billing service   or  who may work on their behalf, to contact me on   my cell phone and/or home phone using pre- recorded messages, artificial voice messages, automatic telephone dialing devices or other computer assisted technology, or by electronic      mail, text messaging, or by any other form of electronic communication. This includes, but is not limited to, appointment reminders, yearly physical exam reminders, preventive care reminders, patient campaigns, welcome calls, and calls about account balances on my account or any account on which I am listed as a guarantor. I understand I have the right to opt out of these communications at any time.      Relationship  Between  Facility and  Provider:      I understand that some, but not all, providers furnishing services to the patient are not employees or agents of [facility].  The patient is under the care and supervision of his/her attending physician, and it is the responsibility of the facility and its nursing staff to carry out the instructions of such physicians. It is the responsibility of the patient's physician/designee to obtain the patient's informed consent, when required, for medical or surgical treatment, special diagnostic or therapeutic procedures, or hospital services rendered for the patient under the special instructions of the physician/designee.           REGISTRATION AUTHORIZATION  Form No. 06931 (Rev. 3/25/2024)    Page 2 of 3                       Immunizations: [facility] shares immunization information with state sponsored health departments to help you and your doctor keep track of your immunization records. By signing, you consent to have this information shared with the health department in your state:                                [place] - LINKS ([place] Immunization Network for Kids Statewide)                                [place] - MIIX ([place] Immunization Information eXchange)                                [place] - ImmPRINT (Immunization Patient Registry with Integrated Technology)     TERM: This authorization is valid for this and subsequent care/treatment I receive at [facility] and will remain valid unless/until revoked in writing by me.     [facility]: As used in this document, [facility] means all [facility] owned and managed facilities, including, but not limited to, all health centers, surgery centers, clinics, urgent care centers, and hospitals.         [facility] complies with applicable Federal civil rights laws and does not discriminate on the basis of race, color, national origin, age, disability, or sex.  ATENCIÓN: si habla español, tiene a humphrey disposición servicios gratuitos de asistencia lingüística. João fagan [phone].  JOSE Ý: N?u b?n nói Ti?ng Vi?t, có các d?ch v? h? tr? ngôn ng? mi?n phí dành cho  b?n. G?i s? [phone].        REGISTRATION AUTHORIZATION  Form No. 47603 (Rev. 3/25/2024)   Page 3 of 3

## 2025-03-19 NOTE — PROGRESS NOTES
Population Health Chart Review & Patient Outreach Details    Updates Requested / Reviewed:  [x]  Care Team Updated    Health Maintenance Topics Addressed and Outreach Outcomes / Actions Taken:  Diabetic Eye Exam [x] CHAVA sent to [facility].

## 2025-04-11 ENCOUNTER — OFFICE VISIT (OUTPATIENT)
Dept: FAMILY MEDICINE | Facility: CLINIC | Age: 65
End: 2025-04-11
Payer: COMMERCIAL

## 2025-04-11 VITALS
BODY MASS INDEX: 40.02 KG/M2 | DIASTOLIC BLOOD PRESSURE: 60 MMHG | RESPIRATION RATE: 18 BRPM | HEIGHT: 61 IN | WEIGHT: 212 LBS | OXYGEN SATURATION: 100 % | TEMPERATURE: 98 F | SYSTOLIC BLOOD PRESSURE: 134 MMHG | HEART RATE: 55 BPM

## 2025-04-11 DIAGNOSIS — R10.9 BILATERAL FLANK PAIN: ICD-10-CM

## 2025-04-11 DIAGNOSIS — N39.0 URINARY TRACT INFECTION WITH HEMATURIA, SITE UNSPECIFIED: Primary | ICD-10-CM

## 2025-04-11 DIAGNOSIS — Z85.038 HISTORY OF COLON CANCER: Chronic | ICD-10-CM

## 2025-04-11 DIAGNOSIS — R31.9 URINARY TRACT INFECTION WITH HEMATURIA, SITE UNSPECIFIED: Primary | ICD-10-CM

## 2025-04-11 DIAGNOSIS — R10.30 LOWER ABDOMINAL PAIN: ICD-10-CM

## 2025-04-11 LAB
BILIRUB SERPL-MCNC: NEGATIVE MG/DL
BLOOD URINE, POC: ABNORMAL
CLARITY, UA: CLEAR
COLOR, UA: YELLOW
GLUCOSE UR QL STRIP: 1000
KETONES UR QL STRIP: NEGATIVE
LEUKOCYTE ESTERASE URINE, POC: NEGATIVE
NITRITE, POC UA: NEGATIVE
PH, POC UA: 5.5
PROTEIN, POC: NEGATIVE
SPECIFIC GRAVITY, POC UA: 1.01
UROBILINOGEN, POC UA: 0.2

## 2025-04-11 PROCEDURE — 87086 URINE CULTURE/COLONY COUNT: CPT | Mod: ,,, | Performed by: CLINICAL MEDICAL LABORATORY

## 2025-04-11 RX ORDER — KETOROLAC TROMETHAMINE 30 MG/ML
30 INJECTION, SOLUTION INTRAMUSCULAR; INTRAVENOUS
Status: COMPLETED | OUTPATIENT
Start: 2025-04-11 | End: 2025-04-11

## 2025-04-11 RX ORDER — CIPROFLOXACIN 500 MG/1
500 TABLET ORAL EVERY 12 HOURS
Qty: 14 TABLET | Refills: 0 | Status: SHIPPED | OUTPATIENT
Start: 2025-04-11

## 2025-04-11 RX ADMIN — KETOROLAC TROMETHAMINE 30 MG: 30 INJECTION, SOLUTION INTRAMUSCULAR; INTRAVENOUS at 10:04

## 2025-04-11 NOTE — PROGRESS NOTES
New Clinic Note    Dilma Cr is a 64 y.o. female     CC:   Chief Complaint   Patient presents with    Flank Pain     Complains of bilateral flank pain that began 3 weeks ago. Stated she never had kidney stones but rates her pain as 8/10 on pain scale. Stated she never had this kind of flank pain .    Diabetes     Free Style Curtis alarmed in triage room and her blood sugar registered 79 so patient placed a piece of candy in her mouth. Complains of blurred visit for past 2 months.  Sees Dr Tucker Bauer in [place] next week on 04/15/2025.    Hyperlipidemia    Hypertension        Subjective    History of Present Illness HPI   Patient complains of lower abdominal pain that radiates into her back for 3 weeks. Pain is worse on the left side.  She denies nausea or vomiting. She denies diarrhea. She denies fever. She has taken tramadol without relief. She complains of urinary frequency.   Current Medications[1]     Past Medical History:   Diagnosis Date    Acute superficial gastritis without hemorrhage 06/21/2022    Arthritis     Cancer     colon cancer    Chronic kidney disease, stage 3b     Colon cancer     Coronary artery disease     Depression     Diabetes mellitus, type 2     Diverticula, colon 06/22/2022    GERD (gastroesophageal reflux disease)     H/O right hemicolectomy 06/22/2022    HH (hiatus hernia) 06/21/2022    Hyperlipidemia     Myocardial infarction     2 stents in 05/14/2021  Guido    Renal disorder         Family History   Problem Relation Name Age of Onset    Hypertension Mother      Diabetes Mother      Hypertension Father      Diabetes Father      Breast cancer Sister      Hypertension Sister      Breast cancer Sister      Hypertension Brother          Past Surgical History:   Procedure Laterality Date    BREAST SURGERY      C5-C6 RADHA  8-, 7-, 6-1-2016    Dr Fowler    CARDIAC SURGERY  02/07/2023    CERVICAL SPINE SURGERY      COLON SURGERY      EPIDURAL STEROID INJECTION INTO  "LUMBAR SPINE N/A 03/29/2022    Procedure: L4-5 RADHA;  Surgeon: Gela Fowler MD;  Location: [facility];  Service: Pain Management;  Laterality: N/A;  PT AWARE ON OV TO BE TESTED\  PLAVIX TO BE HELD FOR 7 DAYS PRIOR TO PROCEDURE PER DR FOWLER AND DR PORTER  3-28  message left on vm re: covid    HYSTERECTOMY      LEFT HEART CATHETERIZATION Left 05/14/2021    Procedure: Left heart cath;  Surgeon: Mateus Guan MD;  Location: [facility] CATH LAB;  Service: Cardiology;  Laterality: Left;    LEFT HEART CATHETERIZATION Left 06/20/2022    Procedure: Left heart cath;  Surgeon: Oliverio Bautista MD;  Location: [facility] CATH LAB;  Service: Cardiology;  Laterality: Left;    LUMBAR SPINE SURGERY  12/28/2023    MEDIPORT REMOVAL  10/14/2021    Dr Kraft    right rotator cuff repair      in [place]    TOTAL REDUCTION MAMMOPLASTY          Review of Systems   Constitutional:  Negative for fatigue and fever.   HENT:  Negative for ear pain, postnasal drip, rhinorrhea and sinus pressure/congestion.    Respiratory:  Negative for cough and shortness of breath.    Cardiovascular:  Negative for chest pain.   Gastrointestinal:  Positive for abdominal pain. Negative for diarrhea, nausea and vomiting.   Genitourinary:  Positive for frequency. Negative for dysuria.   Musculoskeletal:  Positive for back pain.   Neurological:  Negative for headaches.        /60 (BP Location: Left arm, Patient Position: Sitting)   Pulse (!) 55   Temp 98.4 °F (36.9 °C) (Oral)   Resp 18   Ht 5' 1" (1.549 m)   Wt 96.2 kg (212 lb)   LMP  (LMP Unknown)   SpO2 100%   BMI 40.06 kg/m²      Physical Exam  HENT:      Head: Normocephalic and atraumatic.   Cardiovascular:      Rate and Rhythm: Normal rate and regular rhythm.   Pulmonary:      Effort: Pulmonary effort is normal.      Breath sounds: Normal breath sounds.   Abdominal:      Palpations: Abdomen is soft.      Tenderness: There is abdominal tenderness in the right lower quadrant and left " lower quadrant.      Comments: Mild tenderness to left and right lower quadrants.    Neurological:      Mental Status: She is alert and oriented to person, place, and time.   Psychiatric:         Mood and Affect: Mood normal.         Behavior: Behavior normal.          Assessment and Plan      ICD-10-CM ICD-9-CM   1. Urinary tract infection with hematuria, site unspecified  N39.0 599.0    R31.9 599.70   2. Bilateral flank pain  R10.9 789.09   3. History of colon cancer  Z85.038 V10.05   4. Lower abdominal pain  R10.30 789.09        1. Urinary tract infection with hematuria, site unspecified  Urine culture is pending.   -     ciprofloxacin HCl (CIPRO) 500 MG tablet; Take 1 tablet (500 mg total) by mouth every 12 (twelve) hours.  Dispense: 14 tablet; Refill: 0  -     Urine Culture High Risk    2. Bilateral flank pain  -     POCT URINALYSIS W/O SCOPE    3. History of colon cancer  Will refer back to Dr. Swartz. It appears that her colonoscopy will be due later this year.   -     Ambulatory referral/consult to Gastroenterology; Future; Expected date: 04/18/2025    4. Lower abdominal pain  Toradol for pain. We can not get a CT approved on a Friday afternoon. Told patient if her symptoms worsened over the weekend she would need to go to the ER.   -     Ambulatory referral/consult to Gastroenterology; Future; Expected date: 04/18/2025  -     ketorolac injection 30 mg       Follow up if symptoms worsen or fail to improve.            [1]   Current Outpatient Medications:     amLODIPine (NORVASC) 10 MG tablet, Take 1 tablet (10 mg total) by mouth once daily., Disp: 90 tablet, Rfl: 1    ascorbic acid, vitamin C, 250 mg Chew, Take 250 mg by mouth once daily., Disp: , Rfl:     aspirin (ECOTRIN) 81 MG EC tablet, Take 81 mg by mouth once daily., Disp: , Rfl:     citalopram (CELEXA) 10 MG tablet, Take 1 tablet (10 mg total) by mouth once daily., Disp: 90 tablet, Rfl: 1    clopidogreL (PLAVIX) 75 mg tablet, Take 1 tablet (75 mg  total) by mouth once daily., Disp: 90 tablet, Rfl: 1    cyanocobalamin 2000 MCG tablet, Take 1 tablet by mouth once daily., Disp: , Rfl:     FARXIGA 10 mg tablet, Take 1 tablet (10 mg total) by mouth every morning., Disp: 90 tablet, Rfl: 1    FREESTYLE BECCA 14 DAY SENSOR Kit, USE TO CHECK GLUCOSE 4 TIMES DAILY, Disp: , Rfl:     FREESTYLE BECCA 3 SENSOR Aleena, 1 Device by Apply Externally route every 14 (fourteen) days., Disp: 3 each, Rfl: 5    furosemide (LASIX) 20 MG tablet, Take 1 tablet (20 mg total) by mouth daily as needed (for fluid)., Disp: 90 tablet, Rfl: 1    gabapentin (NEURONTIN) 300 MG capsule, Take 1 capsule (300 mg total) by mouth 3 (three) times daily., Disp: 90 capsule, Rfl: 5    insulin aspart U-100 (NOVOLOG) 100 unit/mL (3 mL) InPn pen, Inject 20 Units into the skin 2 (two) times a day., Disp: 12 mL, Rfl: 5    iron-vitamin C 100-250 mg, ICAR-C, 100-250 mg Tab, Take 1 tablet by mouth 2 (two) times daily with meals., Disp: , Rfl:     naloxone (NARCAN) 4 mg/actuation Spry, 1 spray by Nasal route once as needed., Disp: , Rfl:     nitroGLYCERIN (NITROSTAT) 0.4 MG SL tablet, Place 1 tablet (0.4 mg total) under the tongue every 5 (five) minutes as needed for Chest pain., Disp: 30 tablet, Rfl: 0    nortriptyline (PAMELOR) 25 MG capsule, Take 1 capsule (25 mg total) by mouth every evening. take at bedtime, Disp: 90 capsule, Rfl: 1    olmesartan-hydrochlorothiazide (BENICAR HCT) 40-25 mg per tablet, Take 0.5 tablets by mouth once daily., Disp: 45 tablet, Rfl: 1    rosuvastatin (CRESTOR) 40 MG Tab, Take 1 tablet (40 mg total) by mouth once daily., Disp: 90 tablet, Rfl: 1    semaglutide (OZEMPIC) 2 mg/dose (8 mg/3 mL) PnIj, Inject 2 mg into the skin once a week., Disp: , Rfl:     sotaloL (BETAPACE) 120 MG Tab, Take 1 tablet (120 mg total) by mouth every 12 (twelve) hours., Disp: 90 tablet, Rfl: 1    [START ON 5/2/2025] traMADoL (ULTRAM) 50 mg tablet, Take 1 tablet (50 mg total) by mouth every 8 (eight) hours  as needed for Pain., Disp: 90 tablet, Rfl: 0    traMADoL (ULTRAM) 50 mg tablet, Take 1 tablet (50 mg total) by mouth every 8 (eight) hours as needed for Pain., Disp: 90 tablet, Rfl: 0    traMADoL (ULTRAM) 50 mg tablet, Take 1 tablet (50 mg total) by mouth every 8 (eight) hours as needed for Pain., Disp: 90 tablet, Rfl: 0    vitamin D (VITAMIN D3) 1000 units Tab, Take 1,000 Units by mouth once daily., Disp: , Rfl:     ciprofloxacin HCl (CIPRO) 500 MG tablet, Take 1 tablet (500 mg total) by mouth every 12 (twelve) hours., Disp: 14 tablet, Rfl: 0    TRESIBA FLEXTOUCH U-100 100 unit/mL (3 mL) insulin pen, Inject 24 Units into the skin once daily. Take 10 units BID (Patient taking differently: Inject 26 Units into the skin once daily. Increase by 2 units in 2 days until her fasting blood sugar is less than 200.), Disp: 3 Pen, Rfl: 1  No current facility-administered medications for this visit.

## 2025-04-12 LAB — UA COMPLETE W REFLEX CULTURE PNL UR: NORMAL

## 2025-04-23 ENCOUNTER — OFFICE VISIT (OUTPATIENT)
Dept: GASTROENTEROLOGY | Facility: CLINIC | Age: 65
End: 2025-04-23
Payer: COMMERCIAL

## 2025-04-23 ENCOUNTER — HOSPITAL ENCOUNTER (OUTPATIENT)
Dept: RADIOLOGY | Facility: HOSPITAL | Age: 65
Discharge: HOME | End: 2025-04-23
Attending: NURSE PRACTITIONER
Payer: COMMERCIAL

## 2025-04-23 VITALS
SYSTOLIC BLOOD PRESSURE: 145 MMHG | OXYGEN SATURATION: 100 % | HEART RATE: 56 BPM | HEIGHT: 61 IN | BODY MASS INDEX: 39.34 KG/M2 | DIASTOLIC BLOOD PRESSURE: 55 MMHG | WEIGHT: 208.38 LBS

## 2025-04-23 DIAGNOSIS — Z85.038 HISTORY OF COLON CANCER: Chronic | ICD-10-CM

## 2025-04-23 DIAGNOSIS — K59.09 CHRONIC CONSTIPATION: ICD-10-CM

## 2025-04-23 DIAGNOSIS — R10.84 GENERALIZED ABDOMINAL PAIN: ICD-10-CM

## 2025-04-23 DIAGNOSIS — R10.30 LOWER ABDOMINAL PAIN: ICD-10-CM

## 2025-04-23 DIAGNOSIS — R10.30 LOWER ABDOMINAL PAIN: Primary | ICD-10-CM

## 2025-04-23 DIAGNOSIS — Z90.49 H/O RIGHT HEMICOLECTOMY: Chronic | ICD-10-CM

## 2025-04-23 PROCEDURE — 3052F HG A1C>EQUAL 8.0%<EQUAL 9.0%: CPT | Mod: CPTII,,, | Performed by: NURSE PRACTITIONER

## 2025-04-23 PROCEDURE — 99999 PR PBB SHADOW E&M-EST. PATIENT-LVL V: CPT | Mod: PBBFAC,,, | Performed by: NURSE PRACTITIONER

## 2025-04-23 PROCEDURE — 74018 RADEX ABDOMEN 1 VIEW: CPT | Mod: TC

## 2025-04-23 PROCEDURE — 3078F DIAST BP <80 MM HG: CPT | Mod: CPTII,,, | Performed by: NURSE PRACTITIONER

## 2025-04-23 PROCEDURE — 99214 OFFICE O/P EST MOD 30 MIN: CPT | Mod: S$PBB,,, | Performed by: NURSE PRACTITIONER

## 2025-04-23 PROCEDURE — 3008F BODY MASS INDEX DOCD: CPT | Mod: CPTII,,, | Performed by: NURSE PRACTITIONER

## 2025-04-23 PROCEDURE — 99215 OFFICE O/P EST HI 40 MIN: CPT | Mod: PBBFAC,25 | Performed by: NURSE PRACTITIONER

## 2025-04-23 PROCEDURE — 1159F MED LIST DOCD IN RCRD: CPT | Mod: CPTII,,, | Performed by: NURSE PRACTITIONER

## 2025-04-23 PROCEDURE — 3077F SYST BP >= 140 MM HG: CPT | Mod: CPTII,,, | Performed by: NURSE PRACTITIONER

## 2025-04-23 RX ORDER — POLYETHYLENE GLYCOL 3350, SODIUM SULFATE ANHYDROUS, SODIUM BICARBONATE, SODIUM CHLORIDE, POTASSIUM CHLORIDE 236; 22.74; 6.74; 5.86; 2.97 G/4L; G/4L; G/4L; G/4L; G/4L
4 POWDER, FOR SOLUTION ORAL ONCE
Qty: 4000 ML | Refills: 0 | Status: SHIPPED | OUTPATIENT
Start: 2025-04-23 | End: 2025-04-23

## 2025-04-23 NOTE — PROGRESS NOTES
Dilma Cr is a 64 y.o. female here for Abdominal Pain (Colon cancer in 2018, lost sister to colon cancer )        PCP: Ogla Potts  Referring Provider: Olga Potts Md  [address] [facility] [facility] [place] [id number]     HPI:  Presents with report of abdominal pain.  Patient is describing abdominal pain as generalized.  Feels that pain is increased after eating.  Endorses constipation.  Is not taking a regular medication for constipation.  We did discuss MiraLax powder.  No dysuria.  Patient was evaluated by primary care for abdominal pain.  She was treated with antibiotics.  States that she feels that she may have had some improvement following antibiotic therapy but abdominal pain return.  She does have a history of colon cancer.  She was diagnosed with colon cancer in 2018.  She did have a right hemicolectomy.  She required chemotherapy.  She is following with the Oncology.  CEA level per Dr. Stover is normal, liver enzymes normal, on 03/18/2025.  Denies any hematochezia or melena.  Last colonoscopy was 06/22/2022, no colon polyps with changes of right hemicolectomy, diverticula, recommendation to repeat colonoscopy in 3 years.  Last EGD was 06/21/2022, gastritis, hiatal hernia.  Patient is currently taking Plavix.  She is followed by Cardiology in [place].  We will require cardiac clearance prior to holding Plavix for procedure.    Abdominal Pain  Associated symptoms include constipation. Pertinent negatives include no diarrhea, fever, nausea or vomiting.         ROS:  Review of Systems   Constitutional:  Negative for appetite change, fatigue, fever and unexpected weight change.   HENT:  Negative for trouble swallowing.    Cardiovascular:  Negative for chest pain.   Gastrointestinal:  Positive for abdominal pain and constipation. Negative for anal bleeding, blood in stool, change in bowel habit, diarrhea, nausea, rectal pain, vomiting and reflux.    Musculoskeletal:  Negative for gait problem.   Integumentary:  Negative for pallor.   Psychiatric/Behavioral:  The patient is not nervous/anxious.           PMHX:  has a past medical history of Acute superficial gastritis without hemorrhage (06/21/2022), Arthritis, Cancer, Chronic kidney disease, stage 3b, Colon cancer, Coronary artery disease, Depression, Diabetes mellitus, type 2, Diverticula, colon (06/22/2022), GERD (gastroesophageal reflux disease), H/O right hemicolectomy (06/22/2022), HH (hiatus hernia) (06/21/2022), Hyperlipidemia, Myocardial infarction, and Renal disorder.    PSHX:  has a past surgical history that includes Colon surgery; Left heart catheterization (Left, 05/14/2021); Cervical spine surgery; Breast surgery; C5-C6 RADHA (8-, 7-, 6-1-2016); Hysterectomy; Mediport removal (10/14/2021); right rotator cuff repair; Total Reduction Mammoplasty; Epidural steroid injection into lumbar spine (N/A, 03/29/2022); Left heart catheterization (Left, 06/20/2022); Cardiac surgery (02/07/2023); and Lumbar spine surgery (12/28/2023).    PFHX: family history includes Breast cancer in her sister and sister; Diabetes in her father and mother; Hypertension in her brother, father, mother, and sister.    PSlHX:  reports that she has never smoked. She has never been exposed to tobacco smoke. She has never used smokeless tobacco. She reports that she does not drink alcohol and does not use drugs.        Review of patient's allergies indicates:  No Known Allergies    Medication List with Changes/Refills   New Medications    POLYETHYLENE GLYCOL (GOLYTELY) 236-22.74-6.74 -5.86 GRAM SUSPENSION    Take 4,000 mLs (4 L total) by mouth once. for 1 dose   Current Medications    AMLODIPINE (NORVASC) 10 MG TABLET    Take 1 tablet (10 mg total) by mouth once daily.    ASCORBIC ACID, VITAMIN C, 250 MG CHEW    Take 250 mg by mouth once daily.    ASPIRIN (ECOTRIN) 81 MG EC TABLET    Take 81 mg by mouth once daily.     CIPROFLOXACIN HCL (CIPRO) 500 MG TABLET    Take 1 tablet (500 mg total) by mouth every 12 (twelve) hours.    CITALOPRAM (CELEXA) 10 MG TABLET    Take 1 tablet (10 mg total) by mouth once daily.    CLOPIDOGREL (PLAVIX) 75 MG TABLET    Take 1 tablet (75 mg total) by mouth once daily.    CYANOCOBALAMIN 2000 MCG TABLET    Take 1 tablet by mouth once daily.    FARXIGA 10 MG TABLET    Take 1 tablet (10 mg total) by mouth every morning.    FREESTYLE BECCA 14 DAY SENSOR KIT    USE TO CHECK GLUCOSE 4 TIMES DAILY    FREESTYLE BECCA 3 SENSOR ROBBIE    1 Device by Apply Externally route every 14 (fourteen) days.    FUROSEMIDE (LASIX) 20 MG TABLET    Take 1 tablet (20 mg total) by mouth daily as needed (for fluid).    GABAPENTIN (NEURONTIN) 300 MG CAPSULE    Take 1 capsule (300 mg total) by mouth 3 (three) times daily.    INSULIN ASPART U-100 (NOVOLOG) 100 UNIT/ML (3 ML) INPN PEN    Inject 20 Units into the skin 2 (two) times a day.    IRON-VITAMIN C 100-250 MG, ICAR-C, 100-250 MG TAB    Take 1 tablet by mouth 2 (two) times daily with meals.    NALOXONE (NARCAN) 4 MG/ACTUATION SPRY    1 spray by Nasal route once as needed.    NITROGLYCERIN (NITROSTAT) 0.4 MG SL TABLET    Place 1 tablet (0.4 mg total) under the tongue every 5 (five) minutes as needed for Chest pain.    NORTRIPTYLINE (PAMELOR) 25 MG CAPSULE    Take 1 capsule (25 mg total) by mouth every evening. take at bedtime    OLMESARTAN-HYDROCHLOROTHIAZIDE (BENICAR HCT) 40-25 MG PER TABLET    Take 0.5 tablets by mouth once daily.    ROSUVASTATIN (CRESTOR) 40 MG TAB    Take 1 tablet (40 mg total) by mouth once daily.    SOTALOL (BETAPACE) 120 MG TAB    Take 1 tablet (120 mg total) by mouth every 12 (twelve) hours.    TRAMADOL (ULTRAM) 50 MG TABLET    Take 1 tablet (50 mg total) by mouth every 8 (eight) hours as needed for Pain.    TRAMADOL (ULTRAM) 50 MG TABLET    Take 1 tablet (50 mg total) by mouth every 8 (eight) hours as needed for Pain.    TRAMADOL (ULTRAM) 50 MG TABLET     "Take 1 tablet (50 mg total) by mouth every 8 (eight) hours as needed for Pain.    TRESIBA FLEXTOUCH U-100 100 UNIT/ML (3 ML) INSULIN PEN    Inject 24 Units into the skin once daily. Take 10 units BID    VITAMIN D (VITAMIN D3) 1000 UNITS TAB    Take 1,000 Units by mouth once daily.   Discontinued Medications    SEMAGLUTIDE (OZEMPIC) 2 MG/DOSE (8 MG/3 ML) PNIJ    Inject 2 mg into the skin once a week.        Objective Findings:  Vital Signs:  BP (!) 145/55   Pulse (!) 56   Ht 5' 1" (1.549 m)   Wt 94.5 kg (208 lb 6.4 oz)   LMP  (LMP Unknown)   SpO2 100%   BMI 39.38 kg/m²  Body mass index is 39.38 kg/m².    Physical Exam:  Physical Exam  Vitals and nursing note reviewed.   Constitutional:       General: She is not in acute distress.     Appearance: Normal appearance. She is obese.   HENT:      Mouth/Throat:      Mouth: Mucous membranes are moist.   Cardiovascular:      Rate and Rhythm: Bradycardia present.   Pulmonary:      Effort: Pulmonary effort is normal.   Abdominal:      General: Bowel sounds are normal. There is no distension.      Palpations: Abdomen is soft. There is no mass.      Tenderness: There is abdominal tenderness.   Skin:     General: Skin is warm and dry.      Coloration: Skin is not jaundiced or pale.   Neurological:      Mental Status: She is alert and oriented to person, place, and time.   Psychiatric:         Mood and Affect: Mood normal.          Labs:  Lab Results   Component Value Date    WBC 7.11 10/12/2024    HGB 12.1 10/12/2024    HCT 40.1 10/12/2024    MCV 80.2 10/12/2024    RDW 21.4 (H) 10/12/2024     10/12/2024    LYMPH 24.3 (L) 10/12/2024    LYMPH 1.73 10/12/2024    MONO 7.6 (H) 10/12/2024    EOS 0.09 10/12/2024    BASO 0.02 10/12/2024     Lab Results   Component Value Date     10/12/2024    K 4.0 10/12/2024     10/12/2024    CO2 25 10/12/2024     10/12/2024    BUN 26 (H) 10/12/2024    CREATININE 0.97 10/12/2024    CALCIUM 9.1 10/12/2024    PROT 7.5 " "10/12/2024    ALBUMIN 3.7 10/12/2024    BILITOT 0.4 10/12/2024    ALKPHOS 108 10/12/2024    AST 19 10/12/2024    ALT 20 10/12/2024         Imaging: No results found.      Assessment:  Dilma Cr is a 64 y.o. female here with:  1. Generalized abdominal pain    2. History of colon cancer    3. Lower abdominal pain    4. Chronic constipation          Recommendations:  1. KUB  2. CT abdomen and pelvis  3. CBC, creatinine  4. Schedule colonoscopy, cardiac clearance due to Plavix  5. Miralax powder 17 grams in 8 ounces of liquid daily    Follow up after CT and colonoscopy    Portions of this note may have been created with voice recognition software.  Occasional wrong word or "sound a like substitutions may have occurred due to inherent limitations of voice recognition software.  Please read the note carefully and recognize using contexts, where substitutions have occurred.    Diagnosis, risks, benefits, and side effects of any medications and treatment plan were discussed with the patient.  All questions were answered to the satisfaction of the patient, and patient verbalized understanding and agreement to the treatment plan.        No follow-ups on file.      Order summary:  Orders Placed This Encounter    X-Ray KUB    CT Abdomen Pelvis With IV Contrast Routine Oral Contrast    Creatinine, serum    CBC Auto Differential    polyethylene glycol (GOLYTELY) 236-22.74-6.74 -5.86 gram suspension    Colonoscopy       Thank you for allowing me to participate in the care of Dilma Cr.      JIMMY Cortés          "

## 2025-04-24 ENCOUNTER — RESULTS FOLLOW-UP (OUTPATIENT)
Dept: GASTROENTEROLOGY | Facility: CLINIC | Age: 65
End: 2025-04-24

## 2025-04-24 ENCOUNTER — TELEPHONE (OUTPATIENT)
Dept: GASTROENTEROLOGY | Facility: CLINIC | Age: 65
End: 2025-04-24
Payer: COMMERCIAL

## 2025-04-24 DIAGNOSIS — R10.84 GENERALIZED ABDOMINAL PAIN: Primary | ICD-10-CM

## 2025-04-24 NOTE — TELEPHONE ENCOUNTER
Attempted to call results. Left message.              ----- Message from EDMOND Catalan sent at 4/24/2025 12:30 PM CDT -----  Creatinine is elevated, GFR decreased.  Please call and change CT to without IV contrast. I could not cancel the order because it is already scheduled.  ----- Message -----  From: Lab, Background User  Sent: 4/23/2025   4:12 PM CDT  To: EDMOND Bennett

## 2025-04-24 NOTE — TELEPHONE ENCOUNTER
Results called to patient. Order changed to no IV contrast.verbalized understanding.            ----- Message from EDMOND Catalan sent at 4/24/2025 12:30 PM CDT -----  Creatinine is elevated, GFR decreased.  Please call and change CT to without IV contrast. I could not cancel the order because it is already scheduled.  ----- Message -----  From: Lab, Background User  Sent: 4/23/2025   4:12 PM CDT  To: EDMOND Bennett

## 2025-04-29 ENCOUNTER — TELEPHONE (OUTPATIENT)
Dept: GASTROENTEROLOGY | Facility: CLINIC | Age: 65
End: 2025-04-29
Payer: COMMERCIAL

## 2025-04-29 NOTE — TELEPHONE ENCOUNTER
Left voicemail that x-ray did not show a blockage and to call back if any questions.            ----- Message from EDMOND Catalan sent at 4/28/2025 12:06 PM CDT -----  No obstruction.  Degenerative disc disease with prior surgical fusion at L2-L3.  ----- Message -----  From: Interface, Rad Results In  Sent: 4/25/2025   4:58 PM CDT  To: EDMOND Bennett

## 2025-05-15 ENCOUNTER — RESULTS FOLLOW-UP (OUTPATIENT)
Dept: GASTROENTEROLOGY | Facility: CLINIC | Age: 65
End: 2025-05-15

## 2025-05-15 ENCOUNTER — HOSPITAL ENCOUNTER (OUTPATIENT)
Dept: RADIOLOGY | Facility: HOSPITAL | Age: 65
Discharge: HOME | End: 2025-05-15
Attending: NURSE PRACTITIONER
Payer: COMMERCIAL

## 2025-05-15 ENCOUNTER — TELEPHONE (OUTPATIENT)
Dept: GASTROENTEROLOGY | Facility: CLINIC | Age: 65
End: 2025-05-15
Payer: COMMERCIAL

## 2025-05-15 DIAGNOSIS — R10.84 GENERALIZED ABDOMINAL PAIN: ICD-10-CM

## 2025-05-15 PROCEDURE — 74176 CT ABD & PELVIS W/O CONTRAST: CPT | Mod: TC

## 2025-05-15 PROCEDURE — 74176 CT ABD & PELVIS W/O CONTRAST: CPT | Mod: 26,,, | Performed by: RADIOLOGY

## 2025-05-15 PROCEDURE — 25500020 PHARM REV CODE 255: Performed by: NURSE PRACTITIONER

## 2025-05-15 PROCEDURE — A9698 NON-RAD CONTRAST MATERIALNOC: HCPCS | Performed by: NURSE PRACTITIONER

## 2025-05-15 RX ADMIN — BARIUM SULFATE 1150 ML: 20 SUSPENSION ORAL at 08:05

## 2025-05-16 ENCOUNTER — TELEPHONE (OUTPATIENT)
Dept: GASTROENTEROLOGY | Facility: CLINIC | Age: 65
End: 2025-05-16
Payer: COMMERCIAL

## 2025-05-16 NOTE — TELEPHONE ENCOUNTER
Received clearance letter back from Alina Garcia PA-C at [facility] that it was okay with Cardiology for her to hold her Plavix for 5 days before her colonscopy, but to keep taking her 81mg ASA, but that Neurology would also need to clear her. Secure chat sent to EDMOND Bullock asking for clearance to hold Plavix fro Neuro standpoint. Clearance given, and agrees with Cardiology that patient should continue taking the ASA.      Called and spoke with patients  to let her know it was okay to start holding today for Colonoscopy on 5/22/25. Verbalized understanding.

## 2025-05-22 ENCOUNTER — ANESTHESIA EVENT (OUTPATIENT)
Dept: GASTROENTEROLOGY | Facility: HOSPITAL | Age: 65
End: 2025-05-22
Payer: COMMERCIAL

## 2025-05-22 ENCOUNTER — ANESTHESIA (OUTPATIENT)
Dept: GASTROENTEROLOGY | Facility: HOSPITAL | Age: 65
End: 2025-05-22
Payer: COMMERCIAL

## 2025-05-22 ENCOUNTER — HOSPITAL ENCOUNTER (OUTPATIENT)
Dept: GASTROENTEROLOGY | Facility: HOSPITAL | Age: 65
Discharge: HOME | End: 2025-05-22
Attending: NURSE PRACTITIONER | Admitting: INTERNAL MEDICINE
Payer: COMMERCIAL

## 2025-05-22 VITALS
DIASTOLIC BLOOD PRESSURE: 40 MMHG | SYSTOLIC BLOOD PRESSURE: 112 MMHG | BODY MASS INDEX: 39.27 KG/M2 | WEIGHT: 208 LBS | HEIGHT: 61 IN | HEART RATE: 61 BPM | OXYGEN SATURATION: 100 % | RESPIRATION RATE: 15 BRPM | TEMPERATURE: 98 F

## 2025-05-22 DIAGNOSIS — Z85.038 HISTORY OF COLON CANCER: Chronic | ICD-10-CM

## 2025-05-22 DIAGNOSIS — R10.30 LOWER ABDOMINAL PAIN: ICD-10-CM

## 2025-05-22 DIAGNOSIS — Z12.11 SCREENING FOR MALIGNANT NEOPLASM OF COLON: Primary | ICD-10-CM

## 2025-05-22 DIAGNOSIS — Z90.49 HISTORY OF PARTIAL COLECTOMY: ICD-10-CM

## 2025-05-22 DIAGNOSIS — K57.30 DIVERTICULOSIS OF COLON: ICD-10-CM

## 2025-05-22 DIAGNOSIS — K59.09 CHRONIC CONSTIPATION: ICD-10-CM

## 2025-05-22 DIAGNOSIS — R10.84 GENERALIZED ABDOMINAL PAIN: ICD-10-CM

## 2025-05-22 PROCEDURE — 37000008 HC ANESTHESIA 1ST 15 MINUTES

## 2025-05-22 PROCEDURE — 63600175 PHARM REV CODE 636 W HCPCS: Performed by: NURSE ANESTHETIST, CERTIFIED REGISTERED

## 2025-05-22 RX ORDER — SODIUM CHLORIDE, SODIUM LACTATE, POTASSIUM CHLORIDE, CALCIUM CHLORIDE 600; 310; 30; 20 MG/100ML; MG/100ML; MG/100ML; MG/100ML
INJECTION, SOLUTION INTRAVENOUS CONTINUOUS
Status: DISCONTINUED | OUTPATIENT
Start: 2025-05-22 | End: 2025-05-23 | Stop reason: HOSPADM

## 2025-05-22 RX ORDER — PROPOFOL 10 MG/ML
VIAL (ML) INTRAVENOUS
Status: DISCONTINUED | OUTPATIENT
Start: 2025-05-22 | End: 2025-05-22

## 2025-05-22 RX ORDER — LIDOCAINE HYDROCHLORIDE 20 MG/ML
INJECTION, SOLUTION EPIDURAL; INFILTRATION; INTRACAUDAL; PERINEURAL
Status: DISCONTINUED | OUTPATIENT
Start: 2025-05-22 | End: 2025-05-22

## 2025-05-22 RX ORDER — SODIUM CHLORIDE 0.9 % (FLUSH) 0.9 %
10 SYRINGE (ML) INJECTION EVERY 6 HOURS PRN
Status: DISCONTINUED | OUTPATIENT
Start: 2025-05-22 | End: 2025-05-23 | Stop reason: HOSPADM

## 2025-05-22 RX ADMIN — PROPOFOL 20 MG: 10 INJECTION, EMULSION INTRAVENOUS at 02:05

## 2025-05-22 RX ADMIN — PROPOFOL 30 MG: 10 INJECTION, EMULSION INTRAVENOUS at 01:05

## 2025-05-22 RX ADMIN — LIDOCAINE HYDROCHLORIDE 30 MG: 20 INJECTION, SOLUTION EPIDURAL; INFILTRATION; INTRACAUDAL; PERINEURAL at 01:05

## 2025-05-22 RX ADMIN — PROPOFOL 20 MG: 10 INJECTION, EMULSION INTRAVENOUS at 01:05

## 2025-05-22 RX ADMIN — PROPOFOL 50 MG: 10 INJECTION, EMULSION INTRAVENOUS at 01:05

## 2025-05-22 NOTE — TRANSFER OF CARE
"Anesthesia Transfer of Care Note    Patient: Dilma Cr    Procedure(s) Performed: * No procedures listed *    Patient location: GI    Anesthesia Type: MAC    Transport from OR: Transported from OR on room air with adequate spontaneous ventilation. Continuous ECG monitoring in transport. Continuous SpO2 monitoring in transport    Post pain: adequate analgesia    Post assessment: no apparent anesthetic complications    Post vital signs: stable    Level of consciousness: responds to stimulation, awake and sedated    Nausea/Vomiting: no nausea/vomiting    Complications: none    Transfer of care protocol was followed      Last vitals: Visit Vitals  BP (!) 116/36 (BP Location: Right arm, Patient Position: Lying)   Pulse (!) 55   Temp 36.5 °C (97.7 °F) (Oral)   Resp 14   Ht 5' 1" (1.549 m)   Wt 94.3 kg (208 lb)   LMP  (LMP Unknown)   SpO2 100%   Breastfeeding No   BMI 39.30 kg/m²     "

## 2025-05-22 NOTE — ANESTHESIA PREPROCEDURE EVALUATION
05/22/2025  Dilma Cr is a 64 y.o., female.  Past Medical History:   Diagnosis Date    Acute superficial gastritis without hemorrhage 06/21/2022    Arthritis     Cancer     colon cancer    Chronic kidney disease, stage 3b     Colon cancer     Coronary artery disease     Depression     Diabetes mellitus, type 2     Diverticula, colon 06/22/2022    GERD (gastroesophageal reflux disease)     H/O right hemicolectomy 06/22/2022    HH (hiatus hernia) 06/21/2022    Hyperlipidemia     Myocardial infarction     2 stents in 05/14/2021 Dr Porter    Renal disorder        Past Surgical History:   Procedure Laterality Date    BREAST SURGERY      C5-C6 RADHA  8-, 7-, 6-1-2016    Dr Fowler    CARDIAC SURGERY  02/07/2023    CERVICAL SPINE SURGERY      COLON SURGERY      EPIDURAL STEROID INJECTION INTO LUMBAR SPINE N/A 03/29/2022    Procedure: L4-5 RADHA;  Surgeon: Gela Fowler MD;  Location: [facility] PAIN MGMT;  Service: Pain Management;  Laterality: N/A;  PT AWARE ON OV TO BE TESTED\  PLAVIX TO BE HELD FOR 7 DAYS PRIOR TO PROCEDURE PER DR FOWLER AND DR PORTER  3-28  message left on  re: covid    HYSTERECTOMY      LEFT HEART CATHETERIZATION Left 05/14/2021    Procedure: Left heart cath;  Surgeon: Mateus Guan MD;  Location: [facility] CATH LAB;  Service: Cardiology;  Laterality: Left;    LEFT HEART CATHETERIZATION Left 06/20/2022    Procedure: Left heart cath;  Surgeon: Oliverio Bautista MD;  Location: [facility] CATH LAB;  Service: Cardiology;  Laterality: Left;    LUMBAR SPINE SURGERY  12/28/2023    MEDIPORT REMOVAL  10/14/2021    Dr Kraft    right rotator cuff repair      in [place]    TOTAL REDUCTION MAMMOPLASTY         Family History   Problem Relation Name Age of Onset    Hypertension Mother      Diabetes Mother      Hypertension Father      Diabetes Father      Breast cancer Sister       Hypertension Sister      Breast cancer Sister      Hypertension Brother         Social History     Socioeconomic History    Marital status:      Spouse name: Jorge Alberto    Number of children: 2   Occupational History    Occupation:  at [employer] for past 27 years   Tobacco Use    Smoking status: Never     Passive exposure: Never    Smokeless tobacco: Never   Substance and Sexual Activity    Alcohol use: Never    Drug use: Never    Sexual activity: Yes   Social History Narrative    Worked at [employer] as  for past 30 years. H/O colon cancer. Watchman procedure back in Feb (heart).  Sees Dr Stover Oncologist. Cardiologist Dr Alina Garcia ([place]). Sees Endocrinologist Koko Rivera with [facility]. Sees Dr Tucker Bauer for her eyes Stated she was Fired from [employer] due to missing so many days due to chronic illness issues.  Insurance will end July 5,2024.      Social Drivers of Health     Financial Resource Strain: Patient Declined (1/31/2024)    [facility] from [facility] [place] [facility]    Overall Financial Resource Strain (CARDIA)     Difficulty of Paying Living Expenses: Patient declined   Food Insecurity: Patient Declined (1/31/2024)    Received from [facility] [place] [facility] Sub[facility]idi[facility] and Affiliates    Hunger Vital Sign     Worried About Running Out of Food in the Last Year: Patient declined     Ran Out of Food in the Last Year: Patient declined   Transportation Needs: No Transportation Needs (2/4/2024)    Received from [facility] Subsidiaries and Affiliates    PRAPARE - Transportation     Lack of Transportation (Medical): No     Lack of Transportation (Non-Medical): No   Stress: Patient Declined (1/31/2024)    Received from [facility]sidi[facility] and Affiliates    [facility]  [facility]ational [facility] - Occupational Stress Questionnaire     Feeling of Stress : Patient declined   Housing Stability: Unknown (2/4/2024)    Received from [facility] Subsidiaries and Affiliates    Housing Stability Vital Sign     Unable to Pay for Housing in the Last Year: No     Unstable Housing in the Last Year: No       Current Outpatient Medications   Medication Sig Dispense Refill    amLODIPine (NORVASC) 10 MG tablet Take 1 tablet (10 mg total) by mouth once daily. 90 tablet 1    ascorbic acid, vitamin C, 250 mg Chew Take 250 mg by mouth once daily.      aspirin (ECOTRIN) 81 MG EC tablet Take 81 mg by mouth once daily.      ciprofloxacin HCl (CIPRO) 500 MG tablet Take 1 tablet (500 mg total) by mouth every 12 (twelve) hours. 14 tablet 0    citalopram (CELEXA) 10 MG tablet Take 1 tablet (10 mg total) by mouth once daily. 90 tablet 1    clopidogreL (PLAVIX) 75 mg tablet Take 1 tablet (75 mg total) by mouth once daily. 90 tablet 1    cyanocobalamin 2000 MCG tablet Take 1 tablet by mouth once daily.      FARXIGA 10 mg tablet Take 1 tablet (10 mg total) by mouth every morning. 90 tablet 1    FREESTYLE BECCA 14 DAY SENSOR Kit USE TO CHECK GLUCOSE 4 TIMES DAILY      FREESTYLE BECCA 3 SENSOR Aleena 1 Device by Apply Externally route every 14 (fourteen) days. 3 each 5    furosemide (LASIX) 20 MG tablet Take 1 tablet (20 mg total) by mouth daily as needed (for fluid). 90 tablet 1    gabapentin (NEURONTIN) 300 MG capsule Take 1 capsule (300 mg total) by mouth 3 (three) times daily. 90 capsule 5    insulin aspart U-100 (NOVOLOG) 100 unit/mL (3 mL) InPn pen Inject 20 Units into the skin 2 (two) times a day. 12 mL 5    iron-vitamin C 100-250 mg, ICAR-C, 100-250 mg Tab Take 1 tablet by mouth 2 (two) times daily with meals.      naloxone (NARCAN) 4 mg/actuation Spry 1 spray by Nasal route once as needed.      nitroGLYCERIN (NITROSTAT) 0.4 MG SL tablet Place 1 tablet (0.4 mg total)  under the tongue every 5 (five) minutes as needed for Chest pain. 30 tablet 0    nortriptyline (PAMELOR) 25 MG capsule Take 1 capsule (25 mg total) by mouth every evening. take at bedtime 90 capsule 1    olmesartan-hydrochlorothiazide (BENICAR HCT) 40-25 mg per tablet Take 0.5 tablets by mouth once daily. 45 tablet 1    rosuvastatin (CRESTOR) 40 MG Tab Take 1 tablet (40 mg total) by mouth once daily. 90 tablet 1    sotaloL (BETAPACE) 120 MG Tab Take 1 tablet (120 mg total) by mouth every 12 (twelve) hours. 90 tablet 1    traMADoL (ULTRAM) 50 mg tablet Take 1 tablet (50 mg total) by mouth every 8 (eight) hours as needed for Pain. 90 tablet 0    traMADoL (ULTRAM) 50 mg tablet Take 1 tablet (50 mg total) by mouth every 8 (eight) hours as needed for Pain. 90 tablet 0    traMADoL (ULTRAM) 50 mg tablet Take 1 tablet (50 mg total) by mouth every 8 (eight) hours as needed for Pain. 90 tablet 0    TRESIBA FLEXTOUCH U-100 100 unit/mL (3 mL) insulin pen Inject 24 Units into the skin once daily. Take 10 units BID (Patient taking differently: Inject 26 Units into the skin once daily. Increase by 2 units in 2 days until her fasting blood sugar is less than 200.) 3 Pen 1    vitamin D (VITAMIN D3) 1000 units Tab Take 1,000 Units by mouth once daily.       No current facility-administered medications for this encounter.       Review of patient's allergies indicates:  No Known Allergies      Pre-op Assessment    I have reviewed the Patient Summary Reports.     I have reviewed the Nursing Notes. I have reviewed the NPO Status.   I have reviewed the Medications.     Review of Systems  Anesthesia Hx:  No problems with previous Anesthesia             Denies Family Hx of Anesthesia complications.    Denies Personal Hx of Anesthesia complications.                    Hematology/Oncology:    Oncology Normal                                   EENT/Dental:  EENT/Dental Normal           Cardiovascular:     Hypertension  Past MI CAD      Angina      hyperlipidemia                               Renal/:  Chronic Renal Disease                Hepatic/GI:  Bowel Prep.  Hiatal Hernia, GERD                Musculoskeletal:  Arthritis               Neurological:  TIA,  Neuromuscular Disease,                                   Endocrine:  Diabetes, type 2         Morbid Obesity / BMI > 40  Dermatological:  Skin Normal    Psych:  Psychiatric History                  Physical Exam  General: Well nourished, Alert, Oriented and Cooperative    Airway:  Mouth Opening: Normal  Neck ROM: Normal ROM    Chest/Lungs:  Normal Respiratory Rate    Heart:  Rate: Normal        Anesthesia Plan  Type of Anesthesia, risks & benefits discussed:    Anesthesia Type: Gen Natural Airway, MAC  Intra-op Monitoring Plan: Standard ASA Monitors  Post Op Pain Control Plan: multimodal analgesia and IV/PO Opioids PRN  Induction:  IV  Informed Consent: Informed consent signed with the Patient and all parties understand the risks and agree with anesthesia plan.  All questions answered. Patient consented to blood products? Yes  ASA Score: 3  Day of Surgery Review of History & Physical: I have interviewed and examined the patient. I have reviewed the patient's H&P dated: There are no significant changes.     Ready For Surgery From Anesthesia Perspective.     .

## 2025-05-22 NOTE — H&P
[facility] - Endoscopy  Gastroenterology  H&P    Patient Name: Dilma Cr  MRN: [id number]  Admission Date: 5/22/2025  Code Status: Prior    Attending Provider: Debra Sheets FNP   Primary Care Physician: Olga Potts MD  Principal Problem:<principal problem not specified>    Subjective:     History of Present Illness:  This patient is a 64-year-old female with history of colon cancer.  Her last colonoscopy was in 2022.  She has constipation.  She has had a right hemicolectomy.    Past Medical History:   Diagnosis Date    Acute superficial gastritis without hemorrhage 06/21/2022    Arthritis     Cancer     colon cancer    Chronic kidney disease, stage 3b     Colon cancer     Coronary artery disease     Depression     Diabetes mellitus, type 2     Diverticula, colon 06/22/2022    GERD (gastroesophageal reflux disease)     H/O right hemicolectomy 06/22/2022    HH (hiatus hernia) 06/21/2022    Hyperlipidemia     Myocardial infarction     2 stents in 05/14/2021 Dr Porter    Renal disorder        Past Surgical History:   Procedure Laterality Date    BREAST SURGERY      C5-C6 RADHA  8-, 7-, 6-1-2016    Dr Fowler    CARDIAC SURGERY  02/07/2023    CERVICAL SPINE SURGERY      COLON SURGERY      EPIDURAL STEROID INJECTION INTO LUMBAR SPINE N/A 03/29/2022    Procedure: L4-5 RADHA;  Surgeon: Gela Fowler MD;  Location: [facility] PAIN MGMT;  Service: Pain Management;  Laterality: N/A;  PT AWARE ON OV TO BE TESTED\  PLAVIX TO BE HELD FOR 7 DAYS PRIOR TO PROCEDURE PER DR FOWLER AND DR PORTER  3-28  message left on vm re: covid    HYSTERECTOMY      LEFT HEART CATHETERIZATION Left 05/14/2021    Procedure: Left heart cath;  Surgeon: Mateus Guan MD;  Location: [facility] CATH LAB;  Service: Cardiology;  Laterality: Left;    LEFT HEART CATHETERIZATION Left 06/20/2022    Procedure: Left heart cath;  Surgeon: Oliverio Bautista MD;  Location: [facility] CATH LAB;  Service: Cardiology;  Laterality: Left;     LUMBAR SPINE SURGERY  12/28/2023    MEDIPORT REMOVAL  10/14/2021    Dr Kraft    right rotator cuff repair      in [place]    TOTAL REDUCTION MAMMOPLASTY         Review of patient's allergies indicates:  No Known Allergies  Family History       Problem Relation (Age of Onset)    Breast cancer Sister, Sister    Diabetes Mother, Father    Hypertension Mother, Father, Sister, Brother          Tobacco Use    Smoking status: Never     Passive exposure: Never    Smokeless tobacco: Never   Substance and Sexual Activity    Alcohol use: Never    Drug use: Never    Sexual activity: Yes     Review of Systems   Constitutional: Negative.    HENT: Negative.     Respiratory: Negative.     Cardiovascular: Negative.    Gastrointestinal:  Positive for constipation.     Objective:     Vital Signs (Most Recent):    Vital Signs (24h Range):           There is no height or weight on file to calculate BMI.    No intake or output data in the 24 hours ending 05/22/25 1335    Lines/Drains/Airways       None                   Physical Exam  Vitals reviewed.   Constitutional:       General: She is not in acute distress.     Appearance: Normal appearance. She is well-developed. She is not ill-appearing.   HENT:      Head: Normocephalic and atraumatic.      Nose: Nose normal.   Eyes:      Pupils: Pupils are equal, round, and reactive to light.   Cardiovascular:      Rate and Rhythm: Normal rate and regular rhythm.   Pulmonary:      Effort: Pulmonary effort is normal.      Breath sounds: Normal breath sounds. No wheezing.   Abdominal:      General: Abdomen is flat. Bowel sounds are normal. There is no distension.      Palpations: Abdomen is soft.      Tenderness: There is no abdominal tenderness. There is no guarding.   Skin:     General: Skin is warm and dry.      Coloration: Skin is not jaundiced.   Neurological:      Mental Status: She is alert.   Psychiatric:         Attention and Perception: Attention normal.         Mood and Affect: Affect  "normal.         Speech: Speech normal.         Behavior: Behavior is cooperative.      Comments: Pt was calm while speaking.         Significant Labs:  CBC: No results for input(s): "WBC", "HGB", "HCT", "PLT" in the last 48 hours.  CMP: No results for input(s): "GLU", "CALCIUM", "ALBUMIN", "PROT", "NA", "K", "CO2", "CL", "BUN", "CREATININE", "ALKPHOS", "ALT", "AST", "BILITOT" in the last 48 hours.    Significant Imaging:  Imaging results within the past 24 hours have been reviewed.    Assessment/Plan:     There are no hospital problems to display for this patient.        Impression: History of colon cancer, history of right hemicolectomy, constipation.  Plan: Colonoscopy today    Adam Martini MD  Gastroenterology  [facility] - Endoscopy  "

## 2025-05-22 NOTE — DISCHARGE INSTRUCTIONS
Procedure Date  5/22/25     Impression  Overall Impression:   Diverticulosis in the descending colon and sigmoid colon  Rectal exam revealed no mass.  The colon was examined from the rectum to the ileocolonic anastomosis and no polyps were seen.  Changes of right hemicolectomy are present.  Scattered diverticula were present in the descending and sigmoid colon.     Recommendation  Repeat colonoscopy in 5 years      Disposition: Discharge patient to home in stable condition.  High-fiber diet.  MiraLax as needed.  No driving until tomorrow.  Follow up with PCP and Oncology as scheduled.  Return to GI clinic p.r.n..    NO DRIVING, OPERATING EQUIPMENT, OR SIGNING LEGAL DOCUMENTS FOR 24 HOURS.THE NURSE WILL CALL YOU WITH YOUR BIOPSY RESULTS IN A FEW DAYS. IF YOU HAVE  [facility] MYCHART YOUR RESULTS WILL APPEAR THERE AS WELL.Please call the GI Lab if you have any nausea, vomiting, or abdominal pain.

## 2025-05-22 NOTE — ANESTHESIA POSTPROCEDURE EVALUATION
Anesthesia Post Evaluation    Patient: Dilma Cr    Procedure(s) Performed: * No procedures listed *    Final Anesthesia Type: MAC      Patient location during evaluation: GI PACU  Patient participation: Yes- Able to Participate  Level of consciousness: awake and alert, oriented and awake  Post-procedure vital signs: reviewed and stable  Pain management: adequate  Airway patency: patent  OLIVERIO mitigation strategies: Multimodal analgesia  PONV status at discharge: No PONV  Anesthetic complications: no      Cardiovascular status: hemodynamically stable, stable and blood pressure returned to baseline  Respiratory status: unassisted, spontaneous ventilation and room air  Hydration status: euvolemic  Follow-up not needed.              Vitals Value Taken Time   /36 05/22/25 14:11   Temp 36.5 °C (97.7 °F) 05/22/25 14:10   Pulse 56 05/22/25 14:12   Resp 14 05/22/25 14:12   SpO2 100 % 05/22/25 14:12   Vitals shown include unfiled device data.      No case tracking events are documented in the log.      Pain/Alonoz Score: Alonzo Score: 8 (5/22/2025  2:06 PM)

## 2025-06-03 ENCOUNTER — OFFICE VISIT (OUTPATIENT)
Dept: PAIN MEDICINE | Facility: CLINIC | Age: 65
End: 2025-06-03
Payer: COMMERCIAL

## 2025-06-03 VITALS
RESPIRATION RATE: 16 BRPM | HEIGHT: 61 IN | DIASTOLIC BLOOD PRESSURE: 49 MMHG | WEIGHT: 209 LBS | HEART RATE: 57 BPM | SYSTOLIC BLOOD PRESSURE: 158 MMHG | BODY MASS INDEX: 39.46 KG/M2

## 2025-06-03 DIAGNOSIS — Z79.899 ENCOUNTER FOR LONG-TERM (CURRENT) USE OF MEDICATIONS: ICD-10-CM

## 2025-06-03 DIAGNOSIS — M54.17 LUMBOSACRAL RADICULOPATHY: Primary | Chronic | ICD-10-CM

## 2025-06-03 DIAGNOSIS — M89.49 OSTEOARTHROSIS MULTIPLE SITES, NOT SPECIFIED AS GENERALIZED: Chronic | ICD-10-CM

## 2025-06-03 LAB

## 2025-06-03 PROCEDURE — 80305 DRUG TEST PRSMV DIR OPT OBS: CPT | Mod: PBBFAC | Performed by: PHYSICIAN ASSISTANT

## 2025-06-03 PROCEDURE — 3008F BODY MASS INDEX DOCD: CPT | Mod: CPTII,,, | Performed by: PHYSICIAN ASSISTANT

## 2025-06-03 PROCEDURE — 96372 THER/PROPH/DIAG INJ SC/IM: CPT | Mod: PBBFAC | Performed by: PHYSICIAN ASSISTANT

## 2025-06-03 PROCEDURE — 99999 PR PBB SHADOW E&M-EST. PATIENT-LVL V: CPT | Mod: PBBFAC,,, | Performed by: PHYSICIAN ASSISTANT

## 2025-06-03 PROCEDURE — 3078F DIAST BP <80 MM HG: CPT | Mod: CPTII,,, | Performed by: PHYSICIAN ASSISTANT

## 2025-06-03 PROCEDURE — 3077F SYST BP >= 140 MM HG: CPT | Mod: CPTII,,, | Performed by: PHYSICIAN ASSISTANT

## 2025-06-03 PROCEDURE — 99999PBSHW POCT URINE DRUG SCREEN PRESUMP: Mod: PBBFAC,,,

## 2025-06-03 PROCEDURE — 99215 OFFICE O/P EST HI 40 MIN: CPT | Mod: PBBFAC | Performed by: PHYSICIAN ASSISTANT

## 2025-06-03 PROCEDURE — 99999PBSHW PR PBB SHADOW TECHNICAL ONLY FILED TO HB: Mod: PBBFAC,JZ,,

## 2025-06-03 PROCEDURE — 99214 OFFICE O/P EST MOD 30 MIN: CPT | Mod: S$PBB,25,, | Performed by: PHYSICIAN ASSISTANT

## 2025-06-03 PROCEDURE — 3052F HG A1C>EQUAL 8.0%<EQUAL 9.0%: CPT | Mod: CPTII,,, | Performed by: PHYSICIAN ASSISTANT

## 2025-06-03 PROCEDURE — 1159F MED LIST DOCD IN RCRD: CPT | Mod: CPTII,,, | Performed by: PHYSICIAN ASSISTANT

## 2025-06-03 RX ORDER — KETOROLAC TROMETHAMINE 30 MG/ML
60 INJECTION, SOLUTION INTRAMUSCULAR; INTRAVENOUS
Status: COMPLETED | OUTPATIENT
Start: 2025-06-03 | End: 2025-06-03

## 2025-06-03 RX ORDER — TRAMADOL HYDROCHLORIDE 50 MG/1
50 TABLET, FILM COATED ORAL EVERY 8 HOURS PRN
Qty: 90 TABLET | Refills: 0 | Status: CANCELLED | OUTPATIENT
Start: 2025-06-03

## 2025-06-03 RX ORDER — TRAMADOL HYDROCHLORIDE 50 MG/1
50 TABLET, FILM COATED ORAL EVERY 8 HOURS PRN
Qty: 90 TABLET | Refills: 2 | Status: SHIPPED | OUTPATIENT
Start: 2025-06-08

## 2025-06-03 RX ADMIN — KETOROLAC TROMETHAMINE 60 MG: 30 INJECTION, SOLUTION INTRAMUSCULAR at 09:06

## 2025-06-14 DIAGNOSIS — I10 PRIMARY HYPERTENSION: ICD-10-CM

## 2025-06-16 RX ORDER — SOTALOL HYDROCHLORIDE 120 MG/1
TABLET ORAL
Qty: 90 TABLET | Refills: 1 | Status: SHIPPED | OUTPATIENT
Start: 2025-06-16

## 2025-06-25 ENCOUNTER — OFFICE VISIT (OUTPATIENT)
Dept: FAMILY MEDICINE | Facility: CLINIC | Age: 65
End: 2025-06-25
Payer: COMMERCIAL

## 2025-06-25 VITALS
BODY MASS INDEX: 39.27 KG/M2 | OXYGEN SATURATION: 99 % | DIASTOLIC BLOOD PRESSURE: 58 MMHG | SYSTOLIC BLOOD PRESSURE: 123 MMHG | HEIGHT: 61 IN | HEART RATE: 54 BPM | TEMPERATURE: 98 F | RESPIRATION RATE: 18 BRPM | WEIGHT: 208 LBS

## 2025-06-25 DIAGNOSIS — E11.42 TYPE 2 DIABETES MELLITUS WITH DIABETIC POLYNEUROPATHY, WITH LONG-TERM CURRENT USE OF INSULIN: Chronic | ICD-10-CM

## 2025-06-25 DIAGNOSIS — Z79.4 TYPE 2 DIABETES MELLITUS WITH DIABETIC POLYNEUROPATHY, WITH LONG-TERM CURRENT USE OF INSULIN: Chronic | ICD-10-CM

## 2025-06-25 DIAGNOSIS — E11.319 TYPE 2 DIABETES MELLITUS WITH RETINOPATHY OF BOTH EYES, WITH LONG-TERM CURRENT USE OF INSULIN, MACULAR EDEMA PRESENCE UNSPECIFIED, UNSPECIFIED RETINOPATHY SEVERITY: Primary | Chronic | ICD-10-CM

## 2025-06-25 DIAGNOSIS — Z79.4 TYPE 2 DIABETES MELLITUS WITH RETINOPATHY OF BOTH EYES, WITH LONG-TERM CURRENT USE OF INSULIN, MACULAR EDEMA PRESENCE UNSPECIFIED, UNSPECIFIED RETINOPATHY SEVERITY: Primary | Chronic | ICD-10-CM

## 2025-06-25 LAB
CREAT UR-MCNC: 40 MG/DL (ref 15–325)
EST. AVERAGE GLUCOSE BLD GHB EST-MCNC: 183 MG/DL
HBA1C MFR BLD HPLC: 8 %
MICROALBUMIN UR-MCNC: 4.8 MG/DL
MICROALBUMIN/CREAT RATIO PNL UR: 120 MG/G (ref 0–30)

## 2025-06-25 PROCEDURE — 1159F MED LIST DOCD IN RCRD: CPT | Mod: CPTII,,, | Performed by: FAMILY MEDICINE

## 2025-06-25 PROCEDURE — 3074F SYST BP LT 130 MM HG: CPT | Mod: CPTII,,, | Performed by: FAMILY MEDICINE

## 2025-06-25 PROCEDURE — 82570 ASSAY OF URINE CREATININE: CPT | Mod: ,,, | Performed by: CLINICAL MEDICAL LABORATORY

## 2025-06-25 PROCEDURE — 1160F RVW MEDS BY RX/DR IN RCRD: CPT | Mod: CPTII,,, | Performed by: FAMILY MEDICINE

## 2025-06-25 PROCEDURE — 3008F BODY MASS INDEX DOCD: CPT | Mod: CPTII,,, | Performed by: FAMILY MEDICINE

## 2025-06-25 PROCEDURE — 3078F DIAST BP <80 MM HG: CPT | Mod: CPTII,,, | Performed by: FAMILY MEDICINE

## 2025-06-25 PROCEDURE — 99214 OFFICE O/P EST MOD 30 MIN: CPT | Mod: ,,, | Performed by: FAMILY MEDICINE

## 2025-06-25 PROCEDURE — 82043 UR ALBUMIN QUANTITATIVE: CPT | Mod: ,,, | Performed by: CLINICAL MEDICAL LABORATORY

## 2025-06-25 PROCEDURE — 3060F POS MICROALBUMINURIA REV: CPT | Mod: CPTII,,, | Performed by: FAMILY MEDICINE

## 2025-06-25 PROCEDURE — 83036 HEMOGLOBIN GLYCOSYLATED A1C: CPT | Mod: ,,, | Performed by: CLINICAL MEDICAL LABORATORY

## 2025-06-25 PROCEDURE — 3066F NEPHROPATHY DOC TX: CPT | Mod: CPTII,,, | Performed by: FAMILY MEDICINE

## 2025-06-25 PROCEDURE — 3052F HG A1C>EQUAL 8.0%<EQUAL 9.0%: CPT | Mod: CPTII,,, | Performed by: FAMILY MEDICINE

## 2025-06-25 NOTE — PROGRESS NOTES
"New Clinic Note    Dilma Cr is a 64 y.o. female     CC:   Chief Complaint   Patient presents with    Diabetes     Patient is here for 3 month follow up for her diabetes and repeat A1C level. Patient stated her A1C was elevated last visit  with A1C level of 8.2. Does not need any medications renewed today. Sees CLIFTON Singh for her back and leg pain. Complains of feeling cold (on Plavix) and has to keep her a/c cut up and pain in her legs that burns. Takes Neurotin for her neuropathy but stated it does not always help.Increased her Treshiba to 26 untis last visit. Eye appointment next month with Eye doctor.     Follow-up        Subjective    History of Present Illness HPI   Patient is here for an evaluation of diabetes. Her A1C was 8.2 at her last visit. Her insulin was increased. She says her sugars are "better".   Current Medications[1]     Past Medical History:   Diagnosis Date    Acute superficial gastritis without hemorrhage 06/21/2022    Arthritis     Cancer     colon cancer    Chronic kidney disease, stage 3b     Colon cancer     Coronary artery disease     Depression     Diabetes mellitus, type 2     Diverticula, colon 06/22/2022    GERD (gastroesophageal reflux disease)     H/O right hemicolectomy 06/22/2022    HH (hiatus hernia) 06/21/2022    Hyperlipidemia     Myocardial infarction     2 stents in 05/14/2021 Dr Lora    Renal disorder         Family History   Problem Relation Name Age of Onset    Hypertension Mother      Diabetes Mother      Hypertension Father      Diabetes Father      Breast cancer Sister      Hypertension Sister      Breast cancer Sister      Hypertension Brother          Past Surgical History:   Procedure Laterality Date    BREAST SURGERY      C5-C6 RADHA  8-, 7-, 6-1-2016    Dr Fowler    CARDIAC SURGERY  02/07/2023    CERVICAL SPINE SURGERY      COLON SURGERY      EPIDURAL STEROID INJECTION INTO LUMBAR SPINE N/A 03/29/2022    Procedure: L4-5 RADHA;  Surgeon: " "Gela Fowler MD;  Location: [facility] PAIN MGMT;  Service: Pain Management;  Laterality: N/A;  PT AWARE ON OV TO BE TESTED\  PLAVIX TO BE HELD FOR 7 DAYS PRIOR TO PROCEDURE PER DR FOWLER AND DR PORTER  3-28  message left on vm re: covid    HYSTERECTOMY      LEFT HEART CATHETERIZATION Left 05/14/2021    Procedure: Left heart cath;  Surgeon: Mateus Guan MD;  Location: [facility] CATH LAB;  Service: Cardiology;  Laterality: Left;    LEFT HEART CATHETERIZATION Left 06/20/2022    Procedure: Left heart cath;  Surgeon: Oliverio Bautista MD;  Location: [facility] CATH LAB;  Service: Cardiology;  Laterality: Left;    LUMBAR SPINE SURGERY  12/28/2023    MEDIPORT REMOVAL  10/14/2021    Dr Kraft    right rotator cuff repair      in [place]    TOTAL REDUCTION MAMMOPLASTY          Review of Systems   Constitutional:  Negative for fatigue and fever.   HENT:  Negative for ear pain, postnasal drip, rhinorrhea and sinus pressure/congestion.    Respiratory:  Negative for cough and shortness of breath.    Cardiovascular:  Negative for chest pain.   Gastrointestinal:  Negative for abdominal pain, diarrhea, nausea and vomiting.   Genitourinary:  Negative for dysuria.   Neurological:  Negative for headaches.        BP (!) 123/58 (BP Location: Left arm, Patient Position: Sitting)   Pulse (!) 54   Temp 98 °F (36.7 °C) (Oral)   Resp 18   Ht 5' 1" (1.549 m)   Wt 94.3 kg (208 lb)   LMP  (LMP Unknown)   SpO2 99%   BMI 39.30 kg/m²      Physical Exam  HENT:      Head: Normocephalic and atraumatic.   Cardiovascular:      Rate and Rhythm: Normal rate and regular rhythm.   Pulmonary:      Effort: Pulmonary effort is normal.      Breath sounds: Normal breath sounds.   Neurological:      Mental Status: She is alert and oriented to person, place, and time.   Psychiatric:         Mood and Affect: Mood normal.         Behavior: Behavior normal.          Assessment and Plan      ICD-10-CM ICD-9-CM   1. Type 2 diabetes mellitus with " retinopathy of both eyes, with long-term current use of insulin, macular edema presence unspecified, unspecified retinopathy severity  E11.319 250.50    Z79.4 362.01     V58.67   2. Type 2 diabetes mellitus with diabetic polyneuropathy, with long-term current use of insulin  E11.42 250.60    Z79.4 357.2     V58.67        1. Type 2 diabetes mellitus with retinopathy of both eyes, with long-term current use of insulin, macular edema presence unspecified, unspecified retinopathy severity  Not controlled at last visit.  Insulin was adjusted.  We will recheck labs today.  Continue current medicines for now.  -     Hemoglobin A1C; Future; Expected date: 06/25/2025  -     Microalbumin/Creatinine Ratio, Urine    2. Type 2 diabetes mellitus with diabetic polyneuropathy, with long-term current use of insulin  Not controlled at last visit.  Insulin was adjusted.  We will recheck labs today.  -     Hemoglobin A1C; Future; Expected date: 06/25/2025  -     Microalbumin/Creatinine Ratio, Urine        Follow up if symptoms worsen or fail to improve.            [1]   Current Outpatient Medications:     amLODIPine (NORVASC) 10 MG tablet, Take 1 tablet (10 mg total) by mouth once daily., Disp: 90 tablet, Rfl: 1    ascorbic acid, vitamin C, 250 mg Chew, Take 250 mg by mouth once daily., Disp: , Rfl:     aspirin (ECOTRIN) 81 MG EC tablet, Take 81 mg by mouth once daily., Disp: , Rfl:     citalopram (CELEXA) 10 MG tablet, Take 1 tablet (10 mg total) by mouth once daily., Disp: 90 tablet, Rfl: 1    clopidogreL (PLAVIX) 75 mg tablet, Take 1 tablet (75 mg total) by mouth once daily., Disp: 90 tablet, Rfl: 1    cyanocobalamin 2000 MCG tablet, Take 1 tablet by mouth once daily., Disp: , Rfl:     FARXIGA 10 mg tablet, Take 1 tablet (10 mg total) by mouth every morning., Disp: 90 tablet, Rfl: 1    FREESTYLE BECCA 14 DAY SENSOR Kit, USE TO CHECK GLUCOSE 4 TIMES DAILY, Disp: , Rfl:     FREESTYLE BECCA 3 SENSOR Aleena, 1 Device by Apply Externally  route every 14 (fourteen) days., Disp: 3 each, Rfl: 5    furosemide (LASIX) 20 MG tablet, Take 1 tablet (20 mg total) by mouth daily as needed (for fluid)., Disp: 90 tablet, Rfl: 1    gabapentin (NEURONTIN) 300 MG capsule, Take 1 capsule (300 mg total) by mouth 3 (three) times daily., Disp: 90 capsule, Rfl: 5    insulin aspart U-100 (NOVOLOG) 100 unit/mL (3 mL) InPn pen, Inject 20 Units into the skin 2 (two) times a day., Disp: 12 mL, Rfl: 5    iron-vitamin C 100-250 mg, ICAR-C, 100-250 mg Tab, Take 1 tablet by mouth 2 (two) times daily with meals., Disp: , Rfl:     naloxone (NARCAN) 4 mg/actuation Spry, 1 spray by Nasal route once as needed., Disp: , Rfl:     nortriptyline (PAMELOR) 25 MG capsule, Take 1 capsule (25 mg total) by mouth every evening. take at bedtime, Disp: 90 capsule, Rfl: 1    olmesartan-hydrochlorothiazide (BENICAR HCT) 40-25 mg per tablet, Take 0.5 tablets by mouth once daily., Disp: 45 tablet, Rfl: 1    rosuvastatin (CRESTOR) 40 MG Tab, Take 1 tablet (40 mg total) by mouth once daily., Disp: 90 tablet, Rfl: 1    sotaloL (BETAPACE) 120 MG Tab, TAKE 1 TABLET(120 MG) BY MOUTH EVERY 12 HOURS, Disp: 90 tablet, Rfl: 1    traMADoL (ULTRAM) 50 mg tablet, Take 1 tablet (50 mg total) by mouth every 8 (eight) hours as needed for Pain., Disp: 90 tablet, Rfl: 0    traMADoL (ULTRAM) 50 mg tablet, Take 1 tablet (50 mg total) by mouth every 8 (eight) hours as needed for Pain., Disp: 90 tablet, Rfl: 0    traMADoL (ULTRAM) 50 mg tablet, Take 1 tablet (50 mg total) by mouth every 8 (eight) hours as needed for Pain., Disp: 90 tablet, Rfl: 2    TRESIBA FLEXTOUCH U-100 100 unit/mL (3 mL) insulin pen, Inject 24 Units into the skin once daily. Take 10 units BID (Patient taking differently: Inject 26 Units into the skin once daily. Increase by 2 units in 2 days until her fasting blood sugar is less than 200.), Disp: 3 Pen, Rfl: 1    nitroGLYCERIN (NITROSTAT) 0.4 MG SL tablet, Place 1 tablet (0.4 mg total) under the  tongue every 5 (five) minutes as needed for Chest pain., Disp: 30 tablet, Rfl: 0    vitamin D (VITAMIN D3) 1000 units Tab, Take 1,000 Units by mouth once daily., Disp: , Rfl:

## 2025-06-26 ENCOUNTER — RESULTS FOLLOW-UP (OUTPATIENT)
Dept: FAMILY MEDICINE | Facility: CLINIC | Age: 65
End: 2025-06-26

## 2025-07-12 ENCOUNTER — OFFICE VISIT (OUTPATIENT)
Dept: FAMILY MEDICINE | Facility: CLINIC | Age: 65
End: 2025-07-12
Payer: COMMERCIAL

## 2025-07-12 VITALS
HEART RATE: 65 BPM | RESPIRATION RATE: 18 BRPM | OXYGEN SATURATION: 100 % | BODY MASS INDEX: 38.92 KG/M2 | WEIGHT: 206.13 LBS | HEIGHT: 61 IN | TEMPERATURE: 99 F | SYSTOLIC BLOOD PRESSURE: 133 MMHG | DIASTOLIC BLOOD PRESSURE: 58 MMHG

## 2025-07-12 DIAGNOSIS — I10 PRIMARY HYPERTENSION: Chronic | ICD-10-CM

## 2025-07-12 DIAGNOSIS — E11.42 TYPE 2 DIABETES MELLITUS WITH DIABETIC POLYNEUROPATHY, WITH LONG-TERM CURRENT USE OF INSULIN: Chronic | ICD-10-CM

## 2025-07-12 DIAGNOSIS — U07.1 COVID-19 VIRUS DETECTED: ICD-10-CM

## 2025-07-12 DIAGNOSIS — Z79.4 TYPE 2 DIABETES MELLITUS WITH DIABETIC POLYNEUROPATHY, WITH LONG-TERM CURRENT USE OF INSULIN: Chronic | ICD-10-CM

## 2025-07-12 DIAGNOSIS — R05.9 COUGH, UNSPECIFIED TYPE: ICD-10-CM

## 2025-07-12 DIAGNOSIS — U07.1 COVID-19: Primary | ICD-10-CM

## 2025-07-12 LAB
CTP QC/QA: YES
CTP QC/QA: YES
POC MOLECULAR INFLUENZA A AGN: NEGATIVE
POC MOLECULAR INFLUENZA B AGN: NEGATIVE
SARS-COV-2 RDRP RESP QL NAA+PROBE: POSITIVE

## 2025-07-12 PROCEDURE — 3008F BODY MASS INDEX DOCD: CPT | Mod: CPTII,,, | Performed by: NURSE PRACTITIONER

## 2025-07-12 PROCEDURE — 3066F NEPHROPATHY DOC TX: CPT | Mod: CPTII,,, | Performed by: NURSE PRACTITIONER

## 2025-07-12 PROCEDURE — 1159F MED LIST DOCD IN RCRD: CPT | Mod: CPTII,,, | Performed by: NURSE PRACTITIONER

## 2025-07-12 PROCEDURE — 3078F DIAST BP <80 MM HG: CPT | Mod: CPTII,,, | Performed by: NURSE PRACTITIONER

## 2025-07-12 PROCEDURE — 87502 INFLUENZA DNA AMP PROBE: CPT | Mod: QW,,, | Performed by: NURSE PRACTITIONER

## 2025-07-12 PROCEDURE — 3052F HG A1C>EQUAL 8.0%<EQUAL 9.0%: CPT | Mod: CPTII,,, | Performed by: NURSE PRACTITIONER

## 2025-07-12 PROCEDURE — 99051 MED SERV EVE/WKEND/HOLIDAY: CPT | Mod: ,,, | Performed by: NURSE PRACTITIONER

## 2025-07-12 PROCEDURE — 87635 SARS-COV-2 COVID-19 AMP PRB: CPT | Mod: QW,,, | Performed by: NURSE PRACTITIONER

## 2025-07-12 PROCEDURE — 3075F SYST BP GE 130 - 139MM HG: CPT | Mod: CPTII,,, | Performed by: NURSE PRACTITIONER

## 2025-07-12 PROCEDURE — 99214 OFFICE O/P EST MOD 30 MIN: CPT | Mod: ,,, | Performed by: NURSE PRACTITIONER

## 2025-07-12 PROCEDURE — 3060F POS MICROALBUMINURIA REV: CPT | Mod: CPTII,,, | Performed by: NURSE PRACTITIONER

## 2025-07-12 PROCEDURE — 1160F RVW MEDS BY RX/DR IN RCRD: CPT | Mod: CPTII,,, | Performed by: NURSE PRACTITIONER

## 2025-07-12 NOTE — PROGRESS NOTES
Kathryn Crocker DNP, EDMOND    [address] [place] [address]     PATIENT NAME: Dilma Cr  : 1960  DATE: 25  MRN: [id number]      Billing Provider: Kathryn Crocker DNP, EDMOND  Level of Service: CT OFFICE/OUTPT VISIT, EST, LEVL IV, 30-39 MIN  Patient PCP Information       Provider PCP Type    Olga Potts MD General            Reason for Visit / Chief Complaint: Nasal Congestion (Runny nose, cough, pressure in both ears since yesterday. States talley was diagnosed with covid Wednesday)       Update PCP  Update Chief Complaint         History of Present Illness / Problem Focused Workflow     Dilma Cr presents to the clinic with Nasal Congestion (Runny nose, cough, pressure in both ears since yesterday. States talley was diagnosed with covid Wednesday)         Review of Systems     Review of Systems   Constitutional:  Negative for appetite change, fatigue, fever and unexpected weight change.   HENT:  Negative for hearing loss.    Eyes:  Negative for visual disturbance.   Respiratory:  Positive for cough and wheezing. Negative for shortness of breath.    Cardiovascular:  Negative for chest pain.   Gastrointestinal:  Negative for abdominal pain, constipation, diarrhea, nausea and vomiting.   Genitourinary:  Negative for dysuria.   Musculoskeletal:  Negative for back pain.   Psychiatric/Behavioral:  Negative for sleep disturbance.         Medical / Social / Family History     Past Medical History:   Diagnosis Date    Acute superficial gastritis without hemorrhage 2022    Arthritis     Cancer     colon cancer    Chronic kidney disease, stage 3b     Colon cancer     Coronary artery disease     Depression     Diabetes mellitus, type 2     Diverticula, colon 2022    GERD (gastroesophageal reflux disease)     H/O right hemicolectomy 2022    HH (hiatus hernia) 2022    Hyperlipidemia     Myocardial infarction     2 stents in  05/14/2021 Dr Porter    Renal disorder        Past Surgical History:   Procedure Laterality Date    BREAST SURGERY      C5-C6 RADHA  8-, 7-, 6-1-2016    Dr Fowler    CARDIAC SURGERY  02/07/2023    CERVICAL SPINE SURGERY      COLON SURGERY      EPIDURAL STEROID INJECTION INTO LUMBAR SPINE N/A 03/29/2022    Procedure: L4-5 RADHA;  Surgeon: Gela Fowler MD;  Location: [facility];  Service: Pain Management;  Laterality: N/A;  PT AWARE ON OV TO BE TESTED\  PLAVIX TO BE HELD FOR 7 DAYS PRIOR TO PROCEDURE PER DR FOWLER AND DR PORTER  3-28  message left on vm re: covid    HYSTERECTOMY      LEFT HEART CATHETERIZATION Left 05/14/2021    Procedure: Left heart cath;  Surgeon: Mateus Guan MD;  Location: [facility] CATH LAB;  Service: Cardiology;  Laterality: Left;    LEFT HEART CATHETERIZATION Left 06/20/2022    Procedure: Left heart cath;  Surgeon: Oliverio Bautista MD;  Location: [facility] CATH LAB;  Service: Cardiology;  Laterality: Left;    LUMBAR SPINE SURGERY  12/28/2023    MEDIPORT REMOVAL  10/14/2021    Dr Kraft    right rotator cuff repair      in [place]    TOTAL REDUCTION MAMMOPLASTY         Social History  Ms. Dilma Cr  reports that she has never smoked. She has never been exposed to tobacco smoke. She has never used smokeless tobacco. She reports that she does not drink alcohol and does not use drugs.    Family History  Ms. Dilma Cr's family history includes Breast cancer in her sister and sister; Diabetes in her father and mother; Hypertension in her brother, father, mother, and sister.    Medications and Allergies     Medications  Outpatient Medications Marked as Taking for the 7/12/25 encounter (Office Visit) with Kathryn Crocker, BARB, FNP   Medication Sig Dispense Refill    amLODIPine (NORVASC) 10 MG tablet Take 1 tablet (10 mg total) by mouth once daily. 90 tablet 1    ascorbic acid, vitamin C, 250 mg Chew Take 250 mg by mouth once daily.      aspirin (ECOTRIN) 81 MG  EC tablet Take 81 mg by mouth once daily.      citalopram (CELEXA) 10 MG tablet Take 1 tablet (10 mg total) by mouth once daily. 90 tablet 1    clopidogreL (PLAVIX) 75 mg tablet Take 1 tablet (75 mg total) by mouth once daily. 90 tablet 1    cyanocobalamin 2000 MCG tablet Take 1 tablet by mouth once daily.      FARXIGA 10 mg tablet Take 1 tablet (10 mg total) by mouth every morning. 90 tablet 1    FREESTYLE BECCA 14 DAY SENSOR Kit USE TO CHECK GLUCOSE 4 TIMES DAILY      FREESTYLE BECCA 3 SENSOR Aleena 1 Device by Apply Externally route every 14 (fourteen) days. 3 each 5    furosemide (LASIX) 20 MG tablet Take 1 tablet (20 mg total) by mouth daily as needed (for fluid). 90 tablet 1    gabapentin (NEURONTIN) 300 MG capsule Take 1 capsule (300 mg total) by mouth 3 (three) times daily. 90 capsule 5    insulin aspart U-100 (NOVOLOG) 100 unit/mL (3 mL) InPn pen Inject 20 Units into the skin 2 (two) times a day. 12 mL 5    iron-vitamin C 100-250 mg, ICAR-C, 100-250 mg Tab Take 1 tablet by mouth 2 (two) times daily with meals.      naloxone (NARCAN) 4 mg/actuation Spry 1 spray by Nasal route once as needed.      nortriptyline (PAMELOR) 25 MG capsule Take 1 capsule (25 mg total) by mouth every evening. take at bedtime 90 capsule 1    olmesartan-hydrochlorothiazide (BENICAR HCT) 40-25 mg per tablet Take 0.5 tablets by mouth once daily. 45 tablet 1    rosuvastatin (CRESTOR) 40 MG Tab Take 1 tablet (40 mg total) by mouth once daily. 90 tablet 1    sotaloL (BETAPACE) 120 MG Tab TAKE 1 TABLET(120 MG) BY MOUTH EVERY 12 HOURS 90 tablet 1    traMADoL (ULTRAM) 50 mg tablet Take 1 tablet (50 mg total) by mouth every 8 (eight) hours as needed for Pain. 90 tablet 0    traMADoL (ULTRAM) 50 mg tablet Take 1 tablet (50 mg total) by mouth every 8 (eight) hours as needed for Pain. 90 tablet 0    traMADoL (ULTRAM) 50 mg tablet Take 1 tablet (50 mg total) by mouth every 8 (eight) hours as needed for Pain. 90 tablet 2    TRESIBA FLEXTOUCH U-100  "100 unit/mL (3 mL) insulin pen Inject 24 Units into the skin once daily. Take 10 units BID 3 Pen 1    vitamin D (VITAMIN D3) 1000 units Tab Take 1,000 Units by mouth once daily.         Allergies  Review of patient's allergies indicates:  No Known Allergies    Physical Examination     Vitals:    07/12/25 0932   BP: (!) 133/58   BP Location: Left arm   Patient Position: Sitting   Pulse: 65   Resp: 18   Temp: 98.9 °F (37.2 °C)   TempSrc: Oral   SpO2: 100%   Weight: 93.5 kg (206 lb 2 oz)   Height: 5' 1" (1.549 m)     Physical Exam  Vitals and nursing note reviewed.   Constitutional:       General: She is not in acute distress.  HENT:      Nose: Nose normal.      Mouth/Throat:      Mouth: Mucous membranes are moist.   Eyes:      Pupils: Pupils are equal, round, and reactive to light.   Cardiovascular:      Rate and Rhythm: Normal rate and regular rhythm.      Pulses: Normal pulses.      Heart sounds: Normal heart sounds. No murmur heard.  Pulmonary:      Effort: Pulmonary effort is normal. No respiratory distress.      Breath sounds: Rhonchi present. No wheezing or rales.   Chest:      Chest wall: No tenderness.   Abdominal:      General: Bowel sounds are normal.      Palpations: Abdomen is soft.   Musculoskeletal:         General: Normal range of motion.      Cervical back: Normal range of motion and neck supple.      Right lower leg: No edema.      Left lower leg: No edema.   Skin:     General: Skin is warm and dry.   Neurological:      General: No focal deficit present.      Mental Status: She is alert and oriented to person, place, and time.          Assessment and Plan (including Health Maintenance)      Problem List  Smart Sets  Document Outside HM   :    Plan:         Health Maintenance Due   Topic Date Due    Hepatitis C Screening  Never done    Shingles Vaccine (1 of 2) Never done    Pneumococcal Vaccines (Age 50+) (1 of 2 - PCV) Never done    RSV Vaccine (Age 60+ and Pregnant patients) (1 - Risk 60-74 years " 1-dose series) Never done    Foot Exam  08/23/2022    Diabetic Eye Exam  04/26/2024    COVID-19 Vaccine (6 - 2024-25 season) 11/22/2024       Problem List Items Addressed This Visit          Cardiac/Vascular    Hypertension (Chronic)       Endocrine    Type 2 diabetes mellitus with diabetic polyneuropathy, with long-term current use of insulin (Chronic)     Other Visit Diagnoses         COVID-19    -  Primary    Relevant Medications    albuterol-budesonide (AIRSUPRA) 90-80 mcg/actuation      Cough, unspecified type        Relevant Orders    POCT COVID-19 Rapid Screening (Completed)    POCT Influenza A/B Molecular (Completed)          COVID-19  -     albuterol-budesonide (AIRSUPRA) 90-80 mcg/actuation; Inhale 2 puffs into the lungs every 6 (six) hours as needed.  Dispense: 10.7 g; Refill: 0    Cough, unspecified type  -     POCT COVID-19 Rapid Screening  -     POCT Influenza A/B Molecular    Primary hypertension    Type 2 diabetes mellitus with diabetic polyneuropathy, with long-term current use of insulin       Health Maintenance Topics with due status: Not Due       Topic Last Completion Date    TETANUS VACCINE 06/14/2019    Influenza Vaccine 09/27/2024    Lipid Panel 11/27/2024    Mammogram 12/31/2024    Colorectal Cancer Screening 05/22/2025    Diabetes Urine Screening 06/25/2025    Hemoglobin A1c 06/25/2025    High Dose Statin 07/12/2025    Aspirin/Antiplatelet Therapy 07/12/2025           Future Appointments   Date Time Provider Department Center   7/23/2025  2:20 PM Debra Sheets FNP [facility] GASTR [facility]   7/28/2025 10:45 AM Chandra Merchant FNP [facility] NEURO [facility]   9/3/2025  8:45 AM Gela Fowler MD [facility] PNTRE [facility]   9/25/2025 11:00 AM Olga Potts MD [facility] [place]   11/26/2025  9:20 AM Olga Potts MD [facility] [place] [facility]   1/6/2026  9:40 AM [facility] MAMMO2 [facility] [place]        Follow up if symptoms worsen or fail to improve.     Signature:  Kathryn VITAL  "BARB Crocker, SELINP  [address] [place] [address]  Phone #: [phone]  Fax #: [phone]    Date of encounter: 7/12/25    Patient Instructions   Mask for 10 days. Increase fluid intake and rest! Symptomatic treatment. Seek medical attention if symptoms persist or worsen.      Updated Guidance: The updated Respiratory Virus Guidance recommends that people stay home and away from others until at least 24 hours after both their symptoms are getting better overall, and they have not had a fever (and are not using fever-reducing medication). Note that depending on the length of symptoms, this period could be shorter, the same, or longer than the previous guidance for COVID-19.  It is important to note that the guidance doesn't end with staying home and away from others when sick. The guidance encourages added precaution over the next five days after time at home, away from others, is over. Since some people remain contagious beyond the "stay-at-home" period, a period of added precaution using prevention strategies, such as:  Taking more steps for  air;  Enhancing hygiene practices;  Wearing a well-fitting mask;  Keeping a distance from others; and/or  Getting tested for respiratory viruses can lower the chance of spreading respiratory viruses to others.         "

## 2025-07-12 NOTE — PATIENT INSTRUCTIONS
"Mask for 10 days. Increase fluid intake and rest! Symptomatic treatment. Seek medical attention if symptoms persist or worsen.      Updated Guidance: The updated Respiratory Virus Guidance recommends that people stay home and away from others until at least 24 hours after both their symptoms are getting better overall, and they have not had a fever (and are not using fever-reducing medication). Note that depending on the length of symptoms, this period could be shorter, the same, or longer than the previous guidance for COVID-19.  It is important to note that the guidance doesn't end with staying home and away from others when sick. The guidance encourages added precaution over the next five days after time at home, away from others, is over. Since some people remain contagious beyond the "stay-at-home" period, a period of added precaution using prevention strategies, such as:  Taking more steps for  air;  Enhancing hygiene practices;  Wearing a well-fitting mask;  Keeping a distance from others; and/or  Getting tested for respiratory viruses can lower the chance of spreading respiratory viruses to others.   "

## 2025-07-23 ENCOUNTER — OFFICE VISIT (OUTPATIENT)
Dept: GASTROENTEROLOGY | Facility: CLINIC | Age: 65
End: 2025-07-23
Payer: COMMERCIAL

## 2025-07-23 VITALS
DIASTOLIC BLOOD PRESSURE: 53 MMHG | HEIGHT: 61 IN | HEART RATE: 59 BPM | SYSTOLIC BLOOD PRESSURE: 125 MMHG | WEIGHT: 206 LBS | OXYGEN SATURATION: 98 % | BODY MASS INDEX: 38.89 KG/M2

## 2025-07-23 DIAGNOSIS — R19.7 DIARRHEA, UNSPECIFIED TYPE: Primary | ICD-10-CM

## 2025-07-23 PROCEDURE — 1159F MED LIST DOCD IN RCRD: CPT | Mod: CPTII,,, | Performed by: NURSE PRACTITIONER

## 2025-07-23 PROCEDURE — 3066F NEPHROPATHY DOC TX: CPT | Mod: CPTII,,, | Performed by: NURSE PRACTITIONER

## 2025-07-23 PROCEDURE — 3078F DIAST BP <80 MM HG: CPT | Mod: CPTII,,, | Performed by: NURSE PRACTITIONER

## 2025-07-23 PROCEDURE — 99214 OFFICE O/P EST MOD 30 MIN: CPT | Mod: S$PBB,,, | Performed by: NURSE PRACTITIONER

## 2025-07-23 PROCEDURE — 3052F HG A1C>EQUAL 8.0%<EQUAL 9.0%: CPT | Mod: CPTII,,, | Performed by: NURSE PRACTITIONER

## 2025-07-23 PROCEDURE — 3060F POS MICROALBUMINURIA REV: CPT | Mod: CPTII,,, | Performed by: NURSE PRACTITIONER

## 2025-07-23 PROCEDURE — 99215 OFFICE O/P EST HI 40 MIN: CPT | Mod: PBBFAC | Performed by: NURSE PRACTITIONER

## 2025-07-23 PROCEDURE — 3008F BODY MASS INDEX DOCD: CPT | Mod: CPTII,,, | Performed by: NURSE PRACTITIONER

## 2025-07-23 PROCEDURE — 3074F SYST BP LT 130 MM HG: CPT | Mod: CPTII,,, | Performed by: NURSE PRACTITIONER

## 2025-07-23 PROCEDURE — 99999 PR PBB SHADOW E&M-EST. PATIENT-LVL V: CPT | Mod: PBBFAC,,, | Performed by: NURSE PRACTITIONER

## 2025-07-23 NOTE — PROGRESS NOTES
Dilma Cr is a 64 y.o. female here for No chief complaint on file.        PCP: Olga Potts  Referring Provider: Debra Sheets Fnp  [address] [facility] [place] [address]     HPI:  Patient presents for follow-up after CT abdomen and pelvis.  CT abdomen and pelvis on 05/15/2025, no acute abnormality.  Diastasis recti.  Patient reports that abdominal pain that she was having prior to CT has improved.  The patient did have a colonoscopy on 05/22/2025, diverticulosis, changes of right hemicolectomy.  recommendation to repeat colonoscopy in 5 years.  The patient does have a history of colon cancer.  Patient does have follow up with Oncology.  Patient does report that she had an acute onset of nausea, vomiting, and diarrhea that started yesterday.  States that she believes that she has food poisoning or virus.  States that she has not eaten today.  No one else in the home is yet sick.  States that she does not believe that this is related to previous abdominal pain and this was more acute onset and feels that this will resolve spontaneously. No hematochezia or melena.  No fever.  Patient did have a recent COVID infection on 07/12/2025.          ROS:  Review of Systems   Constitutional:  Negative for appetite change, fatigue, fever and unexpected weight change.   HENT:  Negative for trouble swallowing.    Gastrointestinal:  Positive for diarrhea and vomiting. Negative for abdominal pain, anal bleeding, blood in stool, change in bowel habit, constipation, nausea, rectal pain, reflux and fecal incontinence.   Musculoskeletal:  Negative for gait problem.   Integumentary:  Negative for pallor.   Allergic/Immunologic: Negative for food allergies and immunocompromised state.   Neurological:  Negative for dizziness.          PMHX:  has a past medical history of Acute superficial gastritis without hemorrhage (06/21/2022), Arthritis, Cancer, Chronic kidney disease, stage 3b, Colon  cancer, Coronary artery disease, Depression, Diabetes mellitus, type 2, Diverticula, colon (06/22/2022), GERD (gastroesophageal reflux disease), H/O right hemicolectomy (06/22/2022), HH (hiatus hernia) (06/21/2022), Hyperlipidemia, Myocardial infarction, and Renal disorder.    PSHX:  has a past surgical history that includes Colon surgery; Left heart catheterization (Left, 05/14/2021); Cervical spine surgery; Breast surgery; C5-C6 RADHA (8-, 7-, 6-1-2016); Hysterectomy; Mediport removal (10/14/2021); right rotator cuff repair; Total Reduction Mammoplasty; Epidural steroid injection into lumbar spine (N/A, 03/29/2022); Left heart catheterization (Left, 06/20/2022); Cardiac surgery (02/07/2023); and Lumbar spine surgery (12/28/2023).    PFHX: family history includes Breast cancer in her sister and sister; Diabetes in her father and mother; Hypertension in her brother, father, mother, and sister.    PSlHX:  reports that she has never smoked. She has never been exposed to tobacco smoke. She has never used smokeless tobacco. She reports that she does not drink alcohol and does not use drugs.        Review of patient's allergies indicates:  No Known Allergies    Medication List with Changes/Refills   Current Medications    ALBUTEROL-BUDESONIDE (AIRSUPRA) 90-80 MCG/ACTUATION    Inhale 2 puffs into the lungs every 6 (six) hours as needed.    AMLODIPINE (NORVASC) 10 MG TABLET    Take 1 tablet (10 mg total) by mouth once daily.    ASCORBIC ACID, VITAMIN C, 250 MG CHEW    Take 250 mg by mouth once daily.    ASPIRIN (ECOTRIN) 81 MG EC TABLET    Take 81 mg by mouth once daily.    CITALOPRAM (CELEXA) 10 MG TABLET    Take 1 tablet (10 mg total) by mouth once daily.    CLOPIDOGREL (PLAVIX) 75 MG TABLET    Take 1 tablet (75 mg total) by mouth once daily.    CYANOCOBALAMIN 2000 MCG TABLET    Take 1 tablet by mouth once daily.    FARXIGA 10 MG TABLET    Take 1 tablet (10 mg total) by mouth every morning.    FREESTYLE BECCA  "14 DAY SENSOR KIT    USE TO CHECK GLUCOSE 4 TIMES DAILY    FREESTYLE BECCA 3 SENSOR ROBBIE    1 Device by Apply Externally route every 14 (fourteen) days.    FUROSEMIDE (LASIX) 20 MG TABLET    Take 1 tablet (20 mg total) by mouth daily as needed (for fluid).    GABAPENTIN (NEURONTIN) 300 MG CAPSULE    Take 1 capsule (300 mg total) by mouth 3 (three) times daily.    INSULIN ASPART U-100 (NOVOLOG) 100 UNIT/ML (3 ML) INPN PEN    Inject 20 Units into the skin 2 (two) times a day.    IRON-VITAMIN C 100-250 MG, ICAR-C, 100-250 MG TAB    Take 1 tablet by mouth 2 (two) times daily with meals.    NALOXONE (NARCAN) 4 MG/ACTUATION SPRY    1 spray by Nasal route once as needed.    NITROGLYCERIN (NITROSTAT) 0.4 MG SL TABLET    Place 1 tablet (0.4 mg total) under the tongue every 5 (five) minutes as needed for Chest pain.    NORTRIPTYLINE (PAMELOR) 25 MG CAPSULE    Take 1 capsule (25 mg total) by mouth every evening. take at bedtime    OLMESARTAN-HYDROCHLOROTHIAZIDE (BENICAR HCT) 40-25 MG PER TABLET    Take 0.5 tablets by mouth once daily.    ROSUVASTATIN (CRESTOR) 40 MG TAB    Take 1 tablet (40 mg total) by mouth once daily.    SOTALOL (BETAPACE) 120 MG TAB    TAKE 1 TABLET(120 MG) BY MOUTH EVERY 12 HOURS    TRAMADOL (ULTRAM) 50 MG TABLET    Take 1 tablet (50 mg total) by mouth every 8 (eight) hours as needed for Pain.    TRAMADOL (ULTRAM) 50 MG TABLET    Take 1 tablet (50 mg total) by mouth every 8 (eight) hours as needed for Pain.    TRAMADOL (ULTRAM) 50 MG TABLET    Take 1 tablet (50 mg total) by mouth every 8 (eight) hours as needed for Pain.    TRESIBA FLEXTOUCH U-100 100 UNIT/ML (3 ML) INSULIN PEN    Inject 24 Units into the skin once daily. Take 10 units BID    VITAMIN D (VITAMIN D3) 1000 UNITS TAB    Take 1,000 Units by mouth once daily.        Objective Findings:  Vital Signs:  BP (!) 125/53   Pulse (!) 59   Ht 5' 1" (1.549 m)   Wt 93.4 kg (206 lb) Comment: reported by patient.  LMP  (LMP Unknown)   SpO2 98%   BMI " "38.92 kg/m²  Body mass index is 38.92 kg/m².    Physical Exam:  Physical Exam  Vitals and nursing note reviewed.   Constitutional:       General: She is not in acute distress.     Appearance: Normal appearance.   HENT:      Mouth/Throat:      Mouth: Mucous membranes are moist.   Cardiovascular:      Rate and Rhythm: Bradycardia present.   Pulmonary:      Effort: Pulmonary effort is normal.   Abdominal:      Comments: Diastasis recti   Skin:     General: Skin is warm and dry.      Coloration: Skin is not jaundiced or pale.   Neurological:      Mental Status: She is alert and oriented to person, place, and time.   Psychiatric:         Mood and Affect: Mood normal.          Labs:  Lab Results   Component Value Date    WBC 7.49 04/23/2025    HGB 11.1 (L) 04/23/2025    HCT 36.1 (L) 04/23/2025    MCV 87.0 04/23/2025    RDW 13.4 04/23/2025     04/23/2025    LYMPH 25.0 (L) 04/23/2025    LYMPH 1.87 04/23/2025    MONO 9.2 (H) 04/23/2025    EOS 0.10 04/23/2025    BASO 0.03 04/23/2025     Lab Results   Component Value Date     10/12/2024    K 4.0 10/12/2024     10/12/2024    CO2 25 10/12/2024     10/12/2024    BUN 26 (H) 10/12/2024    CREATININE 1.56 (H) 04/23/2025    CALCIUM 9.1 10/12/2024    PROT 7.5 10/12/2024    ALBUMIN 3.7 10/12/2024    BILITOT 0.4 10/12/2024    ALKPHOS 108 10/12/2024    AST 19 10/12/2024    ALT 20 10/12/2024         Imaging: No results found.      Assessment:  Dilma Cr is a 64 y.o. female here with:  1. Diarrhea, unspecified type          Recommendations:  1. Stool studies, rule out infectious process  2. Avoid NSAID's  3. Patient should follow up with primary care if symptoms of viral infection not resolved  4. Encouraged increase fluid intake with Gatorade alternating with water    Portions of this note may have been created with voice recognition software.  Occasional wrong word or "sound a like substitutions may have occurred due to inherent limitations of voice " recognition software.  Please read the note carefully and recognize using contexts, where substitutions have occurred.    Diagnosis, risks, benefits, and side effects of any medications and treatment plan were discussed with the patient.  All questions were answered to the satisfaction of the patient, and patient verbalized understanding and agreement to the treatment plan.        Follow up in about 6 months (around 1/23/2026).      Order summary:  Orders Placed This Encounter    Fecal leukocytes    Enteric Pathogen Panel    Giardia antigen    Fecal fat, qualitative    Calprotectin, Stool       Thank you for allowing me to participate in the care of Dilma Cr.      RASHAD CortésC

## 2025-07-28 ENCOUNTER — OFFICE VISIT (OUTPATIENT)
Dept: NEUROLOGY | Facility: CLINIC | Age: 65
End: 2025-07-28
Payer: COMMERCIAL

## 2025-07-28 VITALS
HEART RATE: 55 BPM | HEIGHT: 61 IN | DIASTOLIC BLOOD PRESSURE: 54 MMHG | SYSTOLIC BLOOD PRESSURE: 130 MMHG | OXYGEN SATURATION: 99 % | BODY MASS INDEX: 38.07 KG/M2 | WEIGHT: 201.63 LBS

## 2025-07-28 DIAGNOSIS — E78.5 HYPERLIPIDEMIA LDL GOAL <70: ICD-10-CM

## 2025-07-28 DIAGNOSIS — F32.A DEPRESSION, UNSPECIFIED DEPRESSION TYPE: ICD-10-CM

## 2025-07-28 DIAGNOSIS — I10 HYPERTENSION, UNSPECIFIED TYPE: ICD-10-CM

## 2025-07-28 DIAGNOSIS — I63.9 CEREBROVASCULAR ACCIDENT (CVA), UNSPECIFIED MECHANISM: Primary | ICD-10-CM

## 2025-07-28 PROCEDURE — 3078F DIAST BP <80 MM HG: CPT | Mod: CPTII,,, | Performed by: NURSE PRACTITIONER

## 2025-07-28 PROCEDURE — 3066F NEPHROPATHY DOC TX: CPT | Mod: CPTII,,, | Performed by: NURSE PRACTITIONER

## 2025-07-28 PROCEDURE — 99999 PR PBB SHADOW E&M-EST. PATIENT-LVL V: CPT | Mod: PBBFAC,,, | Performed by: NURSE PRACTITIONER

## 2025-07-28 PROCEDURE — 99213 OFFICE O/P EST LOW 20 MIN: CPT | Mod: S$PBB,,, | Performed by: NURSE PRACTITIONER

## 2025-07-28 PROCEDURE — 3075F SYST BP GE 130 - 139MM HG: CPT | Mod: CPTII,,, | Performed by: NURSE PRACTITIONER

## 2025-07-28 PROCEDURE — 3008F BODY MASS INDEX DOCD: CPT | Mod: CPTII,,, | Performed by: NURSE PRACTITIONER

## 2025-07-28 PROCEDURE — 99215 OFFICE O/P EST HI 40 MIN: CPT | Mod: PBBFAC | Performed by: NURSE PRACTITIONER

## 2025-07-28 PROCEDURE — 1160F RVW MEDS BY RX/DR IN RCRD: CPT | Mod: CPTII,,, | Performed by: NURSE PRACTITIONER

## 2025-07-28 PROCEDURE — 3052F HG A1C>EQUAL 8.0%<EQUAL 9.0%: CPT | Mod: CPTII,,, | Performed by: NURSE PRACTITIONER

## 2025-07-28 PROCEDURE — 3060F POS MICROALBUMINURIA REV: CPT | Mod: CPTII,,, | Performed by: NURSE PRACTITIONER

## 2025-07-28 PROCEDURE — 1159F MED LIST DOCD IN RCRD: CPT | Mod: CPTII,,, | Performed by: NURSE PRACTITIONER

## 2025-07-28 RX ORDER — CITALOPRAM 20 MG/1
20 TABLET ORAL DAILY
Qty: 30 TABLET | Refills: 11 | Status: SHIPPED | OUTPATIENT
Start: 2025-07-28 | End: 2026-07-28

## 2025-07-28 NOTE — PATIENT INSTRUCTIONS
Keep follow-up with Dr. Olivarez  Continue current plavix and aspirin  Continue current statin treatment  Continue current blood pressure medications  Keep follow-up with cardiology  Continue celexa increasing to 20mg daily

## 2025-07-28 NOTE — PROGRESS NOTES
Subjective:       Patient ID: Dilma Cr is a 64 y.o. female     Chief Complaint:    Chief Complaint   Patient presents with    Follow-up        Allergies:  Patient has no known allergies.    Current Medications:    Outpatient Encounter Medications as of 7/28/2025   Medication Sig Dispense Refill    albuterol-budesonide (AIRSUPRA) 90-80 mcg/actuation Inhale 2 puffs into the lungs every 6 (six) hours as needed. 10.7 g 0    amLODIPine (NORVASC) 10 MG tablet Take 1 tablet (10 mg total) by mouth once daily. 90 tablet 1    ascorbic acid, vitamin C, 250 mg Chew Take 250 mg by mouth once daily.      aspirin (ECOTRIN) 81 MG EC tablet Take 81 mg by mouth once daily.      clopidogreL (PLAVIX) 75 mg tablet Take 1 tablet (75 mg total) by mouth once daily. 90 tablet 1    cyanocobalamin 2000 MCG tablet Take 1 tablet by mouth once daily.      FARXIGA 10 mg tablet Take 1 tablet (10 mg total) by mouth every morning. 90 tablet 1    FREESTYLE BECCA 14 DAY SENSOR Kit USE TO CHECK GLUCOSE 4 TIMES DAILY      FREESTYLE BECCA 3 SENSOR Aleena 1 Device by Apply Externally route every 14 (fourteen) days. 3 each 5    furosemide (LASIX) 20 MG tablet Take 1 tablet (20 mg total) by mouth daily as needed (for fluid). 90 tablet 1    gabapentin (NEURONTIN) 300 MG capsule Take 1 capsule (300 mg total) by mouth 3 (three) times daily. 90 capsule 5    insulin aspart U-100 (NOVOLOG) 100 unit/mL (3 mL) InPn pen Inject 20 Units into the skin 2 (two) times a day. 12 mL 5    iron-vitamin C 100-250 mg, ICAR-C, 100-250 mg Tab Take 1 tablet by mouth 2 (two) times daily with meals.      naloxone (NARCAN) 4 mg/actuation Spry 1 spray by Nasal route once as needed.      nortriptyline (PAMELOR) 25 MG capsule Take 1 capsule (25 mg total) by mouth every evening. take at bedtime 90 capsule 1    olmesartan-hydrochlorothiazide (BENICAR HCT) 40-25 mg per tablet Take 0.5 tablets by mouth once daily. 45 tablet 1    rosuvastatin (CRESTOR) 40 MG Tab Take 1 tablet (40  mg total) by mouth once daily. 90 tablet 1    sotaloL (BETAPACE) 120 MG Tab TAKE 1 TABLET(120 MG) BY MOUTH EVERY 12 HOURS 90 tablet 1    traMADoL (ULTRAM) 50 mg tablet Take 1 tablet (50 mg total) by mouth every 8 (eight) hours as needed for Pain. 90 tablet 0    traMADoL (ULTRAM) 50 mg tablet Take 1 tablet (50 mg total) by mouth every 8 (eight) hours as needed for Pain. 90 tablet 0    traMADoL (ULTRAM) 50 mg tablet Take 1 tablet (50 mg total) by mouth every 8 (eight) hours as needed for Pain. 90 tablet 2    TRESIBA FLEXTOUCH U-100 100 unit/mL (3 mL) insulin pen Inject 24 Units into the skin once daily. Take 10 units BID 3 Pen 1    vitamin D (VITAMIN D3) 1000 units Tab Take 1,000 Units by mouth once daily.      [DISCONTINUED] citalopram (CELEXA) 10 MG tablet Take 1 tablet (10 mg total) by mouth once daily. 90 tablet 1    citalopram (CELEXA) 20 MG tablet Take 1 tablet (20 mg total) by mouth once daily. 30 tablet 11    nitroGLYCERIN (NITROSTAT) 0.4 MG SL tablet Place 1 tablet (0.4 mg total) under the tongue every 5 (five) minutes as needed for Chest pain. 30 tablet 0    [DISCONTINUED] isosorbide mononitrate (IMDUR) 30 MG 24 hr tablet Take 1 tablet (30 mg total) by mouth once daily. 30 tablet 11     No facility-administered encounter medications on file as of 7/28/2025.       History of Present Illness  63 y/o female following in neurology for prior CVA    She was seen in the hospital in October of 2023 with symptoms of numbness in left face and arm.  She had known history of A-fib with prior watchman in 2/2023.  Prior on eliquis, but it was d/c due to anemia.  Currently following with Dr. Reeves, on plavix.  Her workup during the admit included an MRI brain which was not revealing of acute findings, but did show chronic infarcts middle cerebellar peduncles and rajeev.  CTA head negative, but CTA neck showed critical stenosis right carotid siphon, Dr. Olivarez evaluated her but felt at that time was not surgical but plans  "yearly follow-up for continued monitoring.    Seen at [facility] 1/31/24 for reported slurred speech and facial numbness.  Their notes indicate MRI brain negative for acute findings, CTA head without occlusion, echo showed 70-75%, negative bubble study.      She is currently on ASA 81mg daily, plavix 75mg daily.  Was prior on eliquis but had to stop due to GI bleeding in early 2023.  She is followed in cardiology here for A-fib with watchman implant, also followed at [facility] with their last note in February of 2025 reviewed.  Followed in pain treatment for chronic lower back pain.    In past it has been difficult to accurately assess patient, this due to obvious depression which has been discussed in the past.    She was not putting forth much physical effort during exams, would become very tearful, reporting difficulty staying on task at work.  During exam when assessing facial features she would not smile, but stated she was trying but clearly there was lack of physical input on the exam.  I did explain that strokes do not prevent smiling bilateral, she would hardly open her eyes and avoided eye contact.  It was enough that I started her with celexa and did appear much improved on follow-up visits and more interactive until last visit when reporting same "unable to smile at all" symptoms but no clear neurological deficits were found on her remainder evaluations more than prior visit.  Today she still has obvious down mood and she inquires if her dosing of celexa can be increased which it easily can to 20mg daily.    What is interesting is I note in the EMR she had a neuropsychological evaluation with Dr. Farias at [facility] after last seeing me, was referred by another neurology clinic Dr. Tolbert whom I was not aware that she was also following with?           Review of Systems  Review of Systems   Constitutional:  Negative for diaphoresis and fever.   HENT:  Negative for congestion, hearing loss and tinnitus.  "   Eyes:  Negative for blurred vision, double vision, photophobia, discharge and redness.   Respiratory:  Negative for cough and shortness of breath.    Cardiovascular:  Negative for chest pain.   Gastrointestinal:  Negative for abdominal pain, nausea and vomiting.   Musculoskeletal:  Negative for back pain, falls, joint pain, myalgias and neck pain.   Skin:  Negative for itching and rash.   Neurological:  Positive for sensory change and focal weakness. Negative for dizziness, tremors, speech change, seizures, loss of consciousness, weakness and headaches.   Psychiatric/Behavioral:  Positive for depression and memory loss. Negative for hallucinations. The patient does not have insomnia.    All other systems reviewed and are negative.     Objective:     NEUROLOGICAL EXAMINATION:     MENTAL STATUS   Oriented to person, place, and time.   Attention: normal. Concentration: normal.   Speech: speech is normal   Level of consciousness: alert  Knowledge: good and consistent with education.   Normal comprehension.     CRANIAL NERVES     CN II   Visual fields full to confrontation.   Visual acuity: normal  Right visual field deficit: none  Left visual field deficit: none     CN III, IV, VI   Pupils are equal, round, and reactive to light.  Extraocular motions are normal.   Right pupil: Size: 3 mm. Shape: regular. Reactivity: brisk. Consensual response: intact. Accommodation: intact.   Left pupil: Size: 3 mm. Shape: regular. Reactivity: brisk. Consensual response: intact. Accommodation: intact.   CN III: no CN III palsy  CN VI: no CN VI palsy  Nystagmus: none   Diplopia: none  Upgaze: normal  Downgaze: normal  Conjugate gaze: present  Vestibulo-ocular reflex: present    CN V   Facial sensation intact.   Right facial sensation deficit: none  Left facial sensation deficit: none  Right corneal reflex: normal  Left corneal reflex: normal    CN VII   Facial expression full, symmetric.   Right facial weakness: none  Left facial  weakness: none  Right taste: normal  Left taste: normal    CN VIII   CN VIII normal.   Hearing: intact    CN IX, X   CN IX normal.   CN X normal.   Palate: symmetric    CN XI   CN XI normal.   Right sternocleidomastoid strength: normal  Left sternocleidomastoid strength: normal  Right trapezius strength: normal  Left trapezius strength: normal    CN XII   CN XII normal.   Tongue: not atrophic  Fasciculations: absent  Tongue deviation: none    MOTOR EXAM   Muscle bulk: normal  Overall muscle tone: normal  Right arm tone: normal  Left arm tone: normal  Right arm pronator drift: absent  Left arm pronator drift: absent  Right leg tone: normal  Left leg tone: normal    Strength   Right neck flexion: 5/5  Left neck flexion: 5/5  Right neck extension: 5/5  Left neck extension: 5/5  Right deltoid: 5/5  Left deltoid: 5/5  Right biceps: 4/5  Left biceps: 5/5  Right triceps: 4/5  Left triceps: 5/5  Right wrist flexion: 4/5  Left wrist flexion: 5/5  Right wrist extension: 4/5  Left wrist extension: 5/5  Right interossei: 5/5  Left interossei: 5/5  Right iliopsoas: 5/5  Left iliopsoas: 5/5  Right quadriceps: 5/5  Left quadriceps: 5/5  Right hamstrin/5  Left hamstrin/5  Right anterior tibial: 5/5  Left anterior tibial: 5/5  Right posterior tibial: 5/5  Left posterior tibial: 5/5  Right gastroc: 5/5  Left gastroc: 5/5       Patient very clearly not putting forth much physical effort during her exam so is limited     REFLEXES     Reflexes   Right brachioradialis: 2+  Left brachioradialis: 2+  Right biceps: 2+  Left biceps: 2+  Right triceps: 2+  Left triceps: 2+  Right patellar: 2+  Left patellar: 2+  Right achilles: 2+  Left achilles: 2+  Right plantar: normal  Left plantar: normal  Right Delaney: absent  Left Delaney: absent  Right ankle clonus: absent  Left ankle clonus: absent  Right pendular knee jerk: absent  Left pendular knee jerk: absent    SENSORY EXAM   Light touch normal.   Right arm light touch: normal  Left  arm light touch: normal  Right leg light touch: normal  Left leg light touch: normal  Vibration normal.   Right arm vibration: normal  Left arm vibration: normal  Right leg vibration: normal  Left leg vibration: normal  Proprioception normal.   Right arm proprioception: normal  Left arm proprioception: normal  Right leg proprioception: normal  Left leg proprioception: normal  Pinprick normal.   Right arm pinprick: normal  Left arm pinprick: normal  Right leg pinprick: normal  Left leg pinprick: normal  Graphesthesia: normal  Romberg: negative  Stereognosis: normal    GAIT AND COORDINATION     Gait  Gait: wide-based     Coordination   Finger to nose coordination: normal  Heel to shin coordination: normal  Tandem walking coordination: normal    Tremor   Resting tremor: absent  Intention tremor: absent  Action tremor: absent       Physical Exam  Vitals and nursing note reviewed.   Constitutional:       Appearance: Normal appearance.   HENT:      Head: Normocephalic.   Eyes:      Extraocular Movements: Extraocular movements intact and EOM normal.      Pupils: Pupils are equal, round, and reactive to light.   Cardiovascular:      Rate and Rhythm: Normal rate and regular rhythm.   Pulmonary:      Effort: Pulmonary effort is normal.      Breath sounds: Normal breath sounds.   Musculoskeletal:         General: No swelling or tenderness. Normal range of motion.      Cervical back: Normal range of motion and neck supple.      Right lower leg: No edema.      Left lower leg: No edema.   Skin:     General: Skin is warm and dry.      Coloration: Skin is not jaundiced.      Findings: No rash.   Neurological:      General: No focal deficit present.      Mental Status: She is alert and oriented to person, place, and time.      GCS: GCS eye subscore is 4. GCS verbal subscore is 5. GCS motor subscore is 6.      Cranial Nerves: No cranial nerve deficit.      Sensory: No sensory deficit.      Motor: Motor function is intact. No  weakness.      Coordination: Coordination is intact. Coordination normal. Finger-Nose-Finger Test, Heel to Shin Test and Romberg Test normal.      Gait: Gait and tandem walk normal.      Deep Tendon Reflexes: Reflexes normal.      Reflex Scores:       Tricep reflexes are 2+ on the right side and 2+ on the left side.       Bicep reflexes are 2+ on the right side and 2+ on the left side.       Brachioradialis reflexes are 2+ on the right side and 2+ on the left side.       Patellar reflexes are 2+ on the right side and 2+ on the left side.       Achilles reflexes are 2+ on the right side and 2+ on the left side.  Psychiatric:         Attention and Perception: She is inattentive.         Mood and Affect: Mood is depressed. Affect is tearful.         Speech: Speech normal.         Behavior: Behavior is withdrawn. Behavior is cooperative.         Thought Content: Thought content normal.         Cognition and Memory: Cognition is impaired.          Assessment:     Problem List Items Addressed This Visit          Neuro    Cerebrovascular accident (CVA) - Primary       Psychiatric    Depression       Cardiac/Vascular    Hypertension (Chronic)    Hyperlipidemia LDL goal <70                  Primary Diagnosis and ICD10  Cerebrovascular accident (CVA), unspecified mechanism [I63.9]    Plan:     Patient Instructions   Keep follow-up with Dr. Olivarez  Continue current plavix and aspirin  Continue current statin treatment  Continue current blood pressure medications  Keep follow-up with cardiology  Continue celexa increasing to 20mg daily      Medications Discontinued During This Encounter   Medication Reason    citalopram (CELEXA) 10 MG tablet          Requested Prescriptions     Signed Prescriptions Disp Refills    citalopram (CELEXA) 20 MG tablet 30 tablet 11     Sig: Take 1 tablet (20 mg total) by mouth once daily.       No orders of the defined types were placed in this encounter.

## 2025-09-01 DIAGNOSIS — Z79.4 TYPE 2 DIABETES MELLITUS WITH DIABETIC POLYNEUROPATHY, WITH LONG-TERM CURRENT USE OF INSULIN: Chronic | ICD-10-CM

## 2025-09-01 DIAGNOSIS — I63.9 CEREBROVASCULAR ACCIDENT (CVA), UNSPECIFIED MECHANISM: ICD-10-CM

## 2025-09-01 DIAGNOSIS — E11.42 TYPE 2 DIABETES MELLITUS WITH DIABETIC POLYNEUROPATHY, WITH LONG-TERM CURRENT USE OF INSULIN: Chronic | ICD-10-CM

## 2025-09-03 ENCOUNTER — OFFICE VISIT (OUTPATIENT)
Dept: PAIN MEDICINE | Facility: CLINIC | Age: 65
End: 2025-09-03
Payer: COMMERCIAL

## 2025-09-03 VITALS
RESPIRATION RATE: 18 BRPM | SYSTOLIC BLOOD PRESSURE: 130 MMHG | HEART RATE: 58 BPM | BODY MASS INDEX: 37.95 KG/M2 | WEIGHT: 201 LBS | DIASTOLIC BLOOD PRESSURE: 55 MMHG | HEIGHT: 61 IN

## 2025-09-03 DIAGNOSIS — Z79.899 ENCOUNTER FOR LONG-TERM (CURRENT) USE OF HIGH-RISK MEDICATION: Primary | ICD-10-CM

## 2025-09-03 DIAGNOSIS — M54.41 CHRONIC BILATERAL LOW BACK PAIN WITH BILATERAL SCIATICA: ICD-10-CM

## 2025-09-03 DIAGNOSIS — M54.17 LUMBOSACRAL RADICULOPATHY: ICD-10-CM

## 2025-09-03 DIAGNOSIS — M96.1 POSTLAMINECTOMY SYNDROME, LUMBAR: ICD-10-CM

## 2025-09-03 DIAGNOSIS — M54.42 CHRONIC BILATERAL LOW BACK PAIN WITH BILATERAL SCIATICA: ICD-10-CM

## 2025-09-03 DIAGNOSIS — G89.29 CHRONIC BILATERAL LOW BACK PAIN WITH BILATERAL SCIATICA: ICD-10-CM

## 2025-09-03 PROCEDURE — 3060F POS MICROALBUMINURIA REV: CPT | Mod: CPTII,,, | Performed by: PAIN MEDICINE

## 2025-09-03 PROCEDURE — 3075F SYST BP GE 130 - 139MM HG: CPT | Mod: CPTII,,, | Performed by: PAIN MEDICINE

## 2025-09-03 PROCEDURE — 1159F MED LIST DOCD IN RCRD: CPT | Mod: CPTII,,, | Performed by: PAIN MEDICINE

## 2025-09-03 PROCEDURE — 3078F DIAST BP <80 MM HG: CPT | Mod: CPTII,,, | Performed by: PAIN MEDICINE

## 2025-09-03 PROCEDURE — 99999 PR PBB SHADOW E&M-EST. PATIENT-LVL V: CPT | Mod: PBBFAC,,, | Performed by: PAIN MEDICINE

## 2025-09-03 PROCEDURE — 99215 OFFICE O/P EST HI 40 MIN: CPT | Mod: PBBFAC | Performed by: PAIN MEDICINE

## 2025-09-03 PROCEDURE — 99214 OFFICE O/P EST MOD 30 MIN: CPT | Mod: S$PBB,,, | Performed by: PAIN MEDICINE

## 2025-09-03 PROCEDURE — 3066F NEPHROPATHY DOC TX: CPT | Mod: CPTII,,, | Performed by: PAIN MEDICINE

## 2025-09-03 PROCEDURE — 80305 DRUG TEST PRSMV DIR OPT OBS: CPT | Mod: PBBFAC | Performed by: PAIN MEDICINE

## 2025-09-03 PROCEDURE — 3052F HG A1C>EQUAL 8.0%<EQUAL 9.0%: CPT | Mod: CPTII,,, | Performed by: PAIN MEDICINE

## 2025-09-03 PROCEDURE — 3008F BODY MASS INDEX DOCD: CPT | Mod: CPTII,,, | Performed by: PAIN MEDICINE

## 2025-09-03 PROCEDURE — 99999PBSHW POCT URINE DRUG SCREEN PRESUMP: Mod: PBBFAC,,,

## 2025-09-03 RX ORDER — ROSUVASTATIN CALCIUM 40 MG/1
40 TABLET, COATED ORAL
Qty: 90 TABLET | Refills: 0 | Status: SHIPPED | OUTPATIENT
Start: 2025-09-03

## 2025-09-03 RX ORDER — DAPAGLIFLOZIN 10 MG/1
10 TABLET, FILM COATED ORAL EVERY MORNING
Qty: 90 TABLET | Refills: 0 | Status: SHIPPED | OUTPATIENT
Start: 2025-09-03

## 2025-09-03 RX ORDER — CITALOPRAM 10 MG/1
10 TABLET ORAL
Qty: 90 TABLET | Refills: 0 | Status: SHIPPED | OUTPATIENT
Start: 2025-09-03

## 2025-09-03 RX ORDER — TRAMADOL HYDROCHLORIDE 50 MG/1
50 TABLET, FILM COATED ORAL EVERY 8 HOURS PRN
Qty: 90 TABLET | Refills: 2 | Status: SHIPPED | OUTPATIENT
Start: 2025-09-07 | End: 2025-12-06

## (undated) DEVICE — CATH VERSA-KATH RADIO-OPAQUE

## (undated) DEVICE — CANNULA OXYGEN CURVED NON-FLAIR TIP W/TUBING

## (undated) DEVICE — NEEDLE TUOHY 18G X 3 1/2 IN

## (undated) DEVICE — DEVICE BLEEDBACK CONTROL VALVE COPILOT

## (undated) DEVICE — DEVICE RADIAL TR BAND REG

## (undated) DEVICE — TOWEL OR STERILE BLUE 4/PK 20PK/CS

## (undated) DEVICE — GLOVE SURG BIOGEL SZ 7.5

## (undated) DEVICE — BAG-A-JET FLUID DISPENSER SYSTEM

## (undated) DEVICE — CATH DIAG OPTITORQUE 6.5FR JACKY 3.5

## (undated) DEVICE — SYRINGE 3CC LL 25GX5/8IN

## (undated) DEVICE — ISOVUE 370 100ML

## (undated) DEVICE — GLOVE SURGICAL PROTEXIS PI SIZE 7.5

## (undated) DEVICE — SET IV PRIMARY ALARIS (PRIMARY)

## (undated) DEVICE — APPLICATOR CHLORAPREP LITE ORANGE 10.5ML STERILE

## (undated) DEVICE — SYRINGE 150CC INJECTABLE POWER

## (undated) DEVICE — STOPCOCK 3-WAY ROTATING

## (undated) DEVICE — CDS ANGIOGRAPHY PACK

## (undated) DEVICE — CATH IMPULSE 6FR MULTIPACK

## (undated) DEVICE — GUIDEWIRE MODEL CHOICE FLOP J

## (undated) DEVICE — Device

## (undated) DEVICE — SENSOR PULSE OX ADULT

## (undated) DEVICE — POSITIONER ULNAR NERVE

## (undated) DEVICE — TOWEL SURGICAL BLUE COTTON 16INX26IN STERILE 4/PK

## (undated) DEVICE — GLOVE SURGICAL PROTEXIS PI SIZE 6

## (undated) DEVICE — SET IV SOL CONTIN-FLOW 10 DROP/ML (PRIMARY)

## (undated) DEVICE — GUIDEWIRE J .035 X 260CM

## (undated) DEVICE — GUIDEWIRE RUNTHROUGH NS 180CM CORONARY

## (undated) DEVICE — CATH COR GUIDE 6FR XB3

## (undated) DEVICE — SHEATH INTRO AVANTI 6FR 11X038 BX/5

## (undated) DEVICE — TUBING CAPNOLINE PLUS SMART 02 CONNECTOR(ORDER 65110)

## (undated) DEVICE — SET IV EXTENSION 42IN W/2 PORT CLEARLINK

## (undated) DEVICE — TRAY EPIDURAL SINGLE SHOT(PMA)

## (undated) DEVICE — CLIPPER SURGICAL BLADE ASSEMBLY STERILE SNGL USE

## (undated) DEVICE — CATH COR GUIDE 6FR JR4 100CM

## (undated) DEVICE — GLIDESHEATH SLENDER INTRODUCER 6FR

## (undated) DEVICE — SEAL ANGIO 6FR VIP - VASCULAR CLOSURE DEVICE BX/10

## (undated) DEVICE — SOL IRRIG NACL 0.9% 500ML POUR

## (undated) DEVICE — DRESSING IV TEGADERM 10X12CM

## (undated) DEVICE — KIT ANGIO MANIFOLD CUSTOM RFH LEFT HEART KIT

## (undated) DEVICE — GLOVE SURGICAL PROTEXIS PI SIZE 6.5

## (undated) DEVICE — DEVICE INFLATION BLUE DIAMOND IN7112

## (undated) DEVICE — APPLICATOR CHLORAPREP HI-LITE TINTED ORANGE 26ML

## (undated) DEVICE — KIT IV START 849

## (undated) DEVICE — CATH IV JELCO 22GX1 IN

## (undated) DEVICE — DRAPE BRACHIAL ANGIOGRAPHY TIBURON

## (undated) DEVICE — CATH COR GUIDE 6FR XB3.5